# Patient Record
Sex: FEMALE | Race: WHITE | Employment: UNEMPLOYED | URBAN - METROPOLITAN AREA
[De-identification: names, ages, dates, MRNs, and addresses within clinical notes are randomized per-mention and may not be internally consistent; named-entity substitution may affect disease eponyms.]

---

## 2018-02-24 ENCOUNTER — APPOINTMENT (OUTPATIENT)
Dept: MRI IMAGING | Age: 38
End: 2018-02-24
Attending: EMERGENCY MEDICINE
Payer: COMMERCIAL

## 2018-02-24 ENCOUNTER — APPOINTMENT (OUTPATIENT)
Dept: CT IMAGING | Age: 38
End: 2018-02-24
Attending: EMERGENCY MEDICINE
Payer: COMMERCIAL

## 2018-02-24 ENCOUNTER — HOSPITAL ENCOUNTER (EMERGENCY)
Age: 38
Discharge: HOME | End: 2018-02-25
Attending: EMERGENCY MEDICINE
Payer: COMMERCIAL

## 2018-02-24 DIAGNOSIS — R22.1 NECK SWELLING: ICD-10-CM

## 2018-02-24 DIAGNOSIS — K86.89 PANCREATIC MASS: ICD-10-CM

## 2018-02-24 DIAGNOSIS — D49.0 INTRADUCTAL PAPILLARY MUCINOUS NEOPLASM OF PANCREAS: ICD-10-CM

## 2018-02-24 DIAGNOSIS — R07.9 CHEST PAIN, UNSPECIFIED TYPE: ICD-10-CM

## 2018-02-24 DIAGNOSIS — R22.0 FACIAL SWELLING: Primary | ICD-10-CM

## 2018-02-24 LAB
ALBUMIN SERPL-MCNC: 3.7 G/DL (ref 3.5–5)
ALBUMIN/GLOB SERPL: 1.1 {RATIO} (ref 1.1–2.2)
ALP SERPL-CCNC: 112 U/L (ref 45–117)
ALT SERPL-CCNC: 26 U/L (ref 12–78)
ANION GAP SERPL CALC-SCNC: 7 MMOL/L (ref 5–15)
AST SERPL-CCNC: 25 U/L (ref 15–37)
BASOPHILS # BLD: 0.1 K/UL (ref 0–0.1)
BASOPHILS NFR BLD: 1 % (ref 0–1)
BILIRUB SERPL-MCNC: 0.3 MG/DL (ref 0.2–1)
BUN SERPL-MCNC: 7 MG/DL (ref 6–20)
BUN/CREAT SERPL: 9 (ref 12–20)
CALCIUM SERPL-MCNC: 8.8 MG/DL (ref 8.5–10.1)
CHLORIDE SERPL-SCNC: 100 MMOL/L (ref 97–108)
CK MB CFR SERPL CALC: NORMAL % (ref 0–2.5)
CK MB SERPL-MCNC: <1 NG/ML (ref 5–25)
CK SERPL-CCNC: 46 U/L (ref 26–192)
CO2 SERPL-SCNC: 31 MMOL/L (ref 21–32)
CREAT SERPL-MCNC: 0.74 MG/DL (ref 0.55–1.02)
DIFFERENTIAL METHOD BLD: ABNORMAL
EOSINOPHIL # BLD: 0.5 K/UL (ref 0–0.4)
EOSINOPHIL NFR BLD: 4 % (ref 0–7)
ERYTHROCYTE [DISTWIDTH] IN BLOOD BY AUTOMATED COUNT: 13.9 % (ref 11.5–14.5)
GLOBULIN SER CALC-MCNC: 3.5 G/DL (ref 2–4)
GLUCOSE SERPL-MCNC: 111 MG/DL (ref 65–100)
HCG UR QL: NEGATIVE
HCT VFR BLD AUTO: 41.3 % (ref 35–47)
HGB BLD-MCNC: 13.9 G/DL (ref 11.5–16)
IMM GRANULOCYTES # BLD: 0.1 K/UL (ref 0–0.04)
IMM GRANULOCYTES NFR BLD AUTO: 1 % (ref 0–0.5)
INR PPP: 1 (ref 0.9–1.1)
LIPASE SERPL-CCNC: 44 U/L (ref 73–393)
LYMPHOCYTES # BLD: 3.8 K/UL (ref 0.8–3.5)
LYMPHOCYTES NFR BLD: 32 % (ref 12–49)
MCH RBC QN AUTO: 33.4 PG (ref 26–34)
MCHC RBC AUTO-ENTMCNC: 33.7 G/DL (ref 30–36.5)
MCV RBC AUTO: 99.3 FL (ref 80–99)
MONOCYTES # BLD: 0.7 K/UL (ref 0–1)
MONOCYTES NFR BLD: 6 % (ref 5–13)
NEUTS SEG # BLD: 6.6 K/UL (ref 1.8–8)
NEUTS SEG NFR BLD: 56 % (ref 32–75)
NRBC # BLD: 0 K/UL (ref 0–0.01)
NRBC BLD-RTO: 0 PER 100 WBC
PLATELET # BLD AUTO: 348 K/UL (ref 150–400)
PMV BLD AUTO: 9.1 FL (ref 8.9–12.9)
POTASSIUM SERPL-SCNC: 3.3 MMOL/L (ref 3.5–5.1)
PROT SERPL-MCNC: 7.2 G/DL (ref 6.4–8.2)
PROTHROMBIN TIME: 10 SEC (ref 9–11.1)
RBC # BLD AUTO: 4.16 M/UL (ref 3.8–5.2)
SODIUM SERPL-SCNC: 138 MMOL/L (ref 136–145)
T4 FREE SERPL-MCNC: 0.8 NG/DL (ref 0.8–1.5)
TROPONIN I SERPL-MCNC: <0.04 NG/ML
TSH SERPL DL<=0.05 MIU/L-ACNC: 2.16 UIU/ML (ref 0.36–3.74)
WBC # BLD AUTO: 11.7 K/UL (ref 3.6–11)

## 2018-02-24 PROCEDURE — 74011250636 HC RX REV CODE- 250/636: Performed by: EMERGENCY MEDICINE

## 2018-02-24 PROCEDURE — 85025 COMPLETE CBC W/AUTO DIFF WBC: CPT | Performed by: EMERGENCY MEDICINE

## 2018-02-24 PROCEDURE — 84443 ASSAY THYROID STIM HORMONE: CPT | Performed by: EMERGENCY MEDICINE

## 2018-02-24 PROCEDURE — 74011636320 HC RX REV CODE- 636/320: Performed by: RADIOLOGY

## 2018-02-24 PROCEDURE — 99285 EMERGENCY DEPT VISIT HI MDM: CPT

## 2018-02-24 PROCEDURE — 94762 N-INVAS EAR/PLS OXIMTRY CONT: CPT

## 2018-02-24 PROCEDURE — 93005 ELECTROCARDIOGRAM TRACING: CPT

## 2018-02-24 PROCEDURE — 85610 PROTHROMBIN TIME: CPT | Performed by: EMERGENCY MEDICINE

## 2018-02-24 PROCEDURE — 84484 ASSAY OF TROPONIN QUANT: CPT | Performed by: EMERGENCY MEDICINE

## 2018-02-24 PROCEDURE — A9577 INJ MULTIHANCE: HCPCS | Performed by: EMERGENCY MEDICINE

## 2018-02-24 PROCEDURE — 71275 CT ANGIOGRAPHY CHEST: CPT

## 2018-02-24 PROCEDURE — 74011250636 HC RX REV CODE- 250/636

## 2018-02-24 PROCEDURE — 83690 ASSAY OF LIPASE: CPT | Performed by: EMERGENCY MEDICINE

## 2018-02-24 PROCEDURE — 82553 CREATINE MB FRACTION: CPT | Performed by: EMERGENCY MEDICINE

## 2018-02-24 PROCEDURE — 84439 ASSAY OF FREE THYROXINE: CPT | Performed by: EMERGENCY MEDICINE

## 2018-02-24 PROCEDURE — 74182 MRI ABDOMEN W/CONTRAST: CPT

## 2018-02-24 PROCEDURE — 96375 TX/PRO/DX INJ NEW DRUG ADDON: CPT

## 2018-02-24 PROCEDURE — 80053 COMPREHEN METABOLIC PANEL: CPT | Performed by: EMERGENCY MEDICINE

## 2018-02-24 PROCEDURE — 96361 HYDRATE IV INFUSION ADD-ON: CPT

## 2018-02-24 PROCEDURE — 81025 URINE PREGNANCY TEST: CPT

## 2018-02-24 PROCEDURE — 70450 CT HEAD/BRAIN W/O DYE: CPT

## 2018-02-24 PROCEDURE — 96374 THER/PROPH/DIAG INJ IV PUSH: CPT

## 2018-02-24 PROCEDURE — 96376 TX/PRO/DX INJ SAME DRUG ADON: CPT

## 2018-02-24 PROCEDURE — 36415 COLL VENOUS BLD VENIPUNCTURE: CPT | Performed by: EMERGENCY MEDICINE

## 2018-02-24 PROCEDURE — 74183 MRI ABD W/O CNTR FLWD CNTR: CPT

## 2018-02-24 PROCEDURE — 70491 CT SOFT TISSUE NECK W/DYE: CPT

## 2018-02-24 RX ORDER — KETOROLAC TROMETHAMINE 30 MG/ML
15 INJECTION, SOLUTION INTRAMUSCULAR; INTRAVENOUS
Status: COMPLETED | OUTPATIENT
Start: 2018-02-24 | End: 2018-02-24

## 2018-02-24 RX ORDER — HYDROMORPHONE HYDROCHLORIDE 2 MG/ML
0.5 INJECTION, SOLUTION INTRAMUSCULAR; INTRAVENOUS; SUBCUTANEOUS
Status: COMPLETED | OUTPATIENT
Start: 2018-02-24 | End: 2018-02-24

## 2018-02-24 RX ORDER — HYDROMORPHONE HYDROCHLORIDE 2 MG/ML
INJECTION, SOLUTION INTRAMUSCULAR; INTRAVENOUS; SUBCUTANEOUS
Status: COMPLETED
Start: 2018-02-24 | End: 2018-02-24

## 2018-02-24 RX ORDER — SODIUM CHLORIDE 0.9 % (FLUSH) 0.9 %
5-10 SYRINGE (ML) INJECTION AS NEEDED
Status: DISCONTINUED | OUTPATIENT
Start: 2018-02-24 | End: 2018-02-25 | Stop reason: HOSPADM

## 2018-02-24 RX ORDER — ONDANSETRON 2 MG/ML
4 INJECTION INTRAMUSCULAR; INTRAVENOUS
Status: COMPLETED | OUTPATIENT
Start: 2018-02-24 | End: 2018-02-24

## 2018-02-24 RX ORDER — HYDROMORPHONE HYDROCHLORIDE 2 MG/ML
0.5 INJECTION, SOLUTION INTRAMUSCULAR; INTRAVENOUS; SUBCUTANEOUS ONCE
Status: COMPLETED | OUTPATIENT
Start: 2018-02-24 | End: 2018-02-24

## 2018-02-24 RX ORDER — HYDROMORPHONE HYDROCHLORIDE 2 MG/ML
0.5 INJECTION, SOLUTION INTRAMUSCULAR; INTRAVENOUS; SUBCUTANEOUS ONCE
Status: DISCONTINUED | OUTPATIENT
Start: 2018-02-24 | End: 2018-02-24

## 2018-02-24 RX ADMIN — GADOBENATE DIMEGLUMINE 14 ML: 529 INJECTION, SOLUTION INTRAVENOUS at 21:07

## 2018-02-24 RX ADMIN — SODIUM CHLORIDE 1000 ML: 900 INJECTION, SOLUTION INTRAVENOUS at 15:05

## 2018-02-24 RX ADMIN — IOPAMIDOL 100 ML: 755 INJECTION, SOLUTION INTRAVENOUS at 15:47

## 2018-02-24 RX ADMIN — KETOROLAC TROMETHAMINE 15 MG: 30 INJECTION, SOLUTION INTRAMUSCULAR at 18:36

## 2018-02-24 RX ADMIN — HYDROMORPHONE HYDROCHLORIDE 0.5 MG: 2 INJECTION INTRAMUSCULAR; INTRAVENOUS; SUBCUTANEOUS at 23:28

## 2018-02-24 RX ADMIN — IOPAMIDOL 100 ML: 755 INJECTION, SOLUTION INTRAVENOUS at 16:52

## 2018-02-24 RX ADMIN — ONDANSETRON 4 MG: 2 INJECTION INTRAMUSCULAR; INTRAVENOUS at 19:52

## 2018-02-24 RX ADMIN — HYDROMORPHONE HYDROCHLORIDE 2 MG: 2 INJECTION INTRAMUSCULAR; INTRAVENOUS; SUBCUTANEOUS at 19:57

## 2018-02-24 RX ADMIN — KETOROLAC TROMETHAMINE 15 MG: 30 INJECTION, SOLUTION INTRAMUSCULAR at 17:53

## 2018-02-24 RX ADMIN — HYDROMORPHONE HYDROCHLORIDE 2 MG: 2 INJECTION, SOLUTION INTRAMUSCULAR; INTRAVENOUS; SUBCUTANEOUS at 19:57

## 2018-02-24 NOTE — DISCHARGE INSTRUCTIONS
Chest Pain: Care Instructions  Your Care Instructions    There are many things that can cause chest pain. Some are not serious and will get better on their own in a few days. But some kinds of chest pain need more testing and treatment. Your doctor may have recommended a follow-up visit in the next 8 to 12 hours. If you are not getting better, you may need more tests or treatment. Even though your doctor has released you, you still need to watch for any problems. The doctor carefully checked you, but sometimes problems can develop later. If you have new symptoms or if your symptoms do not get better, get medical care right away. If you have worse or different chest pain or pressure that lasts more than 5 minutes or you passed out (lost consciousness), call 911 or seek other emergency help right away. A medical visit is only one step in your treatment. Even if you feel better, you still need to do what your doctor recommends, such as going to all suggested follow-up appointments and taking medicines exactly as directed. This will help you recover and help prevent future problems. How can you care for yourself at home? · Rest until you feel better. · Take your medicine exactly as prescribed. Call your doctor if you think you are having a problem with your medicine. · Do not drive after taking a prescription pain medicine. When should you call for help? Call 911 if:  ? · You passed out (lost consciousness). ? · You have severe difficulty breathing. ? · You have symptoms of a heart attack. These may include:  ¨ Chest pain or pressure, or a strange feeling in your chest.  ¨ Sweating. ¨ Shortness of breath. ¨ Nausea or vomiting. ¨ Pain, pressure, or a strange feeling in your back, neck, jaw, or upper belly or in one or both shoulders or arms. ¨ Lightheadedness or sudden weakness. ¨ A fast or irregular heartbeat.   After you call 911, the  may tell you to chew 1 adult-strength or 2 to 4 low-dose aspirin. Wait for an ambulance. Do not try to drive yourself. ?Call your doctor today if:  ? · You have any trouble breathing. ? · Your chest pain gets worse. ? · You are dizzy or lightheaded, or you feel like you may faint. ? · You are not getting better as expected. ? · You are having new or different chest pain. Where can you learn more? Go to http://maria c-checo.info/. Enter A120 in the search box to learn more about \"Chest Pain: Care Instructions. \"  Current as of: March 20, 2017  Content Version: 11.4  © 4291-1656 [employer]. Care instructions adapted under license by [employer] (which disclaims liability or warranty for this information). If you have questions about a medical condition or this instruction, always ask your healthcare professional. [place] [address] any warranty or liability for your use of this information. Leg and Ankle Edema: Care Instructions  Your Care Instructions  Swelling in the legs, ankles, and feet is called edema. It is common after you sit or stand for a while. Long plane flights or car rides often cause swelling in the legs and feet. You may also have swelling if you have to stand for long periods of time at your job. Problems with the veins in the legs (varicose veins) and changes in hormones can also cause swelling. Sometimes the swelling in the ankles and feet is caused by a more serious problem, such as heart failure, infection, blood clots, or liver or kidney disease. Follow-up care is a key part of your treatment and safety. Be sure to make and go to all appointments, and call your doctor if you are having problems. It's also a good idea to know your test results and keep a list of the medicines you take. How can you care for yourself at home? · If your doctor gave you medicine, take it as prescribed.  Call your doctor if you think you are having a problem with your medicine. · Whenever you are resting, raise your legs up. Try to keep the swollen area higher than the level of your heart. · Take breaks from standing or sitting in one position. ¨ Walk around to increase the blood flow in your lower legs. ¨ Move your feet and ankles often while you stand, or tighten and relax your leg muscles. · Wear support stockings. Put them on in the morning, before swelling gets worse. · Eat a balanced diet. Lose weight if you need to. · Limit the amount of salt (sodium) in your diet. Salt holds fluid in the body and may increase swelling. When should you call for help? Call 911 anytime you think you may need emergency care. For example, call if:  ? · You have symptoms of a blood clot in your lung (called a pulmonary embolism). These may include:  ¨ Sudden chest pain. ¨ Trouble breathing. ¨ Coughing up blood. ?Call your doctor now or seek immediate medical care if:  ? · You have signs of a blood clot, such as:  ¨ Pain in your calf, back of the knee, thigh, or groin. ¨ Redness and swelling in your leg or groin. ? · You have symptoms of infection, such as:  ¨ Increased pain, swelling, warmth, or redness. ¨ Red streaks or pus. ¨ A fever. ? Watch closely for changes in your health, and be sure to contact your doctor if:  ? · Your swelling is getting worse. ? · You have new or worsening pain in your legs. ? · You do not get better as expected. Where can you learn more? Go to http://maria c-checo.info/. Enter Z233 in the search box to learn more about \"Leg and Ankle Edema: Care Instructions. \"  Current as of: March 20, 2017  Content Version: 11.4  © 5623-4479 [employer]. Care instructions adapted under license by [employer] (which disclaims liability or warranty for this information).  If you have questions about a medical condition or this instruction, always ask your healthcare professional. [place] [address] any warranty or liability for your use of this information.

## 2018-02-24 NOTE — Clinical Note
Thank you for allowing us to provide you with medical care today. We realize that you have many choices for your emergency care needs. We thank you for choosing [facility]. Please choose us in the future for any continued health care needs. We hope we addressed all of your medical concerns. We strive to provide excellent quality care in the Emergency Department. Anything less than excellent does not meet our expectations. The exam and treatment you received in the Emergency Department  were for an emergent problem and are not intended as complete care. It is important that you follow up with a doctor, nurse practitioner, or physician's assistant for ongoing care. If your symptoms worsen or you do not improve as expected and you are un able to reach your usual health care provider, you should return to the Emergency Department. We are available 24 hours a day. Take this sheet with you when you go to your follow-up visit. If you have any problem arranging the follow-up visit, co ntact the Emergency Department immediately. Make an appointment your family doctor for follow up of this visit. Return to the ER if you are unable to be seen in a timely manner.

## 2018-02-24 NOTE — ED TRIAGE NOTES
Swollen lymph nodes in neck since August. Reports that she went to [facility] Thursday and was referred to ENT. Having numbness in face and upper chest. Feels a pressure in chest for the last week.

## 2018-02-24 NOTE — ED PROVIDER NOTES
HPI Comments: 42 y.o. female with no significant past medical history who presents to the ED with chief complaint of neck swelling. Pt reports swelling in the lymph nodes in her neck onset last summer that has gotten progressively worse over the past 1-2 months. Pt now reports that for the past few days her neck swelling and pain has gotten worse and she is now having difficulty eating and swallowing, says it \"feels like fullness. \" Pt states she has also had facial numbness and chest pain that \"feels like her chest is bruised,\" says she gets a \"sharp\" pain when she takes a deep breath. Pt states today she had SOB with exertion, chest pain, and fatigue. Pt states she has also lost about 25-30 lbs since the end of October but recently gained back 5 lbs. Pt states she was seen at [facility] 2 days ago and had a negative x-ray, normal LFT's, and an elevated white count of 12,000. Pt states she has an appointment with ENT in 6 days. Pt denies hx of seasonal allergies. Pt denies fever, chills, cough, choking, voice changes, wheezing, or abdominal pain. There are no other acute medical complaints voiced at this time. Social Hx: Smoker. Uses [facility]. PCP: Siobhan Jiang MD    Note written by Roldan Valenzuela, as dictated by Ramona Bonilla MD 2:32 PM     The history is provided by the patient. No past medical history on file. Past Surgical History:   Procedure Laterality Date    HX GYN      endometriosis surgery         Family History:   Problem Relation Age of Onset    Cancer Mother      colon       Social History     Social History    Marital status:      Spouse name: N/A    Number of children: N/A    Years of education: N/A     Occupational History    Not on file.      Social History Main Topics    Smoking status: Current Every Day Smoker    Smokeless tobacco: Not on file    Alcohol use 2.5 oz/week     5 Glasses of wine per week    Drug use: Not on file    Sexual activity: Not on file     Other Topics Concern    Not on file     Social History Narrative    No narrative on file         ALLERGIES: Amoxicillin    Review of Systems   Constitutional: Positive for fatigue and unexpected weight change. Negative for chills and fever. HENT: Positive for trouble swallowing. Negative for voice change. Respiratory: Positive for shortness of breath (exertional). Negative for cough, choking and wheezing. Cardiovascular: Positive for chest pain. Gastrointestinal: Negative for abdominal pain. Musculoskeletal: Positive for neck pain (and swelling). Neurological: Positive for numbness (face). All other systems reviewed and are negative. Vitals:    02/24/18 2100 02/24/18 2322 02/24/18 2330 02/25/18 0100   BP: (!) 130/100 120/84 120/84 128/76   Pulse: 79  72    Resp: 10      Temp:       SpO2: 94%  95% 96%   Weight:       Height:                Physical Exam   Constitutional: She is oriented to person, place, and time. She appears well-developed and well-nourished. NAD, AxOx4, speaking in complete sentences    Pt exhibits a 'puffiness' bilat ant chest/ about her neck/ post head/ no  Palpable masses/ I did palpate 1 lnode ant cervical chain; does not appear cellulitis/ no redness/ stridor/ subglottic ttp;    HENT:   Head: Normocephalic and atraumatic. Nose: Nose normal.   Mouth/Throat: Oropharynx is clear and moist.   Cn intact   Eyes: Conjunctivae and EOM are normal. Pupils are equal, round, and reactive to light. Right eye exhibits no discharge. Left eye exhibits no discharge. No scleral icterus. Neck: Normal range of motion. Neck supple. No JVD present. No tracheal deviation present. An above   Cardiovascular: Normal rate, regular rhythm and normal heart sounds. Exam reveals no gallop and no friction rub. No murmur heard. Pulmonary/Chest: Effort normal and breath sounds normal. No respiratory distress. She has no wheezes. She has no rales. She exhibits no tenderness. Abdominal: Soft. Bowel sounds are normal. There is no tenderness. There is no rebound and no guarding. nttp     Genitourinary: No vaginal discharge found. Genitourinary Comments: Pt denies urinary/ vaginal complaints   Musculoskeletal: Normal range of motion. She exhibits no edema, tenderness or deformity. Lymphadenopathy:     She has cervical adenopathy. Neurological: She is alert and oriented to person, place, and time. She has normal reflexes. No cranial nerve deficit. She exhibits normal muscle tone. Coordination normal.   pt has motor/ CV/ Sensation grossly intact to all extremities, R = L in strength;   Skin: Skin is warm and dry. No rash noted. No erythema. No pallor. Psychiatric: She has a normal mood and affect. Her behavior is normal. Thought content normal.   Nursing note and vitals reviewed. [facility]      ED Course       Procedures    Chief Complaint   Patient presents with    Gland Swelling    Chest Pain       2:18 PM  The patients presenting problems have been discussed, and they are in agreement with the care plan formulated and outlined with them. I have encouraged them to ask questions as they arise throughout their visit. MEDICATIONS GIVEN:  Medications   sodium chloride (NS) flush 5-10 mL (not administered)       LABS REVIEWED:  Labs Reviewed - No data to display    RADIOLOGY RESULTS:  The following have been ordered and reviewed:  _____________________________________________________________________  _____________________________________________________________________    EKG interpretation:   Rhythm:  Sinus tachycardia  rhythm; and regular . Rate (approx.): 110; Axis: normal; P wave: normal; QRS interval: normal ; ST/T wave: normal; Negative acute significant segmental elevations/ no old;     PROCEDURES:        CONSULTATIONS:       PROGRESS NOTES:      DIAGNOSIS:    1. Facial swelling    2. Neck swelling    3. Chest pain, unspecified type    4. Pancreatic mass    5. Intraductal papillary mucinous neoplasm of pancreas        PLAN:  1- pancreatic neoplasm of pancrease - GI/ Heme-onc follow-up;  2 generalized upper body swelling - neg ed evaluation      ED COURSE: The patients hospital course has been uncomplicated. 4:18 PM  'doing ok' awaiting results;     5:30 PM  Pt/ Father told of results/ need for MRI Pancrease; they agree w/ plans;     6:05 PM  Change of shift. Care of patient signed over to Dr Colleen Ponce. Handoff complete.

## 2018-02-25 VITALS
SYSTOLIC BLOOD PRESSURE: 128 MMHG | HEIGHT: 63 IN | BODY MASS INDEX: 28.55 KG/M2 | WEIGHT: 161.16 LBS | OXYGEN SATURATION: 96 % | DIASTOLIC BLOOD PRESSURE: 76 MMHG | HEART RATE: 72 BPM | TEMPERATURE: 98.5 F | RESPIRATION RATE: 10 BRPM

## 2018-02-25 NOTE — ED NOTES
Change of shift. Care of patient taken over from Dr. Meryle Concha; H&P reviewed, handoff complete. Awaiting labs/imaging/consultant: MRI pending d/t finding on CT. Note written by Roldan Jorgensen, as dictated by Brittney Martin. Marli Murray MD 6:00 PM    Pt signed out to Dr. Ilya Smallwood at 22:30 with MRI still pending. Brittney Martin.  Marli Murray MD

## 2018-02-25 NOTE — ED NOTES
Discharge instructions given by provider, patient and  verbalized an understanding, agree to discharge plan. No further questions at this time.

## 2018-02-25 NOTE — ED NOTES
10:48 PM  Change of shift. Care of patient taken over from Gisella Polk to ; H&P reviewed, handoff complete. Awaiting labs/imaging/consultant.

## 2018-02-26 LAB
ATRIAL RATE: 109 BPM
CALCULATED P AXIS, ECG09: 55 DEGREES
CALCULATED R AXIS, ECG10: 25 DEGREES
CALCULATED T AXIS, ECG11: 23 DEGREES
DIAGNOSIS, 93000: NORMAL
P-R INTERVAL, ECG05: 128 MS
Q-T INTERVAL, ECG07: 338 MS
QRS DURATION, ECG06: 84 MS
QTC CALCULATION (BEZET), ECG08: 455 MS
VENTRICULAR RATE, ECG03: 109 BPM

## 2018-02-27 NOTE — PROGRESS NOTES
7: 07 AM  Called by radiology/ Dr Ramos Amaya confirmed pt had a GI appt for follow-up;  Pt to see Dr Soto Aver Dr Melvina Dia

## 2018-03-09 ENCOUNTER — OFFICE VISIT (OUTPATIENT)
Dept: ONCOLOGY | Age: 38
End: 2018-03-09

## 2018-03-09 VITALS
RESPIRATION RATE: 16 BRPM | HEART RATE: 68 BPM | WEIGHT: 162 LBS | TEMPERATURE: 97.9 F | SYSTOLIC BLOOD PRESSURE: 154 MMHG | DIASTOLIC BLOOD PRESSURE: 103 MMHG | OXYGEN SATURATION: 97 % | HEIGHT: 63 IN | BODY MASS INDEX: 28.7 KG/M2

## 2018-03-09 DIAGNOSIS — R22.1 NECK SWELLING: ICD-10-CM

## 2018-03-09 DIAGNOSIS — D72.820 LYMPHOCYTOSIS: Primary | ICD-10-CM

## 2018-03-09 RX ORDER — TRAZODONE HYDROCHLORIDE 50 MG/1
50 TABLET ORAL
COMMUNITY
End: 2018-04-30

## 2018-03-09 NOTE — PROGRESS NOTES
[facility] at [address], [address] [place] [address]  Danya Hove: [phone]  F: [phone]      Reason for Visit:   Ross Ealm is a 42 y.o. female who is seen in consultation at the request of Dr. Katia Cloud (ED physician) for evaluation of lymphadenopathy. History of Present Illness:   Ross Elam is a pleasant 42 y.o. female who presents today for evaluation of lymphadenopathy. She presented to the ED recently complaining of progressive neck fullness/swelling, along with pain and dysphagia. The swelling is painful and progressive, worse over the past few weeks. Some facial numbness as well. She tells me \"I can't feel when my eyes are watering. \"  Occasional chest pain, sharp, and dyspnea with exertion. Fatigue. Progressive weight loss, about 25 pounds in 6 months, though this has stabilized. Some night sweats, no fevers or chills. Tingling and numbness in arms. Prominent veins in face and chest.  She reports being told by several physicians that she likely had cancer and needed to see an oncologist.  In the ED she had imaging that revealed no evidence of adenopathy. She was noted to have an abnormality in her pancreas, and has seen GI about this. She was referred to ENT, and they could find nothing wrong. She is here for further evaluation. She is accompanied by her  today.     Past Medical History:   Diagnosis Date    Anxiety     Blurred vision     Chest pain     Depression     Frequent headaches     Muscle weakness     Shortness of breath     Stomach pain     Swallowing difficulty       Past Surgical History:   Procedure Laterality Date    HX GYN      endometriosis surgery      Social History   Substance Use Topics    Smoking status: Current Every Day Smoker    Smokeless tobacco: Never Used    Alcohol use 2.5 oz/week     5 Glasses of wine per week      Family History   Problem Relation Age of Onset    Cancer Mother      colon     Current Outpatient Prescriptions   Medication Sig    traZODone (DESYREL) 50 mg tablet Take  by mouth nightly.  duloxetine (CYMBALTA) 60 mg capsule      No current facility-administered medications for this visit. Allergies   Allergen Reactions    Amoxicillin Shortness of Breath and Rash     Pt has had keflex in the past        Review of Systems: A complete review of systems was obtained, negative except as described above. Physical Exam:     Visit Vitals    BP (!) 154/103 (BP 1 Location: Left arm, BP Patient Position: Sitting)    Pulse 68    Temp 97.9 °F (36.6 °C) (Oral)    Resp 16    Ht 5' 3\" (1.6 m)    Wt 162 lb (73.5 kg)    LMP 02/14/2018 (Exact Date)    SpO2 97%    BMI 28.7 kg/m2     General: No distress  Eyes: PERRLA, anicteric sclerae  HENT: Atraumatic, OP clear  Neck: Supple. Some soft tissue edema. Lymphatic: No cervical, supraclavicular, or inguinal adenopathy  Respiratory: CTAB, normal respiratory effort  CV: Normal rate, regular rhythm, no murmurs, no peripheral edema  GI: Soft, nontender, nondistended, no masses, no hepatomegaly, no splenomegaly  MS: Normal gait and station. Digits without clubbing or cyanosis. Skin: No rashes, ecchymoses, or petechiae. Normal temperature, turgor, and texture. Psych: Alert, oriented, appropriate affect, normal judgment/insight    Results:     Lab Results   Component Value Date/Time    WBC 11.7 (H) 02/24/2018 03:01 PM    HGB 13.9 02/24/2018 03:01 PM    HCT 41.3 02/24/2018 03:01 PM    PLATELET 164 39/77/6621 03:01 PM    MCV 99.3 (H) 02/24/2018 03:01 PM    ABS.  NEUTROPHILS 6.6 02/24/2018 03:01 PM     Lab Results   Component Value Date/Time    Sodium 138 02/24/2018 03:01 PM    Potassium 3.3 (L) 02/24/2018 03:01 PM    Chloride 100 02/24/2018 03:01 PM    CO2 31 02/24/2018 03:01 PM    Glucose 111 (H) 02/24/2018 03:01 PM    BUN 7 02/24/2018 03:01 PM    Creatinine 0.74 02/24/2018 03:01 PM    GFR est AA >60 02/24/2018 03:01 PM GFR est non-AA >60 02/24/2018 03:01 PM    Calcium 8.8 02/24/2018 03:01 PM     Lab Results   Component Value Date/Time    Bilirubin, total 0.3 02/24/2018 03:01 PM    ALT (SGPT) 26 02/24/2018 03:01 PM    AST (SGOT) 25 02/24/2018 03:01 PM    Alk. phosphatase 112 02/24/2018 03:01 PM    Protein, total 7.2 02/24/2018 03:01 PM    Albumin 3.7 02/24/2018 03:01 PM    Globulin 3.5 02/24/2018 03:01 PM         CT A/P 6/25/2011:  1. There is a well-defined lucent lesion in the pancreas as described. While this may be a pseudocyst, other cystic lesion of pancreatic parenchyma cannot be excluded. There is no evidence of pancreatitis. .  2. No other evidence of inflammatory change or acute process noted in the abdomen or pelvis. CT Head wo contrast 2/24/2018: normal    CT Neck 2/24/2018: normal, no mass or adenopathy    CTA Chest 2/24/2018  1. No evidence for pulmonary embolism. 2. Cystic pancreatic mass. Further evaluation with MRI is recommended. MRI abdomen 2/24/2018:  1. Cystic mass in the pancreatic body tail junction (28 mm), increased from 2011. Single internal septation is predominantly fine, though there is a thickened portion. New duct dilation and parenchymal atrophy in the tail. This is consistent with a low-grade mucinous neoplasm. Mucinous cystadenoma and sidebranch IPMN are favored over main duct IPMN and mucinous cystadenocarcinoma. Nonemergent GI consultation is recommended for consideration of endoscopic ultrasound. 2. Moderate hepatic steatosis. 3. At least partial annular pancreas. Records reviewed and summarized above. Assessment:   1) Neck fullness/edema  Uncertain etiology. She was referred to see me for lymphadenopathy, but CT of neck was unremarkable, no lymphadenopathy noted, no mass noted. Likewise no adenopathy on CT Chest or MRI abdomen. ENT evaluation was apparently unremarkable as well. I reassured her that she has no evidence of malignancy.   From a hematology/oncology perspective, in the absence of malignancy or adenopathy, I unfortunately dont have much to offer to help with her symptoms. I'm not sure where to go from here. We did discuss the option of rheumatology evaluation if her inflammatory labs are elevated and the symptoms persist.  She should also follow up with her PCP. 2) Pancreatic cystic lesion  Likely an IPMN. The next step in evaluation is to see GI for possible EUS. The results of this study will guide further treatment recommendations, such as surgery vs surveillance. I will defer further evaluation to GI, and remain on standby should malignancy be discovered. 3) Lymphocytosis  Relative lymphocytosis noted on recent CBC, as well as CBC back in 2011. Though absent in 2014. This is likely reactive, to whatever inflammatory issue is causing her current symptoms. Given that the cause of her symptoms remains elusive, I will check some additional labs to further evaluate. Plan:     · Labs today: CBC, LD, ESR, CRP, DOMONIQUE, flow cytometry, smear ([facility])  · Follow up with GI - has procedure planned with Dr. Reginald Saha on 3/14  · Follow up TBD      I appreciate the opportunity to participate in Ms. Alayna Hanks's care.     Signed By: Bessie Figueroa MD

## 2018-03-11 ENCOUNTER — HOSPITAL ENCOUNTER (EMERGENCY)
Age: 38
Discharge: HOME | End: 2018-03-11
Attending: STUDENT IN AN ORGANIZED HEALTH CARE EDUCATION/TRAINING PROGRAM
Payer: COMMERCIAL

## 2018-03-11 ENCOUNTER — TELEPHONE (OUTPATIENT)
Dept: ONCOLOGY | Age: 38
End: 2018-03-11

## 2018-03-11 VITALS
OXYGEN SATURATION: 93 % | SYSTOLIC BLOOD PRESSURE: 114 MMHG | BODY MASS INDEX: 29.06 KG/M2 | DIASTOLIC BLOOD PRESSURE: 83 MMHG | TEMPERATURE: 98.4 F | HEIGHT: 63 IN | RESPIRATION RATE: 16 BRPM | WEIGHT: 164 LBS | HEART RATE: 95 BPM

## 2018-03-11 DIAGNOSIS — R10.12 ABDOMINAL PAIN, LUQ (LEFT UPPER QUADRANT): Primary | ICD-10-CM

## 2018-03-11 DIAGNOSIS — Z72.0 TOBACCO ABUSE: ICD-10-CM

## 2018-03-11 LAB
ALBUMIN SERPL-MCNC: 3.3 G/DL (ref 3.5–5)
ALBUMIN/GLOB SERPL: 0.8 {RATIO} (ref 1.1–2.2)
ALP SERPL-CCNC: 113 U/L (ref 45–117)
ALT SERPL-CCNC: 47 U/L (ref 12–78)
ANION GAP SERPL CALC-SCNC: 8 MMOL/L (ref 5–15)
APPEARANCE UR: ABNORMAL
AST SERPL-CCNC: 63 U/L (ref 15–37)
BACTERIA URNS QL MICRO: ABNORMAL /HPF
BASOPHILS # BLD: 0.1 K/UL (ref 0–0.1)
BASOPHILS NFR BLD: 1 % (ref 0–1)
BILIRUB SERPL-MCNC: 0.2 MG/DL (ref 0.2–1)
BILIRUB UR QL: NEGATIVE
BUN SERPL-MCNC: 6 MG/DL (ref 6–20)
BUN/CREAT SERPL: 10 (ref 12–20)
CALCIUM SERPL-MCNC: 8.1 MG/DL (ref 8.5–10.1)
CHLORIDE SERPL-SCNC: 107 MMOL/L (ref 97–108)
CO2 SERPL-SCNC: 24 MMOL/L (ref 21–32)
COLOR UR: ABNORMAL
CREAT SERPL-MCNC: 0.61 MG/DL (ref 0.55–1.02)
DIFFERENTIAL METHOD BLD: ABNORMAL
EOSINOPHIL # BLD: 0.4 K/UL (ref 0–0.4)
EOSINOPHIL NFR BLD: 4 % (ref 0–7)
EPITH CASTS URNS QL MICRO: ABNORMAL /LPF
ERYTHROCYTE [DISTWIDTH] IN BLOOD BY AUTOMATED COUNT: 13.7 % (ref 11.5–14.5)
GLOBULIN SER CALC-MCNC: 4 G/DL (ref 2–4)
GLUCOSE SERPL-MCNC: 151 MG/DL (ref 65–100)
GLUCOSE UR STRIP.AUTO-MCNC: NEGATIVE MG/DL
HCG UR QL: NEGATIVE
HCT VFR BLD AUTO: 41.6 % (ref 35–47)
HGB BLD-MCNC: 14.5 G/DL (ref 11.5–16)
HGB UR QL STRIP: NEGATIVE
IMM GRANULOCYTES # BLD: 0.1 K/UL (ref 0–0.04)
IMM GRANULOCYTES NFR BLD AUTO: 1 % (ref 0–0.5)
KETONES UR QL STRIP.AUTO: NEGATIVE MG/DL
LEUKOCYTE ESTERASE UR QL STRIP.AUTO: ABNORMAL
LIPASE SERPL-CCNC: 57 U/L (ref 73–393)
LYMPHOCYTES # BLD: 3.3 K/UL (ref 0.8–3.5)
LYMPHOCYTES NFR BLD: 36 % (ref 12–49)
MCH RBC QN AUTO: 34.3 PG (ref 26–34)
MCHC RBC AUTO-ENTMCNC: 34.9 G/DL (ref 30–36.5)
MCV RBC AUTO: 98.3 FL (ref 80–99)
MONOCYTES # BLD: 0.6 K/UL (ref 0–1)
MONOCYTES NFR BLD: 7 % (ref 5–13)
NEUTS SEG # BLD: 4.6 K/UL (ref 1.8–8)
NEUTS SEG NFR BLD: 51 % (ref 32–75)
NITRITE UR QL STRIP.AUTO: NEGATIVE
NRBC # BLD: 0 K/UL (ref 0–0.01)
NRBC BLD-RTO: 0 PER 100 WBC
PH UR STRIP: 6 [PH] (ref 5–8)
PLATELET # BLD AUTO: 300 K/UL (ref 150–400)
PMV BLD AUTO: 9.4 FL (ref 8.9–12.9)
POTASSIUM SERPL-SCNC: 4.2 MMOL/L (ref 3.5–5.1)
PROT SERPL-MCNC: 7.3 G/DL (ref 6.4–8.2)
PROT UR STRIP-MCNC: NEGATIVE MG/DL
RBC # BLD AUTO: 4.23 M/UL (ref 3.8–5.2)
RBC #/AREA URNS HPF: ABNORMAL /HPF (ref 0–5)
SODIUM SERPL-SCNC: 139 MMOL/L (ref 136–145)
SP GR UR REFRACTOMETRY: 1.01 (ref 1–1.03)
UR CULT HOLD, URHOLD: NORMAL
UROBILINOGEN UR QL STRIP.AUTO: 0.2 EU/DL (ref 0.2–1)
WBC # BLD AUTO: 9 K/UL (ref 3.6–11)
WBC URNS QL MICRO: ABNORMAL /HPF (ref 0–4)

## 2018-03-11 PROCEDURE — 80053 COMPREHEN METABOLIC PANEL: CPT | Performed by: STUDENT IN AN ORGANIZED HEALTH CARE EDUCATION/TRAINING PROGRAM

## 2018-03-11 PROCEDURE — 96361 HYDRATE IV INFUSION ADD-ON: CPT

## 2018-03-11 PROCEDURE — 81025 URINE PREGNANCY TEST: CPT

## 2018-03-11 PROCEDURE — 96375 TX/PRO/DX INJ NEW DRUG ADDON: CPT

## 2018-03-11 PROCEDURE — 74011000250 HC RX REV CODE- 250: Performed by: STUDENT IN AN ORGANIZED HEALTH CARE EDUCATION/TRAINING PROGRAM

## 2018-03-11 PROCEDURE — 74011250636 HC RX REV CODE- 250/636: Performed by: STUDENT IN AN ORGANIZED HEALTH CARE EDUCATION/TRAINING PROGRAM

## 2018-03-11 PROCEDURE — 96374 THER/PROPH/DIAG INJ IV PUSH: CPT

## 2018-03-11 PROCEDURE — 83690 ASSAY OF LIPASE: CPT | Performed by: STUDENT IN AN ORGANIZED HEALTH CARE EDUCATION/TRAINING PROGRAM

## 2018-03-11 PROCEDURE — 36415 COLL VENOUS BLD VENIPUNCTURE: CPT | Performed by: STUDENT IN AN ORGANIZED HEALTH CARE EDUCATION/TRAINING PROGRAM

## 2018-03-11 PROCEDURE — 99284 EMERGENCY DEPT VISIT MOD MDM: CPT

## 2018-03-11 PROCEDURE — 81001 URINALYSIS AUTO W/SCOPE: CPT | Performed by: STUDENT IN AN ORGANIZED HEALTH CARE EDUCATION/TRAINING PROGRAM

## 2018-03-11 PROCEDURE — 85025 COMPLETE CBC W/AUTO DIFF WBC: CPT | Performed by: STUDENT IN AN ORGANIZED HEALTH CARE EDUCATION/TRAINING PROGRAM

## 2018-03-11 RX ORDER — KETOROLAC TROMETHAMINE 30 MG/ML
30 INJECTION, SOLUTION INTRAMUSCULAR; INTRAVENOUS ONCE
Status: COMPLETED | OUTPATIENT
Start: 2018-03-11 | End: 2018-03-11

## 2018-03-11 RX ORDER — ONDANSETRON 2 MG/ML
4 INJECTION INTRAMUSCULAR; INTRAVENOUS
Status: COMPLETED | OUTPATIENT
Start: 2018-03-11 | End: 2018-03-11

## 2018-03-11 RX ORDER — FAMOTIDINE 10 MG/ML
20 INJECTION INTRAVENOUS
Status: DISCONTINUED | OUTPATIENT
Start: 2018-03-11 | End: 2018-03-11 | Stop reason: CLARIF

## 2018-03-11 RX ORDER — MORPHINE SULFATE 2 MG/ML
4 INJECTION, SOLUTION INTRAMUSCULAR; INTRAVENOUS ONCE
Status: COMPLETED | OUTPATIENT
Start: 2018-03-11 | End: 2018-03-11

## 2018-03-11 RX ORDER — MORPHINE SULFATE 15 MG/1
15 TABLET ORAL
Qty: 7 TAB | Refills: 0 | Status: SHIPPED | OUTPATIENT
Start: 2018-03-11 | End: 2018-04-03

## 2018-03-11 RX ADMIN — FAMOTIDINE 20 MG: 10 INJECTION, SOLUTION INTRAVENOUS at 08:41

## 2018-03-11 RX ADMIN — SODIUM CHLORIDE 1000 ML: 900 INJECTION, SOLUTION INTRAVENOUS at 07:30

## 2018-03-11 RX ADMIN — KETOROLAC TROMETHAMINE 30 MG: 30 INJECTION, SOLUTION INTRAMUSCULAR at 08:41

## 2018-03-11 RX ADMIN — ONDANSETRON 4 MG: 2 INJECTION INTRAMUSCULAR; INTRAVENOUS at 07:31

## 2018-03-11 RX ADMIN — MORPHINE SULFATE 2 MG: 2 INJECTION, SOLUTION INTRAMUSCULAR; INTRAVENOUS at 07:30

## 2018-03-11 NOTE — DISCHARGE INSTRUCTIONS
Abdominal Pain: Care Instructions  Your Care Instructions    Abdominal pain has many possible causes. Some aren't serious and get better on their own in a few days. Others need more testing and treatment. If your pain continues or gets worse, you need to be rechecked and may need more tests to find out what is wrong. You may need surgery to correct the problem. Don't ignore new symptoms, such as fever, nausea and vomiting, urination problems, pain that gets worse, and dizziness. These may be signs of a more serious problem. Your doctor may have recommended a follow-up visit in the next 8 to 12 hours. If you are not getting better, you may need more tests or treatment. The doctor has checked you carefully, but problems can develop later. If you notice any problems or new symptoms, get medical treatment right away. Follow-up care is a key part of your treatment and safety. Be sure to make and go to all appointments, and call your doctor if you are having problems. It's also a good idea to know your test results and keep a list of the medicines you take. How can you care for yourself at home? · Rest until you feel better. · To prevent dehydration, drink plenty of fluids, enough so that your urine is light yellow or clear like water. Choose water and other caffeine-free clear liquids until you feel better. If you have kidney, heart, or liver disease and have to limit fluids, talk with your doctor before you increase the amount of fluids you drink. · If your stomach is upset, eat mild foods, such as rice, dry toast or crackers, bananas, and applesauce. Try eating several small meals instead of two or three large ones. · Wait until 48 hours after all symptoms have gone away before you have spicy foods, alcohol, and drinks that contain caffeine. · Do not eat foods that are high in fat. · Avoid anti-inflammatory medicines such as aspirin, ibuprofen (Advil, Motrin), and naproxen (Aleve).  These can cause stomach upset. Talk to your doctor if you take daily aspirin for another health problem. When should you call for help? Call 911 anytime you think you may need emergency care. For example, call if:  ? · You passed out (lost consciousness). ? · You pass maroon or very bloody stools. ? · You vomit blood or what looks like coffee grounds. ? · You have new, severe belly pain. ?Call your doctor now or seek immediate medical care if:  ? · Your pain gets worse, especially if it becomes focused in one area of your belly. ? · You have a new or higher fever. ? · Your stools are black and look like tar, or they have streaks of blood. ? · You have unexpected vaginal bleeding. ? · You have symptoms of a urinary tract infection. These may include:  ¨ Pain when you urinate. ¨ Urinating more often than usual.  ¨ Blood in your urine. ? · You are dizzy or lightheaded, or you feel like you may faint. ? Watch closely for changes in your health, and be sure to contact your doctor if:  ? · You are not getting better after 1 day (24 hours). Where can you learn more? Go to http://maria c-checo.info/. Enter K039 in the search box to learn more about \"Abdominal Pain: Care Instructions. \"  Current as of: March 20, 2017  Content Version: 11.4  © 9674-2435 [employer]. Care instructions adapted under license by [employer] (which disclaims liability or warranty for this information). If you have questions about a medical condition or this instruction, always ask your healthcare professional. [place] [address] any warranty or liability for your use of this information.

## 2018-03-11 NOTE — TELEPHONE ENCOUNTER
Called at 4 am requesting narcotics for severe pain and that she did not want to go to the ED. Advised that they cannot be called in and that she would have to go to the ED for evaluation.

## 2018-03-11 NOTE — ED NOTES
0915: Patient verbalizes understanding of discharge instructions. Opportunity for questions provided. Patient in no apparent distress. Pt states that her pain has decreased after receiving medication. VSS. Patient ambulatory accompanied by spouse upon discharge.

## 2018-03-11 NOTE — ED TRIAGE NOTES
TRIAGE: Patient arrives from home, escorted by her , for LEFT sided abdominal pain that began \"a long time ago and it just came to a head recently. \" Patient states she spoke to someone on call from [facility] and an on call GI physician. She states she has a mass on her pancreas which is followed by Dr. Christina Martinez and has a surgery scheduled on Wednesday. She last attempted to eat potato salad last night around 1700. Last bowel movement was 0200 this morning.  Last menstrual period was 2.14.18.

## 2018-03-11 NOTE — ED PROVIDER NOTES
HPI Comments: Patient is a 64-year-old female who presents to the emergency department with a chief complaint of left upper abdominal pain. She says this is been going on for the past 2 weeks but acutely became worse last evening. The pain is described as sharp and stabbing. Currently 9/10 in severity. Movement makes it worse. Nothing makes it better. She tried ibuprofen at home without relief. She also spoke to her usual physicians who told her to come to the ER for pain control. Patient has been worked up extensively in the past by many specialists, and currently has a endoscopic biopsy with GI on March 14 for an abnormal MRCP that showed pancreatic abnormalities. She denies fever, vomiting, chest pain, shortness breath, urinary symptoms,vaginal discharge. She admits to consuming alcohol tonight to help with her pain. Patient is a 42 y.o. female presenting with abdominal pain. The history is provided by the patient. Abdominal Pain    Associated symptoms include nausea. Pertinent negatives include no fever, no vomiting, no dysuria, no headaches, no chest pain and no back pain. Past Medical History:   Diagnosis Date    Anxiety     Blurred vision     Chest pain     Depression     Frequent headaches     Muscle weakness     Shortness of breath     Stomach pain     Swallowing difficulty        Past Surgical History:   Procedure Laterality Date    HX GYN      endometriosis surgery         Family History:   Problem Relation Age of Onset    Cancer Mother      colon    Cancer Father      skin       Social History     Social History    Marital status:      Spouse name: N/A    Number of children: N/A    Years of education: N/A     Occupational History    Not on file.      Social History Main Topics    Smoking status: Current Every Day Smoker     Packs/day: 1.00     Years: 25.00    Smokeless tobacco: Never Used    Alcohol use 1.8 oz/week     3 Glasses of wine per week    Drug use: No    Sexual activity: Yes     Other Topics Concern    Not on file     Social History Narrative         ALLERGIES: Amoxicillin    Review of Systems   Constitutional: Negative for chills and fever. HENT: Negative for sore throat. Respiratory: Negative for cough and shortness of breath. Cardiovascular: Negative for chest pain. Gastrointestinal: Positive for abdominal pain and nausea. Negative for vomiting. Genitourinary: Negative for dysuria. Musculoskeletal: Negative for back pain. Skin: Negative for rash. Neurological: Negative for syncope and headaches. Psychiatric/Behavioral: Negative for confusion. All other systems reviewed and are negative. Vitals:    03/11/18 0655   BP: 129/86   Resp: 22   Temp: 98.4 °F (36.9 °C)   SpO2: 95%   Weight: 74.4 kg (164 lb)   Height: 5' 3\" (1.6 m)            Physical Exam   Constitutional: She is oriented to person, place, and time. She appears well-developed. No distress. Tearful, hysterical   HENT:   Head: Normocephalic and atraumatic. Eyes: Conjunctivae and EOM are normal. Pupils are equal, round, and reactive to light. Neck: Normal range of motion. Neck supple. Cardiovascular: Normal rate, regular rhythm and normal heart sounds. No murmur heard. Pulmonary/Chest: Effort normal and breath sounds normal. No respiratory distress. Abdominal: Soft. Bowel sounds are normal. She exhibits no distension. There is tenderness (mild in LUQ). There is no rebound and no guarding. Musculoskeletal: Normal range of motion. She exhibits no edema. Neurological: She is alert and oriented to person, place, and time. No cranial nerve deficit. She exhibits normal muscle tone. Coordination normal.   Skin: Skin is warm and dry. No rash noted. Psychiatric: Her speech is normal and behavior is normal. Thought content normal. Her mood appears anxious. Her affect is labile. Nursing note and vitals reviewed.        MDM  Number of Diagnoses or Management Options  Diagnosis management comments: DDx: pancreatitis, gastritis, gastroparesis, hepatitis. H&P not consistent with acute cholecytitis or appendictis or other acute surgical emergency. Plan is for labs and pain control. Anticipated dc home once comfortable with outpatient follow up with GI as scheduled. ED Course       Procedures      8:45 AM  AST slightly elevated, otherwise blood work reassuring. I reevaluated the patient at 8:43 AM. She is feeling better. Abdominal exam without rebound or guarding, only mild tenderness to palpation of the left upper quadrant. Emergency medical condition stabilized today. Followup with her physicians as an outpatient recommended and encouraged her to keep her appointment with Dr. Evita Obregon on March 14. Patient agreement and understands this plan; all questions answered.

## 2018-03-13 ENCOUNTER — TELEPHONE (OUTPATIENT)
Dept: ONCOLOGY | Age: 38
End: 2018-03-13

## 2018-03-13 NOTE — TELEPHONE ENCOUNTER
Pt called and left a voicemail requesting a return call with her lab results.  Call back number for pt is [phone]

## 2018-03-13 NOTE — TELEPHONE ENCOUNTER
[address] at [facility]  [phone]    03/13/18- Informed patient that her lab results are not back yet. We will call her once we have results. She verbalized understanding.

## 2018-03-14 ENCOUNTER — ANESTHESIA EVENT (OUTPATIENT)
Dept: ENDOSCOPY | Age: 38
End: 2018-03-14
Payer: COMMERCIAL

## 2018-03-14 ENCOUNTER — HOSPITAL ENCOUNTER (OUTPATIENT)
Age: 38
Setting detail: OUTPATIENT SURGERY
Discharge: HOME | End: 2018-03-14
Attending: INTERNAL MEDICINE | Admitting: INTERNAL MEDICINE
Payer: COMMERCIAL

## 2018-03-14 ENCOUNTER — ANESTHESIA (OUTPATIENT)
Dept: ENDOSCOPY | Age: 38
End: 2018-03-14
Payer: COMMERCIAL

## 2018-03-14 VITALS
RESPIRATION RATE: 20 BRPM | SYSTOLIC BLOOD PRESSURE: 145 MMHG | WEIGHT: 161.44 LBS | HEIGHT: 63 IN | BODY MASS INDEX: 28.61 KG/M2 | OXYGEN SATURATION: 94 % | DIASTOLIC BLOOD PRESSURE: 86 MMHG | TEMPERATURE: 98.8 F | HEART RATE: 92 BPM

## 2018-03-14 LAB — HCG UR QL: NEGATIVE

## 2018-03-14 PROCEDURE — 74011000250 HC RX REV CODE- 250

## 2018-03-14 PROCEDURE — 77030022853 HC NDL ASPIR ULTRSND BSC -C: Performed by: INTERNAL MEDICINE

## 2018-03-14 PROCEDURE — 81025 URINE PREGNANCY TEST: CPT

## 2018-03-14 PROCEDURE — 88112 CYTOPATH CELL ENHANCE TECH: CPT | Performed by: INTERNAL MEDICINE

## 2018-03-14 PROCEDURE — 76060000031 HC ANESTHESIA FIRST 0.5 HR: Performed by: INTERNAL MEDICINE

## 2018-03-14 PROCEDURE — 76040000019: Performed by: INTERNAL MEDICINE

## 2018-03-14 PROCEDURE — 74011250636 HC RX REV CODE- 250/636

## 2018-03-14 RX ORDER — SODIUM CHLORIDE 9 MG/ML
50 INJECTION, SOLUTION INTRAVENOUS CONTINUOUS
Status: DISCONTINUED | OUTPATIENT
Start: 2018-03-14 | End: 2018-03-14 | Stop reason: HOSPADM

## 2018-03-14 RX ORDER — PROPOFOL 10 MG/ML
INJECTION, EMULSION INTRAVENOUS AS NEEDED
Status: DISCONTINUED | OUTPATIENT
Start: 2018-03-14 | End: 2018-03-14 | Stop reason: HOSPADM

## 2018-03-14 RX ORDER — EPINEPHRINE 0.1 MG/ML
1 INJECTION INTRACARDIAC; INTRAVENOUS
Status: DISCONTINUED | OUTPATIENT
Start: 2018-03-14 | End: 2018-03-14 | Stop reason: HOSPADM

## 2018-03-14 RX ORDER — SODIUM CHLORIDE 9 MG/ML
INJECTION, SOLUTION INTRAVENOUS
Status: DISCONTINUED | OUTPATIENT
Start: 2018-03-14 | End: 2018-03-14 | Stop reason: HOSPADM

## 2018-03-14 RX ORDER — LIDOCAINE HYDROCHLORIDE 20 MG/ML
INJECTION, SOLUTION EPIDURAL; INFILTRATION; INTRACAUDAL; PERINEURAL AS NEEDED
Status: DISCONTINUED | OUTPATIENT
Start: 2018-03-14 | End: 2018-03-14 | Stop reason: HOSPADM

## 2018-03-14 RX ORDER — FENTANYL CITRATE 50 UG/ML
100 INJECTION, SOLUTION INTRAMUSCULAR; INTRAVENOUS
Status: DISCONTINUED | OUTPATIENT
Start: 2018-03-14 | End: 2018-03-14 | Stop reason: HOSPADM

## 2018-03-14 RX ORDER — MIDAZOLAM HYDROCHLORIDE 1 MG/ML
.25-1 INJECTION, SOLUTION INTRAMUSCULAR; INTRAVENOUS
Status: DISCONTINUED | OUTPATIENT
Start: 2018-03-14 | End: 2018-03-14 | Stop reason: HOSPADM

## 2018-03-14 RX ORDER — DEXTROMETHORPHAN/PSEUDOEPHED 2.5-7.5/.8
1.2 DROPS ORAL
Status: DISCONTINUED | OUTPATIENT
Start: 2018-03-14 | End: 2018-03-14 | Stop reason: HOSPADM

## 2018-03-14 RX ORDER — NALOXONE HYDROCHLORIDE 0.4 MG/ML
0.4 INJECTION, SOLUTION INTRAMUSCULAR; INTRAVENOUS; SUBCUTANEOUS
Status: DISCONTINUED | OUTPATIENT
Start: 2018-03-14 | End: 2018-03-14 | Stop reason: HOSPADM

## 2018-03-14 RX ORDER — CHOLECALCIFEROL (VITAMIN D3) 125 MCG
CAPSULE ORAL
COMMUNITY
End: 2018-04-03

## 2018-03-14 RX ORDER — SODIUM CHLORIDE 0.9 % (FLUSH) 0.9 %
5-10 SYRINGE (ML) INJECTION AS NEEDED
Status: DISCONTINUED | OUTPATIENT
Start: 2018-03-14 | End: 2018-03-14 | Stop reason: HOSPADM

## 2018-03-14 RX ORDER — FLUMAZENIL 0.1 MG/ML
0.2 INJECTION INTRAVENOUS
Status: DISCONTINUED | OUTPATIENT
Start: 2018-03-14 | End: 2018-03-14 | Stop reason: HOSPADM

## 2018-03-14 RX ORDER — ATROPINE SULFATE 0.1 MG/ML
0.5 INJECTION INTRAVENOUS
Status: DISCONTINUED | OUTPATIENT
Start: 2018-03-14 | End: 2018-03-14 | Stop reason: HOSPADM

## 2018-03-14 RX ORDER — IBUPROFEN 200 MG
800 TABLET ORAL
COMMUNITY

## 2018-03-14 RX ORDER — SODIUM CHLORIDE 0.9 % (FLUSH) 0.9 %
5-10 SYRINGE (ML) INJECTION EVERY 8 HOURS
Status: DISCONTINUED | OUTPATIENT
Start: 2018-03-14 | End: 2018-03-14 | Stop reason: HOSPADM

## 2018-03-14 RX ADMIN — PROPOFOL 50 MG: 10 INJECTION, EMULSION INTRAVENOUS at 14:28

## 2018-03-14 RX ADMIN — LIDOCAINE HYDROCHLORIDE 80 MG: 20 INJECTION, SOLUTION EPIDURAL; INFILTRATION; INTRACAUDAL; PERINEURAL at 14:20

## 2018-03-14 RX ADMIN — PROPOFOL 50 MG: 10 INJECTION, EMULSION INTRAVENOUS at 14:27

## 2018-03-14 RX ADMIN — PROPOFOL 70 MG: 10 INJECTION, EMULSION INTRAVENOUS at 14:21

## 2018-03-14 RX ADMIN — PROPOFOL 50 MG: 10 INJECTION, EMULSION INTRAVENOUS at 14:32

## 2018-03-14 RX ADMIN — SODIUM CHLORIDE: 9 INJECTION, SOLUTION INTRAVENOUS at 14:12

## 2018-03-14 RX ADMIN — PROPOFOL 50 MG: 10 INJECTION, EMULSION INTRAVENOUS at 14:30

## 2018-03-14 RX ADMIN — PROPOFOL 50 MG: 10 INJECTION, EMULSION INTRAVENOUS at 14:22

## 2018-03-14 RX ADMIN — PROPOFOL 30 MG: 10 INJECTION, EMULSION INTRAVENOUS at 14:24

## 2018-03-14 RX ADMIN — PROPOFOL 50 MG: 10 INJECTION, EMULSION INTRAVENOUS at 14:26

## 2018-03-14 NOTE — PROCEDURES
118 [facility].  217 [facility] [address], [address]   [phone]                                Endoscopic Ultrasound    NAME:  Octavia Jose   :   1980   MRN:   [id number]       Date/Time:  3/14/2018   Procedure Type: Linear Upper EUS with cyst aspiration    Indications: Pancreatic cyst    Pre-operative Diagnosis: see indication above    Post-operative Diagnosis:  See findings below    : Ifeoma Lynn MD    Referring Provider: Louis Montenegro MD -Glenys De Jesus MD    Anethesia/Sedation:  MAC anesthesia      Procedure Details   After infom consent was obtained for the procedure, with all risks and benefits of procedure explained the patient was taken to the endoscopy suite and placed in the left lateral decubitus position. Following sequential administration of sedation as per above, the linear echoendoscope was inserted into the mouth and advanced under direct vision to second portion of the duodenum. A careful inspection was made as the gastroscope was withdrawn, including a retroflexed view of the proximal stomach; findings and interventions are described below. Findings:     Endoscopic:   Esophagus:normal   Stomach: normal    Duodenum/jejunum: normal    Ultrasound:   Esophagus: not examined   Stomach: not examined   Pancreas:     Areas examined: the entire gland    Parenchyma: -A multi-septate cyst was seen in the proximal body of pancreas. This measured about 29 X 27 mm in size. No definite communication was noted. Pancreatic duct in the tail, upstream of cyst, was mildly dilated. Rest of the pancreas and pancreatic duct downstream of the cystic lesion were normal.      Liver:     Parenchyma: not examined    Gallbladder: normal    Bile Duct: the common bile duct normal               Lymph Node: no adenopathy        Specimen Removed:  Biopsy of  the body of the pancreas    Complications: None. EBL:  None.     Interventions: Fine needle aspirate 2 cc clear was performed of the pancreas  using a 22 gauge needle with 1 of passes with preliminary results suggesting indetermined.       Recommendations:   -Await fluid cytology and CEA levels  -Follow symptoms  -Refer to Surgery for possible distal pancreatectomy for symptomatic cystic neoplasm of pancreas  -Continue current medications    Leanne Montes MD  3/14/2018  2:44 PM

## 2018-03-14 NOTE — ANESTHESIA PREPROCEDURE EVALUATION
Anesthetic History   No history of anesthetic complications  PONV          Review of Systems / Medical History  Patient summary reviewed, nursing notes reviewed and pertinent labs reviewed    Pulmonary  Within defined limits        Smoker         Neuro/Psych   Within defined limits           Cardiovascular  Within defined limits                     GI/Hepatic/Renal  Within defined limits     Hepatitis         Endo/Other  Within defined limits           Other Findings   Comments:  Thrombocytopenia, unspecified           Physical Exam    Airway  Mallampati: II  TM Distance: > 6 cm  Neck ROM: normal range of motion   Mouth opening: Normal     Cardiovascular  Regular rate and rhythm,  S1 and S2 normal,  no murmur, click, rub, or gallop             Dental  No notable dental hx       Pulmonary  Breath sounds clear to auscultation               Abdominal  GI exam deferred       Other Findings            Anesthetic Plan    ASA: 1  Anesthesia type: MAC          Induction: Intravenous  Anesthetic plan and risks discussed with: Patient

## 2018-03-14 NOTE — PROGRESS NOTES
Discussed discharge instructions with  and patient. Both verbalized understanding of written instructions.

## 2018-03-14 NOTE — H&P
118 [facility].  217 [facility] 140 [facility], [address]  [phone]                                History and Physical     NAME: Octavia Jose   :  1980   MRN:  [id number]     HPI:  The patient was seen and examined. Past Surgical History:   Procedure Laterality Date    HX GYN      endometriosis surgery     Past Medical History:   Diagnosis Date    Anxiety     Blurred vision     Chest pain     Depression     Frequent headaches     Muscle weakness     Shortness of breath     Stomach pain     Swallowing difficulty      Social History   Substance Use Topics    Smoking status: Current Every Day Smoker     Packs/day: 1.00     Years: 25.00    Smokeless tobacco: Never Used    Alcohol use 1.8 oz/week     3 Glasses of wine per week     Allergies   Allergen Reactions    Amoxicillin Shortness of Breath and Rash     Pt has had keflex in the past     Family History   Problem Relation Age of Onset    Cancer Mother      colon    Cancer Father      skin     No current facility-administered medications for this encounter. Facility-Administered Medications Ordered in Other Encounters   Medication Dose Route Frequency    0.9% sodium chloride infusion   IntraVENous CONTINUOUS         PHYSICAL EXAM:  General: WD, WN. Alert, cooperative, no acute distress    HEENT: NC, Atraumatic. PERRLA, EOMI. Anicteric sclerae. Lungs:  CTA Bilaterally. No Wheezing/Rhonchi/Rales. Heart:  Regular  rhythm,  No murmur, No Rubs, No Gallops  Abdomen: Soft, Non distended, Non tender.  +Bowel sounds, no HSM  Extremities: No c/c/e  Neurologic:  CN 2-12 gi, Alert and oriented X 3. No acute neurological distress   Psych:   Good insight. Not anxious nor agitated. The heart, lungs and mental status were satisfactory for the administration of MAC sedation and for the procedure.       Mallampati score: 2       Assessment:   · Pancreas tail cyst    Plan:   · Endoscopic procedure  · MAC sedation ·

## 2018-03-14 NOTE — IP AVS SNAPSHOT
[address] 
[phone] Patient: Purnima Leo MRN: [id number] TXM:3/80/[id number] About your hospitalization You were admitted on:  March 14, 2018 You last received care in the:  [facility] ENDOSCOPY You were discharged on:  March 14, 2018 Why you were hospitalized Your primary diagnosis was:  Not on File Follow-up Information None Discharge Orders None A check odette indicates which time of day the medication should be taken. My Medications CONTINUE taking these medications Instructions Each Dose to Equal  
 Morning Noon Evening Bedtime ALEVE 220 mg Cap Generic drug:  naproxen sodium Your last dose was: Your next dose is: Take  by mouth. DULoxetine 60 mg capsule Commonly known as:  CYMBALTA Your last dose was: Your next dose is:    
   
   
      
   
   
   
  
 morphine IR 15 mg tablet Commonly known as:  MS IR Your last dose was: Your next dose is: Take 1 Tab by mouth every four (4) hours as needed for Pain. Max Daily Amount: 90 mg.  
 15 mg MOTRIN  mg tablet Generic drug:  ibuprofen Your last dose was: Your next dose is: Take 800 mg by mouth. 800 mg  
    
   
   
   
  
 traZODone 50 mg tablet Commonly known as:  Patti Colon Your last dose was: Your next dose is: Take  by mouth nightly. Discharge Instructions 118 [facility]. 
[address]  
[phone] DISCHARGE INSTRUCTIONS Purnima Leo [id number] 
1980 DISCOMFORT: 
Sore throat- throat lozenges or warm salt water gargle 
redness at IV site- apply warm compress to area; if redness or soreness persist- contact your physician Gaseous discomfort- walking, belching will help relieve any discomfort You may not operate a vehicle for 12 hours You may not engage in an occupation involving machinery or appliances for rest of today You may not drink alcoholic beverages for at least 12 hours Avoid making any critical decisions for at least 24 hour DIET You may eat and drink after you leave. You may resume your regular diet  however -  remember your colon is empty and a heavy meal will produce gas. Avoid these foods:  vegetables, fried / greasy foods, carbonated drinks ACTIVITY You may resume your normal daily activities Spend the remainder of the day resting -  avoid any strenuous activity. CALL M.D. ANY SIGN OF Increasing pain, nausea, vomiting Abdominal distension (swelling) New increased bleeding (oral or rectal) Fever (chills) Pain in chest area Bloody discharge from nose or mouth Shortness of breath Follow-up Instructions: 
 Call Dr. Darshan Winslow for any questions or problems. If we took a biopsy please call the office within 2 weeks to discuss your                             pathology results. Telephone # [phone] Continue same medications. Introducing [facility]! Dear Mateo Michael: 
Thank you for requesting a [employer] account. Our records indicate that you already have an active [employer] account. You can access your account anytime at [email or web address] Did you know that you can access your hospital and ER discharge instructions at any time in [employer]? You can also review all of your test results from your hospital stay or ER visit. Additional Information If you have questions, please visit the Frequently Asked Questions section of the [employer] website at [email or web address] Remember, [employer] is NOT to be used for urgent needs. For medical emergencies, dial 911. Now available from your iPhone and Android! Providers Seen During Your Hospitalization Provider Specialty Primary office phone Tootie Flannery MD Gastroenterology [phone] Your Primary Care Physician (PCP) Primary Care Physician Office Phone Office Fax Ana Wagner [phone] You are allergic to the following Allergen Reactions Amoxicillin Shortness of Breath Rash Pt has had keflex in the past  
  
Recent Documentation Height Weight Breastfeeding? BMI OB Status Smoking Status 1.6 m 73.2 kg No 28.6 kg/m2 Having regular periods Current Every Day Smoker Emergency Contacts Name Discharge Info Relation Home Work Mobile [facility] [employer] CAREGIVER [3] Spouse [3] [phone] Patient Belongings The following personal items are in your possession at time of discharge: 
  Dental Appliances: None  Visual Aid: None Please provide this summary of care documentation to your next provider. Signatures-by signing, you are acknowledging that this After Visit Summary has been reviewed with you and you have received a copy. Patient Signature:  ____________________________________________________________ Date:  ____________________________________________________________  
  
Jersey Conrad Provider Signature:  ____________________________________________________________ Date:  ____________________________________________________________

## 2018-03-14 NOTE — PROGRESS NOTES

## 2018-03-14 NOTE — ROUTINE PROCESS
Bart Gamboa  1980  [id number]    Situation:  Verbal report received from: Payton Angeles  Procedure: Procedure(s):  ENDOSCOPIC ULTRASOUND (EUS) LINEAR WTIH PATH   FINE NEEDLE ASPIRATION    Background:    Preoperative diagnosis: ANNULAR PANCREAS   NEOPLASM OF TAIL OF PANCREAS   Postoperative diagnosis: 1.- Pancreatic Body and Tail [place]unction Cyst    :  Dr. Paulina Killian  Assistant(s): Endoscopy Technician-1: Leticia Oliveros  Endoscopy RN-1: Aayush Carlson RN    Specimens: * No specimens in log *  H. Pylori  no    Assessment:  Intra-procedure medications   Anesthesia gave intra-procedure sedation and medications, see anesthesia flow sheet yes    Intravenous fluids: NS@ KVO     Vital signs stable     Abdominal assessment: round and soft     Recommendation:  Discharge patient per MD order  Family or Friend   Permission to share finding with family or friend yes

## 2018-03-16 ENCOUNTER — TELEPHONE (OUTPATIENT)
Dept: ONCOLOGY | Age: 38
End: 2018-03-16

## 2018-03-16 LAB
ANA SER QL: NEGATIVE
ANALYSIS AND GATING STRATEGY: NORMAL
ASSESSMENT OF LEUKOCYTES: NORMAL
BASOPHILS # BLD AUTO: 0.1 X10E3/UL (ref 0–0.2)
BASOPHILS NFR BLD AUTO: 1 %
CLINICAL INFO: NORMAL
COMMENT , 490046: NORMAL
CRP SERPL-MCNC: 11.1 MG/L (ref 0–4.9)
EOSINOPHIL # BLD AUTO: 0.3 X10E3/UL (ref 0–0.4)
EOSINOPHIL NFR BLD AUTO: 3 %
ERYTHROCYTE [DISTWIDTH] IN BLOOD BY AUTOMATED COUNT: 14.3 % (ref 12.3–15.4)
ERYTHROCYTE [SEDIMENTATION RATE] IN BLOOD BY WESTERGREN METHOD: 4 MM/HR (ref 0–32)
HCT VFR BLD AUTO: 42.2 % (ref 34–46.6)
HGB BLD-MCNC: 14.3 G/DL (ref 11.1–15.9)
IMM GRANULOCYTES # BLD: 0.1 X10E3/UL (ref 0–0.1)
IMM GRANULOCYTES NFR BLD: 1 %
IMMUNOPHENOTYPING STUDY: NORMAL
LDH SERPL-CCNC: 178 IU/L (ref 119–226)
LYMPHOCYTES # BLD AUTO: 2.1 X10E3/UL (ref 0.7–3.1)
LYMPHOCYTES NFR BLD AUTO: 24 %
MCH RBC QN AUTO: 32.7 PG (ref 26.6–33)
MCHC RBC AUTO-ENTMCNC: 33.9 G/DL (ref 31.5–35.7)
MCV RBC AUTO: 97 FL (ref 79–97)
MONOCYTES # BLD AUTO: 0.4 X10E3/UL (ref 0.1–0.9)
MONOCYTES NFR BLD AUTO: 4 %
NEUTROPHILS # BLD AUTO: 6.1 X10E3/UL (ref 1.4–7)
NEUTROPHILS NFR BLD AUTO: 67 %
PATH INTERP SPEC-IMP: NORMAL
PATHOLOGIST NAME: NORMAL
PLATELET # BLD AUTO: 327 X10E3/UL (ref 150–379)
RBC # BLD AUTO: 4.37 X10E6/UL (ref 3.77–5.28)
SPECIMEN SOURCE: NORMAL
VIABLE CELLS NFR SPEC: NORMAL %
WBC # BLD AUTO: 9 X10E3/UL (ref 3.4–10.8)

## 2018-03-16 NOTE — TELEPHONE ENCOUNTER
[facility]  [phone]      Lab Results   Component Value Date/Time    WBC 9.0 03/11/2018 07:24 AM    HGB 14.5 03/11/2018 07:24 AM    HCT 41.6 03/11/2018 07:24 AM    PLATELET 543 63/87/0240 07:24 AM    MCV 98.3 03/11/2018 07:24 AM    ABS. NEUTROPHILS 4.6 03/11/2018 07:24 AM     Lab Results   Component Value Date/Time    Sodium 139 03/11/2018 07:24 AM    Potassium 4.2 03/11/2018 07:24 AM    Chloride 107 03/11/2018 07:24 AM    CO2 24 03/11/2018 07:24 AM    Glucose 151 (H) 03/11/2018 07:24 AM    BUN 6 03/11/2018 07:24 AM    Creatinine 0.61 03/11/2018 07:24 AM    GFR est AA >60 03/11/2018 07:24 AM    GFR est non-AA >60 03/11/2018 07:24 AM    Calcium 8.1 (L) 03/11/2018 07:24 AM     Lab Results   Component Value Date/Time    Bilirubin, total 0.2 03/11/2018 07:24 AM    ALT (SGPT) 47 03/11/2018 07:24 AM    AST (SGOT) 63 (H) 03/11/2018 07:24 AM    Alk. phosphatase 113 03/11/2018 07:24 AM    Protein, total 7.3 03/11/2018 07:24 AM    Albumin 3.3 (L) 03/11/2018 07:24 AM    Globulin 4.0 03/11/2018 07:24 AM     Lab Results   Component Value Date/Time    Vitamin B12 1457 (H) 06/26/2011 04:27 AM    Folate 8.5 06/26/2011 04:27 AM     03/09/2018 09:45 AM    Sed rate (ESR) 4 03/09/2018 09:45 AM    C-Reactive Protein, Qt 11.1 (H) 03/09/2018 09:45 AM    TSH 2.16 02/24/2018 03:01 PM    Antinuclear Antibodies Direct Negative 03/09/2018 09:45 AM    Lipase 57 (L) 03/11/2018 07:24 AM     Lab Results   Component Value Date/Time    INR 1.0 02/24/2018 03:01 PM    aPTT 39.6 (H) 06/24/2011 11:40 PM       3/9/2018  Peripheral Blood Flow Cytometry: negative      ASSESSMENT:    Labwork reviewed. I attempted to call her to discuss. No answer. I left a message for her to call me back. Labs are essentially unremarkable, with the exception of an elevate CRP. This is nonspecific, but suggestive of inflammation. CBC normal, lymphocytosis resolved.  LD normal.  Flow cytometry normal.  DOMONIQUE normal.    Given the normal labs, and normal imaging, I see no evidence of a malignancy. The patient remains worried about lymphoma causing her neck pain, but in the absence of lymphadenopathy, and with normal flow cytometry, I believe we have ruled this out. I recommend she follow up with her PCP regarding her pain, and follow up with GI regarding the pancreatic cyst lesion. Return to see me if needed for any future hematologic or oncologic issues.       Signed By: Nuria Arnold MD

## 2018-03-19 LAB
BASOPHILS # BLD AUTO: 0.1 X10E3/UL (ref 0–0.2)
BASOPHILS NFR BLD AUTO: 1 %
EOSINOPHIL # BLD AUTO: 0.3 X10E3/UL (ref 0–0.4)
EOSINOPHIL NFR BLD AUTO: 3 %
ERYTHROCYTE [DISTWIDTH] IN BLOOD BY AUTOMATED COUNT: 14.1 % (ref 12.3–15.4)
HCT VFR BLD AUTO: 44.3 % (ref 34–46.6)
HGB BLD-MCNC: 14.6 G/DL (ref 11.1–15.9)
IMM GRANULOCYTES # BLD: 0.1 X10E3/UL (ref 0–0.1)
IMM GRANULOCYTES NFR BLD: 1 %
LYMPHOCYTES # BLD AUTO: 2.4 X10E3/UL (ref 0.7–3.1)
LYMPHOCYTES NFR BLD AUTO: 24 %
MCH RBC QN AUTO: 33.3 PG (ref 26.6–33)
MCHC RBC AUTO-ENTMCNC: 33 G/DL (ref 31.5–35.7)
MCV RBC AUTO: 101 FL (ref 79–97)
MONOCYTES # BLD AUTO: 0.4 X10E3/UL (ref 0.1–0.9)
MONOCYTES NFR BLD AUTO: 4 %
NEUTROPHILS # BLD AUTO: 6.7 X10E3/UL (ref 1.4–7)
NEUTROPHILS NFR BLD AUTO: 67 %
PATH REV BLD -IMP: ABNORMAL
PATH REV BLD -IMP: NORMAL
PATH REV BLD -IMP: NORMAL
PATHOLOGIST NAME: ABNORMAL
PLATELET # BLD AUTO: 336 X10E3/UL (ref 150–379)
RBC # BLD AUTO: 4.39 X10E6/UL (ref 3.77–5.28)
WBC # BLD AUTO: 9.9 X10E3/UL (ref 3.4–10.8)

## 2018-03-21 ENCOUNTER — OFFICE VISIT (OUTPATIENT)
Dept: SURGERY | Age: 38
End: 2018-03-21

## 2018-03-21 VITALS
BODY MASS INDEX: 29.06 KG/M2 | SYSTOLIC BLOOD PRESSURE: 148 MMHG | DIASTOLIC BLOOD PRESSURE: 90 MMHG | TEMPERATURE: 98.5 F | OXYGEN SATURATION: 99 % | WEIGHT: 164 LBS | RESPIRATION RATE: 16 BRPM | HEART RATE: 96 BPM | HEIGHT: 63 IN

## 2018-03-21 DIAGNOSIS — K86.89 PANCREATIC MASS: Primary | ICD-10-CM

## 2018-03-21 PROBLEM — K86.2 PANCREATIC CYST: Status: ACTIVE | Noted: 2018-03-21

## 2018-03-21 RX ORDER — OXYCODONE AND ACETAMINOPHEN 5; 325 MG/1; MG/1
1 TABLET ORAL
Qty: 30 TAB | Refills: 0 | Status: SHIPPED | OUTPATIENT
Start: 2018-03-21 | End: 2018-04-03

## 2018-03-21 NOTE — PROGRESS NOTES
Surgery Consult    Subjective:      Rachel Crystal is a 42 y.o.  female who was referred by Dr Richard Fung for evaluation of a pancreatic neoplasm. The pt states It hurts a lot all along her left flank, and it hurts between her back bone and shoulder blade; it feels almost like a charley horse/muscle cramp. When she goes to try and stretch it, she will start experiencing cramps in her upper back. In conjunction with this, she will experience pain in the same spots when she goes to lay down in bed. Her  states that she went to the ER at [facility] about a month ago and while there, a CT scan and MRI was done which led to the \"thing\" being found on her pancreas. Additionally, the pt can feel \"two little beebees\" in the back of her mouth with her tongue. Her mouth doesn't feel very dry and her jaw will only hurt if she rests her head on her hands. The pt believes that she is abnormally swollen all around her neck, chin, and jaw; it has swelled up in the last couple of days and is sort of numb. Rx NOTE: Pt has tried to take a number of different NSAIDs but none them are working now. Her ENT gave her Percocet (Qty 20) and no one else she has asked is willing to give her any. She was also given 5-7 morphine by another provider which she states did not last her very long. NOTE:  The pt has plans to go to the [facility] at the end of 08/2018 and is concerned that her present condition and/or treatment(s) for it will interfere with it. PMHx:  In 2011/2012, the pt reports that her liver \"became sick\" because of a tick bite. Miscellaneous cytology from 03/14/2018:      MRI ABD W WO CONT from 02/24/2018: I independently reviewed these images.      Chief Complaint   Patient presents with    Mass     pancreatic       Patient Active Problem List    Diagnosis Date Noted    Rash 06/24/2011    Headache(784.0) 06/24/2011    Nausea & vomiting 06/24/2011    Hepatitis 06/24/2011    Thrombocytopenia, unspecified 06/24/2011    Abdominal pain, unspecified site 06/24/2011    Fever, unspecified 06/24/2011     Past Medical History:   Diagnosis Date    Anxiety     Blurred vision     Chest pain     Depression     Frequent headaches     Muscle weakness     Shortness of breath     Stomach pain     Swallowing difficulty       Past Surgical History:   Procedure Laterality Date    HX GYN      endometriosis surgery      Social History   Substance Use Topics    Smoking status: Current Every Day Smoker     Packs/day: 1.00     Years: 25.00    Smokeless tobacco: Never Used    Alcohol use 1.8 oz/week     3 Glasses of wine per week      Family History   Problem Relation Age of Onset    Cancer Mother      colon    Cancer Father      skin      Current Outpatient Prescriptions   Medication Sig    oxyCODONE-acetaminophen (PERCOCET) 5-325 mg per tablet Take 1 Tab by mouth every eight (8) hours as needed for Pain. Max Daily Amount: 3 Tabs.  ibuprofen (MOTRIN IB) 200 mg tablet Take 800 mg by mouth.  naproxen sodium (ALEVE) 220 mg cap Take  by mouth.  traZODone (DESYREL) 50 mg tablet Take  by mouth nightly.  duloxetine (CYMBALTA) 60 mg capsule     morphine IR (MS IR) 15 mg tablet Take 1 Tab by mouth every four (4) hours as needed for Pain. Max Daily Amount: 90 mg. No current facility-administered medications for this visit. Allergies   Allergen Reactions    Amoxicillin Shortness of Breath and Rash     Pt has had keflex in the past        Review of Systems:    A comprehensive review of systems was negative except for that written in the History of Present Illness.     Objective:     Visit Vitals    /90 (BP 1 Location: Right arm, BP Patient Position: Sitting)    Pulse 96    Temp 98.5 °F (36.9 °C) (Oral)    Resp 16    Ht 5' 3\" (1.6 m)    Wt 164 lb (74.4 kg)    LMP 03/14/2018    SpO2 99%    BMI 29.05 kg/m2         Physical Exam:  General appearance: alert, cooperative, no distress, appears stated age  Head: Normocephalic, without obvious abnormality, atraumatic  Lungs: clear to auscultation bilaterally  Heart: regular rate and rhythm, S1, S2 normal, no murmur, click, rub or gallop  Neurologic: Grossly normal  Neck: No peripheral adenopathy. Has bilateral slightly enlarged parotid glands. Abdomen: Soft, no masses, no tenderness, no organomegaly. Assessment:       ICD-10-CM ICD-9-CM    1. Pancreatic mass K86.9 577.9 oxyCODONE-acetaminophen (PERCOCET) 5-325 mg per tablet         Plan:     Patient has elected for a laparoscopic distal pancreatectomy. Risks and benefits explained and the patient will schedule an appointment at their earliest convenience. Will prescribe her 5mg Percocet tablets to take as needed at night for the pain. Written by michael Schafer, as dictated by Bryon Hirsch MD.    Total face to face time with patient: 30 minutes. Greater than 50% of the time was spent in counseling. Signed By: Bryon Hirsch MD     March 21, 2018

## 2018-03-21 NOTE — PROGRESS NOTES
1. Have you been to the ER, urgent care clinic since your last visit? Hospitalized since your last visit? Yes - in [facility]    2. Have you seen or consulted any other health care providers outside of the [address] [place] [address] since your last visit? Include any pap smears or colon screening.  No

## 2018-03-21 NOTE — MR AVS SNAPSHOT
[address] [facility] [address] [address] 
[phone] Patient: Brayden Hennessy MRN: [id number] OBQ:0/00/9780 Visit Information Date & Time Provider Department Dept. Phone Encounter #  
 3/21/2018  2:00 PM Olena Sever, [address] [phone] [id number] Follow-up Instructions Routing History Upcoming Health Maintenance Date Due Pneumococcal 19-64 Highest Risk (1 of 3 - PCV13) 8/23/1997 DTaP/Tdap/Td series (1 - Tdap) 8/23/1999 PAP AKA CERVICAL CYTOLOGY 8/23/1999 Influenza Age 5 to Adult 8/1/2017 Allergies as of 3/21/2018  Review Complete On: 3/21/2018 By: Olena Sever, MD  
  
 Severity Noted Reaction Type Reactions Amoxicillin  06/24/2011    Shortness of Breath, Rash Pt has had keflex in the past  
  
Current Immunizations  Never Reviewed No immunizations on file. Not reviewed this visit You Were Diagnosed With   
  
 Codes Comments Pancreatic mass    -  Primary ICD-10-CM: K86.9 ICD-9-CM: 577.9 Vitals BP Pulse Temp Resp Height(growth percentile) Weight(growth percentile) 148/90 (BP 1 Location: Right arm, BP Patient Position: Sitting) 96 98.5 °F (36.9 °C) (Oral) 16 5' 3\" (1.6 m) 164 lb (74.4 kg) LMP SpO2 BMI OB Status Smoking Status 03/14/2018 99% 29.05 kg/m2 Having regular periods Current Every Day Smoker BMI and BSA Data Body Mass Index Body Surface Area 29.05 kg/m 2 1.82 m 2 Preferred Pharmacy Pharmacy Name Phone [facility] [address] [facility] [phone] Your Updated Medication List  
  
   
This list is accurate as of 3/21/18  3:10 PM.  Always use your most recent med list.  
  
  
  
  
 ALEVE 220 mg Cap Generic drug:  naproxen sodium Take  by mouth. DULoxetine 60 mg capsule Commonly known as:  CYMBALTA  
  
 morphine IR 15 mg tablet Commonly known as:  MS IR Take 1 Tab by mouth every four (4) hours as needed for Pain. Max Daily Amount: 90 mg. MOTRIN  mg tablet Generic drug:  ibuprofen Take 800 mg by mouth. oxyCODONE-acetaminophen 5-325 mg per tablet Commonly known as:  PERCOCET Take 1 Tab by mouth every eight (8) hours as needed for Pain. Max Daily Amount: 3 Tabs. traZODone 50 mg tablet Commonly known as:  Lunette Grate Take  by mouth nightly. Prescriptions Printed Refills  
 oxyCODONE-acetaminophen (PERCOCET) 5-325 mg per tablet 0 Sig: Take 1 Tab by mouth every eight (8) hours as needed for Pain. Max Daily Amount: 3 Tabs. Class: Print Route: Oral  
  
Introducing [facility] SERVICES! Dear Sharona Mcnamara: 
Thank you for requesting a Mevvy account. Our records indicate that you already have an active Mevvy account. You can access your account anytime at [email or web address] Did you know that you can access your hospital and ER discharge instructions at any time in Mevvy? You can also review all of your test results from your hospital stay or ER visit. Additional Information If you have questions, please visit the Frequently Asked Questions section of the Mevvy website at [email or web address] Remember, Mevvy is NOT to be used for urgent needs. For medical emergencies, dial 911. Now available from your iPhone and Android! Please provide this summary of care documentation to your next provider. Your primary care clinician is listed as Ellen Hull. If you have any questions after today's visit, please call [phone].

## 2018-03-23 ENCOUNTER — TELEPHONE (OUTPATIENT)
Dept: SURGERY | Age: 38
End: 2018-03-23

## 2018-03-23 NOTE — TELEPHONE ENCOUNTER
I returned patients call and verified patient with two patient identifiers. Patient has questions about pain control post op, stating her mother had a horrific experience after colon surgery at [facility]. I tried to reassure her her pain will be managed post op. She states she is itching a lot, has had this in the past and is taking benadryl for it and it is working. She also states alcohol spray in the bottle help the itching. She states her stools are light brown, and her urine is dark. I encouraged her to push her fluids. She states she is very fatigued, at times having a hard time even taking a shower as her arms and legs burn like she has been working out with weights. I encouraged her to push her protein and to watch her carbs as her sugar was 150 on 3/11/18. She states she read on the internet that her 'stomach goes to sleep' after this type of surgery and she may vomit when trying to eat. I told her I could not attest to the 'stomach going to sleep' but that she will be watched closely in the hospital after surgery. She states sometimes she wakes up with bruises. I discussed all of this with Dr. Franny Ha and his thoughts were to stay the course for her scheduled surgery date, PATs and pre op appt. She states her surgery is 4/20/18 and her PATs   are on 4/13/18. I made a pre op appt. on Wed 3/18/18 with Dr. Franny Ha and  encouraged her to write down all of her questions for that appt. And to certainly call us if she has any concerns prior to that time.

## 2018-04-03 ENCOUNTER — HOSPITAL ENCOUNTER (INPATIENT)
Age: 38
LOS: 1 days | Discharge: HOME | DRG: 897 | End: 2018-04-04
Attending: EMERGENCY MEDICINE | Admitting: INTERNAL MEDICINE
Payer: COMMERCIAL

## 2018-04-03 ENCOUNTER — APPOINTMENT (OUTPATIENT)
Dept: CT IMAGING | Age: 38
DRG: 897 | End: 2018-04-03
Attending: EMERGENCY MEDICINE
Payer: COMMERCIAL

## 2018-04-03 DIAGNOSIS — E87.29 ALCOHOLIC KETOACIDOSIS: Primary | ICD-10-CM

## 2018-04-03 DIAGNOSIS — K85.20 ALCOHOL-INDUCED ACUTE PANCREATITIS WITHOUT INFECTION OR NECROSIS: ICD-10-CM

## 2018-04-03 DIAGNOSIS — R45.851 SUICIDAL IDEATION: ICD-10-CM

## 2018-04-03 DIAGNOSIS — F10.921 ALCOHOL INTOXICATION WITH DELIRIUM (HCC): ICD-10-CM

## 2018-04-03 PROBLEM — R74.8 ELEVATED LIPASE: Status: ACTIVE | Noted: 2018-04-03

## 2018-04-03 PROBLEM — F10.929 ALCOHOL INTOXICATION (HCC): Status: ACTIVE | Noted: 2018-04-03

## 2018-04-03 PROBLEM — R00.0 SINUS TACHYCARDIA: Status: ACTIVE | Noted: 2018-04-03

## 2018-04-03 PROBLEM — F10.10 ALCOHOL ABUSE: Status: ACTIVE | Noted: 2018-04-03

## 2018-04-03 LAB
ALBUMIN SERPL-MCNC: 3.9 G/DL (ref 3.5–5)
ALBUMIN/GLOB SERPL: 0.9 {RATIO} (ref 1.1–2.2)
ALP SERPL-CCNC: 144 U/L (ref 45–117)
ALT SERPL-CCNC: 52 U/L (ref 12–78)
AMPHET UR QL SCN: NEGATIVE
ANION GAP SERPL CALC-SCNC: 17 MMOL/L (ref 5–15)
APAP SERPL-MCNC: <2 UG/ML (ref 10–30)
APPEARANCE UR: CLEAR
AST SERPL-CCNC: 42 U/L (ref 15–37)
BACTERIA URNS QL MICRO: NEGATIVE /HPF
BARBITURATES UR QL SCN: NEGATIVE
BASOPHILS # BLD: 0.1 K/UL (ref 0–0.1)
BASOPHILS NFR BLD: 1 % (ref 0–1)
BENZODIAZ UR QL: NEGATIVE
BILIRUB SERPL-MCNC: 0.2 MG/DL (ref 0.2–1)
BILIRUB UR QL: NEGATIVE
BUN SERPL-MCNC: 6 MG/DL (ref 6–20)
BUN/CREAT SERPL: 9 (ref 12–20)
CALCIUM SERPL-MCNC: 9 MG/DL (ref 8.5–10.1)
CANNABINOIDS UR QL SCN: NEGATIVE
CHLORIDE SERPL-SCNC: 102 MMOL/L (ref 97–108)
CO2 SERPL-SCNC: 19 MMOL/L (ref 21–32)
COCAINE UR QL SCN: NEGATIVE
COLOR UR: ABNORMAL
CREAT SERPL-MCNC: 0.65 MG/DL (ref 0.55–1.02)
DIFFERENTIAL METHOD BLD: ABNORMAL
DRUG SCRN COMMENT,DRGCM: NORMAL
EOSINOPHIL # BLD: 0.6 K/UL (ref 0–0.4)
EOSINOPHIL NFR BLD: 5 % (ref 0–7)
EPITH CASTS URNS QL MICRO: ABNORMAL /LPF
ERYTHROCYTE [DISTWIDTH] IN BLOOD BY AUTOMATED COUNT: 12.8 % (ref 11.5–14.5)
ETHANOL SERPL-MCNC: 347 MG/DL
GLOBULIN SER CALC-MCNC: 4.3 G/DL (ref 2–4)
GLUCOSE SERPL-MCNC: 187 MG/DL (ref 65–100)
GLUCOSE UR STRIP.AUTO-MCNC: NEGATIVE MG/DL
HCG SERPL QL: NEGATIVE
HCG UR QL: NEGATIVE
HCT VFR BLD AUTO: 47.2 % (ref 35–47)
HGB BLD-MCNC: 15.9 G/DL (ref 11.5–16)
HGB UR QL STRIP: ABNORMAL
HYALINE CASTS URNS QL MICRO: ABNORMAL /LPF (ref 0–5)
IMM GRANULOCYTES # BLD: 0.1 K/UL (ref 0–0.04)
IMM GRANULOCYTES NFR BLD AUTO: 1 % (ref 0–0.5)
KETONES UR QL STRIP.AUTO: NEGATIVE MG/DL
LEUKOCYTE ESTERASE UR QL STRIP.AUTO: NEGATIVE
LIPASE SERPL-CCNC: 911 U/L (ref 73–393)
LYMPHOCYTES # BLD: 3.6 K/UL (ref 0.8–3.5)
LYMPHOCYTES NFR BLD: 30 % (ref 12–49)
MCH RBC QN AUTO: 32.7 PG (ref 26–34)
MCHC RBC AUTO-ENTMCNC: 33.7 G/DL (ref 30–36.5)
MCV RBC AUTO: 97.1 FL (ref 80–99)
METHADONE UR QL: NEGATIVE
MONOCYTES # BLD: 0.6 K/UL (ref 0–1)
MONOCYTES NFR BLD: 5 % (ref 5–13)
NEUTS SEG # BLD: 7.3 K/UL (ref 1.8–8)
NEUTS SEG NFR BLD: 60 % (ref 32–75)
NITRITE UR QL STRIP.AUTO: NEGATIVE
NRBC # BLD: 0 K/UL (ref 0–0.01)
NRBC BLD-RTO: 0 PER 100 WBC
OPIATES UR QL: NEGATIVE
PCP UR QL: NEGATIVE
PH UR STRIP: 6 [PH] (ref 5–8)
PLATELET # BLD AUTO: 389 K/UL (ref 150–400)
PMV BLD AUTO: 9.2 FL (ref 8.9–12.9)
POTASSIUM SERPL-SCNC: 3.5 MMOL/L (ref 3.5–5.1)
PROT SERPL-MCNC: 8.2 G/DL (ref 6.4–8.2)
PROT UR STRIP-MCNC: NEGATIVE MG/DL
RBC # BLD AUTO: 4.86 M/UL (ref 3.8–5.2)
RBC #/AREA URNS HPF: ABNORMAL /HPF (ref 0–5)
SALICYLATES SERPL-MCNC: 4.3 MG/DL (ref 2.8–20)
SODIUM SERPL-SCNC: 138 MMOL/L (ref 136–145)
SP GR UR REFRACTOMETRY: <1.005 (ref 1–1.03)
UR CULT HOLD, URHOLD: NORMAL
UROBILINOGEN UR QL STRIP.AUTO: 0.2 EU/DL (ref 0.2–1)
WBC # BLD AUTO: 12.2 K/UL (ref 3.6–11)
WBC URNS QL MICRO: ABNORMAL /HPF (ref 0–4)

## 2018-04-03 PROCEDURE — 81001 URINALYSIS AUTO W/SCOPE: CPT | Performed by: EMERGENCY MEDICINE

## 2018-04-03 PROCEDURE — 99284 EMERGENCY DEPT VISIT MOD MDM: CPT

## 2018-04-03 PROCEDURE — 85025 COMPLETE CBC W/AUTO DIFF WBC: CPT | Performed by: EMERGENCY MEDICINE

## 2018-04-03 PROCEDURE — 74011000250 HC RX REV CODE- 250: Performed by: EMERGENCY MEDICINE

## 2018-04-03 PROCEDURE — 80307 DRUG TEST PRSMV CHEM ANLYZR: CPT | Performed by: EMERGENCY MEDICINE

## 2018-04-03 PROCEDURE — 96372 THER/PROPH/DIAG INJ SC/IM: CPT

## 2018-04-03 PROCEDURE — 65660000000 HC RM CCU STEPDOWN

## 2018-04-03 PROCEDURE — 70450 CT HEAD/BRAIN W/O DYE: CPT

## 2018-04-03 PROCEDURE — 74011250636 HC RX REV CODE- 250/636: Performed by: EMERGENCY MEDICINE

## 2018-04-03 PROCEDURE — 83690 ASSAY OF LIPASE: CPT | Performed by: EMERGENCY MEDICINE

## 2018-04-03 PROCEDURE — 74011250636 HC RX REV CODE- 250/636: Performed by: INTERNAL MEDICINE

## 2018-04-03 PROCEDURE — 93005 ELECTROCARDIOGRAM TRACING: CPT

## 2018-04-03 PROCEDURE — 96374 THER/PROPH/DIAG INJ IV PUSH: CPT

## 2018-04-03 PROCEDURE — 81025 URINE PREGNANCY TEST: CPT

## 2018-04-03 PROCEDURE — 80053 COMPREHEN METABOLIC PANEL: CPT | Performed by: EMERGENCY MEDICINE

## 2018-04-03 PROCEDURE — 36415 COLL VENOUS BLD VENIPUNCTURE: CPT | Performed by: EMERGENCY MEDICINE

## 2018-04-03 PROCEDURE — 84703 CHORIONIC GONADOTROPIN ASSAY: CPT | Performed by: EMERGENCY MEDICINE

## 2018-04-03 RX ORDER — SODIUM CHLORIDE 0.9 % (FLUSH) 0.9 %
5-10 SYRINGE (ML) INJECTION EVERY 8 HOURS
Status: DISCONTINUED | OUTPATIENT
Start: 2018-04-03 | End: 2018-04-04 | Stop reason: HOSPADM

## 2018-04-03 RX ORDER — HYDROMORPHONE HYDROCHLORIDE 2 MG/ML
1 INJECTION, SOLUTION INTRAMUSCULAR; INTRAVENOUS; SUBCUTANEOUS ONCE
Status: COMPLETED | OUTPATIENT
Start: 2018-04-03 | End: 2018-04-03

## 2018-04-03 RX ORDER — SODIUM CHLORIDE 9 MG/ML
125 INJECTION, SOLUTION INTRAVENOUS CONTINUOUS
Status: DISCONTINUED | OUTPATIENT
Start: 2018-04-03 | End: 2018-04-04 | Stop reason: HOSPADM

## 2018-04-03 RX ORDER — ACETAMINOPHEN 500 MG
500 TABLET ORAL
COMMUNITY

## 2018-04-03 RX ORDER — ENOXAPARIN SODIUM 100 MG/ML
40 INJECTION SUBCUTANEOUS EVERY 24 HOURS
Status: DISCONTINUED | OUTPATIENT
Start: 2018-04-03 | End: 2018-04-04 | Stop reason: HOSPADM

## 2018-04-03 RX ORDER — LORAZEPAM 2 MG/ML
4 INJECTION INTRAMUSCULAR
Status: DISCONTINUED | OUTPATIENT
Start: 2018-04-03 | End: 2018-04-04 | Stop reason: HOSPADM

## 2018-04-03 RX ORDER — ZIPRASIDONE MESYLATE 20 MG/ML
10 INJECTION, POWDER, LYOPHILIZED, FOR SOLUTION INTRAMUSCULAR
Status: COMPLETED | OUTPATIENT
Start: 2018-04-03 | End: 2018-04-03

## 2018-04-03 RX ORDER — ZIPRASIDONE MESYLATE 20 MG/ML
10 INJECTION, POWDER, LYOPHILIZED, FOR SOLUTION INTRAMUSCULAR
Status: DISCONTINUED | OUTPATIENT
Start: 2018-04-03 | End: 2018-04-03

## 2018-04-03 RX ORDER — CLONAZEPAM 0.5 MG/1
0.5 TABLET ORAL
COMMUNITY
End: 2018-04-30

## 2018-04-03 RX ORDER — LORAZEPAM 2 MG/ML
2 INJECTION INTRAMUSCULAR
Status: ACTIVE | OUTPATIENT
Start: 2018-04-03 | End: 2018-04-03

## 2018-04-03 RX ORDER — WATER FOR INJECTION,STERILE
VIAL (ML) INJECTION
Status: DISPENSED
Start: 2018-04-03 | End: 2018-04-04

## 2018-04-03 RX ORDER — SODIUM CHLORIDE 0.9 % (FLUSH) 0.9 %
5-10 SYRINGE (ML) INJECTION AS NEEDED
Status: DISCONTINUED | OUTPATIENT
Start: 2018-04-03 | End: 2018-04-04 | Stop reason: HOSPADM

## 2018-04-03 RX ORDER — CLONAZEPAM 0.5 MG/1
0.25 TABLET ORAL DAILY
COMMUNITY
End: 2018-04-30

## 2018-04-03 RX ORDER — ALPRAZOLAM 0.5 MG/1
0.5 TABLET ORAL
COMMUNITY
End: 2018-04-13

## 2018-04-03 RX ORDER — LORAZEPAM 2 MG/ML
2 INJECTION INTRAMUSCULAR
Status: DISCONTINUED | OUTPATIENT
Start: 2018-04-03 | End: 2018-04-04 | Stop reason: HOSPADM

## 2018-04-03 RX ORDER — HALOPERIDOL 5 MG/ML
5 INJECTION INTRAMUSCULAR ONCE
Status: COMPLETED | OUTPATIENT
Start: 2018-04-03 | End: 2018-04-03

## 2018-04-03 RX ADMIN — ZIPRASIDONE MESYLATE 10 MG: 20 INJECTION, POWDER, LYOPHILIZED, FOR SOLUTION INTRAMUSCULAR at 17:09

## 2018-04-03 RX ADMIN — HYDROMORPHONE HYDROCHLORIDE 1 MG: 2 INJECTION, SOLUTION INTRAMUSCULAR; INTRAVENOUS; SUBCUTANEOUS at 23:03

## 2018-04-03 RX ADMIN — FOLIC ACID: 5 INJECTION, SOLUTION INTRAMUSCULAR; INTRAVENOUS; SUBCUTANEOUS at 16:53

## 2018-04-03 RX ADMIN — Medication 10 ML: at 22:40

## 2018-04-03 RX ADMIN — SODIUM CHLORIDE 1000 ML: 900 INJECTION, SOLUTION INTRAVENOUS at 16:56

## 2018-04-03 RX ADMIN — HALOPERIDOL LACTATE 5 MG: 5 INJECTION, SOLUTION INTRAMUSCULAR at 16:20

## 2018-04-03 NOTE — ED TRIAGE NOTES
Patient presents accompanied by  who states she called him 1 hour ago and asked him to come home.  found patient in upstairs bedroom, and she told him she had been drinking. When she tried to get up then, she fell and hit head twice on nightstand.

## 2018-04-03 NOTE — Clinical Note
Status[de-identified] Inpatient [101] Type of Bed: Telemetry [19] Inpatient Hospitalization Certified Necessary for the Following Reasons: 3. Patient receiving treatment that can only be provided in an inpatient setting (further clarification in H&P documentation) Admitting Diagnosis: [facility] [799734] Admitting Physician: Lutricia Barthel Attending Physician: Lutricia Barthel Estimated Length of Stay: 3-4 Midnights Discharge Plan[de-identified] Home with Office Follow-up

## 2018-04-03 NOTE — IP AVS SNAPSHOT
[address] 
[phone] Patient: Cain Gonzalez MRN: [id number] VQK:0/70/7583 A check odette indicates which time of day the medication should be taken. My Medications CONTINUE taking these medications Instructions Each Dose to Equal  
 Morning Noon Evening Bedtime  
 acetaminophen 500 mg tablet Commonly known as:  TYLENOL Your last dose was:  Resume at discharge Take 500 mg by mouth every six (6) hours as needed for Pain. 500 mg  
    
   
   
   
  
 ALPRAZolam 0.5 mg tablet Commonly known as:  Stephanie Ream Your last dose was:  Resume at discharge Take 0.5 mg by mouth daily as needed for Anxiety. 0.5 mg  
    
   
   
   
  
 * clonazePAM 0.5 mg tablet Commonly known as:  Beverely Hack Your last dose was:  Resume at discharge (not given while in hospital) Take 0.25 mg by mouth daily. 0.25 mg  
    
  
   
   
   
  
 * clonazePAM 0.5 mg tablet Commonly known as:  Beverely Hack Your last dose was:  Resume at discharge (not given while in hospital) Take 0.5 mg by mouth daily (after lunch). 0.5 mg DULoxetine 60 mg capsule Commonly known as:  CYMBALTA Your last dose was:  Resume at discharge (not given while in hospital) Take 60 mg by mouth daily. 60 mg MOTRIN  mg tablet Generic drug:  ibuprofen Your last dose was:  Resume at discharge Take 800 mg by mouth every eight (8) hours as needed. 800 mg  
    
   
   
   
  
 traZODone 50 mg tablet Commonly known as:  Teryl [place] Your last dose was:  Resume at discharge (not given while in hospital) Take 50 mg by mouth nightly. 50 mg  
    
   
   
   
  
  
 * Notice: This list has 2 medication(s) that are the same as other medications prescribed for you.  Read the directions carefully, and ask your doctor or other care provider to review them with you.

## 2018-04-03 NOTE — PROGRESS NOTES
[facility]: MED RECONCILIATION    Comments/Recommendations:    Patient is sleeping (had Haldol and Geodon earlier), but  available to provide information to the best of his knowledge   Her  reports that she had been on Fentanyl patches and Oxycodone for ~6 years and finally de-toxed November 2017. She was put on Vititrol (Naltrexone) 380 mg IM monthly for January and February 2018, but they think she had a reaction to it (itching, hives, throat closing) and it was subsequently stopped.  Quetiapine - patient was prescribed quetiapine 100 mg PO QHS on 3/4/18 but  states that he thinks she stopped taking because of the way it was making her feel    Medications added:     · Alprazolam 0.5 mg PO daily PRN (recently prescribed 3/22)  · Clonazepam 0.5 mg - take 1/2 tab (0.25 mg) PO daily and 1 tab (0.5 mg) in the afternoon (recently prescribed 3/22)    Medications removed:    · Morphine PO   · Percocet  · Naproxen    Medications adjusted:    · none    Information obtained from: patient's  [facility], Rx query    Significant PMH/Disease States:   Past Medical History:   Diagnosis Date    Anxiety     Blurred vision     Chest pain     Depression     Frequent headaches     Muscle weakness     Pancreatic cyst 3/21/2018    Shortness of breath     Stomach pain     Swallowing difficulty      Chief Complaint for this Admission:   Chief Complaint   Patient presents with    Alcohol intoxication    Fall    Head Injury     Allergies: Amoxicillin    Prior to Admission Medications:     Medication Documentation Review Audit       Reviewed by Chiqui Sahu PHARMD (Pharmacist) on 04/03/18 at [address] [place] [address] Provider Last Dose Status Taking?      acetaminophen (TYLENOL) 500 mg tablet Take 500 mg by mouth every six (6) hours as needed for Pain. Historical Provider  Active Yes    ALPRAZolam (XANAX) 0.5 mg tablet Take 0.5 mg by mouth daily as needed for Anxiety.  Historical Provider 4/3/2018 am Active Yes    clonazePAM (KLONOPIN) 0.5 mg tablet Take 0.25 mg by mouth daily. Historical Provider 4/3/2018 am Active Yes    clonazePAM (KLONOPIN) 0.5 mg tablet Take 0.5 mg by mouth daily (after lunch). Historical Provider 4/2/2018 afternoon Active Yes    duloxetine (CYMBALTA) 60 mg capsule Take 60 mg by mouth daily. Daniel Archer MD 4/3/2018 am Active Yes    ibuprofen (MOTRIN IB) 200 mg tablet Take 800 mg by mouth every eight (8) hours as needed. Historical Provider  Active Yes    traZODone (DESYREL) 50 mg tablet Take 50 mg by mouth nightly. Historical Provider 4/2/2018 pm Active Yes                  Thank you for the consult,  Kenroy GUEVARA [facility]

## 2018-04-03 NOTE — ED PROVIDER NOTES
HPI Comments: 42 y.o. female with past medical history significant for depression, anxiety, and pancreatic cyst who presents from home with chief complaint of alcohol intoxication. As per the , he called the pt, who was at home, around noon and believed that she was intoxicated. The  came home, found the pt to be intoxicated, and witnessed the pt fall and hit her head on the nightstand. The pt also slid down some stairs today as well. The  reports that he was surprised to see the pt drinking at this time. The pt normally drinks 3-6 drinks a day. The  reports that the pt has been depressed recently and it was noticeable one day ago. The  reports that she has been depressed about an upcoming pancreatic cyst removal on 4/20. The pt saw GI in February to have a biopsy of the cyst and it was benign. The  reports that the pt was seen at [address] [place] [address] one week ago for taking too many pills. The pt had a psychiatric evaluation and was discharged home. The pt takes antidepressants and anxiolytics. The pt was recently started on Xanax and Clonopin. The pt has been on a pain regiment with Fentanyl patches and other narcotics for pain. The  reports that the pt has been out of her percocet. In the ED, the pt vocalized SI as her reason for drinking. The  was not aware of SI from the pt prior to today. The pt denies taking pills along with drinking. There are no other acute medical concerns at this time. Social hx: current smoker, EtOH use  PCP: Rosalina Melton MD    Full history, physical exam, and ROS unable to be obtained due to:  intoxication. Note written by Roldan Cisneros, as dictated by Cliff Dalal MD 4:20 PM      The history is provided by the patient. No  was used.         Past Medical History:   Diagnosis Date    Anxiety     Blurred vision     Chest pain     Depression     Frequent headaches     Muscle weakness     Pancreatic cyst 3/21/2018    Shortness of breath     Stomach pain     Swallowing difficulty        Past Surgical History:   Procedure Laterality Date    HX GYN      endometriosis surgery         Family History:   Problem Relation Age of Onset    Cancer Mother      colon    Cancer Father      skin       Social History     Social History    Marital status:      Spouse name: N/A    Number of children: N/A    Years of education: N/A     Occupational History    Not on file. Social History Main Topics    Smoking status: Current Every Day Smoker     Packs/day: 1.00     Years: 25.00    Smokeless tobacco: Never Used    Alcohol use 1.8 oz/week     3 Glasses of wine per week    Drug use: No    Sexual activity: Yes     Other Topics Concern    Not on file     Social History Narrative         ALLERGIES: Amoxicillin    Review of Systems   Unable to perform ROS: Other       Vitals:    04/03/18 1542   BP: 127/90   Pulse: (!) 120   Resp: 22   Temp: 96.8 °F (36 °C)   SpO2: 98%   Weight: 70.3 kg (155 lb)   Height: 5' 3\" (1.6 m)            Physical Exam   Constitutional: She appears well-developed and well-nourished. No distress. Crying; Intoxicated;     HENT:   Head: Normocephalic and atraumatic. Right Ear: External ear normal.   Left Ear: External ear normal.   Nose: Nose normal.   Mouth/Throat: Oropharynx is clear and moist.   Eyes: Conjunctivae and EOM are normal. Pupils are equal, round, and reactive to light. Neck: Normal range of motion. Neck supple. No JVD present. No thyromegaly present. Cardiovascular: Normal rate, regular rhythm, normal heart sounds and intact distal pulses. No murmur heard. Pulmonary/Chest: Effort normal and breath sounds normal. No respiratory distress. She has no wheezes. She has no rales. Abdominal: Soft. Bowel sounds are normal. She exhibits no distension. There is no tenderness. Musculoskeletal: Normal range of motion. She exhibits no edema.    No external evidence of injury Neurological: She is alert. No cranial nerve deficit. nonfocal neurological exam   Skin: Skin is warm and dry. No rash noted. Psychiatric:   Depressed affect;  Voices SI;     Nursing note and vitals reviewed. Note written by Roldan Simpson, as dictated by Jay Garcia MD 4:21 PM      MDM  Number of Diagnoses or Management Options  Alcohol intoxication with delirium [facility]):   Alcoholic ketoacidosis:   Alcohol-induced acute pancreatitis without infection or necrosis:   Suicidal ideation:   Diagnosis management comments: Acute alcohol ketoacidosis. Pancreatitis. Depression with SI. Will admit to the hospitalist for further evaluation with [facility]. ED Course       Procedures  4:33 PM  [facility] will come see the pt.     5:02 PM  UA is clean. Drug screen is pending. Acetaminophen is less than 2. Salicylate is 4.3. Chemistry is significant for a bicarb of 19 with an anion gap elevation. Lipase is 911. WBC is 12.2. Alcohol is 347. Normal head CT. CONSULT NOTE:  5:03 PM Jay Garcia MD spoke with Dr. Collin Allen, Consult for Hospitalist.  Discussed available diagnostic tests and clinical findings. He will see and admit the pt. ED EKG interpretation:  Rhythm: sinus tachycardia; and regular .  Rate (approx.): 112; Axis: normal; ST/T wave: normal;   No ectopy  Note written by Roldan Simpson, as dictated by Jay Garcia MD 5:53 PM

## 2018-04-03 NOTE — H&P
[address] 19  [phone]    Admission History and Physical      NAME:  Mina Acosta   :   1980   MRN:  [id number]     PCP:  Aleksey Iyer MD     Date/Time:  4/3/2018         Subjective:     CHIEF COMPLAINT: brought due to alcohol intoxication      HISTORY OF PRESENT ILLNESS:     Ms. Surjit Roberts is a 42 y.o.  female who is admitted with alcohol intoxication. Ms. Surjit Roberts with PMH of alcohol abuse, anxiety, depression, pancreatis cyst was brought to ER after she was intoxicated with alcohol. Pt is sleeping, sedated and unable to provide history. History was obtained from her . According to him, this afternoon, which he was taking on the phone with her, he noted that she was drunk. Later when he arrive at home, found her very intoxicated and fell twice hitting her head. She has a lot of stress recently due to her upcoming pancreatic surgery. When the ER physician asked her about hearting herself, she said yes. Past Medical History:   Diagnosis Date    Anxiety     Blurred vision     Chest pain     Depression     Frequent headaches     Muscle weakness     Pancreatic cyst 3/21/2018    Shortness of breath     Stomach pain     Swallowing difficulty         Past Surgical History:   Procedure Laterality Date    HX GYN      endometriosis surgery       Social History   Substance Use Topics    Smoking status: Current Every Day Smoker     Packs/day: 1.00     Years: 25.00    Smokeless tobacco: Never Used    Alcohol use 1.8 oz/week     3 Glasses of wine per week        Family History   Problem Relation Age of Onset    Cancer Mother      colon    Cancer Father      skin        Allergies   Allergen Reactions    Amoxicillin Shortness of Breath and Rash     Pt has had keflex in the past        Prior to Admission medications    Medication Sig Start Date End Date Taking?  Authorizing Provider oxyCODONE-acetaminophen (PERCOCET) 5-325 mg per tablet Take 1 Tab by mouth every eight (8) hours as needed for Pain. Max Daily Amount: 3 Tabs. 3/21/18   Norma Thomas MD   ibuprofen (MOTRIN IB) 200 mg tablet Take 800 mg by mouth. Historical Provider   naproxen sodium (ALEVE) 220 mg cap Take  by mouth. Historical Provider   morphine IR (MS IR) 15 mg tablet Take 1 Tab by mouth every four (4) hours as needed for Pain. Max Daily Amount: 90 mg. 3/11/18   Aldo Scanlon MD   traZODone (DESYREL) 50 mg tablet Take  by mouth nightly.     Historical Provider   duloxetine (CYMBALTA) 60 mg capsule  10/7/14   Daniel Archer MD         Review of Systems:   pt unable to provide Hx due to sedation        Objective:      VITALS:    Vital signs reviewed; most recent are:    Visit Vitals    /78    Pulse (!) 113    Temp 96.8 °F (36 °C)    Resp 18    Ht 5' 3\" (1.6 m)    Wt 70.3 kg (155 lb)    SpO2 98%    BMI 27.46 kg/m2     SpO2 Readings from Last 6 Encounters:   04/03/18 98%   03/21/18 99%   03/14/18 94%   03/11/18 93%   03/09/18 97%   02/25/18 96%        No intake or output data in the 24 hours ending 04/03/18 1746         Exam:     Physical Exam:    Gen:  Well-developed, well-nourished, in no acute distress  HEENT:  Pink conjunctivae, PERRL, hearing intact to voice, moist mucous membranes  Neck:  Supple, without masses, thyroid non-tender  Resp:  No accessory muscle use, clear breath sounds without wheezes rales or rhonchi  Card:  No murmurs, normal S1, S2 without thrills, bruits or peripheral edema  Abd:  Soft, non-tender, non-distended, normoactive bowel sounds are present, no palpable organomegaly and no detectable hernias  Lymph:  No cervical or inguinal adenopathy  Musc:  No cyanosis or clubbing  Skin:  No rashes or ulcers, skin turgor is good  Neuro:  sedated  Psych:  Sedated        Labs:    Recent Labs      04/03/18   1605   WBC  12.2*   HGB  15.9   HCT  47.2*   PLT  389     Recent Labs      04/03/18 1605   NA  138   K  3.5   CL  102   CO2  19*   GLU  187*   BUN  6   CREA  0.65   CA  9.0   ALB  3.9   TBILI  0.2   SGOT  42*   ALT  52     No results found for: GLUCPOC  No results for input(s): PH, PCO2, PO2, HCO3, FIO2 in the last 72 hours. No results for input(s): INR in the last 72 hours. No lab exists for component: INREXT    Telemetry reviewed:  Sinus tachy       Assessment/Plan:    1. Alcohol intoxication ([address] (4/3/2018)/ Alcohol abuse (4/3/2018). Admit to telemetry. IVF, goody bag, start CIWA protocol. Monitor closely     2. Suicidal ideation (4/3/2018). Suicide precaution. Consult psychiatry. 3.   High anion gap metabolic acidosis (3/6/8828). likely secondary to alcohol abuse. Continue IVF and monitor labs     4. Elevated lipase (4/3/2018)/ pancreatic cyst. Keep NPO. Has upcoming pancreatic surgery. Consult GI, Dr Keven Raza. 5.  Sinus tachycardia (4/3/2018). This is likely secondary to alcohol intoxication. Continue IVF and monitor clinically.           Previous medical records reviewed     Risk of deterioration: high      Total time spent with patient: 79 [address] discussed with: Family, Nursing Staff and >50% of time spent in counseling and coordination of care    Discussed:  Care Plan    Prophylaxis:  Lovenox    Probable Disposition:  Home w/Family           ___________________________________________________    Attending Physician: Tavares Blanc MD

## 2018-04-03 NOTE — ED NOTES
Patient crying and writhing in wheelchair during triage assessment. Stated, \"I just want to die. \" Patient significantly intoxicated.  denies patient verbalizing any suicidal thoughts recently. Charge Nurse, Jessica Dobson, aware.

## 2018-04-03 NOTE — BSMART NOTE
4:20pm: Attempted to contact ER provider regarding consult. Was asked to call back as ER provider is on the phone. 4:30pm: Discussed patient with Dr. Ildefonso Galarza. Patient reports suicidal ideation and an overdose on pills last week resulting in a visit to [facility] but was released. Patient is currently very intoxicated and has been hitting herself and required medication to help calm her down. Patient is getting a CT and fluids and may be appropriate for assessment after 6pm. Oncoming BSMART counselor will be informed of consult request and will attempt assessment at that time. Dr. Ildefonso Galarza is in agreement with this plan.    5:58pm: Per chart review patient is being medically admitted. Will inform incoming BSMART counselor of medical admission.     Narendra Ulloa [facility]

## 2018-04-03 NOTE — IP AVS SNAPSHOT
[address] 
[phone] Patient: Farooq Moss MRN: [id number] BBP:5/52/0970 About your hospitalization You were admitted on:  April 3, 2018 You last received care in the:  [facility] 3 PROG CARE TELE 2 You were discharged on:  April 4, 2018 Why you were hospitalized Your primary diagnosis was:  Not on File Your diagnoses also included:  Alcohol Intoxication (Hcc), Suicidal Ideation, High Anion Gap Metabolic Acidosis, Elevated Lipase, Alcohol Abuse, Sinus Tachycardia Follow-up Information Follow up With Details Comments Contact Info Yessy rBar MD   [address] [facility] [address] [facility] [address] 
[phone] Your Scheduled Appointments Friday April 13, 2018 11:30 AM EDT Preadmission Testing with [facility] PAT EXAM RM 4 [address] [facility] ([address]) [address] [facility]  
[phone] Friday April 20, 2018 PANCREATECTOMY LAPAROSCOPIC with Jasmin Campo MD  
[facility] SURGERY (RI OR PRE ASSESSMENT) [address] [place] [address] 13  
[phone] Discharge Orders None A check odette indicates which time of day the medication should be taken. My Medications CONTINUE taking these medications Instructions Each Dose to Equal  
 Morning Noon Evening Bedtime  
 acetaminophen 500 mg tablet Commonly known as:  TYLENOL Your last dose was:  Resume at discharge Take 500 mg by mouth every six (6) hours as needed for Pain. 500 mg  
    
   
   
   
  
 ALPRAZolam 0.5 mg tablet Commonly known as:  Helenmond Nati Your last dose was:  Resume at discharge Take 0.5 mg by mouth daily as needed for Anxiety. 0.5 mg  
    
   
   
   
  
 * clonazePAM 0.5 mg tablet Commonly known as:  Nohemyene Backbone  
 Your last dose was:  Resume at discharge (not given while in hospital) Take 0.25 mg by mouth daily. 0.25 mg  
    
  
   
   
   
  
 * clonazePAM 0.5 mg tablet Commonly known as:  Beena Zepeda Your last dose was:  Resume at discharge (not given while in hospital) Take 0.5 mg by mouth daily (after lunch). 0.5 mg DULoxetine 60 mg capsule Commonly known as:  CYMBALTA Your last dose was:  Resume at discharge (not given while in hospital) Take 60 mg by mouth daily. 60 mg MOTRIN  mg tablet Generic drug:  ibuprofen Your last dose was:  Resume at discharge Take 800 mg by mouth every eight (8) hours as needed. 800 mg  
    
   
   
   
  
 traZODone 50 mg tablet Commonly known as:  Be Kruegerrick Your last dose was:  Resume at discharge (not given while in hospital) Take 50 mg by mouth nightly. 50 mg  
    
   
   
   
  
  
 * Notice: This list has 2 medication(s) that are the same as other medications prescribed for you. Read the directions carefully, and ask your doctor or other care provider to review them with you. Discharge Instructions HOSPITALIST DISCHARGE INSTRUCTIONS 
NAME: Coby Kolb :  1980 MRN:  [id number] Date/Time:  2018 2:14 PM 
 
ADMIT DATE: 4/3/2018 DISCHARGE DATE: 2018 ADMITTING DIAGNOSIS: 
Anxiety DISCHARGE DIAGNOSIS: 
As above MEDICATIONS: 
 
· It is important that you take the medication exactly as they are prescribed. · Keep your medication in the bottles provided by the pharmacist and keep a list of the medication names, dosages, and times to be taken in your wallet. · Do not take other medications without consulting your doctor. Pain Management: per above medications What to do at Home: 1. Stop drinking alcohol. Recommended diet:  Resume previous diet Recommended activity: Activity as tolerated If you experience any of the following symptoms then please call your primary care physician or return to the emergency room if you cannot get hold of your doctor: 
Fever, chills, nausea, vomiting, diarrhea, change in mentation, falling, bleeding, shortness of breath Follow Up: 
Dr. Bryon May MD  you are to call and set up an appointment to see them in 1 week. Information obtained by : 
I understand that if any problems occur once I am at home I am to contact my physician. I understand and acknowledge receipt of the instructions indicated above. Physician's or R.N.'s Signature                                                                  Date/Time Patient or Representative Signature                                                          Date/Time \ [employer] Announcement We are excited to announce that we are making your provider's discharge notes available to you in [employer]. You will see these notes when they are completed and signed by the physician that discharged you from your recent hospital stay. If you have any questions or concerns about any information you see in Medifyt, please call the Health Information Department where you were seen or reach out to your Primary Care Provider for more information about your plan of care. Introducing [facility]! Dear Candy Kevin: 
Thank you for requesting a [employer] account. Our records indicate that you already have an active [employer] account. You can access your account anytime at [email or web address] Did you know that you can access your hospital and ER discharge instructions at any time in ClaimReturnt? You can also review all of your test results from your hospital stay or ER visit. Additional Information If you have questions, please visit the Frequently Asked Questions section of the Shoto website at [email or web address] Remember, ClaimReturnt is NOT to be used for urgent needs. For medical emergencies, dial 911. Now available from your iPhone and Android! Introducing Clifton Melgar As a Maddy Valentin patient, I wanted to make you aware of our electronic visit tool called Clifton Melgar. Quat-E 24/7 allows you to connect within minutes with a medical provider 24 hours a day, seven days a week via a mobile device or tablet or logging into a secure website from your computer. You can access Clifton Melgar from anywhere in the [place]. A virtual visit might be right for you when you have a simple condition and feel like you just dont want to get out of bed, or cant get away from work for an appointment, when your regular Maddy Valentin provider is not available (evenings, weekends or holidays), or when youre out of town and need minor care. Electronic visits cost only $49 and if the Doyne Sprout 24/7 provider determines a prescription is needed to treat your condition, one can be electronically transmitted to a nearby pharmacy*. Please take a moment to enroll today if you have not already done so. The enrollment process is free and takes just a few minutes. To enroll, please download the Doyne Sprout 24/7 maria to your tablet or phone, or visit [email or web address] to enroll on your computer. And, as an [address] [place] [address] patient with a Timetric account, the results of your visits will be scanned into your electronic medical record and your primary care provider will be able to view the scanned results.    
We urge you to continue to see your regular Maddy Valentin provider for your ongoing medical care. And while your primary care provider may not be the one available when you seek a Clifton Taylorfin virtual visit, the peace of mind you get from getting a real diagnosis real time can be priceless. For more information on Clifton Taylorfin, view our Frequently Asked Questions (FAQs) at [email or web address]. Sincerely, 
 
Travis Hunter MD 
Chief Medical Officer Himanshu Polk *:  certain medications cannot be prescribed via powervaultjaimieNirvanix Unresulted Labs-Please follow up with your PCP about these lab tests Order Current Status EKG, 12 LEAD, INITIAL Preliminary result Providers Seen During Your Hospitalization Provider Specialty Primary office phone Radha Martin MD Emergency Medicine [phone] Hilario Orta MD Hospitalist [phone] Clyde Barron MD Internal Medicine [phone] Your Primary Care Physician (PCP) Primary Care Physician Office Phone Office Fax Jennifer Catalans [phone] You are allergic to the following Allergen Reactions Amoxicillin Shortness of Breath Rash Pt has had keflex in the past  
  
Recent Documentation Height Weight BMI OB Status Smoking Status 1.6 m 70.3 kg 27.46 kg/m2 Having regular periods Current Every Day Smoker Emergency Contacts Name Discharge Info Relation Home Work Mobile [facility] Medic CAREGIVER [3] Spouse [3] [phone] Patient Belongings The following personal items are in your possession at time of discharge: 
  Dental Appliances: None         Home Medications: None   Jewelry: None  Clothing: At bedside, Shirt Please provide this summary of care documentation to your next provider. Signatures-by signing, you are acknowledging that this After Visit Summary has been reviewed with you and you have received a copy.   
  
 
  
    
    
 Patient Signature: ____________________________________________________________ Date:  ____________________________________________________________  
  
Anjum Shackle Provider Signature:  ____________________________________________________________ Date:  ____________________________________________________________

## 2018-04-04 VITALS
WEIGHT: 155 LBS | HEART RATE: 69 BPM | DIASTOLIC BLOOD PRESSURE: 85 MMHG | OXYGEN SATURATION: 95 % | RESPIRATION RATE: 17 BRPM | TEMPERATURE: 98.6 F | HEIGHT: 63 IN | BODY MASS INDEX: 27.46 KG/M2 | SYSTOLIC BLOOD PRESSURE: 127 MMHG

## 2018-04-04 LAB
ANION GAP SERPL CALC-SCNC: 8 MMOL/L (ref 5–15)
ATRIAL RATE: 112 BPM
BUN SERPL-MCNC: 8 MG/DL (ref 6–20)
BUN/CREAT SERPL: 15 (ref 12–20)
CALCIUM SERPL-MCNC: 7.5 MG/DL (ref 8.5–10.1)
CALCULATED P AXIS, ECG09: 75 DEGREES
CALCULATED R AXIS, ECG10: 80 DEGREES
CALCULATED T AXIS, ECG11: 28 DEGREES
CHLORIDE SERPL-SCNC: 109 MMOL/L (ref 97–108)
CO2 SERPL-SCNC: 26 MMOL/L (ref 21–32)
CREAT SERPL-MCNC: 0.54 MG/DL (ref 0.55–1.02)
DIAGNOSIS, 93000: NORMAL
ERYTHROCYTE [DISTWIDTH] IN BLOOD BY AUTOMATED COUNT: 12.9 % (ref 11.5–14.5)
GLUCOSE SERPL-MCNC: 86 MG/DL (ref 65–100)
HCT VFR BLD AUTO: 39.8 % (ref 35–47)
HGB BLD-MCNC: 13 G/DL (ref 11.5–16)
LIPASE SERPL-CCNC: 389 U/L (ref 73–393)
MCH RBC QN AUTO: 32.5 PG (ref 26–34)
MCHC RBC AUTO-ENTMCNC: 32.7 G/DL (ref 30–36.5)
MCV RBC AUTO: 99.5 FL (ref 80–99)
NRBC # BLD: 0 K/UL (ref 0–0.01)
NRBC BLD-RTO: 0 PER 100 WBC
P-R INTERVAL, ECG05: 128 MS
PLATELET # BLD AUTO: 282 K/UL (ref 150–400)
PMV BLD AUTO: 9.3 FL (ref 8.9–12.9)
POTASSIUM SERPL-SCNC: 3.7 MMOL/L (ref 3.5–5.1)
Q-T INTERVAL, ECG07: 328 MS
QRS DURATION, ECG06: 86 MS
QTC CALCULATION (BEZET), ECG08: 447 MS
RBC # BLD AUTO: 4 M/UL (ref 3.8–5.2)
SODIUM SERPL-SCNC: 143 MMOL/L (ref 136–145)
VENTRICULAR RATE, ECG03: 112 BPM
WBC # BLD AUTO: 8.7 K/UL (ref 3.6–11)

## 2018-04-04 PROCEDURE — 74011250636 HC RX REV CODE- 250/636: Performed by: INTERNAL MEDICINE

## 2018-04-04 PROCEDURE — 80048 BASIC METABOLIC PNL TOTAL CA: CPT | Performed by: INTERNAL MEDICINE

## 2018-04-04 PROCEDURE — 74011250637 HC RX REV CODE- 250/637: Performed by: INTERNAL MEDICINE

## 2018-04-04 PROCEDURE — 85027 COMPLETE CBC AUTOMATED: CPT | Performed by: INTERNAL MEDICINE

## 2018-04-04 PROCEDURE — 36415 COLL VENOUS BLD VENIPUNCTURE: CPT | Performed by: INTERNAL MEDICINE

## 2018-04-04 PROCEDURE — 83690 ASSAY OF LIPASE: CPT | Performed by: INTERNAL MEDICINE

## 2018-04-04 RX ORDER — IBUPROFEN 200 MG
1 TABLET ORAL DAILY
Status: DISCONTINUED | OUTPATIENT
Start: 2018-04-04 | End: 2018-04-04 | Stop reason: HOSPADM

## 2018-04-04 RX ADMIN — LORAZEPAM 2 MG: 2 INJECTION INTRAMUSCULAR; INTRAVENOUS at 05:32

## 2018-04-04 RX ADMIN — LORAZEPAM 2 MG: 2 INJECTION INTRAMUSCULAR; INTRAVENOUS at 09:18

## 2018-04-04 RX ADMIN — SODIUM CHLORIDE 125 ML/HR: 900 INJECTION, SOLUTION INTRAVENOUS at 09:17

## 2018-04-04 RX ADMIN — LORAZEPAM 2 MG: 2 INJECTION INTRAMUSCULAR; INTRAVENOUS at 02:59

## 2018-04-04 RX ADMIN — Medication 10 ML: at 05:33

## 2018-04-04 NOTE — DISCHARGE INSTRUCTIONS
HOSPITALIST DISCHARGE INSTRUCTIONS  NAME: Marco A Mena   :  1980   MRN:  [id number]     Date/Time:  2018 2:14 PM    ADMIT DATE: 4/3/2018     DISCHARGE DATE: 2018     ADMITTING DIAGNOSIS:  Anxiety    DISCHARGE DIAGNOSIS:  As above    MEDICATIONS:    · It is important that you take the medication exactly as they are prescribed. · Keep your medication in the bottles provided by the pharmacist and keep a list of the medication names, dosages, and times to be taken in your wallet. · Do not take other medications without consulting your doctor. Pain Management: per above medications    What to do at Home:  1. Stop drinking alcohol. Recommended diet:  Resume previous diet    Recommended activity: Activity as tolerated    If you experience any of the following symptoms then please call your primary care physician or return to the emergency room if you cannot get hold of your doctor:  Fever, chills, nausea, vomiting, diarrhea, change in mentation, falling, bleeding, shortness of breath    Follow Up:  Dr. Raymundo Emmanuel MD  you are to call and set up an appointment to see them in 1 week. Information obtained by :  I understand that if any problems occur once I am at home I am to contact my physician. I understand and acknowledge receipt of the instructions indicated above.                                                                                                                                            Physician's or R.N.'s Signature                                                                  Date/Time                                                                                                                                              Patient or Representative Signature                                                          Date/Time    \

## 2018-04-04 NOTE — PROGRESS NOTES
Bedside and Verbal shift change report given to [facility] (oncoming nurse) by Lindy Martinez RN (offgoing nurse). Report included the following information SBAR, Kardex, Intake/Output, MAR, Recent Results and Cardiac Rhythm SR.       2257  Patient reports mid abdominal pain, sharp, 6/10, states \"gnawing pain. \" MD Kemp [place] notified, order for x1 dose Dilaudid ordered by MD. No further PRN pain medication orders to be received per MD this evening until patient is seen by morning physician. 2038  Patient in room with staff, spouse, transitioned to bed. Patient at this time appears calm, impulsive, but follows commands.  at bedside present to answer admission database questions. SAD assessment unable to be completed as patient is sleeping at this time. Tech present at bedside for remainder of evening per suicide precaution orders. Seizure pads in place, yellow socks placed, bed alarm on, tech instructed to notify RN for assistance. CIWA at this time: 1. Vital signs stable. Will continue to monitor.    _______________________________________________________  Primary Nurse Essie Diehl RN and Saad Paredes RN performed a dual skin assessment on this patient Impairment noted- see wound doc flow sheet  Fredrick score is 17. Patient has abrasions on PROMISE LE and UE ecchymosis. Patient does not have any POA open wounds. _________________________________________  2003  TRANSFER - IN REPORT:    Verbal report received from Aris Cee RN (name) on Leroy Duran  being received from ED 11(unit) for routine progression of care      Report consisted of patients Situation, Background, Assessment and   Recommendations(SBAR). Information from the following report(s) SBAR, Kardex, Intake/Output, MAR, Recent Results and Cardiac Rhythm SR was reviewed with the receiving nurse. Opportunity for questions and clarification was provided.       Assessment completed upon patients arrival to unit and care assumed.

## 2018-04-04 NOTE — PROGRESS NOTES
Pharmacist Discharge Medication Reconciliation    Discharge Provider:  Bridge       Discharge Medications:      My Medications        CONTINUE taking these medications         Instructions Each Dose to Equal   Morning Noon Evening Bedtime      acetaminophen 500 mg tablet   Commonly known as:  TYLENOL       Your last dose was: Your next dose is: Take 500 mg by mouth every six (6) hours as needed for Pain. 500 mg                        ALPRAZolam 0.5 mg tablet   Commonly known as:  XANAX       Your last dose was: Your next dose is: Take 0.5 mg by mouth daily as needed for Anxiety. 0.5 mg                        * clonazePAM 0.5 mg tablet   Commonly known as:  KlonoPIN       Your last dose was: Your next dose is: Take 0.25 mg by mouth daily. 0.25 mg                        * clonazePAM 0.5 mg tablet   Commonly known as:  KlonoPIN       Your last dose was: Your next dose is: Take 0.5 mg by mouth daily (after lunch). 0.5 mg                        DULoxetine 60 mg capsule   Commonly known as:  CYMBALTA       Your last dose was: Your next dose is: Take 60 mg by mouth daily. 60 mg                        MOTRIN  mg tablet   Generic drug:  ibuprofen       Your last dose was: Your next dose is: Take 800 mg by mouth every eight (8) hours as needed. 800 mg                        traZODone 50 mg tablet   Commonly known as:  DESYREL       Your last dose was: Your next dose is: Take 50 mg by mouth nightly. 50 mg                        * Notice: This list has 2 medication(s) that are the same as other medications prescribed for you. Read the directions carefully, and ask your doctor or other care provider to review them with you.                    The patient's chart, MAR, and AVS were reviewed by   Kerri Franco, Lula Araya,   Contact: [phone]

## 2018-04-04 NOTE — DISCHARGE SUMMARY
Physician Discharge Summary     Patient ID:  Chaitanya Reyes  [id number]  89 y.o.  1980    Admit date: 4/3/2018    Discharge date and time: 4/4/2018    Admission Diagnoses: Alcohol intoxication ([address]  Alcohol intoxication ([facility]    Discharge Diagnoses: Active Problems:    Alcohol intoxication ([address] (4/3/2018)      Suicidal ideation (4/3/2018)      High anion gap metabolic acidosis (8/2/6087)      Elevated lipase (4/3/2018)      Alcohol abuse (4/3/2018)      Sinus tachycardia (4/3/2018)           Hospital Course:   Alcohol intoxication / Alcohol abuse: increased etoh consumption acutely in relation to anxiety regarding surgery scheduled. Continue CIWA protocol. Eating po will stop goody bag  --pt received 3 doses of ativan. Reviewed symptoms with pt and nurse. Her +scores on the CIWA protocol are more consistent with generalized anxiety than actual alcohol withdrawal.      Suicidal ideation: Suicide precaution. Consult psychiatry, cleared for discharge       High anion gap metabolic acidosis (3/5/0641). likely secondary to alcohol abuse. Continue IVF and monitor labs        Elevated lipase (4/3/2018)/ pancreatic cyst: no abdominal pain, wishes to eat. Advance diet -- tolerated diet prior to discharge       Sinus tachycardia: This is likely secondary to alcohol intoxication. Continue IVF and monitor clinically.       Anxiety: sounds to be situational as above. Order nicotine patch -- contributing to her overall anxiety       PCP: Nam Mariee MD     Consults: GI and Psychiatry    Condition of patient at discharge: improved    Discharge Exam:  Please refer to progress note from today. Disposition: home    Patient Instructions:   Current Discharge Medication List      CONTINUE these medications which have NOT CHANGED    Details   ALPRAZolam (XANAX) 0.5 mg tablet Take 0.5 mg by mouth daily as needed for Anxiety. !! clonazePAM (KLONOPIN) 0.5 mg tablet Take 0.25 mg by mouth daily.       !! clonazePAM (KLONOPIN) 0.5 mg tablet Take 0.5 mg by mouth daily (after lunch). acetaminophen (TYLENOL) 500 mg tablet Take 500 mg by mouth every six (6) hours as needed for Pain. ibuprofen (MOTRIN IB) 200 mg tablet Take 800 mg by mouth every eight (8) hours as needed. traZODone (DESYREL) 50 mg tablet Take 50 mg by mouth nightly. duloxetine (CYMBALTA) 60 mg capsule Take 60 mg by mouth daily. !! - Potential duplicate medications found. Please discuss with provider. Activity: Activity as tolerated  Diet: Regular Diet  Wound Care: None needed    Follow-up with Joseph Freeman MD in 1 week.   Follow-up tests/labs none    Approximate time spent in patient care on day of discharge: 33 minutes    Signed:  Morales Baldwin MD  4/4/2018  2:15 PM

## 2018-04-04 NOTE — CONSULTS
PSYCHIATRIC CONSULTATION                         IDENTIFICATION:    Patient Name  Charo Mcnamara   Date of Birth 1980   [facility] [id number]   Medical Record Number  [id number]      Age  42 y.o. PCP Anahi Borrero MD   Admit date:  4/3/2018    [facility]  329/01  [facility] [address] [facility]   Date of Service  4/4/2018            HISTORY         REASON FOR HOSPITALIZATION/CONSULTATION:  A psychiatric consultation was requested by Mary Gonzalez MD to evaluate or provide advice/opinion related to evaluating alcohol abuse and suicidal ideations. CC: \"I was upset and drank too much\"    HISTORY OF PRESENT ILLNESS:    The patient, Charo Mcnamara, is a 42 y.o. WHITE OR  female with a past psychiatric history significant for depression  , who was admitted to the medical floor for the treatment of <principal problem not specified>. Pt seen today for evaluation. Pt was seen in her room. Her  was by her bedside. Pt reported that she  has been stressed out lately due to her upcoming surgery on 20th. Has been drinking 3 drinks of whiskey 5 days a week for last 1 and 1/2 month. Was drinking alcohol to calm herself down. Yesterday she drank too much and ended up in ER with alcohol intoxication. Pt has been on Cymbalta 60mg daily for yrs. Denies any depressive Sx at this time. Denies any SI/HI. Pt did make suicidal statements yesterday while she was intoxicated. \"I don't even remember making those statements. I was drunk\". , Denies  any intent or plan of suicide. No h/o previous suicide attempt. Pt as well as her   who was sitting at her bedside commited to her safety. No psychotic Sx. No manic Sx. ALLERGIES:  Allergies   Allergen Reactions    Amoxicillin Shortness of Breath and Rash     Pt has had keflex in the past      MEDICATIONS PRIOR TO ADMISSION:  Prescriptions Prior to Admission   Medication Sig    ALPRAZolam (XANAX) 0.5 mg tablet Take 0.5 mg by mouth daily as needed for Anxiety.     clonazePAM (KLONOPIN) 0.5 mg tablet Take 0.25 mg by mouth daily.  clonazePAM (KLONOPIN) 0.5 mg tablet Take 0.5 mg by mouth daily (after lunch).  acetaminophen (TYLENOL) 500 mg tablet Take 500 mg by mouth every six (6) hours as needed for Pain.  ibuprofen (MOTRIN IB) 200 mg tablet Take 800 mg by mouth every eight (8) hours as needed.  traZODone (DESYREL) 50 mg tablet Take 50 mg by mouth nightly.  duloxetine (CYMBALTA) 60 mg capsule Take 60 mg by mouth daily. PAST MEDICAL HISTORY:  Past Medical History:   Diagnosis Date    Anxiety     Blurred vision     Chest pain     Depression     Frequent headaches     Muscle weakness     Pancreatic cyst 3/21/2018    Shortness of breath     Stomach pain     Swallowing difficulty      Past Surgical History:   Procedure Laterality Date    HX GYN      endometriosis surgery      SOCIAL HISTORY: Pt is . Have one kid. She is not working  Social History     Social History    Marital status:      Spouse name: N/A    Number of children: N/A    Years of education: N/A     Occupational History    Not on file. Social History Main Topics    Smoking status: Current Every Day Smoker     Packs/day: 1.00     Years: 25.00    Smokeless tobacco: Never Used    Alcohol use 1.8 oz/week     3 Glasses of wine per week    Drug use: No    Sexual activity: Yes     Other Topics Concern    Not on file     Social History Narrative      FAMILY HISTORY: History reviewed. No pertinent family history. Family History   Problem Relation Age of Onset    Cancer Mother      colon    Cancer Father      skin       REVIEW of SYSTEMS:   Psychological ROS: positive for - anxiety and depression  Pertinent items are noted in the History of Present Illness. All other Systems reviewed and are considered negative. MENTAL STATUS EXAM & VITALS       MENTAL STATUS EXAM (MSE):    MSE FINDINGS ARE WITHIN NORMAL LIMITS (WNL) UNLESS OTHERWISE STATED BELOW. Orientation oriented to time, place and person   Vital Signs (BP,Pulse, Temp) See below (reviewed 4/4/2018); vital signs are WNL if not listed below.    Gait and Station Stable, no ataxia   Abnormal Muscular Movements/Tone/Behavior No EPS, no Tardive Dyskinesia, no abnormal muscular movements; wnl tone   Relations cooperative   General Appearance:  age appropriate   Language No aphasia or dysarthria   Speech:  normal pitch, normal volume and non-pressured   Thought Processes logical, wnl rate of thoughts, good abstract reasoning and computation   Thought Associations goal directed and logical   Thought Content free of delusions and free of hallucinations   Suicidal Ideations no plan , no intention and none   Homicidal Ideations no plan , no intention and none   Mood:  anxious   Affect:  anxious   Memory recent  adequate   Memory remote:  adequate   Concentration/Attention:  adequate   Fund of Knowledge average   Insight:  fair   Reliability fair   Judgment:  fair            VITALS:     Patient Vitals for the past 24 hrs:   Temp Pulse Resp BP SpO2   04/04/18 1101 98.6 °F (37 °C) 69 17 127/85 95 %   04/04/18 0724 98.7 °F (37.1 °C) 88 18 135/74 94 %   04/04/18 0700 - 92 - - -   04/04/18 0301 97.8 °F (36.6 °C) 82 17 120/74 96 %   04/03/18 2324 - 77 - - -   04/03/18 2244 97.7 °F (36.5 °C) 74 20 105/69 97 %   04/03/18 2038 98 °F (36.7 °C) 92 20 91/55 92 %   04/03/18 2030 - 90 - - -   04/03/18 1945 - 95 23 105/66 96 %   04/03/18 1930 - 95 22 105/59 95 %   04/03/18 1915 - 97 21 107/71 92 %   04/03/18 1900 - 99 23 106/68 96 %   04/03/18 1845 - (!) 103 26 107/59 95 %   04/03/18 1830 - (!) 106 24 110/68 94 %   04/03/18 1815 - (!) 111 25 101/69 91 %   04/03/18 1800 - (!) 114 26 108/70 (!) 88 %   04/03/18 1745 - (!) 107 24 109/78 93 %   04/03/18 1730 - (!) 104 25 118/78 93 %   04/03/18 1720 - - - - 98 %   04/03/18 1715 - (!) 112 23 102/42 -   04/03/18 1630 - (!) 113 18 103/78 -   04/03/18 1615 - (!) 121 18 (!) 133/97 - 04/03/18 1542 96.8 °F (36 °C) (!) 120 22 127/90 98 %              DATA     LABORATORY DATA:  Labs Reviewed   ACETAMINOPHEN - Abnormal; Notable for the following:        Result Value    Acetaminophen level <2 (*)     All other components within normal limits   METABOLIC PANEL, COMPREHENSIVE - Abnormal; Notable for the following:     CO2 19 (*)     Anion gap 17 (*)     Glucose 187 (*)     BUN/Creatinine ratio 9 (*)     AST (SGOT) 42 (*)     Alk. phosphatase 144 (*)     Globulin 4.3 (*)     A-G Ratio 0.9 (*)     All other components within normal limits   LIPASE - Abnormal; Notable for the following:     Lipase 911 (*)     All other components within normal limits   CBC WITH AUTOMATED DIFF - Abnormal; Notable for the following:     WBC 12.2 (*)     HCT 47.2 (*)     IMMATURE GRANULOCYTES 1 (*)     ABS. LYMPHOCYTES 3.6 (*)     ABS. EOSINOPHILS 0.6 (*)     ABS. IMM.  GRANS. 0.1 (*)     All other components within normal limits   URINALYSIS W/MICROSCOPIC - Abnormal; Notable for the following:     Blood TRACE (*)     All other components within normal limits   ETHYL ALCOHOL - Abnormal; Notable for the following:     ALCOHOL(ETHYL),SERUM 347 (*)     All other components within normal limits   METABOLIC PANEL, BASIC - Abnormal; Notable for the following:     Chloride 109 (*)     Creatinine 0.54 (*)     Calcium 7.5 (*)     All other components within normal limits   CBC W/O DIFF - Abnormal; Notable for the following:     MCV 99.5 (*)     All other components within normal limits   URINE CULTURE HOLD SAMPLE   SAMPLES BEING HELD   SALICYLATE   DRUG SCREEN, URINE   HCG QL SERUM   LIPASE   HCG URINE, QL. - POC     Admission on 04/03/2018   Component Date Value Ref Range Status    Acetaminophen level 04/03/2018 <2* 10 - 30 ug/mL Final    Salicylate level 29/34/7596 4.3  2.8 - 20.0 MG/DL Final    Ventricular Rate 04/03/2018 112  BPM Preliminary    Atrial Rate 04/03/2018 112  BPM Preliminary    P-R Interval 04/03/2018 128  ms Preliminary    QRS Duration 04/03/2018 86  ms Preliminary    Q-T Interval 04/03/2018 328  ms Preliminary    QTC Calculation (Bezet) 04/03/2018 447  ms Preliminary    Calculated P Axis 04/03/2018 75  degrees Preliminary    Calculated R Axis 04/03/2018 80  degrees Preliminary    Calculated T Axis 04/03/2018 28  degrees Preliminary    Diagnosis 04/03/2018    Preliminary                    Value:Sinus tachycardia  Possible Left atrial enlargement  Borderline ECG  When compared with ECG of 24-FEB-2018 14:24,  No significant change was found      AMPHETAMINES 04/03/2018 NEGATIVE   NEG   Final    BARBITURATES 04/03/2018 NEGATIVE   NEG   Final    BENZODIAZEPINES 04/03/2018 NEGATIVE   NEG   Final    COCAINE 04/03/2018 NEGATIVE   NEG   Final    METHADONE 04/03/2018 NEGATIVE   NEG   Final    OPIATES 04/03/2018 NEGATIVE   NEG   Final    PCP(PHENCYCLIDINE) 04/03/2018 NEGATIVE   NEG   Final    THC (TH-CANNABINOL) 04/03/2018 NEGATIVE   NEG   Final    Drug screen comment 04/03/2018 (NOTE)   Final    Sodium 04/03/2018 138  136 - 145 mmol/L Final    Potassium 04/03/2018 3.5  3.5 - 5.1 mmol/L Final    Chloride 04/03/2018 102  97 - 108 mmol/L Final    CO2 04/03/2018 19* 21 - 32 mmol/L Final    Anion gap 04/03/2018 17* 5 - 15 mmol/L Final    Glucose 04/03/2018 187* 65 - 100 mg/dL Final    BUN 04/03/2018 6  6 - 20 MG/DL Final    Creatinine 04/03/2018 0.65  0.55 - 1.02 MG/DL Final    BUN/Creatinine ratio 04/03/2018 9* 12 - 20   Final    GFR est AA 04/03/2018 >60  >60 ml/min/1.73m2 Final    GFR est non-AA 04/03/2018 >60  >60 ml/min/1.73m2 Final    Calcium 04/03/2018 9.0  8.5 - 10.1 MG/DL Final    Bilirubin, total 04/03/2018 0.2  0.2 - 1.0 MG/DL Final    ALT (SGPT) 04/03/2018 52  12 - 78 U/L Final    AST (SGOT) 04/03/2018 42* 15 - 37 U/L Final    Alk.  phosphatase 04/03/2018 144* 45 - 117 U/L Final    Protein, total 04/03/2018 8.2  6.4 - 8.2 g/dL Final    Albumin 04/03/2018 3.9  3.5 - 5.0 g/dL Final    Globulin 04/03/2018 4.3* 2.0 - 4.0 g/dL Final    A-G Ratio 04/03/2018 0.9* 1.1 - 2.2   Final    Lipase 04/03/2018 911* 73 - 393 U/L Final    WBC 04/03/2018 12.2* 3.6 - 11.0 K/uL Final    RBC 04/03/2018 4.86  3.80 - 5.20 M/uL Final    HGB 04/03/2018 15.9  11.5 - 16.0 g/dL Final    HCT 04/03/2018 47.2* 35.0 - 47.0 % Final    MCV 04/03/2018 97.1  80.0 - 99.0 FL Final    MCH 04/03/2018 32.7  26.0 - 34.0 PG Final    MCHC 04/03/2018 33.7  30.0 - 36.5 g/dL Final    RDW 04/03/2018 12.8  11.5 - 14.5 % Final    PLATELET 93/49/7341 007  150 - 400 K/uL Final    MPV 04/03/2018 9.2  8.9 - 12.9 FL Final    NRBC 04/03/2018 0.0  0  WBC Final    ABSOLUTE NRBC 04/03/2018 0.00  0.00 - 0.01 K/uL Final    NEUTROPHILS 04/03/2018 60  32 - 75 % Final    LYMPHOCYTES 04/03/2018 30  12 - 49 % Final    MONOCYTES 04/03/2018 5  5 - 13 % Final    EOSINOPHILS 04/03/2018 5  0 - 7 % Final    BASOPHILS 04/03/2018 1  0 - 1 % Final    IMMATURE GRANULOCYTES 04/03/2018 1* 0.0 - 0.5 % Final    ABS. NEUTROPHILS 04/03/2018 7.3  1.8 - 8.0 K/UL Final    ABS. LYMPHOCYTES 04/03/2018 3.6* 0.8 - 3.5 K/UL Final    ABS. MONOCYTES 04/03/2018 0.6  0.0 - 1.0 K/UL Final    ABS. EOSINOPHILS 04/03/2018 0.6* 0.0 - 0.4 K/UL Final    ABS. BASOPHILS 04/03/2018 0.1  0.0 - 0.1 K/UL Final    ABS. IMM.  GRANS. 04/03/2018 0.1* 0.00 - 0.04 K/UL Final    DF 04/03/2018 AUTOMATED    Final    Color 04/03/2018 YELLOW/STRAW    Final    Appearance 04/03/2018 CLEAR  CLEAR   Final    Specific gravity 04/03/2018 <1.005  1.003 - 1.030 Final    pH (UA) 04/03/2018 6.0  5.0 - 8.0   Final    Protein 04/03/2018 NEGATIVE   NEG mg/dL Final    Glucose 04/03/2018 NEGATIVE   NEG mg/dL Final    Ketone 04/03/2018 NEGATIVE   NEG mg/dL Final    Bilirubin 04/03/2018 NEGATIVE   NEG   Final    Blood 04/03/2018 TRACE* NEG   Final    Urobilinogen 04/03/2018 0.2  0.2 - 1.0 EU/dL Final    Nitrites 04/03/2018 NEGATIVE   NEG   Final    Leukocyte Esterase 04/03/2018 NEGATIVE   NEG   Final    WBC 04/03/2018 0-4  0 - 4 /hpf Final    RBC 04/03/2018 0-5  0 - 5 /hpf Final    Epithelial cells 04/03/2018 FEW  FEW /lpf Final    Bacteria 04/03/2018 NEGATIVE   NEG /hpf Final    Hyaline cast 04/03/2018 0-2  0 - 5 /lpf Final    Urine culture hold 04/03/2018 URINE ON HOLD IN MICROBIOLOGY DEPT FOR 3 DAYS. IF UNPRESERVED URINE IS SUBMITTED, IT CANNOT BE USED FOR ADDITIONAL TESTING AFTER 24 HRS, RECOLLECTION WILL BE REQUIRED.     Final    ALCOHOL(ETHYL),SERUM 04/03/2018 347* <10 MG/DL Final    HCG, Ql. 04/03/2018 NEGATIVE   NEG   Final    Pregnancy test,urine (POC) 04/03/2018 NEGATIVE   NEG   Final    Sodium 04/04/2018 143  136 - 145 mmol/L Final    Potassium 04/04/2018 3.7  3.5 - 5.1 mmol/L Final    Chloride 04/04/2018 109* 97 - 108 mmol/L Final    CO2 04/04/2018 26  21 - 32 mmol/L Final    Anion gap 04/04/2018 8  5 - 15 mmol/L Final    Glucose 04/04/2018 86  65 - 100 mg/dL Final    BUN 04/04/2018 8  6 - 20 MG/DL Final    Creatinine 04/04/2018 0.54* 0.55 - 1.02 MG/DL Final    BUN/Creatinine ratio 04/04/2018 15  12 - 20   Final    GFR est AA 04/04/2018 >60  >60 ml/min/1.73m2 Final    GFR est non-AA 04/04/2018 >60  >60 ml/min/1.73m2 Final    Calcium 04/04/2018 7.5* 8.5 - 10.1 MG/DL Final    WBC 04/04/2018 8.7  3.6 - 11.0 K/uL Final    RBC 04/04/2018 4.00  3.80 - 5.20 M/uL Final    HGB 04/04/2018 13.0  11.5 - 16.0 g/dL Final    HCT 04/04/2018 39.8  35.0 - 47.0 % Final    MCV 04/04/2018 99.5* 80.0 - 99.0 FL Final    MCH 04/04/2018 32.5  26.0 - 34.0 PG Final    MCHC 04/04/2018 32.7  30.0 - 36.5 g/dL Final    RDW 04/04/2018 12.9  11.5 - 14.5 % Final    PLATELET 85/11/1335 711  150 - 400 K/uL Final    MPV 04/04/2018 9.3  8.9 - 12.9 FL Final    NRBC 04/04/2018 0.0  0  WBC Final    ABSOLUTE NRBC 04/04/2018 0.00  0.00 - 0.01 K/uL Final    Lipase 04/04/2018 389  73 - 393 U/L Final        RADIOLOGY REPORTS:    Results from Hospital Encounter encounter on 10/21/14 XR ABD ACUTE W 1 V CHEST   Narrative **Final Report**      ICD Codes / Adm. Diagnosis: 3  389635 / Alcohol Problem  Abdominal Pain  Examination:  CR ABDOMEN ACUTE W PA CHEST  - [id number] - Oct 21 2014  5:30PM  Accession No:  [id number]  Reason:  abdominal pain      REPORT:  INDICATION:  Abdominal pain. COMPARISON:  Chest radiograph 6/24/2011. FINDINGS:  2 supine and left lateral decubitus views of the abdomen were   obtained. The bowel gas pattern is nonobstructive. No free intraperitoneal air is appreciated. An upright view of the chest was obtained. The heart is normal in size. The lungs are clear. IMPRESSION:    Nonobstructive bowel gas pattern. No pneumonia or CHF. Signing/Reading Doctor: Jadine Pack ([id number])    ApprovedJadine Pack ([id number])  Oct 21 2014  5:38PM                            Mri Lilliana Evans Wo Cont    Result Date: 2/26/2018  *PRELIMINARY REPORT* Pancreatic cystic lesion with thin septations in the tail has increased in size since 2011. No solid pancreatic mass. Low-grade IPMN is most likely. No evidence of pancreatitis. Preliminary report was provided by Dr. Marj Ann, the on-call radiologist, at [phone] Final report to follow. *END PRELIMINARY REPORT* MRI ABDOMEN AND MRCP WITH AND WITHOUT CONTRAST. INDICATION: Pancreatic mass COMPARISON: CT abdomen and pelvis 6/25/2011. TECHNIQUE: Multisequence and multiplanar MRI of the abdomen was performed after the administration of 14 mL of MultiHance IV contrast. Images were obtained without contrast; and in late arterial, portal venous, and delayed phases after the administration of contrast. Subtraction images were reconstructed. Heavily T2-weighted thick slab and thin slice images were obtained in the oblique coronal plane through the biliary tree (MRCP). FINDINGS: LIVER: Loss of signal on out of phase images is consistent with moderate hepatic steatosis.  There is focal fatty sparing around the gallbladder fossa and the IVC. Incidental, tiny, segment 2 cyst.. MRCP: Mild intrahepatic and extra hepatic biliary ductal dilation is of unclear etiology, as there is no pancreatic head mass or obstructing calculus. The common hepatic duct measures 9 mm in the dillan hepatis, and the common bile duct gradually tapers 6-7 mm in the pancreatic head. No pancreatic duct dilation downstream from the mucinous neoplasm. GALLBLADDER: Normal. No gallstones. PANCREAS: A round, circumscribed, cystic mass at the junction of the body and tail has increased and now measures 25 x 28 x 28 mm (15 x 16 x 13 mm at a similar level on 6/25/2011). A fine internal septation is intrinsically T1 hyperintense and does not definitely enhance (8-27). Posterosuperiorly, this septation is thicker (18-25, 14-34, 2-12). On MRCP images, there is questionable thickening anteroinferiorly as well (18-34), though this is not re-created on other sequences. Duct dilation in the pancreatic tail is new, though associated tail parenchymal atrophy suggests that this is relatively chronic. There is at least partial annular pancreas. A tongue of pancreatic tissue extends around the duodenum anteriorly, though there is no clear extension around the duodenum posteriorly. Annular pancreas can occur in this arrangement, with a fibrotic band posteriorly. No upstream dilation of the duodenal bulb. No findings to suggest windsock diverticulum. SPLEEN: Normal. ADRENALS: Normal. KIDNEYS: Normal. Tiny subcentimeter right lower pole cysts. DISTAL ESOPHAGUS: Normal. STOMACH AND DUODENUM: Normal. VISUALIZED SMALL BOWEL AND COLON: Normal. PERITONEUM: No free fluid and no abdominal lymphadenopathy. VISUALIZED LUNG BASES: Grossly clear within the sensitivity of MRI. BONES: No suspicious lesions. IMPRESSION: 1. Cystic mass in the pancreatic body tail junction (28 mm), increased from 2011.  Single internal septation is predominantly fine, though there is a thickened portion. New duct dilation and parenchymal atrophy in the tail. This is consistent with a low-grade mucinous neoplasm. Mucinous cystadenoma and sidebranch IPMN are favored over main duct IPMN and mucinous cystadenocarcinoma. Nonemergent GI consultation is recommended for consideration of endoscopic ultrasound. 2. Moderate hepatic steatosis. 3. At least partial annular pancreas. The findings were called to Dr. Aleida Laguerre on 2/26/2018 9:11 AM by Dr. Cindy Fernandez. [facility]    Result Date: 4/3/2018  EXAM:  CT HEAD WO CONT INDICATION:   falls, intoxicated, head injury. Leena Plump and hit head twice today. COMPARISON: 2/24/2018. CONTRAST:  None. TECHNIQUE: Unenhanced CT of the head was performed using 5 mm images. Brain and bone windows were generated. CT dose reduction was achieved through use of a standardized protocol tailored for this examination and automatic exposure control for dose modulation. FINDINGS: The ventricles and sulci are normal in size, shape and configuration and midline. There is no significant white matter disease. There is no intracranial hemorrhage, extra-axial collection, mass, mass effect or midline shift. The basilar cisterns are open. No acute infarct is identified. The bone windows demonstrate no abnormalities. The visualized portions of the paranasal sinuses and mastoid air cells are clear. IMPRESSION: No acute intracranial abnormality. Ct Head Wo Cont    Result Date: 2/24/2018  EXAM:  CT HEAD WO CONT INDICATION:   L facial swelling/ numbness, swollen lymph nodes in neck COMPARISON: 2011. CONTRAST:  None. TECHNIQUE: Unenhanced CT of the head was performed using 5 mm images. Brain and bone windows were generated. CT dose reduction was achieved through use of a standardized protocol tailored for this examination and automatic exposure control for dose modulation. FINDINGS: The ventricles and sulci are normal in size, shape and configuration and midline. There is no significant white matter disease. There is no intracranial hemorrhage, extra-axial collection, mass, mass effect or midline shift. The basilar cisterns are open. No acute infarct is identified. The bone windows demonstrate no abnormalities. The visualized portions of the paranasal sinuses and mastoid air cells are clear. IMPRESSION: Unremarkable CT of the head. Ct Neck Soft Tissue W Cont    Result Date: 2/24/2018  History: Swollen lymph nodes in neck and face. EXAM:  CT NECK SOFT TISSUE W CONT INDICATION:  swelling/ palpable masses/ 'getting bigger;' COMPARISON: None. CONTRAST: 100 mL of Isovue-300. TECHNIQUE: Multislice helical CT was performed from the mid calvarium to the aortic arch during uneventful rapid bolus intravenous contrast administration. Contiguous 2.5 mm axial images were reconstructed and lung and soft tissue windows were generated. Coronal and sagittal reformations were generated. CT dose reduction was achieved through use of a standardized protocol tailored for this examination and automatic exposure control for dose modulation. FINDINGS: No mass or adenopathy is identified within the neck. The carotid and jugular vessels enhance normally. The thyroid gland, submandibular salivary glands and parotid glands are normal. No nasopharyngeal, pharyngeal or laryngeal mass is identified. No abnormalities are identified in the visualized portions of the brain or orbits. The visualized mastoid air cells and paranasal sinuses are clear. The visualized portions of the lung apices are clear. IMPRESSION: Normal CT of the neck. Cta Chest W Or W Wo Cont    Result Date: 2/24/2018  History: Dyspnea, shortness of breath. CTA of the chest was performed. 200 mL Optiray-350 were injected and scanning was performed during the arterial phase of contrast administration. Post processing was performed. 3D reconstructions were performed.   CT dose reduction was achieved through use of a standardized protocol tailored for this examination and automatic exposure control for dose modulation. A second injection was performed. There are no intraluminal filling defects to suggest pulmonary embolism. The heart, pericardium, and great vessels appear unremarkable. The chest wall and axilla appear unremarkable. The lungs are clear. The upper abdomen demonstrates a 2.4 cm cystic lesion within the pancreatic body. IMPRESSION: 1. No evidence for pulmonary embolism. 2. Cystic pancreatic mass. Further evaluation with MRI is recommended.               MEDICATIONS       ALL MEDICATIONS  Current Facility-Administered Medications   Medication Dose Route Frequency    nicotine (NICODERM CQ) 21 mg/24 hr patch 1 Patch  1 Patch TransDERmal DAILY    sodium chloride (NS) flush 5-10 mL  5-10 mL IntraVENous Q8H    sodium chloride (NS) flush 5-10 mL  5-10 mL IntraVENous PRN    sodium chloride (NS) flush 5-10 mL  5-10 mL IntraVENous Q8H    sodium chloride (NS) flush 5-10 mL  5-10 mL IntraVENous PRN    0.9% sodium chloride infusion  125 mL/hr IntraVENous CONTINUOUS    enoxaparin (LOVENOX) injection 40 mg  40 mg SubCUTAneous Q24H    LORazepam (ATIVAN) injection 2 mg  2 mg IntraVENous Q1H PRN    LORazepam (ATIVAN) injection 4 mg  4 mg IntraVENous Q1H PRN      SCHEDULED MEDICATIONS  Current Facility-Administered Medications   Medication Dose Route Frequency    nicotine (NICODERM CQ) 21 mg/24 hr patch 1 Patch  1 Patch TransDERmal DAILY    sodium chloride (NS) flush 5-10 mL  5-10 mL IntraVENous Q8H    sodium chloride (NS) flush 5-10 mL  5-10 mL IntraVENous Q8H    0.9% sodium chloride infusion  125 mL/hr IntraVENous CONTINUOUS    enoxaparin (LOVENOX) injection 40 mg  40 mg SubCUTAneous Q24H                ASSESSMENT & PLAN        The patient Aime Norris is a 42 y.o.  female who presents at this time for treatment of the following diagnoses:  Patient Active Hospital Problem List:   Alcohol intoxication ([facility] [address] (4/3/2018)   Suicidal ideation (4/3/2018)     High anion gap metabolic acidosis (7/4/7031)     Elevated lipase (4/3/2018)   Alcohol abuse (4/3/2018)     Sinus tachycardia (4/3/2018)          Assessment:  1) Alcohol abuse with alcohol intoxication  2) Unspecified anxiety d/o  Plan:  Continue her alcohol detox. Patient denies any SI/HI at this time. No intent or plan. Pt reported that she made those statements yesterday when she was intoxicated. She doesn't even remember making those statements. She was stressed due to her upcoming surgery on April 20th. Drank too much yesterday to calm herself. Pt commited to safety. Her  was by her bedside and he commited to her safety too. Disposition:  No further f/u needed from our side. Thank you very much for the opportunity to participate in the care of your patient. I will continue to follow up with patient as deemed appropriate. I have reviewed admission (and previous/old) labs and medical tests in the EHR and or transferring hospital documents. I will continue to order blood tests/labs and diagnostic tests as deemed appropriate and review results as they become available (see orders for details). I have reviewed old psychiatric and medical records available in the EHR. I Will order additional psychiatric records from other institutions to further elucidate the nature of patient's psychopathology and review once available. I will gather additional collateral information from friends, family and o/p treatment team to further elucidate the nature of patient's psychopathology and baselline level of psychiatric functioning.                      SIGNED:    Sp Love MD  4/4/2018

## 2018-04-04 NOTE — PROGRESS NOTES
[address]. Deion feels uncomfortable, mild tremor, looks anxious. Ativan administered. Martínez removed. Pt still NPO.    1106 Pt anxious wants to go home. Notified GI health care ZARA Hardy on the floor. She contacted NP to consult pt. 1117 Pt requested nicotine patch, called MD Huerta.

## 2018-04-04 NOTE — CONSULTS
Gastroenterology Consultation Note      Admit Date: 4/3/2018  Consult Date: 4/4/2018   I greatly appreciate your asking me to see Chaitanya Reyes, thank you very much for the opportunity to participate in her care. Narrative Assessment and Plan     · Alcohol intoxication  · Suicidal ideation  · Elevated lipase - likely triggered by recent alcohol use, no evidence of pancreatitis  · Pancreatic cyst - scheduled for distal pancreatectomy 4/20/18 with Dr. James Romero    No evidence of pancreatitis. Did discuss with patient importance of completing abstaining from alcohol as this can lead to complications with pancreas such as pancreatitis or progression of pancreatic cyst. Pt has been turning to alcohol due to her anxiety in regards to surgery and medical conditions. Plan  - agree with psych consultation as patient will need some assistance with managing her anxiety  - okay for diet  - pt has another f/u appointment with Dr. James Romero prior to upcoming surgery  - following    ---  Her lipase elevation is not diagnostic of pancreatitis and is now resolved. No plans for endoscopic study here. Once recovered she can be discharged. Danilo Drew MD     Subjective:     Chief Complaint: \"I'm so hungry\"    History of Present Illness: Chaitanya Reyes is a 42 y.o. female who was brought in to ER for alcohol intoxication and suicidal ideation. Patient with history of pancreatic cyst first diagnosed in 2011. MRI done in 2011 showed progression of pancreatic cyst. Evaluated in office by Dr. Rohan Keller and referred to Dr. Layne Lindsay for EUS done 3/14/18. Path: benign, degenerated acinar and ductal cells, histocytes, and scant mixed inflammatory cells. No evidence of malignancy. Patient was referred to surgeon for to evaluate due to patient persistent symptoms. Patient prior complaints of intermittent abdominal \"nagging\" discomfort in epigastric and LUQ. Intermittent.  Usually she is able to tolerate it and the pain is not worsening. Denies nausea or vomiting. Able to tolerate diet. Currently she is asymptomatic. After surgery evaluation plan for distal pancreatectomy on 4/20/18. Since plan for surgery patient anxiety has increased and per  she is drinking more.  also states she ran out of her Cymbalta and that made things worse also. PCP:  Harshal Boyer MD    Past Medical History:   Diagnosis Date    Anxiety     Blurred vision     Chest pain     Depression     Frequent headaches     Muscle weakness     Pancreatic cyst 3/21/2018    Shortness of breath     Stomach pain     Swallowing difficulty         Past Surgical History:   Procedure Laterality Date    HX GYN      endometriosis surgery       Social History   Substance Use Topics    Smoking status: Current Every Day Smoker     Packs/day: 1.00     Years: 25.00    Smokeless tobacco: Never Used    Alcohol use 1.8 oz/week     3 Glasses of wine per week        Family History   Problem Relation Age of Onset    Cancer Mother      colon    Cancer Father      skin        Allergies   Allergen Reactions    Amoxicillin Shortness of Breath and Rash     Pt has had keflex in the past            Home Medications:  Prior to Admission Medications   Prescriptions Last Dose Informant Patient Reported? Taking? ALPRAZolam (XANAX) 0.5 mg tablet 4/3/2018 at am Significant Other Yes Yes   Sig: Take 0.5 mg by mouth daily as needed for Anxiety. acetaminophen (TYLENOL) 500 mg tablet  Significant Other Yes Yes   Sig: Take 500 mg by mouth every six (6) hours as needed for Pain. clonazePAM (KLONOPIN) 0.5 mg tablet 4/3/2018 at am Significant Other Yes Yes   Sig: Take 0.25 mg by mouth daily. clonazePAM (KLONOPIN) 0.5 mg tablet 4/2/2018 at afternoon Significant Other Yes Yes   Sig: Take 0.5 mg by mouth daily (after lunch). duloxetine (CYMBALTA) 60 mg capsule 4/3/2018 at am Significant Other Yes Yes   Sig: Take 60 mg by mouth daily.    ibuprofen (MOTRIN IB) 200 mg tablet Significant Other Yes Yes   Sig: Take 800 mg by mouth every eight (8) hours as needed. traZODone (DESYREL) 50 mg tablet 4/2/2018 at pm Significant Other Yes Yes   Sig: Take 50 mg by mouth nightly. Facility-Administered Medications: None       Hospital Medications:  Current Facility-Administered Medications   Medication Dose Route Frequency    nicotine (NICODERM CQ) 21 mg/24 hr patch 1 Patch  1 Patch TransDERmal DAILY    sodium chloride (NS) flush 5-10 mL  5-10 mL IntraVENous Q8H    sodium chloride (NS) flush 5-10 mL  5-10 mL IntraVENous PRN    sodium chloride (NS) flush 5-10 mL  5-10 mL IntraVENous Q8H    sodium chloride (NS) flush 5-10 mL  5-10 mL IntraVENous PRN    0.9% sodium chloride infusion  125 mL/hr IntraVENous CONTINUOUS    enoxaparin (LOVENOX) injection 40 mg  40 mg SubCUTAneous Q24H    0.9% sodium chloride 1,000 mL with mvi, adult no. 4 with vit K 10 mL, thiamine 348 mg, folic acid 1 mg infusion   IntraVENous Q24H    LORazepam (ATIVAN) injection 2 mg  2 mg IntraVENous Q1H PRN    LORazepam (ATIVAN) injection 4 mg  4 mg IntraVENous Q1H PRN       Review of Systems: Admission ROS by Eliezer Enamorado MD from 4/3/2018 were reviewed with the patient and changes (other than per HPI) include: none      Objective:     Physical Exam:  Visit Vitals    /85 (BP 1 Location: Left arm, BP Patient Position: At rest)    Pulse 69    Temp 98.6 °F (37 °C)    Resp 17    Ht 5' 3\" (1.6 m)    Wt 70.3 kg (155 lb)    SpO2 95%    BMI 27.46 kg/m2     SpO2 Readings from Last 6 Encounters:   04/04/18 95%   03/21/18 99%   03/14/18 94%   03/11/18 93%   03/09/18 97%   02/25/18 96%          Intake/Output Summary (Last 24 hours) at 04/04/18 1207  Last data filed at 04/04/18 8982   Gross per 24 hour   Intake           1937.5 ml   Output             2200 ml   Net           -262.5 ml      General: no distress, comfortable  Skin:  No rash or palpable dermatologic mass lesions  HEENT: Pupils equal, sclera anicteric, oropharynx with no gross lesions  Cardiovascular: No abnormal audible heart sounds, well perfused, no edema  Respiratory:  No abnormal audible breath sounds, normal respiratory effort, no throacic deformity  GI:  Bowel sounds present, soft, nondistended, nontender  Musculoskeletal:  No skeletal deformity nor acute arthritis noted. Neurological:  CN II-XII grossly intact, no focal deficits  Psychiatric:  Anxious, memory intact, appears to have insight into current illness    Laboratory:    Recent Results (from the past 24 hour(s))   ACETAMINOPHEN    Collection Time: 04/03/18  4:05 PM   Result Value Ref Range    Acetaminophen level <2 (L) 10 - 30 ug/mL   SALICYLATE    Collection Time: 04/03/18  4:05 PM   Result Value Ref Range    Salicylate level 4.3 2.8 - 02.1 MG/DL   METABOLIC PANEL, COMPREHENSIVE    Collection Time: 04/03/18  4:05 PM   Result Value Ref Range    Sodium 138 136 - 145 mmol/L    Potassium 3.5 3.5 - 5.1 mmol/L    Chloride 102 97 - 108 mmol/L    CO2 19 (L) 21 - 32 mmol/L    Anion gap 17 (H) 5 - 15 mmol/L    Glucose 187 (H) 65 - 100 mg/dL    BUN 6 6 - 20 MG/DL    Creatinine 0.65 0.55 - 1.02 MG/DL    BUN/Creatinine ratio 9 (L) 12 - 20      GFR est AA >60 >60 ml/min/1.73m2    GFR est non-AA >60 >60 ml/min/1.73m2    Calcium 9.0 8.5 - 10.1 MG/DL    Bilirubin, total 0.2 0.2 - 1.0 MG/DL    ALT (SGPT) 52 12 - 78 U/L    AST (SGOT) 42 (H) 15 - 37 U/L    Alk.  phosphatase 144 (H) 45 - 117 U/L    Protein, total 8.2 6.4 - 8.2 g/dL    Albumin 3.9 3.5 - 5.0 g/dL    Globulin 4.3 (H) 2.0 - 4.0 g/dL    A-G Ratio 0.9 (L) 1.1 - 2.2     LIPASE    Collection Time: 04/03/18  4:05 PM   Result Value Ref Range    Lipase 911 (H) 73 - 393 U/L   CBC WITH AUTOMATED DIFF    Collection Time: 04/03/18  4:05 PM   Result Value Ref Range    WBC 12.2 (H) 3.6 - 11.0 K/uL    RBC 4.86 3.80 - 5.20 M/uL    HGB 15.9 11.5 - 16.0 g/dL    HCT 47.2 (H) 35.0 - 47.0 %    MCV 97.1 80.0 - 99.0 FL    MCH 32.7 26.0 - 34.0 PG    MCHC 33.7 30.0 - 36.5 g/dL RDW 12.8 11.5 - 14.5 %    PLATELET 540 927 - 673 K/uL    MPV 9.2 8.9 - 12.9 FL    NRBC 0.0 0  WBC    ABSOLUTE NRBC 0.00 0.00 - 0.01 K/uL    NEUTROPHILS 60 32 - 75 %    LYMPHOCYTES 30 12 - 49 %    MONOCYTES 5 5 - 13 %    EOSINOPHILS 5 0 - 7 %    BASOPHILS 1 0 - 1 %    IMMATURE GRANULOCYTES 1 (H) 0.0 - 0.5 %    ABS. NEUTROPHILS 7.3 1.8 - 8.0 K/UL    ABS. LYMPHOCYTES 3.6 (H) 0.8 - 3.5 K/UL    ABS. MONOCYTES 0.6 0.0 - 1.0 K/UL    ABS. EOSINOPHILS 0.6 (H) 0.0 - 0.4 K/UL    ABS. BASOPHILS 0.1 0.0 - 0.1 K/UL    ABS. IMM. GRANS. 0.1 (H) 0.00 - 0.04 K/UL    DF AUTOMATED     ETHYL ALCOHOL    Collection Time: 04/03/18  4:05 PM   Result Value Ref Range    ALCOHOL(ETHYL),SERUM 347 (H) <10 MG/DL   HCG QL SERUM    Collection Time: 04/03/18  4:05 PM   Result Value Ref Range    HCG, Ql. NEGATIVE  NEG     DRUG SCREEN, URINE    Collection Time: 04/03/18  4:30 PM   Result Value Ref Range    AMPHETAMINES NEGATIVE  NEG      BARBITURATES NEGATIVE  NEG      BENZODIAZEPINES NEGATIVE  NEG      COCAINE NEGATIVE  NEG      METHADONE NEGATIVE  NEG      OPIATES NEGATIVE  NEG      PCP(PHENCYCLIDINE) NEGATIVE  NEG      THC (TH-CANNABINOL) NEGATIVE  NEG      Drug screen comment (NOTE)    URINALYSIS W/MICROSCOPIC    Collection Time: 04/03/18  4:30 PM   Result Value Ref Range    Color YELLOW/STRAW      Appearance CLEAR CLEAR      Specific gravity <1.005 1.003 - 1.030    pH (UA) 6.0 5.0 - 8.0      Protein NEGATIVE  NEG mg/dL    Glucose NEGATIVE  NEG mg/dL    Ketone NEGATIVE  NEG mg/dL    Bilirubin NEGATIVE  NEG      Blood TRACE (A) NEG      Urobilinogen 0.2 0.2 - 1.0 EU/dL    Nitrites NEGATIVE  NEG      Leukocyte Esterase NEGATIVE  NEG      WBC 0-4 0 - 4 /hpf    RBC 0-5 0 - 5 /hpf    Epithelial cells FEW FEW /lpf    Bacteria NEGATIVE  NEG /hpf    Hyaline cast 0-2 0 - 5 /lpf   URINE CULTURE HOLD SAMPLE    Collection Time: 04/03/18  4:30 PM   Result Value Ref Range    Urine culture hold        URINE ON HOLD IN MICROBIOLOGY DEPT FOR 3 DAYS.  IF UNPRESERVED URINE IS SUBMITTED, IT CANNOT BE USED FOR ADDITIONAL TESTING AFTER 24 HRS, RECOLLECTION WILL BE REQUIRED.    HCG URINE, QL. - POC    Collection Time: 04/03/18  5:18 PM   Result Value Ref Range    Pregnancy test,urine (POC) NEGATIVE  NEG     EKG, 12 LEAD, INITIAL    Collection Time: 04/03/18  5:53 PM   Result Value Ref Range    Ventricular Rate 112 BPM    Atrial Rate 112 BPM    P-R Interval 128 ms    QRS Duration 86 ms    Q-T Interval 328 ms    QTC Calculation (Bezet) 447 ms    Calculated P Axis 75 degrees    Calculated R Axis 80 degrees    Calculated T Axis 28 degrees    Diagnosis       Sinus tachycardia  Possible Left atrial enlargement  Borderline ECG  When compared with ECG of 24-FEB-2018 14:24,  No significant change was found     METABOLIC PANEL, BASIC    Collection Time: 04/04/18  1:23 AM   Result Value Ref Range    Sodium 143 136 - 145 mmol/L    Potassium 3.7 3.5 - 5.1 mmol/L    Chloride 109 (H) 97 - 108 mmol/L    CO2 26 21 - 32 mmol/L    Anion gap 8 5 - 15 mmol/L    Glucose 86 65 - 100 mg/dL    BUN 8 6 - 20 MG/DL    Creatinine 0.54 (L) 0.55 - 1.02 MG/DL    BUN/Creatinine ratio 15 12 - 20      GFR est AA >60 >60 ml/min/1.73m2    GFR est non-AA >60 >60 ml/min/1.73m2    Calcium 7.5 (L) 8.5 - 10.1 MG/DL   CBC W/O DIFF    Collection Time: 04/04/18  1:23 AM   Result Value Ref Range    WBC 8.7 3.6 - 11.0 K/uL    RBC 4.00 3.80 - 5.20 M/uL    HGB 13.0 11.5 - 16.0 g/dL    HCT 39.8 35.0 - 47.0 %    MCV 99.5 (H) 80.0 - 99.0 FL    MCH 32.5 26.0 - 34.0 PG    MCHC 32.7 30.0 - 36.5 g/dL    RDW 12.9 11.5 - 14.5 %    PLATELET 710 511 - 704 K/uL    MPV 9.3 8.9 - 12.9 FL    NRBC 0.0 0  WBC    ABSOLUTE NRBC 0.00 0.00 - 0.01 K/uL   LIPASE    Collection Time: 04/04/18  1:23 AM   Result Value Ref Range    Lipase 389 73 - 393 U/L     MRI Results (most recent):    Results from Hospital Encounter encounter on 02/24/18   MRI ABD W WO CONT   Narrative PRELIMINARY REPORT    Pancreatic cystic lesion with thin septations in the tail has increased in size  since 2011. No solid pancreatic mass. Low-grade IPMN is most likely. No evidence  of pancreatitis. Preliminary report was provided by Dr. Mei Garcia, the on-call radiologist, at [phone]  hours    Final report to follow. END PRELIMINARY REPORT        MRI ABDOMEN AND MRCP WITH AND WITHOUT CONTRAST. INDICATION: Pancreatic mass    COMPARISON: CT abdomen and pelvis 6/25/2011. TECHNIQUE: Multisequence and multiplanar MRI of the abdomen was performed after  the administration of 14 mL of MultiHance IV contrast. Images were obtained  without contrast; and in late arterial, portal venous, and delayed phases after  the administration of contrast. Subtraction images were reconstructed. Heavily T2-weighted thick slab and thin slice images were obtained in the  oblique coronal plane through the biliary tree (MRCP). FINDINGS:     LIVER: Loss of signal on out of phase images is consistent with moderate hepatic  steatosis. There is focal fatty sparing around the gallbladder fossa and the  IVC. Incidental, tiny, segment 2 cyst.. MRCP: Mild intrahepatic and extra hepatic biliary ductal dilation is of unclear  etiology, as there is no pancreatic head mass or obstructing calculus. The  common hepatic duct measures 9 mm in the dillan hepatis, and the common bile duct  gradually tapers 6-7 mm in the pancreatic head. No pancreatic duct dilation  downstream from the mucinous neoplasm. GALLBLADDER: Normal. No gallstones. PANCREAS: A round, circumscribed, cystic mass at the junction of the body and  tail has increased and now measures 25 x 28 x 28 mm (15 x 16 x 13 mm at a  similar level on 6/25/2011). A fine internal septation is intrinsically T1  hyperintense and does not definitely enhance (8-27). Posterosuperiorly, this  septation is thicker (18-25, 14-34, 2-12).  On MRCP images, there is questionable  thickening anteroinferiorly as well (18-34), though this is not re-created on  other sequences. Duct dilation in the pancreatic tail is new, though associated  tail parenchymal atrophy suggests that this is relatively chronic. There is at least partial annular pancreas. A tongue of pancreatic tissue  extends around the duodenum anteriorly, though there is no clear extension  around the duodenum posteriorly. Annular pancreas can occur in this arrangement,  with a fibrotic band posteriorly. No upstream dilation of the duodenal bulb. No  findings to suggest windsock diverticulum. SPLEEN: Normal.  ADRENALS: Normal.  KIDNEYS: Normal. Tiny subcentimeter right lower pole cysts. DISTAL ESOPHAGUS: Normal.  STOMACH AND DUODENUM: Normal.  VISUALIZED SMALL BOWEL AND COLON: Normal.  PERITONEUM: No free fluid and no abdominal lymphadenopathy. VISUALIZED LUNG BASES: Grossly clear within the sensitivity of MRI. BONES: No suspicious lesions. Impression IMPRESSION:   1. Cystic mass in the pancreatic body tail junction (28 mm), increased from  2011. Single internal septation is predominantly fine, though there is a  thickened portion. New duct dilation and parenchymal atrophy in the tail. 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 Nonemergent GI consultation is recommended for consideration of endoscopic  ultrasound. 2. Moderate hepatic steatosis. 3. At least partial annular pancreas. The findings were called to Dr. Venora Kawasaki on 2/26/2018 9:11 AM by Dr. Roland Brown. [phone]                Assessment/Plan:     Active Problems:    Alcohol intoxication ([address] (4/3/2018)      Suicidal ideation (4/3/2018)      High anion gap metabolic acidosis (6/6/4270)      Elevated lipase (4/3/2018)      Alcohol abuse (4/3/2018)      Sinus tachycardia (4/3/2018)         See above narrative for full detail.     Jamil Schulz PA-C  04/04/18  12:07 PM

## 2018-04-04 NOTE — PROGRESS NOTES
I met with the patient and the pt's  [facility] cell is [phone]. Pt lives in a two story home with 5 steps to enter the home. Pt drives and is independent with all of her ADLs. Pt has prescription coverage under her insurance plan, she gets her prescriptions filled at [facility]. Pt's PCP is Dr. Koko Sanabria. Pt has never had home health before and does not have any DME. No other issues or concerns at this time. Thanks JIMY Luu   Care Management Interventions  PCP Verified by CM:  Yes  MyChart Signup: No  Discharge Durable Medical Equipment: No  Physical Therapy Consult: No  Occupational Therapy Consult: No  Speech Therapy Consult: No  Current Support Network: Lives with Spouse  Plan discussed with Pt/Family/Caregiver: Yes  Discharge Location  Discharge Placement: Home

## 2018-04-04 NOTE — PROGRESS NOTES
Deshawn Franklin [address]  [address] [facility], [id number] [place] [address]  [phone]      Medical Progress Note      NAME: Pacheco Garcia   :  1980  MRM:  [id number]    Date/Time: 2018  1:47 PM         Subjective:     Chief Complaint:  anxiety    Pt reports anxiety this morning. She is upset and wants to eat. She is also upset bc she would like to smoke a cigarette. She reports she has been drinking more etoh as a coping mechanism to ease her anxiety about her upcoming pancreatectomy. She denies abdominal pain, SI, HI          Objective:       Vitals:          Last 24hrs VS reviewed since prior progress note.  Most recent are:    Visit Vitals    /85 (BP 1 Location: Left arm, BP Patient Position: At rest)    Pulse 69    Temp 98.6 °F (37 °C)    Resp 17    Ht 5' 3\" (1.6 m)    Wt 70.3 kg (155 lb)    SpO2 95%    BMI 27.46 kg/m2     SpO2 Readings from Last 6 Encounters:   18 95%   18 99%   18 94%   18 93%   18 97%   18 96%          Intake/Output Summary (Last 24 hours) at 18 1347  Last data filed at 18 4665   Gross per 24 hour   Intake           1937.5 ml   Output             2200 ml   Net           -262.5 ml          Exam:     Physical Exam:    Gen:  Well-developed, well-nourished, in no acute distress  HEENT:  Pink conjunctivae, PERRL, hearing intact to voice, moist mucous membranes  Neck:  Supple, without masses, thyroid non-tender  Resp:  No accessory muscle use, clear breath sounds without wheezes rales or rhonchi  Card:  No murmurs, normal S1, S2 without thrills, bruits or peripheral edema  Abd:  Soft, non-tender, non-distended, normoactive bowel sounds are present  Musc:  No cyanosis or clubbing  Skin:  No rashes or ulcers, skin turgor is good  Neuro:  Cranial nerves 3-12 are grossly intact,  strength is 5/5 bilaterally and dorsi / plantarflexion is 5/5 bilaterally, follows commands appropriately  Psych:  Good insight, oriented to person, place and time, alert           Medications Reviewed: (see below)    Lab Data Reviewed: (see below)    ______________________________________________________________________    Medications:     Current Facility-Administered Medications   Medication Dose Route Frequency    nicotine (NICODERM CQ) 21 mg/24 hr patch 1 Patch  1 Patch TransDERmal DAILY    sodium chloride (NS) flush 5-10 mL  5-10 mL IntraVENous Q8H    sodium chloride (NS) flush 5-10 mL  5-10 mL IntraVENous PRN    sodium chloride (NS) flush 5-10 mL  5-10 mL IntraVENous Q8H    sodium chloride (NS) flush 5-10 mL  5-10 mL IntraVENous PRN    0.9% sodium chloride infusion  125 mL/hr IntraVENous CONTINUOUS    enoxaparin (LOVENOX) injection 40 mg  40 mg SubCUTAneous Q24H    0.9% sodium chloride 1,000 mL with mvi, adult no. 4 with vit K 10 mL, thiamine 335 mg, folic acid 1 mg infusion   IntraVENous Q24H    LORazepam (ATIVAN) injection 2 mg  2 mg IntraVENous Q1H PRN    LORazepam (ATIVAN) injection 4 mg  4 mg IntraVENous Q1H PRN            Lab Review:     Recent Labs      04/04/18   0123  04/03/18   1605   WBC  8.7  12.2*   HGB  13.0  15.9   HCT  39.8  47.2*   PLT  282  389     Recent Labs      04/04/18   0123  04/03/18   1605   NA  143  138   K  3.7  3.5   CL  109*  102   CO2  26  19*   GLU  86  187*   BUN  8  6   CREA  0.54*  0.65   CA  7.5*  9.0   ALB   --   3.9   SGOT   --   42*   ALT   --   52     No components found for: GLPOC         Assessment / Plan:     Alcohol intoxication / Alcohol abuse: increased etoh consumption acutely in relation to anxiety regarding surgery scheduled. Continue CIWA protocol. Eating po will stop goody bag  --pt received 3 doses of ativan. Reviewed symptoms with pt and nurse. Her +scores on the CIWA protocol are more consistent with generalized anxiety than actual alcohol withdrawal.     Suicidal ideation: Suicide precaution. Consult psychiatry - pending      High anion gap metabolic acidosis (5/5/7906). likely secondary to alcohol abuse. Continue IVF and monitor labs       Elevated lipase (4/3/2018)/ pancreatic cyst: no abdominal pain, wishes to eat. Advance diet -- tolerated diet prior to discharge      Sinus tachycardia: This is likely secondary to alcohol intoxication. Continue IVF and monitor clinically.      Anxiety: sounds to be situational as above.  Order nicotine patch -- contributing to her overall anxiety    Total time spent with patient: 39 [address] [facility] discussed with: Patient and Family    Discussed:  Care Plan    Prophylaxis:  Lovenox    Disposition:  Home w/Family           ___________________________________________________    Attending Physician: Bobby Berrios MD

## 2018-04-04 NOTE — PROGRESS NOTES
GI Note    Noted GI Consult in chart. Asked by RN to review. Patient has been evaluated by [facility] (Dr. Tatiana Menendez) in the past. Have notified their NP of consult.     Julieta Larsen PA-C  04/04/18  11:06 AM

## 2018-04-04 NOTE — PROGRESS NOTES
Discharge instructions were reviewed with patient and her family. All questions were answered. IV and heart monitor were removed. Patient received a copy of her discharge papers and will be discharged home with her family.

## 2018-04-04 NOTE — ED NOTES
TRANSFER - OUT REPORT:    Verbal report given to [facility] RN on [employer]  being transferred to [facility] for routine progression of care       Report consisted of patients Situation, Background, Assessment and   Recommendations(SBAR). Information from the following report(s) SBAR, Kardex, ED Summary, [facility] and Recent Results was reviewed with the receiving nurse. Lines:   Peripheral IV 04/03/18 Left Hand (Active)   Site Assessment Clean, dry, & intact 4/3/2018  6:05 PM   Phlebitis Assessment 0 4/3/2018  6:05 PM   Infiltration Assessment 0 4/3/2018  6:05 PM   Dressing Status Clean, dry, & intact 4/3/2018  6:05 PM   Dressing Type Transparent 4/3/2018  6:05 PM   Hub Color/Line Status Pink 4/3/2018  6:05 PM        Opportunity for questions and clarification was provided.       Patient transported with:   Noemi Frame, monitor

## 2018-04-13 ENCOUNTER — HOSPITAL ENCOUNTER (OUTPATIENT)
Dept: PREADMISSION TESTING | Age: 38
Discharge: HOME | End: 2018-04-13

## 2018-04-13 VITALS
HEART RATE: 88 BPM | WEIGHT: 165.25 LBS | HEIGHT: 63 IN | DIASTOLIC BLOOD PRESSURE: 107 MMHG | TEMPERATURE: 98.1 F | SYSTOLIC BLOOD PRESSURE: 147 MMHG | BODY MASS INDEX: 29.28 KG/M2

## 2018-04-13 NOTE — PERIOP NOTES
PREOPERATIVE INSTRUCTIONS REVIEWED WITH PATIENT. PATIENT GIVEN TWO-- SIX PACKS OF CHG WIPES. INSTRUCTIONS REVIEWED ON USE OF CHG WIPES. PATIENT GIVEN SSI INFECTIONS SHEET. PATIENT WAS GIVEN THE OPPORTUNITY TO ASK QUESTIONS ON THE INFORMATION PROVIDED. QUIT SMOKING INFORMATION PROVIDED

## 2018-04-17 ENCOUNTER — ANESTHESIA EVENT (OUTPATIENT)
Dept: SURGERY | Age: 38
DRG: 407 | End: 2018-04-17
Payer: COMMERCIAL

## 2018-04-18 LAB
ALBUMIN SERPL-MCNC: 3.6 G/DL (ref 3.5–5)
ALBUMIN/GLOB SERPL: 0.8 {RATIO} (ref 1.1–2.2)
ALP SERPL-CCNC: 123 U/L (ref 45–117)
ALT SERPL-CCNC: 42 U/L (ref 12–78)
ANION GAP SERPL CALC-SCNC: 8 MMOL/L (ref 5–15)
AST SERPL-CCNC: 27 U/L (ref 15–37)
BILIRUB SERPL-MCNC: 0.1 MG/DL (ref 0.2–1)
BUN SERPL-MCNC: 5 MG/DL (ref 6–20)
BUN/CREAT SERPL: 9 (ref 12–20)
CALCIUM SERPL-MCNC: 9.2 MG/DL (ref 8.5–10.1)
CHLORIDE SERPL-SCNC: 107 MMOL/L (ref 97–108)
CO2 SERPL-SCNC: 25 MMOL/L (ref 21–32)
CREAT SERPL-MCNC: 0.55 MG/DL (ref 0.55–1.02)
GLOBULIN SER CALC-MCNC: 4.3 G/DL (ref 2–4)
GLUCOSE SERPL-MCNC: 141 MG/DL (ref 65–100)
LIPASE SERPL-CCNC: 115 U/L (ref 73–393)
POTASSIUM SERPL-SCNC: 3.4 MMOL/L (ref 3.5–5.1)
PROT SERPL-MCNC: 7.9 G/DL (ref 6.4–8.2)
SODIUM SERPL-SCNC: 140 MMOL/L (ref 136–145)

## 2018-04-18 PROCEDURE — 96375 TX/PRO/DX INJ NEW DRUG ADDON: CPT

## 2018-04-18 PROCEDURE — 83690 ASSAY OF LIPASE: CPT | Performed by: EMERGENCY MEDICINE

## 2018-04-18 PROCEDURE — 96374 THER/PROPH/DIAG INJ IV PUSH: CPT

## 2018-04-18 PROCEDURE — 80053 COMPREHEN METABOLIC PANEL: CPT | Performed by: EMERGENCY MEDICINE

## 2018-04-18 PROCEDURE — 96376 TX/PRO/DX INJ SAME DRUG ADON: CPT

## 2018-04-18 PROCEDURE — 96361 HYDRATE IV INFUSION ADD-ON: CPT

## 2018-04-18 PROCEDURE — 36415 COLL VENOUS BLD VENIPUNCTURE: CPT | Performed by: EMERGENCY MEDICINE

## 2018-04-18 PROCEDURE — 99284 EMERGENCY DEPT VISIT MOD MDM: CPT

## 2018-04-18 PROCEDURE — 85025 COMPLETE CBC W/AUTO DIFF WBC: CPT | Performed by: EMERGENCY MEDICINE

## 2018-04-19 ENCOUNTER — HOSPITAL ENCOUNTER (EMERGENCY)
Age: 38
Discharge: HOME | End: 2018-04-19
Attending: EMERGENCY MEDICINE
Payer: COMMERCIAL

## 2018-04-19 VITALS
SYSTOLIC BLOOD PRESSURE: 135 MMHG | HEART RATE: 86 BPM | BODY MASS INDEX: 30.11 KG/M2 | RESPIRATION RATE: 16 BRPM | WEIGHT: 170 LBS | DIASTOLIC BLOOD PRESSURE: 98 MMHG | TEMPERATURE: 98.4 F | OXYGEN SATURATION: 95 %

## 2018-04-19 DIAGNOSIS — R10.12 ABDOMINAL PAIN, LUQ (LEFT UPPER QUADRANT): Primary | ICD-10-CM

## 2018-04-19 LAB
BASOPHILS # BLD: 0.1 K/UL (ref 0–0.1)
BASOPHILS NFR BLD: 1 % (ref 0–1)
DIFFERENTIAL METHOD BLD: ABNORMAL
EOSINOPHIL # BLD: 0.4 K/UL (ref 0–0.4)
EOSINOPHIL NFR BLD: 4 % (ref 0–7)
ERYTHROCYTE [DISTWIDTH] IN BLOOD BY AUTOMATED COUNT: 12.8 % (ref 11.5–14.5)
HCT VFR BLD AUTO: 43.6 % (ref 35–47)
HGB BLD-MCNC: 14.9 G/DL (ref 11.5–16)
IMM GRANULOCYTES # BLD: 0.1 K/UL
IMM GRANULOCYTES NFR BLD AUTO: 1 %
LYMPHOCYTES # BLD: 4.7 K/UL (ref 0.8–3.5)
LYMPHOCYTES NFR BLD: 45 % (ref 12–49)
MCH RBC QN AUTO: 33.5 PG (ref 26–34)
MCHC RBC AUTO-ENTMCNC: 34.2 G/DL (ref 30–36.5)
MCV RBC AUTO: 98 FL (ref 80–99)
MONOCYTES # BLD: 0.5 K/UL (ref 0–1)
MONOCYTES NFR BLD: 5 % (ref 5–13)
NEUTS SEG # BLD: 4.6 K/UL (ref 1.8–8)
NEUTS SEG NFR BLD: 44 % (ref 32–75)
NRBC # BLD: 0 K/UL (ref 0–0.01)
NRBC BLD-RTO: 0 PER 100 WBC
PLATELET # BLD AUTO: 385 K/UL (ref 150–400)
PMV BLD AUTO: 9.4 FL (ref 8.9–12.9)
RBC # BLD AUTO: 4.45 M/UL (ref 3.8–5.2)
WBC # BLD AUTO: 10.4 K/UL (ref 3.6–11)
WBC MORPH BLD: ABNORMAL

## 2018-04-19 PROCEDURE — 74011250636 HC RX REV CODE- 250/636: Performed by: EMERGENCY MEDICINE

## 2018-04-19 PROCEDURE — 74011000250 HC RX REV CODE- 250: Performed by: EMERGENCY MEDICINE

## 2018-04-19 RX ORDER — OXYCODONE AND ACETAMINOPHEN 5; 325 MG/1; MG/1
1-2 TABLET ORAL
Qty: 12 TAB | Refills: 0 | Status: SHIPPED | OUTPATIENT
Start: 2018-04-19 | End: 2018-04-30

## 2018-04-19 RX ORDER — HYDROMORPHONE HYDROCHLORIDE 2 MG/ML
1 INJECTION, SOLUTION INTRAMUSCULAR; INTRAVENOUS; SUBCUTANEOUS
Status: COMPLETED | OUTPATIENT
Start: 2018-04-19 | End: 2018-04-19

## 2018-04-19 RX ORDER — HYDROMORPHONE HYDROCHLORIDE 2 MG/ML
0.5 INJECTION, SOLUTION INTRAMUSCULAR; INTRAVENOUS; SUBCUTANEOUS
Status: COMPLETED | OUTPATIENT
Start: 2018-04-19 | End: 2018-04-19

## 2018-04-19 RX ORDER — LORAZEPAM 2 MG/ML
1 INJECTION INTRAMUSCULAR
Status: COMPLETED | OUTPATIENT
Start: 2018-04-19 | End: 2018-04-19

## 2018-04-19 RX ORDER — OXYCODONE AND ACETAMINOPHEN 5; 325 MG/1; MG/1
1-2 TABLET ORAL
Qty: 12 TAB | Refills: 0 | Status: SHIPPED | OUTPATIENT
Start: 2018-04-19 | End: 2018-04-19

## 2018-04-19 RX ORDER — KETOROLAC TROMETHAMINE 30 MG/ML
30 INJECTION, SOLUTION INTRAMUSCULAR; INTRAVENOUS
Status: COMPLETED | OUTPATIENT
Start: 2018-04-19 | End: 2018-04-19

## 2018-04-19 RX ADMIN — SODIUM CHLORIDE 1000 ML: 900 INJECTION, SOLUTION INTRAVENOUS at 04:51

## 2018-04-19 RX ADMIN — HYDROMORPHONE HYDROCHLORIDE 1 MG: 2 INJECTION INTRAMUSCULAR; INTRAVENOUS; SUBCUTANEOUS at 04:51

## 2018-04-19 RX ADMIN — LORAZEPAM 1 MG: 2 INJECTION INTRAMUSCULAR; INTRAVENOUS at 03:33

## 2018-04-19 RX ADMIN — KETOROLAC TROMETHAMINE 30 MG: 30 INJECTION, SOLUTION INTRAMUSCULAR at 03:32

## 2018-04-19 RX ADMIN — SODIUM CHLORIDE 20 MG: 9 INJECTION INTRAMUSCULAR; INTRAVENOUS; SUBCUTANEOUS at 03:33

## 2018-04-19 RX ADMIN — HYDROMORPHONE HYDROCHLORIDE 0.5 MG: 2 INJECTION INTRAMUSCULAR; INTRAVENOUS; SUBCUTANEOUS at 06:34

## 2018-04-19 RX ADMIN — SODIUM CHLORIDE 1000 ML: 900 INJECTION, SOLUTION INTRAVENOUS at 03:33

## 2018-04-19 NOTE — ED TRIAGE NOTES
TRIAGE: Pt arrives with c/o L sided abdominal pain and nausea. Pt scheduled for pancreatic surgery with Dr Venkat Rm on Friday and was told to come in and check in early if pain becomes unbearable.

## 2018-04-19 NOTE — DISCHARGE INSTRUCTIONS
Abdominal Pain: Care Instructions  Your Care Instructions    Abdominal pain has many possible causes. Some aren't serious and get better on their own in a few days. Others need more testing and treatment. If your pain continues or gets worse, you need to be rechecked and may need more tests to find out what is wrong. You may need surgery to correct the problem. Don't ignore new symptoms, such as fever, nausea and vomiting, urination problems, pain that gets worse, and dizziness. These may be signs of a more serious problem. Your doctor may have recommended a follow-up visit in the next 8 to 12 hours. If you are not getting better, you may need more tests or treatment. The doctor has checked you carefully, but problems can develop later. If you notice any problems or new symptoms, get medical treatment right away. Follow-up care is a key part of your treatment and safety. Be sure to make and go to all appointments, and call your doctor if you are having problems. It's also a good idea to know your test results and keep a list of the medicines you take. How can you care for yourself at home? · Rest until you feel better. · To prevent dehydration, drink plenty of fluids, enough so that your urine is light yellow or clear like water. Choose water and other caffeine-free clear liquids until you feel better. If you have kidney, heart, or liver disease and have to limit fluids, talk with your doctor before you increase the amount of fluids you drink. · If your stomach is upset, eat mild foods, such as rice, dry toast or crackers, bananas, and applesauce. Try eating several small meals instead of two or three large ones. · Wait until 48 hours after all symptoms have gone away before you have spicy foods, alcohol, and drinks that contain caffeine. · Do not eat foods that are high in fat. · Avoid anti-inflammatory medicines such as aspirin, ibuprofen (Advil, Motrin), and naproxen (Aleve).  These can cause stomach upset. Talk to your doctor if you take daily aspirin for another health problem. When should you call for help? Call 911 anytime you think you may need emergency care. For example, call if:  ? · You passed out (lost consciousness). ? · You pass maroon or very bloody stools. ? · You vomit blood or what looks like coffee grounds. ? · You have new, severe belly pain. ?Call your doctor now or seek immediate medical care if:  ? · Your pain gets worse, especially if it becomes focused in one area of your belly. ? · You have a new or higher fever. ? · Your stools are black and look like tar, or they have streaks of blood. ? · You have unexpected vaginal bleeding. ? · You have symptoms of a urinary tract infection. These may include:  ¨ Pain when you urinate. ¨ Urinating more often than usual.  ¨ Blood in your urine. ? · You are dizzy or lightheaded, or you feel like you may faint. ? Watch closely for changes in your health, and be sure to contact your doctor if:  ? · You are not getting better after 1 day (24 hours). Where can you learn more? Go to http://maria c-checo.info/. Enter I994 in the search box to learn more about \"Abdominal Pain: Care Instructions. \"  Current as of: March 20, 2017  Content Version: 11.4  © 9440-5620 [employer] Care instructions adapted under license by [employer] (which disclaims liability or warranty for this information). If you have questions about a medical condition or this instruction, always ask your healthcare professional. [place] [address] any warranty or liability for your use of this information.

## 2018-04-19 NOTE — ED NOTES
0350: Assumed care of pt, report received from [facility] BUTLER Pt medicated as ordered, on monitor x2  0500: Pt provided with drink with MD approval  0630: MD reviewed discharge instructions and options with patient and patient verbalized understanding. RN reviewed discharge instructions using teachback method. Pt ambulated to exit without difficulty and in no signs of acute distress escorted by  who will drive home. No complaints or needs expressed at this time. Patient was counseled on medications prescribed at discharge. VSS at time of discharge.  Pt to call PCP in the morning for follow up and have surgery Friday

## 2018-04-19 NOTE — ED PROVIDER NOTES
Patient is a 42 y.o. female presenting with referral/consult. Referral / Consult   The problem has been gradually worsening. Associated symptoms include abdominal pain. Pertinent negatives include no headaches and no shortness of breath. 42year old female with anxiety, alcohol abuse, chronic pain, pancreatitis and pancreatic cyst, presents with worsening left sided abdominal pain. She was recently seen/admitted for alcohol withdrawal- surgery scheduled for Friday with Dr. Enoc Montoya for pancreatic cyst removal.  She states the pain has gotten worse and she can't tolerate it any more. She does admit to drinking 3 beers today- she has been cutting back. She is taking tylenol and aleve without relief. Pain is 6/10. Nausea/vomiting today. No fevers. NO urinary or bowel difficulties. Eating doesn't make it worse. Normal periods. Patient states she called surgery today and they recommended she come in for treatment. Past Medical History:   Diagnosis Date    Anxiety     Blurred vision     Chest pain     Chronic pain     MUSCLE WEAKNESS,PANCREATIC CYST     Depression     Frequent headaches     Ill-defined condition     Muscle weakness     Pancreatic cyst 3/21/2018    Shortness of breath     Stomach pain     Swallowing difficulty        Past Surgical History:   Procedure Laterality Date    HX GI      COLONOSCOPY    HX GYN  2005    endometriosis surgery         Family History:   Problem Relation Age of Onset    Cancer Mother      colon    Cancer Father      skin    Anesth Problems Father      SUCCINYLCHOLINE  REACTION       Social History     Social History    Marital status:      Spouse name: N/A    Number of children: N/A    Years of education: N/A     Occupational History    Not on file.      Social History Main Topics    Smoking status: Current Every Day Smoker     Packs/day: 1.00     Years: 25.00    Smokeless tobacco: Never Used      Comment: STOP SMOKING BOOKLET PROVIDED    Alcohol use 1.8 oz/week     3 Shots of liquor per week    Drug use: No    Sexual activity: Yes     Other Topics Concern    Not on file     Social History Narrative         ALLERGIES: Amoxicillin    Review of Systems   Constitutional: Negative for appetite change and fever. HENT: Negative for congestion. Eyes: Negative for visual disturbance. Respiratory: Negative for cough, chest tightness and shortness of breath. Gastrointestinal: Positive for abdominal pain, nausea and vomiting. Endocrine: Negative for polyuria. Genitourinary: Negative for dysuria. Musculoskeletal: Negative for gait problem. Skin: Negative for rash. Neurological: Negative for headaches. Psychiatric/Behavioral: Negative for dysphoric mood. Vitals:    04/18/18 2243 04/19/18 0022   BP: (!) 149/107 (!) 139/93   Pulse: (!) 111 (!) 113   Resp: 16 18   Temp: 98.4 °F (36.9 °C) 98.7 °F (37.1 °C)   SpO2: 96% 99%   Weight: 77.1 kg (170 lb)             Physical Exam   Constitutional: She is oriented to person, place, and time. She appears well-developed and well-nourished. No distress. HENT:   Head: Normocephalic and atraumatic. Mouth/Throat: Oropharynx is clear and moist. No oropharyngeal exudate. Eyes: Conjunctivae and EOM are normal. Pupils are equal, round, and reactive to light. Right eye exhibits no discharge. Left eye exhibits no discharge. No scleral icterus. Neck: Normal range of motion. Neck supple. No JVD present. Cardiovascular: Normal rate, regular rhythm, normal heart sounds and intact distal pulses. Exam reveals no gallop and no friction rub. No murmur heard. Pulmonary/Chest: Effort normal and breath sounds normal. No stridor. No respiratory distress. She has no wheezes. She has no rales. She exhibits no tenderness. Abdominal: Soft. Bowel sounds are normal. She exhibits no distension and no mass. There is tenderness (LUQ). There is no rebound and no guarding. Musculoskeletal: Normal range of motion.  She exhibits no edema or tenderness. Neurological: She is alert and oriented to person, place, and time. She has normal reflexes. No cranial nerve deficit. She exhibits normal muscle tone. Coordination normal.   Skin: Skin is warm and dry. No rash noted. No erythema. Psychiatric: She has a normal mood and affect. Her behavior is normal. Judgment and thought content normal.        MDM      ED Course       Procedures      MIldly tachy and hypertensive- cutting back on alcohol- may be starting to withdraw- IV fluids/ativan, Toradol/pepcid for abdominal pain. Labs all ok, lipase normal.        FEeling better. Spoke with surgery- Dr. Kiarra Pinon. He spoke with her yesterday morning and advised her to come to the office. Can go home with Rx for pain meds and follow up in am for further. Dr. James Romero has given her Percocet in the past. Patient is comfortable going home with pain meds. Heart rate improving.

## 2018-04-20 ENCOUNTER — HOSPITAL ENCOUNTER (INPATIENT)
Age: 38
LOS: 10 days | Discharge: HOME | DRG: 407 | End: 2018-04-30
Attending: SURGERY | Admitting: SURGERY
Payer: COMMERCIAL

## 2018-04-20 ENCOUNTER — ANESTHESIA (OUTPATIENT)
Dept: SURGERY | Age: 38
DRG: 407 | End: 2018-04-20
Payer: COMMERCIAL

## 2018-04-20 DIAGNOSIS — K86.2 PANCREATIC CYST: Primary | ICD-10-CM

## 2018-04-20 LAB
BASOPHILS # BLD: 0 K/UL (ref 0–0.1)
BASOPHILS NFR BLD: 0 % (ref 0–1)
DIFFERENTIAL METHOD BLD: ABNORMAL
EOSINOPHIL # BLD: 0 K/UL (ref 0–0.4)
EOSINOPHIL NFR BLD: 0 % (ref 0–7)
ERYTHROCYTE [DISTWIDTH] IN BLOOD BY AUTOMATED COUNT: 12.7 % (ref 11.5–14.5)
GLUCOSE BLD STRIP.AUTO-MCNC: 190 MG/DL (ref 65–100)
GLUCOSE BLD STRIP.AUTO-MCNC: 208 MG/DL (ref 65–100)
GLUCOSE BLD STRIP.AUTO-MCNC: 251 MG/DL (ref 65–100)
HCG UR QL: NEGATIVE
HCT VFR BLD AUTO: 33.7 % (ref 35–47)
HGB BLD-MCNC: 11 G/DL (ref 11.5–16)
IMM GRANULOCYTES # BLD: 0 K/UL (ref 0–0.04)
IMM GRANULOCYTES NFR BLD AUTO: 0 % (ref 0–0.5)
LYMPHOCYTES # BLD: 0.7 K/UL (ref 0.8–3.5)
LYMPHOCYTES NFR BLD: 5 % (ref 12–49)
MCH RBC QN AUTO: 33 PG (ref 26–34)
MCHC RBC AUTO-ENTMCNC: 32.6 G/DL (ref 30–36.5)
MCV RBC AUTO: 101.2 FL (ref 80–99)
MONOCYTES # BLD: 0.1 K/UL (ref 0–1)
MONOCYTES NFR BLD: 1 % (ref 5–13)
NEUTS SEG # BLD: 12.7 K/UL (ref 1.8–8)
NEUTS SEG NFR BLD: 94 % (ref 32–75)
NRBC # BLD: 0 K/UL (ref 0–0.01)
NRBC BLD-RTO: 0 PER 100 WBC
PLATELET # BLD AUTO: 289 K/UL (ref 150–400)
PMV BLD AUTO: 9.7 FL (ref 8.9–12.9)
RBC # BLD AUTO: 3.33 M/UL (ref 3.8–5.2)
RBC MORPH BLD: ABNORMAL
SERVICE CMNT-IMP: ABNORMAL
WBC # BLD AUTO: 13.5 K/UL (ref 3.6–11)

## 2018-04-20 PROCEDURE — 74011000250 HC RX REV CODE- 250: Performed by: SURGERY

## 2018-04-20 PROCEDURE — 77030013567 HC DRN WND RESERV BARD -A: Performed by: SURGERY

## 2018-04-20 PROCEDURE — 77030012407 HC DRN WND BARD -B: Performed by: SURGERY

## 2018-04-20 PROCEDURE — 77030035051: Performed by: SURGERY

## 2018-04-20 PROCEDURE — 77030008771 HC TU NG SALEM SUMP -A: Performed by: ANESTHESIOLOGY

## 2018-04-20 PROCEDURE — 77030035045 HC TRCR ENDOSC VRSPRT BLDLSS COVD -B: Performed by: SURGERY

## 2018-04-20 PROCEDURE — 77030008684 HC TU ET CUF COVD -B: Performed by: ANESTHESIOLOGY

## 2018-04-20 PROCEDURE — 82962 GLUCOSE BLOOD TEST: CPT

## 2018-04-20 PROCEDURE — 85018 HEMOGLOBIN: CPT

## 2018-04-20 PROCEDURE — 77030022473 HC HNDL ENDO GIA UNIV USDA -C: Performed by: SURGERY

## 2018-04-20 PROCEDURE — 74011000250 HC RX REV CODE- 250

## 2018-04-20 PROCEDURE — 77030018702 HC CLP LIG TI TELE -A: Performed by: SURGERY

## 2018-04-20 PROCEDURE — 77030002933 HC SUT MCRYL J&J -A: Performed by: SURGERY

## 2018-04-20 PROCEDURE — 77030002916 HC SUT ETHLN J&J -A: Performed by: SURGERY

## 2018-04-20 PROCEDURE — 88309 TISSUE EXAM BY PATHOLOGIST: CPT | Performed by: SURGERY

## 2018-04-20 PROCEDURE — 77030019702 HC WRP THER MENM -C: Performed by: SURGERY

## 2018-04-20 PROCEDURE — 77030035048 HC TRCR ENDOSC OPTCL COVD -B: Performed by: SURGERY

## 2018-04-20 PROCEDURE — 77030037367 HC STPLR ENDO TRI-STPLR COVD -D: Performed by: SURGERY

## 2018-04-20 PROCEDURE — 74011250636 HC RX REV CODE- 250/636: Performed by: SURGERY

## 2018-04-20 PROCEDURE — P9045 ALBUMIN (HUMAN), 5%, 250 ML: HCPCS

## 2018-04-20 PROCEDURE — 77030016151 HC PROTCTR LNS DFOG COVD -B: Performed by: SURGERY

## 2018-04-20 PROCEDURE — 77030037032 HC INSRT SCIS CLICKLLINE DISP STOR -B: Performed by: SURGERY

## 2018-04-20 PROCEDURE — 77030020747 HC TU INSUF ENDOSC TELE -A: Performed by: SURGERY

## 2018-04-20 PROCEDURE — 74011250636 HC RX REV CODE- 250/636: Performed by: ANESTHESIOLOGY

## 2018-04-20 PROCEDURE — 77030010938 HC CLP LIG TELE -A: Performed by: SURGERY

## 2018-04-20 PROCEDURE — 77030039266 HC ADH SKN EXOFIN S2SG -A: Performed by: SURGERY

## 2018-04-20 PROCEDURE — 77030002895 HC DEV VASC CLOSR COVD -B: Performed by: SURGERY

## 2018-04-20 PROCEDURE — 74011250636 HC RX REV CODE- 250/636

## 2018-04-20 PROCEDURE — 77030034628 HC LIGASURE LAP SEAL DIV MD COVD -F: Performed by: SURGERY

## 2018-04-20 PROCEDURE — 74011250636 HC RX REV CODE- 250/636: Performed by: SPECIALIST

## 2018-04-20 PROCEDURE — 74011636637 HC RX REV CODE- 636/637: Performed by: SURGERY

## 2018-04-20 PROCEDURE — 76010000173 HC OR TIME 3 TO 3.5 HR INTENSV-TIER 1: Performed by: SURGERY

## 2018-04-20 PROCEDURE — 77030002996 HC SUT SLK J&J -A: Performed by: SURGERY

## 2018-04-20 PROCEDURE — 36415 COLL VENOUS BLD VENIPUNCTURE: CPT | Performed by: SURGERY

## 2018-04-20 PROCEDURE — 77030034850: Performed by: SURGERY

## 2018-04-20 PROCEDURE — 74011250637 HC RX REV CODE- 250/637: Performed by: SURGERY

## 2018-04-20 PROCEDURE — 0FBG0ZZ EXCISION OF PANCREAS, OPEN APPROACH: ICD-10-PCS | Performed by: SURGERY

## 2018-04-20 PROCEDURE — 76060000037 HC ANESTHESIA 3 TO 3.5 HR: Performed by: SURGERY

## 2018-04-20 PROCEDURE — 0WJG4ZZ INSPECTION OF PERITONEAL CAVITY, PERCUTANEOUS ENDOSCOPIC APPROACH: ICD-10-PCS | Performed by: SURGERY

## 2018-04-20 PROCEDURE — 81025 URINE PREGNANCY TEST: CPT

## 2018-04-20 PROCEDURE — 77030020263 HC SOL INJ SOD CL0.9% LFCR 1000ML: Performed by: SURGERY

## 2018-04-20 PROCEDURE — 77030031139 HC SUT VCRL2 J&J -A: Performed by: SURGERY

## 2018-04-20 PROCEDURE — 77030011640 HC PAD GRND REM COVD -A: Performed by: SURGERY

## 2018-04-20 PROCEDURE — 65660000000 HC RM CCU STEPDOWN

## 2018-04-20 PROCEDURE — 77030008756 HC TU IRR SUC STRY -B: Performed by: SURGERY

## 2018-04-20 PROCEDURE — 77030009965 HC RELD STPLR ENDOS COVD -D: Performed by: SURGERY

## 2018-04-20 PROCEDURE — 77030026438 HC STYL ET INTUB CARD -A: Performed by: ANESTHESIOLOGY

## 2018-04-20 PROCEDURE — 76210000006 HC OR PH I REC 0.5 TO 1 HR: Performed by: SURGERY

## 2018-04-20 PROCEDURE — 85025 COMPLETE CBC W/AUTO DIFF WBC: CPT | Performed by: SURGERY

## 2018-04-20 RX ORDER — KETAMINE HYDROCHLORIDE 10 MG/ML
INJECTION, SOLUTION INTRAMUSCULAR; INTRAVENOUS AS NEEDED
Status: DISCONTINUED | OUTPATIENT
Start: 2018-04-20 | End: 2018-04-20 | Stop reason: HOSPADM

## 2018-04-20 RX ORDER — DULOXETIN HYDROCHLORIDE 60 MG/1
60 CAPSULE, DELAYED RELEASE ORAL DAILY
Status: DISCONTINUED | OUTPATIENT
Start: 2018-04-21 | End: 2018-04-30 | Stop reason: HOSPADM

## 2018-04-20 RX ORDER — TRAZODONE HYDROCHLORIDE 50 MG/1
50 TABLET ORAL
Status: DISCONTINUED | OUTPATIENT
Start: 2018-04-20 | End: 2018-04-30 | Stop reason: HOSPADM

## 2018-04-20 RX ORDER — DEXTROSE, SODIUM CHLORIDE, SODIUM LACTATE, POTASSIUM CHLORIDE, AND CALCIUM CHLORIDE 5; .6; .31; .03; .02 G/100ML; G/100ML; G/100ML; G/100ML; G/100ML
125 INJECTION, SOLUTION INTRAVENOUS CONTINUOUS
Status: DISCONTINUED | OUTPATIENT
Start: 2018-04-20 | End: 2018-04-20 | Stop reason: HOSPADM

## 2018-04-20 RX ORDER — MAGNESIUM SULFATE 100 %
4 CRYSTALS MISCELLANEOUS AS NEEDED
Status: DISCONTINUED | OUTPATIENT
Start: 2018-04-20 | End: 2018-04-30 | Stop reason: HOSPADM

## 2018-04-20 RX ORDER — FENTANYL CITRATE 50 UG/ML
50 INJECTION, SOLUTION INTRAMUSCULAR; INTRAVENOUS AS NEEDED
Status: DISCONTINUED | OUTPATIENT
Start: 2018-04-20 | End: 2018-04-20 | Stop reason: HOSPADM

## 2018-04-20 RX ORDER — SODIUM CHLORIDE, SODIUM LACTATE, POTASSIUM CHLORIDE, CALCIUM CHLORIDE 600; 310; 30; 20 MG/100ML; MG/100ML; MG/100ML; MG/100ML
125 INJECTION, SOLUTION INTRAVENOUS CONTINUOUS
Status: DISCONTINUED | OUTPATIENT
Start: 2018-04-20 | End: 2018-04-20 | Stop reason: HOSPADM

## 2018-04-20 RX ORDER — ROCURONIUM BROMIDE 10 MG/ML
INJECTION, SOLUTION INTRAVENOUS AS NEEDED
Status: DISCONTINUED | OUTPATIENT
Start: 2018-04-20 | End: 2018-04-20 | Stop reason: HOSPADM

## 2018-04-20 RX ORDER — ONDANSETRON 2 MG/ML
4 INJECTION INTRAMUSCULAR; INTRAVENOUS
Status: DISCONTINUED | OUTPATIENT
Start: 2018-04-20 | End: 2018-04-30 | Stop reason: HOSPADM

## 2018-04-20 RX ORDER — GLYCOPYRROLATE 0.2 MG/ML
INJECTION INTRAMUSCULAR; INTRAVENOUS AS NEEDED
Status: DISCONTINUED | OUTPATIENT
Start: 2018-04-20 | End: 2018-04-20 | Stop reason: HOSPADM

## 2018-04-20 RX ORDER — MIDAZOLAM HYDROCHLORIDE 1 MG/ML
1 INJECTION, SOLUTION INTRAMUSCULAR; INTRAVENOUS
Status: DISCONTINUED | OUTPATIENT
Start: 2018-04-20 | End: 2018-04-20 | Stop reason: HOSPADM

## 2018-04-20 RX ORDER — SODIUM CHLORIDE 0.9 % (FLUSH) 0.9 %
5-10 SYRINGE (ML) INJECTION EVERY 8 HOURS
Status: DISCONTINUED | OUTPATIENT
Start: 2018-04-20 | End: 2018-04-30 | Stop reason: HOSPADM

## 2018-04-20 RX ORDER — DIPHENHYDRAMINE HYDROCHLORIDE 50 MG/ML
12.5 INJECTION, SOLUTION INTRAMUSCULAR; INTRAVENOUS AS NEEDED
Status: DISCONTINUED | OUTPATIENT
Start: 2018-04-20 | End: 2018-04-20 | Stop reason: HOSPADM

## 2018-04-20 RX ORDER — ONDANSETRON 2 MG/ML
4 INJECTION INTRAMUSCULAR; INTRAVENOUS AS NEEDED
Status: DISCONTINUED | OUTPATIENT
Start: 2018-04-20 | End: 2018-04-20 | Stop reason: HOSPADM

## 2018-04-20 RX ORDER — SODIUM CHLORIDE 0.9 % (FLUSH) 0.9 %
5-10 SYRINGE (ML) INJECTION AS NEEDED
Status: DISCONTINUED | OUTPATIENT
Start: 2018-04-20 | End: 2018-04-30 | Stop reason: HOSPADM

## 2018-04-20 RX ORDER — MORPHINE SULFATE 10 MG/ML
2 INJECTION, SOLUTION INTRAMUSCULAR; INTRAVENOUS
Status: DISCONTINUED | OUTPATIENT
Start: 2018-04-20 | End: 2018-04-20 | Stop reason: HOSPADM

## 2018-04-20 RX ORDER — HYDRALAZINE HYDROCHLORIDE 20 MG/ML
INJECTION INTRAMUSCULAR; INTRAVENOUS AS NEEDED
Status: DISCONTINUED | OUTPATIENT
Start: 2018-04-20 | End: 2018-04-20 | Stop reason: HOSPADM

## 2018-04-20 RX ORDER — NEOSTIGMINE METHYLSULFATE 1 MG/ML
INJECTION INTRAVENOUS AS NEEDED
Status: DISCONTINUED | OUTPATIENT
Start: 2018-04-20 | End: 2018-04-20 | Stop reason: HOSPADM

## 2018-04-20 RX ORDER — DEXTROSE 50 % IN WATER (D50W) INTRAVENOUS SYRINGE
12.5-25 AS NEEDED
Status: DISCONTINUED | OUTPATIENT
Start: 2018-04-20 | End: 2018-04-30 | Stop reason: HOSPADM

## 2018-04-20 RX ORDER — LIDOCAINE HYDROCHLORIDE 10 MG/ML
0.5 INJECTION, SOLUTION EPIDURAL; INFILTRATION; INTRACAUDAL; PERINEURAL AS NEEDED
Status: DISCONTINUED | OUTPATIENT
Start: 2018-04-20 | End: 2018-04-20 | Stop reason: HOSPADM

## 2018-04-20 RX ORDER — SUCCINYLCHOLINE CHLORIDE 20 MG/ML
INJECTION INTRAMUSCULAR; INTRAVENOUS AS NEEDED
Status: DISCONTINUED | OUTPATIENT
Start: 2018-04-20 | End: 2018-04-20

## 2018-04-20 RX ORDER — ALBUMIN HUMAN 50 G/1000ML
SOLUTION INTRAVENOUS AS NEEDED
Status: DISCONTINUED | OUTPATIENT
Start: 2018-04-20 | End: 2018-04-20 | Stop reason: HOSPADM

## 2018-04-20 RX ORDER — HYDROMORPHONE HYDROCHLORIDE 2 MG/ML
1 INJECTION, SOLUTION INTRAMUSCULAR; INTRAVENOUS; SUBCUTANEOUS
Status: DISCONTINUED | OUTPATIENT
Start: 2018-04-20 | End: 2018-04-21

## 2018-04-20 RX ORDER — PHENYLEPHRINE HCL IN 0.9% NACL 0.4MG/10ML
SYRINGE (ML) INTRAVENOUS AS NEEDED
Status: DISCONTINUED | OUTPATIENT
Start: 2018-04-20 | End: 2018-04-20 | Stop reason: HOSPADM

## 2018-04-20 RX ORDER — MIDAZOLAM HYDROCHLORIDE 1 MG/ML
1 INJECTION, SOLUTION INTRAMUSCULAR; INTRAVENOUS AS NEEDED
Status: DISCONTINUED | OUTPATIENT
Start: 2018-04-20 | End: 2018-04-20 | Stop reason: HOSPADM

## 2018-04-20 RX ORDER — ONDANSETRON 2 MG/ML
INJECTION INTRAMUSCULAR; INTRAVENOUS AS NEEDED
Status: DISCONTINUED | OUTPATIENT
Start: 2018-04-20 | End: 2018-04-20 | Stop reason: HOSPADM

## 2018-04-20 RX ORDER — LABETALOL HYDROCHLORIDE 5 MG/ML
INJECTION, SOLUTION INTRAVENOUS AS NEEDED
Status: DISCONTINUED | OUTPATIENT
Start: 2018-04-20 | End: 2018-04-20 | Stop reason: HOSPADM

## 2018-04-20 RX ORDER — HYDROMORPHONE HCL/0.9% NACL/PF 0.5 MG/ML
PLASTIC BAG, INJECTION (ML) INTRAVENOUS
Status: DISCONTINUED | OUTPATIENT
Start: 2018-04-20 | End: 2018-04-22

## 2018-04-20 RX ORDER — PROPOFOL 10 MG/ML
INJECTION, EMULSION INTRAVENOUS AS NEEDED
Status: DISCONTINUED | OUTPATIENT
Start: 2018-04-20 | End: 2018-04-20 | Stop reason: HOSPADM

## 2018-04-20 RX ORDER — FENTANYL CITRATE 50 UG/ML
INJECTION, SOLUTION INTRAMUSCULAR; INTRAVENOUS AS NEEDED
Status: DISCONTINUED | OUTPATIENT
Start: 2018-04-20 | End: 2018-04-20 | Stop reason: HOSPADM

## 2018-04-20 RX ORDER — SODIUM CHLORIDE 0.9 % (FLUSH) 0.9 %
5-10 SYRINGE (ML) INJECTION AS NEEDED
Status: DISCONTINUED | OUTPATIENT
Start: 2018-04-20 | End: 2018-04-20 | Stop reason: HOSPADM

## 2018-04-20 RX ORDER — SODIUM CHLORIDE 0.9 % (FLUSH) 0.9 %
5-10 SYRINGE (ML) INJECTION EVERY 8 HOURS
Status: DISCONTINUED | OUTPATIENT
Start: 2018-04-20 | End: 2018-04-20 | Stop reason: HOSPADM

## 2018-04-20 RX ORDER — LIDOCAINE HYDROCHLORIDE 20 MG/ML
INJECTION, SOLUTION EPIDURAL; INFILTRATION; INTRACAUDAL; PERINEURAL AS NEEDED
Status: DISCONTINUED | OUTPATIENT
Start: 2018-04-20 | End: 2018-04-20 | Stop reason: HOSPADM

## 2018-04-20 RX ORDER — FENTANYL CITRATE 50 UG/ML
25 INJECTION, SOLUTION INTRAMUSCULAR; INTRAVENOUS
Status: DISCONTINUED | OUTPATIENT
Start: 2018-04-20 | End: 2018-04-20 | Stop reason: HOSPADM

## 2018-04-20 RX ORDER — SODIUM CHLORIDE, SODIUM LACTATE, POTASSIUM CHLORIDE, CALCIUM CHLORIDE 600; 310; 30; 20 MG/100ML; MG/100ML; MG/100ML; MG/100ML
INJECTION, SOLUTION INTRAVENOUS
Status: DISCONTINUED | OUTPATIENT
Start: 2018-04-20 | End: 2018-04-20 | Stop reason: HOSPADM

## 2018-04-20 RX ORDER — OXYCODONE HYDROCHLORIDE 5 MG/1
5 TABLET ORAL AS NEEDED
Status: DISCONTINUED | OUTPATIENT
Start: 2018-04-20 | End: 2018-04-20 | Stop reason: HOSPADM

## 2018-04-20 RX ORDER — CEFAZOLIN SODIUM 2 G/50ML
2 SOLUTION INTRAVENOUS
Status: COMPLETED | OUTPATIENT
Start: 2018-04-20 | End: 2018-04-20

## 2018-04-20 RX ORDER — DEXAMETHASONE SODIUM PHOSPHATE 4 MG/ML
INJECTION, SOLUTION INTRA-ARTICULAR; INTRALESIONAL; INTRAMUSCULAR; INTRAVENOUS; SOFT TISSUE AS NEEDED
Status: DISCONTINUED | OUTPATIENT
Start: 2018-04-20 | End: 2018-04-20 | Stop reason: HOSPADM

## 2018-04-20 RX ORDER — DIPHENHYDRAMINE HYDROCHLORIDE 50 MG/ML
12.5 INJECTION, SOLUTION INTRAMUSCULAR; INTRAVENOUS
Status: ACTIVE | OUTPATIENT
Start: 2018-04-20 | End: 2018-04-21

## 2018-04-20 RX ORDER — DEXTROSE, SODIUM CHLORIDE, AND POTASSIUM CHLORIDE 5; .45; .15 G/100ML; G/100ML; G/100ML
100 INJECTION INTRAVENOUS CONTINUOUS
Status: DISCONTINUED | OUTPATIENT
Start: 2018-04-20 | End: 2018-04-21

## 2018-04-20 RX ORDER — HYDROMORPHONE HYDROCHLORIDE 2 MG/ML
INJECTION, SOLUTION INTRAMUSCULAR; INTRAVENOUS; SUBCUTANEOUS AS NEEDED
Status: DISCONTINUED | OUTPATIENT
Start: 2018-04-20 | End: 2018-04-20 | Stop reason: HOSPADM

## 2018-04-20 RX ORDER — BUPIVACAINE HYDROCHLORIDE AND EPINEPHRINE 5; 5 MG/ML; UG/ML
30 INJECTION, SOLUTION EPIDURAL; INTRACAUDAL; PERINEURAL ONCE
Status: COMPLETED | OUTPATIENT
Start: 2018-04-20 | End: 2018-04-20

## 2018-04-20 RX ADMIN — HYDRALAZINE HYDROCHLORIDE 6 MG: 20 INJECTION INTRAMUSCULAR; INTRAVENOUS at 11:23

## 2018-04-20 RX ADMIN — PROPOFOL 200 MG: 10 INJECTION, EMULSION INTRAVENOUS at 09:22

## 2018-04-20 RX ADMIN — FENTANYL CITRATE 50 MCG: 50 INJECTION, SOLUTION INTRAMUSCULAR; INTRAVENOUS at 09:22

## 2018-04-20 RX ADMIN — HYDROMORPHONE HYDROCHLORIDE 0.4 MG: 2 INJECTION, SOLUTION INTRAMUSCULAR; INTRAVENOUS; SUBCUTANEOUS at 10:05

## 2018-04-20 RX ADMIN — SODIUM CHLORIDE, SODIUM LACTATE, POTASSIUM CHLORIDE, CALCIUM CHLORIDE: 600; 310; 30; 20 INJECTION, SOLUTION INTRAVENOUS at 11:30

## 2018-04-20 RX ADMIN — HYDROMORPHONE HYDROCHLORIDE 0.4 MG: 2 INJECTION, SOLUTION INTRAMUSCULAR; INTRAVENOUS; SUBCUTANEOUS at 11:05

## 2018-04-20 RX ADMIN — LABETALOL HYDROCHLORIDE 5 MG: 5 INJECTION, SOLUTION INTRAVENOUS at 11:10

## 2018-04-20 RX ADMIN — ALBUMIN HUMAN 250 ML: 50 SOLUTION INTRAVENOUS at 11:45

## 2018-04-20 RX ADMIN — LABETALOL HYDROCHLORIDE 10 MG: 5 INJECTION, SOLUTION INTRAVENOUS at 11:17

## 2018-04-20 RX ADMIN — Medication 80 MCG: at 11:45

## 2018-04-20 RX ADMIN — Medication 10 ML: at 22:34

## 2018-04-20 RX ADMIN — DEXAMETHASONE SODIUM PHOSPHATE 4 MG: 4 INJECTION, SOLUTION INTRA-ARTICULAR; INTRALESIONAL; INTRAMUSCULAR; INTRAVENOUS; SOFT TISSUE at 09:52

## 2018-04-20 RX ADMIN — HYDROMORPHONE HYDROCHLORIDE 0.4 MG: 2 INJECTION, SOLUTION INTRAMUSCULAR; INTRAVENOUS; SUBCUTANEOUS at 10:27

## 2018-04-20 RX ADMIN — NEOSTIGMINE METHYLSULFATE 4 MG: 1 INJECTION INTRAVENOUS at 12:15

## 2018-04-20 RX ADMIN — ONDANSETRON 4 MG: 2 INJECTION INTRAMUSCULAR; INTRAVENOUS at 09:58

## 2018-04-20 RX ADMIN — TRAZODONE HYDROCHLORIDE 25 MG: 50 TABLET ORAL at 22:34

## 2018-04-20 RX ADMIN — HYDROMORPHONE HYDROCHLORIDE 0.4 MG: 2 INJECTION, SOLUTION INTRAMUSCULAR; INTRAVENOUS; SUBCUTANEOUS at 10:20

## 2018-04-20 RX ADMIN — Medication 80 MCG: at 11:42

## 2018-04-20 RX ADMIN — CEFAZOLIN SODIUM 2 G: 2 SOLUTION INTRAVENOUS at 09:31

## 2018-04-20 RX ADMIN — SODIUM CHLORIDE, SODIUM LACTATE, POTASSIUM CHLORIDE, CALCIUM CHLORIDE: 600; 310; 30; 20 INJECTION, SOLUTION INTRAVENOUS at 09:12

## 2018-04-20 RX ADMIN — HYDROMORPHONE HYDROCHLORIDE 1 MG: 2 INJECTION INTRAMUSCULAR; INTRAVENOUS; SUBCUTANEOUS at 22:33

## 2018-04-20 RX ADMIN — ROCURONIUM BROMIDE 30 MG: 10 INJECTION, SOLUTION INTRAVENOUS at 09:56

## 2018-04-20 RX ADMIN — HYDROMORPHONE HYDROCHLORIDE 1 MG: 2 INJECTION INTRAMUSCULAR; INTRAVENOUS; SUBCUTANEOUS at 20:23

## 2018-04-20 RX ADMIN — GLYCOPYRROLATE 0.8 MG: 0.2 INJECTION INTRAMUSCULAR; INTRAVENOUS at 12:21

## 2018-04-20 RX ADMIN — MIDAZOLAM HYDROCHLORIDE 2 MG: 1 INJECTION, SOLUTION INTRAMUSCULAR; INTRAVENOUS at 08:37

## 2018-04-20 RX ADMIN — LIDOCAINE HYDROCHLORIDE 80 MG: 20 INJECTION, SOLUTION EPIDURAL; INFILTRATION; INTRACAUDAL; PERINEURAL at 09:22

## 2018-04-20 RX ADMIN — DEXTROSE MONOHYDRATE, SODIUM CHLORIDE, AND POTASSIUM CHLORIDE 100 ML/HR: 50; 4.5; 1.49 INJECTION, SOLUTION INTRAVENOUS at 13:30

## 2018-04-20 RX ADMIN — Medication: at 13:05

## 2018-04-20 RX ADMIN — HUMAN INSULIN 3 UNITS: 100 INJECTION, SOLUTION SUBCUTANEOUS at 17:54

## 2018-04-20 RX ADMIN — FENTANYL CITRATE 50 MCG: 50 INJECTION, SOLUTION INTRAMUSCULAR; INTRAVENOUS at 09:59

## 2018-04-20 RX ADMIN — HYDROMORPHONE HYDROCHLORIDE 0.4 MG: 2 INJECTION, SOLUTION INTRAMUSCULAR; INTRAVENOUS; SUBCUTANEOUS at 10:57

## 2018-04-20 RX ADMIN — ROCURONIUM BROMIDE 40 MG: 10 INJECTION, SOLUTION INTRAVENOUS at 09:22

## 2018-04-20 RX ADMIN — KETAMINE HYDROCHLORIDE 30 MG: 10 INJECTION, SOLUTION INTRAMUSCULAR; INTRAVENOUS at 09:45

## 2018-04-20 RX ADMIN — ALBUMIN HUMAN 250 ML: 50 SOLUTION INTRAVENOUS at 12:06

## 2018-04-20 NOTE — PERIOP NOTES
TRANSFER - OUT REPORT:    Verbal report given to NURSE ISIDRO ZUÑIGA(name) on [employer]  being transferred to [facility](unit) for routine post - op       Report consisted of patients Situation, Background, Assessment and   Recommendations(SBAR). Time Pre op antibiotic given:ANCEF 2 Vika@[employer]  Anesthesia Stop time: 5254  Martínez Present on Transfer to floor:YES  Order for Martínez on Chart:YES  Discharge Prescriptions with Chart:NO    Information from the following report(s) SBAR, OR Summary, Intake/Output, MAR, Recent Results and Cardiac Rhythm NSR was reviewed with the receiving nurse. Opportunity for questions and clarification was provided. Is the patient on 02? YES       L/Min 2      Is the patient on a monitor? NO    Is the nurse transporting with the patient? NO    Surgical Waiting Area notified of patient's transfer from PACU?  NO      The following personal items collected during your admission accompanied patient upon transfer:   Dental Appliance: Dental Appliances: None  Vision:    Hearing Aid:    Jewelry:    Clothing:    Other Valuables:    Valuables sent to safe:

## 2018-04-20 NOTE — ROUTINE PROCESS
Patient: Lindsay Bradley MRN: [id number]  SSN: [id number]   YOB: 1980  Age: 42 y.o. Sex: female     Patient is status post Procedure(s):  LAPAROSCOPIC CONVERTED TO OPEN DISTAL PANCREATECTOMY . Surgeon(s) and Role:     * Liz Thakur MD - Primary    Local/Dose/Irrigation:  Marcaine 0.5% with Epi- 3 ml                        Varinder Cordial #1 04/20/18 Left; Anterior Abdomen (Active)   Site Assessment Clean, dry, & intact 4/20/2018 11:50 AM   Dressing Status Clean, dry, & intact 4/20/2018 11:50 AM   Drainage Description Serosanguinous 4/20/2018 11:50 AM   Jaylen Drain Airleak No 4/20/2018 11:50 AM   Status Patent; Charged 4/20/2018 11:50 AM   Y Connector Used No 4/20/2018 11:50 AM      Airway - Endotracheal Tube 04/20/18 Oral (Active)                   Dressing/Packing:  Wound Abdomen-DRESSING TYPE: Topical skin adhesive/glue (04/20/18 1154)  Splint/Cast:  ]    Other:  Trocar sites x 3, midline incision- upper abdomen              16 Fr Martínez catheter               19 Fr Jaylen drain

## 2018-04-20 NOTE — IP AVS SNAPSHOT
[address] [place] [phone] Patient: Arnav Woodson MRN: [id number] OEE:[birth year]01 A check odette indicates which time of day the medication should be taken. My Medications START taking these medications Instructions Each Dose to Equal  
 Morning Noon Evening Bedtime  
 gabapentin 300 mg capsule Commonly known as:  NEURONTIN Your last dose was: Your next dose is: Take 1 Cap by mouth three (3) times daily. Indications: Postoperative Acute Pain 300 mg  
    
   
   
   
  
 ondansetron 4 mg disintegrating tablet Commonly known as:  ZOFRAN ODT Your last dose was: Your next dose is: Take 1 Tab by mouth every eight (8) hours as needed for Nausea. Indications: PREVENTION OF POST-OPERATIVE NAUSEA AND VOMITING  
 4 mg  
    
   
   
   
  
 oxyCODONE IR 10 mg Tab immediate release tablet Commonly known as:  Benito Star Your last dose was: Your next dose is: Take 1 Tab by mouth every three (3) hours as needed. Max Daily Amount: 80 mg. May cut tablet into half and take half a tablet instead of a whole tablet. Indications: Pain 10 mg  
    
   
   
   
  
 senna-docusate 8.6-50 mg per tablet Commonly known as:  Mueller Sherry Your last dose was: Your next dose is: Take 1 Tab by mouth daily as needed for Constipation. 1 Tab CHANGE how you take these medications Instructions Each Dose to Equal  
 Morning Noon Evening Bedtime * naloxone 2 mg/actuation Spry What changed:  Another medication with the same name was added. Make sure you understand how and when to take each. Your last dose was: Your next dose is:    
   
   
 Use 1 spray intranasally into 1 nostril. Use a new Narcan nasal spray for subsequent doses and administer into alternating nostrils.  May repeat every 2 to 3 minutes as needed. * naloxone 4 mg/actuation nasal spray Commonly known as:  [employer] What changed: You were already taking a medication with the same name, and this prescription was added. Make sure you understand how and when to take each. Your last dose was: Your next dose is:    
   
   
 Use 1 spray intranasally into 1 nostril. Use a new Narcan nasal spray for subsequent doses and administer into alternating nostrils. May repeat every 2 to 3 minutes as needed. * naloxone 2 mg/0.4 mL auto-injector Commonly known as:  Jasmin Nagel What changed: You were already taking a medication with the same name, and this prescription was added. Make sure you understand how and when to take each. Your last dose was: Your next dose is: 0.4 mL by IntraMUSCular route once as needed for Overdose for up to 1 dose. 2 mg * Notice: This list has 3 medication(s) that are the same as other medications prescribed for you. Read the directions carefully, and ask your doctor or other care provider to review them with you. CONTINUE taking these medications Instructions Each Dose to Equal  
 Morning Noon Evening Bedtime  
 acetaminophen 500 mg tablet Commonly known as:  TYLENOL Your last dose was: Your next dose is: Take 500 mg by mouth every eight (8) hours as needed for Pain. 500 mg DULoxetine 60 mg capsule Commonly known as:  CYMBALTA Your last dose was: Your next dose is: Take 60 mg by mouth daily. 60 mg MOTRIN  mg tablet Generic drug:  ibuprofen Your last dose was: Your next dose is: Take 800 mg by mouth every eight (8) hours as needed. 800 mg  
    
   
   
   
  
  
STOP taking these medications   
 clonazePAM 0.5 mg tablet Commonly known as:  KlonoPIN  
   
  
 oxyCODONE-acetaminophen 5-325 mg per tablet Commonly known as:  PERCOCET  
   
  
 traZODone 50 mg tablet Commonly known as:  Jeanie Odor Where to Get Your Medications [id number] [address], [address],  [address] [place] [id number] Phone:  [phone]  
  gabapentin 300 mg capsule Information on where to get these meds will be given to you by the nurse or doctor. ! Ask your nurse or doctor about these medications  
  naloxone 2 mg/0.4 mL auto-injector  
 naloxone 4 mg/actuation nasal spray  
 ondansetron 4 mg disintegrating tablet  
 oxyCODONE IR 10 mg Tab immediate release tablet  
 senna-docusate 8.6-50 mg per tablet

## 2018-04-20 NOTE — INTERVAL H&P NOTE
H&P Update:  Josefina Sosa was seen and examined. History and physical has been reviewed. The patient has been examined.  There have been no significant clinical changes since the completion of the originally dated History and Physical.    Signed By: Smiley Crystal MD     April 20, 2018 6:35 AM

## 2018-04-20 NOTE — ANESTHESIA POSTPROCEDURE EVALUATION
Post-Anesthesia Evaluation and Assessment    Patient: Josefina Sosa MRN: [id number]  SSN: [id number]    YOB: 1980  Age: 42 y.o. Sex: female       Cardiovascular Function/Vital Signs  Visit Vitals    /58 (BP 1 Location: Right arm, BP Patient Position: Head of bed elevated (Comment degrees))    Pulse 92    Temp 36.9 °C (98.5 °F)    Resp 16    Ht 5' 3\" (1.6 m)    Wt 74.8 kg (165 lb)    SpO2 93%    BMI 29.23 kg/m2       Patient is status post general anesthesia for Procedure(s):  LAPAROSCOPIC CONVERTED TO OPEN DISTAL PANCREATECTOMY . Nausea/Vomiting: None    Postoperative hydration reviewed and adequate. Pain:  Pain Intensity 1: 4 (04/20/18 1263)   Managed    Neurological Status:   Neuro (WDL): Within Defined Limits (04/20/18 1330)   At baseline    Mental Status and Level of Consciousness: Arousable    Pulmonary Status:   O2 Device: Nasal cannula (04/20/18 1330)   Adequate oxygenation and airway patent    Complications related to anesthesia: None    Post-anesthesia assessment completed.  No concerns    Signed By: Jordan Montes MD     April 20, 2018

## 2018-04-20 NOTE — OP NOTES
[address] [place] [address] REPORT    Suni Gonzales  MR#: [id number]  : 1980  ACCOUNT #: [de-identified]   DATE OF SERVICE: 2018    PREOPERATIVE DIAGNOSIS:  Mucinous lesion of the body of the pancreas. POSTOPERATIVE DIAGNOSIS:  Mucinous lesion of the body of the pancreas. PROCEDURE:  Laparoscopic distal pancreatectomy converted to open procedure. SURGEON:  Hermann Lee. Mary Mullen MD.    ASSISTANT:  Merlin Hernández. ANESTHESIA:  General endotracheal supplemented with 0.5% Marcaine with epinephrine. ESTIMATED BLOOD LOSS:  500 ml     SPECIMEN:  Distal pancreas. FINDINGS:  3 cm mass. COMPLICATIONS:  None. DRAINS:  One #19 Jaylen drain    IMPLANTS:  None. DESCRIPTION OF PROCEDURE:  With the patient supine and suitably anesthetized, the abdomen was prepped with ChloraPrep and draped as a field. 0.5% Marcaine with epinephrine was infiltrated in appropriate places. Subumbilical incision was made. The abdominal cavity was entered using direct vision and the camera was inserted. Everything appeared suitable. Then two 5 mm trocars were placed in the right upper abdomen and another one was placed in the left upper abdomen for retraction purposes. The patient was placed in reverse Trendelenburg position, turned a little bit to the right and the lesser sac was entered using the LigaSure device to divide the omentum. I did expose the entire length of the pancreas from the neck all the way out towards the tail of the pancreas. In dissecting down at the neck, there were a lot of adhesions and scarring, if you will, or inflammation surrounding where the cyst was and I felt uncomfortable continuing this with laparoscopy because I could not really visualize well where the vessels were. I therefore converted to an open procedure through an upper midline incision that was only 15 cm in length. I divided the pancreas at its neck using a stapling device.   I then mobilized the remainder of the pancreas by blunt and careful use of the LigaSure device. I distally wound up dividing the splenic artery because it was so adherent to the distal pancreas. The vein was left intact throughout. The spleen looked fine after the division of the splenic artery, which had obviously been divided proximally as well with the stapling device. The liver bed was inspected carefully. There was one bleeder from the splenic vein, which was suture ligated. The staple of the proximal splenic artery was reinforced with a figure-of-eight suture of 2-0 silk as well. Everything else looked dry. A 19 Jaylen drain was placed in the area of the pancreatic closure. All lap pads and Vicryl had been previously removed. The wound was inspected carefully and there is no evidence of problems with hemostasis. The fascia was then approximated with running #1 Vicryl. Subcutaneous tissue was approximated with Vicryl. The skin of the main incision was closed with subcuticular Monocryl followed by Dermabond. The 4 stab wounds of the trocar sites were closed with subcuticular Monocryl followed by Dermabond. At the termination of procedure, all counts were correct. The patient tolerated this well and was brought to the PACU in satisfactory condition. MD Aida Bess / FAY  D: 04/20/2018 12:15     T: 04/20/2018 16:16  JOB #: [id number]  CC: Syed Davis MD  CC: Patito Kang MD  CC: Gayle Diaz MD

## 2018-04-20 NOTE — BRIEF OP NOTE
BRIEF OPERATIVE NOTE    Date of Procedure: 4/20/2018   Preoperative Diagnosis: PANCREATIC MASS  Postoperative Diagnosis: PANCREATIC MASS    Procedure(s):  LAPAROSCOPIC CONVERTED TO OPEN DISTAL PANCREATECTOMY   Surgeon(s) and Role:     * Isaura Still MD - Primary         Surgical Assistant: Dusty Smallwood    Surgical Staff:  Circ-1: Migdalia Martínez RN  Circ-Relief: Ortiz Boogie RN  Scrub Tech-1: Travis Akins RN-Relief: Yu Harden RN  Surg Asst-1: Miguel Lama  Surg Asst-Relief: Radha Kearns  Event Time In   Incision Start 0021   Incision Close      Anesthesia: General   Estimated Blood Loss: 500 ml  Specimens:   ID Type Source Tests Collected by Time Destination   1 : distal pancreas Fresh Abdomen  Isaura Still MD 4/20/2018 1133 Pathology      Findings: 3 cm mass   Complications: none  Drain; 19 Jaylen  Implants: * No implants in log *      [id number]

## 2018-04-20 NOTE — H&P (VIEW-ONLY)
Surgery Consult    Subjective:      Amalia Mera is a 42 y.o.  female who was referred by Dr Loyda Pedersen for evaluation of a pancreatic neoplasm. The pt states It hurts a lot all along her left flank, and it hurts between her back bone and shoulder blade; it feels almost like a charley horse/muscle cramp. When she goes to try and stretch it, she will start experiencing cramps in her upper back. In conjunction with this, she will experience pain in the same spots when she goes to lay down in bed. Her  states that she went to the ER at [facility] about a month ago and while there, a CT scan and MRI was done which led to the \"thing\" being found on her pancreas. Additionally, the pt can feel \"two little beebees\" in the back of her mouth with her tongue. Her mouth doesn't feel very dry and her jaw will only hurt if she rests her head on her hands. The pt believes that she is abnormally swollen all around her neck, chin, and jaw; it has swelled up in the last couple of days and is sort of numb. Rx NOTE: Pt has tried to take a number of different NSAIDs but none them are working now. Her ENT gave her Percocet (Qty 20) and no one else she has asked is willing to give her any. She was also given 5-7 morphine by another provider which she states did not last her very long. NOTE:  The pt has plans to go to the [employer] at the end of 08/2018 and is concerned that her present condition and/or treatment(s) for it will interfere with it. PMHx:  In 2011/2012, the pt reports that her liver \"became sick\" because of a tick bite. Miscellaneous cytology from 03/14/2018:      MRI ABD W WO CONT from 02/24/2018: I independently reviewed these images.      Chief Complaint   Patient presents with    Mass     pancreatic       Patient Active Problem List    Diagnosis Date Noted    Rash 06/24/2011    Headache(784.0) 06/24/2011    Nausea & vomiting 06/24/2011    Hepatitis 06/24/2011    Thrombocytopenia, unspecified 06/24/2011    Abdominal pain, unspecified site 06/24/2011    Fever, unspecified 06/24/2011     Past Medical History:   Diagnosis Date    Anxiety     Blurred vision     Chest pain     Depression     Frequent headaches     Muscle weakness     Shortness of breath     Stomach pain     Swallowing difficulty       Past Surgical History:   Procedure Laterality Date    HX GYN      endometriosis surgery      Social History   Substance Use Topics    Smoking status: Current Every Day Smoker     Packs/day: 1.00     Years: 25.00    Smokeless tobacco: Never Used    Alcohol use 1.8 oz/week     3 Glasses of wine per week      Family History   Problem Relation Age of Onset    Cancer Mother      colon    Cancer Father      skin      Current Outpatient Prescriptions   Medication Sig    oxyCODONE-acetaminophen (PERCOCET) 5-325 mg per tablet Take 1 Tab by mouth every eight (8) hours as needed for Pain. Max Daily Amount: 3 Tabs.  ibuprofen (MOTRIN IB) 200 mg tablet Take 800 mg by mouth.  naproxen sodium (ALEVE) 220 mg cap Take  by mouth.  traZODone (DESYREL) 50 mg tablet Take  by mouth nightly.  duloxetine (CYMBALTA) 60 mg capsule     morphine IR (MS IR) 15 mg tablet Take 1 Tab by mouth every four (4) hours as needed for Pain. Max Daily Amount: 90 mg. No current facility-administered medications for this visit. Allergies   Allergen Reactions    Amoxicillin Shortness of Breath and Rash     Pt has had keflex in the past        Review of Systems:    A comprehensive review of systems was negative except for that written in the History of Present Illness.     Objective:     Visit Vitals    /90 (BP 1 Location: Right arm, BP Patient Position: Sitting)    Pulse 96    Temp 98.5 °F (36.9 °C) (Oral)    Resp 16    Ht 5' 3\" (1.6 m)    Wt 164 lb (74.4 kg)    LMP 03/14/2018    SpO2 99%    BMI 29.05 kg/m2         Physical Exam:  General appearance: alert, cooperative, no distress, appears stated age  Head: Normocephalic, without obvious abnormality, atraumatic  Lungs: clear to auscultation bilaterally  Heart: regular rate and rhythm, S1, S2 normal, no murmur, click, rub or gallop  Neurologic: Grossly normal  Neck: No peripheral adenopathy. Has bilateral slightly enlarged parotid glands. Abdomen: Soft, no masses, no tenderness, no organomegaly. Assessment:       ICD-10-CM ICD-9-CM    1. Pancreatic mass K86.9 577.9 oxyCODONE-acetaminophen (PERCOCET) 5-325 mg per tablet         Plan:     Patient has elected for a laparoscopic distal pancreatectomy. Risks and benefits explained and the patient will schedule an appointment at their earliest convenience. Will prescribe her 5mg Percocet tablets to take as needed at night for the pain. Written by michael Sniha, as dictated by Leslee Bruch Charmayne Bulla, MD.    Total face to face time with patient: 30 minutes. Greater than 50% of the time was spent in counseling. Signed By: Leslee Bruch Charmayne Bulla, MD     March 21, 2018

## 2018-04-20 NOTE — IP AVS SNAPSHOT
2700 [facility] [address] [place] [address] 
[phone] Patient: Cain Gonzalez MRN: [id number] NW:[id number] About your hospitalization You were admitted on:  April 20, 2018 You last received care in the:  [facility] [id number] You were discharged on:  April 30, 2018 Why you were hospitalized Your primary diagnosis was:  Pancreatic Cyst  
 Your diagnoses also included:  Pancreas Cyst, Obesity (Bmi 30-39. 9) Follow-up Information Follow up With Details Comments Contact Info Adrián Valera NP Go on 5/4/2018 New PCP appointment on Friday May 4 @ 11:00 a.m. If patient is unable to attend, please call the office. [address] [address] [phone] None   None (395) Patient stated that they have no PCP Maylin Luu MD In 1 week  200 [facility] [place] 506 [address] [place] [address] 
[phone] Your Scheduled Appointments Friday May 04, 2018 11:00 AM EDT New Patient with Adrián Valera NP 5900 [facility] ([facility]) N [address] [place] [address] [facility] [id number] [phone] Discharge Orders None A check odette indicates which time of day the medication should be taken. My Medications START taking these medications Instructions Each Dose to Equal  
 Morning Noon Evening Bedtime  
 gabapentin 300 mg capsule Commonly known as:  NEURONTIN Your last dose was: Your next dose is: Take 1 Cap by mouth three (3) times daily. Indications: Postoperative Acute Pain 300 mg  
    
   
   
   
  
 ondansetron 4 mg disintegrating tablet Commonly known as:  ZOFRAN ODT Your last dose was: Your next dose is: Take 1 Tab by mouth every eight (8) hours as needed for Nausea. Indications: PREVENTION OF POST-OPERATIVE NAUSEA AND VOMITING  
 4 mg oxyCODONE IR 10 mg Tab immediate release tablet Commonly known as:  Anthony Harrison Your last dose was: Your next dose is: Take 1 Tab by mouth every three (3) hours as needed. Max Daily Amount: 80 mg. May cut tablet into half and take half a tablet instead of a whole tablet. Indications: Pain 10 mg  
    
   
   
   
  
 senna-docusate 8.6-50 mg per tablet Commonly known as:  Hardik Hamilton Your last dose was: Your next dose is: Take 1 Tab by mouth daily as needed for Constipation. 1 Tab CHANGE how you take these medications Instructions Each Dose to Equal  
 Morning Noon Evening Bedtime * naloxone 2 mg/actuation Spry What changed:  Another medication with the same name was added. Make sure you understand how and when to take each. Your last dose was: Your next dose is:    
   
   
 Use 1 spray intranasally into 1 nostril. Use a new Narcan nasal spray for subsequent doses and administer into alternating nostrils. May repeat every 2 to 3 minutes as needed. * naloxone 4 mg/actuation nasal spray Commonly known as:  [employer] What changed: You were already taking a medication with the same name, and this prescription was added. Make sure you understand how and when to take each. Your last dose was: Your next dose is:    
   
   
 Use 1 spray intranasally into 1 nostril. Use a new Narcan nasal spray for subsequent doses and administer into alternating nostrils. May repeat every 2 to 3 minutes as needed. * naloxone 2 mg/0.4 mL auto-injector Commonly known as:  Patria Gupta What changed: You were already taking a medication with the same name, and this prescription was added. Make sure you understand how and when to take each. Your last dose was: Your next dose is: 0.4 mL by IntraMUSCular route once as needed for Overdose for up to 1 dose. 2 mg * Notice: This list has 3 medication(s) that are the same as other medications prescribed for you. Read the directions carefully, and ask your doctor or other care provider to review them with you. CONTINUE taking these medications Instructions Each Dose to Equal  
 Morning Noon Evening Bedtime  
 acetaminophen 500 mg tablet Commonly known as:  TYLENOL Your last dose was: Your next dose is: Take 500 mg by mouth every eight (8) hours as needed for Pain. 500 mg DULoxetine 60 mg capsule Commonly known as:  CYMBALTA Your last dose was: Your next dose is: Take 60 mg by mouth daily. 60 mg MOTRIN  mg tablet Generic drug:  ibuprofen Your last dose was: Your next dose is: Take 800 mg by mouth every eight (8) hours as needed. 800 mg  
    
   
   
   
  
  
STOP taking these medications   
 clonazePAM 0.5 mg tablet Commonly known as:  KlonoPIN  
   
  
 oxyCODONE-acetaminophen 5-325 mg per tablet Commonly known as:  PERCOCET  
   
  
 traZODone 50 mg tablet Commonly known as:  Radha Olivas Where to Get Your Medications These [facility] [address], 150 [facility],  [address] [place] [address] Phone:  [phone]  
  gabapentin 300 mg capsule Information on where to get these meds will be given to you by the nurse or doctor. ! Ask your nurse or doctor about these medications  
  naloxone 2 mg/0.4 mL auto-injector  
 naloxone 4 mg/actuation nasal spray  
 ondansetron 4 mg disintegrating tablet  
 oxyCODONE IR 10 mg Tab immediate release tablet  
 senna-docusate 8.6-50 mg per tablet Opioid Education Prescription Opioids: What You Need to Know: 
 
Prescription opioids can be used to help relieve moderate-to-severe pain and are often prescribed following a surgery or injury, or for certain health conditions. These medications can be an important part of treatment but also come with serious risks. Opioids are strong pain medicines. Examples include hydrocodone, oxycodone, fentanyl, and morphine. Heroin is an example of an illegal opioid. It is important to work with your health care provider to make sure you are getting the safest, most effective care. WHAT ARE THE RISKS AND SIDE EFFECTS OF OPIOID USE? Prescription opioids carry serious risks of addiction and overdose, especially with prolonged use. An opioid overdose, often marked by slow breathing, can cause sudden death. The use of prescription opioids can have a number of side effects as well, even when taken as directed. · Tolerance-meaning you might need to take more of a medication for the same pain relief · Physical dependence-meaning you have symptoms of withdrawal when the medication is stopped. Withdrawal symptoms can include nausea, sweating, chills, diarrhea, stomach cramps, and muscle aches. Withdrawal can last up to several weeks, depending on which drug you took and how long you took it. · Increased sensitivity to pain · Constipation · Nausea, vomiting, and dry mouth · Sleepiness and dizziness · Confusion · Depression · Low levels of testosterone that can result in lower sex drive, energy, and strength · Itching and sweating RISKS ARE GREATER WITH:      
· History of drug misuse, substance use disorder, or overdose · Mental health conditions (such as depression or anxiety) · Sleep apnea · Older age (72 years or older) · Pregnancy Avoid alcohol while taking prescription opioids. Also, unless specifically advised by your health care provider, medications to avoid include: · Benzodiazepines (such as Xanax or Valium) · Muscle relaxants (such as Soma or Flexeril) · Hypnotics (such as Ambien or Lunesta) · Other prescription opioids KNOW YOUR OPTIONS Talk to your health care provider about ways to manage your pain that don't involve prescription opioids. Some of these options may actually work better and have fewer risks and side effects. Options may include: 
· Pain relievers such as acetaminophen, ibuprofen, and naproxen · Some medications that are also used for depression or seizures · Physical therapy and exercise · Counseling to help patients learn how to cope better with triggers of pain and stress. · Application of heat or cold compress · Massage therapy · Relaxation techniques Be Informed Make sure you know the name of your medication, how much and how often to take it, and its potential risks & side effects. IF YOU ARE PRESCRIBED OPIOIDS FOR PAIN: 
· Never take opioids in greater amounts or more often than prescribed. Remember the goal is not to be pain-free but to manage your pain at a tolerable level. · Follow up with your primary care provider to: · Work together to create a plan on how to manage your pain. · Talk about ways to help manage your pain that don't involve prescription opioids. · Talk about any and all concerns and side effects. · Help prevent misuse and abuse. · Never sell or share prescription opioids · Help prevent misuse and abuse. · Store prescription opioids in a secure place and out of reach of others (this may include visitors, children, friends, and family). · Safely dispose of unused/unwanted prescription opioids: Find your community drug take-back program or your pharmacy mail-back program, or flush them down the toilet, following guidance from the Food and Drug Administration (www.fda.gov/Drugs/ResourcesForYou). · Visit www.cdc.gov/drugoverdose to learn about the risks of opioid abuse and overdose. · If you believe you may be struggling with addiction, tell your health care provider and ask for guidance or call [address] at [phone]. Discharge Instructions FOLLOW-UP with [address] [place] [address] Surgery at [facility], Dr. Raleigh Coburn : We are located at [facility] in the [facility], [address] [address] . Call office at [phone] to make to this appointment. Call your physician immediately [phone] if you have any fevers greater than 100.5F, drainage from your wound that is not clear or looks infected, persistent bleeding, increasing abdominal pain, problems urinating, or persistent nausea/vomiting. You should be aware that you may have shoulder pain after surgery and that this will progressively go away. This is called 'referred pain' and is from the area of the diaphragm caused by gas that may be trapped under the diaphragm from laparoscopic surgery. This gas will progressively get reabsorbed by your body. WOUND CARE INSTRUCTIONS:   You may shower at home. If clothing rubs against the wound or causes irritation and the wound is not draining you may cover it with a dry dressing during the daytime. Try to keep the wound dry and avoid ointments on the wound unless directed to do so. If the wound becomes bright red and painful or starts to drain infected material that is not clear, please contact your physician immediately. You should also call if you begin to drain fluid that is thin and greenish-brown from the wound or if output is whitish, pus-like in appearance. If the wound though is mildly pink and has a thick firm ridge underneath it, this is normal, and is referred to as a healing ridge. This will resolve over the next 4-6 weeks. DIET:  You may eat any foods that you can tolerate.   It is a good idea to eat a high fiber diet and take in plenty of fluids to prevent constipation. If you do become constipated you may want to take a mild laxative or softener (such as colace, senna, dulcolax, or miralax) on a daily basis until your bowel habits are regular. Constipation can be very uncomfortable, along with straining, after recent abdominal surgery. ACTIVITY:  You are encouraged to cough and deep breath or use your incentive spirometer if you were given one, every 15-30 minutes when awake. This will help prevent respiratory complications and low grade fevers post-operatively. You may want to hug a pillow when coughing and sneezing to add additional support to the surgical area(s) which will decrease pain during these times. You are encouraged to walk and engage in light activity for the next two weeks. You should not lift more than 15 pounds during this time frame as it could put you at increased risk for a post-operative hernia. Twenty pounds is roughly equivalent to a plastic bag of groceries. · You may shower 24 hours after surgery. Pat the cut (incision) dry. Do not take a bath for the first week. · Your doctor will tell you when you can have sex again. MEDICATIONS:  Try to take narcotic medications and anti-inflammatory medications, such as tylenol, ibuprofen, naprosyn, etc., with food. This will minimize stomach upset from the medication. Should you develop nausea and vomiting from the pain medication, or develop a rash, please discontinue the medication and contact your physician. You should not drive, make important decisions, or operate machinery when taking narcotic pain medication. · Your clonazepam and trazodone was stopped per recommendation of the inpatient pharmacist due to the risk of overdose while taking high doses of oxycodone and new prescription of gabapentin. · You also have a prescription of the naloxone auto-injector in case of over-sedation or over-dose. Your pharmacist should review instructions with you. · Keep your medication in the bottles provided by the pharmacist and keep a list of the medication names, dosages, and times to be taken in your wallet. · Do not take other medications without consulting your doctor. · Take ibuprofen (Motrin) or tylenol then combine with oxycodone as needed for severe pain. QUESTIONS:  Please feel free to call Dr. Davi Blanton office ([phone]) if you have any questions, and they will be glad to assist you. Information obtained by : 
 
I understand that if any problems occur once I am at home I am to contact my physician. I understand and acknowledge receipt of the instructions indicated above. Physician's or R.N.'s Signature                                                                  Date/Time Patient or Representative Signature                                                          Date/Time Introducing [facility]! Dear Adriaan Ocsaio: 
Thank you for requesting a [employer] account. Our records indicate that you already have an active [employer] account. You can access your account anytime at [email or web address]/[email or web address] Did you know that you can access your hospital and ER discharge instructions at any time in [employer]? You can also review all of your test results from your hospital stay or ER visit. Additional Information If you have questions, please visit the Frequently Asked Questions section of the [employer] website at [email or web address]/. Remember, [employer] is NOT to be used for urgent needs. For medical emergencies, dial 911. Now available from your iPhone and Android! Introducing Clifton Melgar As a [facility] patient, I wanted to make you aware of our electronic visit tool called [employer]if[employer] [place] [employer]. Bundle It 24/7 allows you to connect within minutes with a medical provider 24 hours a day, seven days a week via a mobile device or tablet or logging into a secure website from your computer. You can access [employer] from anywhere in the [place]. A virtual visit might be right for you when you have a simple condition and feel like you just dont want to get out of bed, or cant get away from work for an appointment, when your regular [facility] provider is not available (evenings, weekends or holidays), or when youre out of town and need minor care. Electronic visits cost only $49 and if the Bundle It 24/7 provider determines a prescription is needed to treat your condition, one can be electronically transmitted to a nearby pharmacy*. Please take a moment to enroll today if you have not already done so. The enrollment process is free and takes just a few minutes. To enroll, please download the Bundle It 24/7 maria to your tablet or phone, or visit [email or web address] to enroll on your computer. And, as an [address] [place] [address] patient with a [employer] account, the results of your visits will be scanned into your electronic medical record and your primary care provider will be able to view the scanned results. We urge you to continue to see your regular [facility] provider for your ongoing medical care. And while your primary care provider may not be the one available when you seek a Clifton Taylorfin virtual visit, the peace of mind you get from getting a real diagnosis real time can be priceless. For more information on Clifton [place] [employer], view our Frequently Asked Questions (FAQs) at [email or web address]. Sincerely, 
 
Beatriz Lainez MD 
Chief Medical Officer Himanshu Polk *:  certain medications cannot be prescribed via Clifton Melgar Providers Seen During Your Hospitalization Provider Specialty Primary office phone Ezequiel Crane MD General Surgery [phone] Your Primary Care Physician (PCP) Primary Care Physician Office Phone Office Fax NONE ** None ** ** None ** You are allergic to the following Allergen Reactions Amoxicillin Shortness of Breath Rash Pt has had keflex in the past  
  
Recent Documentation Height Weight Breastfeeding? BMI OB Status Smoking Status 1.6 m 77.2 kg No 30.15 kg/m2 Having regular periods Current Every Day Smoker Emergency Contacts Name Discharge Info Relation Home Work Mobile [facility] [employer] CAREGIVER [3] Spouse [3] [phone] Patient Belongings The following personal items are in your possession at time of discharge: 
  Dental Appliances: None  Visual Aid: None   Hearing Aids/Status: Does not own  Home Medications: None   Jewelry: None  Clothing: At bedside    Other Valuables: Cell Phone (and )  Personal Items Sent to Safe: none Discharge Instructions Attachments/References PANCREATIC CANCER SURGERY: POST-OP (ENGLISH) SURGICAL DRAIN CARE (ENGLISH) Patient Handouts Pancreatic Cancer Surgery: What to Expect at [facility] Your Recovery By the time you go home, most of your pain will probably be gone. If you have pain, you will have medicine you can take. You will probably feel very tired and weak. Even simple tasks may tire you. Take naps when you wish, but try to get some exercise. You may have trouble concentrating or difficulty sleeping. This usually goes away in 2 to 4 weeks. You will probably be able to return to work or your normal routine in about 1 month. It will probably take about 3 months for your strength to come back fully. You may need more treatment for the cancer, such as chemotherapy or radiation. Food may not taste good to you and may have a metallic taste. Your stomach may not empty as it should after eating. This may cause nausea, vomiting, and loss of appetite. These usually go away 2 to 6 weeks after surgery. Most people regain their normal appetite in about 8 weeks. You will probably lose some weight. This is normal. 
You may have a feeding tube (J-tube) coming out of your belly. If you have one, your doctor will decide when to take it out. You may have it for several months or longer. When you find out that you have cancer, you may feel many emotions and may need some help coping. Seek out family, friends, and counselors for support. You also can do things at home to make yourself feel better while you go through treatment. Call the Immediately (2-244.717.3118) or visit its website at [employer] for more information. This care sheet gives you a general idea about how long it will take for you to recover. But each person recovers at a different pace. Follow the steps below to get better as quickly as possible. How can you care for yourself at home? Activity ? · Rest when you feel tired. Getting enough sleep will help you recover. You will probably want to nap often. ? · Try to walk each day. Start by walking a little more than you did the day before. Bit by bit, increase the amount you walk. Walking boosts blood flow and helps prevent pneumonia and constipation. ? · For about 4 to 6 weeks after surgery, avoid lifting anything that would make you strain. This may include a child, heavy grocery bags and milk containers, a heavy briefcase or backpack, cat litter or dog food bags, or a vacuum . ? · Avoid strenuous activities, such as biking, jogging, weight lifting, or aerobic exercise, until your doctor says it is okay. ? · You may shower, if your doctor okays it. Pat the cut (incision) dry.  Follow your doctor's instructions about showering with your drain and how to empty and care for it. Keep your feeding tube taped to your skin so it will not fall off. After showering, clean the tube site, dry it well, and replace the dressing if you have one. ? · Ask your doctor when you can drive again. ? · You will probably be able to return to work about 4 weeks after you leave the hospital.  
? · Your doctor will tell you when you can have sex again. Diet ? · Sometimes the stomach empties food into the small intestine too quickly. This is called dumping syndrome. It can cause diarrhea and make you feel faint, shaky, and nauseated. It also can make it hard for your body to get enough nutrition. ¨ High-sugar foods-such as desserts, soda pop, and fruit juices-are most likely to cause dumping syndrome. Avoid high-sugar foods, or use products that have artificial sweeteners if sugar gives you a problem. ¨ Do not drink liquids within a half hour before eating and up to an hour after eating. Liquids move food even more quickly into the small intestine. Quick emptying of the stomach increases the chance of diarrhea. ¨ Eat slowly. Try to chew each bite about 20 times. Allow 20 to 30 minutes for each meal. 
¨ Eat 5 or 6 small meals or snacks a day. This may keep you from feeling too full after eating and may reduce problems with diarrhea and dumping syndrome. ? · If the surgeon did not remove any part of your stomach, you can eat your normal diet. But the surgery affects everyone's digestion differently. You may need to eat more smaller meals instead of fewer larger meals. You may have to try several foods to see what tastes good to you. ? · Eat healthy food. If you do not feel like eating, try to eat food that has protein and extra calories to keep up your strength and prevent weight loss. Drink liquid meal replacements for extra calories and protein. If your stomach is upset, try bland, low-fat foods like plain rice, broiled chicken, toast, and yogurt.  ·  
 ? · Whenever you eat, you may have to take enzyme pills to replace those the pancreas makes. These help you digest your food, especially fat. ? · You may notice that your bowel movements are not regular right after your surgery. This is common. Try to avoid constipation and straining with bowel movements. You may want to take a fiber supplement every day. If you have not had a bowel movement after a couple of days, ask your doctor about taking a mild laxative. Medicines ? · Your doctor will tell you if and when you can restart your medicines. He or she will also give you instructions about taking any new medicines. ? · If you take blood thinners, such as warfarin (Coumadin), clopidogrel (Plavix), or aspirin, be sure to talk to your doctor. He or she will tell you if and when to start taking those medicines again. Make sure that you understand exactly what your doctor wants you to do.  
? · You may have to take anti-ulcer medicine for stomach ulcers. ? · You may have diabetes. If this is the case, you may have to check your blood sugar and give yourself insulin shots every day. ? · You may need to take enzyme supplements to replace enzymes the pancreas makes. ? · Take pain medicines exactly as directed. ¨ If the doctor gave you a prescription medicine for pain, take it as prescribed. ¨ If you are not taking a prescription pain medicine, ask your doctor if you can take an over-the-counter medicine. ? · If you think your pain medicine is making you sick to your stomach: 
¨ Take your medicine after meals (unless your doctor has told you not to). ¨ Ask your doctor for a different pain medicine. ? · If your doctor prescribed antibiotics, take them as directed. Do not stop taking them just because you feel better. You need to take the full course of antibiotics. Incision care ? · You may feel a ridge along the incision, or cut. This is normal, and it will go away in a few weeks. ? · Wash the area daily with warm, soapy water and pat it dry, unless your doctor tells you not to. ? · If you have strips of tape on the cut, leave the tape on for a week or until it falls off.  
? · You may see a small amount of clear or light red fluid staining your dressing. This is normal.  
Exercise ? · Regular exercise will help you regain strength. Start with walking every day. Your doctor will tell you when you can do more. Other instructions ? · You will have a drain near your incision. Your doctor will tell you how to take care of it. ? · You may have a feeding tube in your belly. Your doctor will show you how to use it and take care of it. ¨ It is normal to have some yellowish fluid around your feeding tube. This is not a sign of infection. ¨ Keep your feeding tube clamped unless you are using it. ¨ Keep it taped to your skin at all times. ¨ Clean around the tube with water before and after you use it. ¨ Flush the tube daily as your doctor tells you to. Follow-up care is a key part of your treatment and safety. Be sure to make and go to all appointments, and call your doctor if you are having problems. It's also a good idea to know your test results and keep a list of the medicines you take. When should you call for help? Call 911 anytime you think you may need emergency care. For example, call if: 
? · You passed out (lost consciousness). ? · You are short of breath. ?Call your doctor now or seek immediate medical care if: 
? · You have pain that does not get better after you take pain medicine. ? · You have loose stitches, or your incision comes open. ? · Bright red blood has soaked through the bandage over your incision. ? · You cannot pass stools or gas. ? · You are sick to your stomach or cannot drink fluids. ? · You have signs of a blood clot in your leg (called a deep vein thrombosis), such as: 
¨ Pain in your calf, back of the knee, thigh, or groin. ¨ Redness or swelling in your leg. ? · You have signs of infection, such as: 
¨ Increased pain, swelling, warmth, or redness. ¨ Red streaks leading from the incision. ¨ Pus draining from the incision. ¨ A fever. ? Watch closely for changes in your health, and be sure to contact your doctor if you have any problems. Where can you learn more? Go to http://maria c-checo.info/. Enter P848 in the search box to learn more about \"Pancreatic Cancer Surgery: What to Expect at Home. \" Current as of: May 12, 2017 Content Version: 11.4 © 4480-8243 [employer]. Care instructions adapted under license by [employer] (which disclaims liability or warranty for this information). If you have questions about a medical condition or this instruction, always ask your healthcare professional. [address] any warranty or liability for your use of this information. Surgical Drain Care: Care Instructions What is a surgical drain? After a surgery, fluid may collect inside your body in the surgical area. This makes an infection or other problems more likely. A surgical drain allows the fluid to flow out. The doctor will put a thin rubber tube into the area of your body where the fluid is likely to collect. The rubber tube will carry the fluid outside your body. The most common type of surgical drain carries the fluid into a collection bulb that you empty. This is called a Junito-Garcia drain. The drain uses suction created by the bulb to pull the fluid from your body into the bulb. The rubber tube will probably be held in place by one or two stitches in your skin. Most people attach the bulb with a safety pin to clothing or near the bandage so that it doesn't flip around or pull on the stitches. When you first get the drain, the fluid will be bloody.  It will change color from red to pink to a light yellow or clear as the wound heals and the fluid starts to go away. Your doctor may give you specific information on when you no longer need the drain and when it will be removed. In general, you will need the drain until you are collecting less than about 2 tablespoons of fluid in 24 hours. Follow-up care is a key part of your treatment and safety. Be sure to make and go to all appointments, and call your doctor if you are having problems. It's also a good idea to know your test results and keep a list of the medicines you take. How can you care for yourself at home? Fluid collection Follow any instructions your doctor gives you. How often you empty the bulb depends on how much fluid is draining. Empty the bulb when it is half full. To empty the bulb: 
· Wash your hands with soap and water. · Take the plug out of the bulb. · Empty the bulb. If your doctor asks you to measure the fluid, empty the fluid into a measuring cup, and write down the color and how much you collected. Your doctor will want to know this information. How often you empty the bulb depends on how much fluid there is. Doctors often suggest emptying it when it's about half full. · Clean the plug with alcohol. · Squeeze the bulb until it is flat. This removes all the air from the bulb. You may need to put the bulb on a table or a counter to flatten it. · Keep the bulb flat and put the plug in. · The bulb should stay flat after you put the plug back in. This creates the suction that pulls the fluid into the bulb. · Empty the fluid into the toilet. · Wash your hands. Bandage care You may have a bandage. Your doctor will tell you how often to change it. · Wash your hands with soap and water. · Take off the bandage from around the drain. · Clean the drain site and the skin around it with soap and water. Use gauze or a cotton swab. · When the site is dry, put on a new bandage. Drain care Squeezing or \"milking\" the tube can help prevent clogs so that it drains correctly. Your doctor will tell you when you need to do this. In general, you do this when: 
· You see a clot in the tube that is preventing fluid from draining. The clot may look like a dark, stringy lining. · You see fluid leaking around the tube where it goes into the skin. · You think there is no suction in the drain. To milk the tube: · Use one hand to hold and pinch the tube where it leaves the skin. · With the other hand, pinch the tube with your thumb and first finger just below where you're holding it. · Slowly and firmly push your thumb and first finger down the tubing toward the bulb. · Do this as many times as you need to. The clot should move down the tube and into the bulb. When should you call for help? Call your doctor now or seek immediate medical care if: 
? · You have signs of infection, such as: 
¨ Increased pain, swelling, warmth, or redness around the area. ¨ Red streaks leading from the area. ¨ Pus draining from the area. ¨ A fever. ? · You see a sudden change in the color or smell of the drainage. ? · The tube is coming loose where it leaves your skin. ? Watch closely for changes in your health, and be sure to contact your doctor if: 
? · You see a lot of fluid around the drain. ? · You cannot remove a clot from the tube by milking the tube. Where can you learn more? Go to http://maria c-checo.info/. Enter K117 in the search box to learn more about \"Surgical Drain Care: Care Instructions. \" Current as of: March 20, 2017 Content Version: 11.4 © 2135-2791 [employer]. Care instructions adapted under license by [employer] (which disclaims liability or warranty for this information). If you have questions about a medical condition or this instruction, always ask your healthcare professional. [address] any warranty or liability for your use of this information. Please provide this summary of care documentation to your next provider. Signatures-by signing, you are acknowledging that this After Visit Summary has been reviewed with you and you have received a copy. Patient Signature:  ____________________________________________________________ Date:  ____________________________________________________________  
  
[facility] Provider Signature:  ____________________________________________________________ Date:  ____________________________________________________________

## 2018-04-21 LAB
BASOPHILS # BLD: 0 K/UL (ref 0–0.1)
BASOPHILS NFR BLD: 0 % (ref 0–1)
DIFFERENTIAL METHOD BLD: ABNORMAL
EOSINOPHIL # BLD: 0.1 K/UL (ref 0–0.4)
EOSINOPHIL NFR BLD: 1 % (ref 0–7)
ERYTHROCYTE [DISTWIDTH] IN BLOOD BY AUTOMATED COUNT: 12.7 % (ref 11.5–14.5)
GLUCOSE BLD STRIP.AUTO-MCNC: 146 MG/DL (ref 65–100)
GLUCOSE BLD STRIP.AUTO-MCNC: 160 MG/DL (ref 65–100)
GLUCOSE BLD STRIP.AUTO-MCNC: 183 MG/DL (ref 65–100)
GLUCOSE BLD STRIP.AUTO-MCNC: 212 MG/DL (ref 65–100)
HCT VFR BLD AUTO: 35.8 % (ref 35–47)
HGB BLD-MCNC: 11.3 G/DL (ref 11.5–16)
HGB BLD-MCNC: 12.1 G/DL (ref 11.5–16)
IMM GRANULOCYTES # BLD: 0.1 K/UL (ref 0–0.04)
IMM GRANULOCYTES NFR BLD AUTO: 1 % (ref 0–0.5)
LYMPHOCYTES # BLD: 2.8 K/UL (ref 0.8–3.5)
LYMPHOCYTES NFR BLD: 17 % (ref 12–49)
MCH RBC QN AUTO: 33.5 PG (ref 26–34)
MCHC RBC AUTO-ENTMCNC: 31.6 G/DL (ref 30–36.5)
MCV RBC AUTO: 106.2 FL (ref 80–99)
MONOCYTES # BLD: 0.6 K/UL (ref 0–1)
MONOCYTES NFR BLD: 4 % (ref 5–13)
NEUTS SEG # BLD: 13.1 K/UL (ref 1.8–8)
NEUTS SEG NFR BLD: 78 % (ref 32–75)
NRBC # BLD: 0 K/UL (ref 0–0.01)
NRBC BLD-RTO: 0 PER 100 WBC
PLATELET # BLD AUTO: 302 K/UL (ref 150–400)
PMV BLD AUTO: 10 FL (ref 8.9–12.9)
RBC # BLD AUTO: 3.37 M/UL (ref 3.8–5.2)
SERVICE CMNT-IMP: ABNORMAL
WBC # BLD AUTO: 16.8 K/UL (ref 3.6–11)

## 2018-04-21 PROCEDURE — 65660000000 HC RM CCU STEPDOWN

## 2018-04-21 PROCEDURE — 85025 COMPLETE CBC W/AUTO DIFF WBC: CPT | Performed by: SURGERY

## 2018-04-21 PROCEDURE — 74011636637 HC RX REV CODE- 636/637: Performed by: SURGERY

## 2018-04-21 PROCEDURE — 36415 COLL VENOUS BLD VENIPUNCTURE: CPT | Performed by: SURGERY

## 2018-04-21 PROCEDURE — 51798 US URINE CAPACITY MEASURE: CPT

## 2018-04-21 PROCEDURE — 82962 GLUCOSE BLOOD TEST: CPT

## 2018-04-21 PROCEDURE — 74011250637 HC RX REV CODE- 250/637: Performed by: SURGERY

## 2018-04-21 PROCEDURE — 74011250636 HC RX REV CODE- 250/636: Performed by: SURGERY

## 2018-04-21 PROCEDURE — 77030011943

## 2018-04-21 PROCEDURE — 74011250636 HC RX REV CODE- 250/636: Performed by: NURSE PRACTITIONER

## 2018-04-21 PROCEDURE — 74011250636 HC RX REV CODE- 250/636: Performed by: SPECIALIST

## 2018-04-21 PROCEDURE — 74011250637 HC RX REV CODE- 250/637: Performed by: NURSE PRACTITIONER

## 2018-04-21 RX ORDER — ACETAMINOPHEN 325 MG/1
650 TABLET ORAL
Status: DISCONTINUED | OUTPATIENT
Start: 2018-04-21 | End: 2018-04-27

## 2018-04-21 RX ORDER — IBUPROFEN 200 MG
1 TABLET ORAL DAILY
Status: DISCONTINUED | OUTPATIENT
Start: 2018-04-21 | End: 2018-04-25

## 2018-04-21 RX ORDER — SODIUM CHLORIDE, SODIUM LACTATE, POTASSIUM CHLORIDE, CALCIUM CHLORIDE 600; 310; 30; 20 MG/100ML; MG/100ML; MG/100ML; MG/100ML
100 INJECTION, SOLUTION INTRAVENOUS CONTINUOUS
Status: DISCONTINUED | OUTPATIENT
Start: 2018-04-21 | End: 2018-04-23

## 2018-04-21 RX ADMIN — HYDROMORPHONE HYDROCHLORIDE 1 MG: 2 INJECTION INTRAMUSCULAR; INTRAVENOUS; SUBCUTANEOUS at 08:08

## 2018-04-21 RX ADMIN — HUMAN INSULIN 2 UNITS: 100 INJECTION, SOLUTION SUBCUTANEOUS at 16:44

## 2018-04-21 RX ADMIN — HUMAN INSULIN 2 UNITS: 100 INJECTION, SOLUTION SUBCUTANEOUS at 13:16

## 2018-04-21 RX ADMIN — HYDROMORPHONE HYDROCHLORIDE 1 MG: 2 INJECTION INTRAMUSCULAR; INTRAVENOUS; SUBCUTANEOUS at 05:17

## 2018-04-21 RX ADMIN — Medication 10 ML: at 13:18

## 2018-04-21 RX ADMIN — DULOXETINE HYDROCHLORIDE 60 MG: 60 CAPSULE, DELAYED RELEASE ORAL at 09:02

## 2018-04-21 RX ADMIN — TRAZODONE HYDROCHLORIDE 50 MG: 50 TABLET ORAL at 22:10

## 2018-04-21 RX ADMIN — SODIUM CHLORIDE 500 ML: 900 INJECTION, SOLUTION INTRAVENOUS at 14:52

## 2018-04-21 RX ADMIN — SODIUM CHLORIDE, SODIUM LACTATE, POTASSIUM CHLORIDE, AND CALCIUM CHLORIDE 100 ML/HR: 600; 310; 30; 20 INJECTION, SOLUTION INTRAVENOUS at 10:04

## 2018-04-21 RX ADMIN — HUMAN INSULIN 3 UNITS: 100 INJECTION, SOLUTION SUBCUTANEOUS at 07:30

## 2018-04-21 RX ADMIN — ACETAMINOPHEN 650 MG: 325 TABLET, FILM COATED ORAL at 16:54

## 2018-04-21 RX ADMIN — HYDROMORPHONE HYDROCHLORIDE 1 MG: 2 INJECTION INTRAMUSCULAR; INTRAVENOUS; SUBCUTANEOUS at 02:51

## 2018-04-21 RX ADMIN — Medication: at 18:01

## 2018-04-21 RX ADMIN — DEXTROSE MONOHYDRATE, SODIUM CHLORIDE, AND POTASSIUM CHLORIDE 100 ML/HR: 50; 4.5; 1.49 INJECTION, SOLUTION INTRAVENOUS at 00:11

## 2018-04-21 RX ADMIN — Medication 10 ML: at 05:17

## 2018-04-21 RX ADMIN — SODIUM CHLORIDE 500 ML: 900 INJECTION, SOLUTION INTRAVENOUS at 16:06

## 2018-04-21 RX ADMIN — ACETAMINOPHEN 650 MG: 325 TABLET, FILM COATED ORAL at 11:29

## 2018-04-21 RX ADMIN — ONDANSETRON 4 MG: 2 INJECTION INTRAMUSCULAR; INTRAVENOUS at 20:51

## 2018-04-21 RX ADMIN — ACETAMINOPHEN 650 MG: 325 TABLET, FILM COATED ORAL at 22:10

## 2018-04-21 NOTE — PROGRESS NOTES
Progress Note    Patient: Arnav Woodson MRN: [id number]  SSN: [id number]    YOB: 1980  Age: 42 y.o. Sex: female      Admit Date: 2018    1 Day Post-Op    Procedure:  Procedure(s):  LAPAROSCOPIC CONVERTED TO OPEN DISTAL PANCREATECTOMY     Subjective:     Patient has complaints of pain. Wants to go outside and smoke. On PCA and very drowsy; per spouse \"sometimes coherant and sometimes out of it\"; he has been hitting the PCA for her. Feels better sitting up in the bed.  + belching, - flatus, voiding spontaneously; BETSY is bloody and < 50 ml. Tolerating clears without nausea / vomiting; HR in the one-teens to one-twenties   BS > 200   Objective:     Visit Vitals    BP 99/52 (BP 1 Location: Right arm, BP Patient Position: At rest)    Pulse (!) 123    Temp 98.7 °F (37.1 °C)    Resp 18    Ht 5' 3\" (1.6 m)    Wt 171 lb 8.3 oz (77.8 kg)    SpO2 90%    BMI 30.38 kg/m2       Temp (24hrs), Av.5 °F (36.9 °C), Min:97.7 °F (36.5 °C), Max:99.5 °F (37.5 °C)      Physical Exam:    A + O x 3, appears sedated and answers appropriately, but will then start talking about something off topic   Seems bit restless and moving about in the bed easily   Chest  Clear with diminished bases   COR  Regular and tachycardic   ABD Soft, BETSY with scant bloody drainage; midline incision is dry and intact; tender as appropriate; few BS  EXT No edema; ambulating with assist due to sedation       Data Review: reviewed  Nursing documentation and I & O    Lab Review: All lab results for the last 24 hours reviewed.     Assessment:     Hospital Problems  Date Reviewed: 2018          Codes Class Noted POA    Pancreas cyst ICD-10-CM: K86.2  ICD-9-CM: 577.2  2018 Unknown        * (Principal)Pancreatic cyst ICD-10-CM: K86.2  ICD-9-CM: 577.2  3/21/2018 Yes              Plan/Recommendations/Medical Decision Making:     POD 1 lap converted to open distal pancreatectomy    Will adjust PCA as she is overmedicated   DC prn dose Dilaudid   Add Tylenol 650 mg q4 hrs prn pain   DC current IVF and change to LR due to hyperglycemia   Monitor for signs of active bleeding and CBC in the am   Pulmonary toilet   Advised may not go off unit to smoke and will add nicotine patch   Diet clears   GI and DVT prophylaxis   Monitor BS sliding scale insulin   Dr. Danielle Jaramillo covering and will follow this weekend       Signed By: Hoa King NP     April 21, 2018

## 2018-04-21 NOTE — ROUTINE PROCESS
0820:  Pt having pain. HR 120s-130s. Pain medication makes her drowsy and incoherent but does not take her pain away. Pt reports she does no mind that. I told her I do because she could stop breathing. I inform her and her spouse about having provider look into her pain regiment and help with keeping her comfortable but not so drowsy. I  Informed her Toradol may be a good addition but patient voiced she's been on pain medication before and Toradol did not help her in the past.  Pt voiced she wanted to go outside to smoke. I informed her and her  that is not possible at any time. About 0910: Update Kajal Calderon NP of above situation. She voiced she will see the patient and definitely patient cannot go outside. 1676:  Update patient and her  about no more  Prn narcotic pain medication. I told Acetaminophen was added. Holly Albert voiced \"isn't that Tylenol? It does not work. \"  I told her she's too sedated and we do not want her to stop breathing. Holly Albert voiced she likes being sedated. 1350:  Pt has not voiced since Martínez was removed. Lori Peer assisted pt to commode to facilitate urination but it was unsuccessful. Will bladder scan patient. 1411:  Bladder scan reveal 70 ml of urine. Provider call about no urination post Martínez removal and bladder scan reveal 70 ml of urine in bladder. 1532:  500 ml bolus NS alsomt completed. Bladder scan performed with 179 ml of urine in bladder. 1658:  Returned to bed after failed attempt to urinate. Will straight cath patient (Dr. Lorenzo Leos aware). About 1723, pt straight cath and obtain 230 ml of faby urine. Pt wanted to sit on commode after cath but was unsuccessful.

## 2018-04-21 NOTE — PROGRESS NOTES
04/20/18 2348   Vitals   Temp 97.9 °F (36.6 °C)   Temp Source Oral   Pulse (Heart Rate) (!) 115   Heart Rate Source Monitor   Resp Rate 18   O2 Sat (%) 97 %   Level of Consciousness Alert   /68   MAP (Calculated) 79   BP 1 Location Right arm   BP 1 Method Automatic   BP Patient Position At rest   MEWS Score 3   pt was up and moving around when pct recorded HR.

## 2018-04-21 NOTE — PROGRESS NOTES
Bedside shift change report given to Ambrosio (oncoming nurse) by Angel Santoyo [id number] (offgoing nurse). Report included the following information SBAR, Intake/Output, MAR, Recent Results and Cardiac Rhythm Sinus Tach.

## 2018-04-21 NOTE — PROGRESS NOTES
MEWS of 4: Tachycardia drop to low 120s from upper 120s to 130s. Pt with pain issue being addressed by provider. Will continue to watch patient.

## 2018-04-21 NOTE — PROGRESS NOTES
Problem: Pressure Injury - Risk of  Goal: *Prevention of pressure ulcer  Outcome: Progressing Towards Goal  Pt afebrile with no erythema to surgical sites. Problem: Falls - Risk of  Goal: *Absence of Falls  Document Kirstie Fall Risk and appropriate interventions in the flowsheet.    Outcome: Progressing Towards Goal  Fall Risk Interventions:            Medication Interventions: Patient to call before getting OOB

## 2018-04-22 ENCOUNTER — APPOINTMENT (OUTPATIENT)
Dept: GENERAL RADIOLOGY | Age: 38
DRG: 407 | End: 2018-04-22
Attending: SURGERY
Payer: COMMERCIAL

## 2018-04-22 LAB
ANION GAP SERPL CALC-SCNC: 7 MMOL/L (ref 5–15)
ARTERIAL PATENCY WRIST A: YES
BASE EXCESS BLD CALC-SCNC: 1 MMOL/L
BASOPHILS # BLD: 0 K/UL (ref 0–0.1)
BASOPHILS NFR BLD: 0 % (ref 0–1)
BDY SITE: ABNORMAL
BUN SERPL-MCNC: 8 MG/DL (ref 6–20)
BUN/CREAT SERPL: 12 (ref 12–20)
CALCIUM SERPL-MCNC: 7.6 MG/DL (ref 8.5–10.1)
CHLORIDE SERPL-SCNC: 103 MMOL/L (ref 97–108)
CO2 SERPL-SCNC: 27 MMOL/L (ref 21–32)
CREAT SERPL-MCNC: 0.66 MG/DL (ref 0.55–1.02)
DIFFERENTIAL METHOD BLD: ABNORMAL
EOSINOPHIL # BLD: 0.3 K/UL (ref 0–0.4)
EOSINOPHIL NFR BLD: 2 % (ref 0–7)
ERYTHROCYTE [DISTWIDTH] IN BLOOD BY AUTOMATED COUNT: 12.9 % (ref 11.5–14.5)
GAS FLOW.O2 O2 DELIVERY SYS: ABNORMAL L/MIN
GAS FLOW.O2 SETTING OXYMISER: 5 L/M
GLUCOSE BLD STRIP.AUTO-MCNC: 112 MG/DL (ref 65–100)
GLUCOSE BLD STRIP.AUTO-MCNC: 161 MG/DL (ref 65–100)
GLUCOSE BLD STRIP.AUTO-MCNC: 180 MG/DL (ref 65–100)
GLUCOSE BLD STRIP.AUTO-MCNC: 185 MG/DL (ref 65–100)
GLUCOSE SERPL-MCNC: 164 MG/DL (ref 65–100)
HCO3 BLD-SCNC: 26.5 MMOL/L (ref 22–26)
HCT VFR BLD AUTO: 28 % (ref 35–47)
HGB BLD-MCNC: 8.7 G/DL (ref 11.5–16)
IMM GRANULOCYTES # BLD: 0.1 K/UL (ref 0–0.04)
IMM GRANULOCYTES NFR BLD AUTO: 1 % (ref 0–0.5)
LYMPHOCYTES # BLD: 2.2 K/UL (ref 0.8–3.5)
LYMPHOCYTES NFR BLD: 13 % (ref 12–49)
MCH RBC QN AUTO: 33.3 PG (ref 26–34)
MCHC RBC AUTO-ENTMCNC: 31.1 G/DL (ref 30–36.5)
MCV RBC AUTO: 107.3 FL (ref 80–99)
MONOCYTES # BLD: 0.9 K/UL (ref 0–1)
MONOCYTES NFR BLD: 5 % (ref 5–13)
NEUTS SEG # BLD: 13.6 K/UL (ref 1.8–8)
NEUTS SEG NFR BLD: 79 % (ref 32–75)
NRBC # BLD: 0 K/UL (ref 0–0.01)
NRBC BLD-RTO: 0 PER 100 WBC
O2/TOTAL GAS SETTING VFR VENT: 40 %
PCO2 BLD: 45.2 MMHG (ref 35–45)
PH BLD: 7.38 [PH] (ref 7.35–7.45)
PLATELET # BLD AUTO: 245 K/UL (ref 150–400)
PMV BLD AUTO: 10.2 FL (ref 8.9–12.9)
PO2 BLD: 52 MMHG (ref 80–100)
POTASSIUM SERPL-SCNC: 3.8 MMOL/L (ref 3.5–5.1)
RBC # BLD AUTO: 2.61 M/UL (ref 3.8–5.2)
SAO2 % BLD: 85 % (ref 92–97)
SERVICE CMNT-IMP: ABNORMAL
SODIUM SERPL-SCNC: 137 MMOL/L (ref 136–145)
SPECIMEN TYPE: ABNORMAL
TOTAL RESP. RATE, ITRR: 16
WBC # BLD AUTO: 17.1 K/UL (ref 3.6–11)

## 2018-04-22 PROCEDURE — 74011250637 HC RX REV CODE- 250/637: Performed by: SURGERY

## 2018-04-22 PROCEDURE — 36600 WITHDRAWAL OF ARTERIAL BLOOD: CPT

## 2018-04-22 PROCEDURE — 74011636637 HC RX REV CODE- 636/637: Performed by: SURGERY

## 2018-04-22 PROCEDURE — 80048 BASIC METABOLIC PNL TOTAL CA: CPT | Performed by: NURSE PRACTITIONER

## 2018-04-22 PROCEDURE — 85025 COMPLETE CBC W/AUTO DIFF WBC: CPT | Performed by: NURSE PRACTITIONER

## 2018-04-22 PROCEDURE — 36415 COLL VENOUS BLD VENIPUNCTURE: CPT | Performed by: NURSE PRACTITIONER

## 2018-04-22 PROCEDURE — 74011250636 HC RX REV CODE- 250/636: Performed by: SURGERY

## 2018-04-22 PROCEDURE — 82962 GLUCOSE BLOOD TEST: CPT

## 2018-04-22 PROCEDURE — 74011250637 HC RX REV CODE- 250/637: Performed by: NURSE PRACTITIONER

## 2018-04-22 PROCEDURE — 82803 BLOOD GASES ANY COMBINATION: CPT

## 2018-04-22 PROCEDURE — 65660000000 HC RM CCU STEPDOWN

## 2018-04-22 PROCEDURE — 71045 X-RAY EXAM CHEST 1 VIEW: CPT

## 2018-04-22 RX ORDER — KETOROLAC TROMETHAMINE 30 MG/ML
15 INJECTION, SOLUTION INTRAMUSCULAR; INTRAVENOUS EVERY 6 HOURS
Status: COMPLETED | OUTPATIENT
Start: 2018-04-22 | End: 2018-04-23

## 2018-04-22 RX ORDER — HYDROMORPHONE HYDROCHLORIDE 4 MG/1
4 TABLET ORAL
Status: DISCONTINUED | OUTPATIENT
Start: 2018-04-22 | End: 2018-04-25

## 2018-04-22 RX ORDER — HYDROMORPHONE HYDROCHLORIDE 2 MG/1
2 TABLET ORAL
Status: DISCONTINUED | OUTPATIENT
Start: 2018-04-22 | End: 2018-04-22

## 2018-04-22 RX ORDER — HYDROMORPHONE HYDROCHLORIDE 2 MG/ML
1 INJECTION, SOLUTION INTRAMUSCULAR; INTRAVENOUS; SUBCUTANEOUS
Status: DISCONTINUED | OUTPATIENT
Start: 2018-04-22 | End: 2018-04-24

## 2018-04-22 RX ORDER — DOCUSATE SODIUM 100 MG/1
100 CAPSULE, LIQUID FILLED ORAL 2 TIMES DAILY
Status: DISCONTINUED | OUTPATIENT
Start: 2018-04-22 | End: 2018-04-30

## 2018-04-22 RX ADMIN — ACETAMINOPHEN 650 MG: 325 TABLET, FILM COATED ORAL at 12:19

## 2018-04-22 RX ADMIN — HUMAN INSULIN 2 UNITS: 100 INJECTION, SOLUTION SUBCUTANEOUS at 12:00

## 2018-04-22 RX ADMIN — ONDANSETRON 4 MG: 2 INJECTION INTRAMUSCULAR; INTRAVENOUS at 21:25

## 2018-04-22 RX ADMIN — DOCUSATE SODIUM 100 MG: 100 CAPSULE, LIQUID FILLED ORAL at 20:00

## 2018-04-22 RX ADMIN — DOCUSATE SODIUM 100 MG: 100 CAPSULE, LIQUID FILLED ORAL at 11:36

## 2018-04-22 RX ADMIN — KETOROLAC TROMETHAMINE 15 MG: 30 INJECTION, SOLUTION INTRAMUSCULAR at 11:35

## 2018-04-22 RX ADMIN — KETOROLAC TROMETHAMINE 15 MG: 30 INJECTION, SOLUTION INTRAMUSCULAR at 23:51

## 2018-04-22 RX ADMIN — KETOROLAC TROMETHAMINE 15 MG: 30 INJECTION, SOLUTION INTRAMUSCULAR at 20:00

## 2018-04-22 RX ADMIN — DULOXETINE HYDROCHLORIDE 60 MG: 60 CAPSULE, DELAYED RELEASE ORAL at 09:09

## 2018-04-22 RX ADMIN — Medication: at 08:17

## 2018-04-22 RX ADMIN — TRAZODONE HYDROCHLORIDE 50 MG: 50 TABLET ORAL at 21:25

## 2018-04-22 RX ADMIN — Medication 10 ML: at 21:25

## 2018-04-22 RX ADMIN — ACETAMINOPHEN 650 MG: 325 TABLET, FILM COATED ORAL at 21:25

## 2018-04-22 RX ADMIN — HYDROMORPHONE HYDROCHLORIDE 4 MG: 4 TABLET ORAL at 16:38

## 2018-04-22 RX ADMIN — Medication 5 ML: at 14:00

## 2018-04-22 RX ADMIN — HUMAN INSULIN 2 UNITS: 100 INJECTION, SOLUTION SUBCUTANEOUS at 07:04

## 2018-04-22 NOTE — PROGRESS NOTES
Bedside shift change report given to Ambrosio (oncoming nurse) by Astrid Abreu (offgoing nurse). Report included the following information SBAR, Intake/Output, MAR, Recent Results and Cardiac Rhythm Sinus Tach.

## 2018-04-22 NOTE — PROGRESS NOTES
Progress Note    Patient: Mina Acosta MRN: [id number]  SSN: [id number]    YOB: 1980  Age: 42 y.o. Sex: female      Admit Date: 2018    2 Days Post-Op    Procedure:  Procedure(s):  LAPAROSCOPIC CONVERTED TO OPEN DISTAL PANCREATECTOMY     Subjective:     No acute surgical issues. Pt reported PCA does not control pain well because she falls asleep and waits up in pain. Pt denied any nausea or vomiting. Passing a little flatus but no BM yet. WBC a bit elevated - will monitor for now    Objective:     Visit Vitals    BP 98/61 (BP 1 Location: Right arm, BP Patient Position: At rest)    Pulse (!) 107    Temp 98.3 °F (36.8 °C)    Resp 17    Ht 5' 3\" (1.6 m)    Wt 177 lb 14.6 oz (80.7 kg)    SpO2 90%    BMI 31.52 kg/m2       Temp (24hrs), Av.6 °F (37 °C), Min:98.3 °F (36.8 °C), Max:99.9 °F (37.7 °C)        Physical Exam:    Gen:  NAD  Pulm:  Unlabored  Abd:  S/moderately distended/appropriate TTP  Wound:  C/D/I    Recent Results (from the past 24 hour(s))   GLUCOSE, POC    Collection Time: 18 12:04 PM   Result Value Ref Range    Glucose (POC) 183 (H) 65 - 100 mg/dL    Performed by Harry Hart, POC    Collection Time: 18  4:30 PM   Result Value Ref Range    Glucose (POC) 160 (H) 65 - 100 mg/dL    Performed by MELO ABREU(CON)    GLUCOSE, POC    Collection Time: 18 10:09 PM   Result Value Ref Range    Glucose (POC) 146 (H) 65 - 100 mg/dL    Performed by Pradip Esteban    POC G3 - PUL    Collection Time: 18  1:41 AM   Result Value Ref Range    FIO2 (POC) 40 %    pH (POC) 7.376 7.35 - 7.45      pCO2 (POC) 45.2 (H) 35.0 - 45.0 MMHG    pO2 (POC) 52 (L) 80 - 100 MMHG    HCO3 (POC) 26.5 (H) 22 - 26 MMOL/L    sO2 (POC) 85 (L) 92 - 97 %    Base excess (POC) 1 mmol/L    Site LEFT RADIAL      Device: NASAL CANNULA      Flow rate (POC) 5.0 L/M    Allens test (POC) YES      Specimen type (POC) ARTERIAL      Total resp.  rate 16     CBC WITH AUTOMATED DIFF Collection Time: 04/22/18  3:55 AM   Result Value Ref Range    WBC 17.1 (H) 3.6 - 11.0 K/uL    RBC 2.61 (L) 3.80 - 5.20 M/uL    HGB 8.7 (L) 11.5 - 16.0 g/dL    HCT 28.0 (L) 35.0 - 47.0 %    .3 (H) 80.0 - 99.0 FL    MCH 33.3 26.0 - 34.0 PG    MCHC 31.1 30.0 - 36.5 g/dL    RDW 12.9 11.5 - 14.5 %    PLATELET 990 635 - 895 K/uL    MPV 10.2 8.9 - 12.9 FL    NRBC 0.0 0  WBC    ABSOLUTE NRBC 0.00 0.00 - 0.01 K/uL    NEUTROPHILS 79 (H) 32 - 75 %    LYMPHOCYTES 13 12 - 49 %    MONOCYTES 5 5 - 13 %    EOSINOPHILS 2 0 - 7 %    BASOPHILS 0 0 - 1 %    IMMATURE GRANULOCYTES 1 (H) 0.0 - 0.5 %    ABS. NEUTROPHILS 13.6 (H) 1.8 - 8.0 K/UL    ABS. LYMPHOCYTES 2.2 0.8 - 3.5 K/UL    ABS. MONOCYTES 0.9 0.0 - 1.0 K/UL    ABS. EOSINOPHILS 0.3 0.0 - 0.4 K/UL    ABS. BASOPHILS 0.0 0.0 - 0.1 K/UL    ABS. IMM. GRANS. 0.1 (H) 0.00 - 0.04 K/UL    DF AUTOMATED     METABOLIC PANEL, BASIC    Collection Time: 04/22/18  3:55 AM   Result Value Ref Range    Sodium 137 136 - 145 mmol/L    Potassium 3.8 3.5 - 5.1 mmol/L    Chloride 103 97 - 108 mmol/L    CO2 27 21 - 32 mmol/L    Anion gap 7 5 - 15 mmol/L    Glucose 164 (H) 65 - 100 mg/dL    BUN 8 6 - 20 MG/DL    Creatinine 0.66 0.55 - 1.02 MG/DL    BUN/Creatinine ratio 12 12 - 20      GFR est AA >60 >60 ml/min/1.73m2    GFR est non-AA >60 >60 ml/min/1.73m2    Calcium 7.6 (L) 8.5 - 10.1 MG/DL   GLUCOSE, POC    Collection Time: 04/22/18  6:25 AM   Result Value Ref Range    Glucose (POC) 161 (H) 65 - 100 mg/dL    Performed by Lili Preston          Assessment:     Hospital Problems  Date Reviewed: 4/20/2018          Codes Class Noted POA    Pancreas cyst ICD-10-CM: K86.2  ICD-9-CM: 577.2  4/20/2018 Unknown        * (Principal)Pancreatic cyst ICD-10-CM: K86.2  ICD-9-CM: 577.2  3/21/2018 Yes              Plan/Recommendations/Medical Decision Making:     - Continue clear liquids  - Bowel regiment  - DC PCA and switch to oral dilaudid with IV for breakthrough pain  - Out of bed.   Encourage ambulation  - Monitor leukocytosis - No indication for antibiotic at this time  - Labs in am    Signed By: Venecia Hewitt MD     April 22, 2018

## 2018-04-22 NOTE — PROGRESS NOTES
Problem: Pressure Injury - Risk of  Goal: *Prevention of pressure ulcer  Outcome: Progressing Towards Goal  Skin on pressure points cdi with no apparent breakdown at this time. Problem: Falls - Risk of  Goal: *Absence of Falls  Document Kirstie Fall Risk and appropriate interventions in the flowsheet.    Outcome: Progressing Towards Goal  Fall Risk Interventions:            Medication Interventions: Teach patient to arise slowly

## 2018-04-22 NOTE — ROUTINE PROCESS
1124:  FT at 35% with saturation about 88-89%; increase to 40% and saturation increases to 92-93%. Pt declined NC but pt needs frequent reminder to keep FT on face. She keeps taking it off even with family at bedside. Patient reports pain 6/10; face relax, pt spoke calmly, able to laugh at my joke. She wants strong narcotics. \"Toradol does not work. I have taken it before. \"    21 : One hour reassessment and provided patient with Tylenol. I informed pt and family that I need to go low and go slow with narcotic medication administration as I am concern about pt's respiratory status. Pt and family nodded understanding. 1552:  Pt  Had been sleeping since after lunch. I had to wake her up for assessment and as soon as patient she voiced \" I hurt\" groggily.

## 2018-04-22 NOTE — PROGRESS NOTES
04/22/18 1124   Vitals   Temp 99.9 °F (37.7 °C)   Temp Source Oral   Pulse (Heart Rate) (!) 117   Heart Rate Source Monitor   Resp Rate 19   O2 Sat (%) 92 %   Level of Consciousness Alert   /68   MAP (Calculated) 83   BP 1 Location Right arm   BP 1 Method Automatic   BP Patient Position Head of bed elevated (Comment degrees)   Cardiac Rhythm Sinus Tach   MEWS Score 3   Box Number 442   Electrodes Replaced No   Pain 1   Pain Scale 1 Numeric (0 - 10)   Pain Intensity 1 6   Patient Stated Pain Goal 3   POSS Scale   Opioid Sedation Scale 0   Oxygen Therapy   O2 Flow Rate (L/min) 10 l/min   FIO2 (%) 40 %   Pt tachycardic but slowly improving. Provider aware. Adjusting pain medication today. Will continue to monitor patient.

## 2018-04-22 NOTE — ROUTINE PROCESS
Bedside and Verbal shift change report given to [id number] (oncoming nurse) by Cole Ward (offgoing nurse). Report included the following information SBAR, Kardex, Intake/Output, MAR, Recent Results and Cardiac Rhythm -130s with activites.

## 2018-04-23 ENCOUNTER — APPOINTMENT (OUTPATIENT)
Dept: GENERAL RADIOLOGY | Age: 38
DRG: 407 | End: 2018-04-23
Attending: NURSE PRACTITIONER
Payer: COMMERCIAL

## 2018-04-23 LAB
ALBUMIN SERPL-MCNC: 2.5 G/DL (ref 3.5–5)
ALBUMIN/GLOB SERPL: 0.7 {RATIO} (ref 1.1–2.2)
ALP SERPL-CCNC: 136 U/L (ref 45–117)
ALT SERPL-CCNC: 26 U/L (ref 12–78)
ANION GAP SERPL CALC-SCNC: 9 MMOL/L (ref 5–15)
AST SERPL-CCNC: 45 U/L (ref 15–37)
BILIRUB SERPL-MCNC: 0.4 MG/DL (ref 0.2–1)
BUN SERPL-MCNC: 5 MG/DL (ref 6–20)
BUN/CREAT SERPL: 9 (ref 12–20)
CALCIUM SERPL-MCNC: 8 MG/DL (ref 8.5–10.1)
CHLORIDE SERPL-SCNC: 101 MMOL/L (ref 97–108)
CO2 SERPL-SCNC: 28 MMOL/L (ref 21–32)
CREAT SERPL-MCNC: 0.53 MG/DL (ref 0.55–1.02)
ERYTHROCYTE [DISTWIDTH] IN BLOOD BY AUTOMATED COUNT: 12.8 % (ref 11.5–14.5)
GLOBULIN SER CALC-MCNC: 3.6 G/DL (ref 2–4)
GLUCOSE BLD STRIP.AUTO-MCNC: 120 MG/DL (ref 65–100)
GLUCOSE BLD STRIP.AUTO-MCNC: 144 MG/DL (ref 65–100)
GLUCOSE BLD STRIP.AUTO-MCNC: 158 MG/DL (ref 65–100)
GLUCOSE BLD STRIP.AUTO-MCNC: 163 MG/DL (ref 65–100)
GLUCOSE SERPL-MCNC: 129 MG/DL (ref 65–100)
HCT VFR BLD AUTO: 28.3 % (ref 35–47)
HGB BLD-MCNC: 8.9 G/DL (ref 11.5–16)
LIPASE SERPL-CCNC: 159 U/L (ref 73–393)
MCH RBC QN AUTO: 32.6 PG (ref 26–34)
MCHC RBC AUTO-ENTMCNC: 31.4 G/DL (ref 30–36.5)
MCV RBC AUTO: 103.7 FL (ref 80–99)
NRBC # BLD: 0 K/UL (ref 0–0.01)
NRBC BLD-RTO: 0 PER 100 WBC
PLATELET # BLD AUTO: 277 K/UL (ref 150–400)
PMV BLD AUTO: 10.5 FL (ref 8.9–12.9)
POTASSIUM SERPL-SCNC: 3.2 MMOL/L (ref 3.5–5.1)
PROT SERPL-MCNC: 6.1 G/DL (ref 6.4–8.2)
RBC # BLD AUTO: 2.73 M/UL (ref 3.8–5.2)
SERVICE CMNT-IMP: ABNORMAL
SODIUM SERPL-SCNC: 138 MMOL/L (ref 136–145)
WBC # BLD AUTO: 13.7 K/UL (ref 3.6–11)

## 2018-04-23 PROCEDURE — 74011250637 HC RX REV CODE- 250/637: Performed by: SURGERY

## 2018-04-23 PROCEDURE — 74011250637 HC RX REV CODE- 250/637: Performed by: NURSE PRACTITIONER

## 2018-04-23 PROCEDURE — 36415 COLL VENOUS BLD VENIPUNCTURE: CPT | Performed by: SURGERY

## 2018-04-23 PROCEDURE — 83690 ASSAY OF LIPASE: CPT | Performed by: SURGERY

## 2018-04-23 PROCEDURE — 74011250636 HC RX REV CODE- 250/636: Performed by: NURSE PRACTITIONER

## 2018-04-23 PROCEDURE — 85027 COMPLETE CBC AUTOMATED: CPT | Performed by: SURGERY

## 2018-04-23 PROCEDURE — 65660000000 HC RM CCU STEPDOWN

## 2018-04-23 PROCEDURE — 74011636637 HC RX REV CODE- 636/637: Performed by: SURGERY

## 2018-04-23 PROCEDURE — 80053 COMPREHEN METABOLIC PANEL: CPT | Performed by: SURGERY

## 2018-04-23 PROCEDURE — 82962 GLUCOSE BLOOD TEST: CPT

## 2018-04-23 PROCEDURE — 74011250636 HC RX REV CODE- 250/636: Performed by: SURGERY

## 2018-04-23 PROCEDURE — 71046 X-RAY EXAM CHEST 2 VIEWS: CPT

## 2018-04-23 RX ORDER — PANTOPRAZOLE SODIUM 40 MG/1
40 TABLET, DELAYED RELEASE ORAL
Status: DISCONTINUED | OUTPATIENT
Start: 2018-04-24 | End: 2018-04-30 | Stop reason: HOSPADM

## 2018-04-23 RX ORDER — POTASSIUM CHLORIDE 750 MG/1
20 TABLET, FILM COATED, EXTENDED RELEASE ORAL
Status: COMPLETED | OUTPATIENT
Start: 2018-04-23 | End: 2018-04-23

## 2018-04-23 RX ORDER — SODIUM CHLORIDE AND POTASSIUM CHLORIDE .9; .15 G/100ML; G/100ML
SOLUTION INTRAVENOUS CONTINUOUS
Status: DISCONTINUED | OUTPATIENT
Start: 2018-04-23 | End: 2018-04-27

## 2018-04-23 RX ORDER — ENOXAPARIN SODIUM 100 MG/ML
40 INJECTION SUBCUTANEOUS EVERY 24 HOURS
Status: DISCONTINUED | OUTPATIENT
Start: 2018-04-23 | End: 2018-04-30 | Stop reason: HOSPADM

## 2018-04-23 RX ADMIN — DOCUSATE SODIUM 100 MG: 100 CAPSULE, LIQUID FILLED ORAL at 08:47

## 2018-04-23 RX ADMIN — POTASSIUM CHLORIDE AND SODIUM CHLORIDE: 900; 150 INJECTION, SOLUTION INTRAVENOUS at 10:10

## 2018-04-23 RX ADMIN — HUMAN INSULIN 2 UNITS: 100 INJECTION, SOLUTION SUBCUTANEOUS at 12:02

## 2018-04-23 RX ADMIN — HUMAN INSULIN 2 UNITS: 100 INJECTION, SOLUTION SUBCUTANEOUS at 16:30

## 2018-04-23 RX ADMIN — TRAZODONE HYDROCHLORIDE 50 MG: 50 TABLET ORAL at 22:21

## 2018-04-23 RX ADMIN — POTASSIUM CHLORIDE AND SODIUM CHLORIDE: 900; 150 INJECTION, SOLUTION INTRAVENOUS at 20:15

## 2018-04-23 RX ADMIN — KETOROLAC TROMETHAMINE 15 MG: 30 INJECTION, SOLUTION INTRAMUSCULAR at 06:31

## 2018-04-23 RX ADMIN — POTASSIUM CHLORIDE 20 MEQ: 750 TABLET, FILM COATED, EXTENDED RELEASE ORAL at 10:10

## 2018-04-23 RX ADMIN — Medication 10 ML: at 22:25

## 2018-04-23 RX ADMIN — DULOXETINE HYDROCHLORIDE 60 MG: 60 CAPSULE, DELAYED RELEASE ORAL at 08:47

## 2018-04-23 RX ADMIN — Medication 10 ML: at 06:32

## 2018-04-23 RX ADMIN — ACETAMINOPHEN 650 MG: 325 TABLET, FILM COATED ORAL at 08:47

## 2018-04-23 RX ADMIN — HYDROMORPHONE HYDROCHLORIDE 4 MG: 4 TABLET ORAL at 20:15

## 2018-04-23 RX ADMIN — HYDROMORPHONE HYDROCHLORIDE 4 MG: 4 TABLET ORAL at 16:27

## 2018-04-23 RX ADMIN — Medication 10 ML: at 15:20

## 2018-04-23 RX ADMIN — HYDROMORPHONE HYDROCHLORIDE 4 MG: 4 TABLET ORAL at 03:13

## 2018-04-23 RX ADMIN — HYDROMORPHONE HYDROCHLORIDE 4 MG: 4 TABLET ORAL at 11:33

## 2018-04-23 RX ADMIN — KETOROLAC TROMETHAMINE 15 MG: 30 INJECTION, SOLUTION INTRAMUSCULAR at 19:25

## 2018-04-23 RX ADMIN — KETOROLAC TROMETHAMINE 15 MG: 30 INJECTION, SOLUTION INTRAMUSCULAR at 13:55

## 2018-04-23 RX ADMIN — ACETAMINOPHEN 650 MG: 325 TABLET, FILM COATED ORAL at 15:20

## 2018-04-23 RX ADMIN — ENOXAPARIN SODIUM 40 MG: 40 INJECTION SUBCUTANEOUS at 10:10

## 2018-04-23 RX ADMIN — HUMAN INSULIN 2 UNITS: 100 INJECTION, SOLUTION SUBCUTANEOUS at 06:58

## 2018-04-23 RX ADMIN — DOCUSATE SODIUM 100 MG: 100 CAPSULE, LIQUID FILLED ORAL at 17:41

## 2018-04-23 NOTE — CDMP QUERY
#3    Please clarify if this patient is (was) being treated/managed for:     => Systemic Inflammatory Response Syndrome (SIRS) of non-infectious origin in the postop setting requiring lab monitoring  => Other explanation of clinical findings  => Clinically Undetermined (no explanation for clinical findings)    The medical record reflects the following clinical findings, treatment, and risk factors. Risk Factors:  postop  Clinical Indicators:  WBC: 17.1, pulse rate range 102-117, temp range 98.3-100.1  Treatment: lab monitoring    Please clarify and document your clinical opinion in the progress notes and discharge summary including the definitive and/or presumptive diagnosis, (suspected or probable), related to the above clinical findings. Please include clinical findings supporting your diagnosis.     Thank you,    Tanya Schultz RN  [facility]  [phone]

## 2018-04-23 NOTE — PROGRESS NOTES
Problem: Pressure Injury - Risk of  Goal: *Prevention of pressure ulcer  Outcome: Progressing Towards Goal  Skin on pressure points cdi with no apparent breakdown at this time. Pt independently mobile and changes positions frequently    Problem: Falls - Risk of  Goal: *Absence of Falls  Document Kirstie Fall Risk and appropriate interventions in the flowsheet. Outcome: Progressing Towards Goal  Fall Risk Interventions:            Medication Interventions: Teach patient to arise slowly                  Problem: Surgical Pathway Post-Op Day 2 through Discharge  Goal: Medications  Outcome: Progressing Towards Goal  PCA discontinued. Scheduled toradol.   Goal: *Tolerating diet  Outcome: Progressing Towards Goal  toleratring clear liquid diet

## 2018-04-23 NOTE — PROGRESS NOTES
General Surgery Daily Progress Note    Admit Date: 2018  Post-Operative Day: 3 Days Post-Op from Procedure(s):  LAPAROSCOPIC CONVERTED TO OPEN DISTAL PANCREATECTOMY      Subjective:     Last 24 hrs: Per nurses report, pt was very groggy and difficult to arouse with PCA and IV dilaudid for breakthrough pain. PCA stopped yesterday evening and started on scheduled toradol, dilaudid 4 mg PO prn and tylenol. Pt reports still in pain. Note hx of ETOH abuse and sinus tachy on problem list. low SpO2, on NC O2 at 2L. Can get IS up to 775ish although She is not using regularly. Denies CP, SOB and difficulty breathing. Clear diet, no n/v/d. No flatus, no bm. Started on stool softener yesterday. OOB and ambulating a bit. Right and left hand swelling with former IV sites, reports FROM in both. R>L      Objective:     Blood pressure 111/90, pulse (!) 102, temperature 99 °F (37.2 °C), resp. rate 18, height 5' 3\" (1.6 m), weight 177 lb 14.6 oz (80.7 kg), last menstrual period 2018, SpO2 97 %, not currently breastfeeding. Temp (24hrs), Av.2 °F (37.3 °C), Min:98.3 °F (36.8 °C), Max:100.1 °F (37.8 °C)      _____________________  Physical Exam:     Alert and Oriented, walking around bed room, in no acute distress. Cardiovascular: RRR although a bit tachy, NC O2 2L, no LE peripheral edema. R hand swelling > L hand.  No redness or warmth  Lungs:bilateral crackles in bases  Abdomen: +Distended, +BS, +ecchymosis, TTP  Midline incision CDI   BETSY with SS output    Right hand swelling > Left hand  Ecchymosis at former IV sites   FROM  Strength 4/4    Assessment:   Principal Problem:    Pancreatic cyst (3/21/2018)    Active Problems:    Pancreas cyst (2018)            Plan:     CXR   Monitor vitals  Cont NC O2 PRN  Replete K  Change IVF  Cont clears  Cont pain control, scheduled toradol, PO dilaudid and tylenol  OOB and ambulate  Consult for PT  D/w pt to use IS hourly  Start DVT and GI proph  Further plans as per  [place]    Data Review:    Recent Labs      04/23/18   0250  04/22/18   0355  04/21/18   0253   WBC  13.7*  17.1*  16.8*   HGB  8.9*  8.7*  11.3*   HCT  28.3*  28.0*  35.8   PLT  277  245  302     Recent Labs      04/23/18   0250  04/22/18   0355   NA  138  137   K  3.2*  3.8   CL  101  103   CO2  28  27   GLU  129*  164*   BUN  5*  8   CREA  0.53*  0.66   CA  8.0*  7.6*   ALB  2.5*   --    SGOT  45*   --    ALT  26   --      Recent Labs      04/23/18   0250   LPSE  159           ______________________  Medications:    Current Facility-Administered Medications   Medication Dose Route Frequency    potassium chloride SR (KLOR-CON 10) tablet 20 mEq  20 mEq Oral NOW    0.9% sodium chloride with KCl 20 mEq/L infusion   IntraVENous CONTINUOUS    HYDROmorphone (DILAUDID) injection 1 mg  1 mg IntraVENous Q2H PRN    docusate sodium (COLACE) capsule 100 mg  100 mg Oral BID    ketorolac (TORADOL) injection 15 mg  15 mg IntraVENous Q6H    HYDROmorphone (DILAUDID) tablet 4 mg  4 mg Oral Q4H PRN    acetaminophen (TYLENOL) tablet 650 mg  650 mg Oral Q4H PRN    nicotine (NICODERM CQ) 14 mg/24 hr patch 1 Patch  1 Patch TransDERmal DAILY    sodium chloride (NS) flush 5-10 mL  5-10 mL IntraVENous Q8H    sodium chloride (NS) flush 5-10 mL  5-10 mL IntraVENous PRN    ondansetron (ZOFRAN) injection 4 mg  4 mg IntraVENous Q4H PRN    DULoxetine (CYMBALTA) capsule 60 mg  60 mg Oral DAILY    traZODone (DESYREL) tablet 50 mg  50 mg Oral QHS    insulin regular (NOVOLIN R, HUMULIN R) injection   SubCUTAneous AC&HS    glucose chewable tablet 16 g  4 Tab Oral PRN    dextrose (D50W) injection syrg 12.5-25 g  12.5-25 g IntraVENous PRN    glucagon (GLUCAGEN) injection 1 mg  1 mg IntraMUSCular PRN       Alfred Nicholson NP  4/23/2018                    ATTENDING ADDENDUM  I supervised the APC and reviewed the note. We discussed the plan of care  Doing well now that she is off high dose narcotics. Still no bowel function yet.

## 2018-04-23 NOTE — CDMP QUERY
Patient is noted to have a BMI of 31.52. Please clarify if this patient is:       =>Obesity (BMI of 30-39.9)    => Morbid Obesity  (BMI 40 or greater)    =>Overweight (BMI 25-29.9)    => Other weight status (specify  status)    => Unable to determine      The [address] [place] [address] has issued a statement indicating that, \"Individuals who are overweight, obese, or morbidly obese are at an increased risk for certain medical conditions when compared to persons of normal weight. Therefore, these conditions are always clinically significant and reportable when documented by the provider. \"      Presentation:  BMI: 31.52     Ht: 5' 3\" (1.6 m)    Wt: 80.7 kg (177 lb 14.6 oz)      Please clarify and document your clinical opinion in the progress notes and discharge summary, including the definitive and or presumptive diagnosis, (suspected or probable), related to the above clinical findings. Please include clinical findings supporting your diagnosis.      Thank you,    Lizzeth Carrero RN  [facility]  [phone]

## 2018-04-23 NOTE — CDMP QUERY
#4    Please clarify if this patient is (was) being treated/managed for:     => Encephalopathy due to narcotics in the postop setting requiring decreased PCA  => Other explanation of clinical findings  => Clinically Undetermined (no explanation for clinical findings)    The medical record reflects the following clinical findings, treatment, and risk factors. Risk Factors: postop   Clinical Indicators: \"appears sedated and answers appropriately, but will then start talking about something off topic\" On PCA and very drowsy; per spouse \"sometimes coherant and sometimes out of it\"  Treatment: decreased PCA, increased safety measures    Please clarify and document your clinical opinion in the progress notes and discharge summary including the definitive and/or presumptive diagnosis, (suspected or probable), related to the above clinical findings. Please include clinical findings supporting your diagnosis.     Thank you,    Clement Ya RN  [facility]  [phone]

## 2018-04-23 NOTE — PROGRESS NOTES
Bedside shift change report given to Ambrosio (oncoming nurse) by Perry Mckinney (offgoing nurse). Report included the following information SBAR, Intake/Output, MAR, Recent Results and Cardiac Rhythm Sinus Tach.

## 2018-04-23 NOTE — CDMP QUERY
#2    Please clarify if this patient is (was) being treated/managed for:     => Mild Hypokalemia in the setting of post-op hydration requiring potassium supplement   => Other explanation of clinical findings  => Clinically Undetermined (no explanation for clinical findings)    The medical record reflects the following clinical findings, treatment, and risk factors. Risk Factors:  post-op IVFs  Clinical Indicators:  Potassium: 3.2   Treatment: supplemental potassium, lab monitoring     Please clarify and document your clinical opinion in the progress notes and discharge summary including the definitive and/or presumptive diagnosis, (suspected or probable), related to the above clinical findings. Please include clinical findings supporting your diagnosis.     Thank you,    Sd Russell RN  [facility]  [phone]

## 2018-04-23 NOTE — CDMP QUERY
#5    Please clarify if this patient is (was) being treated/managed for:     => Acute Pulmonary Insufficiency in the postop setting requiring supplemental O2  => Other explanation of clinical findings  => Clinically Undetermined (no explanation for clinical findings)    The medical record reflects the following clinical findings, treatment, and risk factors. Risk Factors:  postop  Clinical Indicators:  97 % -- 6 l/min; 4/22 nurse note- [id number]:  FT at 35% with saturation about 88-89%; increase to 40% and saturation increases to 92-93%. Pt declined NC but pt needs frequent reminder to keep FT on face  Treatment: supplemental O2 via face tent and NC    Please clarify and document your clinical opinion in the progress notes and discharge summary including the definitive and/or presumptive diagnosis, (suspected or probable), related to the above clinical findings. Please include clinical findings supporting your diagnosis.     Thank you,    Marcia Benton RN  [facility]  [phone]

## 2018-04-24 ENCOUNTER — APPOINTMENT (OUTPATIENT)
Dept: CT IMAGING | Age: 38
DRG: 407 | End: 2018-04-24
Attending: NURSE PRACTITIONER
Payer: COMMERCIAL

## 2018-04-24 LAB
ANION GAP SERPL CALC-SCNC: 9 MMOL/L (ref 5–15)
ATRIAL RATE: 121 BPM
BUN SERPL-MCNC: 4 MG/DL (ref 6–20)
BUN/CREAT SERPL: 7 (ref 12–20)
CALCIUM SERPL-MCNC: 8.2 MG/DL (ref 8.5–10.1)
CALCULATED P AXIS, ECG09: 41 DEGREES
CALCULATED R AXIS, ECG10: 47 DEGREES
CALCULATED T AXIS, ECG11: 17 DEGREES
CHLORIDE SERPL-SCNC: 98 MMOL/L (ref 97–108)
CO2 SERPL-SCNC: 27 MMOL/L (ref 21–32)
CREAT SERPL-MCNC: 0.6 MG/DL (ref 0.55–1.02)
DIAGNOSIS, 93000: NORMAL
ERYTHROCYTE [DISTWIDTH] IN BLOOD BY AUTOMATED COUNT: 12.5 % (ref 11.5–14.5)
GLUCOSE BLD STRIP.AUTO-MCNC: 119 MG/DL (ref 65–100)
GLUCOSE BLD STRIP.AUTO-MCNC: 138 MG/DL (ref 65–100)
GLUCOSE BLD STRIP.AUTO-MCNC: 140 MG/DL (ref 65–100)
GLUCOSE BLD STRIP.AUTO-MCNC: 149 MG/DL (ref 65–100)
GLUCOSE SERPL-MCNC: 146 MG/DL (ref 65–100)
HCT VFR BLD AUTO: 29.4 % (ref 35–47)
HGB BLD-MCNC: 9.4 G/DL (ref 11.5–16)
MCH RBC QN AUTO: 32.8 PG (ref 26–34)
MCHC RBC AUTO-ENTMCNC: 32 G/DL (ref 30–36.5)
MCV RBC AUTO: 102.4 FL (ref 80–99)
NRBC # BLD: 0 K/UL (ref 0–0.01)
NRBC BLD-RTO: 0 PER 100 WBC
P-R INTERVAL, ECG05: 138 MS
PLATELET # BLD AUTO: 290 K/UL (ref 150–400)
PMV BLD AUTO: 10.3 FL (ref 8.9–12.9)
POTASSIUM SERPL-SCNC: 3.3 MMOL/L (ref 3.5–5.1)
Q-T INTERVAL, ECG07: 314 MS
QRS DURATION, ECG06: 90 MS
QTC CALCULATION (BEZET), ECG08: 445 MS
RBC # BLD AUTO: 2.87 M/UL (ref 3.8–5.2)
SERVICE CMNT-IMP: ABNORMAL
SODIUM SERPL-SCNC: 134 MMOL/L (ref 136–145)
VENTRICULAR RATE, ECG03: 121 BPM
WBC # BLD AUTO: 14.1 K/UL (ref 3.6–11)

## 2018-04-24 PROCEDURE — 97116 GAIT TRAINING THERAPY: CPT | Performed by: PHYSICAL THERAPIST

## 2018-04-24 PROCEDURE — 74177 CT ABD & PELVIS W/CONTRAST: CPT

## 2018-04-24 PROCEDURE — 85027 COMPLETE CBC AUTOMATED: CPT | Performed by: NURSE PRACTITIONER

## 2018-04-24 PROCEDURE — 74011250637 HC RX REV CODE- 250/637: Performed by: SURGERY

## 2018-04-24 PROCEDURE — 74011636320 HC RX REV CODE- 636/320: Performed by: SURGERY

## 2018-04-24 PROCEDURE — 74011000250 HC RX REV CODE- 250: Performed by: SURGERY

## 2018-04-24 PROCEDURE — 74011250636 HC RX REV CODE- 250/636: Performed by: SURGERY

## 2018-04-24 PROCEDURE — 74011000258 HC RX REV CODE- 258: Performed by: SURGERY

## 2018-04-24 PROCEDURE — 93005 ELECTROCARDIOGRAM TRACING: CPT

## 2018-04-24 PROCEDURE — 80048 BASIC METABOLIC PNL TOTAL CA: CPT | Performed by: NURSE PRACTITIONER

## 2018-04-24 PROCEDURE — 74011250636 HC RX REV CODE- 250/636: Performed by: NURSE PRACTITIONER

## 2018-04-24 PROCEDURE — 74011250637 HC RX REV CODE- 250/637: Performed by: NURSE PRACTITIONER

## 2018-04-24 PROCEDURE — 77010033678 HC OXYGEN DAILY

## 2018-04-24 PROCEDURE — 97161 PT EVAL LOW COMPLEX 20 MIN: CPT | Performed by: PHYSICAL THERAPIST

## 2018-04-24 PROCEDURE — 74011636637 HC RX REV CODE- 636/637: Performed by: SURGERY

## 2018-04-24 PROCEDURE — 82962 GLUCOSE BLOOD TEST: CPT

## 2018-04-24 PROCEDURE — 36415 COLL VENOUS BLD VENIPUNCTURE: CPT | Performed by: NURSE PRACTITIONER

## 2018-04-24 PROCEDURE — 65660000000 HC RM CCU STEPDOWN

## 2018-04-24 RX ORDER — METOPROLOL TARTRATE 5 MG/5ML
10 INJECTION INTRAVENOUS
Status: DISCONTINUED | OUTPATIENT
Start: 2018-04-24 | End: 2018-04-30 | Stop reason: HOSPADM

## 2018-04-24 RX ORDER — SODIUM CHLORIDE 0.9 % (FLUSH) 0.9 %
10 SYRINGE (ML) INJECTION
Status: COMPLETED | OUTPATIENT
Start: 2018-04-24 | End: 2018-04-24

## 2018-04-24 RX ORDER — HYDROMORPHONE HYDROCHLORIDE 2 MG/ML
2 INJECTION, SOLUTION INTRAMUSCULAR; INTRAVENOUS; SUBCUTANEOUS
Status: DISCONTINUED | OUTPATIENT
Start: 2018-04-24 | End: 2018-04-25

## 2018-04-24 RX ORDER — POTASSIUM CHLORIDE 14.9 MG/ML
10 INJECTION INTRAVENOUS
Status: COMPLETED | OUTPATIENT
Start: 2018-04-24 | End: 2018-04-24

## 2018-04-24 RX ORDER — KETOROLAC TROMETHAMINE 30 MG/ML
15 INJECTION, SOLUTION INTRAMUSCULAR; INTRAVENOUS
Status: DISPENSED | OUTPATIENT
Start: 2018-04-24 | End: 2018-04-25

## 2018-04-24 RX ADMIN — POTASSIUM CHLORIDE AND SODIUM CHLORIDE: 900; 150 INJECTION, SOLUTION INTRAVENOUS at 21:13

## 2018-04-24 RX ADMIN — METOPROLOL TARTRATE 10 MG: 5 INJECTION, SOLUTION INTRAVENOUS at 06:05

## 2018-04-24 RX ADMIN — Medication 10 ML: at 12:18

## 2018-04-24 RX ADMIN — HYDROMORPHONE HYDROCHLORIDE 1 MG: 2 INJECTION INTRAMUSCULAR; INTRAVENOUS; SUBCUTANEOUS at 10:35

## 2018-04-24 RX ADMIN — Medication 10 ML: at 14:48

## 2018-04-24 RX ADMIN — TRAZODONE HYDROCHLORIDE 50 MG: 50 TABLET ORAL at 23:21

## 2018-04-24 RX ADMIN — Medication 10 ML: at 22:00

## 2018-04-24 RX ADMIN — POTASSIUM CHLORIDE 10 MEQ: 200 INJECTION, SOLUTION INTRAVENOUS at 14:47

## 2018-04-24 RX ADMIN — POTASSIUM CHLORIDE AND SODIUM CHLORIDE: 900; 150 INJECTION, SOLUTION INTRAVENOUS at 06:31

## 2018-04-24 RX ADMIN — SODIUM CHLORIDE 100 ML: 900 INJECTION, SOLUTION INTRAVENOUS at 12:18

## 2018-04-24 RX ADMIN — METOPROLOL TARTRATE 10 MG: 5 INJECTION, SOLUTION INTRAVENOUS at 11:22

## 2018-04-24 RX ADMIN — HUMAN INSULIN 2 UNITS: 100 INJECTION, SOLUTION SUBCUTANEOUS at 12:55

## 2018-04-24 RX ADMIN — IOHEXOL 50 ML: 240 INJECTION, SOLUTION INTRATHECAL; INTRAVASCULAR; INTRAVENOUS; ORAL at 12:18

## 2018-04-24 RX ADMIN — ONDANSETRON 4 MG: 2 INJECTION INTRAMUSCULAR; INTRAVENOUS at 04:07

## 2018-04-24 RX ADMIN — IOPAMIDOL 100 ML: 755 INJECTION, SOLUTION INTRAVENOUS at 12:18

## 2018-04-24 RX ADMIN — KETOROLAC TROMETHAMINE 15 MG: 30 INJECTION, SOLUTION INTRAMUSCULAR at 20:21

## 2018-04-24 RX ADMIN — Medication 10 ML: at 06:37

## 2018-04-24 RX ADMIN — POTASSIUM CHLORIDE 10 MEQ: 200 INJECTION, SOLUTION INTRAVENOUS at 12:50

## 2018-04-24 RX ADMIN — METOPROLOL TARTRATE 10 MG: 5 INJECTION, SOLUTION INTRAVENOUS at 20:21

## 2018-04-24 RX ADMIN — HYDROMORPHONE HYDROCHLORIDE 4 MG: 4 TABLET ORAL at 00:43

## 2018-04-24 RX ADMIN — POTASSIUM CHLORIDE 10 MEQ: 200 INJECTION, SOLUTION INTRAVENOUS at 11:12

## 2018-04-24 RX ADMIN — KETOROLAC TROMETHAMINE 15 MG: 30 INJECTION, SOLUTION INTRAMUSCULAR at 14:50

## 2018-04-24 RX ADMIN — ENOXAPARIN SODIUM 40 MG: 40 INJECTION SUBCUTANEOUS at 10:42

## 2018-04-24 RX ADMIN — HUMAN INSULIN 2 UNITS: 100 INJECTION, SOLUTION SUBCUTANEOUS at 07:30

## 2018-04-24 RX ADMIN — HYDROMORPHONE HYDROCHLORIDE 1 MG: 2 INJECTION INTRAMUSCULAR; INTRAVENOUS; SUBCUTANEOUS at 05:31

## 2018-04-24 NOTE — PROGRESS NOTES
Progress Note    Patient: Iman Cardona MRN: [id number]  SSN: [id number]    YOB: 1980  Age: 42 y.o. Sex: female      Admit Date: 2018    4 Days Post-Op    Procedure:  Procedure(s):  LAPAROSCOPIC CONVERTED TO OPEN DISTAL PANCREATECTOMY     Subjective:     Patient with complaint of increase left upper quadrant pain and abdominal bloating in the last 24 hours. No nausea, no vomiting, no chest pain, no shortness of breath. Ambulating, voiding. No flatus, no bowel movement. Low grade fevers overnight. Objective:     Visit Vitals    BP (!) 151/100 (BP 1 Location: Right arm, BP Patient Position: At rest)    Pulse 100    Temp 99.9 °F (37.7 °C)    Resp 17    Ht 5' 3\" (1.6 m)    Wt 179 lb 3.7 oz (81.3 kg)    SpO2 93%    Breastfeeding No    BMI 31.75 kg/m2       Temp (24hrs), Av.3 °F (37.4 °C), Min:98.3 °F (36.8 °C), Max:100.3 °F (37.9 °C)      Physical Exam:    LUNG: clear to auscultation bilaterally, HEART: regular rate and rhythm, S1, S2 normal,  ABDOMEN: soft, distended, generalized tenderness, work to left upper quadrant. BETSY drain with minimal dark old blood output. Bowel sounds hypoactive to upper abdomen. Surgical incision dry and intact.  EXTREMITIES:  extremities normal, atraumatic, no cyanosis or edema        Lab Review:   BMP:   Lab Results   Component Value Date/Time     (L) 2018 03:21 AM    K 3.3 (L) 2018 03:21 AM    CL 98 2018 03:21 AM    CO2 27 2018 03:21 AM    AGAP 9 2018 03:21 AM     (H) 2018 03:21 AM    BUN 4 (L) 2018 03:21 AM    CREA 0.60 2018 03:21 AM    GFRAA >60 2018 03:21 AM    GFRNA >60 2018 03:21 AM     CMP:   Lab Results   Component Value Date/Time     (L) 2018 03:21 AM    K 3.3 (L) 2018 03:21 AM    CL 98 2018 03:21 AM    CO2 27 2018 03:21 AM    AGAP 9 2018 03:21 AM     (H) 2018 03:21 AM    BUN 4 (L) 2018 03:21 AM    CREA 0.60 04/24/2018 03:21 AM    GFRAA >60 04/24/2018 03:21 AM    GFRNA >60 04/24/2018 03:21 AM    CA 8.2 (L) 04/24/2018 03:21 AM     CBC:   Lab Results   Component Value Date/Time    WBC 14.1 (H) 04/24/2018 03:21 AM    HGB 9.4 (L) 04/24/2018 03:21 AM    HCT 29.4 (L) 04/24/2018 03:21 AM     04/24/2018 03:21 AM       Assessment:     Hospital Problems  Date Reviewed: 4/20/2018          Codes Class Noted POA    Pancreas cyst ICD-10-CM: K86.2  ICD-9-CM: 577.2  4/20/2018 Unknown        * (Principal)Pancreatic cyst ICD-10-CM: K86.2  ICD-9-CM: 577.2  3/21/2018 Yes              Plan/Recommendations/Medical Decision Making:     Continue present treatment  STAT CT scan of abdomen/pelvis to evaluate. NPO for now. Continue pain management. DVT and PPI prophylaxis. Replete potassium  Monitor BETSY drain output. Labs in AM  Further plans per Dr. Rosina Sosa. Signed By: Elmer Reece NP     April 24, 2018       ATTENDING ADDENDUM  I supervised the APC and reviewed the note. We discussed the plan of care  CT did not show any evidence of infection or drainable fluid.   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

## 2018-04-24 NOTE — PROGRESS NOTES
Problem: Falls - Risk of  Goal: *Absence of Falls  Document Kirstie Fall Risk and appropriate interventions in the flowsheet. Outcome: Progressing Towards Goal  Fall Risk Interventions:            Medication Interventions: Teach patient to arise slowly, Patient to call before getting OOB                  Problem: Surgical Pathway Post-Op Day 2 through Discharge  Goal: *No signs and symptoms of infection or wound complications  Outcome: Progressing Towards Goal  Variance: Patient Condition    Patient WBC's are still elevated and patient HR is elevated as well   Goal: *Optimal pain control at patient's stated goal  Outcome: Progressing Towards Goal  Improving on patient pain control   Goal: *Tolerating diet  Outcome: Not Progressing Towards Goal  Variance: Patient Condition    Patient on a clear liquid diet but abdomen with limited bowel sounds, not passing flatus, distention and patient became nauseous through part of the night and was given zofran for relief.  Continuing to monitor patient closely and offering her ice chips at this time    Goal: *Demonstrates progressive activity  Outcome: Progressing Towards Goal  Patient needs encouragement to walk in the hallway

## 2018-04-24 NOTE — PROGRESS NOTES
Reason for Admission: Giovanna Hutson 171 on 4/20/18                     RRAT Score:  9                   Plan for utilizing home health:   Not indicated at this time                       Likelihood of Readmission:  Low                         Transition of Care Plan:  Patient will be discharged home in care of her  and daughter. CM met with patient to discuss discharge planning. Patient lives with her  and her 15year old daughter in their private home. Patient is independent without any assistive devices. She does not have a PCP, she was offered assistance with getting a new PCP, she declined. She wanted to get a referral from her friend. Patient uses [facility] for her prescriptions. Her  will provide transportation home and she did not voice any discharge planning. CM will continue to follow as needed. Lior Crawford MSA, RN, CRM. Care Management Interventions  PCP Verified by CM: Yes  Palliative Care Criteria Met (RRAT>21 & CHF Dx)?: No  Mode of Transport at Discharge:  Other (see comment) (Private car)  Transition of Care Consult (CM Consult): Discharge Planning  MyChart Signup: No  Discharge Durable Medical Equipment: No  Health Maintenance Reviewed: Yes  Physical Therapy Consult: Yes  Occupational Therapy Consult: No  Speech Therapy Consult: No  Current Support Network: Lives with Spouse  Confirm Follow Up Transport: Family  Plan discussed with Pt/Family/Caregiver: Yes  Discharge Location  Discharge Placement: Home with family assistance

## 2018-04-24 NOTE — ROUTINE PROCESS
Bedside and Verbal shift change report given to Yasmin Pantoja (oncoming nurse) by Kaia Sierra (offgoing nurse). Report included the following information SBAR, Kardex, Procedure Summary, Intake/Output, MAR and Cardiac Rhythm NST occ NSR.

## 2018-04-24 NOTE — PROGRESS NOTES
Spiritual Care Partner Volunteer visited patient in [facility] on 4/24/18. Documented by:   Chaplain Strauss MDiv, JACINTO  [phone] ([phone])

## 2018-04-24 NOTE — PROGRESS NOTES
Bedside and Verbal shift change report given to [address] [place] [address] (oncoming nurse) by Noe Caceres RN (offgoing nurse). Report included the following information SBAR, OR Summary, Procedure Summary, MAR and Recent Results.

## 2018-04-24 NOTE — PROGRESS NOTES
physical Therapy EVALUATION/DISCHARGE  Patient: Ninfa Lamb (46 y.o. female)  Date: 4/24/2018  Primary Diagnosis: PANCREATIC MASS  Pancreas cyst  Procedure(s) (LRB):  LAPAROSCOPIC CONVERTED TO OPEN DISTAL PANCREATECTOMY  (N/A) 4 Days Post-Op   Precautions:   Fall  ASSESSMENT :  Based on the objective data described below, the patient presents with near baseline functional mobility. Prior to admit patient was living with family in a 2 level home but can stay on the first floor. Patient currently up in chair and agreeable to PT. Patient without O2 in place in sitting and noted SpO2 83% on room air just sitting. Replaced O2 and instructed in pursed lip breathing and SpO2 improved to 93%. Sit to stand modified independent with good standing balance. Amb approx 110 feet without assistive device with stable gait. SpO2 at 83% during ambulation on 2 L O2. Increased O2 to 3 L and SpO2 improved to 88% during ambulation. Patient reports she is a long time smoker (25 years) and reports she may have had some breathing issues prior to admit. Discussed smoking cessation and patient appears open to this. Patient is currently functioning at a modified independent-SBA level for mobility and does not require skilled PT services. Recommend ambulation with RN or PCT and that she ambulate with O2 in place secondary to major desaturation with activity. Please reconsult if patient status changes. Skilled physical therapy is not indicated at this time. PLAN :  Discharge Recommendations: None  Further Equipment Recommendations for Discharge: none     SUBJECTIVE:   Patient stated Janiya ramos for walking with me and helping me realize what I need to do.     OBJECTIVE DATA SUMMARY:   HISTORY:    Past Medical History:   Diagnosis Date    Anxiety     Blurred vision     Chest pain     Chronic pain     MUSCLE WEAKNESS,PANCREATIC CYST     Depression     Frequent headaches     Ill-defined condition     Muscle weakness  Pancreatic cyst 3/21/2018    Shortness of breath     Stomach pain     Swallowing difficulty      Past Surgical History:   Procedure Laterality Date    HX GI      COLONOSCOPY    HX GYN  2005    endometriosis surgery     Prior Level of Function/Home Situation: Independent at baseline. Will be staying on 1st floor in hospital bed at [facility] factors and/or comorbidities impacting plan of care:     Home Situation  Home Environment: Private residence  # Steps to Enter: 3 (4 from the back)  One/Two Story Residence: Two story, live on 1st floor  # of Interior Steps: 12  Height of Each Step (in): 10 inches  Interior Rails: Left  Lift Chair Available: No  Living Alone: No  Support Systems: Child(sherman), Family member(s), Friends \ neighbors, Parent, Spouse/Significant Other/Partner  Patient Expects to be Discharged to[de-identified] Private residence  Current DME Used/Available at Home: None    EXAMINATION/PRESENTATION/DECISION MAKING:   Critical Behavior:  Neurologic State: Alert  Orientation Level: Oriented X4  Cognition: Follows commands, Memory loss     Hearing: Auditory  Auditory Impairment: None  Hearing Aids/Status: Does not own    Range Of Motion:  AROM: Generally decreased, functional                       Strength:    Strength: Generally decreased, functional        Functional Mobility:  Bed Mobility:   not tested, up in chair           Transfers:  Sit to Stand: modified independent  Stand to Sit: modified independent                       Balance:   Sitting: Intact  Standing: Intact  Ambulation/Gait Training:  Distance (ft): 125 Feet (ft)  Assistive Device: Gait belt  Ambulation - Level of Assistance: Stand-by assistance        Gait Abnormalities: Decreased step clearance        Base of Support: Widened     Speed/Gaby: Pace decreased (<100 feet/min); Slow  Step Length: Left shortened;Right shortened            Functional Measure:  Timed up and go:    Timed Get Up And Go Test: 11     Timed Up and Go and G-code impairment scale:  Percentage of Impairment CH    0%   CI    1-19% CJ    20-39% CK    40-59% CL    60-79% CM    80-99% CN     100%   Timed   Score 0-56 10 11-12 13-14 15-16 17-18 19 20       < than 10 seconds=Normal  Greater then 13.5 seconds (in elderly)=Increased fall risk   Letty Xie Woolacott M. Predicting the probability for falls in community dwelling older adults using the Timed Up and Go Test. Phys Ther. 2000;80:896-903. G codes: In compliance with CMSs Claims Based Outcome Reporting, the following G-code set was chosen for this patient based on their primary functional limitation being treated: The outcome measure chosen to determine the severity of the functional limitation was the TUG with a score of 11 sec which was correlated with the impairment scale. ? Mobility - Walking and Moving Around:     - CURRENT STATUS: CI - 1%-19% impaired, limited or restricted    - GOAL STATUS: CI - 1%-19% impaired, limited or restricted    - D/C STATUS:  CI - 1%-19% impaired, limited or restricted         Pain:  Pain Scale 1: Numeric (0 - 10)  Pain Intensity 1: 8  Pain Location 1: Abdomen  Activity Tolerance:   Patient desaturates with activity  Please refer to the flowsheet for vital signs taken during this treatment. After treatment:   [x]   Patient left in no apparent distress sitting up in chair  []   Patient left in no apparent distress in bed  [x]   Call bell left within reach  [x]   Nursing notified  []   Caregiver present  []   Bed alarm activated    COMMUNICATION/EDUCATION:   Communication/Collaboration:  [x]   Fall prevention education was provided and the patient/caregiver indicated understanding. [x]   Patient/family have participated as able and agree with findings and recommendations. []   Patient is unable to participate in plan of care at this time.   Findings and recommendations were discussed with: Physical Therapist, Occupational Therapist and Registered Nurse    Thank you for this referral.  Ashley Zuluaga, PT, DPT   Time Calculation: 24 mins

## 2018-04-24 NOTE — ROUTINE PROCESS
Patient heart rate increasing progressively through the night and now sustained at 118. Blood pressure is more elevated at 145/91 and when patient ambulates HR increases to the 150's. Paging on call doctor now.

## 2018-04-24 NOTE — ROUTINE PROCESS
Bedside shift change report given to ISIDRO Gunn (oncoming nurse) by Hardik Menjivar (offgoing nurse). Report included the following information SBAR, OR Summary, Procedure Summary, Intake/Output, Recent Results, Med Rec Status and Cardiac Rhythm sinus tachycardia .

## 2018-04-25 LAB
ALBUMIN SERPL-MCNC: 2.5 G/DL (ref 3.5–5)
ALBUMIN/GLOB SERPL: 0.6 {RATIO} (ref 1.1–2.2)
ALP SERPL-CCNC: 186 U/L (ref 45–117)
ALT SERPL-CCNC: 47 U/L (ref 12–78)
ANION GAP SERPL CALC-SCNC: 10 MMOL/L (ref 5–15)
AST SERPL-CCNC: 46 U/L (ref 15–37)
BILIRUB SERPL-MCNC: 0.7 MG/DL (ref 0.2–1)
BUN SERPL-MCNC: 5 MG/DL (ref 6–20)
BUN/CREAT SERPL: 11 (ref 12–20)
CALCIUM SERPL-MCNC: 8.7 MG/DL (ref 8.5–10.1)
CHLORIDE SERPL-SCNC: 102 MMOL/L (ref 97–108)
CO2 SERPL-SCNC: 24 MMOL/L (ref 21–32)
CREAT SERPL-MCNC: 0.47 MG/DL (ref 0.55–1.02)
ERYTHROCYTE [DISTWIDTH] IN BLOOD BY AUTOMATED COUNT: 12.9 % (ref 11.5–14.5)
GLOBULIN SER CALC-MCNC: 4.1 G/DL (ref 2–4)
GLUCOSE BLD STRIP.AUTO-MCNC: 123 MG/DL (ref 65–100)
GLUCOSE BLD STRIP.AUTO-MCNC: 161 MG/DL (ref 65–100)
GLUCOSE BLD STRIP.AUTO-MCNC: 213 MG/DL (ref 65–100)
GLUCOSE SERPL-MCNC: 125 MG/DL (ref 65–100)
HCT VFR BLD AUTO: 27.3 % (ref 35–47)
HGB BLD-MCNC: 8.9 G/DL (ref 11.5–16)
MCH RBC QN AUTO: 33 PG (ref 26–34)
MCHC RBC AUTO-ENTMCNC: 32.6 G/DL (ref 30–36.5)
MCV RBC AUTO: 101.1 FL (ref 80–99)
NRBC # BLD: 0.03 K/UL (ref 0–0.01)
NRBC BLD-RTO: 0.3 PER 100 WBC
PLATELET # BLD AUTO: 283 K/UL (ref 150–400)
PMV BLD AUTO: 10.2 FL (ref 8.9–12.9)
POTASSIUM SERPL-SCNC: 3.4 MMOL/L (ref 3.5–5.1)
PROT SERPL-MCNC: 6.6 G/DL (ref 6.4–8.2)
RBC # BLD AUTO: 2.7 M/UL (ref 3.8–5.2)
SERVICE CMNT-IMP: ABNORMAL
SODIUM SERPL-SCNC: 136 MMOL/L (ref 136–145)
WBC # BLD AUTO: 11.3 K/UL (ref 3.6–11)

## 2018-04-25 PROCEDURE — 74011250636 HC RX REV CODE- 250/636: Performed by: NURSE PRACTITIONER

## 2018-04-25 PROCEDURE — 74011250637 HC RX REV CODE- 250/637: Performed by: SURGERY

## 2018-04-25 PROCEDURE — 85027 COMPLETE CBC AUTOMATED: CPT | Performed by: NURSE PRACTITIONER

## 2018-04-25 PROCEDURE — 80053 COMPREHEN METABOLIC PANEL: CPT | Performed by: NURSE PRACTITIONER

## 2018-04-25 PROCEDURE — 74011000250 HC RX REV CODE- 250: Performed by: SURGERY

## 2018-04-25 PROCEDURE — 65660000000 HC RM CCU STEPDOWN

## 2018-04-25 PROCEDURE — 74011250637 HC RX REV CODE- 250/637: Performed by: NURSE PRACTITIONER

## 2018-04-25 PROCEDURE — 82962 GLUCOSE BLOOD TEST: CPT

## 2018-04-25 PROCEDURE — 74011636637 HC RX REV CODE- 636/637: Performed by: SURGERY

## 2018-04-25 PROCEDURE — 36415 COLL VENOUS BLD VENIPUNCTURE: CPT | Performed by: NURSE PRACTITIONER

## 2018-04-25 RX ORDER — POTASSIUM CHLORIDE 750 MG/1
20 TABLET, FILM COATED, EXTENDED RELEASE ORAL 2 TIMES DAILY
Status: COMPLETED | OUTPATIENT
Start: 2018-04-25 | End: 2018-04-25

## 2018-04-25 RX ORDER — NICOTINE 7MG/24HR
1 PATCH, TRANSDERMAL 24 HOURS TRANSDERMAL DAILY
Status: DISCONTINUED | OUTPATIENT
Start: 2018-04-25 | End: 2018-04-30 | Stop reason: HOSPADM

## 2018-04-25 RX ORDER — OXYCODONE AND ACETAMINOPHEN 5; 325 MG/1; MG/1
2 TABLET ORAL
Status: DISCONTINUED | OUTPATIENT
Start: 2018-04-25 | End: 2018-04-27

## 2018-04-25 RX ADMIN — KETOROLAC TROMETHAMINE 15 MG: 30 INJECTION, SOLUTION INTRAMUSCULAR at 04:23

## 2018-04-25 RX ADMIN — POTASSIUM CHLORIDE 20 MEQ: 750 TABLET, FILM COATED, EXTENDED RELEASE ORAL at 10:22

## 2018-04-25 RX ADMIN — TRAZODONE HYDROCHLORIDE 50 MG: 50 TABLET ORAL at 22:46

## 2018-04-25 RX ADMIN — HUMAN INSULIN 3 UNITS: 100 INJECTION, SOLUTION SUBCUTANEOUS at 18:08

## 2018-04-25 RX ADMIN — OXYCODONE HYDROCHLORIDE AND ACETAMINOPHEN 2 TABLET: 5; 325 TABLET ORAL at 19:02

## 2018-04-25 RX ADMIN — Medication 10 ML: at 06:00

## 2018-04-25 RX ADMIN — OXYCODONE HYDROCHLORIDE AND ACETAMINOPHEN 2 TABLET: 5; 325 TABLET ORAL at 22:46

## 2018-04-25 RX ADMIN — DULOXETINE HYDROCHLORIDE 60 MG: 60 CAPSULE, DELAYED RELEASE ORAL at 09:04

## 2018-04-25 RX ADMIN — METOPROLOL TARTRATE 10 MG: 5 INJECTION, SOLUTION INTRAVENOUS at 04:19

## 2018-04-25 RX ADMIN — HUMAN INSULIN 2 UNITS: 100 INJECTION, SOLUTION SUBCUTANEOUS at 12:39

## 2018-04-25 RX ADMIN — POTASSIUM CHLORIDE 20 MEQ: 750 TABLET, FILM COATED, EXTENDED RELEASE ORAL at 18:09

## 2018-04-25 RX ADMIN — Medication 10 ML: at 22:47

## 2018-04-25 RX ADMIN — POTASSIUM CHLORIDE AND SODIUM CHLORIDE: 900; 150 INJECTION, SOLUTION INTRAVENOUS at 19:52

## 2018-04-25 RX ADMIN — POTASSIUM CHLORIDE AND SODIUM CHLORIDE: 900; 150 INJECTION, SOLUTION INTRAVENOUS at 09:05

## 2018-04-25 RX ADMIN — DOCUSATE SODIUM 100 MG: 100 CAPSULE, LIQUID FILLED ORAL at 18:09

## 2018-04-25 RX ADMIN — DOCUSATE SODIUM 100 MG: 100 CAPSULE, LIQUID FILLED ORAL at 09:04

## 2018-04-25 RX ADMIN — OXYCODONE HYDROCHLORIDE AND ACETAMINOPHEN 2 TABLET: 5; 325 TABLET ORAL at 15:10

## 2018-04-25 RX ADMIN — OXYCODONE HYDROCHLORIDE AND ACETAMINOPHEN 2 TABLET: 5; 325 TABLET ORAL at 10:22

## 2018-04-25 RX ADMIN — ENOXAPARIN SODIUM 40 MG: 40 INJECTION SUBCUTANEOUS at 09:04

## 2018-04-25 RX ADMIN — KETOROLAC TROMETHAMINE 15 MG: 30 INJECTION, SOLUTION INTRAMUSCULAR at 15:10

## 2018-04-25 NOTE — ROUTINE PROCESS
Bedside and Verbal shift change report given to Fara(oncoming nurse) by Nehal Rivera (offgoing nurse). Report included the following information SBAR, Kardex, Intake/Output, Med Rec Status and Cardiac Rhythm NSR.

## 2018-04-25 NOTE — PROGRESS NOTES
General Surgery Daily Progress Note    Admit Date: 2018  Post-Operative Day: 5 Days Post-Op from Procedure(s):  LAPAROSCOPIC CONVERTED TO OPEN DISTAL PANCREATECTOMY      Subjective:     Last 24 hrs: Pt feels better today. Is passing flatus. Still has pain but afraid dilaudid is making her constipated. Likes percocet - had some prior to surgery. WBC trending down; Tmax 99.2. CT results noted    Objective:     Blood pressure 167/76, pulse 80, temperature 98.7 °F (37.1 °C), resp. rate 17, height 5' 3\" (1.6 m), weight 170 lb 3.1 oz (77.2 kg), last menstrual period 2018, SpO2 97 %, not currently breastfeeding. Temp (24hrs), Av.9 °F (37.2 °C), Min:98.7 °F (37.1 °C), Max:99.2 °F (37.3 °C)      _____________________  Physical Exam:     Alert and Oriented, x3, in no acute distress.   Cardiovascular: RRR, no peripheral edema  Lungs:CTAB   Abdomen: soft w/ some distention; +BS, incision is healing well; BETSY w/ brown drainage      Assessment:   Principal Problem:    Pancreatic cyst (3/21/2018)    Active Problems:    Pancreas cyst (2018)            Plan:     Clear liq diet  Change dilaudid to percocet - cont toradol per order  OOB/IS  Cont IVF  Gi/dvt proph  Decrease Nicoderm to 7mg - per pt request  Replete K  Am labs    Data Review:    Recent Labs      18   0321  18   0250   WBC  11.3*  14.1*  13.7*   HGB  8.9*  9.4*  8.9*   HCT  27.3*  29.4*  28.3*   PLT  283  290  277     Recent Labs      18   04118   0321  18   0250   NA  136  134*  138   K  3.4*  3.3*  3.2*   CL  102  98  101   CO2  24  27  28   GLU  125*  146*  129*   BUN  5*  4*  5*   CREA  0.47*  0.60  0.53*   CA  8.7  8.2*  8.0*   ALB  2.5*   --   2.5*   SGOT  46*   --   45*   ALT  47   --   26     Recent Labs      18   0250   [facility]  [facility]           ______________________  Medications:    Current Facility-Administered Medications   Medication Dose Route Frequency    nicotine (NICODERM CQ) 7 mg/24 hr patch 1 Patch  1 Patch TransDERmal DAILY    oxyCODONE-acetaminophen (PERCOCET) 5-325 mg per tablet 2 Tab  2 Tab Oral Q4H PRN    potassium chloride SR (KLOR-CON 10) tablet 20 mEq  20 mEq Oral BID    metoprolol (LOPRESSOR) injection 10 mg  10 mg IntraVENous Q6H PRN    ketorolac (TORADOL) injection 15 mg  15 mg IntraVENous Q6H PRN    0.9% sodium chloride with KCl 20 mEq/L infusion   IntraVENous CONTINUOUS    enoxaparin (LOVENOX) injection 40 mg  40 mg SubCUTAneous Q24H    pantoprazole (PROTONIX) tablet 40 mg  40 mg Oral ACB    docusate sodium (COLACE) capsule 100 mg  100 mg Oral BID    acetaminophen (TYLENOL) tablet 650 mg  650 mg Oral Q4H PRN    sodium chloride (NS) flush 5-10 mL  5-10 mL IntraVENous Q8H    sodium chloride (NS) flush 5-10 mL  5-10 mL IntraVENous PRN    ondansetron (ZOFRAN) injection 4 mg  4 mg IntraVENous Q4H PRN    DULoxetine (CYMBALTA) capsule 60 mg  60 mg Oral DAILY    traZODone (DESYREL) tablet 50 mg  50 mg Oral QHS    insulin regular (NOVOLIN R, HUMULIN R) injection   SubCUTAneous AC&HS    glucose chewable tablet 16 g  4 Tab Oral PRN    dextrose (D50W) injection syrg 12.5-25 g  12.5-25 g IntraVENous PRN    glucagon (GLUCAGEN) injection 1 mg  1 mg IntraMUSCular PRN       Earnstine Drop, NP  4/25/2018                    ATTENDING ADDENDUM  I supervised the APC and reviewed the note. We discussed the plan of care  Passing a lot of flatus. Hungry. Will start gi light diet. Path benign.

## 2018-04-25 NOTE — PROGRESS NOTES
NUTRITION COMPLETE ASSESSMENT    RECOMMENDATIONS:   1. Continue diet advancement per surgery  -- If unable to advance diet to solids within the next 48 hours, consider short term PPN (see recs below prn)/    2. Continue daily weights (standing preferred)     Interventions/Plan:   Food/Nutrient Delivery: Ensure Clear    Assessment:   Reason for Assessment:   [x] NPO/CL x 6 days    Diet: Clear liquids  Nutritionally Significant Medications: [x] Reviewed & Includes: 0.9% sodium chloride with KCl 20 mEq/L @ 100 mL/hr; colace twice/day; insulin correction scale 4x/day; zofran q 4 hours prn; protonix daily; potassium chloride 20 mEq twice/day     Meal Intake:   Patient Vitals for the past 100 hrs:   % Diet Eaten   04/23/18 0845 0 %   04/22/18 1219 0 %   04/21/18 1730 5 %   04/21/18 1208 5 %     Pre-Hospitalization:  Usual Appetite: Excellent  Diet at Home: Regular  Vitamins/Supplements: No    Current Hospitalization:   Appetite: Fair  PO Ability: Independent      Subjective:  Pt in good spirits. In some pain after giving herself a bath. States her endurance feels very low. Objective:  Chart reviewed, discussed with RN and team during interdisciplinary rounds. Pt admitted for distal pancreatectomy. No significant nutrition related history. She is POD5 from open distal pancreatectomy. If unable to advance diet within the next 48 hours, consider PPN for short term nutrition support until diet is advanced. 4.25%AA D10 @ 63 mL/hr + 20% lipids, 250 mL 3x/week. This would provide a daily average of 985 kcal, 64 g protein in 1512 mL (1762 mL on lipid days)-- meeting 58% and 83% of estimated kcal and protein needs, respectively. Pt states weight has been stable (admits she has gained some). She had a great appetite PTA and is agreeable to trying ONS until her diet is advanced as she is concerned with her slow diet advancement.   She does hope to lose weight on discharge, but explained that now is not the time to fall farther behind nutritionally. She agrees. Will send Ensure Clear TID (720 kcal, 24 g protein). Estimated Nutrition Needs:   Kcals/day: 9146 Kcals/day (1809-3025 kcal/day (MSJ x 1.2-1.4))  Protein: 77 g (77-92 g/day (1-1.2 g/kg))  Fluid: 1700 ml (~1 mL/kcal)  Based On: [place] Jeor  Weight Used: Actual wt (77.2 kg)    Pt expected to meet estimated nutrient needs:  []   Yes     [x]  No    Nutrition Diagnosis:   1. Inadequate protein-energy intake related to slow diet progression as evidenced by Day 6 NPO/CL diet    Goals:     Pt to consume at least 2 supplemetns per day and diet to advance to solids within the next 72 hours     Monitoring & Evaluation:    - Total energy intake, Liquid meal replacement   - Weight/weight change     Previous Nutrition Goals Met:   N/A  Previous Recommendations:    N/A    Education & Discharge Needs:   [x] None Identified   [] Identified and addressed    [x] Participated in care plan, discharge planning, and/or interdisciplinary rounds        Cultural, Latter day and ethnic food preferences identified: None    Skin Integrity: []Intact  [x]Other: surgical incision  Edema: [x]None []Other  Last BM: None (+gas)  Food Allergies: [x]None []Other  Diet Restrictions: Cultural/Restorationist Preference(s): None     Anthropometrics:    Weight Loss Metrics 4/25/2018 4/20/2018 4/19/2018 4/18/2018 4/13/2018 4/3/2018 3/21/2018   Today's Wt 170 lb 3.1 oz - - 170 lb 165 lb 4 oz 155 lb 164 lb   BMI - 30.15 kg/m2 30.11 kg/m2 - 29.27 kg/m2 27.46 kg/m2 29.05 kg/m2      Weight Source: Standing scale (comment)  Height: 5' 3\" (160 cm),    Body mass index is 30.15 kg/(m^2).   IBW : 52.2 kg (115 lb),    Usual Body Weight: 77.1 kg (170 lb),      Labs:    Lab Results   Component Value Date/Time    Sodium 136 04/25/2018 04:19 AM    Potassium 3.4 (L) 04/25/2018 04:19 AM    Chloride 102 04/25/2018 04:19 AM    CO2 24 04/25/2018 04:19 AM    Glucose 125 (H) 04/25/2018 04:19 AM    BUN 5 (L) 04/25/2018 04:19 AM Creatinine 0.47 (L) 04/25/2018 04:19 AM    Calcium 8.7 04/25/2018 04:19 AM    Magnesium 1.9 06/26/2011 04:27 AM    Phosphorus 2.5 06/26/2011 04:27 AM    Albumin 2.5 (L) 04/25/2018 04:19 AM     Helen Torres, [address] [facility]

## 2018-04-25 NOTE — PROGRESS NOTES
Patient is refusing Q6 Glucose draws. Glucose stable at 04:00 lab draws. Will continue to monitor for s/s hypoglycemia        Bedside shift change report given to Courtney (oncoming nurse) by Zac Moeller (offgoing nurse). Report included the following information SBAR, OR Summary, Procedure Summary, Intake/Output and Cardiac Rhythm NSR.

## 2018-04-26 PROBLEM — E66.9 OBESITY (BMI 30-39.9): Status: ACTIVE | Noted: 2018-04-26

## 2018-04-26 LAB
ANION GAP SERPL CALC-SCNC: 9 MMOL/L (ref 5–15)
BUN SERPL-MCNC: 8 MG/DL (ref 6–20)
BUN/CREAT SERPL: 18 (ref 12–20)
CALCIUM SERPL-MCNC: 7.8 MG/DL (ref 8.5–10.1)
CHLORIDE SERPL-SCNC: 104 MMOL/L (ref 97–108)
CO2 SERPL-SCNC: 25 MMOL/L (ref 21–32)
CREAT SERPL-MCNC: 0.45 MG/DL (ref 0.55–1.02)
ERYTHROCYTE [DISTWIDTH] IN BLOOD BY AUTOMATED COUNT: 12.9 % (ref 11.5–14.5)
GLUCOSE BLD STRIP.AUTO-MCNC: 111 MG/DL (ref 65–100)
GLUCOSE BLD STRIP.AUTO-MCNC: 122 MG/DL (ref 65–100)
GLUCOSE BLD STRIP.AUTO-MCNC: 150 MG/DL (ref 65–100)
GLUCOSE BLD STRIP.AUTO-MCNC: 159 MG/DL (ref 65–100)
GLUCOSE SERPL-MCNC: 138 MG/DL (ref 65–100)
HCT VFR BLD AUTO: 29.6 % (ref 35–47)
HGB BLD-MCNC: 9.6 G/DL (ref 11.5–16)
MCH RBC QN AUTO: 32.4 PG (ref 26–34)
MCHC RBC AUTO-ENTMCNC: 32.4 G/DL (ref 30–36.5)
MCV RBC AUTO: 100 FL (ref 80–99)
NRBC # BLD: 0.02 K/UL (ref 0–0.01)
NRBC BLD-RTO: 0.2 PER 100 WBC
PLATELET # BLD AUTO: 331 K/UL (ref 150–400)
PMV BLD AUTO: 10.5 FL (ref 8.9–12.9)
POTASSIUM SERPL-SCNC: 3.8 MMOL/L (ref 3.5–5.1)
RBC # BLD AUTO: 2.96 M/UL (ref 3.8–5.2)
SERVICE CMNT-IMP: ABNORMAL
SODIUM SERPL-SCNC: 138 MMOL/L (ref 136–145)
WBC # BLD AUTO: 10.1 K/UL (ref 3.6–11)

## 2018-04-26 PROCEDURE — 74011250637 HC RX REV CODE- 250/637: Performed by: NURSE PRACTITIONER

## 2018-04-26 PROCEDURE — 36415 COLL VENOUS BLD VENIPUNCTURE: CPT | Performed by: NURSE PRACTITIONER

## 2018-04-26 PROCEDURE — 65270000032 HC RM SEMIPRIVATE

## 2018-04-26 PROCEDURE — 74011250636 HC RX REV CODE- 250/636: Performed by: NURSE PRACTITIONER

## 2018-04-26 PROCEDURE — 74011250637 HC RX REV CODE- 250/637: Performed by: SURGERY

## 2018-04-26 PROCEDURE — 80048 BASIC METABOLIC PNL TOTAL CA: CPT | Performed by: NURSE PRACTITIONER

## 2018-04-26 PROCEDURE — 74011636637 HC RX REV CODE- 636/637: Performed by: SURGERY

## 2018-04-26 PROCEDURE — 85027 COMPLETE CBC AUTOMATED: CPT | Performed by: NURSE PRACTITIONER

## 2018-04-26 PROCEDURE — 82962 GLUCOSE BLOOD TEST: CPT

## 2018-04-26 RX ORDER — POLYETHYLENE GLYCOL 3350 17 G/17G
17 POWDER, FOR SOLUTION ORAL DAILY
Status: DISCONTINUED | OUTPATIENT
Start: 2018-04-26 | End: 2018-04-30

## 2018-04-26 RX ORDER — PREGABALIN 25 MG/1
50 CAPSULE ORAL 2 TIMES DAILY
Status: DISCONTINUED | OUTPATIENT
Start: 2018-04-26 | End: 2018-04-30 | Stop reason: HOSPADM

## 2018-04-26 RX ADMIN — DOCUSATE SODIUM 100 MG: 100 CAPSULE, LIQUID FILLED ORAL at 17:10

## 2018-04-26 RX ADMIN — OXYCODONE HYDROCHLORIDE AND ACETAMINOPHEN 2 TABLET: 5; 325 TABLET ORAL at 08:04

## 2018-04-26 RX ADMIN — POTASSIUM CHLORIDE AND SODIUM CHLORIDE: 900; 150 INJECTION, SOLUTION INTRAVENOUS at 04:59

## 2018-04-26 RX ADMIN — TRAZODONE HYDROCHLORIDE 50 MG: 50 TABLET ORAL at 21:42

## 2018-04-26 RX ADMIN — HUMAN INSULIN 2 UNITS: 100 INJECTION, SOLUTION SUBCUTANEOUS at 12:20

## 2018-04-26 RX ADMIN — OXYCODONE HYDROCHLORIDE AND ACETAMINOPHEN 2 TABLET: 5; 325 TABLET ORAL at 19:57

## 2018-04-26 RX ADMIN — ENOXAPARIN SODIUM 40 MG: 40 INJECTION SUBCUTANEOUS at 09:29

## 2018-04-26 RX ADMIN — OXYCODONE HYDROCHLORIDE AND ACETAMINOPHEN 2 TABLET: 5; 325 TABLET ORAL at 16:29

## 2018-04-26 RX ADMIN — DULOXETINE HYDROCHLORIDE 60 MG: 60 CAPSULE, DELAYED RELEASE ORAL at 09:29

## 2018-04-26 RX ADMIN — DOCUSATE SODIUM 100 MG: 100 CAPSULE, LIQUID FILLED ORAL at 09:29

## 2018-04-26 RX ADMIN — OXYCODONE HYDROCHLORIDE AND ACETAMINOPHEN 2 TABLET: 5; 325 TABLET ORAL at 12:00

## 2018-04-26 RX ADMIN — PREGABALIN 50 MG: 25 CAPSULE ORAL at 17:10

## 2018-04-26 RX ADMIN — PANTOPRAZOLE SODIUM 40 MG: 40 TABLET, DELAYED RELEASE ORAL at 06:41

## 2018-04-26 RX ADMIN — HUMAN INSULIN 2 UNITS: 100 INJECTION, SOLUTION SUBCUTANEOUS at 07:30

## 2018-04-26 RX ADMIN — OXYCODONE HYDROCHLORIDE AND ACETAMINOPHEN 2 TABLET: 5; 325 TABLET ORAL at 03:47

## 2018-04-26 RX ADMIN — POLYETHYLENE GLYCOL 3350 17 G: 17 POWDER, FOR SOLUTION ORAL at 11:21

## 2018-04-26 RX ADMIN — Medication 10 ML: at 06:00

## 2018-04-26 NOTE — PROGRESS NOTES
TRANSFER - OUT REPORT:    Verbal report given to Sarah Morocho RN (name) on [facility]  being transferred to  (unit) for routine progression of care       Report consisted of patients Situation, Background, Assessment and   Recommendations(SBAR). Information from the following report(s) SBAR, Kardex, Procedure Summary, Intake/Output, MAR and Cardiac Rhythm NSR was reviewed with the receiving nurse. Lines:   Peripheral IV 04/24/18 Left Forearm (Active)   Site Assessment Clean, dry, & intact 4/26/2018  4:59 AM   Phlebitis Assessment 0 4/26/2018  4:59 AM   Infiltration Assessment 0 4/26/2018  4:59 AM   Dressing Status Clean, dry, & intact 4/26/2018  4:59 AM   Dressing Type Transparent 4/26/2018  4:59 AM   Hub Color/Line Status Blue; Infusing 4/26/2018  4:59 AM   Action Taken Open ports on tubing capped 4/26/2018  4:59 AM   Alcohol Cap Used Yes 4/26/2018  4:59 AM        Opportunity for questions and clarification was provided.       Patient transported with:   Registered Nurse

## 2018-04-26 NOTE — PROGRESS NOTES
several days ago the patient was overly sedated with her Dilaudid PCA resulting in confusion, poor pulmonary function. All cleared with cessation of sedation. She also developed hypokalemia treated with potassium repletion. Her elevated WBC is due to a partial splenic infarction due to the nature of her surgical procedure.

## 2018-04-26 NOTE — PROGRESS NOTES
Spiritual Care Partner Volunteer visited patient in room 442 on 4.26.18. Documented by: : Rev. Debi Ramon.  Mckay Duff; [facility], to contact [phone] call: [phone]

## 2018-04-26 NOTE — PROGRESS NOTES
Daily Progress Note  [facility] General Surgery at [address] Date: 2018  Post-Operative Day: 6 from Giovanna Hutson [address]      Subjective:     Last 24 hrs: Having throbbing pain around drain site, described as \"a pot of pain. \"  Otherwise, she is doing very well. Tolerating PO and + flatus. Requesting miralax. T max 98.7, WBC 10.1. Objective:     Blood pressure (!) 148/102, pulse 83, temperature 98.7 °F (37.1 °C), resp. rate 16, height 5' 3\" (1.6 m), weight 77.2 kg (170 lb 3.1 oz), last menstrual period 2018, SpO2 99 %, not currently breastfeeding. Temp (24hrs), Av.4 °F (36.9 °C), Min:98.1 °F (36.7 °C), Max:98.7 °F (37.1 °C)      _____________________  Physical Exam:     Alert and Oriented, lying in bed, no acute distress. Cardiovascular: RRR, no peripheral edema  Lungs:CTAB   Abdomen: + BS, soft. Tenderness around drain insertion site with 1 cm of erythema. Small area of tenderness superior to this area. No induration or drainage. Midline incision healing nicely. BETSY with tea colored output, 10 mL yesterday.        Assessment:   Principal Problem:    Pancreatic cyst (3/21/2018)    Active Problems:    Pancreas cyst (2018)      Abdominal pain      Plan:     Continue to monitor drain site  Follow WBC/temp  PO as tolerated  Can transfer to floor  OOB  Lovenox for DVT prophylaxis      Jeanette Richards, [address] [facility],   [facility], [address] [place] [address]  [phone]    Data Review:    Recent Labs      18   0357  18   0419  18   0321   WBC  10.1  11.3*  14.1*   HGB  9.6*  8.9*  9.4*   HCT  29.6*  27.3*  29.4*   PLT  331  283  290     Recent Labs      18   0357  18   0419  18   0321   NA  138  136  134*   K  3.8  3.4*  3.3*   CL  104  102  98   CO2  25  24  27   GLU  138*  125*  146*   BUN  8  5*  4*   CREA  0.45*  0.47*  0.60   CA  7.8*  8.7  8.2*   ALB   -- 2.5*   --    TBILI   --   0.7   --    SGOT   --   46*   --    ALT   --   47   --      No results for input(s): AML, LPSE in the last 72 hours. ______________________  Medications:    Current Facility-Administered Medications   Medication Dose Route Frequency    polyethylene glycol (MIRALAX) packet 17 g  17 g Oral DAILY    nicotine (NICODERM CQ) 7 mg/24 hr patch 1 Patch  1 Patch TransDERmal DAILY    oxyCODONE-acetaminophen (PERCOCET) 5-325 mg per tablet 2 Tab  2 Tab Oral Q4H PRN    metoprolol (LOPRESSOR) injection 10 mg  10 mg IntraVENous Q6H PRN    0.9% sodium chloride with KCl 20 mEq/L infusion   IntraVENous CONTINUOUS    enoxaparin (LOVENOX) injection 40 mg  40 mg SubCUTAneous Q24H    pantoprazole (PROTONIX) tablet 40 mg  40 mg Oral ACB    docusate sodium (COLACE) capsule 100 mg  100 mg Oral BID    acetaminophen (TYLENOL) tablet 650 mg  650 mg Oral Q4H PRN    sodium chloride (NS) flush 5-10 mL  5-10 mL IntraVENous Q8H    sodium chloride (NS) flush 5-10 mL  5-10 mL IntraVENous PRN    ondansetron (ZOFRAN) injection 4 mg  4 mg IntraVENous Q4H PRN    DULoxetine (CYMBALTA) capsule 60 mg  60 mg Oral DAILY    traZODone (DESYREL) tablet 50 mg  50 mg Oral QHS    insulin regular (NOVOLIN R, HUMULIN R) injection   SubCUTAneous AC&HS    glucose chewable tablet 16 g  4 Tab Oral PRN    dextrose (D50W) injection syrg 12.5-25 g  12.5-25 g IntraVENous PRN    glucagon (GLUCAGEN) injection 1 mg  1 mg IntraMUSCular PRN                                                                                               ATTENDING ADDENDUM  I supervised the APC and reviewed the note. We discussed the plan of care  The incision site looks ok to me. Describes a radicular component to the pain from around the drain site. Will try Lyrica rather than increase narcotics. Otherwise doing very well.

## 2018-04-26 NOTE — PROGRESS NOTES
For completeness sake she is noted to be obese, which was taken into account during her surgery and post operative course

## 2018-04-27 LAB
ERYTHROCYTE [DISTWIDTH] IN BLOOD BY AUTOMATED COUNT: 12.6 % (ref 11.5–14.5)
GLUCOSE BLD STRIP.AUTO-MCNC: 109 MG/DL (ref 65–100)
GLUCOSE BLD STRIP.AUTO-MCNC: 128 MG/DL (ref 65–100)
GLUCOSE BLD STRIP.AUTO-MCNC: 187 MG/DL (ref 65–100)
GLUCOSE BLD STRIP.AUTO-MCNC: 212 MG/DL (ref 65–100)
HCT VFR BLD AUTO: 28.9 % (ref 35–47)
HGB BLD-MCNC: 9.7 G/DL (ref 11.5–16)
MCH RBC QN AUTO: 32.1 PG (ref 26–34)
MCHC RBC AUTO-ENTMCNC: 33.6 G/DL (ref 30–36.5)
MCV RBC AUTO: 95.7 FL (ref 80–99)
NRBC # BLD: 0 K/UL (ref 0–0.01)
NRBC BLD-RTO: 0 PER 100 WBC
PLATELET # BLD AUTO: 359 K/UL (ref 150–400)
PMV BLD AUTO: 10.7 FL (ref 8.9–12.9)
RBC # BLD AUTO: 3.02 M/UL (ref 3.8–5.2)
SERVICE CMNT-IMP: ABNORMAL
WBC # BLD AUTO: 11.5 K/UL (ref 3.6–11)

## 2018-04-27 PROCEDURE — 74011250637 HC RX REV CODE- 250/637: Performed by: SURGERY

## 2018-04-27 PROCEDURE — 74011250637 HC RX REV CODE- 250/637: Performed by: NURSE PRACTITIONER

## 2018-04-27 PROCEDURE — 74011250636 HC RX REV CODE- 250/636: Performed by: NURSE PRACTITIONER

## 2018-04-27 PROCEDURE — 74011636637 HC RX REV CODE- 636/637: Performed by: NURSE PRACTITIONER

## 2018-04-27 PROCEDURE — 85027 COMPLETE CBC AUTOMATED: CPT | Performed by: NURSE PRACTITIONER

## 2018-04-27 PROCEDURE — 36415 COLL VENOUS BLD VENIPUNCTURE: CPT | Performed by: NURSE PRACTITIONER

## 2018-04-27 PROCEDURE — 74011250636 HC RX REV CODE- 250/636: Performed by: SURGERY

## 2018-04-27 PROCEDURE — 82962 GLUCOSE BLOOD TEST: CPT

## 2018-04-27 PROCEDURE — 65270000032 HC RM SEMIPRIVATE

## 2018-04-27 PROCEDURE — 51798 US URINE CAPACITY MEASURE: CPT

## 2018-04-27 RX ORDER — DIPHENHYDRAMINE HYDROCHLORIDE 50 MG/ML
12.5 INJECTION, SOLUTION INTRAMUSCULAR; INTRAVENOUS
Status: DISCONTINUED | OUTPATIENT
Start: 2018-04-27 | End: 2018-04-30 | Stop reason: HOSPADM

## 2018-04-27 RX ORDER — OXYCODONE HYDROCHLORIDE 5 MG/1
10 TABLET ORAL
Status: DISCONTINUED | OUTPATIENT
Start: 2018-04-27 | End: 2018-04-30 | Stop reason: HOSPADM

## 2018-04-27 RX ORDER — ACETAMINOPHEN 325 MG/1
650 TABLET ORAL
Status: DISCONTINUED | OUTPATIENT
Start: 2018-04-27 | End: 2018-04-30 | Stop reason: HOSPADM

## 2018-04-27 RX ADMIN — ACETAMINOPHEN 650 MG: 325 TABLET, FILM COATED ORAL at 17:24

## 2018-04-27 RX ADMIN — ENOXAPARIN SODIUM 40 MG: 40 INJECTION SUBCUTANEOUS at 09:42

## 2018-04-27 RX ADMIN — OXYCODONE HYDROCHLORIDE 10 MG: 5 TABLET ORAL at 12:52

## 2018-04-27 RX ADMIN — DOCUSATE SODIUM 100 MG: 100 CAPSULE, LIQUID FILLED ORAL at 09:41

## 2018-04-27 RX ADMIN — Medication 10 ML: at 13:08

## 2018-04-27 RX ADMIN — POLYETHYLENE GLYCOL 3350 17 G: 17 POWDER, FOR SOLUTION ORAL at 09:43

## 2018-04-27 RX ADMIN — PREGABALIN 50 MG: 25 CAPSULE ORAL at 09:41

## 2018-04-27 RX ADMIN — Medication 10 ML: at 06:37

## 2018-04-27 RX ADMIN — ACETAMINOPHEN 650 MG: 325 TABLET, FILM COATED ORAL at 04:15

## 2018-04-27 RX ADMIN — PANTOPRAZOLE SODIUM 40 MG: 40 TABLET, DELAYED RELEASE ORAL at 06:37

## 2018-04-27 RX ADMIN — DOCUSATE SODIUM 100 MG: 100 CAPSULE, LIQUID FILLED ORAL at 17:24

## 2018-04-27 RX ADMIN — DIPHENHYDRAMINE HYDROCHLORIDE 12.5 MG: 50 INJECTION, SOLUTION INTRAMUSCULAR; INTRAVENOUS at 19:41

## 2018-04-27 RX ADMIN — Medication 10 ML: at 19:42

## 2018-04-27 RX ADMIN — DULOXETINE HYDROCHLORIDE 60 MG: 60 CAPSULE, DELAYED RELEASE ORAL at 09:41

## 2018-04-27 RX ADMIN — TRAZODONE HYDROCHLORIDE 50 MG: 50 TABLET ORAL at 22:00

## 2018-04-27 RX ADMIN — OXYCODONE HYDROCHLORIDE AND ACETAMINOPHEN 2 TABLET: 5; 325 TABLET ORAL at 06:01

## 2018-04-27 RX ADMIN — PREGABALIN 50 MG: 25 CAPSULE ORAL at 17:24

## 2018-04-27 RX ADMIN — OXYCODONE HYDROCHLORIDE 10 MG: 5 TABLET ORAL at 22:00

## 2018-04-27 RX ADMIN — OXYCODONE HYDROCHLORIDE 10 MG: 5 TABLET ORAL at 15:53

## 2018-04-27 RX ADMIN — OXYCODONE HYDROCHLORIDE AND ACETAMINOPHEN 2 TABLET: 5; 325 TABLET ORAL at 09:51

## 2018-04-27 RX ADMIN — Medication 10 ML: at 06:03

## 2018-04-27 RX ADMIN — HUMAN INSULIN 2 UNITS: 100 INJECTION, SOLUTION SUBCUTANEOUS at 13:12

## 2018-04-27 RX ADMIN — OXYCODONE HYDROCHLORIDE 10 MG: 5 TABLET ORAL at 18:46

## 2018-04-27 RX ADMIN — OXYCODONE HYDROCHLORIDE AND ACETAMINOPHEN 2 TABLET: 5; 325 TABLET ORAL at 01:27

## 2018-04-27 NOTE — PROGRESS NOTES
Daily Progress Note  [facility] General Surgery at [address] Date: 2018  Post-Operative Day: 6 from Procedure(s):  LAPAROSCOPIC CONVERTED TO OPEN DISTAL PANCREATECTOMY      Subjective:     Last 24 hrs: Continues to have throbbing pain in LUQ above drain site, pain medication helps, but wears off before she can get next dose. Otherwise doing well. Eating small amounts, + flatus. No BM yet. Objective:     Blood pressure (!) 147/92, pulse 76, temperature 98.1 °F (36.7 °C), resp. rate 16, height 5' 3\" (1.6 m), weight 77.2 kg (170 lb 3.1 oz), last menstrual period 2018, SpO2 97 %, not currently breastfeeding. Temp (24hrs), Av.3 °F (36.8 °C), Min:98.1 °F (36.7 °C), Max:98.6 °F (37 °C)      _____________________  Physical Exam:     Alert and Oriented, sitting up in bed, no acute distress. Cardiovascular: RRR, no peripheral edema  Lungs:CTAB   Abdomen: soft, incision healing well. Pain in LUQ is unchanged. Drain with tea colored drainage, which is unchanged. 40 mL out yesterday. Assessment:   Principal Problem:    Pancreatic cyst (3/21/2018)    Active Problems:    Pancreas cyst (2018)      Obesity (BMI 30-39.9) (2018)    splenic infarct - likely cause of her on-going pain. Will continue to treat symptomatically.          Plan:     Adjust pain medication   KVO IVF  Plan for discharge once pain is better controlled  OOB  Lovenox for DVT prophylaxis        Ming Lombardi, ACNP - [address] [facility] Surgery at [place] [address],  [facility], [address] [place] [address]  [phone]    Data Review:    Recent Labs      18   04218   WBC  11.5*  10.1  11.3*   HGB  9.7*  9.6*  8.9*   HCT  28.9*  29.6*  27.3*   PLT  359  331  283     Recent Labs      18   0357  18   NA  138  136   K  3.8  3.4*   CL  104  102   CO2  25  24   GLU  138*  125*   BUN  8  5*   CREA  0.45*  0.47*   CA  7.8*  8.7   ALB   --   2.5*   TBILI --   0.7   SGOT   --   46*   ALT   --   47     No results for input(s): AML, LPSE in the last 72 hours. ______________________  Medications:    Current Facility-Administered Medications   Medication Dose Route Frequency    polyethylene glycol (MIRALAX) packet 17 g  17 g Oral DAILY    pregabalin (LYRICA) capsule 50 mg  50 mg Oral BID    nicotine (NICODERM CQ) 7 mg/24 hr patch 1 Patch  1 Patch TransDERmal DAILY    oxyCODONE-acetaminophen (PERCOCET) 5-325 mg per tablet 2 Tab  2 Tab Oral Q4H PRN    metoprolol (LOPRESSOR) injection 10 mg  10 mg IntraVENous Q6H PRN    0.9% sodium chloride with KCl 20 mEq/L infusion   IntraVENous CONTINUOUS    enoxaparin (LOVENOX) injection 40 mg  40 mg SubCUTAneous Q24H    pantoprazole (PROTONIX) tablet 40 mg  40 mg Oral ACB    docusate sodium (COLACE) capsule 100 mg  100 mg Oral BID    acetaminophen (TYLENOL) tablet 650 mg  650 mg Oral Q4H PRN    sodium chloride (NS) flush 5-10 mL  5-10 mL IntraVENous Q8H    sodium chloride (NS) flush 5-10 mL  5-10 mL IntraVENous PRN    ondansetron (ZOFRAN) injection 4 mg  4 mg IntraVENous Q4H PRN    DULoxetine (CYMBALTA) capsule 60 mg  60 mg Oral DAILY    traZODone (DESYREL) tablet 50 mg  50 mg Oral QHS    insulin regular (NOVOLIN R, HUMULIN R) injection   SubCUTAneous AC&HS    glucose chewable tablet 16 g  4 Tab Oral PRN    dextrose (D50W) injection syrg 12.5-25 g  12.5-25 g IntraVENous PRN    glucagon (GLUCAGEN) injection 1 mg  1 mg IntraMUSCular PRN                                                                                               ATTENDING ADDENDUM  I supervised the APC and reviewed the note. We discussed the plan of care  Will adjust her pain meds and hopefully get her home in several days.

## 2018-04-27 NOTE — PROGRESS NOTES
Bedside shift change report given to Dwight Vera (oncoming nurse) by Miguel Kwan (offgoing nurse). Report included the following information SBAR.

## 2018-04-27 NOTE — PROGRESS NOTES
Bedside shift change report given to ISIDRO Prabhakar (oncoming nurse) by Geneva Gonsalez RN (offgoing nurse). Report included the following information SBAR, Intake/Output and MAR.

## 2018-04-27 NOTE — PROGRESS NOTES
Bedside and Verbal shift change report given to Jennifer Barroso (oncoming nurse) by Early Leventhal (offgoing nurse). Report included the following information SBAR, Kardex, Procedure Summary, Intake/Output, MAR, Accordion and Recent Results.

## 2018-04-27 NOTE — PROGRESS NOTES
CM following for discharge planning, patient transferred to [address] [place] [address] from [address] [facility]. Patient advancing diet, passing flatus, working to manage pain. Expect discharge home when medically clear, no CM needs identified at this time. Per 4/24/18 note Brunilda Cleveland), patient declined offer of PCP setup. Please consult CM should new discharge needs arise.     JIMY Craig

## 2018-04-27 NOTE — PROGRESS NOTES
1935:  Patient developing hives. RN noted two spots, one on the RLQ of abd and one on the back of her L shoulder. Patient states that this happens occasionally when she gets stressed. Dr. Torres Cavazos paged. Order received for 12.5mg IV benadryl Q6H PRN. Will monitor. Bedside shift change report given to Michael Camargo RN (oncoming nurse) by Karen Smith RN (offgoing nurse). Report included the following information SBAR, Intake/Output and MAR.

## 2018-04-28 LAB
GLUCOSE BLD STRIP.AUTO-MCNC: 151 MG/DL (ref 65–100)
GLUCOSE BLD STRIP.AUTO-MCNC: 177 MG/DL (ref 65–100)
GLUCOSE BLD STRIP.AUTO-MCNC: 207 MG/DL (ref 65–100)
SERVICE CMNT-IMP: ABNORMAL

## 2018-04-28 PROCEDURE — 74011250637 HC RX REV CODE- 250/637: Performed by: SURGERY

## 2018-04-28 PROCEDURE — 74011250637 HC RX REV CODE- 250/637: Performed by: NURSE PRACTITIONER

## 2018-04-28 PROCEDURE — 82962 GLUCOSE BLOOD TEST: CPT

## 2018-04-28 PROCEDURE — 74011636637 HC RX REV CODE- 636/637: Performed by: NURSE PRACTITIONER

## 2018-04-28 PROCEDURE — 65270000032 HC RM SEMIPRIVATE

## 2018-04-28 PROCEDURE — 74011250636 HC RX REV CODE- 250/636: Performed by: NURSE PRACTITIONER

## 2018-04-28 RX ADMIN — DOCUSATE SODIUM 100 MG: 100 CAPSULE, LIQUID FILLED ORAL at 18:04

## 2018-04-28 RX ADMIN — ENOXAPARIN SODIUM 40 MG: 40 INJECTION SUBCUTANEOUS at 11:08

## 2018-04-28 RX ADMIN — HUMAN INSULIN 2 UNITS: 100 INJECTION, SOLUTION SUBCUTANEOUS at 08:01

## 2018-04-28 RX ADMIN — HUMAN INSULIN 3 UNITS: 100 INJECTION, SOLUTION SUBCUTANEOUS at 16:30

## 2018-04-28 RX ADMIN — PANTOPRAZOLE SODIUM 40 MG: 40 TABLET, DELAYED RELEASE ORAL at 07:32

## 2018-04-28 RX ADMIN — OXYCODONE HYDROCHLORIDE 10 MG: 5 TABLET ORAL at 20:31

## 2018-04-28 RX ADMIN — Medication 10 ML: at 21:33

## 2018-04-28 RX ADMIN — OXYCODONE HYDROCHLORIDE 10 MG: 5 TABLET ORAL at 01:05

## 2018-04-28 RX ADMIN — PREGABALIN 50 MG: 25 CAPSULE ORAL at 08:40

## 2018-04-28 RX ADMIN — POLYETHYLENE GLYCOL 3350 17 G: 17 POWDER, FOR SOLUTION ORAL at 08:40

## 2018-04-28 RX ADMIN — OXYCODONE HYDROCHLORIDE 10 MG: 5 TABLET ORAL at 07:33

## 2018-04-28 RX ADMIN — OXYCODONE HYDROCHLORIDE 10 MG: 5 TABLET ORAL at 04:32

## 2018-04-28 RX ADMIN — OXYCODONE HYDROCHLORIDE 10 MG: 5 TABLET ORAL at 16:42

## 2018-04-28 RX ADMIN — OXYCODONE HYDROCHLORIDE 10 MG: 5 TABLET ORAL at 10:41

## 2018-04-28 RX ADMIN — Medication 10 ML: at 04:36

## 2018-04-28 RX ADMIN — ACETAMINOPHEN 650 MG: 325 TABLET, FILM COATED ORAL at 13:59

## 2018-04-28 RX ADMIN — DOCUSATE SODIUM 100 MG: 100 CAPSULE, LIQUID FILLED ORAL at 08:41

## 2018-04-28 RX ADMIN — OXYCODONE HYDROCHLORIDE 10 MG: 5 TABLET ORAL at 13:59

## 2018-04-28 RX ADMIN — DULOXETINE HYDROCHLORIDE 60 MG: 60 CAPSULE, DELAYED RELEASE ORAL at 08:40

## 2018-04-28 RX ADMIN — HUMAN INSULIN 2 UNITS: 100 INJECTION, SOLUTION SUBCUTANEOUS at 11:52

## 2018-04-28 RX ADMIN — PREGABALIN 50 MG: 25 CAPSULE ORAL at 18:04

## 2018-04-28 RX ADMIN — ACETAMINOPHEN 650 MG: 325 TABLET, FILM COATED ORAL at 07:31

## 2018-04-28 RX ADMIN — TRAZODONE HYDROCHLORIDE 50 MG: 50 TABLET ORAL at 21:32

## 2018-04-28 RX ADMIN — ACETAMINOPHEN 650 MG: 325 TABLET, FILM COATED ORAL at 18:04

## 2018-04-28 NOTE — PROGRESS NOTES
Daily Progress Note  [facility] General Surgery at [address] Date: 2018  Post-Operative Day: 7 from Gi[facility] [address]      Subjective:     Last 24 hrs:  Still having throbbing pain a few cm above drain site, but pain is overall better with this regimen. Tolerating PO and + flatus. No BM yet. Objective:     Blood pressure 126/80, pulse 77, temperature 98.1 °F (36.7 °C), resp. rate 16, height 5' 3\" (1.6 m), weight 77.2 kg (170 lb 3.1 oz), last menstrual period 2018, SpO2 92 %, not currently breastfeeding. Temp (24hrs), Av °F (37.2 °C), Min:98.1 °F (36.7 °C), Max:99.7 °F (37.6 °C)      _____________________  Physical Exam:     Alert and Oriented, sitting up on side of bed, pleasant and cooperative. Cardiovascular: RRR, no peripheral edema  Lungs:CTAB   Abdomen: soft, incision healing well. BETSY with tea colored fluid, 20 mL out yesterday. Slight erythema around insertion site, improved since Wednesday. Assessment:   Principal Problem:    Pancreatic cyst (3/21/2018)    Active Problems:    Pancreas cyst (2018)      Obesity (BMI 30-39.9) (2018)      Splenic infarct      Plan:     Continue bowel regimen, await return of function. Continue current pain medication  Plan for dc in the next 2-3 days. Esteban Pavon [facility] Surgery at [facility] 124,  [place], [address] [place] [address], [facility]  [phone]    Data Review:    Recent Labs      18   0421  18   0357   WBC  11.5*  10.1   HGB  9.7*  9.6*   HCT  28.9*  29.6*   PLT  359  331     Recent Labs      18   0357   NA  138   K  3.8   CL  104   CO2  25   GLU  138*   BUN  8   CREA  0.45*   CA  7.8*     No results for input(s): AML, LPSE in the last 72 hours.         ______________________  Medications:    Current Facility-Administered Medications   Medication Dose Route Frequency    acetaminophen (TYLENOL) tablet 650 mg  650 mg Oral Q6HWA    oxyCODONE IR (ROXICODONE) tablet 10 mg  10 mg Oral Q3H PRN    insulin regular (NOVOLIN R, HUMULIN R) injection   SubCUTAneous TIDAC    diphenhydrAMINE (BENADRYL) injection 12.5 mg  12.5 mg IntraVENous Q6H PRN    polyethylene glycol (MIRALAX) packet 17 g  17 g Oral DAILY    pregabalin (LYRICA) capsule 50 mg  50 mg Oral BID    nicotine (NICODERM CQ) 7 mg/24 hr patch 1 Patch  1 Patch TransDERmal DAILY    metoprolol (LOPRESSOR) injection 10 mg  10 mg IntraVENous Q6H PRN    enoxaparin (LOVENOX) injection 40 mg  40 mg SubCUTAneous Q24H    pantoprazole (PROTONIX) tablet 40 mg  40 mg Oral ACB    docusate sodium (COLACE) capsule 100 mg  100 mg Oral BID    sodium chloride (NS) flush 5-10 mL  5-10 mL IntraVENous Q8H    sodium chloride (NS) flush 5-10 mL  5-10 mL IntraVENous PRN    ondansetron (ZOFRAN) injection 4 mg  4 mg IntraVENous Q4H PRN    DULoxetine (CYMBALTA) capsule 60 mg  60 mg Oral DAILY    traZODone (DESYREL) tablet 50 mg  50 mg Oral QHS    glucose chewable tablet 16 g  4 Tab Oral PRN    dextrose (D50W) injection syrg 12.5-25 g  12.5-25 g IntraVENous PRN    glucagon (GLUCAGEN) injection 1 mg  1 mg IntraMUSCular PRN         Pt seen and examined  Agree with above  Still complaining of some pain mainly around the drain    abd- s/mild incisional tenderness.  -overall improving  Conitnue diet and pain control

## 2018-04-29 LAB
GLUCOSE BLD STRIP.AUTO-MCNC: 132 MG/DL (ref 65–100)
GLUCOSE BLD STRIP.AUTO-MCNC: 140 MG/DL (ref 65–100)
GLUCOSE BLD STRIP.AUTO-MCNC: 140 MG/DL (ref 65–100)
GLUCOSE BLD STRIP.AUTO-MCNC: 160 MG/DL (ref 65–100)
GLUCOSE BLD STRIP.AUTO-MCNC: 165 MG/DL (ref 65–100)
GLUCOSE BLD STRIP.AUTO-MCNC: 185 MG/DL (ref 65–100)
GLUCOSE BLD STRIP.AUTO-MCNC: 211 MG/DL (ref 65–100)
SERVICE CMNT-IMP: ABNORMAL

## 2018-04-29 PROCEDURE — 74011250637 HC RX REV CODE- 250/637: Performed by: NURSE PRACTITIONER

## 2018-04-29 PROCEDURE — 74011250637 HC RX REV CODE- 250/637: Performed by: SURGERY

## 2018-04-29 PROCEDURE — 74011250636 HC RX REV CODE- 250/636: Performed by: NURSE PRACTITIONER

## 2018-04-29 PROCEDURE — 65270000032 HC RM SEMIPRIVATE

## 2018-04-29 PROCEDURE — 74011636637 HC RX REV CODE- 636/637: Performed by: NURSE PRACTITIONER

## 2018-04-29 RX ADMIN — HUMAN INSULIN 2 UNITS: 100 INJECTION, SOLUTION SUBCUTANEOUS at 18:01

## 2018-04-29 RX ADMIN — ACETAMINOPHEN 650 MG: 325 TABLET, FILM COATED ORAL at 06:12

## 2018-04-29 RX ADMIN — OXYCODONE HYDROCHLORIDE 10 MG: 5 TABLET ORAL at 16:28

## 2018-04-29 RX ADMIN — HUMAN INSULIN 2 UNITS: 100 INJECTION, SOLUTION SUBCUTANEOUS at 07:53

## 2018-04-29 RX ADMIN — DOCUSATE SODIUM 100 MG: 100 CAPSULE, LIQUID FILLED ORAL at 09:06

## 2018-04-29 RX ADMIN — PANTOPRAZOLE SODIUM 40 MG: 40 TABLET, DELAYED RELEASE ORAL at 07:32

## 2018-04-29 RX ADMIN — ACETAMINOPHEN 650 MG: 325 TABLET, FILM COATED ORAL at 18:02

## 2018-04-29 RX ADMIN — OXYCODONE HYDROCHLORIDE 10 MG: 5 TABLET ORAL at 19:30

## 2018-04-29 RX ADMIN — POLYETHYLENE GLYCOL 3350 17 G: 17 POWDER, FOR SOLUTION ORAL at 09:07

## 2018-04-29 RX ADMIN — OXYCODONE HYDROCHLORIDE 10 MG: 5 TABLET ORAL at 04:14

## 2018-04-29 RX ADMIN — PREGABALIN 50 MG: 25 CAPSULE ORAL at 18:01

## 2018-04-29 RX ADMIN — Medication 10 ML: at 06:10

## 2018-04-29 RX ADMIN — TRAZODONE HYDROCHLORIDE 50 MG: 50 TABLET ORAL at 22:18

## 2018-04-29 RX ADMIN — Medication 10 ML: at 13:09

## 2018-04-29 RX ADMIN — OXYCODONE HYDROCHLORIDE 10 MG: 5 TABLET ORAL at 10:11

## 2018-04-29 RX ADMIN — DULOXETINE HYDROCHLORIDE 60 MG: 60 CAPSULE, DELAYED RELEASE ORAL at 09:06

## 2018-04-29 RX ADMIN — OXYCODONE HYDROCHLORIDE 10 MG: 5 TABLET ORAL at 07:32

## 2018-04-29 RX ADMIN — OXYCODONE HYDROCHLORIDE 10 MG: 5 TABLET ORAL at 22:39

## 2018-04-29 RX ADMIN — ENOXAPARIN SODIUM 40 MG: 40 INJECTION SUBCUTANEOUS at 09:09

## 2018-04-29 RX ADMIN — OXYCODONE HYDROCHLORIDE 10 MG: 5 TABLET ORAL at 13:08

## 2018-04-29 RX ADMIN — PREGABALIN 50 MG: 25 CAPSULE ORAL at 09:06

## 2018-04-29 RX ADMIN — DOCUSATE SODIUM 100 MG: 100 CAPSULE, LIQUID FILLED ORAL at 18:02

## 2018-04-29 RX ADMIN — HUMAN INSULIN 2 UNITS: 100 INJECTION, SOLUTION SUBCUTANEOUS at 12:08

## 2018-04-29 RX ADMIN — ACETAMINOPHEN 650 MG: 325 TABLET, FILM COATED ORAL at 12:07

## 2018-04-29 NOTE — PROGRESS NOTES
Progress Note    Patient: Alicia Malik MRN: [id number]  SSN: [id number]    YOB: 1980  Age: 42 y.o. Sex: female      Admit Date: 2018    9 Days Post-Op    Procedure:  Procedure(s):  LAPAROSCOPIC CONVERTED TO OPEN DISTAL PANCREATECTOMY     Subjective:     Patient starting to feel better. She states the pain medicine is more effective now. + bm   Tolerating diet. Objective:     Visit Vitals    /77 (BP 1 Location: Left arm, BP Patient Position: At rest)    Pulse 85    Temp 98.6 °F (37 °C)    Resp 16    Ht 5' 3\" (1.6 m)    Wt 170 lb 3.1 oz (77.2 kg)    SpO2 91%    Breastfeeding No    BMI 30.15 kg/m2       Temp (24hrs), Av.2 °F (36.8 °C), Min:97.8 °F (36.6 °C), Max:98.6 °F (37 °C)      Physical Exam:    Gen- alert in NAD  Lungs- CTA  H-RRR   Abd- S with mild LUQ pain near drain  5 cc of milky drainage today. Data Review: images and reports reviewed    Lab Review: All lab results for the last 24 hours reviewed.   Recent Results (from the past 24 hour(s))   GLUCOSE, POC    Collection Time: 18  9:14 PM   Result Value Ref Range    Glucose (POC) 132 (H) 65 - 100 mg/dL    Performed by GEORGE NICHOLAS(CON)    GLUCOSE, POC    Collection Time: 18 11:18 PM   Result Value Ref Range    Glucose (POC) 140 (H) 65 - 100 mg/dL    Performed by GEORGE NICHOLAS(CON)    GLUCOSE, POC    Collection Time: 18 11:18 PM   Result Value Ref Range    Glucose (POC) 140 (H) 65 - 100 mg/dL    Performed by GEORGE NICHOLAS(CON)    GLUCOSE, POC    Collection Time: 18  7:35 AM   Result Value Ref Range    Glucose (POC) 165 (H) 65 - 100 mg/dL    Performed by [facility]    GLUCOSE, POC    Collection Time: 18 11:37 AM   Result Value Ref Range    Glucose (POC) 160 (H) 65 - 100 mg/dL    Performed by TJ Marlow 119, POC    Collection Time: 18  4:01 PM   Result Value Ref Range    Glucose (POC) 185 (H) 65 - 100 mg/dL    Performed by [address] [place] [address] Problems  Date Reviewed: 4/20/2018          Codes Class Noted POA    Obesity (BMI 30-39. 9) ICD-10-CM: E66.9  ICD-9-CM: 278.00  4/26/2018 Unknown        Pancreas cyst ICD-10-CM: K86.2  ICD-9-CM: 577.2  4/20/2018 Unknown        * (Principal)Pancreatic cyst ICD-10-CM: K86.2  ICD-9-CM: 577.2  3/21/2018 Yes              Plan/Recommendations/Medical Decision Making:   Continues to improve - May be able to be discharged soon.      Signed By: Melanie Aragon MD     April 29, 2018

## 2018-04-30 VITALS
RESPIRATION RATE: 18 BRPM | DIASTOLIC BLOOD PRESSURE: 74 MMHG | OXYGEN SATURATION: 94 % | HEIGHT: 63 IN | TEMPERATURE: 98.2 F | SYSTOLIC BLOOD PRESSURE: 120 MMHG | WEIGHT: 170.19 LBS | HEART RATE: 79 BPM | BODY MASS INDEX: 30.16 KG/M2

## 2018-04-30 LAB
GLUCOSE BLD STRIP.AUTO-MCNC: 142 MG/DL (ref 65–100)
GLUCOSE BLD STRIP.AUTO-MCNC: 180 MG/DL (ref 65–100)
SERVICE CMNT-IMP: ABNORMAL
SERVICE CMNT-IMP: ABNORMAL

## 2018-04-30 PROCEDURE — 74011250637 HC RX REV CODE- 250/637: Performed by: NURSE PRACTITIONER

## 2018-04-30 PROCEDURE — 82962 GLUCOSE BLOOD TEST: CPT

## 2018-04-30 PROCEDURE — 74011250637 HC RX REV CODE- 250/637: Performed by: SURGERY

## 2018-04-30 PROCEDURE — 74011250636 HC RX REV CODE- 250/636: Performed by: NURSE PRACTITIONER

## 2018-04-30 PROCEDURE — 74011636637 HC RX REV CODE- 636/637: Performed by: NURSE PRACTITIONER

## 2018-04-30 RX ORDER — NALOXONE HYDROCHLORIDE 2 MG/.4ML
2 INJECTION, SOLUTION INTRAMUSCULAR; SUBCUTANEOUS
Qty: 1 DEVICE | Refills: 0 | Status: SHIPPED | OUTPATIENT
Start: 2018-04-30 | End: 2018-05-04

## 2018-04-30 RX ORDER — ONDANSETRON 4 MG/1
4 TABLET, ORALLY DISINTEGRATING ORAL
Qty: 30 TAB | Refills: 0 | Status: SHIPPED | OUTPATIENT
Start: 2018-04-30 | End: 2018-11-09

## 2018-04-30 RX ORDER — GABAPENTIN 300 MG/1
300 CAPSULE ORAL 3 TIMES DAILY
Qty: 90 CAP | Refills: 0 | Status: SHIPPED | OUTPATIENT
Start: 2018-04-30 | End: 2018-07-30

## 2018-04-30 RX ORDER — NALOXONE HYDROCHLORIDE 4 MG/.1ML
SPRAY NASAL
Qty: 3 EACH | Refills: 0 | Status: SHIPPED | OUTPATIENT
Start: 2018-04-30

## 2018-04-30 RX ORDER — OXYCODONE HYDROCHLORIDE 10 MG/1
10 TABLET ORAL
Qty: 64 TAB | Refills: 0 | Status: SHIPPED | OUTPATIENT
Start: 2018-04-30 | End: 2018-05-07 | Stop reason: SDUPTHER

## 2018-04-30 RX ORDER — AMOXICILLIN 250 MG
1 CAPSULE ORAL
Qty: 30 TAB | Refills: 0 | Status: ON HOLD | OUTPATIENT
Start: 2018-04-30 | End: 2018-08-17

## 2018-04-30 RX ORDER — AMOXICILLIN 250 MG
1 CAPSULE ORAL DAILY
Status: DISCONTINUED | OUTPATIENT
Start: 2018-04-30 | End: 2018-04-30 | Stop reason: HOSPADM

## 2018-04-30 RX ADMIN — OXYCODONE HYDROCHLORIDE 10 MG: 5 TABLET ORAL at 05:11

## 2018-04-30 RX ADMIN — ENOXAPARIN SODIUM 40 MG: 40 INJECTION SUBCUTANEOUS at 09:20

## 2018-04-30 RX ADMIN — DULOXETINE HYDROCHLORIDE 60 MG: 60 CAPSULE, DELAYED RELEASE ORAL at 08:30

## 2018-04-30 RX ADMIN — HUMAN INSULIN 2 UNITS: 100 INJECTION, SOLUTION SUBCUTANEOUS at 06:50

## 2018-04-30 RX ADMIN — PANTOPRAZOLE SODIUM 40 MG: 40 TABLET, DELAYED RELEASE ORAL at 06:50

## 2018-04-30 RX ADMIN — ACETAMINOPHEN 650 MG: 325 TABLET, FILM COATED ORAL at 05:11

## 2018-04-30 RX ADMIN — PREGABALIN 50 MG: 25 CAPSULE ORAL at 08:30

## 2018-04-30 RX ADMIN — DOCUSATE SODIUM 100 MG: 100 CAPSULE, LIQUID FILLED ORAL at 08:30

## 2018-04-30 RX ADMIN — ACETAMINOPHEN 650 MG: 325 TABLET, FILM COATED ORAL at 11:33

## 2018-04-30 RX ADMIN — Medication 10 ML: at 06:53

## 2018-04-30 RX ADMIN — OXYCODONE HYDROCHLORIDE 10 MG: 5 TABLET ORAL at 02:04

## 2018-04-30 RX ADMIN — DOCUSATE SODIUM AND SENNOSIDES 1 TABLET: 8.6; 5 TABLET, FILM COATED ORAL at 09:25

## 2018-04-30 RX ADMIN — OXYCODONE HYDROCHLORIDE 10 MG: 5 TABLET ORAL at 11:33

## 2018-04-30 RX ADMIN — OXYCODONE HYDROCHLORIDE 10 MG: 5 TABLET ORAL at 08:30

## 2018-04-30 NOTE — PROGRESS NOTES
Bedside and Verbal shift change report given to Jennifer Barroso (oncoming nurse) by Marquis Bowens (offgoing nurse). Report included the following information SBAR, Kardex, Procedure Summary, Intake/Output, MAR and Accordion.

## 2018-04-30 NOTE — PROGRESS NOTES
New PCP appointment on Friday May 4,2018 @ 11:00 a.m. With Christina Park NP.  Added to AVS.      Mary Cristobal, Care Management  Specialist

## 2018-04-30 NOTE — PROGRESS NOTES
CM following for discharge planning, patient to discharge home with family today or tomorrow. New consult for PCP setup, patient previously declined [facility] setup as she was going to make own arrangements with a different provider. CM s/w patient at bedside, patient now prefers for [facility] to arrange PCP. CM sent referral to [facility] [place] for setup. Anticipate return home when medically clear with no further CM needs.     JIMY Yost

## 2018-04-30 NOTE — PROGRESS NOTES
Discussed at length with inpatient pharmacist regarding pt's PMH of ETOH abuse, current post op pain, PAT meds and current inpatient pain regiment. Pt was started on Lyrica while in the hospital and it is not on her insurance formulary. Pharmacist recommends gabapentin 300mg TID. Since pain is controlled with oxycodone every 3 hours, pharmacist recommends to stop clonazepam. Since gabapentin is new drug and can be sedating, stop trazodone. Recommend to have patient take oxycodone every 4 hours as needed. Oxycodone rx already written for every 3 hours but will discuss with pt and  that needs to be closer to 4 hours. rx to be given of naloxone autoinjector.

## 2018-04-30 NOTE — DISCHARGE SUMMARY
Physician Discharge Summary     Patient ID:  Cayetano Hernandez  [id number]  76 y.o.  1980    Admit Date: 4/20/2018    Discharge Date: 4/30/2018    Admission Diagnoses: PANCREATIC MASS  Pancreas cyst    Discharge Diagnoses:  Principal Problem:    Pancreatic cyst (3/21/2018)    Active Problems:    Pancreas cyst (4/20/2018)      Obesity (BMI 30-39.9) (4/26/2018)         Admission Condition: Poor    Discharge Condition: Fair    Last Procedure: Procedure(s):  LAPAROSCOPIC CONVERTED TO [address] Course:   Due to small splenic infarct, pt had difficulty getting pain under control early on in the post operative period and was drowsy on PCA. PCA was stopped and pt transitioned to PO pain medicines. Bowel function returned and diet was advanced. Pt was started on low dose Lyrica and oxycodone 10 mg every 3 hours and pain was controlled. Lyrica was not on formulary for her insurance and per pharmacist's recommendations was switched to gabapentin 300mg TID at discharge. Home meds of Clonazepam and trazodone was stopped. rx for oxycodone 10mg and naloxone was given and explained to patient and . Discharged to home with drain in place. F/u with Dr. Kate Disla in one week. Consults: None    Disposition: home    Patient Instructions:   Cannot display discharge medications since this patient is not currently admitted. Activity: No lifting, pushing or pulling anything heavier than 20 lbs x 2 weeks. Walking as tolerated. No driving while you are taking narcotics. Diet: Regular Diet. If you have cramps or nausea, try eating smaller light meals more frequently. You may find it helpful to take an over-the-counter stool softener such as colace if you feel constipated. Wound Care: Keep clean and dry. You may shower, but no immersion in standing water (bath tubs, swimming pools) x 2 weeks. Pat area with soft towel to dry. Follow-up with Dr. Kate Disla in 10-14 days.   Call office at [phone] to schedule appointment. We are located at [facility] in the [facility].        Signed:  Elda Osborn NP  4/30/2018  4:41 PM

## 2018-04-30 NOTE — DISCHARGE INSTRUCTIONS
FOLLOW-UP with [address] [facility], Dr. Evita Huitron : We are located at [facility] in the [facility], [address] . Call office at [phone] to make to this appointment. Call your physician immediately [phone] if you have any fevers greater than 100.5F, drainage from your wound that is not clear or looks infected, persistent bleeding, increasing abdominal pain, problems urinating, or persistent nausea/vomiting. You should be aware that you may have shoulder pain after surgery and that this will progressively go away. This is called 'referred pain' and is from the area of the diaphragm caused by gas that may be trapped under the diaphragm from laparoscopic surgery. This gas will progressively get reabsorbed by your body. WOUND CARE INSTRUCTIONS:   You may shower at home. If clothing rubs against the wound or causes irritation and the wound is not draining you may cover it with a dry dressing during the daytime. Try to keep the wound dry and avoid ointments on the wound unless directed to do so. If the wound becomes bright red and painful or starts to drain infected material that is not clear, please contact your physician immediately. You should also call if you begin to drain fluid that is thin and greenish-brown from the wound or if output is whitish, pus-like in appearance. If the wound though is mildly pink and has a thick firm ridge underneath it, this is normal, and is referred to as a healing ridge. This will resolve over the next 4-6 weeks. DIET:  You may eat any foods that you can tolerate. It is a good idea to eat a high fiber diet and take in plenty of fluids to prevent constipation. If you do become constipated you may want to take a mild laxative or softener (such as colace, senna, dulcolax, or miralax) on a daily basis until your bowel habits are regular.   Constipation can be very uncomfortable, along with straining, after recent abdominal surgery. ACTIVITY:  You are encouraged to cough and deep breath or use your incentive spirometer if you were given one, every 15-30 minutes when awake. This will help prevent respiratory complications and low grade fevers post-operatively. You may want to hug a pillow when coughing and sneezing to add additional support to the surgical area(s) which will decrease pain during these times. You are encouraged to walk and engage in light activity for the next two weeks. You should not lift more than 15 pounds during this time frame as it could put you at increased risk for a post-operative hernia. Twenty pounds is roughly equivalent to a plastic bag of groceries. · You may shower 24 hours after surgery. Pat the cut (incision) dry. Do not take a bath for the first week. · Your doctor will tell you when you can have sex again. MEDICATIONS:  Try to take narcotic medications and anti-inflammatory medications, such as tylenol, ibuprofen, naprosyn, etc., with food. This will minimize stomach upset from the medication. Should you develop nausea and vomiting from the pain medication, or develop a rash, please discontinue the medication and contact your physician. You should not drive, make important decisions, or operate machinery when taking narcotic pain medication. · Your clonazepam and trazodone was stopped per recommendation of the inpatient pharmacist due to the risk of overdose while taking high doses of oxycodone and new prescription of gabapentin. · You also have a prescription of the naloxone auto-injector in case of over-sedation or over-dose. Your pharmacist should review instructions with you. · Keep your medication in the bottles provided by the pharmacist and keep a list of the medication names, dosages, and times to be taken in your wallet. · Do not take other medications without consulting your doctor.      · Take ibuprofen (Motrin) or tylenol then combine with oxycodone as needed for severe pain. QUESTIONS:  Please feel free to call Dr. Araseli Magallanes office ([phone]) if you have any questions, and they will be glad to assist you. Information obtained by :    I understand that if any problems occur once I am at home I am to contact my physician. I understand and acknowledge receipt of the instructions indicated above.                                                                                                                                            Physician's or R.N.'s Signature                                                                  Date/Time                                                                                                                                              Patient or Representative Signature                                                          Date/Time

## 2018-04-30 NOTE — PROGRESS NOTES
General Surgery Daily Progress Note    Admit Date: 2018  Post-Operative Day: 10 Days Post-Op from Procedure(s):  LAPAROSCOPIC CONVERTED TO OPEN DISTAL PANCREATECTOMY      Subjective:     Last 24 hrs: Pain controlled with roxicone 10 mg every 3 hrs. Eating well. Had small, hard BM yesterday. No n/v/d/fevers/cp/sob. OOB and ambulating. Voiding well. Drain with 5ish cc of gray output, pt notes the change in color. Afebrile, VSS. Ready to go home. BS cont to be elevated, pt does not have PCP but has used [facility] in the past.      Objective:     Blood pressure 120/74, pulse 79, temperature 98.2 °F (36.8 °C), resp. rate 18, height 5' 3\" (1.6 m), weight 170 lb 3.1 oz (77.2 kg), last menstrual period 2018, SpO2 94 %, not currently breastfeeding. Temp (24hrs), Av.2 °F (36.8 °C), Min:97.8 °F (36.6 °C), Max:98.6 °F (37 °C)      _____________________  Physical Exam:     Alert and Oriented, in good spirits, in no acute distress. Cardiovascular: RRR,  No LE peripheral edema  Lungs:CTAB   Abdomen: +distension, +BS, TTP in LUQ, incision closed with dermabond and CDI  BETSY with scant opaque tan output. Minimal redness around BETSY insertion site        Assessment:   Principal Problem:    Pancreatic cyst (3/21/2018)    Active Problems:    Pancreas cyst (2018)      Obesity (BMI 30-39.9) (2018)            Plan:     Drain - likely to go home with with it. Will need BETSY drain care teaching  Pericolace - in lieu of miralax  Pain meds - cont percocet, will be sent home with rx  Recommend warm heating pad to the abd  Discharge - later today or tomorrow, per Dr. Rosina Sosa  PCP appointment after discharge - will ask case management to facilitate  Further plans per Dr. Rosina Sosa    ADDENDUM:  Patient concerned about pain control after discharge to home. Will give patient prescription for roxicodone 10mg, gabapenting and nalaxone and instructions to stop trazadone and benzos.     Data Review:    No results for input(s): WBC, HGB, HCT, PLT, HGBEXT, HCTEXT, PLTEXT in the last 72 hours. No results for input(s): NA, K, CL, CO2, GLU, BUN, CREA, CA, MG, PHOS, ALB, TBIL, SGOT, ALT, INR in the last 72 hours. No lab exists for component: INREXT  No results for input(s): AML, LPSE in the last 72 hours.         ______________________  Medications:    Current Facility-Administered Medications   Medication Dose Route Frequency    acetaminophen (TYLENOL) tablet 650 mg  650 mg Oral Q6HWA    oxyCODONE IR (ROXICODONE) tablet 10 mg  10 mg Oral Q3H PRN    insulin regular (NOVOLIN R, HUMULIN R) injection   SubCUTAneous TIDAC    diphenhydrAMINE (BENADRYL) injection 12.5 mg  12.5 mg IntraVENous Q6H PRN    polyethylene glycol (MIRALAX) packet 17 g  17 g Oral DAILY    pregabalin (LYRICA) capsule 50 mg  50 mg Oral BID    nicotine (NICODERM CQ) 7 mg/24 hr patch 1 Patch  1 Patch TransDERmal DAILY    metoprolol (LOPRESSOR) injection 10 mg  10 mg IntraVENous Q6H PRN    enoxaparin (LOVENOX) injection 40 mg  40 mg SubCUTAneous Q24H    pantoprazole (PROTONIX) tablet 40 mg  40 mg Oral ACB    docusate sodium (COLACE) capsule 100 mg  100 mg Oral BID    sodium chloride (NS) flush 5-10 mL  5-10 mL IntraVENous Q8H    sodium chloride (NS) flush 5-10 mL  5-10 mL IntraVENous PRN    ondansetron (ZOFRAN) injection 4 mg  4 mg IntraVENous Q4H PRN    DULoxetine (CYMBALTA) capsule 60 mg  60 mg Oral DAILY    traZODone (DESYREL) tablet 50 mg  50 mg Oral QHS    glucose chewable tablet 16 g  4 Tab Oral PRN    dextrose (D50W) injection syrg 12.5-25 g  12.5-25 g IntraVENous PRN    glucagon (GLUCAGEN) injection 1 mg  1 mg IntraMUSCular PRN       Frankie Sotelo NP  4/30/2018

## 2018-05-04 ENCOUNTER — OFFICE VISIT (OUTPATIENT)
Dept: FAMILY MEDICINE CLINIC | Age: 38
End: 2018-05-04

## 2018-05-04 VITALS
RESPIRATION RATE: 16 BRPM | HEART RATE: 89 BPM | BODY MASS INDEX: 28.79 KG/M2 | OXYGEN SATURATION: 97 % | WEIGHT: 162.5 LBS | TEMPERATURE: 98.6 F | SYSTOLIC BLOOD PRESSURE: 112 MMHG | HEIGHT: 63 IN | DIASTOLIC BLOOD PRESSURE: 73 MMHG

## 2018-05-04 DIAGNOSIS — F41.9 ANXIETY AND DEPRESSION: Primary | ICD-10-CM

## 2018-05-04 DIAGNOSIS — J98.11 BILATERAL ATELECTASIS: ICD-10-CM

## 2018-05-04 DIAGNOSIS — F32.A ANXIETY AND DEPRESSION: Primary | ICD-10-CM

## 2018-05-04 RX ORDER — DULOXETIN HYDROCHLORIDE 60 MG/1
60 CAPSULE, DELAYED RELEASE ORAL DAILY
Qty: 90 CAP | Refills: 1 | Status: SHIPPED | OUTPATIENT
Start: 2018-05-04 | End: 2019-05-25

## 2018-05-04 NOTE — PROGRESS NOTES
1. Have you been to the ER, urgent care clinic since your last visit? Hospitalized since your last visit? Yes, [facility], 04/2018    2. Have you seen or consulted any other health care providers outside of the [address] [place] [address] [place] since your last visit? Include any pap smears or colon screening.  No     Chief Complaint   Patient presents with   [facility] Follow-up     [facility] April 2018

## 2018-05-04 NOTE — PROGRESS NOTES
Chief Complaint   Patient presents with   [facility] Follow-up     [facility] April 2018     she is a 42y.o. year old female who presents for evalution. Pt states has been taking Cymbalta x years, takes for depression and anxiety. Previously was getting through other PCP but hasn't seen them in past year. Requesting refills today. Also just wants to F/U after pancreatic cyst removal and establish care with PCP. Discharged from hospital 4 days ago. Pt denies any alcohol usage since discharge. States is taking oxycodone for her pain. Reviewed PmHx, RxHx, FmHx, SocHx, AllgHx and updated and dated in the chart. Review of Systems - negative except as listed above in the HPI    Objective:     Vitals:    05/04/18 1124   BP: 112/73   Pulse: 89   Resp: 16   Temp: 98.6 °F (37 °C)   TempSrc: Oral   SpO2: 97%   Weight: 162 lb 8 oz (73.7 kg)   Height: 5' 3\" (1.6 m)     Physical Examination: General appearance - alert, well appearing, and in no distress  Chest - clear to auscultation, no wheezes, rales or rhonchi, symmetric air entry  Heart - normal rate, regular rhythm, normal S1, S2, no murmurs, rubs, clicks or gallops  Abdomen - scars from previous incisions healing well - no signs of infection or dehiscence, drain on L side still in place and bandaged     Assessment/ Plan:   Diagnoses and all orders for this visit:    1. Anxiety and depression  -     DULoxetine (CYMBALTA) 60 mg capsule; Take 1 Cap by mouth daily. Stable, refills provided. F/U prn    2. Bilateral atelectasis   Work on deep breathing at home. Keep F/U with surgeon as scheduled. Pt voiced understanding regarding plan of care. Follow-up Disposition:  Return if symptoms worsen or fail to improve. I have discussed the diagnosis with the patient and the intended plan as seen in the above orders. The patient has received an after-visit summary and questions were answered concerning future plans.      Medication Side Effects and Warnings were discussed with patient    Mathew Nieto, FLY

## 2018-05-04 NOTE — MR AVS SNAPSHOT
[address] [address] 
[phone] Patient: Rosaura Bermudez MRN: [id number] LNX:[birth year] Visit Information Date & Time Provider Department Dept. Phone Encounter #  
 5/4/2018 11:00 AM FLY [facility] [phone] [id number] Follow-up Instructions Return if symptoms worsen or fail to improve. Your Appointments 5/7/2018  1:40 PM  
POST OP 10 MIN with MD Pj Guzman [phone] ([facility]) Appt Note: PO follow up, 04/20/2018 GP: Laparosocopic Distal Pancreatectomy [address] [facility] [address] [facility] [id number]  
[address] [place] [address] Upcoming Health Maintenance Date Due Pneumococcal 19-64 Highest Risk (1 of 3 - PCV13) 8/23/1997 DTaP/Tdap/Td series (1 - Tdap) 8/23/1999 PAP AKA CERVICAL CYTOLOGY 8/23/1999 Influenza Age 5 to Adult 8/1/2018 Allergies as of 5/4/2018  Review Complete On: 5/4/2018 By: Nicanor Osgood, LPN Severity Noted Reaction Type Reactions Amoxicillin  06/24/2011    Shortness of Breath, Rash Pt has had keflex in the past  
  
Current Immunizations  Reviewed on 4/25/2018 No immunizations on file. Not reviewed this visit You Were Diagnosed With   
  
 Codes Comments Anxiety and depression    -  Primary ICD-10-CM: F41.9, F32.9 ICD-9-CM: 300.00, 311 Bilateral atelectasis     ICD-10-CM: J98.11 ICD-9-CM: 518.0 Vitals BP Pulse Temp Resp Height(growth percentile) Weight(growth percentile) 112/73 89 98.6 °F (37 °C) (Oral) 16 5' 3\" (1.6 m) 162 lb 8 oz (73.7 kg) LMP SpO2 BMI OB Status Smoking Status 04/04/2018 97% 28.79 kg/m2 Having regular periods Current Every Day Smoker Vitals History BMI and BSA Data Body Mass Index Body Surface Area  28.79 kg/m 2 1.81 m 2  
  
  
 Preferred Pharmacy Pharmacy Name Phone [facility] [phone] [facility]  [address] [phone] Your Updated Medication List  
  
   
This list is accurate as of 5/4/18 11:38 AM.  Always use your most recent med list.  
  
  
  
  
 acetaminophen 500 mg tablet Commonly known as:  TYLENOL Take 500 mg by mouth every eight (8) hours as needed for Pain. DULoxetine 60 mg capsule Commonly known as:  CYMBALTA Take 1 Cap by mouth daily. gabapentin 300 mg capsule Commonly known as:  NEURONTIN Take 1 Cap by mouth three (3) times daily. Indications: Postoperative Acute Pain MOTRIN  mg tablet Generic drug:  ibuprofen Take 800 mg by mouth every eight (8) hours as needed. * naloxone 2 mg/actuation Spry Use 1 spray intranasally into 1 nostril. Use a new Narcan nasal spray for subsequent doses and administer into alternating nostrils. May repeat every 2 to 3 minutes as needed. * naloxone 4 mg/actuation nasal spray Commonly known as:  [employer] Use 1 spray intranasally into 1 nostril. Use a new Narcan nasal spray for subsequent doses and administer into alternating nostrils. May repeat every 2 to 3 minutes as needed. ondansetron 4 mg disintegrating tablet Commonly known as:  ZOFRAN ODT Take 1 Tab by mouth every eight (8) hours as needed for Nausea. Indications: PREVENTION OF POST-OPERATIVE NAUSEA AND VOMITING  
  
 oxyCODONE IR 10 mg Tab immediate release tablet Commonly known as:  Santo Bers Take 1 Tab by mouth every three (3) hours as needed. Max Daily Amount: 80 mg. May cut tablet into half and take half a tablet instead of a whole tablet. Indications: Pain  
  
 senna-docusate 8.6-50 mg per tablet Commonly known as:  Cruz Peng Take 1 Tab by mouth daily as needed for Constipation. * Notice:   This list has 2 medication(s) that are the same as other medications prescribed for you. Read the directions carefully, and ask your doctor or other care provider to review them with you. Prescriptions Sent to Pharmacy Refills DULoxetine (CYMBALTA) 60 mg capsule 1 Sig: Take 1 Cap by mouth daily. Class: Normal  
 Pharmacy: [facility] [place], [address] [facility] #: [phone] Route: Oral  
  
Follow-up Instructions Return if symptoms worsen or fail to improve. Introducing [facility]! Dear Jonatan Linares: 
Thank you for requesting a [employer] account. Our records indicate that you already have an active [employer] account. You can access your account anytime at [email or web address] Did you know that you can access your hospital and ER discharge instructions at any time in [employer]? You can also review all of your test results from your hospital stay or ER visit. Additional Information If you have questions, please visit the Frequently Asked Questions section of the [employer] website at [email or web address] Remember, [employer] is NOT to be used for urgent needs. For medical emergencies, dial 911. Now available from your iPhone and Android! Please provide this summary of care documentation to your next provider. Your primary care clinician is listed as NONE. If you have any questions after today's visit, please call [phone].

## 2018-05-07 ENCOUNTER — OFFICE VISIT (OUTPATIENT)
Dept: SURGERY | Age: 38
End: 2018-05-07

## 2018-05-07 ENCOUNTER — APPOINTMENT (OUTPATIENT)
Dept: CT IMAGING | Age: 38
DRG: 864 | End: 2018-05-07
Attending: NURSE PRACTITIONER
Payer: COMMERCIAL

## 2018-05-07 ENCOUNTER — HOSPITAL ENCOUNTER (INPATIENT)
Age: 38
LOS: 4 days | Discharge: HOME | DRG: 864 | End: 2018-05-11
Attending: SURGERY | Admitting: SURGERY
Payer: COMMERCIAL

## 2018-05-07 VITALS
HEART RATE: 91 BPM | SYSTOLIC BLOOD PRESSURE: 110 MMHG | WEIGHT: 159 LBS | BODY MASS INDEX: 28.17 KG/M2 | TEMPERATURE: 98.4 F | RESPIRATION RATE: 18 BRPM | OXYGEN SATURATION: 96 % | HEIGHT: 63 IN | DIASTOLIC BLOOD PRESSURE: 70 MMHG

## 2018-05-07 DIAGNOSIS — K86.89 PANCREATIC FLUID LEAK: Primary | ICD-10-CM

## 2018-05-07 DIAGNOSIS — K86.2 PANCREATIC CYST: ICD-10-CM

## 2018-05-07 DIAGNOSIS — Z09 POSTOPERATIVE EXAMINATION: Primary | ICD-10-CM

## 2018-05-07 PROBLEM — R50.82 POSTOPERATIVE FEVER: Status: ACTIVE | Noted: 2018-05-07

## 2018-05-07 LAB
ALBUMIN SERPL-MCNC: 2.9 G/DL (ref 3.5–5)
ALBUMIN/GLOB SERPL: 0.6 {RATIO} (ref 1.1–2.2)
ALP SERPL-CCNC: 136 U/L (ref 45–117)
ALT SERPL-CCNC: 20 U/L (ref 12–78)
AMYLASE FLD-CCNC: NORMAL U/L
ANION GAP SERPL CALC-SCNC: 8 MMOL/L (ref 5–15)
AST SERPL-CCNC: 20 U/L (ref 15–37)
BASOPHILS # BLD: 0.1 K/UL (ref 0–0.1)
BASOPHILS NFR BLD: 1 % (ref 0–1)
BILIRUB SERPL-MCNC: 0.1 MG/DL (ref 0.2–1)
BUN SERPL-MCNC: 6 MG/DL (ref 6–20)
BUN/CREAT SERPL: 10 (ref 12–20)
CALCIUM SERPL-MCNC: 9.3 MG/DL (ref 8.5–10.1)
CHLORIDE SERPL-SCNC: 99 MMOL/L (ref 97–108)
CO2 SERPL-SCNC: 31 MMOL/L (ref 21–32)
CREAT SERPL-MCNC: 0.63 MG/DL (ref 0.55–1.02)
DIFFERENTIAL METHOD BLD: ABNORMAL
EOSINOPHIL # BLD: 0.8 K/UL (ref 0–0.4)
EOSINOPHIL NFR BLD: 8 % (ref 0–7)
ERYTHROCYTE [DISTWIDTH] IN BLOOD BY AUTOMATED COUNT: 14.2 % (ref 11.5–14.5)
GLOBULIN SER CALC-MCNC: 4.9 G/DL (ref 2–4)
GLUCOSE SERPL-MCNC: 150 MG/DL (ref 65–100)
HCT VFR BLD AUTO: 33.3 % (ref 35–47)
HGB BLD-MCNC: 10.5 G/DL (ref 11.5–16)
IMM GRANULOCYTES # BLD: 0.1 K/UL (ref 0–0.04)
IMM GRANULOCYTES NFR BLD AUTO: 1 % (ref 0–0.5)
LIPASE SERPL-CCNC: 81 U/L (ref 73–393)
LYMPHOCYTES # BLD: 2.9 K/UL (ref 0.8–3.5)
LYMPHOCYTES NFR BLD: 27 % (ref 12–49)
MCH RBC QN AUTO: 31 PG (ref 26–34)
MCHC RBC AUTO-ENTMCNC: 31.5 G/DL (ref 30–36.5)
MCV RBC AUTO: 98.2 FL (ref 80–99)
MONOCYTES # BLD: 0.6 K/UL (ref 0–1)
MONOCYTES NFR BLD: 6 % (ref 5–13)
NEUTS SEG # BLD: 6.1 K/UL (ref 1.8–8)
NEUTS SEG NFR BLD: 57 % (ref 32–75)
NRBC # BLD: 0 K/UL (ref 0–0.01)
NRBC BLD-RTO: 0 PER 100 WBC
PLATELET # BLD AUTO: 909 K/UL (ref 150–400)
PLATELET COMMENTS,PCOM: ABNORMAL
PMV BLD AUTO: 10.2 FL (ref 8.9–12.9)
POTASSIUM SERPL-SCNC: 4 MMOL/L (ref 3.5–5.1)
PROT SERPL-MCNC: 7.8 G/DL (ref 6.4–8.2)
RBC # BLD AUTO: 3.39 M/UL (ref 3.8–5.2)
RBC MORPH BLD: ABNORMAL
SODIUM SERPL-SCNC: 138 MMOL/L (ref 136–145)
SPECIMEN SOURCE FLD: NORMAL
WBC # BLD AUTO: 10.6 K/UL (ref 3.6–11)

## 2018-05-07 PROCEDURE — 74011636320 HC RX REV CODE- 636/320: Performed by: SURGERY

## 2018-05-07 PROCEDURE — 83690 ASSAY OF LIPASE: CPT | Performed by: NURSE PRACTITIONER

## 2018-05-07 PROCEDURE — 80053 COMPREHEN METABOLIC PANEL: CPT | Performed by: NURSE PRACTITIONER

## 2018-05-07 PROCEDURE — 74177 CT ABD & PELVIS W/CONTRAST: CPT

## 2018-05-07 PROCEDURE — 85025 COMPLETE CBC W/AUTO DIFF WBC: CPT | Performed by: NURSE PRACTITIONER

## 2018-05-07 PROCEDURE — 74011250637 HC RX REV CODE- 250/637: Performed by: NURSE PRACTITIONER

## 2018-05-07 PROCEDURE — 36415 COLL VENOUS BLD VENIPUNCTURE: CPT | Performed by: NURSE PRACTITIONER

## 2018-05-07 PROCEDURE — 74011250636 HC RX REV CODE- 250/636: Performed by: NURSE PRACTITIONER

## 2018-05-07 PROCEDURE — 82150 ASSAY OF AMYLASE: CPT | Performed by: NURSE PRACTITIONER

## 2018-05-07 PROCEDURE — 74011250636 HC RX REV CODE- 250/636: Performed by: SURGERY

## 2018-05-07 PROCEDURE — 65270000032 HC RM SEMIPRIVATE

## 2018-05-07 RX ORDER — KETOROLAC TROMETHAMINE 30 MG/ML
30 INJECTION, SOLUTION INTRAMUSCULAR; INTRAVENOUS
Status: ACTIVE | OUTPATIENT
Start: 2018-05-07 | End: 2018-05-07

## 2018-05-07 RX ORDER — SODIUM CHLORIDE 9 MG/ML
50 INJECTION, SOLUTION INTRAVENOUS CONTINUOUS
Status: DISCONTINUED | OUTPATIENT
Start: 2018-05-07 | End: 2018-05-11 | Stop reason: HOSPADM

## 2018-05-07 RX ORDER — SODIUM CHLORIDE 0.9 % (FLUSH) 0.9 %
10 SYRINGE (ML) INJECTION
Status: COMPLETED | OUTPATIENT
Start: 2018-05-07 | End: 2018-05-07

## 2018-05-07 RX ORDER — LEVOFLOXACIN 5 MG/ML
500 INJECTION, SOLUTION INTRAVENOUS EVERY 24 HOURS
Status: DISCONTINUED | OUTPATIENT
Start: 2018-05-07 | End: 2018-05-11 | Stop reason: HOSPADM

## 2018-05-07 RX ORDER — SODIUM CHLORIDE 0.9 % (FLUSH) 0.9 %
5-10 SYRINGE (ML) INJECTION AS NEEDED
Status: DISCONTINUED | OUTPATIENT
Start: 2018-05-07 | End: 2018-05-11 | Stop reason: HOSPADM

## 2018-05-07 RX ORDER — ONDANSETRON 2 MG/ML
4 INJECTION INTRAMUSCULAR; INTRAVENOUS
Status: DISCONTINUED | OUTPATIENT
Start: 2018-05-07 | End: 2018-05-11 | Stop reason: HOSPADM

## 2018-05-07 RX ORDER — SODIUM CHLORIDE 0.9 % (FLUSH) 0.9 %
5-10 SYRINGE (ML) INJECTION EVERY 8 HOURS
Status: DISCONTINUED | OUTPATIENT
Start: 2018-05-07 | End: 2018-05-11 | Stop reason: HOSPADM

## 2018-05-07 RX ORDER — OXYCODONE HYDROCHLORIDE 10 MG/1
10 TABLET ORAL
Qty: 64 TAB | Refills: 0 | Status: SHIPPED | OUTPATIENT
Start: 2018-05-07 | End: 2018-05-11

## 2018-05-07 RX ORDER — OXYCODONE HYDROCHLORIDE 5 MG/1
10 TABLET ORAL
Status: DISCONTINUED | OUTPATIENT
Start: 2018-05-07 | End: 2018-05-10

## 2018-05-07 RX ADMIN — SODIUM CHLORIDE 125 ML/HR: 900 INJECTION, SOLUTION INTRAVENOUS at 19:19

## 2018-05-07 RX ADMIN — OXYCODONE HYDROCHLORIDE 10 MG: 5 TABLET ORAL at 16:03

## 2018-05-07 RX ADMIN — OXYCODONE HYDROCHLORIDE 10 MG: 5 TABLET ORAL at 19:18

## 2018-05-07 RX ADMIN — LEVOFLOXACIN 500 MG: 5 INJECTION, SOLUTION INTRAVENOUS at 22:42

## 2018-05-07 RX ADMIN — OXYCODONE HYDROCHLORIDE 10 MG: 5 TABLET ORAL at 22:43

## 2018-05-07 RX ADMIN — IOHEXOL 50 ML: 240 INJECTION, SOLUTION INTRATHECAL; INTRAVASCULAR; INTRAVENOUS; ORAL at 18:34

## 2018-05-07 RX ADMIN — IOPAMIDOL 100 ML: 755 INJECTION, SOLUTION INTRAVENOUS at 18:33

## 2018-05-07 RX ADMIN — ONDANSETRON 4 MG: 2 INJECTION INTRAMUSCULAR; INTRAVENOUS at 19:18

## 2018-05-07 RX ADMIN — Medication 10 ML: at 18:33

## 2018-05-07 RX ADMIN — Medication 10 ML: at 18:34

## 2018-05-07 NOTE — IP AVS SNAPSHOT
1111 [facility] [address] [place] [address] 
[phone] Patient: Sara Rodriguez MRN: [id number] About your hospitalization You were admitted on: May 7, 2018 You last received care in the:  [facility] [id number] You were discharged on:  May 11, 2018 Why you were hospitalized Your primary diagnosis was:  Not on File Your diagnoses also included:  Postoperative Fever Follow-up Information Follow up With Details Comments Contact Info Reynaldo Orta NP Go on 5/14/2018 Hospital PCP f/u appointment on Monday 5/14 @ 2:45 p.[address]. [address] [facility] [address] [id number] [phone] Your Scheduled Appointments Monday May 14, 2018  2:45 PM EDT TRANSITIONAL CARE MANAGEMENT with Reynaldo Orta NP 5900 [facility] ([address]) [address] [address] [phone] Wednesday May 16, 2018  9:40 AM EDT  
POST OP with Connor Cowart NP  
[facility] 22 578 ([address]) [address] [facility] [address]  
[phone] Discharge Orders None A check odette indicates which time of day the medication should be taken. My Medications START taking these medications Instructions Each Dose to Equal  
 Morning Noon Evening Bedtime  
 levoFLOXacin 250 mg tablet Commonly known as:  Benito Feldman Your last dose was: Your next dose is: Take 2 Tabs by mouth daily for 5 days. 500 mg  
    
   
   
   
  
 metroNIDAZOLE 500 mg tablet Commonly known as:  FLAGYL Your last dose was: Your next dose is: Take 1 Tab by mouth three (3) times daily for 5 days. 500 mg CHANGE how you take these medications Instructions Each Dose to Equal  
 Morning Noon Evening Bedtime oxyCODONE IR 5 mg immediate release tablet Commonly known as:  Mulu Dukes What changed:   
- medication strength 
- how much to take 
- reasons to take this 
- additional instructions Your last dose was: Your next dose is: Take 1-2 Tabs by mouth every four (4) hours as needed for Pain for up to 40 doses. Max Daily Amount: 60 mg.  
 5-10 mg CONTINUE taking these medications Instructions Each Dose to Equal  
 Morning Noon Evening Bedtime  
 acetaminophen 500 mg tablet Commonly known as:  TYLENOL Your last dose was: Your next dose is: Take 500 mg by mouth every eight (8) hours as needed for Pain. 500 mg DULoxetine 60 mg capsule Commonly known as:  CYMBALTA Your last dose was: Your next dose is: Take 1 Cap by mouth daily. 60 mg  
    
   
   
   
  
 gabapentin 300 mg capsule Commonly known as:  NEURONTIN Your last dose was: Your next dose is: Take 1 Cap by mouth three (3) times daily. Indications: Postoperative Acute Pain 300 mg MOTRIN  mg tablet Generic drug:  ibuprofen Your last dose was: Your next dose is: Take 800 mg by mouth every eight (8) hours as needed. 800 mg  
    
   
   
   
  
 * naloxone 2 mg/actuation Spry Your last dose was: Your next dose is:    
   
   
 Use 1 spray intranasally into 1 nostril. Use a new Narcan nasal spray for subsequent doses and administer into alternating nostrils. May repeat every 2 to 3 minutes as needed. * naloxone 4 mg/actuation nasal spray Commonly known as:  [employer] Your last dose was: Your next dose is:    
   
   
 Use 1 spray intranasally into 1 nostril. Use a new Narcan nasal spray for subsequent doses and administer into alternating nostrils.  May repeat every 2 to 3 minutes as needed. ondansetron 4 mg disintegrating tablet Commonly known as:  ZOFRAN ODT Your last dose was: Your next dose is: Take 1 Tab by mouth every eight (8) hours as needed for Nausea. Indications: PREVENTION OF POST-OPERATIVE NAUSEA AND VOMITING  
 4 mg  
    
   
   
   
  
 senna-docusate 8.6-50 mg per tablet Commonly known as:  Ward Palmer Your last dose was: Your next dose is: Take 1 Tab by mouth daily as needed for Constipation. 1 Tab * Notice: This list has 2 medication(s) that are the same as other medications prescribed for you. Read the directions carefully, and ask your doctor or other care provider to review them with you. Where to Get Your Medications Information on where to get these meds will be given to you by the nurse or doctor. ! Ask your nurse or doctor about these medications  
  levoFLOXacin 250 mg tablet  
 metroNIDAZOLE 500 mg tablet  
 oxyCODONE IR 5 mg immediate release tablet Opioid Education Prescription Opioids: What You Need to Know: 
 
 
 
 
FOLLOW-UP:  Follow-up with Juan Nielsen NP on Wednesday, May 16, 2018 at 9:40 am.  Please call office at [phone] if you need to re-schedule. Arrive by 20 minutes before scheduled appointment time to complete paperwork.   Make sure to bring your ID card and insurance information. We are located at [facility] in the [facility], [address] [facility]. Call your physician immediately if you have any fevers greater than 101, drainage from your wound that is not clear or looks infected, persistent bleeding, increasing abdominal pain, problems urinating, or persistent nausea/vomiting. ACTIVITY:   You are encouraged to walk and engage in light activity for the next two weeks. You should not lift more than 20 pounds during this time frame as it could put you at increased risk for a post-operative hernia. Twenty pounds is roughly equivalent to a plastic bag of groceries. · Most people are able to return to work within 1 to 2 weeks after surgery. · You may shower 24 hours after surgery. Pat the cut (incision) dry. Do not take a bath for the first week. · No driving while you are taking narcotics. DIET:  You may eat any foods that you can tolerate. You may find it helpful to eat smaller sized, light meals more frequently for the first few days following surgery. It is a good idea to eat a high fiber diet and take in plenty of fluids to prevent constipation. If you do become constipated you may want to take a mild laxative or take ducolax tablets on a daily basis until your bowel habits are regular. Constipation can be very uncomfortable, along with straining, after recent abdominal surgery. WOUND CARE INSTRUCTIONS:   You may shower at home, taking care to keep the drains as dry as possible. Do not soak in a bath tub or swimming pool while you have drains in place. If clothing rubs against the wound or causes irritation and the wound is not draining you may cover it with a dry dressing during the daytime. Try to keep the wound dry and avoid ointments on the wound unless directed to do so. If the wound becomes bright red and painful or starts to drain infected material that is not clear, please contact your physician immediately.    You should also call if you begin to drain fluid that is thin and greenish-brown from the wound and appears to look like bile. DRAIN CARE: Empty drains daily (more often if needed) and record output. Also note if the fluid in the drain has changed in appearance. Bring this information with you to your follow-up appointment. MEDICATIONS:  Try to take narcotic medications and anti-inflammatory medications, such as tylenol, ibuprofen, naprosyn, etc., with food. This will minimize stomach upset from the medication. Should you develop nausea and vomiting from the pain medication, or develop a rash, please discontinue the medication and contact your physician. You should not drive, make important decisions, or operate machinery when taking narcotic pain medication. · It is important that you take the medication exactly as they are prescribed. · Keep your medication in the bottles provided by the pharmacist and keep a list of the medication names, dosages, and times to be taken in your wallet. · Do not take other medications without consulting your doctor. QUESTIONS:  Please feel free to call the office ([phone]) if you have any questions, and they will be glad to assist you. Signed: 
Andria Mcgarry NP 
5/11/2018 
2:38 PM 
 
ST. [address] [place] [address] SURGICAL DRAIN CARE DISCHARGE INSRUCTIONS What is a surgical drain? After a surgery, fluid may collect inside your body in the surgical area. This makes an infection or other problem more likely. A surgical drain allows the fluid to flow out. The doctor will put a thin rubber tube into the area of your body where the fluid is likely to collect. The rubber tube will carry the fluid outside your body. The most common type of surgical drain carries the fluid into a collection bulb that you empty. This is called a Junito-Garcia drain. The drain uses suction created by the bulb to pull the fluid from your body into the bulb.  The rubber tube will probably be held in place by one or two stitches in your skin. Most people attach the bulb with a safety pin to clothing or near the bandage so that it doesnt flip around or pull on the stitches. When you first get the drain, the fluid will be bloody. It will change color from red to pink to a light yellow or clear as the wound heals and the fluid starts to go away. Your doctor may give you specific information on when you no longer need the drain and when it will be removed. In general, you will need the drain until you are collecting less than about 2 tablespoons of fluid in 24 hours. Follow-up care is a key part of your treatment and safety. Be sure to make and go to all appointments, and call your doctor if you are having problems. Its also a good idea to know your test results and keep a list of the medicines that you take. How can you care for yourself at home? Fluid Collection: 
 
Follow any instructions your doctor gives you. How often you empty the bulb depends on how much fluid is draining. Empty the bulb when it is half full. To empty the bulb: 
? Wash your hands with soap and water. ? Take the plug out of the bulb. ? Empty the bulb. If your doctor asks you to measure the fluid, empty the fluid into a measuring cup, and write down how much you collected. ? Clean the plug with alcohol. ? Squeeze the bulb until it is flat. This removes all the air from the bulb. You may need to put the bulb on a table or a counter to flatten it. ? Keep the bulb flat and put the plug in. ? The bulb should stay flat after you put the plug back in. This creates the suction that pulls the fluid into the bulb. ? Empty the fluid into the toilet. ? Wash your hands. Bandage care ? You may have a bandage. Your doctor will tell you how often to change it. ? Wash your hands with soap and water. ? Take off the bandage from around the drain. ? Clean the drain site and the skin around it with soap and water. Use gauze or a cotton swab. ? When the site is dry, put on a new bandage. Drain care Squeezing or milking the tube can help prevent clogs so that it drains correctly. Your doctor will tell you when you need to do this. In general, you do this when: 
? You see a clot in the tube that is preventing fluid from draining. The clot may look like a dark, stringy lining. ? You see fluid leaking around the tube where it goes into the skin. ? You think there is no suction in the drain. To milk the tube: ? Use one hand to hold and pinch the tube where it leaves the skin. ? With the other hand, pinch the tube with your thumb and first finger just below where youre holding it. ? Slowly and firmly push your thumb and first finger down the tubing toward the bulb. ? Do this as many times as you need to. The clot should move down the tube and into the bulb. When should you call for help? Call your doctor [phone] or seek immediate medical care If: 
? You have signs of infection, such as: 
o Increased pain, swelling, warmth or redness around the area. o Red streaks leading from the area. o Pus draining from the area. o A fever. ? You see a sudden change in the color or smell of the drainage. ? The tube is coming loose where it leaves your skin. Watch closely for changes in your health, and be sure to contact your doctor If: 
? You see a lot of fluid around the drain. ? You cannot remove a clot from the tube by milking the tube. Introducing [facility]! Dear Emma Signs: 
Thank you for requesting a [employer] account. Our records indicate that you already have an active [employer] account. You can access your account anytime at [email or web address] Did you know that you can access your hospital and ER discharge instructions at any time in [employer]?   You can also review all of your test results from your hospital stay or ER visit. Additional Information If you have questions, please visit the Frequently Asked Questions section of the MyChart website at [email or web address] Remember, [employer] is NOT to be used for urgent needs. For medical emergencies, dial 911. Now available from your iPhone and Android! Introducing Clifton Melgar As a [employer] patient, I wanted to make you aware of our electronic visit tool called Clifton Melgar. [employer] 24/7 allows you to connect within minutes with a medical provider 24 hours a day, seven days a week via a mobile device or tablet or logging into a secure website from your computer. You can access Clifton Melgar from anywhere in the [place]. A virtual visit might be right for you when you have a simple condition and feel like you just dont want to get out of bed, or cant get away from work for an appointment, when your regular [employer] provider is not available (evenings, weekends or holidays), or when youre out of town and need minor care. Electronic visits cost only $49 and if the [employer] 24/7 provider determines a prescription is needed to treat your condition, one can be electronically transmitted to a nearby pharmacy*. Please take a moment to enroll today if you have not already done so. The enrollment process is free and takes just a few minutes. To enroll, please download the [employer] 24/7 maria to your tablet or phone, or visit [email or web address] to enroll on your computer. And, as an [address] [place] [address] patient with a [employer] account, the results of your visits will be scanned into your electronic medical record and your primary care provider will be able to view the scanned results. We urge you to continue to see your regular [employer] provider for your ongoing medical care.   And while your primary care provider may not be the one available when you seek a Clifton Duartekayleigh virtual visit, the peace of mind you get from getting a real diagnosis real time can be priceless. For more information on Clifton Duartejaimiefin, view our Frequently Asked Questions (FAQs) at [email or web address]. Sincerely, 
 
Deb Canas MD 
Chief Medical Officer Himanshu Polk *:  certain medications cannot be prescribed via Clifton Duartejaimieblake Unresulted Labs-Please follow up with your PCP about these lab tests Order Current Status CULTURE, ANAEROBIC Preliminary result CULTURE, BODY FLUID W GRAM STAIN Preliminary result Providers Seen During Your Hospitalization Provider Specialty Primary office phone Salas Campoverde MD General Surgery [phone] Your Primary Care Physician (PCP) Primary Care Physician Office Phone Office Fax Gigi Murphy [phone] You are allergic to the following Allergen Reactions Amoxicillin Shortness of Breath Rash Pt has had keflex in the past  
  
Recent Documentation Height Weight BMI OB Status Smoking Status 1.6 m 72.1 kg 28.17 kg/m2 Having regular periods Current Every Day Smoker Emergency Contacts Name Discharge Info Relation Home Work Mobile [facility] [employer] CAREGIVER [3] Spouse [3] [phone] Patient Belongings The following personal items are in your possession at time of discharge: 
  Dental Appliances: None         Home Medications: None   Jewelry: None  Clothing: At bedside    Other Valuables: None Please provide this summary of care documentation to your next provider. Signatures-by signing, you are acknowledging that this After Visit Summary has been reviewed with you and you have received a copy. Patient Signature:  ____________________________________________________________ Date:  ____________________________________________________________  
  
Lizet Oka Provider Signature:  ____________________________________________________________ Date:  ____________________________________________________________

## 2018-05-07 NOTE — PROGRESS NOTES
1. Have you been to the ER, urgent care clinic since your last visit? Hospitalized since your last visit? No    2. Have you seen or consulted any other health care providers outside of the [facility] since your last visit? Include any pap smears or colon screening.  No     Patient state she is out of pain medication and needs a refill

## 2018-05-07 NOTE — IP AVS SNAPSHOT
[address] 
[phone] Patient: Leroy Duran MRN: [id number] A check odette indicates which time of day the medication should be taken. My Medications START taking these medications Instructions Each Dose to Equal  
 Morning Noon Evening Bedtime  
 levoFLOXacin 250 mg tablet Commonly known as:  Izabel Callaway Your last dose was: Your next dose is: Take 2 Tabs by mouth daily for 5 days. 500 mg  
    
   
   
   
  
 metroNIDAZOLE 500 mg tablet Commonly known as:  FLAGYL Your last dose was: Your next dose is: Take 1 Tab by mouth three (3) times daily for 5 days. 500 mg CHANGE how you take these medications Instructions Each Dose to Equal  
 Morning Noon Evening Bedtime  
 oxyCODONE IR 5 mg immediate release tablet Commonly known as:  Joel Bone What changed:   
- medication strength 
- how much to take 
- reasons to take this 
- additional instructions Your last dose was: Your next dose is: Take 1-2 Tabs by mouth every four (4) hours as needed for Pain for up to 40 doses. Max Daily Amount: 60 mg.  
 5-10 mg CONTINUE taking these medications Instructions Each Dose to Equal  
 Morning Noon Evening Bedtime  
 acetaminophen 500 mg tablet Commonly known as:  TYLENOL Your last dose was: Your next dose is: Take 500 mg by mouth every eight (8) hours as needed for Pain. 500 mg DULoxetine 60 mg capsule Commonly known as:  CYMBALTA Your last dose was: Your next dose is: Take 1 Cap by mouth daily. 60 mg  
    
   
   
   
  
 gabapentin 300 mg capsule Commonly known as:  NEURONTIN Your last dose was: Your next dose is: Take 1 Cap by mouth three (3) times daily. Indications: Postoperative Acute Pain 300 mg MOTRIN  mg tablet Generic drug:  ibuprofen Your last dose was: Your next dose is: Take 800 mg by mouth every eight (8) hours as needed. 800 mg  
    
   
   
   
  
 * naloxone 2 mg/actuation Spry Your last dose was: Your next dose is:    
   
   
 Use 1 spray intranasally into 1 nostril. Use a new Narcan nasal spray for subsequent doses and administer into alternating nostrils. May repeat every 2 to 3 minutes as needed. * naloxone 4 mg/actuation nasal spray Commonly known as:  [employer] Your last dose was: Your next dose is:    
   
   
 Use 1 spray intranasally into 1 nostril. Use a new Narcan nasal spray for subsequent doses and administer into alternating nostrils. May repeat every 2 to 3 minutes as needed. ondansetron 4 mg disintegrating tablet Commonly known as:  ZOFRAN ODT Your last dose was: Your next dose is: Take 1 Tab by mouth every eight (8) hours as needed for Nausea. Indications: PREVENTION OF POST-OPERATIVE NAUSEA AND VOMITING  
 4 mg  
    
   
   
   
  
 senna-docusate 8.6-50 mg per tablet Commonly known as:  Cruz Peng Your last dose was: Your next dose is: Take 1 Tab by mouth daily as needed for Constipation. 1 Tab * Notice: This list has 2 medication(s) that are the same as other medications prescribed for you. Read the directions carefully, and ask your doctor or other care provider to review them with you. Where to Get Your Medications Information on where to get these meds will be given to you by the nurse or doctor. ! Ask your nurse or doctor about these medications  
  levoFLOXacin 250 mg tablet  
 metroNIDAZOLE 500 mg tablet oxyCODONE IR 5 mg immediate release tablet

## 2018-05-07 NOTE — H&P
Subjective:   Jeremi Walls is a 42 y.o. female presents for postop care 17 days following laparoscopic distal pancreatectomy converted to open procedure by Dr. Pineda Casas. 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     Appetite is good. Eating a regular diet without difficulty. Bowel movements are regular. However, patient is not feeling feel well.  is concerned.  notes that she has been sleeping a lot more the past 2 days, less energy. 2 days ago started having sporadic fevers of 100.5F - 101F. She would take tylenol and it would go away and return later on. No fever today; last took tylenol at 9 am but no fever at that point in the morning. Stopped taking gabapentin 2 days ago due to HA. Pt decreased amt of roxicodone 10 mg from every 3 hours to every 4 hours. Can make it rhough the night without taking pain medicine. Still in a lot of pain and hurts to ride in the car, she \"can't imagine going to the store. \"  Has rx for narcan from discharge. Drain output 120-150cc per day for the past 2 days.     Advance directive not on file        Objective:           Visit Vitals    /70 (BP 1 Location: Left arm, BP Patient Position: Sitting)    Pulse 91    Temp 98.4 °F (36.9 °C) (Oral)    Resp 18    Ht 5' 3\" (1.6 m)    Wt 159 lb (72.1 kg)    SpO2 96%    BMI 28.17 kg/m2            General:  fatigued, no distress, accompanied by    Abdomen: bowel sounds active, very TTPin RUQ around side and to her back   Incision:   healing well, no drainage, no erythema, no seroma, no swelling, no dehiscence, incisions well approximated   Heart: regular rate and rhythm, S1, S2 normal, no murmur, click, rub or gallop   Lungs: clear to auscultation bilaterally      BETSY with opaque milky discharge          Assessment:      S/P laparoscopic distal pancreatectomy converted to open procedure. Fevers  R/o splenic abscess/intra-abdominal infection     Plan:   D/W and plan per Dr. Lorrie Rocha  1. Admit to hospital.  2. CT scan today  3. check drain fluid for amylase  4. Labs  5. Pain control  6. NPO for now  7. IVF  8. Notify Dr. Erik French and further plans per Dr. Lino Martin  Patient verbalized understanding and agreement.

## 2018-05-08 ENCOUNTER — APPOINTMENT (OUTPATIENT)
Dept: CT IMAGING | Age: 38
DRG: 864 | End: 2018-05-08
Attending: SURGERY
Payer: COMMERCIAL

## 2018-05-08 ENCOUNTER — PATIENT OUTREACH (OUTPATIENT)
Dept: FAMILY MEDICINE CLINIC | Age: 38
End: 2018-05-08

## 2018-05-08 LAB
ERYTHROCYTE [DISTWIDTH] IN BLOOD BY AUTOMATED COUNT: 14.3 % (ref 11.5–14.5)
HCT VFR BLD AUTO: 29 % (ref 35–47)
HGB BLD-MCNC: 9.3 G/DL (ref 11.5–16)
MCH RBC QN AUTO: 30.9 PG (ref 26–34)
MCHC RBC AUTO-ENTMCNC: 32.1 G/DL (ref 30–36.5)
MCV RBC AUTO: 96.3 FL (ref 80–99)
NRBC # BLD: 0 K/UL (ref 0–0.01)
NRBC BLD-RTO: 0 PER 100 WBC
PLATELET # BLD AUTO: 768 K/UL (ref 150–400)
PMV BLD AUTO: 9.7 FL (ref 8.9–12.9)
RBC # BLD AUTO: 3.01 M/UL (ref 3.8–5.2)
WBC # BLD AUTO: 9.8 K/UL (ref 3.6–11)

## 2018-05-08 PROCEDURE — 87205 SMEAR GRAM STAIN: CPT | Performed by: RADIOLOGY

## 2018-05-08 PROCEDURE — 74011000250 HC RX REV CODE- 250: Performed by: NURSE PRACTITIONER

## 2018-05-08 PROCEDURE — 10030 IMG GID FLU COLL DRG SFT TIS: CPT

## 2018-05-08 PROCEDURE — 77030010546 HC BG URIN DRNG URES -B

## 2018-05-08 PROCEDURE — 74011250636 HC RX REV CODE- 250/636: Performed by: RADIOLOGY

## 2018-05-08 PROCEDURE — 85027 COMPLETE CBC AUTOMATED: CPT | Performed by: NURSE PRACTITIONER

## 2018-05-08 PROCEDURE — 0W9G30Z DRAINAGE OF PERITONEAL CAVITY WITH DRAINAGE DEVICE, PERCUTANEOUS APPROACH: ICD-10-PCS | Performed by: RADIOLOGY

## 2018-05-08 PROCEDURE — 74011000250 HC RX REV CODE- 250: Performed by: SURGERY

## 2018-05-08 PROCEDURE — 74011250637 HC RX REV CODE- 250/637: Performed by: NURSE PRACTITIONER

## 2018-05-08 PROCEDURE — 36415 COLL VENOUS BLD VENIPUNCTURE: CPT | Performed by: NURSE PRACTITIONER

## 2018-05-08 PROCEDURE — 65270000032 HC RM SEMIPRIVATE

## 2018-05-08 PROCEDURE — 74011250636 HC RX REV CODE- 250/636: Performed by: NURSE PRACTITIONER

## 2018-05-08 PROCEDURE — C1769 GUIDE WIRE: HCPCS

## 2018-05-08 PROCEDURE — 87075 CULTR BACTERIA EXCEPT BLOOD: CPT | Performed by: RADIOLOGY

## 2018-05-08 PROCEDURE — 74011250636 HC RX REV CODE- 250/636: Performed by: SURGERY

## 2018-05-08 PROCEDURE — C1729 CATH, DRAINAGE: HCPCS

## 2018-05-08 PROCEDURE — 77030003666 HC NDL SPINAL BD -A

## 2018-05-08 PROCEDURE — 77030018781

## 2018-05-08 PROCEDURE — 77030005208 HC CATH HLDR GLWC -A

## 2018-05-08 RX ORDER — GABAPENTIN 300 MG/1
300 CAPSULE ORAL 3 TIMES DAILY
Status: DISCONTINUED | OUTPATIENT
Start: 2018-05-08 | End: 2018-05-09

## 2018-05-08 RX ORDER — HYDROMORPHONE HYDROCHLORIDE 1 MG/ML
3 INJECTION, SOLUTION INTRAMUSCULAR; INTRAVENOUS; SUBCUTANEOUS
Status: DISCONTINUED | OUTPATIENT
Start: 2018-05-08 | End: 2018-05-08 | Stop reason: HOSPADM

## 2018-05-08 RX ORDER — SODIUM CHLORIDE 9 MG/ML
25 INJECTION, SOLUTION INTRAVENOUS ONCE
Status: COMPLETED | OUTPATIENT
Start: 2018-05-08 | End: 2018-05-08

## 2018-05-08 RX ORDER — HYDROMORPHONE HYDROCHLORIDE 1 MG/ML
2 INJECTION, SOLUTION INTRAMUSCULAR; INTRAVENOUS; SUBCUTANEOUS
Status: DISCONTINUED | OUTPATIENT
Start: 2018-05-08 | End: 2018-05-10

## 2018-05-08 RX ORDER — MIDAZOLAM HYDROCHLORIDE 1 MG/ML
5 INJECTION, SOLUTION INTRAMUSCULAR; INTRAVENOUS
Status: DISCONTINUED | OUTPATIENT
Start: 2018-05-08 | End: 2018-05-08 | Stop reason: ALTCHOICE

## 2018-05-08 RX ORDER — LIDOCAINE HYDROCHLORIDE 10 MG/ML
10 INJECTION INFILTRATION; PERINEURAL
Status: COMPLETED | OUTPATIENT
Start: 2018-05-08 | End: 2018-05-08

## 2018-05-08 RX ORDER — METRONIDAZOLE 500 MG/100ML
500 INJECTION, SOLUTION INTRAVENOUS EVERY 12 HOURS
Status: DISCONTINUED | OUTPATIENT
Start: 2018-05-08 | End: 2018-05-11 | Stop reason: HOSPADM

## 2018-05-08 RX ORDER — SODIUM CHLORIDE 0.9 % (FLUSH) 0.9 %
10 SYRINGE (ML) INJECTION
Status: COMPLETED | OUTPATIENT
Start: 2018-05-08 | End: 2018-05-08

## 2018-05-08 RX ORDER — FAMOTIDINE 20 MG/1
20 TABLET, FILM COATED ORAL 2 TIMES DAILY
Status: DISCONTINUED | OUTPATIENT
Start: 2018-05-08 | End: 2018-05-11 | Stop reason: HOSPADM

## 2018-05-08 RX ORDER — HYDROMORPHONE HYDROCHLORIDE 1 MG/ML
0.5 INJECTION, SOLUTION INTRAMUSCULAR; INTRAVENOUS; SUBCUTANEOUS
Status: DISCONTINUED | OUTPATIENT
Start: 2018-05-08 | End: 2018-05-08

## 2018-05-08 RX ORDER — FENTANYL CITRATE 50 UG/ML
300 INJECTION, SOLUTION INTRAMUSCULAR; INTRAVENOUS
Status: DISCONTINUED | OUTPATIENT
Start: 2018-05-08 | End: 2018-05-08 | Stop reason: ALTCHOICE

## 2018-05-08 RX ORDER — DULOXETIN HYDROCHLORIDE 60 MG/1
60 CAPSULE, DELAYED RELEASE ORAL DAILY
Status: DISCONTINUED | OUTPATIENT
Start: 2018-05-08 | End: 2018-05-11 | Stop reason: HOSPADM

## 2018-05-08 RX ADMIN — LIDOCAINE HYDROCHLORIDE 10 ML: 10 INJECTION, SOLUTION INFILTRATION; PERINEURAL at 15:48

## 2018-05-08 RX ADMIN — MIDAZOLAM HYDROCHLORIDE 1 MG: 1 INJECTION, SOLUTION INTRAMUSCULAR; INTRAVENOUS at 14:30

## 2018-05-08 RX ADMIN — GABAPENTIN 300 MG: 300 CAPSULE ORAL at 16:46

## 2018-05-08 RX ADMIN — FENTANYL CITRATE 25 MCG: 50 INJECTION, SOLUTION INTRAMUSCULAR; INTRAVENOUS at 14:49

## 2018-05-08 RX ADMIN — METRONIDAZOLE 500 MG: 500 INJECTION, SOLUTION INTRAVENOUS at 08:51

## 2018-05-08 RX ADMIN — SODIUM CHLORIDE 25 ML/HR: 900 INJECTION, SOLUTION INTRAVENOUS at 13:54

## 2018-05-08 RX ADMIN — FENTANYL CITRATE 50 MCG: 50 INJECTION, SOLUTION INTRAMUSCULAR; INTRAVENOUS at 14:30

## 2018-05-08 RX ADMIN — HYDROMORPHONE HYDROCHLORIDE 1 MG: 1 INJECTION, SOLUTION INTRAMUSCULAR; INTRAVENOUS; SUBCUTANEOUS at 16:10

## 2018-05-08 RX ADMIN — Medication 10 ML: at 06:26

## 2018-05-08 RX ADMIN — Medication 10 ML: at 16:10

## 2018-05-08 RX ADMIN — OXYCODONE HYDROCHLORIDE 10 MG: 5 TABLET ORAL at 16:46

## 2018-05-08 RX ADMIN — GABAPENTIN 300 MG: 300 CAPSULE ORAL at 21:12

## 2018-05-08 RX ADMIN — HYDROMORPHONE HYDROCHLORIDE 1 MG: 1 INJECTION, SOLUTION INTRAMUSCULAR; INTRAVENOUS; SUBCUTANEOUS at 15:28

## 2018-05-08 RX ADMIN — FAMOTIDINE 20 MG: 20 TABLET ORAL at 18:22

## 2018-05-08 RX ADMIN — FENTANYL CITRATE 50 MCG: 50 INJECTION, SOLUTION INTRAMUSCULAR; INTRAVENOUS at 14:54

## 2018-05-08 RX ADMIN — MIDAZOLAM HYDROCHLORIDE 1 MG: 1 INJECTION, SOLUTION INTRAMUSCULAR; INTRAVENOUS at 14:55

## 2018-05-08 RX ADMIN — SODIUM CHLORIDE 125 ML/HR: 900 INJECTION, SOLUTION INTRAVENOUS at 12:03

## 2018-05-08 RX ADMIN — FENTANYL CITRATE 25 MCG: 50 INJECTION, SOLUTION INTRAMUSCULAR; INTRAVENOUS at 14:39

## 2018-05-08 RX ADMIN — FAMOTIDINE 20 MG: 20 TABLET ORAL at 12:04

## 2018-05-08 RX ADMIN — METRONIDAZOLE 500 MG: 500 INJECTION, SOLUTION INTRAVENOUS at 20:54

## 2018-05-08 RX ADMIN — FENTANYL CITRATE 50 MCG: 50 INJECTION, SOLUTION INTRAMUSCULAR; INTRAVENOUS at 15:09

## 2018-05-08 RX ADMIN — Medication 10 ML: at 13:09

## 2018-05-08 RX ADMIN — HYDROMORPHONE HYDROCHLORIDE 1 MG: 1 INJECTION, SOLUTION INTRAMUSCULAR; INTRAVENOUS; SUBCUTANEOUS at 15:37

## 2018-05-08 RX ADMIN — DULOXETINE HYDROCHLORIDE 60 MG: 60 CAPSULE, DELAYED RELEASE ORAL at 12:04

## 2018-05-08 RX ADMIN — OXYCODONE HYDROCHLORIDE 10 MG: 5 TABLET ORAL at 09:25

## 2018-05-08 RX ADMIN — OXYCODONE HYDROCHLORIDE 10 MG: 5 TABLET ORAL at 19:52

## 2018-05-08 RX ADMIN — OXYCODONE HYDROCHLORIDE 10 MG: 5 TABLET ORAL at 22:34

## 2018-05-08 RX ADMIN — OXYCODONE HYDROCHLORIDE 10 MG: 5 TABLET ORAL at 02:55

## 2018-05-08 RX ADMIN — MIDAZOLAM HYDROCHLORIDE 1 MG: 1 INJECTION, SOLUTION INTRAMUSCULAR; INTRAVENOUS at 14:34

## 2018-05-08 RX ADMIN — Medication 10 ML: at 21:12

## 2018-05-08 RX ADMIN — MIDAZOLAM HYDROCHLORIDE 1 MG: 1 INJECTION, SOLUTION INTRAMUSCULAR; INTRAVENOUS at 14:42

## 2018-05-08 RX ADMIN — OXYCODONE HYDROCHLORIDE 10 MG: 5 TABLET ORAL at 12:27

## 2018-05-08 RX ADMIN — SODIUM CHLORIDE 125 ML/HR: 900 INJECTION, SOLUTION INTRAVENOUS at 03:09

## 2018-05-08 RX ADMIN — FENTANYL CITRATE 50 MCG: 50 INJECTION, SOLUTION INTRAMUSCULAR; INTRAVENOUS at 14:58

## 2018-05-08 RX ADMIN — LEVOFLOXACIN 500 MG: 5 INJECTION, SOLUTION INTRAVENOUS at 22:34

## 2018-05-08 RX ADMIN — OXYCODONE HYDROCHLORIDE 10 MG: 5 TABLET ORAL at 06:22

## 2018-05-08 RX ADMIN — HYDROMORPHONE HYDROCHLORIDE 2 MG: 1 INJECTION, SOLUTION INTRAMUSCULAR; INTRAVENOUS; SUBCUTANEOUS at 18:17

## 2018-05-08 NOTE — PROGRESS NOTES
CT completed  IMPRESSION:   1. Status post distal pancreatectomy. 2. Fluid collection in the bed of resection increased in size with surrounding  inflammation. 3. Splenic infarct decreased in size. .    Will start ABX- give Levaquin 500 mg q 24 hours starting tonight  Plan to review CT images am and decide regarding aspiration / drainage of pancreatic bed fluid collection  BETSY drain amylase is very high consistent with pancreatic fluid

## 2018-05-08 NOTE — H&P
Radiology History and Physical    Patient: Tin Paz 42 y.o. female     Chief Complaint: No chief complaint on file. History of Present Illness: LUQ collection. History:    Past Medical History:   Diagnosis Date    Anxiety     Blurred vision     Chest pain     Chronic pain     MUSCLE WEAKNESS,PANCREATIC CYST     Depression     Frequent headaches     Ill-defined condition     Muscle weakness     Obesity (BMI 30-39.9) 4/26/2018    Pancreatic cyst 3/21/2018    Shortness of breath     Stomach pain     Swallowing difficulty      Family History   Problem Relation Age of Onset    Cancer Mother      colon    Cancer Father      skin    Anesth Problems Father      SUCCINYLCHOLINE  REACTION     Social History     Social History    Marital status:      Spouse name: N/A    Number of children: N/A    Years of education: N/A     Occupational History    Not on file. Social History Main Topics    Smoking status: Current Every Day Smoker     Packs/day: 1.00     Years: 25.00    Smokeless tobacco: Never Used      Comment: STOP SMOKING BOOKLET PROVIDED    Alcohol use 1.8 oz/week     3 Shots of liquor per week    Drug use: No    Sexual activity: Yes     Other Topics Concern    Not on file     Social History Narrative       Allergies:    Allergies   Allergen Reactions    Amoxicillin Shortness of Breath and Rash     Pt has had keflex in the past       Current Medications:  Current Facility-Administered Medications   Medication Dose Route Frequency    metroNIDAZOLE (FLAGYL) IVPB premix 500 mg  500 mg IntraVENous Q12H    DULoxetine (CYMBALTA) capsule 60 mg  60 mg Oral DAILY    gabapentin (NEURONTIN) capsule 300 mg  300 mg Oral TID    famotidine (PEPCID) tablet 20 mg  20 mg Oral BID    fentaNYL citrate (PF) injection 300 mcg  300 mcg IntraVENous Multiple    midazolam (VERSED) injection 5 mg  5 mg IntraVENous Multiple    sodium chloride (NS) flush 10 mL  10 mL IntraVENous RAD ONCE  lidocaine (XYLOCAINE) 10 mg/mL (1 %) injection 1 mL  10 mg IntraDERMal RAD ONCE    sodium chloride (NS) flush 5-10 mL  5-10 mL IntraVENous Q8H    sodium chloride (NS) flush 5-10 mL  5-10 mL IntraVENous PRN    oxyCODONE IR (ROXICODONE) tablet 10 mg  10 mg Oral Q3H PRN    ondansetron (ZOFRAN) injection 4 mg  4 mg IntraVENous Q4H PRN    0.9% sodium chloride infusion  125 mL/hr IntraVENous CONTINUOUS    levoFLOXacin (LEVAQUIN) 500 mg in D5W IVPB  500 mg IntraVENous Q24H        Physical Exam:  Blood pressure 120/43, pulse 94, temperature 97.9 °F (36.6 °C), resp. rate 21, SpO2 94 %. GENERAL: alert, cooperative, mild distress, appears stated age, LUNG: clear to auscultation bilaterally, HEART: regular rate and rhythm      Alerts:    Hospital Problems  Date Reviewed: 5/7/2018          Codes Class Noted POA    Postoperative fever ICD-10-CM: R50.82  ICD-9-CM: 780.62  5/7/2018 Unknown              Laboratory:      Recent Labs      05/08/18   0443  05/07/18   1630   HGB  9.3*  10.5*   HCT  29.0*  33.3*   WBC  9.8  10.6   PLT  768*  909*   BUN   --   6   CREA   --   0.63   K   --   4.0         Plan of Care/Planned Procedure:  Risks, benefits, and alternatives reviewed with patient and she agrees to proceed with the procedure.

## 2018-05-08 NOTE — PROGRESS NOTES
Reason for Readmission: surgical complication - fluid collection s/p distal pancreatectomy          RRAT Score and Risk Level: 4, low risk             Care Conference scheduled: N/A - patient has attended all follow-ups, complies with tx plan, no barriers to care. Resources/supports as identified by patient/family: None         Top Challenges facing patient (as identified by patient/family and CM):  No challenges r/t any of following    Finances/Medication cost?       Transportation        Support system or lack thereof? Living arrangements? Self-care/ADLs/Cognition? Current Advanced Directive/Advance Care Plan: None            Plan for utilizing home health: Not homebound, no needs currently assessed. Likelihood of additional readmission: moderate, depending on surgical status. Transition of Care Plan:    Based on readmission, the patient's previous Plan of Care   has been evaluated and/or modified. The current Transition of Care Plan is: 43 y/o   female, insured by [employer], resides with  and 15 y/o daughter, independent all ADL/IADL's, no home DME. Was recently at [facility] for pancretectomy and splenic infarct, readmitted for fulid collection in bed of resection with surrounding inflammation. Patient was setup with PCP during last [facility] admission, attended appointment as directed. Patient currently off unit for percutaneous drain placemeent, CM s/w surgery NP Carmen who confirms plan to drain and monitor over next few days. Expect discharge home after drain removed, no CM needs anticipated at this time. Please consult CM should new needs arise. Care Management Interventions  PCP Verified by CM:  Yes  Last Visit to PCP: 05/04/18  Cheryl Signup: No  Discharge Durable Medical Equipment: No  Physical Therapy Consult: No  Occupational Therapy Consult: No  Speech Therapy Consult: No  Current Support Network: Own Home  Discharge Location  Discharge Placement: [address]

## 2018-05-08 NOTE — PROCEDURES
PROCEDURE:CT-guided drainage of LUQ collection. INDICATION:post distal pancreatectomy. ANESTHESIA:sedation and local.  COMPLICATION:NONE. SPECIMENS REMOVED:10cc turbid grey fluid. BLOOD LOSS:NONE. /ASSISTANT:GALINA Castro RECOMMENDATIONS:suction bag, irrigation, ordered. CONSENT OBTAINED:YES.  NOTES:samples sent for C&S, gram stain, and amylase.

## 2018-05-08 NOTE — ED NOTES
Upon my arrival to the room, patient is crying and rocking around in the bed, and will not answer my questions, keeps saying over and over again that she is sorry. Patient is not cooperative. Unable to complete the SADD assessment at this time.

## 2018-05-08 NOTE — PROGRESS NOTES
Clinical Pharmacy Note: Metronidazole Dosing    Please note that the metronidazole dose for Sarah Stephens has been changed to 500 mg IVq12h per [facility]-approved protocol. Please contact the pharmacy with any questions.     Andrea Holden, [facility]

## 2018-05-08 NOTE — PROGRESS NOTES
Bedside shift change report given to Ana Mead (oncoming nurse) by Perry Olmedo (offgoing nurse). Report included the following information SBAR.

## 2018-05-08 NOTE — PROGRESS NOTES
General Surgery Daily Progress Note    Admit Date: 2018  Post-Operative Day: * No surgery found * from * No surgery found *     Subjective:     Last 24 hrs: Pt wants to go home. Still having some LLQ abd pain. No n/v      Objective:     Blood pressure 102/67, pulse 66, temperature 98 °F (36.7 °C), resp. rate 16, SpO2 91 %. Temp (24hrs), Av.4 °F (36.9 °C), Min:98 °F (36.7 °C), Max:98.8 °F (37.1 °C)      _____________________  Physical Exam:     Alert and Oriented, x3, in no acute distress. Cardiovascular: RRR, no peripheral edema  Lungs:CTAB   Abdomen: soft, BETSY w/ clouded serous drainage      Assessment:   Active Problems:    Postoperative fever (2018)            Plan: Add flagyl to levaquin  For perc drain today  May eat after procedure  Add PTA meds  PPI  Am labs    Data Review:    Recent Labs      18   0443  18   1630   WBC  9.8  10.6   HGB  9.3*  10.5*   HCT  29.0*  33.3*   PLT  768*  909*     Recent Labs      18   1630   NA  138   K  4.0   CL  99   CO2  31   GLU  150*   BUN  6   CREA  0.63   CA  9.3   ALB  2.9*   SGOT  20   ALT  20     Recent Labs      18   1630   LPSE  81           ______________________  Medications:    Current Facility-Administered Medications   Medication Dose Route Frequency    metroNIDAZOLE (FLAGYL) IVPB premix 500 mg  500 mg IntraVENous Q12H    sodium chloride (NS) flush 5-10 mL  5-10 mL IntraVENous Q8H    sodium chloride (NS) flush 5-10 mL  5-10 mL IntraVENous PRN    oxyCODONE IR (ROXICODONE) tablet 10 mg  10 mg Oral Q3H PRN    ondansetron (ZOFRAN) injection 4 mg  4 mg IntraVENous Q4H PRN    0.9% sodium chloride infusion  125 mL/hr IntraVENous CONTINUOUS    levoFLOXacin (LEVAQUIN) 500 mg in D5W IVPB  500 mg IntraVENous Q24H       Liz Lainez NP  2018                    ATTENDING ADDENDUM  I supervised the APC and reviewed the note. We discussed the plan of care  Dr. Douglas Andersen placed a drain in the collection of turbid fluid. Await cultures.

## 2018-05-08 NOTE — ROUTINE PROCESS
TRANSFER - OUT REPORT:    Verbal report given to Madeline(name) on [employer]  being transferred to [facility](unit) for routine progression of care       Report consisted of patients Situation, Background, Assessment and   Recommendations(SBAR). Information from the following report(s) SBAR, Procedure Summary and MAR was reviewed with the receiving nurse. Lines:   Peripheral IV 05/07/18 Right Arm (Active)   Site Assessment Clean, dry, & intact 5/8/2018 11:40 AM   Phlebitis Assessment 0 5/8/2018 11:40 AM   Infiltration Assessment 0 5/8/2018 11:40 AM   Dressing Status Clean, dry, & intact 5/8/2018 11:40 AM   Dressing Type Transparent 5/8/2018 11:40 AM   Hub Color/Line Status Infusing 5/8/2018 11:40 AM   Action Taken Open ports on tubing capped 5/8/2018 11:40 AM   Alcohol Cap Used Yes 5/8/2018 11:40 AM       Peripheral IV 05/08/18 Left Arm (Active)   Site Assessment Clean, dry, & intact 5/8/2018  9:12 AM   Dressing Status New 5/8/2018  9:12 AM   Dressing Type Transparent 5/8/2018  9:12 AM        Opportunity for questions and clarification was provided.       Patient transported with:   [employer]

## 2018-05-09 LAB
ALBUMIN SERPL-MCNC: 2.7 G/DL (ref 3.5–5)
ALBUMIN/GLOB SERPL: 0.6 {RATIO} (ref 1.1–2.2)
ALP SERPL-CCNC: 122 U/L (ref 45–117)
ALT SERPL-CCNC: 16 U/L (ref 12–78)
ANION GAP SERPL CALC-SCNC: 10 MMOL/L (ref 5–15)
AST SERPL-CCNC: 17 U/L (ref 15–37)
BILIRUB SERPL-MCNC: 0.2 MG/DL (ref 0.2–1)
BUN SERPL-MCNC: 5 MG/DL (ref 6–20)
BUN/CREAT SERPL: 8 (ref 12–20)
CALCIUM SERPL-MCNC: 9.1 MG/DL (ref 8.5–10.1)
CHLORIDE SERPL-SCNC: 98 MMOL/L (ref 97–108)
CO2 SERPL-SCNC: 27 MMOL/L (ref 21–32)
CREAT SERPL-MCNC: 0.63 MG/DL (ref 0.55–1.02)
ERYTHROCYTE [DISTWIDTH] IN BLOOD BY AUTOMATED COUNT: 14.1 % (ref 11.5–14.5)
GLOBULIN SER CALC-MCNC: 4.3 G/DL (ref 2–4)
GLUCOSE SERPL-MCNC: 156 MG/DL (ref 65–100)
HCT VFR BLD AUTO: 31 % (ref 35–47)
HGB BLD-MCNC: 9.8 G/DL (ref 11.5–16)
MCH RBC QN AUTO: 30.6 PG (ref 26–34)
MCHC RBC AUTO-ENTMCNC: 31.6 G/DL (ref 30–36.5)
MCV RBC AUTO: 96.9 FL (ref 80–99)
NRBC # BLD: 0 K/UL (ref 0–0.01)
NRBC BLD-RTO: 0 PER 100 WBC
PLATELET # BLD AUTO: 748 K/UL (ref 150–400)
PMV BLD AUTO: 9.9 FL (ref 8.9–12.9)
POTASSIUM SERPL-SCNC: 3.8 MMOL/L (ref 3.5–5.1)
PROT SERPL-MCNC: 7 G/DL (ref 6.4–8.2)
RBC # BLD AUTO: 3.2 M/UL (ref 3.8–5.2)
SODIUM SERPL-SCNC: 135 MMOL/L (ref 136–145)
WBC # BLD AUTO: 12.6 K/UL (ref 3.6–11)

## 2018-05-09 PROCEDURE — 74011250637 HC RX REV CODE- 250/637: Performed by: NURSE PRACTITIONER

## 2018-05-09 PROCEDURE — 85027 COMPLETE CBC AUTOMATED: CPT | Performed by: NURSE PRACTITIONER

## 2018-05-09 PROCEDURE — 65270000032 HC RM SEMIPRIVATE

## 2018-05-09 PROCEDURE — 74011000250 HC RX REV CODE- 250: Performed by: NURSE PRACTITIONER

## 2018-05-09 PROCEDURE — 74011250636 HC RX REV CODE- 250/636: Performed by: SURGERY

## 2018-05-09 PROCEDURE — 74011250636 HC RX REV CODE- 250/636: Performed by: NURSE PRACTITIONER

## 2018-05-09 PROCEDURE — 36415 COLL VENOUS BLD VENIPUNCTURE: CPT | Performed by: NURSE PRACTITIONER

## 2018-05-09 PROCEDURE — 80053 COMPREHEN METABOLIC PANEL: CPT | Performed by: NURSE PRACTITIONER

## 2018-05-09 RX ORDER — PREGABALIN 25 MG/1
50 CAPSULE ORAL 2 TIMES DAILY
Status: DISCONTINUED | OUTPATIENT
Start: 2018-05-09 | End: 2018-05-11 | Stop reason: HOSPADM

## 2018-05-09 RX ORDER — OCTREOTIDE ACETATE 50 UG/ML
50 INJECTION, SOLUTION INTRAVENOUS; SUBCUTANEOUS 3 TIMES DAILY
Status: DISCONTINUED | OUTPATIENT
Start: 2018-05-09 | End: 2018-05-11 | Stop reason: HOSPADM

## 2018-05-09 RX ORDER — LIDOCAINE 50 MG/G
1 PATCH TOPICAL EVERY 24 HOURS
Status: DISCONTINUED | OUTPATIENT
Start: 2018-05-09 | End: 2018-05-11 | Stop reason: HOSPADM

## 2018-05-09 RX ORDER — AMOXICILLIN 250 MG
1 CAPSULE ORAL DAILY PRN
Status: DISCONTINUED | OUTPATIENT
Start: 2018-05-09 | End: 2018-05-11

## 2018-05-09 RX ADMIN — HYDROMORPHONE HYDROCHLORIDE 2 MG: 1 INJECTION, SOLUTION INTRAMUSCULAR; INTRAVENOUS; SUBCUTANEOUS at 06:40

## 2018-05-09 RX ADMIN — HYDROMORPHONE HYDROCHLORIDE 2 MG: 1 INJECTION, SOLUTION INTRAMUSCULAR; INTRAVENOUS; SUBCUTANEOUS at 00:30

## 2018-05-09 RX ADMIN — GABAPENTIN 300 MG: 300 CAPSULE ORAL at 08:42

## 2018-05-09 RX ADMIN — FAMOTIDINE 20 MG: 20 TABLET ORAL at 08:42

## 2018-05-09 RX ADMIN — HYDROMORPHONE HYDROCHLORIDE 2 MG: 1 INJECTION, SOLUTION INTRAMUSCULAR; INTRAVENOUS; SUBCUTANEOUS at 18:47

## 2018-05-09 RX ADMIN — OCTREOTIDE ACETATE 50 MCG: 50 INJECTION, SOLUTION INTRAVENOUS; SUBCUTANEOUS at 11:17

## 2018-05-09 RX ADMIN — Medication 10 ML: at 18:47

## 2018-05-09 RX ADMIN — HYDROMORPHONE HYDROCHLORIDE 2 MG: 1 INJECTION, SOLUTION INTRAMUSCULAR; INTRAVENOUS; SUBCUTANEOUS at 12:47

## 2018-05-09 RX ADMIN — OXYCODONE HYDROCHLORIDE 10 MG: 5 TABLET ORAL at 11:48

## 2018-05-09 RX ADMIN — FAMOTIDINE 20 MG: 20 TABLET ORAL at 17:49

## 2018-05-09 RX ADMIN — Medication 10 ML: at 16:25

## 2018-05-09 RX ADMIN — PREGABALIN 50 MG: 25 CAPSULE ORAL at 17:50

## 2018-05-09 RX ADMIN — Medication 10 ML: at 06:47

## 2018-05-09 RX ADMIN — OCTREOTIDE ACETATE 50 MCG: 50 INJECTION, SOLUTION INTRAVENOUS; SUBCUTANEOUS at 16:24

## 2018-05-09 RX ADMIN — METRONIDAZOLE 500 MG: 500 INJECTION, SOLUTION INTRAVENOUS at 08:42

## 2018-05-09 RX ADMIN — Medication 10 ML: at 23:09

## 2018-05-09 RX ADMIN — OXYCODONE HYDROCHLORIDE 10 MG: 5 TABLET ORAL at 05:11

## 2018-05-09 RX ADMIN — OXYCODONE HYDROCHLORIDE 10 MG: 5 TABLET ORAL at 20:56

## 2018-05-09 RX ADMIN — DULOXETINE HYDROCHLORIDE 60 MG: 60 CAPSULE, DELAYED RELEASE ORAL at 08:42

## 2018-05-09 RX ADMIN — OXYCODONE HYDROCHLORIDE 10 MG: 5 TABLET ORAL at 08:42

## 2018-05-09 RX ADMIN — OCTREOTIDE ACETATE 50 MCG: 50 INJECTION, SOLUTION INTRAVENOUS; SUBCUTANEOUS at 23:06

## 2018-05-09 RX ADMIN — OXYCODONE HYDROCHLORIDE 10 MG: 5 TABLET ORAL at 02:00

## 2018-05-09 RX ADMIN — SODIUM CHLORIDE 125 ML/HR: 900 INJECTION, SOLUTION INTRAVENOUS at 18:46

## 2018-05-09 RX ADMIN — OXYCODONE HYDROCHLORIDE 10 MG: 5 TABLET ORAL at 17:49

## 2018-05-09 RX ADMIN — METRONIDAZOLE 500 MG: 500 INJECTION, SOLUTION INTRAVENOUS at 20:56

## 2018-05-09 RX ADMIN — OXYCODONE HYDROCHLORIDE 10 MG: 5 TABLET ORAL at 14:44

## 2018-05-09 RX ADMIN — Medication 10 ML: at 11:25

## 2018-05-09 RX ADMIN — ONDANSETRON 4 MG: 2 INJECTION INTRAMUSCULAR; INTRAVENOUS at 16:24

## 2018-05-09 RX ADMIN — ONDANSETRON 4 MG: 2 INJECTION INTRAMUSCULAR; INTRAVENOUS at 11:25

## 2018-05-09 RX ADMIN — SODIUM CHLORIDE 125 ML/HR: 900 INJECTION, SOLUTION INTRAVENOUS at 02:00

## 2018-05-09 RX ADMIN — LEVOFLOXACIN 500 MG: 5 INJECTION, SOLUTION INTRAVENOUS at 23:07

## 2018-05-09 NOTE — PROGRESS NOTES
NUTRITION  Pt seen for:     [x]             PO intake check           RECOMMENDATIONS:   1. Monitor BG with insulin as needed d/t recent resection    - add consistent CHO to diet order as needed    - switch to chocolate Glucerna if BG elevated  2. Weekly weights    Interventions:  - diet liberalized to low fat  - Ensure Enlive BID  SUBJECTIVE/OBJECTIVE:   Information obtained from:   patient        Pt admitted for post-op fever s/p distal pancreatectomy on 4/20 for pancreatic cyst. PMHx: pancreatic cyst, pancreatitis, ETOH abuse, depression, anxiety. Abcess noted with drain placed yesterday. Octreotide started for pancreatic fistula. Surgery noting appetite good despite the above. Pt does note early satiety during visit. Able to eat 100% eggs and some toast this morning. Enjoys Ensure (chocolate) so will order BID for now (700kcal, 40g protein). Of note: BG has been elevated over 120mg/dl since distal pancreatectomy. Recommend continued monitoring of BG with insulin as needed d/t recent resection. Diet:  GI lite  Intake: [] Good     [x]  Fair      []  Poor     Weight Changes:   [x]            Loss - 4% wt loss x 1 month. #. Wt Readings from Last 8 Encounters:   05/09/18 72.1 kg (159 lb)   05/07/18 72.1 kg (159 lb)   05/04/18 73.7 kg (162 lb 8 oz)   04/13/18 75 kg (165 lb 4 oz)   04/03/18 70.3 kg (155 lb)   03/21/18 74.4 kg (164 lb)     Nutrition Diagnosis:   1. Altered nutrition-related lab values related to recent pancreatectomy as evidenced by elevated BG    2.  Unintended weight loss related to inadequate protein/energy intake (improving) as evidenced by wt loss of 4% x 1 month; recent surgery; tolerating diet    PLAN:   []           Obtained/adjusted food preferences/tolerances and/or snacks options   []           Educated patient  [x]           Rescreen per screening protocol  [x]           Add Supplements    Rescreen:  [x]            At Nutrition Risk will follow per protocol          [] Not at Nutrition Risk, rescreen per screening protocol    Jerline Lesches, RD

## 2018-05-09 NOTE — PROGRESS NOTES
General Surgery Daily Progress Note    Admit Date: 2018  Post-Operative Day: * No surgery found * from * No surgery found *     Subjective:     Last 24 hrs: Pt in tremendous pain around new drain site. Taking 10 mg oxycodone every 3 hours and 2 mg IV dilaudid every 3 hours and still does not have relief from pain. Perc drain 225 CC output yesterday; BETSY drain with 25 cc output  On levaquin and flagyl. Tmax 99.1. WBC up at bit to 12.6. Drain cultures pending. +flatus, last BM 2 days ago. Tolerate GI lite diet, no N/V. Objective:     Blood pressure 148/82, pulse 85, temperature 99.1 °F (37.3 °C), resp. rate 18, SpO2 94 %. Temp (24hrs), Av.2 °F (36.8 °C), Min:97.6 °F (36.4 °C), Max:99.1 °F (37.3 °C)      _____________________  Physical Exam:     Alert and Oriented, crying and holding her left side. Cardiovascular: RRR,  peripheral edema, on 3L NC  Lungs: unlabored  Abdomen: +distension, +hypoactive BS, very TTP in left side  Perc drain with thick light brown output  BETSY drain white-yellow, clearer fluid (clearing up compared to Monday). Assessment:   Active Problems:    Postoperative fever (2018)    intra-abdominal fluid collection        Plan:     D/W Dr. Christian Obando with radiology - drain in good placement  D/w Dr. Aung Greene - will re-start Lyrica while inpatient and plan to resume gabapentin when at home since lyrica is not covered by insurance.   Lidocaine patch  Cont oxycodone and dilaudid IV prn  Warm heat  Start octreotide for pancreatic fistula  Add pericolace PRN  Cont GI lite diet  Cont GI and DVT proph  OOB and ambulate  Labs in the morning  Further plans per Dr. Aung Greene    Data Review:    Recent Labs      18   0626  18   0443  18   1630   WBC  12.6*  9.8  10.6   HGB  9.8*  9.3*  10.5*   HCT  31.0*  29.0*  33.3*   PLT  748*  768*  909*     Recent Labs      18   0626  18   1630   NA  135*  138   K  3.8  4.0   CL  98  99   CO2  27  31   GLU  156*  150*   BUN 5*  6   CREA  0.63  0.63   CA  9.1  9.3   ALB  2.7*  2.9*   SGOT  17  20   ALT  16  20     Recent Labs      05/07/18   1630   LPSE  81           ______________________  Medications:    Current Facility-Administered Medications   Medication Dose Route Frequency    senna-docusate (PERICOLACE) 8.6-50 mg per tablet 1 Tab  1 Tab Oral DAILY PRN    metroNIDAZOLE (FLAGYL) IVPB premix 500 mg  500 mg IntraVENous Q12H    DULoxetine (CYMBALTA) capsule 60 mg  60 mg Oral DAILY    gabapentin (NEURONTIN) capsule 300 mg  300 mg Oral TID    famotidine (PEPCID) tablet 20 mg  20 mg Oral BID    HYDROmorphone (PF) (DILAUDID) injection 2 mg  2 mg IntraVENous Q6H PRN    sodium chloride (NS) flush 5-10 mL  5-10 mL IntraVENous Q8H    sodium chloride (NS) flush 5-10 mL  5-10 mL IntraVENous PRN    oxyCODONE IR (ROXICODONE) tablet 10 mg  10 mg Oral Q3H PRN    ondansetron (ZOFRAN) injection 4 mg  4 mg IntraVENous Q4H PRN    0.9% sodium chloride infusion  125 mL/hr IntraVENous CONTINUOUS    levoFLOXacin (LEVAQUIN) 500 mg in D5W IVPB  500 mg IntraVENous Q24H       Missy Amador NP  5/9/2018

## 2018-05-09 NOTE — CDMP QUERY
Please clarify if this patient is (was) being treated/managed for:     =>Possible  Peritonitis (POA)  in the setting of positive body fluid cultures requiring IV Levaquin.  => Other explanation of clinical findings  => Clinically Undetermined (no explanation for clinical findings)    The medical record reflects the following clinical findings, treatment, and risk factors. Risk Factors:  s/p open distal pancreatectomy; positive body fluid cultures    Clinical Indicators:  - Body Fluid Cx Results:   GPC in pairs seen on gram stain of the thio broth   - Readmitted for fluid collection in bed of resection with surrounding inflammation  - H&P: BETSY drain amylase is very high consistent with pancreatic fluid  - CT drainage : 10cc turbid grey fluid.  - RUQ TTP on admission. continues to c/o LLQ abd pain. Transient fevers reports .5F - 101F. Treatment: Levaquin    Please clarify and document your clinical opinion in the progress notes and discharge summary including the definitive and/or presumptive diagnosis, (suspected or probable), related to the above clinical findings. Please include clinical findings supporting your diagnosis.     Thank you,  Humberto Mcghee, RN  [phone]

## 2018-05-09 NOTE — PROGRESS NOTES
26 Was notified by the lab that the body fluid culture grew gram positive cocci in pair seen on gram stain of the thio broth.  Will notify Dr. Darline Donovan

## 2018-05-10 LAB
ANION GAP SERPL CALC-SCNC: 7 MMOL/L (ref 5–15)
BUN SERPL-MCNC: 2 MG/DL (ref 6–20)
BUN/CREAT SERPL: 4 (ref 12–20)
CALCIUM SERPL-MCNC: 8.8 MG/DL (ref 8.5–10.1)
CHLORIDE SERPL-SCNC: 104 MMOL/L (ref 97–108)
CO2 SERPL-SCNC: 28 MMOL/L (ref 21–32)
CREAT SERPL-MCNC: 0.57 MG/DL (ref 0.55–1.02)
ERYTHROCYTE [DISTWIDTH] IN BLOOD BY AUTOMATED COUNT: 14.1 % (ref 11.5–14.5)
GLUCOSE SERPL-MCNC: 171 MG/DL (ref 65–100)
HCT VFR BLD AUTO: 28.9 % (ref 35–47)
HGB BLD-MCNC: 9.1 G/DL (ref 11.5–16)
MCH RBC QN AUTO: 30.8 PG (ref 26–34)
MCHC RBC AUTO-ENTMCNC: 31.5 G/DL (ref 30–36.5)
MCV RBC AUTO: 98 FL (ref 80–99)
NRBC # BLD: 0 K/UL (ref 0–0.01)
NRBC BLD-RTO: 0 PER 100 WBC
PLATELET # BLD AUTO: 641 K/UL (ref 150–400)
PMV BLD AUTO: 10.2 FL (ref 8.9–12.9)
POTASSIUM SERPL-SCNC: 4.1 MMOL/L (ref 3.5–5.1)
RBC # BLD AUTO: 2.95 M/UL (ref 3.8–5.2)
SODIUM SERPL-SCNC: 139 MMOL/L (ref 136–145)
WBC # BLD AUTO: 10.5 K/UL (ref 3.6–11)

## 2018-05-10 PROCEDURE — 85027 COMPLETE CBC AUTOMATED: CPT | Performed by: NURSE PRACTITIONER

## 2018-05-10 PROCEDURE — 74011250636 HC RX REV CODE- 250/636: Performed by: SURGERY

## 2018-05-10 PROCEDURE — 74011000250 HC RX REV CODE- 250: Performed by: NURSE PRACTITIONER

## 2018-05-10 PROCEDURE — 80048 BASIC METABOLIC PNL TOTAL CA: CPT | Performed by: NURSE PRACTITIONER

## 2018-05-10 PROCEDURE — 36415 COLL VENOUS BLD VENIPUNCTURE: CPT | Performed by: NURSE PRACTITIONER

## 2018-05-10 PROCEDURE — 74011250637 HC RX REV CODE- 250/637: Performed by: NURSE PRACTITIONER

## 2018-05-10 PROCEDURE — 65270000032 HC RM SEMIPRIVATE

## 2018-05-10 PROCEDURE — 74011250636 HC RX REV CODE- 250/636: Performed by: NURSE PRACTITIONER

## 2018-05-10 RX ORDER — SENNOSIDES 8.6 MG/1
2 TABLET ORAL DAILY
Status: DISCONTINUED | OUTPATIENT
Start: 2018-05-10 | End: 2018-05-11 | Stop reason: HOSPADM

## 2018-05-10 RX ORDER — POLYETHYLENE GLYCOL 3350 17 G/17G
17 POWDER, FOR SOLUTION ORAL DAILY
Status: DISCONTINUED | OUTPATIENT
Start: 2018-05-10 | End: 2018-05-11 | Stop reason: HOSPADM

## 2018-05-10 RX ORDER — OXYCODONE HYDROCHLORIDE 5 MG/1
15 TABLET ORAL
Status: DISCONTINUED | OUTPATIENT
Start: 2018-05-10 | End: 2018-05-11

## 2018-05-10 RX ORDER — HYDROMORPHONE HYDROCHLORIDE 1 MG/ML
2 INJECTION, SOLUTION INTRAMUSCULAR; INTRAVENOUS; SUBCUTANEOUS
Status: DISCONTINUED | OUTPATIENT
Start: 2018-05-10 | End: 2018-05-11 | Stop reason: SDUPTHER

## 2018-05-10 RX ADMIN — Medication 10 ML: at 08:14

## 2018-05-10 RX ADMIN — LEVOFLOXACIN 500 MG: 5 INJECTION, SOLUTION INTRAVENOUS at 23:35

## 2018-05-10 RX ADMIN — METRONIDAZOLE 500 MG: 500 INJECTION, SOLUTION INTRAVENOUS at 08:02

## 2018-05-10 RX ADMIN — POLYETHYLENE GLYCOL 3350 17 G: 17 POWDER, FOR SOLUTION ORAL at 12:16

## 2018-05-10 RX ADMIN — Medication 10 ML: at 22:00

## 2018-05-10 RX ADMIN — METRONIDAZOLE 500 MG: 500 INJECTION, SOLUTION INTRAVENOUS at 20:22

## 2018-05-10 RX ADMIN — Medication 10 ML: at 15:02

## 2018-05-10 RX ADMIN — HYDROMORPHONE HYDROCHLORIDE 2 MG: 1 INJECTION, SOLUTION INTRAMUSCULAR; INTRAVENOUS; SUBCUTANEOUS at 19:17

## 2018-05-10 RX ADMIN — OCTREOTIDE ACETATE 50 MCG: 50 INJECTION, SOLUTION INTRAVENOUS; SUBCUTANEOUS at 16:43

## 2018-05-10 RX ADMIN — ONDANSETRON 4 MG: 2 INJECTION INTRAMUSCULAR; INTRAVENOUS at 10:01

## 2018-05-10 RX ADMIN — OCTREOTIDE ACETATE 50 MCG: 50 INJECTION, SOLUTION INTRAVENOUS; SUBCUTANEOUS at 10:01

## 2018-05-10 RX ADMIN — SENNOSIDES 17.2 MG: 8.6 TABLET, FILM COATED ORAL at 12:16

## 2018-05-10 RX ADMIN — Medication 10 ML: at 10:01

## 2018-05-10 RX ADMIN — HYDROMORPHONE HYDROCHLORIDE 2 MG: 1 INJECTION, SOLUTION INTRAMUSCULAR; INTRAVENOUS; SUBCUTANEOUS at 15:00

## 2018-05-10 RX ADMIN — Medication 10 ML: at 09:00

## 2018-05-10 RX ADMIN — ONDANSETRON 4 MG: 2 INJECTION INTRAMUSCULAR; INTRAVENOUS at 23:56

## 2018-05-10 RX ADMIN — OXYCODONE HYDROCHLORIDE 10 MG: 5 TABLET ORAL at 04:25

## 2018-05-10 RX ADMIN — Medication 10 ML: at 09:48

## 2018-05-10 RX ADMIN — PREGABALIN 50 MG: 25 CAPSULE ORAL at 08:02

## 2018-05-10 RX ADMIN — PREGABALIN 50 MG: 25 CAPSULE ORAL at 17:30

## 2018-05-10 RX ADMIN — OXYCODONE HYDROCHLORIDE 15 MG: 5 TABLET ORAL at 16:41

## 2018-05-10 RX ADMIN — ONDANSETRON 4 MG: 2 INJECTION INTRAMUSCULAR; INTRAVENOUS at 16:43

## 2018-05-10 RX ADMIN — FAMOTIDINE 20 MG: 20 TABLET ORAL at 08:02

## 2018-05-10 RX ADMIN — DULOXETINE HYDROCHLORIDE 60 MG: 60 CAPSULE, DELAYED RELEASE ORAL at 08:02

## 2018-05-10 RX ADMIN — OXYCODONE HYDROCHLORIDE 15 MG: 5 TABLET ORAL at 11:56

## 2018-05-10 RX ADMIN — FAMOTIDINE 20 MG: 20 TABLET ORAL at 17:30

## 2018-05-10 RX ADMIN — OXYCODONE HYDROCHLORIDE 15 MG: 5 TABLET ORAL at 20:06

## 2018-05-10 RX ADMIN — HYDROMORPHONE HYDROCHLORIDE 2 MG: 1 INJECTION, SOLUTION INTRAMUSCULAR; INTRAVENOUS; SUBCUTANEOUS at 08:57

## 2018-05-10 RX ADMIN — OXYCODONE HYDROCHLORIDE 10 MG: 5 TABLET ORAL at 08:02

## 2018-05-10 RX ADMIN — OXYCODONE HYDROCHLORIDE 10 MG: 5 TABLET ORAL at 00:57

## 2018-05-10 RX ADMIN — HYDROMORPHONE HYDROCHLORIDE 2 MG: 1 INJECTION, SOLUTION INTRAMUSCULAR; INTRAVENOUS; SUBCUTANEOUS at 02:36

## 2018-05-10 NOTE — PROGRESS NOTES
Daily Progress Note  [facility] General Surgery at [address] Date: 2018  Post-Operative Day: 1 from CT guided drainage of fluid collection     Subjective:     Last 24 hrs: Still having a lot of pain around drain site, requesting increase in oxycodone. + flatus, no BM yet. WBC trending down, 10.5 today. Continues on levaquin/flagyl. Objective:     Blood pressure 103/70, pulse 60, temperature 98.4 °F (36.9 °C), resp. rate 18, height 5' 3\" (1.6 m), weight 72.1 kg (159 lb), SpO2 90 %. Temp (24hrs), Av.5 °F (36.9 °C), Min:98 °F (36.7 °C), Max:98.7 °F (37.1 °C)      _____________________  Physical Exam:     Alert and Oriented, lying in bed, no acute distress. Cardiovascular: RRR, no peripheral edema  Lungs:CTAB   Abdomen: + BS, soft, tender around drain site.         Assessment:   Active Problems:    Postoperative fever (2018)    s/p percutaneous drainage of abdominal fluid collection    Pancreatic leak    Plan:     Continue abx  Increase Roxicodone  Add bowel regimen  PO as tolerated  OOB  Lovenox for DVT prophylaxis        Magi Costello, ACNP - [address] [facility] Surgery at [address], [address] [place] [address]  [phone]    Data Review:    Recent Labs      05/10/18   0100  18   0626  18   0443   WBC  10.5  12.6*  9.8   HGB  9.1*  9.8*  9.3*   HCT  28.9*  31.0*  29.0*   PLT  641*  748*  768*     Recent Labs      05/10/18   0446  18   0626  18   1630   NA  139  135*  138   K  4.1  3.8  4.0   CL  104  98  99   CO2  28  27  31   GLU  171*  156*  150*   BUN  2*  5*  6   CREA  0.57  0.63  0.63   CA  8.8  9.1  9.3   ALB   --   2.7*  2.9*   TBILI   --   0.2  0.1*   SGOT   --   17  20   ALT   --   16  20     Recent Labs      18   1630   LPSE  81           ______________________  Medications:    Current Facility-Administered Medications   Medication Dose Route Frequency    oxyCODONE IR (ROXICODONE) tablet 15 mg  15 mg Oral Q3H PRN    senna (SENOKOT) tablet 17.2 mg  2 Tab Oral DAILY    senna-docusate (PERICOLACE) 8.6-50 mg per tablet 1 Tab  1 Tab Oral DAILY PRN    lidocaine (LIDODERM) 5 % patch 1 Patch  1 Patch TransDERmal Q24H    octreotide (SANDOSTATIN) injection 50 mcg  50 mcg IntraVENous TID    pregabalin (LYRICA) capsule 50 mg  50 mg Oral BID    metroNIDAZOLE (FLAGYL) IVPB premix 500 mg  500 mg IntraVENous Q12H    DULoxetine (CYMBALTA) capsule 60 mg  60 mg Oral DAILY    famotidine (PEPCID) tablet 20 mg  20 mg Oral BID    HYDROmorphone (PF) (DILAUDID) injection 2 mg  2 mg IntraVENous Q6H PRN    sodium chloride (NS) flush 5-10 mL  5-10 mL IntraVENous Q8H    sodium chloride (NS) flush 5-10 mL  5-10 mL IntraVENous PRN    ondansetron (ZOFRAN) injection 4 mg  4 mg IntraVENous Q4H PRN    0.9% sodium chloride infusion  50 mL/hr IntraVENous CONTINUOUS    levoFLOXacin (LEVAQUIN) 500 mg in D5W IVPB  500 mg IntraVENous Q24H                                                                                               ATTENDING ADDENDUM  I supervised the APC and reviewed the note. We discussed the plan of care  Cultures negative so far. Hopefully the pain will shashank soon.

## 2018-05-10 NOTE — PROGRESS NOTES
Bedside shift change report given to Gail Sim (oncoming nurse) by Keshia Sanz RN (offgoing nurse). Report included the following information SBAR, Kardex, Intake/Output, MAR and Recent Results.

## 2018-05-10 NOTE — PROGRESS NOTES
Bedside shift change report given to [facility] (oncoming nurse) by Sonia Light RN (offgoing nurse). Report included the following information SBAR, Kardex, ED Summary, Intake/Output, MAR, Accordion and Recent Results.

## 2018-05-11 VITALS
OXYGEN SATURATION: 90 % | BODY MASS INDEX: 28.17 KG/M2 | HEART RATE: 68 BPM | RESPIRATION RATE: 16 BRPM | HEIGHT: 63 IN | WEIGHT: 159 LBS | DIASTOLIC BLOOD PRESSURE: 68 MMHG | SYSTOLIC BLOOD PRESSURE: 108 MMHG | TEMPERATURE: 98 F

## 2018-05-11 LAB
ERYTHROCYTE [DISTWIDTH] IN BLOOD BY AUTOMATED COUNT: 14 % (ref 11.5–14.5)
HCT VFR BLD AUTO: 28 % (ref 35–47)
HGB BLD-MCNC: 8.8 G/DL (ref 11.5–16)
MCH RBC QN AUTO: 30.6 PG (ref 26–34)
MCHC RBC AUTO-ENTMCNC: 31.4 G/DL (ref 30–36.5)
MCV RBC AUTO: 97.2 FL (ref 80–99)
NRBC # BLD: 0 K/UL (ref 0–0.01)
NRBC BLD-RTO: 0 PER 100 WBC
PLATELET # BLD AUTO: 586 K/UL (ref 150–400)
PMV BLD AUTO: 10.2 FL (ref 8.9–12.9)
RBC # BLD AUTO: 2.88 M/UL (ref 3.8–5.2)
WBC # BLD AUTO: 9.6 K/UL (ref 3.6–11)

## 2018-05-11 PROCEDURE — 74011250636 HC RX REV CODE- 250/636: Performed by: NURSE PRACTITIONER

## 2018-05-11 PROCEDURE — 74011250637 HC RX REV CODE- 250/637: Performed by: NURSE PRACTITIONER

## 2018-05-11 PROCEDURE — 74011000250 HC RX REV CODE- 250: Performed by: NURSE PRACTITIONER

## 2018-05-11 PROCEDURE — 74011250636 HC RX REV CODE- 250/636: Performed by: SURGERY

## 2018-05-11 PROCEDURE — 85027 COMPLETE CBC AUTOMATED: CPT | Performed by: NURSE PRACTITIONER

## 2018-05-11 PROCEDURE — 77030010548 HC BG WND DRN ARMD -B

## 2018-05-11 PROCEDURE — 36415 COLL VENOUS BLD VENIPUNCTURE: CPT | Performed by: NURSE PRACTITIONER

## 2018-05-11 RX ORDER — LEVOFLOXACIN 250 MG/1
500 TABLET ORAL DAILY
Qty: 10 TAB | Refills: 0 | Status: SHIPPED | OUTPATIENT
Start: 2018-05-11 | End: 2018-05-16

## 2018-05-11 RX ORDER — DIPHENHYDRAMINE HYDROCHLORIDE 50 MG/ML
12.5 INJECTION, SOLUTION INTRAMUSCULAR; INTRAVENOUS
Status: DISCONTINUED | OUTPATIENT
Start: 2018-05-11 | End: 2018-05-11 | Stop reason: HOSPADM

## 2018-05-11 RX ORDER — HYDROMORPHONE HYDROCHLORIDE 2 MG/1
2-4 TABLET ORAL
Status: DISCONTINUED | OUTPATIENT
Start: 2018-05-11 | End: 2018-05-11 | Stop reason: HOSPADM

## 2018-05-11 RX ORDER — METRONIDAZOLE 500 MG/1
500 TABLET ORAL 3 TIMES DAILY
Qty: 15 TAB | Refills: 0 | Status: SHIPPED | OUTPATIENT
Start: 2018-05-11 | End: 2018-05-16

## 2018-05-11 RX ORDER — OXYCODONE HYDROCHLORIDE 5 MG/1
5-10 TABLET ORAL
Qty: 40 TAB | Refills: 0 | Status: SHIPPED | OUTPATIENT
Start: 2018-05-11 | End: 2018-05-16 | Stop reason: SDUPTHER

## 2018-05-11 RX ORDER — HYDROMORPHONE HYDROCHLORIDE 2 MG/ML
2 INJECTION, SOLUTION INTRAMUSCULAR; INTRAVENOUS; SUBCUTANEOUS
Status: DISCONTINUED | OUTPATIENT
Start: 2018-05-11 | End: 2018-05-11

## 2018-05-11 RX ADMIN — Medication 10 ML: at 06:00

## 2018-05-11 RX ADMIN — PREGABALIN 50 MG: 25 CAPSULE ORAL at 09:18

## 2018-05-11 RX ADMIN — SENNOSIDES 17.2 MG: 8.6 TABLET, FILM COATED ORAL at 09:18

## 2018-05-11 RX ADMIN — OCTREOTIDE ACETATE 50 MCG: 50 INJECTION, SOLUTION INTRAVENOUS; SUBCUTANEOUS at 00:32

## 2018-05-11 RX ADMIN — HYDROMORPHONE HYDROCHLORIDE 2 MG: 1 INJECTION, SOLUTION INTRAMUSCULAR; INTRAVENOUS; SUBCUTANEOUS at 00:00

## 2018-05-11 RX ADMIN — HYDROMORPHONE HYDROCHLORIDE 2 MG: 1 INJECTION, SOLUTION INTRAMUSCULAR; INTRAVENOUS; SUBCUTANEOUS at 05:11

## 2018-05-11 RX ADMIN — HYDROMORPHONE HYDROCHLORIDE 4 MG: 2 TABLET ORAL at 15:07

## 2018-05-11 RX ADMIN — OCTREOTIDE ACETATE 50 MCG: 50 INJECTION, SOLUTION INTRAVENOUS; SUBCUTANEOUS at 09:19

## 2018-05-11 RX ADMIN — DIPHENHYDRAMINE HYDROCHLORIDE 12.5 MG: 50 INJECTION, SOLUTION INTRAMUSCULAR; INTRAVENOUS at 05:12

## 2018-05-11 RX ADMIN — METRONIDAZOLE 500 MG: 500 INJECTION, SOLUTION INTRAVENOUS at 09:17

## 2018-05-11 RX ADMIN — ONDANSETRON 4 MG: 2 INJECTION INTRAMUSCULAR; INTRAVENOUS at 09:44

## 2018-05-11 RX ADMIN — OXYCODONE HYDROCHLORIDE 15 MG: 5 TABLET ORAL at 08:05

## 2018-05-11 RX ADMIN — DULOXETINE HYDROCHLORIDE 60 MG: 60 CAPSULE, DELAYED RELEASE ORAL at 09:18

## 2018-05-11 RX ADMIN — OXYCODONE HYDROCHLORIDE 15 MG: 5 TABLET ORAL at 02:11

## 2018-05-11 RX ADMIN — HYDROMORPHONE HYDROCHLORIDE 4 MG: 2 TABLET ORAL at 11:13

## 2018-05-11 RX ADMIN — POLYETHYLENE GLYCOL 3350 17 G: 17 POWDER, FOR SOLUTION ORAL at 09:17

## 2018-05-11 RX ADMIN — FAMOTIDINE 20 MG: 20 TABLET ORAL at 09:18

## 2018-05-11 NOTE — PROGRESS NOTES
Written and verbal discharge instructions reviewed with patient and caregiver. Also provided instruction on drain care for BETSY and accordian drain. Patient confirmed understanding with adequate teach back. IV removed per protocol. Belongings with patient at time of discharge.

## 2018-05-11 NOTE — PROGRESS NOTES
Daily Progress Note  [facility] General Surgery at [address] Date: 2018  Post-Operative Day: 2 from CT guided drainage of intraabdominal fluid collection     Subjective:     Last 24 hrs: still having a lot of pain around drain site. Eating well, afebrile, WBC 9.6, continues on levaquin/flagyl. + flatus, no BM yet. Continues to request both IV and PO pain medication. Hospital is out IV formulations of both morphine and dilaudid, so we will transition to PO dilaudid. She also notes that her accordion drain is leaking from a broken stopper on the collection bag. Will consult IR to replace. Objective:     Blood pressure 108/69, pulse 64, temperature 98.5 °F (36.9 °C), resp. rate 16, height 5' 3\" (1.6 m), weight 72.1 kg (159 lb), SpO2 93 %. Temp (24hrs), Av.4 °F (36.9 °C), Min:98.1 °F (36.7 °C), Max:98.5 °F (36.9 °C)      _____________________  Physical Exam:     Alert and Oriented, lying in bed, no acute distress. Cardiovascular: RRR, no peripheral edema  Lungs:CTAB   Abdomen: + BS, soft, tenderness around drain site. BETSY with grey clear fluid, 7.5 mL out yesterday. Accordion drain with 55 mL out yesterday, tan/grey fluid. Assessment:   Active Problems:    Postoperative fever (2018)    intraabdominal fluid collection    Pancreatic leak        Plan:     PO as tolerated  Adjust pain medications. She may switch back to Roxicodone 15 mg PO if the dilaudid is not working for her.    Bowel regimen  Continue abx  Monitor drain output  Further plan per Dr. Leonardo Greenwood, [address] [facility] Surgery at [facility] [address],   [facility], [address], [place]  [phone]    Data Review:    Recent Labs      18   0219  05/10/18   0100  18   0626   WBC  9.6  10.5  12.6*   HGB  8.8*  9.1*  9.8*   HCT  28.0*  28.9*  31.0*   PLT  586*  641*  748*     Recent Labs      05/10/18   0446  18   0626   NA  139  135*   K  4.1  3.8   CL  104  98   CO2 28  27   GLU  171*  156*   BUN  2*  5*   CREA  0.57  0.63   CA  8.8  9.1   ALB   --   2.7*   TBILI   --   0.2   SGOT   --   17   ALT   --   16     No results for input(s): AML, LPSE in the last 72 hours. ______________________  Medications:    Current Facility-Administered Medications   Medication Dose Route Frequency    diphenhydrAMINE (BENADRYL) injection 12.5 mg  12.5 mg IntraVENous Q6H PRN    HYDROmorphone (DILAUDID) tablet 2-4 mg  2-4 mg Oral Q4H PRN    senna (SENOKOT) tablet 17.2 mg  2 Tab Oral DAILY    polyethylene glycol (MIRALAX) packet 17 g  17 g Oral DAILY    lidocaine (LIDODERM) 5 % patch 1 Patch  1 Patch TransDERmal Q24H    octreotide (SANDOSTATIN) injection 50 mcg  50 mcg IntraVENous TID    pregabalin (LYRICA) capsule 50 mg  50 mg Oral BID    metroNIDAZOLE (FLAGYL) IVPB premix 500 mg  500 mg IntraVENous Q12H    DULoxetine (CYMBALTA) capsule 60 mg  60 mg Oral DAILY    famotidine (PEPCID) tablet 20 mg  20 mg Oral BID    sodium chloride (NS) flush 5-10 mL  5-10 mL IntraVENous Q8H    sodium chloride (NS) flush 5-10 mL  5-10 mL IntraVENous PRN    ondansetron (ZOFRAN) injection 4 mg  4 mg IntraVENous Q4H PRN    0.9% sodium chloride infusion  50 mL/hr IntraVENous CONTINUOUS    levoFLOXacin (LEVAQUIN) 500 mg in D5W IVPB  500 mg IntraVENous Q24H                                                                                               ATTENDING ADDENDUM  I supervised the APC and reviewed the note. We discussed the plan of care  Has decided she would like to go home on Roxicodone. Will see her back in a week.

## 2018-05-11 NOTE — PROGRESS NOTES
Hospital PCP [facility] follow-up appointment with Ramon Lozada on Monday May 14,2018 @ 2:45p.m. @ .  Added to AVS.   Gray Aparicio CM Specialist

## 2018-05-11 NOTE — PROGRESS NOTES
To clarify things she has developed a pancreatic leak with a fluid collection in the pancreatic resection bed. That fluid has not grown anything ans is presumed to be a sterile collection. She was placed empirically on antibiotics prior to the drainage, and, per protocol, will finish that course despite the negative cultures.

## 2018-05-11 NOTE — PROGRESS NOTES
Bedside shift change report given to Mel Doss 80 (oncoming nurse) by Fernando Betancourt RN (offgoing nurse). Report included the following information SBAR, Kardex, ED Summary, Intake/Output, MAR, Accordion and Recent Results.

## 2018-05-11 NOTE — PROGRESS NOTES
Bedside shift change report given to Darius Gil RN (oncoming nurse) by Pati Ramirez RN (offgoing nurse). Report included the following information SBAR, Kardex, Intake/Output, MAR and Recent Results.

## 2018-05-11 NOTE — PROGRESS NOTES
Patient c/o itching. Paged sent out to [facility].  Spoke with Dr. Corey Nageotte and received order for [facility]

## 2018-05-12 LAB
BACTERIA SPEC CULT: NORMAL
BACTERIA SPEC CULT: NORMAL
GRAM STN SPEC: NORMAL
SERVICE CMNT-IMP: NORMAL
SERVICE CMNT-IMP: NORMAL

## 2018-05-14 ENCOUNTER — OFFICE VISIT (OUTPATIENT)
Dept: FAMILY MEDICINE CLINIC | Age: 38
End: 2018-05-14

## 2018-05-14 ENCOUNTER — TELEPHONE (OUTPATIENT)
Dept: SURGERY | Age: 38
End: 2018-05-14

## 2018-05-14 VITALS
HEIGHT: 63 IN | WEIGHT: 155.5 LBS | OXYGEN SATURATION: 97 % | TEMPERATURE: 99.2 F | SYSTOLIC BLOOD PRESSURE: 119 MMHG | BODY MASS INDEX: 27.55 KG/M2 | RESPIRATION RATE: 18 BRPM | DIASTOLIC BLOOD PRESSURE: 91 MMHG | HEART RATE: 121 BPM

## 2018-05-14 DIAGNOSIS — L76.82 PAIN AT SURGICAL INCISION: ICD-10-CM

## 2018-05-14 DIAGNOSIS — R50.82 POSTOPERATIVE FEVER: Primary | ICD-10-CM

## 2018-05-14 DIAGNOSIS — R00.0 TACHYCARDIA: ICD-10-CM

## 2018-05-14 NOTE — MR AVS SNAPSHOT
[address] [address] 
[phone] Patient: Daina Overton MRN: [id number] VHI:[birth year] Visit Information Date & Time Provider Department Dept. Phone Encounter #  
 5/14/2018  2:45 PM Neyda Saunders NP [address] [facility] [phone] [id number] Follow-up Instructions Return if symptoms worsen or fail to improve. Your Appointments 5/16/2018  9:40 AM  
POST OP with Meryl Griffin NP  
[facility] GENERAL SURGERY [address] ([address]) Appt Note: PO    2nd visit following surgery by Dr. Jeannie Nails. vca  
 [address] [facility] [address] [facility] [address]  
[address] [place] [address] Upcoming Health Maintenance Date Due Pneumococcal 19-64 Highest Risk (1 of 3 - PCV13) 8/23/1997 DTaP/Tdap/Td series (1 - Tdap) 8/23/1999 PAP AKA CERVICAL CYTOLOGY 8/23/1999 Influenza Age 5 to Adult 8/1/2018 Allergies as of 5/14/2018  Review Complete On: 5/14/2018 By: Izaiah Omer LPN Severity Noted Reaction Type Reactions Amoxicillin  06/24/2011    Shortness of Breath, Rash Pt has had keflex in the past  
  
Current Immunizations  Reviewed on 4/25/2018 No immunizations on file. Not reviewed this visit You Were Diagnosed With   
  
 Codes Comments Postoperative fever    -  Primary ICD-10-CM: R50.82 ICD-9-CM: 780.62 Pain at surgical incision     ICD-10-CM: L76.82 
ICD-9-CM: 530. 0 Vitals BP Pulse Temp Resp Height(growth percentile) Weight(growth percentile) (!) 119/91 (BP 1 Location: Right arm, BP Patient Position: Sitting) (!) 121 99.2 °F (37.3 °C) (Oral) 18 5' 3\" (1.6 m) 155 lb 8 oz (70.5 kg) SpO2 BMI OB Status Smoking Status 97% 27.55 kg/m2 Having regular periods Current Every Day Smoker Vitals History BMI and BSA Data Body Mass Index Body Surface Area 27.55 kg/m 2 1.77 m 2 Preferred Pharmacy Pharmacy Name Phone [facility] [address] [facility]  [place] [phone] Your Updated Medication List  
  
   
This list is accurate as of 5/14/18  3:00 PM.  Always use your most recent med list.  
  
  
  
  
 acetaminophen 500 mg tablet Commonly known as:  TYLENOL Take 500 mg by mouth every eight (8) hours as needed for Pain. DULoxetine 60 mg capsule Commonly known as:  CYMBALTA Take 1 Cap by mouth daily. gabapentin 300 mg capsule Commonly known as:  NEURONTIN Take 1 Cap by mouth three (3) times daily. Indications: Postoperative Acute Pain  
  
 levoFLOXacin 250 mg tablet Commonly known as:  Zhang Bald Take 2 Tabs by mouth daily for 5 days. metroNIDAZOLE 500 mg tablet Commonly known as:  FLAGYL Take 1 Tab by mouth three (3) times daily for 5 days. MOTRIN  mg tablet Generic drug:  ibuprofen Take 800 mg by mouth every eight (8) hours as needed. * naloxone 2 mg/actuation Spry Use 1 spray intranasally into 1 nostril. Use a new Narcan nasal spray for subsequent doses and administer into alternating nostrils. May repeat every 2 to 3 minutes as needed. * naloxone 4 mg/actuation nasal spray Commonly known as:  [employer] Use 1 spray intranasally into 1 nostril. Use a new Narcan nasal spray for subsequent doses and administer into alternating nostrils. May repeat every 2 to 3 minutes as needed. ondansetron 4 mg disintegrating tablet Commonly known as:  ZOFRAN ODT Take 1 Tab by mouth every eight (8) hours as needed for Nausea. Indications: PREVENTION OF POST-OPERATIVE NAUSEA AND VOMITING  
  
 oxyCODONE IR 5 mg immediate release tablet Commonly known as:  [place] Take 1-2 Tabs by mouth every four (4) hours as needed for Pain for up to 40 doses. Max Daily Amount: 60 mg.  
  
 senna-docusate 8.6-50 mg per tablet Commonly known as:  Minesh Cortez Take 1 Tab by mouth daily as needed for Constipation. * Notice: This list has 2 medication(s) that are the same as other medications prescribed for you. Read the directions carefully, and ask your doctor or other care provider to review them with you. Follow-up Instructions Return if symptoms worsen or fail to improve. Introducing [facility] SERVICES! Dear Anisa Joel: 
Thank you for requesting a [employer] account. Our records indicate that you already have an active [employer] account. You can access your account anytime at [email or web address] Did you know that you can access your hospital and ER discharge instructions at any time in [employer]? You can also review all of your test results from your hospital stay or ER visit. Additional Information If you have questions, please visit the Frequently Asked Questions section of the [employer] website at [email or web address] Remember, [employer]o is NOT to be used for urgent needs. For medical emergencies, dial 911. Now available from your iPhone and Android! Please provide this summary of care documentation to your next provider. Your primary care clinician is listed as Wanda Matthews. If you have any questions after today's visit, please call [phone].

## 2018-05-14 NOTE — PROGRESS NOTES
Chief Complaint   Patient presents with   Emre from [facility] last week has 2 drainage tubes on left side     she is a 42y.o. year old female who presents for evalution. Pt here for hospital F/U. Was re-hospitalized with post-surgical fevers. Discharged 5/11/18 on Flagyl and Levaquin. Has been taking at home with no problems. No fevers or chills since discharge. Continues to complain of pain around drainage tube. Has F/U appt with surgeon in 2 days. Reviewed PmHx, RxHx, FmHx, SocHx, AllgHx and updated and dated in the chart. Review of Systems - negative except as listed above in the HPI    Objective:     Vitals:    05/14/18 1430   BP: (!) 119/91   Pulse: (!) 121   Resp: 18   Temp: 99.2 °F (37.3 °C)   TempSrc: Oral   SpO2: 97%   Weight: 155 lb 8 oz (70.5 kg)   Height: 5' 3\" (1.6 m)     Physical Examination: General appearance - alert, well appearing, and in no distress  Chest - clear to auscultation, no wheezes, rales or rhonchi, symmetric air entry  Heart - normal rate, regular rhythm, normal S1, S2, no murmurs, rubs, clicks or gallops    Assessment/ Plan:   Diagnoses and all orders for this visit:    1. Postoperative fever  Resolved, cont current antibiotics. 2. Pain at surgical incision  Pt asked me to prescribe pain medication for her, I declined - needs to come from surgeon. Unable to rebandage drain appropriately, did not examine today. F/U with surgeon in 2 days as planned. 3. Tachycardia  Likely secondary to pain and movement. Cont to monitor at home when comfortable and at rest.      Pt voiced understanding regarding plan of care. Follow-up Disposition:  Return if symptoms worsen or fail to improve. I have discussed the diagnosis with the patient and the intended plan as seen in the above orders. The patient has received an after-visit summary and questions were answered concerning future plans.      Medication Side Effects and Warnings were discussed with patient    Ashely Matthews NP

## 2018-05-14 NOTE — PROGRESS NOTES
1. Have you been to the ER, urgent care clinic since your last visit? Hospitalized since your last visit? No    2. Have you seen or consulted any other health care providers outside of the [facility] since your last visit? Include any pap smears or colon screening.  No   Chief Complaint   Patient presents with   Emre from [facility] last week has 2 drainage tubes on left side

## 2018-05-16 ENCOUNTER — OFFICE VISIT (OUTPATIENT)
Dept: SURGERY | Age: 38
End: 2018-05-16

## 2018-05-16 VITALS
DIASTOLIC BLOOD PRESSURE: 70 MMHG | HEIGHT: 63 IN | TEMPERATURE: 98.1 F | BODY MASS INDEX: 27.29 KG/M2 | WEIGHT: 154 LBS | OXYGEN SATURATION: 98 % | SYSTOLIC BLOOD PRESSURE: 110 MMHG | HEART RATE: 101 BPM | RESPIRATION RATE: 18 BRPM

## 2018-05-16 DIAGNOSIS — R10.11 RIGHT UPPER QUADRANT ABDOMINAL PAIN: ICD-10-CM

## 2018-05-16 DIAGNOSIS — K86.89 PANCREATIC FLUID LEAK: ICD-10-CM

## 2018-05-16 DIAGNOSIS — Z09 POSTOPERATIVE EXAMINATION: Primary | ICD-10-CM

## 2018-05-16 DIAGNOSIS — R50.82 POSTOPERATIVE FEVER: ICD-10-CM

## 2018-05-16 RX ORDER — OXYCODONE HYDROCHLORIDE 5 MG/1
7.5 TABLET ORAL
Qty: 60 TAB | Refills: 0 | Status: SHIPPED | OUTPATIENT
Start: 2018-05-16 | End: 2018-05-23

## 2018-05-16 RX ORDER — PREGABALIN 50 MG/1
50 CAPSULE ORAL 2 TIMES DAILY
Qty: 60 CAP | Refills: 0 | Status: SHIPPED | OUTPATIENT
Start: 2018-05-16 | End: 2018-05-16 | Stop reason: CLARIF

## 2018-05-16 RX ORDER — PREGABALIN 50 MG/1
50 CAPSULE ORAL 2 TIMES DAILY
Qty: 60 CAP | Refills: 0 | Status: SHIPPED | OUTPATIENT
Start: 2018-05-16 | End: 2018-07-30

## 2018-05-16 NOTE — MR AVS SNAPSHOT
[address] [id number] 
[phone] Patient: Liberty Oconnell MRN: [id number] WDC:[id number] Visit Information Date & Time Provider Department Dept. Phone Encounter #  
 5/16/2018  9:40 AM FLY Hurley [id number] [phone] [id number] Your Appointments 5/23/2018 10:40 AM  
POST OP 10 MIN with MD Pj Contreras [phone] ([address]) Appt Note: PO;  
 [address] [facility] [id number]  
[address] [place] [address] Upcoming Health Maintenance Date Due Pneumococcal 19-64 Highest Risk (1 of 3 - PCV13) 8/23/1997 DTaP/Tdap/Td series (1 - Tdap) 8/23/1999 PAP AKA CERVICAL CYTOLOGY 8/23/1999 Influenza Age 5 to Adult 8/1/2018 Allergies as of 5/16/2018  Review Complete On: 5/16/2018 By: Darius Cm NP Severity Noted Reaction Type Reactions Amoxicillin  06/24/2011    Shortness of Breath, Rash Pt has had keflex in the past  
  
Current Immunizations  Reviewed on 4/25/2018 No immunizations on file. Not reviewed this visit You Were Diagnosed With   
  
 Codes Comments Postoperative examination    -  Primary ICD-10-CM: D46 ICD-9-CM: V67.00 Right upper quadrant abdominal pain     ICD-10-CM: R10.11 ICD-9-CM: 789.01 Postoperative fever     ICD-10-CM: R50.82 ICD-9-CM: 780.62 Pancreatic fluid leak     ICD-10-CM: K86.89 
ICD-9-CM: 577.8 Vitals BP Pulse Temp Resp Height(growth percentile) Weight(growth percentile) 110/70 (BP 1 Location: Left arm, BP Patient Position: Sitting) (!) 101 98.1 °F (36.7 °C) (Oral) 18 5' 3\" (1.6 m) 154 lb (69.9 kg) SpO2 BMI OB Status Smoking Status 98% 27.28 kg/m2 Having regular periods Current Every Day Smoker BMI and BSA Data Body Mass Index Body Surface Area  
 27.28 kg/m 2 1.76 m 2 Preferred Pharmacy Pharmacy Name Phone [facility] [address] [facility]  [place] [address] [phone] Your Updated Medication List  
  
   
This list is accurate as of 5/16/18 11:42 AM.  Always use your most recent med list.  
  
  
  
  
 acetaminophen 500 mg tablet Commonly known as:  TYLENOL Take 500 mg by mouth every eight (8) hours as needed for Pain. DULoxetine 60 mg capsule Commonly known as:  CYMBALTA Take 1 Cap by mouth daily. gabapentin 300 mg capsule Commonly known as:  NEURONTIN Take 1 Cap by mouth three (3) times daily. Indications: Postoperative Acute Pain  
  
 levoFLOXacin 250 mg tablet Commonly known as:  Lorelle Gaston Take 2 Tabs by mouth daily for 5 days. metroNIDAZOLE 500 mg tablet Commonly known as:  FLAGYL Take 1 Tab by mouth three (3) times daily for 5 days. MOTRIN  mg tablet Generic drug:  ibuprofen Take 800 mg by mouth every eight (8) hours as needed. * naloxone 2 mg/actuation Spry Use 1 spray intranasally into 1 nostril. Use a new Narcan nasal spray for subsequent doses and administer into alternating nostrils. May repeat every 2 to 3 minutes as needed. * naloxone 4 mg/actuation nasal spray Commonly known as:  [employer] Use 1 spray intranasally into 1 nostril. Use a new Narcan nasal spray for subsequent doses and administer into alternating nostrils. May repeat every 2 to 3 minutes as needed. ondansetron 4 mg disintegrating tablet Commonly known as:  ZOFRAN ODT Take 1 Tab by mouth every eight (8) hours as needed for Nausea. Indications: PREVENTION OF POST-OPERATIVE NAUSEA AND VOMITING  
  
 oxyCODONE IR 5 mg immediate release tablet Commonly known as:  Frantz Allegra Take 1.5 Tabs by mouth every three (3) hours as needed for Pain for up to 7 days. Max Daily Amount: 60 mg. senna-docusate 8.6-50 mg per tablet Commonly known as:  Beckion Hope Take 1 Tab by mouth daily as needed for Constipation. * Notice: This list has 2 medication(s) that are the same as other medications prescribed for you. Read the directions carefully, and ask your doctor or other care provider to review them with you. Prescriptions Printed Refills  
 oxyCODONE IR (ROXICODONE) 5 mg immediate release tablet 0 Sig: Take 1.5 Tabs by mouth every three (3) hours as needed for Pain for up to 7 days. Max Daily Amount: 60 mg.  
 Class: Print Route: Oral  
  
Patient Instructions You make take 1.5 tabs of the oxycodone and take acetaminophen according to package directions. Call if you get a temperature >100.4F. Continue to take all of your Flagyl and Levaquin. Follow up next week with Dr. Laura Hernández. Pancreatic Cancer Surgery: What to Expect at [facility] Your Recovery By the time you go home, most of your pain will probably be gone. If you have pain, you will have medicine you can take. You will probably feel very tired and weak. Even simple tasks may tire you. Take naps when you wish, but try to get some exercise. You may have trouble concentrating or difficulty sleeping. This usually goes away in 2 to 4 weeks. You will probably be able to return to work or your normal routine in about 1 month. It will probably take about 3 months for your strength to come back fully. You may need more treatment for the cancer, such as chemotherapy or radiation. Food may not taste good to you and may have a metallic taste. Your stomach may not empty as it should after eating. This may cause nausea, vomiting, and loss of appetite. These usually go away 2 to 6 weeks after surgery. Most people regain their normal appetite in about 8 weeks. You will probably lose some weight. This is normal. 
You may have a feeding tube (J-tube) coming out of your belly.  If you have one, your doctor will decide when to take it out. You may have it for several months or longer. When you find out that you have cancer, you may feel many emotions and may need some help coping. Seek out family, friends, and counselors for support. You also can do things at home to make yourself feel better while you go through treatment. Call the [facility] (2-696.809.3220) or visit its website at [address] [facility] for more information. This care sheet gives you a general idea about how long it will take for you to recover. But each person recovers at a different pace. Follow the steps below to get better as quickly as possible. How can you care for yourself at home? Activity ? · Rest when you feel tired. Getting enough sleep will help you recover. You will probably want to nap often. ? · Try to walk each day. Start by walking a little more than you did the day before. Bit by bit, increase the amount you walk. Walking boosts blood flow and helps prevent pneumonia and constipation. ? · For about 4 to 6 weeks after surgery, avoid lifting anything that would make you strain. This may include a child, heavy grocery bags and milk containers, a heavy briefcase or backpack, cat litter or dog food bags, or a vacuum . ? · Avoid strenuous activities, such as biking, jogging, weight lifting, or aerobic exercise, until your doctor says it is okay. ? · You may shower, if your doctor okays it. Pat the cut (incision) dry. Follow your doctor's instructions about showering with your drain and how to empty and care for it. Keep your feeding tube taped to your skin so it will not fall off. After showering, clean the tube site, dry it well, and replace the dressing if you have one. ? · Ask your doctor when you can drive again. ? · You will probably be able to return to work about 4 weeks after you leave the hospital.  
? · Your doctor will tell you when you can have sex again. Diet ? · Sometimes the stomach empties food into the small intestine too quickly. This is called dumping syndrome. It can cause diarrhea and make you feel faint, shaky, and nauseated. It also can make it hard for your body to get enough nutrition. ¨ High-sugar foods-such as desserts, soda pop, and fruit juices-are most likely to cause dumping syndrome. Avoid high-sugar foods, or use products that have artificial sweeteners if sugar gives you a problem. ¨ Do not drink liquids within a half hour before eating and up to an hour after eating. Liquids move food even more quickly into the small intestine. Quick emptying of the stomach increases the chance of diarrhea. ¨ Eat slowly. Try to chew each bite about 20 times. Allow 20 to 30 minutes for each meal. 
¨ Eat 5 or 6 small meals or snacks a day. This may keep you from feeling too full after eating and may reduce problems with diarrhea and dumping syndrome. ? · If the surgeon did not remove any part of your stomach, you can eat your normal diet. But the surgery affects everyone's digestion differently. You may need to eat more smaller meals instead of fewer larger meals. You may have to try several foods to see what tastes good to you. ? · Eat healthy food. If you do not feel like eating, try to eat food that has protein and extra calories to keep up your strength and prevent weight loss. Drink liquid meal replacements for extra calories and protein. If your stomach is upset, try bland, low-fat foods like plain rice, broiled chicken, toast, and yogurt. ? · Whenever you eat, you may have to take enzyme pills to replace those the pancreas makes. These help you digest your food, especially fat. ? · You may notice that your bowel movements are not regular right after your surgery. This is common. Try to avoid constipation and straining with bowel movements. You may want to take a fiber supplement every day.  If you have not had a bowel movement after a couple of days, ask your doctor about taking a mild laxative. Medicines ? · Your doctor will tell you if and when you can restart your medicines. He or she will also give you instructions about taking any new medicines. ? · If you take blood thinners, such as warfarin (Coumadin), clopidogrel (Plavix), or aspirin, be sure to talk to your doctor. He or she will tell you if and when to start taking those medicines again. Make sure that you understand exactly what your doctor wants you to do.  
? · You may have to take anti-ulcer medicine for stomach ulcers. ? · You may have diabetes. If this is the case, you may have to check your blood sugar and give yourself insulin shots every day. ? · You may need to take enzyme supplements to replace enzymes the pancreas makes. ? · Take pain medicines exactly as directed. ¨ If the doctor gave you a prescription medicine for pain, take it as prescribed. ¨ If you are not taking a prescription pain medicine, ask your doctor if you can take an over-the-counter medicine. ? · If you think your pain medicine is making you sick to your stomach: 
¨ Take your medicine after meals (unless your doctor has told you not to). ¨ Ask your doctor for a different pain medicine. ? · If your doctor prescribed antibiotics, take them as directed. Do not stop taking them just because you feel better. You need to take the full course of antibiotics. Incision care ? · You may feel a ridge along the incision, or cut. This is normal, and it will go away in a few weeks. ? · Wash the area daily with warm, soapy water and pat it dry, unless your doctor tells you not to. ? · If you have strips of tape on the cut, leave the tape on for a week or until it falls off.  
? · You may see a small amount of clear or light red fluid staining your dressing. This is normal.  
Exercise ? · Regular exercise will help you regain strength.  Start with walking every day. Your doctor will tell you when you can do more. Other instructions ? · You will have a drain near your incision. Your doctor will tell you how to take care of it. ? · You may have a feeding tube in your belly. Your doctor will show you how to use it and take care of it. ¨ It is normal to have some yellowish fluid around your feeding tube. This is not a sign of infection. ¨ Keep your feeding tube clamped unless you are using it. ¨ Keep it taped to your skin at all times. ¨ Clean around the tube with water before and after you use it. ¨ Flush the tube daily as your doctor tells you to. Follow-up care is a key part of your treatment and safety. Be sure to make and go to all appointments, and call your doctor if you are having problems. It's also a good idea to know your test results and keep a list of the medicines you take. When should you call for help? Call 911 anytime you think you may need emergency care. For example, call if: 
? · You passed out (lost consciousness). ? · You are short of breath. ?Call your doctor now or seek immediate medical care if: 
? · You have pain that does not get better after you take pain medicine. ? · You have loose stitches, or your incision comes open. ? · Bright red blood has soaked through the bandage over your incision. ? · You cannot pass stools or gas. ? · You are sick to your stomach or cannot drink fluids. ? · You have signs of a blood clot in your leg (called a deep vein thrombosis), such as: 
¨ Pain in your calf, back of the knee, thigh, or groin. ¨ Redness or swelling in your leg. ? · You have signs of infection, such as: 
¨ Increased pain, swelling, warmth, or redness. ¨ Red streaks leading from the incision. ¨ Pus draining from the incision. ¨ A fever. ? Watch closely for changes in your health, and be sure to contact your doctor if you have any problems. Where can you learn more? Go to http://maria c-checo.info/. Enter P848 in the search box to learn more about \"Pancreatic Cancer Surgery: What to Expect at Home. \" Current as of: May 12, 2017 Content Version: 11.4 © 3090-3156 [employer]. Care instructions adapted under license by [employer] (which disclaims liability or warranty for this information). If you have questions about a medical condition or this instruction, always ask your healthcare professional. [address] any warranty or liability for your use of this information. Surgical Drain Care: Care Instructions What is a surgical drain? After a surgery, fluid may collect inside your body in the surgical area. This makes an infection or other problems more likely. A surgical drain allows the fluid to flow out. The doctor will put a thin rubber tube into the area of your body where the fluid is likely to collect. The rubber tube will carry the fluid outside your body. The most common type of surgical drain carries the fluid into a collection bulb that you empty. This is called a Junito-Garcia drain. The drain uses suction created by the bulb to pull the fluid from your body into the bulb. The rubber tube will probably be held in place by one or two stitches in your skin. Most people attach the bulb with a safety pin to clothing or near the bandage so that it doesn't flip around or pull on the stitches. When you first get the drain, the fluid will be bloody. It will change color from red to pink to a light yellow or clear as the wound heals and the fluid starts to go away. Your doctor may give you specific information on when you no longer need the drain and when it will be removed. In general, you will need the drain until you are collecting less than about 2 tablespoons of fluid in 24 hours. Follow-up care is a key part of your treatment and safety.  Be sure to make and go to all appointments, and call your doctor if you are having problems. It's also a good idea to know your test results and keep a list of the medicines you take. How can you care for yourself at home? Fluid collection Follow any instructions your doctor gives you. How often you empty the bulb depends on how much fluid is draining. Empty the bulb when it is half full. To empty the bulb: 
· Wash your hands with soap and water. · Take the plug out of the bulb. · Empty the bulb. If your doctor asks you to measure the fluid, empty the fluid into a measuring cup, and write down the color and how much you collected. Your doctor will want to know this information. How often you empty the bulb depends on how much fluid there is. Doctors often suggest emptying it when it's about half full. · Clean the plug with alcohol. · Squeeze the bulb until it is flat. This removes all the air from the bulb. You may need to put the bulb on a table or a counter to flatten it. · Keep the bulb flat and put the plug in. · The bulb should stay flat after you put the plug back in. This creates the suction that pulls the fluid into the bulb. · Empty the fluid into the toilet. · Wash your hands. Bandage care You may have a bandage. Your doctor will tell you how often to change it. · Wash your hands with soap and water. · Take off the bandage from around the drain. · Clean the drain site and the skin around it with soap and water. Use gauze or a cotton swab. · When the site is dry, put on a new bandage. Drain care Squeezing or \"milking\" the tube can help prevent clogs so that it drains correctly. Your doctor will tell you when you need to do this. In general, you do this when: 
· You see a clot in the tube that is preventing fluid from draining. The clot may look like a dark, stringy lining. · You see fluid leaking around the tube where it goes into the skin. · You think there is no suction in the drain. To milk the tube: · Use one hand to hold and pinch the tube where it leaves the skin. · With the other hand, pinch the tube with your thumb and first finger just below where you're holding it. · Slowly and firmly push your thumb and first finger down the tubing toward the bulb. · Do this as many times as you need to. The clot should move down the tube and into the bulb. When should you call for help? Call your doctor now or seek immediate medical care if: 
? · You have signs of infection, such as: 
¨ Increased pain, swelling, warmth, or redness around the area. ¨ Red streaks leading from the area. ¨ Pus draining from the area. ¨ A fever. ? · You see a sudden change in the color or smell of the drainage. ? · The tube is coming loose where it leaves your skin. ? Watch closely for changes in your health, and be sure to contact your doctor if: 
? · You see a lot of fluid around the drain. ? · You cannot remove a clot from the tube by milking the tube. Where can you learn more? Go to http://maria c-checo.info/. Enter K117 in the search box to learn more about \"Surgical Drain Care: Care Instructions. \" Current as of: March 20, 2017 Content Version: 11.4 © 0389-6037 [employer]. Care instructions adapted under license by [employer] (which disclaims liability or warranty for this information). If you have questions about a medical condition or this instruction, always ask your healthcare professional. [place] [address] any warranty or liability for your use of this information. Introducing [facility] & HEALTH SERVICES! Dear Deshaun Hilton: 
Thank you for requesting a [employer] account. Our records indicate that you already have an active [employer] account. You can access your account anytime at [email or web address] Did you know that you can access your hospital and ER discharge instructions at any time in [employer]? You can also review all of your test results from your hospital stay or ER visit. Additional Information If you have questions, please visit the Frequently Asked Questions section of the [employer] website at [email or web address] Remember, [employer] is NOT to be used for urgent needs. For medical emergencies, dial 911. Now available from your iPhone and Android! Please provide this summary of care documentation to your next provider. Your primary care clinician is listed as Adrián Valera. If you have any questions after today's visit, please call [phone].

## 2018-05-16 NOTE — PROGRESS NOTES
Subjective:   Zach Pride is a 42 y.o. female presents for postop care following following discharge from the hospital. She was admitted due to fevers and found to have pancreatic leak causing a fluid collection. Pt had drain placed by IR and was discharged home on Flagyl and Levaquin. Pt had distal pancreatectomy and partial splenic infarct. Appetite is fair. Eating a regular diet without difficulty. Pt recognizes that she should eat more. No n/v. Ate half a big mac yesterday. Bowel movements are slow but moving with taking pericolace. Pain is not well controlled. Pain in left side, localized around IR drain and going around to back. Medication(s) being used: none. Her pain is best controlled with 10 mg oxycodone every 3 hours; she ran out last night. She has also ran out of tylenol and has not been taking that as well. She stopped taking gabapentin for \"firey\" pain on skin due to HAs. She is open to try 7.5 mg of oxycodone as she has never tried that before. Had temp of 99.9F and 100F yesterday. Pt reports that she is not taking trazodone, clonazepam nor drinking ETOH. Denies nausea, redness at incision site, vomiting and diarrhea  BETSY drain putting out 10 cc day. IR drain put out 40 cc yesterday but leaked last night so unsure of drainage amt since then.  reports that she has not been as drowsy as last OV prior to being re-admitted for pancreatic leak.     Advance directive not on file      Objective:     Visit Vitals    /70 (BP 1 Location: Left arm, BP Patient Position: Sitting)    Pulse (!) 101    Temp 98.1 °F (36.7 °C) (Oral)    Resp 18    Ht 5' 3\" (1.6 m)    Wt 154 lb (69.9 kg)    SpO2 98%    BMI 27.28 kg/m2       General:  fatigued, no distress, accompanied by    Abdomen: soft, bowel sounds active, TTP in left side   Incision:   healing well, no drainage, no erythema, no seroma, no swelling, no dehiscence, incisions well approximated   Heart: regular rate and rhythm, S1, S2 normal, no murmur, click, rub or gallop   Lungs: clear to auscultation bilaterally     BETSY drain - scant, clear whitish output  IR accordian drain- clear-ambar, tan output in bulb only    Assessment:     S/P distal pancreatectomy. Post op pain    Plan:     1. Pt is to increase activities as tolerated. .  2. Follow-up in one week with DR. Lazo  3. Wound care discussed - removed outer bandages from drains and reapplied fresh bandages. Pt may shower if keep drain sites dry. 4. Will need CT scan prior to removing drains. KEep track of daily output  5. Pain control - refill oxycodone and pt agreeable to decrease (or try to) to 7.5 mg every 3 hrs. Pt to restart acetaminophen. Pt requesting Lyrica despite not covered by insurance and need prior auth. Pt has naloxone at home. Patient verbalized understanding and agreement. Ms. Latrice Resendiz has a reminder for a \"due or due soon\" health maintenance. I have asked that she contact her primary care provider for follow-up on this health maintenance.

## 2018-05-16 NOTE — DISCHARGE SUMMARY
Physician Discharge Summary     Patient ID:  Niru Dawn  [id number]  60 y.o.  1980    Allergies: Amoxicillin    Admit Date: 5/7/2018    Discharge Date: 5/11/2018    * Admission Diagnoses: FEVER;Postoperative fever    * Discharge Diagnoses:    Hospital Problems as of 5/11/2018  Date Reviewed: 5/7/2018          Codes Class Noted - Resolved POA    Postoperative fever ICD-10-CM: R50.82  ICD-9-CM: 780.62  5/7/2018 - Present Unknown               Admission Condition: Fair    * Discharge Condition: improved    * Procedures: * No surgery found [facility] Course:   Found to have a collection of pancreatic fluid which was drained percutaneously. Pain at her drain sites was the problem that kept her in the hospital.  Oxycodone on a regular basis was successful in keeping the pain under control. Drains were left in place. She received Flagyl and Levaquin presumptively on admission, and this was continued at home despite negative cultures from the fluid aspirated at the time of drainage. Consults: None    Significant Diagnostic Studies: none    * Disposition: Home    Discharge Medications:   Discharge Medication List as of 5/11/2018  2:50 PM      START taking these medications    Details   levoFLOXacin (LEVAQUIN) 250 mg tablet Take 2 Tabs by mouth daily for 5 days. , Print, Disp-10 Tab, R-0      metroNIDAZOLE (FLAGYL) 500 mg tablet Take 1 Tab by mouth three (3) times daily for 5 days. , Print, Disp-15 Tab, R-0         CONTINUE these medications which have CHANGED    Details   oxyCODONE IR (ROXICODONE) 5 mg immediate release tablet Take 1-2 Tabs by mouth every four (4) hours as needed for Pain for up to 40 doses. Max Daily Amount: 60 mg., Print, Disp-40 Tab, R-0         CONTINUE these medications which have NOT CHANGED    Details   DULoxetine (CYMBALTA) 60 mg capsule Take 1 Cap by mouth daily. , Normal, Disp-90 Cap, R-1      senna-docusate (PERICOLACE) 8.6-50 mg per tablet Take 1 Tab by mouth daily as needed for Constipation. , Print, Disp-30 Tab, R-0      gabapentin (NEURONTIN) 300 mg capsule Take 1 Cap by mouth three (3) times daily. Indications: Postoperative Acute Pain, Normal, Disp-90 Cap, R-0      ondansetron (ZOFRAN ODT) 4 mg disintegrating tablet Take 1 Tab by mouth every eight (8) hours as needed for Nausea. Indications: PREVENTION OF POST-OPERATIVE NAUSEA AND VOMITING, Print, Disp-30 Tab, R-0      naloxone (NARCAN) 4 mg/actuation nasal spray Use 1 spray intranasally into 1 nostril. Use a new Narcan nasal spray for subsequent doses and administer into alternating nostrils. May repeat every 2 to 3 minutes as needed. , Print, Disp-3 Each, R-0      naloxone 2 mg/actuation spry Use 1 spray intranasally into 1 nostril. Use a new Narcan nasal spray for subsequent doses and administer into alternating nostrils. May repeat every 2 to 3 minutes as needed. , Print, Disp-1 Packet, R-0      acetaminophen (TYLENOL) 500 mg tablet Take 500 mg by mouth every eight (8) hours as needed for Pain., Historical Med      ibuprofen (MOTRIN IB) 200 mg tablet Take 800 mg by mouth every eight (8) hours as needed., Historical Med             * Follow-up Care/Patient Instructions:   Activity: Activity as tolerated  Diet: Regular Diet  Instructed in drain care    Follow-up Information     Follow up With Details Comments [address] [place] [address], NP Go on 5/14/2018 [facility] PCP f/u appointment on Monday 5/14 @ 2:45 p.m. [address], [address] [place] [address]  [phone]              Signed:  Tung Reyes MD  5/16/2018  9:16 AM

## 2018-05-16 NOTE — PATIENT INSTRUCTIONS
You make take 1.5 tabs of the oxycodone and take acetaminophen according to package directions. Call if you get a temperature >100.4F. Continue to take all of your Flagyl and Levaquin. Follow up next week with Dr. Raffi Regalado. Pancreatic Cancer Surgery: What to Expect at [address] [place] [address]  By the time you go home, most of your pain will probably be gone. If you have pain, you will have medicine you can take. You will probably feel very tired and weak. Even simple tasks may tire you. Take naps when you wish, but try to get some exercise. You may have trouble concentrating or difficulty sleeping. This usually goes away in 2 to 4 weeks. You will probably be able to return to work or your normal routine in about 1 month. It will probably take about 3 months for your strength to come back fully. You may need more treatment for the cancer, such as chemotherapy or radiation. Food may not taste good to you and may have a metallic taste. Your stomach may not empty as it should after eating. This may cause nausea, vomiting, and loss of appetite. These usually go away 2 to 6 weeks after surgery. Most people regain their normal appetite in about 8 weeks. You will probably lose some weight. This is normal.  You may have a feeding tube (J-tube) coming out of your belly. If you have one, your doctor will decide when to take it out. You may have it for several months or longer. When you find out that you have cancer, you may feel many emotions and may need some help coping. Seek out family, friends, and counselors for support. You also can do things at home to make yourself feel better while you go through treatment. Call the [facility] ([phone]) or visit its website at [address] for more information. This care sheet gives you a general idea about how long it will take for you to recover. But each person recovers at a different pace.  Follow the steps below to get better as quickly as possible. How can you care for yourself at home? Activity  ? · Rest when you feel tired. Getting enough sleep will help you recover. You will probably want to nap often. ? · Try to walk each day. Start by walking a little more than you did the day before. Bit by bit, increase the amount you walk. Walking boosts blood flow and helps prevent pneumonia and constipation. ? · For about 4 to 6 weeks after surgery, avoid lifting anything that would make you strain. This may include a child, heavy grocery bags and milk containers, a heavy briefcase or backpack, cat litter or dog food bags, or a vacuum . ? · Avoid strenuous activities, such as biking, jogging, weight lifting, or aerobic exercise, until your doctor says it is okay. ? · You may shower, if your doctor okays it. Pat the cut (incision) dry. Follow your doctor's instructions about showering with your drain and how to empty and care for it. Keep your feeding tube taped to your skin so it will not fall off. After showering, clean the tube site, dry it well, and replace the dressing if you have one. ? · Ask your doctor when you can drive again. ? · You will probably be able to return to work about 4 weeks after you leave the hospital.   ? · Your doctor will tell you when you can have sex again. Diet  ? · Sometimes the stomach empties food into the small intestine too quickly. This is called dumping syndrome. It can cause diarrhea and make you feel faint, shaky, and nauseated. It also can make it hard for your body to get enough nutrition. ¨ High-sugar foods-such as desserts, soda pop, and fruit juices-are most likely to cause dumping syndrome. Avoid high-sugar foods, or use products that have artificial sweeteners if sugar gives you a problem. ¨ Do not drink liquids within a half hour before eating and up to an hour after eating. Liquids move food even more quickly into the small intestine.  Quick emptying of the stomach increases the chance of diarrhea. ¨ Eat slowly. Try to chew each bite about 20 times. Allow 20 to 30 minutes for each meal.  ¨ Eat 5 or 6 small meals or snacks a day. This may keep you from feeling too full after eating and may reduce problems with diarrhea and dumping syndrome. ? · If the surgeon did not remove any part of your stomach, you can eat your normal diet. But the surgery affects everyone's digestion differently. You may need to eat more smaller meals instead of fewer larger meals. You may have to try several foods to see what tastes good to you. ? · Eat healthy food. If you do not feel like eating, try to eat food that has protein and extra calories to keep up your strength and prevent weight loss. Drink liquid meal replacements for extra calories and protein. If your stomach is upset, try bland, low-fat foods like plain rice, broiled chicken, toast, and yogurt. ? · Whenever you eat, you may have to take enzyme pills to replace those the pancreas makes. These help you digest your food, especially fat. ? · You may notice that your bowel movements are not regular right after your surgery. This is common. Try to avoid constipation and straining with bowel movements. You may want to take a fiber supplement every day. If you have not had a bowel movement after a couple of days, ask your doctor about taking a mild laxative. Medicines  ? · Your doctor will tell you if and when you can restart your medicines. He or she will also give you instructions about taking any new medicines. ? · If you take blood thinners, such as warfarin (Coumadin), clopidogrel (Plavix), or aspirin, be sure to talk to your doctor. He or she will tell you if and when to start taking those medicines again. Make sure that you understand exactly what your doctor wants you to do.   ? · You may have to take anti-ulcer medicine for stomach ulcers. ? · You may have diabetes.  If this is the case, you may have to check your blood sugar and give yourself insulin shots every day. ? · You may need to take enzyme supplements to replace enzymes the pancreas makes. ? · Take pain medicines exactly as directed. ¨ If the doctor gave you a prescription medicine for pain, take it as prescribed. ¨ If you are not taking a prescription pain medicine, ask your doctor if you can take an over-the-counter medicine. ? · If you think your pain medicine is making you sick to your stomach:  ¨ Take your medicine after meals (unless your doctor has told you not to). ¨ Ask your doctor for a different pain medicine. ? · If your doctor prescribed antibiotics, take them as directed. Do not stop taking them just because you feel better. You need to take the full course of antibiotics. Incision care  ? · You may feel a ridge along the incision, or cut. This is normal, and it will go away in a few weeks. ? · Wash the area daily with warm, soapy water and pat it dry, unless your doctor tells you not to. ? · If you have strips of tape on the cut, leave the tape on for a week or until it falls off.   ? · You may see a small amount of clear or light red fluid staining your dressing. This is normal.   Exercise  ? · Regular exercise will help you regain strength. Start with walking every day. Your doctor will tell you when you can do more. Other instructions  ? · You will have a drain near your incision. Your doctor will tell you how to take care of it. ? · You may have a feeding tube in your belly. Your doctor will show you how to use it and take care of it. ¨ It is normal to have some yellowish fluid around your feeding tube. This is not a sign of infection. ¨ Keep your feeding tube clamped unless you are using it. ¨ Keep it taped to your skin at all times. ¨ Clean around the tube with water before and after you use it. ¨ Flush the tube daily as your doctor tells you to. Follow-up care is a key part of your treatment and safety.  Be sure to make and go to all appointments, and call your doctor if you are having problems. It's also a good idea to know your test results and keep a list of the medicines you take. When should you call for help? Call 911 anytime you think you may need emergency care. For example, call if:  ? · You passed out (lost consciousness). ? · You are short of breath. ?Call your doctor now or seek immediate medical care if:  ? · You have pain that does not get better after you take pain medicine. ? · You have loose stitches, or your incision comes open. ? · Bright red blood has soaked through the bandage over your incision. ? · You cannot pass stools or gas. ? · You are sick to your stomach or cannot drink fluids. ? · You have signs of a blood clot in your leg (called a deep vein thrombosis), such as:  ¨ Pain in your calf, back of the knee, thigh, or groin. ¨ Redness or swelling in your leg. ? · You have signs of infection, such as:  ¨ Increased pain, swelling, warmth, or redness. ¨ Red streaks leading from the incision. ¨ Pus draining from the incision. ¨ A fever. ? Watch closely for changes in your health, and be sure to contact your doctor if you have any problems. Where can you learn more? Go to http://maria c-checo.info/. Enter P848 in the search box to learn more about \"Pancreatic Cancer Surgery: What to Expect at Home. \"  Current as of: May 12, 2017  Content Version: 11.4  © 5996-2322 [employer]. Care instructions adapted under license by [employer] (which disclaims liability or warranty for this information). If you have questions about a medical condition or this instruction, always ask your healthcare professional. [address] any warranty or liability for your use of this information. Surgical Drain Care: Care Instructions  What is a surgical drain? After a surgery, fluid may collect inside your body in the surgical area.  This makes an infection or other problems more likely. A surgical drain allows the fluid to flow out. The doctor will put a thin rubber tube into the area of your body where the fluid is likely to collect. The rubber tube will carry the fluid outside your body. The most common type of surgical drain carries the fluid into a collection bulb that you empty. This is called a Junito-Garcia drain. The drain uses suction created by the bulb to pull the fluid from your body into the bulb. The rubber tube will probably be held in place by one or two stitches in your skin. Most people attach the bulb with a safety pin to clothing or near the bandage so that it doesn't flip around or pull on the stitches. When you first get the drain, the fluid will be bloody. It will change color from red to pink to a light yellow or clear as the wound heals and the fluid starts to go away. Your doctor may give you specific information on when you no longer need the drain and when it will be removed. In general, you will need the drain until you are collecting less than about 2 tablespoons of fluid in 24 hours. Follow-up care is a key part of your treatment and safety. Be sure to make and go to all appointments, and call your doctor if you are having problems. It's also a good idea to know your test results and keep a list of the medicines you take. How can you care for yourself at home? Fluid collection  Follow any instructions your doctor gives you. How often you empty the bulb depends on how much fluid is draining. Empty the bulb when it is half full. To empty the bulb:  · Wash your hands with soap and water. · Take the plug out of the bulb. · Empty the bulb. If your doctor asks you to measure the fluid, empty the fluid into a measuring cup, and write down the color and how much you collected. Your doctor will want to know this information. How often you empty the bulb depends on how much fluid there is.  Doctors often suggest emptying it when it's about half full. · Clean the plug with alcohol. · Squeeze the bulb until it is flat. This removes all the air from the bulb. You may need to put the bulb on a table or a counter to flatten it. · Keep the bulb flat and put the plug in. · The bulb should stay flat after you put the plug back in. This creates the suction that pulls the fluid into the bulb. · Empty the fluid into the toilet. · Wash your hands. Bandage care  You may have a bandage. Your doctor will tell you how often to change it. · Wash your hands with soap and water. · Take off the bandage from around the drain. · Clean the drain site and the skin around it with soap and water. Use gauze or a cotton swab. · When the site is dry, put on a new bandage. Drain care  Squeezing or \"milking\" the tube can help prevent clogs so that it drains correctly. Your doctor will tell you when you need to do this. In general, you do this when:  · You see a clot in the tube that is preventing fluid from draining. The clot may look like a dark, stringy lining. · You see fluid leaking around the tube where it goes into the skin. · You think there is no suction in the drain. To milk the tube:  · Use one hand to hold and pinch the tube where it leaves the skin. · With the other hand, pinch the tube with your thumb and first finger just below where you're holding it. · Slowly and firmly push your thumb and first finger down the tubing toward the bulb. · Do this as many times as you need to. The clot should move down the tube and into the bulb. When should you call for help? Call your doctor now or seek immediate medical care if:  ? · You have signs of infection, such as:  ¨ Increased pain, swelling, warmth, or redness around the area. ¨ Red streaks leading from the area. ¨ Pus draining from the area. ¨ A fever. ? · You see a sudden change in the color or smell of the drainage. ? · The tube is coming loose where it leaves your skin. ? Watch closely for changes in your health, and be sure to contact your doctor if:  ? · You see a lot of fluid around the drain. ? · You cannot remove a clot from the tube by milking the tube. Where can you learn more? Go to http://maria c-checo.info/. Enter K117 in the search box to learn more about \"Surgical Drain Care: Care Instructions. \"  Current as of: March 20, 2017  Content Version: 11.4  © 3373-0148 [employer]. Care instructions adapted under license by [employer] (which disclaims liability or warranty for this information). If you have questions about a medical condition or this instruction, always ask your healthcare professional. [address] any warranty or liability for your use of this information.

## 2018-05-16 NOTE — PROGRESS NOTES
1. Have you been to the ER, urgent care clinic since your last visit? Hospitalized since your last visit? No-not since last office visi    2. Have you seen or consulted any other health care providers outside of the [facility] since your last visit? Include any pap smears or colon screening.  No

## 2018-05-23 ENCOUNTER — OFFICE VISIT (OUTPATIENT)
Dept: SURGERY | Age: 38
End: 2018-05-23

## 2018-05-23 VITALS
OXYGEN SATURATION: 98 % | HEART RATE: 98 BPM | DIASTOLIC BLOOD PRESSURE: 84 MMHG | RESPIRATION RATE: 18 BRPM | HEIGHT: 63 IN | TEMPERATURE: 98.2 F | WEIGHT: 152 LBS | SYSTOLIC BLOOD PRESSURE: 112 MMHG | BODY MASS INDEX: 26.93 KG/M2

## 2018-05-23 DIAGNOSIS — K86.89 PANCREATIC FLUID LEAK: ICD-10-CM

## 2018-05-23 DIAGNOSIS — R10.11 RIGHT UPPER QUADRANT ABDOMINAL PAIN: ICD-10-CM

## 2018-05-23 DIAGNOSIS — Z09 POSTOPERATIVE EXAMINATION: ICD-10-CM

## 2018-05-23 DIAGNOSIS — R50.82 POSTOPERATIVE FEVER: ICD-10-CM

## 2018-05-23 RX ORDER — OXYCODONE HYDROCHLORIDE 10 MG/1
10 TABLET ORAL
Qty: 120 TAB | Refills: 0 | Status: SHIPPED | OUTPATIENT
Start: 2018-05-23 | End: 2018-06-06 | Stop reason: SDUPTHER

## 2018-05-23 NOTE — MR AVS SNAPSHOT
[address] [id number] 
[phone] Patient: Delma Gordon MRN: [id number] HPL:[birth year] Visit Information Date & Time Provider Department Dept. Phone Encounter #  
 5/23/2018 10:40 AM Monroe Heimlich, [address] [phone] [id number] Your Appointments 6/6/2018  1:40 PM  
POST OP 10 MIN with Monroe Heimlich, MD  
[address] [facility] Appt Note: PO 2 week f/u  
 [address] [facility] [address]  
[address] [place] [address] Upcoming Health Maintenance Date Due Pneumococcal 19-64 Highest Risk (1 of 3 - PCV13) 8/23/1997 DTaP/Tdap/Td series (1 - Tdap) 8/23/1999 PAP AKA CERVICAL CYTOLOGY 8/23/1999 Influenza Age 5 to Adult 8/1/2018 Allergies as of 5/23/2018  Review Complete On: 5/23/2018 By: Monroe Heimlich, MD  
  
 Severity Noted Reaction Type Reactions Amoxicillin  06/24/2011    Shortness of Breath, Rash Pt has had keflex in the past  
  
Current Immunizations  Reviewed on 4/25/2018 No immunizations on file. Not reviewed this visit You Were Diagnosed With   
  
 Codes Comments Pancreatic fluid leak     ICD-10-CM: K86.89 
ICD-9-CM: 577.8 Postoperative examination     ICD-10-CM: L99 ICD-9-CM: V67.00 Right upper quadrant abdominal pain     ICD-10-CM: R10.11 ICD-9-CM: 789.01 Postoperative fever     ICD-10-CM: R50.82 ICD-9-CM: 780.62 Vitals BP Pulse Temp Resp Height(growth percentile) Weight(growth percentile) 112/84 (BP 1 Location: Right arm, BP Patient Position: Sitting) 98 98.2 °F (36.8 °C) (Oral) 18 5' 3\" (1.6 m) 152 lb (68.9 kg) SpO2 BMI OB Status Smoking Status 98% 26.93 kg/m2 Having regular periods Current Every Day Smoker BMI and BSA Data Body Mass Index Body Surface Area 26.93 kg/m 2 1.75 m 2 Preferred Pharmacy Pharmacy Name Phone [facility] [address], [address] AT [facility]  [place] [phone] Your Updated Medication List  
  
   
This list is accurate as of 5/23/18  5:52 PM.  Always use your most recent med list.  
  
  
  
  
 acetaminophen 500 mg tablet Commonly known as:  TYLENOL Take 500 mg by mouth every eight (8) hours as needed for Pain. DULoxetine 60 mg capsule Commonly known as:  CYMBALTA Take 1 Cap by mouth daily. gabapentin 300 mg capsule Commonly known as:  NEURONTIN Take 1 Cap by mouth three (3) times daily. Indications: Postoperative Acute Pain MOTRIN  mg tablet Generic drug:  ibuprofen Take 800 mg by mouth every eight (8) hours as needed. * naloxone 2 mg/actuation Spry Use 1 spray intranasally into 1 nostril. Use a new Narcan nasal spray for subsequent doses and administer into alternating nostrils. May repeat every 2 to 3 minutes as needed. * naloxone 4 mg/actuation nasal spray Commonly known as:  [employer] Use 1 spray intranasally into 1 nostril. Use a new Narcan nasal spray for subsequent doses and administer into alternating nostrils. May repeat every 2 to 3 minutes as needed. ondansetron 4 mg disintegrating tablet Commonly known as:  ZOFRAN ODT Take 1 Tab by mouth every eight (8) hours as needed for Nausea. Indications: PREVENTION OF POST-OPERATIVE NAUSEA AND VOMITING  
  
 * oxyCODONE IR 5 mg immediate release tablet Commonly known as:  Gonzales Melena Take 1.5 Tabs by mouth every three (3) hours as needed for Pain for up to 7 days. Max Daily Amount: 60 mg.  
  
 * oxyCODONE IR 10 mg Tab immediate release tablet Commonly known as:  Gonzales Melena Take 1 Tab by mouth every three (3) hours as needed. Max Daily Amount: 80 mg.  
  
 pregabalin 50 mg capsule Commonly known as:  Miriam Bonilla Take 1 Cap by mouth two (2) times a day. Max Daily Amount: 100 mg. Indications: Postoperative Acute Pain  
  
 senna-docusate 8.6-50 mg per tablet Commonly known as:  Linda Blanc Take 1 Tab by mouth daily as needed for Constipation. * Notice: This list has 4 medication(s) that are the same as other medications prescribed for you. Read the directions carefully, and ask your doctor or other care provider to review them with you. Prescriptions Printed Refills  
 oxyCODONE IR (ROXICODONE) 10 mg tab immediate release tablet 0 Sig: Take 1 Tab by mouth every three (3) hours as needed. Max Daily Amount: 80 mg.  
 Class: Print Route: Oral  
  
Introducing [facility] SERVICES! Dear Taylor Charles: 
Thank you for requesting a obopay account. Our records indicate that you already have an active obopay account. You can access your account anytime at [email or web address] Did you know that you can access your hospital and ER discharge instructions at any time in obopay? You can also review all of your test results from your hospital stay or ER visit. Additional Information If you have questions, please visit the Frequently Asked Questions section of the obopay website at [email or web address] Echo [email or web address]/[email or web address]/. Remember, obopay is NOT to be used for urgent needs. For medical emergencies, dial 911. Now available from your iPhone and Android! Please provide this summary of care documentation to your next provider. Your primary care clinician is listed as Kenya Cornell. If you have any questions after today's visit, please call [phone].

## 2018-05-23 NOTE — PROGRESS NOTES
1. Have you been to the ER, urgent care clinic since your last visit? Hospitalized since your last visit? No    2. Have you seen or consulted any other health care providers outside of the [address] [place] [address] since your last visit? Include any pap smears or colon screening.  No

## 2018-05-23 NOTE — PROGRESS NOTES
Post-Op Visit    Subjective:     Oc Soto is a 42 y.o.  female s/p LAPAROSCOPIC CONVERTED TO OPEN DISTAL PANCREATECTOMY from 04/20/2018 who presents for post-op care. Drainage continues at about 100/day. Her pain is variable but persistent. Appetite is reasonable. CT ABD PELV W CONT from 05/07/2018:  FINDINGS:  LOWER CHEST: The visualized portions of the lung bases are clear. Is minimal  atelectasis in the posterior lower lobes. ABDOMEN:  Liver: The liver is normal in size and contour with no focal abnormality. Gallbladder and bile ducts: There is no calcified gallstone or biliary  dilatation. Spleen: There is a low-attenuation splenic infarct which has decreased in size. Pancreas: The pancreatic tail has been resected. There is a surgical drain in  the left upper quadrant. There is a 7.6 x 5.7 x 7.7 cm fluid collection in the  bed of resection which has increased in size with inflammatory stranding in the  adjacent fat. There is no gas within the collection and there is no well-formed  wall around the collection. The head and neck of the pancreas are normal.  Adrenal glands: No abnormality. Kidneys: No abnormality. PELVIS:  Reproductive organs: The uterus is normal.   Bladder: No abnormality. BOWEL AND MESENTERY: The small bowel is normal.  There is no mesenteric mass or  adenopathy. The appendix is normal.  PERITONEUM: There is no ascites or free intraperitoneal air. RETROPERITONEUM: The aorta  tapers without aneurysm. There is no retroperitoneal  adenopathy or mass. There is no pelvic mass or adenopathy. BONES AND SOFT TISSUES: The bones and soft tissues of the abdominal wall are  within normal limits.     IMPRESSION  IMPRESSION:   1. Status post distal pancreatectomy. 2. Fluid collection in the bed of resection increased in size with surrounding  inflammation. 3. Splenic infarct decreased in size. .    I independently reviewed these images.      Surgical pathology report from 04/20/2018:          Chief Complaint   Patient presents with    Other     drainage bag leaking       Patient Active Problem List    Diagnosis Date Noted    Postoperative fever 05/07/2018    Obesity (BMI 30-39.9) 04/26/2018    Pancreas cyst 04/20/2018    Alcohol intoxication ([facility] [address] 04/03/2018    Suicidal ideation 04/03/2018    High anion gap metabolic acidosis 26/01/6396    Elevated lipase 04/03/2018    Alcohol abuse 04/03/2018    Sinus tachycardia 04/03/2018    Pancreatic cyst 03/21/2018    Rash 06/24/2011    Headache(784.0) 06/24/2011    Nausea & vomiting 06/24/2011    Hepatitis 06/24/2011    Thrombocytopenia, unspecified ([facility] [address] 06/24/2011    Abdominal pain, unspecified site 06/24/2011    Fever, unspecified 06/24/2011     Past Medical History:   Diagnosis Date    Anxiety     Blurred vision     Chest pain     Chronic pain     MUSCLE WEAKNESS,PANCREATIC CYST     Depression     Frequent headaches     Ill-defined condition     Muscle weakness     Obesity (BMI 30-39.9) 4/26/2018    Pancreatic cyst 3/21/2018    Shortness of breath     Stomach pain     Swallowing difficulty       Past Surgical History:   Procedure Laterality Date    HX GI      COLONOSCOPY    HX GYN  2005    endometriosis surgery    HX OTHER SURGICAL  04/20/2018    lap distal pancreatectomy converted to open-[facility]-DR. Rosamaria Mack      Social History   Substance Use Topics    Smoking status: Current Every Day Smoker     Packs/day: 1.00     Years: 25.00    Smokeless tobacco: Never Used      Comment: STOP SMOKING BOOKLET PROVIDED    Alcohol use 1.8 oz/week     3 Shots of liquor per week      Family History   Problem Relation Age of Onset    Cancer Mother      colon    Cancer Father      skin    Anesth Problems Father      SUCCINYLCHOLINE  REACTION      Prior to Admission medications    Medication Sig Start Date End Date Taking?  Authorizing Provider   oxyCODONE IR (ROXICODONE) 5 mg immediate release tablet Take 1.5 Tabs by mouth every three (3) hours as needed for Pain for up to 7 days. Max Daily Amount: 60 mg. 5/16/18 5/23/18 Yes Boogie Maria NP   DULoxetine (CYMBALTA) 60 mg capsule Take 1 Cap by mouth daily. 5/4/18  Yes Chuyita Fernandez NP   senna-docusate (PERICOLACE) 8.6-50 mg per tablet Take 1 Tab by mouth daily as needed for Constipation. 4/30/18  Yes Boogie Maria NP   gabapentin (NEURONTIN) 300 mg capsule Take 1 Cap by mouth three (3) times daily. Indications: Postoperative Acute Pain 4/30/18  Yes Boogie Maria NP   ondansetron (ZOFRAN ODT) 4 mg disintegrating tablet Take 1 Tab by mouth every eight (8) hours as needed for Nausea. Indications: PREVENTION OF POST-OPERATIVE NAUSEA AND VOMITING 4/30/18  Yes Boogie Maria NP   acetaminophen (TYLENOL) 500 mg tablet Take 500 mg by mouth every eight (8) hours as needed for Pain. Yes Historical Provider   ibuprofen (MOTRIN IB) 200 mg tablet Take 800 mg by mouth every eight (8) hours as needed. Yes Historical Provider   pregabalin (LYRICA) 50 mg capsule Take 1 Cap by mouth two (2) times a day. Max Daily Amount: 100 mg. Indications: Postoperative Acute Pain 5/16/18   Boogie Maria NP   naloxone [facility]) 4 mg/actuation nasal spray Use 1 spray intranasally into 1 nostril. Use a new Narcan nasal spray for subsequent doses and administer into alternating nostrils. May repeat every 2 to 3 minutes as needed. 4/30/18   Boogie Maria NP   naloxone 2 mg/actuation spry Use 1 spray intranasally into 1 nostril. Use a new Narcan nasal spray for subsequent doses and administer into alternating nostrils. May repeat every 2 to 3 minutes as needed. 4/19/18   Minor MD Rambo     Allergies   Allergen Reactions    Amoxicillin Shortness of Breath and Rash     Pt has had keflex in the past         Review of Systems:    A comprehensive review of systems was negative except for that written in the History of Present Illness.     Objective:     Visit Vitals    /84 (BP 1 Location: Right arm, BP Patient Position: Sitting)    Pulse 98    Temp 98.2 °F (36.8 °C) (Oral)    Resp 18    Ht 5' 3\" (1.6 m)    Wt 152 lb (68.9 kg)    SpO2 98%    BMI 26.93 kg/m2       Physical Exam:  General appearance: alert, cooperative, no distress, appears stated age  Head: Normocephalic, without obvious abnormality, atraumatic  Neurologic: Grossly normal  Abdomen: She is still very tender in her left upper abdomen. At her two drain sites in particular. There is no evidence of infection around the drain sites. IMPRESSION:  Persistent upper abdominal pain that is characteristic of ongoing inflammation in the bed of her pancreatic resection. Assessment:       ICD-10-CM ICD-9-CM    1. Pancreatic fluid leak K86.89 577.8    2. Postoperative examination Z09 V67.00    3. Right upper quadrant abdominal pain R10.11 789.01    4. Postoperative fever R50.82 780.62        Plan:     Continue the drainage and treat her pain with appropriate narcotics. Written by michael Boothe, as dictated by Nona Regalado MD.    Total face to face time with patient: 19 minutes. Greater than 50% of the time was spent in counseling. Signed By: Nona Regalado MD    May 23, 2018

## 2018-06-06 ENCOUNTER — OFFICE VISIT (OUTPATIENT)
Dept: SURGERY | Age: 38
End: 2018-06-06

## 2018-06-06 ENCOUNTER — TELEPHONE (OUTPATIENT)
Dept: SURGERY | Age: 38
End: 2018-06-06

## 2018-06-06 VITALS
OXYGEN SATURATION: 96 % | BODY MASS INDEX: 27.11 KG/M2 | TEMPERATURE: 98.6 F | DIASTOLIC BLOOD PRESSURE: 80 MMHG | SYSTOLIC BLOOD PRESSURE: 140 MMHG | RESPIRATION RATE: 18 BRPM | HEIGHT: 63 IN | WEIGHT: 153 LBS | HEART RATE: 75 BPM

## 2018-06-06 DIAGNOSIS — K86.89 PANCREATIC FLUID LEAK: ICD-10-CM

## 2018-06-06 RX ORDER — OXYCODONE HYDROCHLORIDE 10 MG/1
10 TABLET ORAL
Qty: 120 TAB | Refills: 0 | Status: SHIPPED | OUTPATIENT
Start: 2018-06-06 | End: 2018-06-27 | Stop reason: SDUPTHER

## 2018-06-06 NOTE — PROGRESS NOTES
Post-Op Visit    Subjective:     Delma Gordon is a 42 y.o.  female s/p LAPAROSCOPIC CONVERTED TO OPEN DISTAL PANCREATECTOMY from 04/20/2018 who presents for post-op care. Her pain is better but is still present. Still draining about 75 cc/day from the accordion drain. Chief Complaint   Patient presents with    Surgical Follow-up    Dressing Change     at tube site       Patient Active Problem List    Diagnosis Date Noted    Postoperative fever 05/07/2018    Obesity (BMI 30-39.9) 04/26/2018    Pancreas cyst 04/20/2018    Alcohol intoxication ([address] 04/03/2018    Suicidal ideation 04/03/2018    High anion gap metabolic acidosis 45/86/7808    Elevated lipase 04/03/2018    Alcohol abuse 04/03/2018    Sinus tachycardia 04/03/2018    Pancreatic cyst 03/21/2018    Rash 06/24/2011    Headache(784.0) 06/24/2011    Nausea & vomiting 06/24/2011    Hepatitis 06/24/2011    Thrombocytopenia, unspecified ([address] 06/24/2011    Abdominal pain, unspecified site 06/24/2011    Fever, unspecified 06/24/2011     Past Medical History:   Diagnosis Date    Anxiety     Blurred vision     Chest pain     Chronic pain     MUSCLE WEAKNESS,PANCREATIC CYST     Depression     Frequent headaches     Ill-defined condition     Muscle weakness     Obesity (BMI 30-39.9) 4/26/2018    Pancreatic cyst 3/21/2018    Shortness of breath     Stomach pain     Swallowing difficulty       Past Surgical History:   Procedure Laterality Date    HX GI      COLONOSCOPY    HX GYN  2005    endometriosis surgery    HX OTHER SURGICAL  04/20/2018    lap distal pancreatectomy converted to open-[facility]-DR. Gagan Dave      Social History   Substance Use Topics    Smoking status: Current Every Day Smoker     Packs/day: 1.00     Years: 25.00    Smokeless tobacco: Never Used      Comment: STOP SMOKING BOOKLET PROVIDED    Alcohol use 1.8 oz/week     3 Shots of liquor per week      Family History   Problem Relation Age of Onset    Cancer Mother      colon    Cancer Father      skin    Anesth Problems Father      SUCCINYLCHOLINE  REACTION      Prior to Admission medications    Medication Sig Start Date End Date Taking? Authorizing Provider   oxyCODONE IR (ROXICODONE) 10 mg tab immediate release tablet Take 1 Tab by mouth every three (3) hours as needed. Max Daily Amount: 80 mg. 5/23/18  Yes Jasmin Campo MD   DULoxetine (CYMBALTA) 60 mg capsule Take 1 Cap by mouth daily. 5/4/18  Yes Katerina Abdi NP   acetaminophen (TYLENOL) 500 mg tablet Take 500 mg by mouth every eight (8) hours as needed for Pain. Yes Historical Provider   ibuprofen (MOTRIN IB) 200 mg tablet Take 800 mg by mouth every eight (8) hours as needed. Yes Historical Provider   pregabalin (LYRICA) 50 mg capsule Take 1 Cap by mouth two (2) times a day. Max Daily Amount: 100 mg. Indications: Postoperative Acute Pain 5/16/18   Sol Dutta NP   senna-docusate (PERICOLACE) 8.6-50 mg per tablet Take 1 Tab by mouth daily as needed for Constipation. 4/30/18   Sol Dutta NP   gabapentin (NEURONTIN) 300 mg capsule Take 1 Cap by mouth three (3) times daily. Indications: Postoperative Acute Pain 4/30/18   Sol Dutta NP   ondansetron (ZOFRAN ODT) 4 mg disintegrating tablet Take 1 Tab by mouth every eight (8) hours as needed for Nausea. Indications: PREVENTION OF POST-OPERATIVE NAUSEA AND VOMITING 4/30/18   Sol Dutta NP   naloxone [facility]) 4 mg/actuation nasal spray Use 1 spray intranasally into 1 nostril. Use a new Narcan nasal spray for subsequent doses and administer into alternating nostrils. May repeat every 2 to 3 minutes as needed. 4/30/18   Sol Dutta NP   naloxone 2 mg/actuation spry Use 1 spray intranasally into 1 nostril. Use a new Narcan nasal spray for subsequent doses and administer into alternating nostrils. May repeat every 2 to 3 minutes as needed.  4/19/18   Norma Quiroz MD     Allergies   Allergen Reactions    Amoxicillin Shortness of Breath and Rash     Pt has had keflex in the past         Review of Systems:        Objective:     Visit Vitals    /80 (BP 1 Location: Right arm, BP Patient Position: Sitting)    Pulse 75    Temp 98.6 °F (37 °C) (Oral)    Resp 18    Ht 5' 3\" (1.6 m)    Wt 153 lb (69.4 kg)    SpO2 96%    BMI 27.1 kg/m2       Physical Exam:  General appearance: alert, cooperative, no distress, appears stated age  Head: Normocephalic, without obvious abnormality, atraumatic  Neurologic: Grossly normal  Abdomen: Incision looks fine. Drainage sites are clean. Assessment:       ICD-10-CM ICD-9-CM    1. Pancreatic fluid leak K86.89 577.8        Plan:     The [facility] Gordon drainage was <10 cc/day so I removed it. Will continue to monitor the output from her drain and will see her again in three weeks. Written by michael Olvera, as dictated by Nanette Birchwood Lyla Oppenheim, MD.    Total face to face time with patient: 18 minutes. Greater than 50% of the time was spent in counseling. Signed By: Nanette Birchwood Lyla Oppenheim, MD    June 6, 2018

## 2018-06-06 NOTE — MR AVS SNAPSHOT
[address] [facility] [address] [id number] 
[phone] Patient: Siomara Rae MRN: [id number]:[id number] Visit Information Date & Time Provider Department Dept. Phone Encounter #  
 6/6/2018  1:40 PM [facility] [phone] [id number] Upcoming Health Maintenance Date Due Pneumococcal 19-64 Highest Risk (1 of 3 - PCV13) 8/23/1997 DTaP/Tdap/Td series (1 - Tdap) 8/23/1999 PAP AKA CERVICAL CYTOLOGY 8/23/1999 Influenza Age 5 to Adult 8/1/2018 Allergies as of 6/6/2018  Review Complete On: 6/6/2018 By: Jasmin Campo MD  
  
 Severity Noted Reaction Type Reactions Amoxicillin  06/24/2011    Shortness of Breath, Rash Pt has had keflex in the past  
  
Current Immunizations  Reviewed on 4/25/2018 No immunizations on file. Not reviewed this visit You Were Diagnosed With   
  
 Codes Comments Pancreatic fluid leak     ICD-10-CM: K86.89 
ICD-9-CM: 577.8 Vitals BP Pulse Temp Resp Height(growth percentile) Weight(growth percentile) 140/80 (BP 1 Location: Right arm, BP Patient Position: Sitting) 75 98.6 °F (37 °C) (Oral) 18 5' 3\" (1.6 m) 153 lb (69.4 kg) SpO2 BMI OB Status Smoking Status 96% 27.1 kg/m2 Having regular periods Current Every Day Smoker BMI and BSA Data Body Mass Index Body Surface Area  
 27.1 kg/m 2 1.76 m 2 Preferred Pharmacy Pharmacy Name Phone [facility] [address], [id number] [facility] [phone] Your Updated Medication List  
  
   
This list is accurate as of 6/6/18  2:48 PM.  Always use your most recent med list.  
  
  
  
  
 acetaminophen 500 mg tablet Commonly known as:  TYLENOL Take 500 mg by mouth every eight (8) hours as needed for Pain. DULoxetine 60 mg capsule Commonly known as:  CYMBALTA Take 1 Cap by mouth daily. gabapentin 300 mg capsule Commonly known as:  NEURONTIN Take 1 Cap by mouth three (3) times daily. Indications: Postoperative Acute Pain MOTRIN  mg tablet Generic drug:  ibuprofen Take 800 mg by mouth every eight (8) hours as needed. * naloxone 2 mg/actuation Spry Use 1 spray intranasally into 1 nostril. Use a new Narcan nasal spray for subsequent doses and administer into alternating nostrils. May repeat every 2 to 3 minutes as needed. * naloxone 4 mg/actuation nasal spray Commonly known as:  [employer] Use 1 spray intranasally into 1 nostril. Use a new Narcan nasal spray for subsequent doses and administer into alternating nostrils. May repeat every 2 to 3 minutes as needed. ondansetron 4 mg disintegrating tablet Commonly known as:  ZOFRAN ODT Take 1 Tab by mouth every eight (8) hours as needed for Nausea. Indications: PREVENTION OF POST-OPERATIVE NAUSEA AND VOMITING  
  
 oxyCODONE IR 10 mg Tab immediate release tablet Commonly known as:  Martín Maddi Take 1 Tab by mouth every four (4) hours as needed. Max Daily Amount: 60 mg. Indications: Pain  
  
 pregabalin 50 mg capsule Commonly known as:  Therese Mcpherson Take 1 Cap by mouth two (2) times a day. Max Daily Amount: 100 mg. Indications: Postoperative Acute Pain  
  
 senna-docusate 8.6-50 mg per tablet Commonly known as:  Steva Pelon Take 1 Tab by mouth daily as needed for Constipation. * Notice: This list has 2 medication(s) that are the same as other medications prescribed for you. Read the directions carefully, and ask your doctor or other care provider to review them with you. Prescriptions Printed Refills  
 oxyCODONE IR (ROXICODONE) 10 mg tab immediate release tablet 0 Sig: Take 1 Tab by mouth every four (4) hours as needed. Max Daily Amount: 60 mg. Indications: Pain Class: Print Route: Oral  
  
Introducing [facility]! Dear Jett Castillo: Thank you for requesting a [employer] account. Our records indicate that you already have an active [employer] account. You can access your account anytime at [email or web address] Did you know that you can access your hospital and ER discharge instructions at any time in [employer]? You can also review all of your test results from your hospital stay or ER visit. Additional Information If you have questions, please visit the Frequently Asked Questions section of the [employer] website at [email or web address] Remember, [employer] is NOT to be used for urgent needs. For medical emergencies, dial 911. Now available from your iPhone and Android! Please provide this summary of care documentation to your next provider. Your primary care clinician is listed as [facility] President. If you have any questions after today's visit, please call [phone].

## 2018-06-06 NOTE — PROGRESS NOTES
1. Have you been to the ER, urgent care clinic since your last visit? Hospitalized since your last visit? No    2. Have you seen or consulted any other health care providers outside of the [address] [place] [address] since your last visit? Include any pap smears or colon screening.  No

## 2018-06-27 ENCOUNTER — OFFICE VISIT (OUTPATIENT)
Dept: SURGERY | Age: 38
End: 2018-06-27

## 2018-06-27 VITALS
RESPIRATION RATE: 18 BRPM | BODY MASS INDEX: 26.4 KG/M2 | TEMPERATURE: 100.8 F | HEART RATE: 80 BPM | HEIGHT: 63 IN | SYSTOLIC BLOOD PRESSURE: 144 MMHG | DIASTOLIC BLOOD PRESSURE: 80 MMHG | OXYGEN SATURATION: 98 % | WEIGHT: 149 LBS

## 2018-06-27 DIAGNOSIS — Z09 POSTOPERATIVE EXAMINATION: Primary | ICD-10-CM

## 2018-06-27 DIAGNOSIS — K86.89 PANCREATIC FLUID LEAK: ICD-10-CM

## 2018-06-27 RX ORDER — OXYCODONE HYDROCHLORIDE 10 MG/1
10 TABLET ORAL
Qty: 80 TAB | Refills: 0 | Status: SHIPPED | OUTPATIENT
Start: 2018-06-27 | End: 2018-07-16 | Stop reason: SDUPTHER

## 2018-06-27 NOTE — PROGRESS NOTES
1. Have you been to the ER, urgent care clinic since your last visit? Hospitalized since your last visit? No    2. Have you seen or consulted any other health care providers outside of the [address] [place] [address] since your last visit? Include any pap smears or colon screening.  No     Patient still has drain in place

## 2018-06-27 NOTE — PROGRESS NOTES
Post-Op Visit    Subjective:     Steffanie Wilson is a 42 y.o.  female s/p LAPAROSCOPIC CONVERTED TO OPEN DISTAL PANCREATECTOMY from 04/20/2018 who presents for post-op care. She is doing better. Drainage is down to about 20 cc/day. Chief Complaint   Patient presents with    Surgical Follow-up       Patient Active Problem List    Diagnosis Date Noted    Postoperative fever 05/07/2018    Obesity (BMI 30-39.9) 04/26/2018    Pancreas cyst 04/20/2018    Alcohol intoxication ([address] 04/03/2018    Suicidal ideation 04/03/2018    High anion gap metabolic acidosis 45/41/0823    Elevated lipase 04/03/2018    Alcohol abuse 04/03/2018    Sinus tachycardia 04/03/2018    Pancreatic cyst 03/21/2018    Rash 06/24/2011    Headache(784.0) 06/24/2011    Nausea & vomiting 06/24/2011    Hepatitis 06/24/2011    Thrombocytopenia, unspecified ([facility] [address] 06/24/2011    Abdominal pain, unspecified site 06/24/2011    Fever, unspecified 06/24/2011     Past Medical History:   Diagnosis Date    Anxiety     Blurred vision     Chest pain     Chronic pain     MUSCLE WEAKNESS,PANCREATIC CYST     Depression     Frequent headaches     Ill-defined condition     Muscle weakness     Obesity (BMI 30-39.9) 4/26/2018    Pancreatic cyst 3/21/2018    Shortness of breath     Stomach pain     Swallowing difficulty       Past Surgical History:   Procedure Laterality Date    HX GI      COLONOSCOPY    HX GYN  2005    endometriosis surgery    HX OTHER SURGICAL  04/20/2018    lap distal pancreatectomy converted to open-[facility]-DR. César Henley      Social History   Substance Use Topics    Smoking status: Current Every Day Smoker     Packs/day: 1.00     Years: 25.00    Smokeless tobacco: Never Used      Comment: STOP SMOKING BOOKLET PROVIDED    Alcohol use 1.8 oz/week     3 Shots of liquor per week      Family History   Problem Relation Age of Onset    Cancer Mother      colon    Cancer Father      skin    Anesth Problems Father      SUCCINYLCHOLINE  REACTION      Prior to Admission medications    Medication Sig Start Date End Date Taking? Authorizing Provider   oxyCODONE IR (ROXICODONE) 10 mg tab immediate release tablet Take 1 Tab by mouth every four (4) hours as needed. Max Daily Amount: 60 mg. Indications: Pain 6/6/18   Nazia Stacy MD   pregabalin (LYRICA) 50 mg capsule Take 1 Cap by mouth two (2) times a day. Max Daily Amount: 100 mg. Indications: Postoperative Acute Pain 5/16/18   Missy Amador NP   DULoxetine (CYMBALTA) 60 mg capsule Take 1 Cap by mouth daily. 5/4/18   Karuna Jordan NP   senna-docusate (PERICOLACE) 8.6-50 mg per tablet Take 1 Tab by mouth daily as needed for Constipation. 4/30/18   Missy Amador NP   gabapentin (NEURONTIN) 300 mg capsule Take 1 Cap by mouth three (3) times daily. Indications: Postoperative Acute Pain 4/30/18   Missy Amador NP   ondansetron (ZOFRAN ODT) 4 mg disintegrating tablet Take 1 Tab by mouth every eight (8) hours as needed for Nausea. Indications: PREVENTION OF POST-OPERATIVE NAUSEA AND VOMITING 4/30/18   Missy Amador NP   naloxone [facility]) 4 mg/actuation nasal spray Use 1 spray intranasally into 1 nostril. Use a new Narcan nasal spray for subsequent doses and administer into alternating nostrils. May repeat every 2 to 3 minutes as needed. 4/30/18   Missy Amador NP   naloxone 2 mg/actuation spry Use 1 spray intranasally into 1 nostril. Use a new Narcan nasal spray for subsequent doses and administer into alternating nostrils. May repeat every 2 to 3 minutes as needed. 4/19/18   Danica Snow MD   acetaminophen (TYLENOL) 500 mg tablet Take 500 mg by mouth every eight (8) hours as needed for Pain. Historical Provider   ibuprofen (MOTRIN IB) 200 mg tablet Take 800 mg by mouth every eight (8) hours as needed.     Historical Provider     Allergies   Allergen Reactions    Amoxicillin Shortness of Breath and Rash     Pt has had keflex in the past Review of Systems:    A comprehensive review of systems was negative except for that written in the History of Present Illness. Objective:     Visit Vitals    /80 (BP 1 Location: Left arm, BP Patient Position: Sitting)    Pulse 80    Temp (!) 100.8 °F (38.2 °C) (Oral)    Resp 18    Ht 5' 3\" (1.6 m)    Wt 149 lb (67.6 kg)    SpO2 98%    BMI 26.39 kg/m2       Physical Exam:  General appearance: alert, cooperative, no distress, appears stated age  Head: Normocephalic, without obvious abnormality, atraumatic  Neurologic: Grossly normal  Abdomen: Incision is well-healed. Drain is in place       Assessment:       ICD-10-CM ICD-9-CM    1. Postoperative examination Z09 V67.00        Plan: Will do a CT scan of the abdomen in preparation for removal of her drain. Written by michael Tracy, as dictated by Erick Sosa MD.    Total face to face time with patient: 10 minutes. Greater than 50% of the time was spent in counseling. Signed By: Erick Sosa MD    June 27, 2018

## 2018-06-27 NOTE — MR AVS SNAPSHOT
[address] [facility] [address] [address] 
[phone] Patient: Daina Overton MRN: [id number] [id number] Visit Information Date & Time Provider Department Dept. Phone Encounter #  
 6/27/2018 11:40 AM Stacy Orozco, [address]64 [phone] [id number] Upcoming Health Maintenance Date Due Pneumococcal 19-64 Highest Risk (1 of 3 - PCV13) 8/23/1997 DTaP/Tdap/Td series (1 - Tdap) 8/23/1999 PAP AKA CERVICAL CYTOLOGY 8/23/1999 Influenza Age 5 to Adult 8/1/2018 Allergies as of 6/27/2018  Review Complete On: 6/27/2018 By: Stacy Orozco MD  
  
 Severity Noted Reaction Type Reactions Amoxicillin  06/24/2011    Shortness of Breath, Rash Pt has had keflex in the past  
  
Current Immunizations  Reviewed on 4/25/2018 No immunizations on file. Not reviewed this visit You Were Diagnosed With   
  
 Codes Comments Postoperative examination    -  Primary ICD-10-CM: G89 ICD-9-CM: V67.00 Pancreatic fluid leak     ICD-10-CM: K86.89 
ICD-9-CM: 577.8 Vitals BP Pulse Temp Resp Height(growth percentile) Weight(growth percentile) 144/80 (BP 1 Location: Left arm, BP Patient Position: Sitting) 80 (!) 100.8 °F (38.2 °C) (Oral) 18 5' 3\" (1.6 m) 149 lb (67.6 kg) SpO2 BMI OB Status Smoking Status 98% 26.39 kg/m2 Having regular periods Current Every Day Smoker BMI and BSA Data Body Mass Index Body Surface Area  
 26.39 kg/m 2 1.73 m 2 Preferred Pharmacy Pharmacy Name Phone Nirav [address], Evelina Berman [address] AT [facility] OF  [facility] [phone] Your Updated Medication List  
  
   
This list is accurate as of 6/27/18 12:27 PM.  Always use your most recent med list.  
  
  
  
  
 acetaminophen 500 mg tablet Commonly known as:  TYLENOL  
 Take 500 mg by mouth every eight (8) hours as needed for Pain. DULoxetine 60 mg capsule Commonly known as:  CYMBALTA Take 1 Cap by mouth daily. gabapentin 300 mg capsule Commonly known as:  NEURONTIN Take 1 Cap by mouth three (3) times daily. Indications: Postoperative Acute Pain MOTRIN  mg tablet Generic drug:  ibuprofen Take 800 mg by mouth every eight (8) hours as needed. * naloxone 2 mg/actuation Spry Use 1 spray intranasally into 1 nostril. Use a new Narcan nasal spray for subsequent doses and administer into alternating nostrils. May repeat every 2 to 3 minutes as needed. * naloxone 4 mg/actuation nasal spray Commonly known as:  [employer] Use 1 spray intranasally into 1 nostril. Use a new Narcan nasal spray for subsequent doses and administer into alternating nostrils. May repeat every 2 to 3 minutes as needed. ondansetron 4 mg disintegrating tablet Commonly known as:  ZOFRAN ODT Take 1 Tab by mouth every eight (8) hours as needed for Nausea. Indications: PREVENTION OF POST-OPERATIVE NAUSEA AND VOMITING  
  
 oxyCODONE IR 10 mg Tab immediate release tablet Commonly known as:  Gareld Maid Take 1 Tab by mouth every six (6) hours as needed. Max Daily Amount: 40 mg. Indications: Pain  
  
 pregabalin 50 mg capsule Commonly known as:  August Majestic Take 1 Cap by mouth two (2) times a day. Max Daily Amount: 100 mg. Indications: Postoperative Acute Pain  
  
 senna-docusate 8.6-50 mg per tablet Commonly known as:  Bettie Fresh Take 1 Tab by mouth daily as needed for Constipation. * Notice: This list has 2 medication(s) that are the same as other medications prescribed for you. Read the directions carefully, and ask your doctor or other care provider to review them with you. Prescriptions Printed  Refills  
 oxyCODONE IR (ROXICODONE) 10 mg tab immediate release tablet 0  
 Sig: Take 1 Tab by mouth every six (6) hours as needed. Max Daily Amount: 40 mg. Indications: Pain Class: Print Route: Oral  
  
To-Do List   
 06/29/2018 Imaging:  CT ABD W WO CONT Introducing [facility] & [facility] SERVICES! Dear Rony Steward: 
Thank you for requesting a [employer] account. Our records indicate that you already have an active [employer] account. You can access your account anytime at [email or web address] Did you know that you can access your hospital and ER discharge instructions at any time in [employer]? You can also review all of your test results from your hospital stay or ER visit. Additional Information If you have questions, please visit the Frequently Asked Questions section of the [employer] website at [email or web address] Remember, [employer] is NOT to be used for urgent needs. For medical emergencies, dial 911. Now available from your iPhone and Android! Please provide this summary of care documentation to your next provider. Your primary care clinician is listed as Nakia Posey. If you have any questions after today's visit, please call [phone].

## 2018-07-02 ENCOUNTER — HOSPITAL ENCOUNTER (OUTPATIENT)
Dept: CT IMAGING | Age: 38
Discharge: HOME | End: 2018-07-02
Attending: SURGERY
Payer: COMMERCIAL

## 2018-07-02 DIAGNOSIS — Z09 POSTOPERATIVE EXAMINATION: ICD-10-CM

## 2018-07-02 PROCEDURE — 74011000258 HC RX REV CODE- 258

## 2018-07-02 PROCEDURE — 74011636320 HC RX REV CODE- 636/320

## 2018-07-02 PROCEDURE — 74160 CT ABDOMEN W/CONTRAST: CPT

## 2018-07-02 PROCEDURE — 74011636320 HC RX REV CODE- 636/320: Performed by: SURGERY

## 2018-07-02 RX ORDER — SODIUM CHLORIDE 0.9 % (FLUSH) 0.9 %
SYRINGE (ML) INJECTION
Status: COMPLETED
Start: 2018-07-02 | End: 2018-07-02

## 2018-07-02 RX ORDER — SODIUM CHLORIDE 0.9 % (FLUSH) 0.9 %
10 SYRINGE (ML) INJECTION
Status: COMPLETED | OUTPATIENT
Start: 2018-07-02 | End: 2018-07-02

## 2018-07-02 RX ORDER — SODIUM CHLORIDE 9 MG/ML
INJECTION, SOLUTION INTRAVENOUS
Status: COMPLETED
Start: 2018-07-02 | End: 2018-07-02

## 2018-07-02 RX ADMIN — IOPAMIDOL 100 ML: 755 INJECTION, SOLUTION INTRAVENOUS at 14:28

## 2018-07-02 RX ADMIN — IOHEXOL 50 ML: 240 INJECTION, SOLUTION INTRATHECAL; INTRAVASCULAR; INTRAVENOUS; ORAL at 14:29

## 2018-07-02 RX ADMIN — Medication 10 ML: at 14:29

## 2018-07-02 RX ADMIN — SODIUM CHLORIDE 100 ML: 900 INJECTION, SOLUTION INTRAVENOUS at 14:29

## 2018-07-02 NOTE — PROGRESS NOTES
After reviewing with Dr. Anabell Vera, order was received to remove pt's drain. Pt's drain was removed and bandage applied. Pt was instructed to call Dr. Cabrera Adame if pain or fever reoccurs.

## 2018-07-16 ENCOUNTER — OFFICE VISIT (OUTPATIENT)
Dept: SURGERY | Age: 38
End: 2018-07-16

## 2018-07-16 VITALS
TEMPERATURE: 98.3 F | SYSTOLIC BLOOD PRESSURE: 110 MMHG | DIASTOLIC BLOOD PRESSURE: 70 MMHG | HEIGHT: 63 IN | OXYGEN SATURATION: 96 % | BODY MASS INDEX: 25.87 KG/M2 | RESPIRATION RATE: 18 BRPM | WEIGHT: 146 LBS | HEART RATE: 102 BPM

## 2018-07-16 DIAGNOSIS — K86.89 PANCREATIC FLUID LEAK: ICD-10-CM

## 2018-07-16 DIAGNOSIS — Z09 POSTOPERATIVE EXAMINATION: Primary | ICD-10-CM

## 2018-07-16 RX ORDER — OXYCODONE HYDROCHLORIDE 10 MG/1
10 TABLET ORAL
Qty: 90 TAB | Refills: 0 | Status: SHIPPED | OUTPATIENT
Start: 2018-07-16 | End: 2018-07-30 | Stop reason: SDUPTHER

## 2018-07-16 NOTE — PROGRESS NOTES
1. Have you been to the ER, urgent care clinic since your last visit? Hospitalized since your last visit? No    2. Have you seen or consulted any other health care providers outside of the [address] [place] [address] since your last visit? Include any pap smears or colon screening.  No

## 2018-07-16 NOTE — MR AVS SNAPSHOT
[address] [facility] [address] [id number] [phone] Patient: Kellen Crabtree MRN: [id number] Visit Information Date & Time Provider Department Dept. Phone Encounter #  
 7/16/2018  2:50 PM Lizet Palencia [address] [phone] [id number] Your Appointments 8/1/2018  2:20 PM  
ESTABLISHED PATIENT with Lizet Palencia MD  
[address] ([address]) Appt Note: EP 3 week f/u 04/20/2018 GP: LAPAROSCOPIC CONVERTED TO OPEN DISTAL PANCREATECTOMY  
 [address] [facility] [address] [place] [address] [address] [facility] [address]  
[address] [place] [address] Upcoming Health Maintenance Date Due Pneumococcal 19-64 Highest Risk (1 of 3 - PCV13) 8/23/1997 DTaP/Tdap/Td series (1 - Tdap) 8/23/1999 PAP AKA CERVICAL CYTOLOGY 8/23/1999 Influenza Age 5 to Adult 8/1/2018 Allergies as of 7/16/2018  Review Complete On: 7/16/2018 By: Lizet Palencia MD  
  
 Severity Noted Reaction Type Reactions Amoxicillin  06/24/2011    Shortness of Breath, Rash Pt has had keflex in the past  
  
Current Immunizations  Reviewed on 4/25/2018 No immunizations on file. Not reviewed this visit You Were Diagnosed With   
  
 Codes Comments Postoperative examination    -  Primary ICD-10-CM: I98 ICD-9-CM: V67.00 Pancreatic fluid leak     ICD-10-CM: K86.89 
ICD-9-CM: 577.8 Vitals BP Pulse Temp Resp Height(growth percentile) Weight(growth percentile) 110/70 (BP 1 Location: Right arm, BP Patient Position: Sitting) (!) 102 98.3 °F (36.8 °C) (Oral) 18 5' 3\" (1.6 m) 146 lb (66.2 kg) SpO2 BMI OB Status Smoking Status 96% 25.86 kg/m2 Having regular periods Current Every Day Smoker BMI and BSA Data Body Mass Index Body Surface Area  
 25.86 kg/m 2 1.72 m 2 Preferred Pharmacy Pharmacy Name Phone Evelina Moore [address] AT [facility] OF  Manuel [address]rennon [phone] Your Updated Medication List  
  
   
This list is accurate as of 7/16/18  4:59 PM.  Always use your most recent med list.  
  
  
  
  
 acetaminophen 500 mg tablet Commonly known as:  TYLENOL Take 500 mg by mouth every eight (8) hours as needed for Pain. DULoxetine 60 mg capsule Commonly known as:  CYMBALTA Take 1 Cap by mouth daily. gabapentin 300 mg capsule Commonly known as:  NEURONTIN Take 1 Cap by mouth three (3) times daily. Indications: Postoperative Acute Pain MOTRIN  mg tablet Generic drug:  ibuprofen Take 800 mg by mouth every eight (8) hours as needed. * naloxone 2 mg/actuation Spry Use 1 spray intranasally into 1 nostril. Use a new Narcan nasal spray for subsequent doses and administer into alternating nostrils. May repeat every 2 to 3 minutes as needed. * naloxone 4 mg/actuation nasal spray Commonly known as:  [employer] Use 1 spray intranasally into 1 nostril. Use a new Narcan nasal spray for subsequent doses and administer into alternating nostrils. May repeat every 2 to 3 minutes as needed. ondansetron 4 mg disintegrating tablet Commonly known as:  ZOFRAN ODT Take 1 Tab by mouth every eight (8) hours as needed for Nausea. Indications: PREVENTION OF POST-OPERATIVE NAUSEA AND VOMITING  
  
 oxyCODONE IR 10 mg Tab immediate release tablet Commonly known as:  Danne Copper Take 1 Tab by mouth every six (6) hours as needed. Max Daily Amount: 40 mg. Indications: Pain  
  
 pregabalin 50 mg capsule Commonly known as:  Seleta Baller Take 1 Cap by mouth two (2) times a day. Max Daily Amount: 100 mg. Indications: Postoperative Acute Pain  
  
 senna-docusate 8.6-50 mg per tablet Commonly known as:  Bessy Bernal Take 1 Tab by mouth daily as needed for Constipation. * Notice: This list has 2 medication(s) that are the same as other medications prescribed for you. Read the directions carefully, and ask your doctor or other care provider to review them with you. Prescriptions Printed Refills  
 oxyCODONE IR (ROXICODONE) 10 mg tab immediate release tablet 0 Sig: Take 1 Tab by mouth every six (6) hours as needed. Max Daily Amount: 40 mg. Indications: Pain Class: Print Route: Oral  
  
Introducing [facility] & [facility] SERVICES! Dear Khris Vincent: 
Thank you for requesting a [employer] account. Our records indicate that you already have an active [employer] account. You can access your account anytime at [email or web address] Did you know that you can access your hospital and ER discharge instructions at any time in [employer]? You can also review all of your test results from your hospital stay or ER visit. Additional Information If you have questions, please visit the Frequently Asked Questions section of the [employer] website at [email or web address] Remember, [employer] is NOT to be used for urgent needs. For medical emergencies, dial 911. Now available from your iPhone and Android! Please provide this summary of care documentation to your next provider. Your primary care clinician is listed as Hannah Pope. If you have any questions after today's visit, please call [phone].

## 2018-07-16 NOTE — PROGRESS NOTES
Post-Op Visit    Subjective:     Zeenat Spicer is a 42 y.o.  female s/p LAPAROSCOPIC CONVERTED TO OPEN DISTAL PANCREATECTOMY from 04/20/2018 who presents for post-op care. She returns today with some resolution of the pain associated with the drain, which has been removed. She has a new pain underneath her ribcage on the left side which is not quite as severe as the old pain used to be. She has had no fevers, no chills. Her appetite is slowly improving. Chief Complaint   Patient presents with    Surgical Follow-up       Patient Active Problem List    Diagnosis Date Noted    Postoperative examination 07/16/2018    Pancreatic fluid leak 07/16/2018    Postoperative fever 05/07/2018    Obesity (BMI 30-39.9) 04/26/2018    Pancreas cyst 04/20/2018    Alcohol intoxication ([address] 04/03/2018    Suicidal ideation 04/03/2018    High anion gap metabolic acidosis 48/64/2658    Elevated lipase 04/03/2018    Alcohol abuse 04/03/2018    Sinus tachycardia 04/03/2018    Pancreatic cyst 03/21/2018    Rash 06/24/2011    Headache(784.0) 06/24/2011    Nausea & vomiting 06/24/2011    Hepatitis 06/24/2011    Thrombocytopenia, unspecified ([address] 06/24/2011    Abdominal pain, unspecified site 06/24/2011    Fever, unspecified 06/24/2011     Past Medical History:   Diagnosis Date    Anxiety     Blurred vision     Chest pain     Chronic pain     MUSCLE WEAKNESS,PANCREATIC CYST     Depression     Frequent headaches     Ill-defined condition     Muscle weakness     Obesity (BMI 30-39.9) 4/26/2018    Pancreatic cyst 3/21/2018    Shortness of breath     Stomach pain     Swallowing difficulty       Past Surgical History:   Procedure Laterality Date    HX GI      COLONOSCOPY    HX GYN  2005    endometriosis surgery    HX OTHER SURGICAL  04/20/2018    lap distal pancreatectomy converted to open-[facility]-DR. Ciro James      Social History   Substance Use Topics    Smoking status: Current Every Day Smoker     Packs/day: 1.00     Years: 25.00    Smokeless tobacco: Never Used      Comment: STOP SMOKING BOOKLET PROVIDED    Alcohol use 1.8 oz/week     3 Shots of liquor per week      Family History   Problem Relation Age of Onset    Cancer Mother      colon    Cancer Father      skin    Anesth Problems Father      SUCCINYLCHOLINE  REACTION      Prior to Admission medications    Medication Sig Start Date End Date Taking? Authorizing Provider   oxyCODONE IR (ROXICODONE) 10 mg tab immediate release tablet Take 1 Tab by mouth every six (6) hours as needed. Max Daily Amount: 40 mg. Indications: Pain 7/16/18  Yes Silvia Del Castillo MD   DULoxetine (CYMBALTA) 60 mg capsule Take 1 Cap by mouth daily. 5/4/18  Yes Aime Reynolds NP   acetaminophen (TYLENOL) 500 mg tablet Take 500 mg by mouth every eight (8) hours as needed for Pain. Yes Historical Provider   ibuprofen (MOTRIN IB) 200 mg tablet Take 800 mg by mouth every eight (8) hours as needed. Yes Historical Provider   pregabalin (LYRICA) 50 mg capsule Take 1 Cap by mouth two (2) times a day. Max Daily Amount: 100 mg. Indications: Postoperative Acute Pain 5/16/18   Talib Zuniga NP   senna-docusate (PERICOLACE) 8.6-50 mg per tablet Take 1 Tab by mouth daily as needed for Constipation. 4/30/18   Talib Zuniga NP   gabapentin (NEURONTIN) 300 mg capsule Take 1 Cap by mouth three (3) times daily. Indications: Postoperative Acute Pain 4/30/18   Talib Zuniga NP   ondansetron (ZOFRAN ODT) 4 mg disintegrating tablet Take 1 Tab by mouth every eight (8) hours as needed for Nausea. Indications: PREVENTION OF POST-OPERATIVE NAUSEA AND VOMITING 4/30/18   Tabbya Efrain NP   naloxone [facility]) 4 mg/actuation nasal spray Use 1 spray intranasally into 1 nostril. Use a new Narcan nasal spray for subsequent doses and administer into alternating nostrils. May repeat every 2 to 3 minutes as needed.  4/30/18   Talib Zuniga NP   naloxone 2 mg/actuation spry Use 1 spray intranasally into 1 nostril. Use a new Narcan nasal spray for subsequent doses and administer into alternating nostrils. May repeat every 2 to 3 minutes as needed. 4/19/18   Nia France MD     Allergies   Allergen Reactions    Amoxicillin Shortness of Breath and Rash     Pt has had keflex in the past         Review of Systems:    A comprehensive review of systems was negative except for that written in the History of Present Illness. Objective:     Visit Vitals    /70 (BP 1 Location: Right arm, BP Patient Position: Sitting)    Pulse (!) 102    Temp 98.3 °F (36.8 °C) (Oral)    Resp 18    Ht 5' 3\" (1.6 m)    Wt 146 lb (66.2 kg)    SpO2 96%    BMI 25.86 kg/m2       Physical Exam:  General appearance: alert, cooperative, no distress, appears stated age  Head: Normocephalic, without obvious abnormality, atraumatic  Neurologic: Grossly normal  Abdomen: Drain sites are healing up with no evidence of infection. She is tender to palpation in the LUQ but there is no discernable mass present. Assessment:       ICD-10-CM ICD-9-CM    1. Postoperative examination Z09 V67.00    2. Pancreatic fluid leak K86.89 577.8 oxyCODONE IR (ROXICODONE) 10 mg tab immediate release tablet       Plan: Will slowly taper pain medication and continue to resume normal ADLs. She is will return in three weeks for another f/u appt. Written by michael Love, as dictated by Zina Colorado MD.    Total face to face time with patient: 10 minutes. Greater than 50% of the time was spent in counseling. Signed By: Zina Colorado MD    July 16, 2018

## 2018-07-27 ENCOUNTER — TELEPHONE (OUTPATIENT)
Dept: SURGERY | Age: 38
End: 2018-07-27

## 2018-07-27 NOTE — TELEPHONE ENCOUNTER
Patient calls today with C/O swelling to the left of  prior drain site-pain level 6. No redness, not draining. Intermittent fevers 99.5  101.5. States this started the day after she saw Dr. Estela Goodell 7/16/18. Appt.  Made for Monday for laverne.

## 2018-07-30 ENCOUNTER — OFFICE VISIT (OUTPATIENT)
Dept: SURGERY | Age: 38
End: 2018-07-30

## 2018-07-30 ENCOUNTER — HOSPITAL ENCOUNTER (INPATIENT)
Dept: CT IMAGING | Age: 38
LOS: 18 days | Discharge: HOME | DRG: 438 | End: 2018-08-17
Attending: SURGERY | Admitting: SURGERY
Payer: COMMERCIAL

## 2018-07-30 VITALS
HEIGHT: 63 IN | DIASTOLIC BLOOD PRESSURE: 70 MMHG | RESPIRATION RATE: 18 BRPM | SYSTOLIC BLOOD PRESSURE: 110 MMHG | OXYGEN SATURATION: 97 % | WEIGHT: 143 LBS | TEMPERATURE: 98.6 F | HEART RATE: 84 BPM | BODY MASS INDEX: 25.34 KG/M2

## 2018-07-30 DIAGNOSIS — K65.1 ABSCESS OF ABDOMINAL CAVITY (HCC): ICD-10-CM

## 2018-07-30 DIAGNOSIS — K59.03 DRUG-INDUCED CONSTIPATION: ICD-10-CM

## 2018-07-30 DIAGNOSIS — K65.1 ABSCESS OF ABDOMINAL CAVITY (HCC): Primary | ICD-10-CM

## 2018-07-30 DIAGNOSIS — R63.0 DECREASED APPETITE: ICD-10-CM

## 2018-07-30 DIAGNOSIS — R09.02 HYPOXIA: ICD-10-CM

## 2018-07-30 DIAGNOSIS — R10.9 ACUTE ABDOMINAL PAIN: ICD-10-CM

## 2018-07-30 DIAGNOSIS — K85.90 PANCREATIC ABSCESS: Primary | ICD-10-CM

## 2018-07-30 DIAGNOSIS — K86.89 PANCREATIC FLUID LEAK: ICD-10-CM

## 2018-07-30 PROCEDURE — 74011250637 HC RX REV CODE- 250/637: Performed by: SURGERY

## 2018-07-30 PROCEDURE — 74176 CT ABD & PELVIS W/O CONTRAST: CPT

## 2018-07-30 PROCEDURE — 74011250636 HC RX REV CODE- 250/636: Performed by: SURGERY

## 2018-07-30 PROCEDURE — 65270000032 HC RM SEMIPRIVATE

## 2018-07-30 PROCEDURE — 74011250636 HC RX REV CODE- 250/636: Performed by: RADIOLOGY

## 2018-07-30 PROCEDURE — 74011250636 HC RX REV CODE- 250/636

## 2018-07-30 RX ORDER — DIPHENHYDRAMINE HYDROCHLORIDE 50 MG/ML
50 INJECTION, SOLUTION INTRAMUSCULAR; INTRAVENOUS
Status: DISCONTINUED | OUTPATIENT
Start: 2018-07-30 | End: 2018-07-30

## 2018-07-30 RX ORDER — DEXTROSE, SODIUM CHLORIDE, AND POTASSIUM CHLORIDE 5; .45; .15 G/100ML; G/100ML; G/100ML
75 INJECTION INTRAVENOUS CONTINUOUS
Status: DISCONTINUED | OUTPATIENT
Start: 2018-07-30 | End: 2018-08-01

## 2018-07-30 RX ORDER — LIDOCAINE HYDROCHLORIDE 10 MG/ML
INJECTION, SOLUTION EPIDURAL; INFILTRATION; INTRACAUDAL; PERINEURAL
Status: DISCONTINUED
Start: 2018-07-30 | End: 2018-07-30

## 2018-07-30 RX ORDER — LEVOFLOXACIN 5 MG/ML
500 INJECTION, SOLUTION INTRAVENOUS EVERY 24 HOURS
Status: DISCONTINUED | OUTPATIENT
Start: 2018-07-30 | End: 2018-07-30

## 2018-07-30 RX ORDER — SODIUM CHLORIDE 0.9 % (FLUSH) 0.9 %
5-10 SYRINGE (ML) INJECTION AS NEEDED
Status: DISCONTINUED | OUTPATIENT
Start: 2018-07-30 | End: 2018-08-17 | Stop reason: HOSPADM

## 2018-07-30 RX ORDER — IBUPROFEN 200 MG
1 TABLET ORAL DAILY
Status: DISCONTINUED | OUTPATIENT
Start: 2018-07-30 | End: 2018-08-17 | Stop reason: HOSPADM

## 2018-07-30 RX ORDER — DULOXETIN HYDROCHLORIDE 60 MG/1
60 CAPSULE, DELAYED RELEASE ORAL DAILY
Status: DISCONTINUED | OUTPATIENT
Start: 2018-07-31 | End: 2018-08-17 | Stop reason: HOSPADM

## 2018-07-30 RX ORDER — FENTANYL CITRATE 50 UG/ML
50 INJECTION, SOLUTION INTRAMUSCULAR; INTRAVENOUS
Status: DISCONTINUED | OUTPATIENT
Start: 2018-07-30 | End: 2018-08-05

## 2018-07-30 RX ORDER — LIDOCAINE HYDROCHLORIDE 10 MG/ML
10 INJECTION INFILTRATION; PERINEURAL
Status: DISCONTINUED | OUTPATIENT
Start: 2018-07-30 | End: 2018-07-30

## 2018-07-30 RX ORDER — DIPHENHYDRAMINE HYDROCHLORIDE 50 MG/ML
12.5 INJECTION, SOLUTION INTRAMUSCULAR; INTRAVENOUS
Status: COMPLETED | OUTPATIENT
Start: 2018-07-30 | End: 2018-07-30

## 2018-07-30 RX ORDER — SODIUM CHLORIDE 0.9 % (FLUSH) 0.9 %
5-10 SYRINGE (ML) INJECTION EVERY 8 HOURS
Status: DISCONTINUED | OUTPATIENT
Start: 2018-07-30 | End: 2018-08-17 | Stop reason: HOSPADM

## 2018-07-30 RX ORDER — IBUPROFEN 200 MG
1 TABLET ORAL DAILY
Status: DISCONTINUED | OUTPATIENT
Start: 2018-07-31 | End: 2018-07-30

## 2018-07-30 RX ORDER — OXYCODONE HYDROCHLORIDE 10 MG/1
10 TABLET ORAL
Qty: 90 TAB | Refills: 0 | Status: SHIPPED | OUTPATIENT
Start: 2018-07-30 | End: 2018-08-17

## 2018-07-30 RX ORDER — ONDANSETRON 2 MG/ML
4 INJECTION INTRAMUSCULAR; INTRAVENOUS
Status: DISCONTINUED | OUTPATIENT
Start: 2018-07-30 | End: 2018-08-17 | Stop reason: HOSPADM

## 2018-07-30 RX ORDER — IBUPROFEN 200 MG
1 TABLET ORAL DAILY
Status: DISCONTINUED | OUTPATIENT
Start: 2018-07-31 | End: 2018-07-30 | Stop reason: SDUPTHER

## 2018-07-30 RX ORDER — FENTANYL CITRATE 50 UG/ML
50 INJECTION, SOLUTION INTRAMUSCULAR; INTRAVENOUS
Status: DISCONTINUED | OUTPATIENT
Start: 2018-07-30 | End: 2018-07-30

## 2018-07-30 RX ORDER — ACETAMINOPHEN 325 MG/1
650 TABLET ORAL
Status: DISCONTINUED | OUTPATIENT
Start: 2018-07-30 | End: 2018-07-31 | Stop reason: SDUPTHER

## 2018-07-30 RX ORDER — HYDROMORPHONE HYDROCHLORIDE 1 MG/ML
2 INJECTION, SOLUTION INTRAMUSCULAR; INTRAVENOUS; SUBCUTANEOUS
Status: DISCONTINUED | OUTPATIENT
Start: 2018-07-30 | End: 2018-07-30

## 2018-07-30 RX ORDER — DIPHENHYDRAMINE HYDROCHLORIDE 50 MG/ML
INJECTION, SOLUTION INTRAMUSCULAR; INTRAVENOUS
Status: COMPLETED
Start: 2018-07-30 | End: 2018-07-30

## 2018-07-30 RX ADMIN — ACETAMINOPHEN 650 MG: 325 TABLET, FILM COATED ORAL at 23:14

## 2018-07-30 RX ADMIN — DIPHENHYDRAMINE HYDROCHLORIDE 50 MG: 50 INJECTION, SOLUTION INTRAMUSCULAR; INTRAVENOUS at 15:35

## 2018-07-30 RX ADMIN — Medication 1 MG: at 16:55

## 2018-07-30 RX ADMIN — Medication 10 ML: at 18:18

## 2018-07-30 RX ADMIN — Medication 2 MG: at 15:42

## 2018-07-30 RX ADMIN — Medication 10 ML: at 21:00

## 2018-07-30 RX ADMIN — FENTANYL CITRATE 50 MCG: 50 INJECTION, SOLUTION INTRAMUSCULAR; INTRAVENOUS at 23:15

## 2018-07-30 RX ADMIN — POTASSIUM CHLORIDE, DEXTROSE MONOHYDRATE AND SODIUM CHLORIDE 75 ML/HR: 150; 5; 450 INJECTION, SOLUTION INTRAVENOUS at 18:25

## 2018-07-30 RX ADMIN — FENTANYL CITRATE 50 MCG: 50 INJECTION, SOLUTION INTRAMUSCULAR; INTRAVENOUS at 21:00

## 2018-07-30 RX ADMIN — LEVOFLOXACIN 500 MG: 5 INJECTION, SOLUTION INTRAVENOUS at 15:40

## 2018-07-30 RX ADMIN — Medication 1 MG: at 16:02

## 2018-07-30 RX ADMIN — FENTANYL CITRATE 50 MCG: 50 INJECTION, SOLUTION INTRAMUSCULAR; INTRAVENOUS at 18:18

## 2018-07-30 NOTE — H&P
Progress Notes  Encounter Date: 7/30/2018  Galileo Sandy MD   General Surgery      Hide copied text  Hover for attribution information        Subjective:          Cecilia Georges is a 42 y.o.  female who presents with \"knot under ribs\". The pt states that for the past week, she has noticed a low grade fever to 100.7 degF and has developed a firm mass in her left upper abdomen near an old drain site. Chief Complaint   Patient presents with    Abdominal Pain              Patient Active Problem List     Diagnosis Date Noted    Abscess of abdominal cavity ([address] 07/30/2018    Postoperative examination 07/16/2018    Pancreatic fluid leak 07/16/2018    Postoperative fever 05/07/2018    Obesity (BMI 30-39.9) 04/26/2018    Pancreas cyst 04/20/2018    Alcohol intoxication ([address] 04/03/2018    Suicidal ideation 04/03/2018    High anion gap metabolic acidosis 49/55/2492    Elevated lipase 04/03/2018    Alcohol abuse 04/03/2018    Sinus tachycardia 04/03/2018    Pancreatic cyst 03/21/2018    Rash 06/24/2011    Headache(784.0) 06/24/2011    Nausea & vomiting 06/24/2011    Hepatitis 06/24/2011    Thrombocytopenia, unspecified ([address] 06/24/2011    Abdominal pain, unspecified site 06/24/2011    Fever, unspecified 06/24/2011           Past Medical History:   Diagnosis Date    Abscess of abdominal cavity ([address] 7/30/2018    Anxiety      Blurred vision      Chest pain      Chronic pain       MUSCLE WEAKNESS,PANCREATIC CYST     Depression      Frequent headaches      Ill-defined condition      Muscle weakness      Obesity (BMI 30-39.9) 4/26/2018    Pancreatic cyst 3/21/2018    Shortness of breath      Stomach pain      Swallowing difficulty              Past Surgical History:   Procedure Laterality Date    HX GI         COLONOSCOPY    HX GYN   2005     endometriosis surgery    HX OTHER SURGICAL   04/20/2018     lap distal pancreatectomy converted to open-[facility]-DR. Shannon Trinh Social History   Substance Use Topics    Smoking status: Current Every Day Smoker       Packs/day: 1.00       Years: 25.00    Smokeless tobacco: Never Used         Comment: STOP SMOKING BOOKLET PROVIDED    Alcohol use 1.8 oz/week        3 Shots of liquor per week              Family History   Problem Relation Age of Onset    Cancer Mother         colon    Cancer Father         skin    Anesth Problems Father         SUCCINYLCHOLINE  REACTION      Prior to Admission medications    Medication Sig Start Date End Date Taking? Authorizing Provider   oxyCODONE IR (ROXICODONE) 10 mg tab immediate release tablet Take 1 Tab by mouth every six (6) hours as needed. Max Daily Amount: 40 mg. Indications: Pain, abdominal abscess 7/30/18   Yes Livingston Runner, MD   DULoxetine (CYMBALTA) 60 mg capsule Take 1 Cap by mouth daily. 5/4/18   Yes Clare Harmon NP   pregabalin (LYRICA) 50 mg capsule Take 1 Cap by mouth two (2) times a day. Max Daily Amount: 100 mg. Indications: Postoperative Acute Pain 5/16/18     Hossein Cole NP   senna-docusate (PERICOLACE) 8.6-50 mg per tablet Take 1 Tab by mouth daily as needed for Constipation. 4/30/18     Hossein Cole NP   gabapentin (NEURONTIN) 300 mg capsule Take 1 Cap by mouth three (3) times daily. Indications: Postoperative Acute Pain 4/30/18     Hossein Cole NP   ondansetron (ZOFRAN ODT) 4 mg disintegrating tablet Take 1 Tab by mouth every eight (8) hours as needed for Nausea. Indications: PREVENTION OF POST-OPERATIVE NAUSEA AND VOMITING 4/30/18     Hossein Cole NP   naloxone [facility]) 4 mg/actuation nasal spray Use 1 spray intranasally into 1 nostril. Use a new Narcan nasal spray for subsequent doses and administer into alternating nostrils. May repeat every 2 to 3 minutes as needed. 4/30/18     Hossein Cole NP   naloxone 2 mg/actuation spry Use 1 spray intranasally into 1 nostril.  Use a new Narcan nasal spray for subsequent doses and administer into alternating nostrils. May repeat every 2 to 3 minutes as needed. 4/19/18     Carolann Gonzalez MD   acetaminophen (TYLENOL) 500 mg tablet Take 500 mg by mouth every eight (8) hours as needed for Pain. Historical Provider   ibuprofen (MOTRIN IB) 200 mg tablet Take 800 mg by mouth every eight (8) hours as needed. Historical Provider            Allergies   Allergen Reactions    Amoxicillin Shortness of Breath and Rash       Pt has had keflex in the past          Review of Systems:    A comprehensive review of systems was negative except for that written in the History of Present Illness. Objective:           Visit Vitals    /70 (BP 1 Location: Right arm, BP Patient Position: Sitting)    Pulse 84    Temp 98.6 °F (37 °C) (Oral)    Resp 18    Ht 5' 3\" (1.6 m)    Wt 143 lb (64.9 kg)    SpO2 97%    BMI 25.33 kg/m2         Physical Exam:  General appearance: alert, cooperative, no distress, appears stated age  Head: Normocephalic, without obvious abnormality, atraumatic  Neurologic: Grossly normal  Abdomen: She has a 3x4 cm mass underneath her left upper abdominal drain site. This is quite tender. There is no erythema around it. Heart: regular rate and rhythm  Lungs: clear to auscultation     Assessment:          ICD-10-CM ICD-9-CM     1. Abscess of abdominal cavity (HCC) K65.1 567.22 CT GUIDE CATH/PERC DRAIN PERITON/RETROPERI FLUID W SI   2. Pancreatic fluid leak K86.89 577.8 oxyCODONE IR (ROXICODONE) 10 mg tab immediate release tablet         Plan: Will do a CT-guided insertion of drain. Written by michael Zendejas, as dictated by Nader Grijalva MD.     Total face to face time with patient: 10 minutes. Greater than 50% of the time was spent in counseling. Signed By: Ndaer Grijalva MD     July 30, 2018           Electronically signed by Nabor Bird MD at 07/30/18 0461   ·    Office Visit on 7/30/2018   ·    ·    Revision History   ·    ·    Detailed Report   · ·   Note shared with patient   Note Details   Author Eliezer Duran MD File Time 07/30/18 1654   Author Type Physician Status Signed   Last  Eliezer Duran MD Specialty General Surgery

## 2018-07-30 NOTE — PROGRESS NOTES
1. Have you been to the ER, urgent care clinic since your last visit? Hospitalized since your last visit? No    2. Have you seen or consulted any other health care providers outside of the [address] [place] [address] since your last visit? Include any pap smears or colon screening.  No

## 2018-07-30 NOTE — MR AVS SNAPSHOT
[address] [facility] [address] [address] 
[phone] Patient: Charisse Ruano MRN: [id number] Visit Information Date & Time Provider Department Dept. Phone Encounter #  
 7/30/2018 12:45 PM Erick Salmeron [address] [phone] [id number] Your Appointments 8/6/2018  3:20 PM  
ESTABLISHED PATIENT with Erick aSlmeron MD  
[address] ([address]) Appt Note: EP 3 week f/u 04/20/2018 GP: LAPAROSCOPIC CONVERTED TO OPEN DISTAL PANCREATECTOMY; $50CP $0PB/GSSM 7/16/18 st; re-champ from 8/1/18 [address] [facility] [address] [facility] [id number]  
[facility] Upcoming Health Maintenance Date Due Pneumococcal 19-64 Highest Risk (1 of 3 - PCV13) 8/23/1997 DTaP/Tdap/Td series (1 - Tdap) 8/23/1999 PAP AKA CERVICAL CYTOLOGY 8/23/1999 Influenza Age 5 to Adult 8/1/2018 Allergies as of 7/30/2018  Review Complete On: 7/30/2018 By: Erick Salmeron MD  
  
 Severity Noted Reaction Type Reactions Amoxicillin  06/24/2011    Shortness of Breath, Rash Pt has had keflex in the past  
  
Current Immunizations  Reviewed on 4/25/2018 No immunizations on file. Not reviewed this visit You Were Diagnosed With   
  
 Codes Comments [facility]    -  Primary ICD-10-CM: K65.1 ICD-9-CM: 567.22 Pancreatic fluid leak     ICD-10-CM: K86.89 
ICD-9-CM: 577.8 Vitals BP Pulse Temp Resp Height(growth percentile) Weight(growth percentile) 110/70 (BP 1 Location: Right arm, BP Patient Position: Sitting) 84 98.6 °F (37 °C) (Oral) 18 5' 3\" (1.6 m) 143 lb (64.9 kg) SpO2 BMI OB Status Smoking Status 97% 25.33 kg/m2 Having regular periods Current Every Day Smoker BMI and BSA Data Body Mass Index Body Surface Area 25.33 kg/m 2 1.7 m 2 Preferred Pharmacy Pharmacy Name Phone [facility]vel[facility] [address] [facility]  [place] [address] [phone] Your Updated Medication List  
  
   
This list is accurate as of 7/30/18  1:35 PM.  Always use your most recent med list.  
  
  
  
  
 acetaminophen 500 mg tablet Commonly known as:  TYLENOL Take 500 mg by mouth every eight (8) hours as needed for Pain. DULoxetine 60 mg capsule Commonly known as:  CYMBALTA Take 1 Cap by mouth daily. gabapentin 300 mg capsule Commonly known as:  NEURONTIN Take 1 Cap by mouth three (3) times daily. Indications: Postoperative Acute Pain MOTRIN  mg tablet Generic drug:  ibuprofen Take 800 mg by mouth every eight (8) hours as needed. * naloxone 2 mg/actuation Spry Use 1 spray intranasally into 1 nostril. Use a new Narcan nasal spray for subsequent doses and administer into alternating nostrils. May repeat every 2 to 3 minutes as needed. * naloxone 4 mg/actuation nasal spray Commonly known as:  [employer] Use 1 spray intranasally into 1 nostril. Use a new Narcan nasal spray for subsequent doses and administer into alternating nostrils. May repeat every 2 to 3 minutes as needed. ondansetron 4 mg disintegrating tablet Commonly known as:  ZOFRAN ODT Take 1 Tab by mouth every eight (8) hours as needed for Nausea. Indications: PREVENTION OF POST-OPERATIVE NAUSEA AND VOMITING  
  
 oxyCODONE IR 10 mg Tab immediate release tablet Commonly known as:  Urban [place]ute Take 1 Tab by mouth every six (6) hours as needed. Max Daily Amount: 40 mg. Indications: Pain, abdominal abscess  
  
 pregabalin 50 mg capsule Commonly known as:  Alba Hernandez Take 1 Cap by mouth two (2) times a day. Max Daily Amount: 100 mg. Indications: Postoperative Acute Pain  
  
 senna-docusate 8.6-50 mg per tablet Commonly known as:  David Pandya  
 Take 1 Tab by mouth daily as needed for Constipation. * Notice: This list has 2 medication(s) that are the same as other medications prescribed for you. Read the directions carefully, and ask your doctor or other care provider to review them with you. Prescriptions Printed Refills  
 oxyCODONE IR (ROXICODONE) 10 mg tab immediate release tablet 0 Sig: Take 1 Tab by mouth every six (6) hours as needed. Max Daily Amount: 40 mg. Indications: Pain, abdominal abscess Class: Print Route: Oral  
  
To-Do List   
 07/30/2018 Imaging:  CT GUIDE CATH/PERC DRAIN PERITON/RETROPERI FLUID W SI Introducing [facility]! Dear Lulu Prom: 
Thank you for requesting a [email or web address] account. Our records indicate that you already have an active [email or web address] account. You can access your account anytime at [email or web address] Did you know that you can access your hospital and ER discharge instructions at any time in [email or web address]? You can also review all of your test results from your hospital stay or ER visit. Additional Information If you have questions, please visit the Frequently Asked Questions section of the [email or web address] website at [email or web address] Remember, [email or web address] is NOT to be used for urgent needs. For medical emergencies, dial 911. Now available from your iPhone and Android! Please provide this summary of care documentation to your next provider. Your primary care clinician is listed as Delta Reyes. If you have any questions after today's visit, please call [phone].

## 2018-07-30 NOTE — IP AVS SNAPSHOT
[address] 
[phone] Patient: Adonis Shone MRN: [id number] CEP:[id number] About your hospitalization You were admitted on:  July 30, 2018 You last received care in the:  [facility] OBSERVATION You were discharged on:  August 17, 2018 Why you were hospitalized Your primary diagnosis was:  Not on File Your diagnoses also included:  Pancreatic Abscess Follow-up Information Follow up With Details Comments Contact Info Harris Reynolds NP In 5 days for hospital discharge follow up and blood glucose management [address] [facility] [address] [address] [phone] Your Scheduled Appointments Monday August 27, 2018  2:40 PM EDT  
POST OP with MD Pj Buckley [phone] ([address]) [address] [facility] [address] [phone]  
[phone] Discharge Orders None A check odette indicates which time of day the medication should be taken. My Medications START taking these medications Instructions Each Dose to Equal  
 Morning Noon Evening Bedtime * HYDROmorphone 8 mg tablet Commonly known as:  DILAUDID Your last dose was: Your next dose is: Take 1 Tab by mouth every six (6) hours as needed for up to 7 days. Max Daily Amount: 32 mg.  
 8 mg  
    
   
   
   
  
 * HYDROmorphone 8 mg tablet Commonly known as:  DILAUDID Start taking on:  8/24/2018 Your last dose was: Your next dose is: Take 1 Tab by mouth every eight (8) hours for 7 days. Max Daily Amount: 24 mg. Indications: Severe Pain  
 8 mg  
    
   
   
   
  
 levoFLOXacin 750 mg tablet Commonly known as:  Charlcie Nadiya Start taking on:  8/18/2018 Your last dose was: Your next dose is: Take 1 Tab by mouth every twenty-four (24) hours. 750 mg  
    
   
   
   
  
 polyethylene glycol 17 gram packet Commonly known as:  Jen Rekha Start taking on:  8/18/2018 Your last dose was: Your next dose is: Take 1 Packet by mouth daily. 17 g * Notice: This list has 2 medication(s) that are the same as other medications prescribed for you. Read the directions carefully, and ask your doctor or other care provider to review them with you. CHANGE how you take these medications Instructions Each Dose to Equal  
 Morning Noon Evening Bedtime  
 senna-docusate 8.6-50 mg per tablet Commonly known as:  Lanney Brunner What changed:   
- how much to take - when to take this 
- reasons to take this Your last dose was: Your next dose is: Take 2 Tabs by mouth nightly for 30 days. 2 Tab CONTINUE taking these medications Instructions Each Dose to Equal  
 Morning Noon Evening Bedtime  
 acetaminophen 500 mg tablet Commonly known as:  TYLENOL Your last dose was: Your next dose is: Take 500 mg by mouth every eight (8) hours as needed for Pain. 500 mg DULoxetine 60 mg capsule Commonly known as:  CYMBALTA Your last dose was: Your next dose is: Take 1 Cap by mouth daily. 60 mg MOTRIN  mg tablet Generic drug:  ibuprofen Your last dose was: Your next dose is: Take 800 mg by mouth every eight (8) hours as needed. 800 mg  
    
   
   
   
  
 * naloxone 2 mg/actuation Spry Your last dose was: Your next dose is:    
   
   
 Use 1 spray intranasally into 1 nostril. Use a new Narcan nasal spray for subsequent doses and administer into alternating nostrils. May repeat every 2 to 3 minutes as needed. * naloxone 4 mg/actuation nasal spray Commonly known as:  Fay  
   
 Your last dose was: Your next dose is:    
   
   
 Use 1 spray intranasally into 1 nostril. Use a new Narcan nasal spray for subsequent doses and administer into alternating nostrils. May repeat every 2 to 3 minutes as needed. ondansetron 4 mg disintegrating tablet Commonly known as:  ZOFRAN ODT Your last dose was: Your next dose is: Take 1 Tab by mouth every eight (8) hours as needed for Nausea. Indications: PREVENTION OF POST-OPERATIVE NAUSEA AND VOMITING  
 4 mg * Notice: This list has 2 medication(s) that are the same as other medications prescribed for you. Read the directions carefully, and ask your doctor or other care provider to review them with you. STOP taking these medications   
 oxyCODONE IR 10 mg Tab immediate release tablet Commonly known as:  Khadijah Low Where to Get Your Medications [facility] AT [address], 150 [facility],  [address] [place] [id number] Phone:  [phone]  
  senna-docusate 8.6-50 mg per tablet Information on where to get these meds will be given to you by the nurse or doctor. ! Ask your nurse or doctor about these medications HYDROmorphone 8 mg tablet HYDROmorphone 8 mg tablet  
 levoFLOXacin 750 mg tablet  
 polyethylene glycol 17 gram packet Opioid Education Prescription Opioids: What You Need to Know: 
 
Prescription opioids can be used to help relieve moderate-to-severe pain and are often prescribed following a surgery or injury, or for certain health conditions. These medications can be an important part of treatment but also come with serious risks. Opioids are strong pain medicines. Examples include hydrocodone, oxycodone, fentanyl, and morphine. Heroin is an example of an illegal opioid.   It is important to work with your health care provider to make sure you are getting the safest, most effective care. WHAT ARE THE RISKS AND SIDE EFFECTS OF OPIOID USE? Prescription opioids carry serious risks of addiction and overdose, especially with prolonged use. An opioid overdose, often marked by slow breathing, can cause sudden death. The use of prescription opioids can have a number of side effects as well, even when taken as directed. · Tolerance-meaning you might need to take more of a medication for the same pain relief · Physical dependence-meaning you have symptoms of withdrawal when the medication is stopped. Withdrawal symptoms can include nausea, sweating, chills, diarrhea, stomach cramps, and muscle aches. Withdrawal can last up to several weeks, depending on which drug you took and how long you took it. · Increased sensitivity to pain · Constipation · Nausea, vomiting, and dry mouth · Sleepiness and dizziness · Confusion · Depression · Low levels of testosterone that can result in lower sex drive, energy, and strength · Itching and sweating RISKS ARE GREATER WITH:      
· History of drug misuse, substance use disorder, or overdose · Mental health conditions (such as depression or anxiety) · Sleep apnea · Older age (72 years or older) · Pregnancy Avoid alcohol while taking prescription opioids. Also, unless specifically advised by your health care provider, medications to avoid include: · Benzodiazepines (such as Xanax or Valium) · Muscle relaxants (such as Soma or Flexeril) · Hypnotics (such as Ambien or Lunesta) · Other prescription opioids KNOW YOUR OPTIONS Talk to your health care provider about ways to manage your pain that don't involve prescription opioids. Some of these options may actually work better and have fewer risks and side effects. Options may include: 
· Pain relievers such as acetaminophen, ibuprofen, and naproxen · Some medications that are also used for depression or seizures · Physical therapy and exercise · Counseling to help patients learn how to cope better with triggers of pain and stress. · Application of heat or cold compress · Massage therapy · Relaxation techniques Be Informed Make sure you know the name of your medication, how much and how often to take it, and its potential risks & side effects. IF YOU ARE PRESCRIBED OPIOIDS FOR PAIN: 
· Never take opioids in greater amounts or more often than prescribed. Remember the goal is not to be pain-free but to manage your pain at a tolerable level. · Follow up with your primary care provider to: · Work together to create a plan on how to manage your pain. · Talk about ways to help manage your pain that don't involve prescription opioids. · Talk about any and all concerns and side effects. · Help prevent misuse and abuse. · Never sell or share prescription opioids · Help prevent misuse and abuse. · Store prescription opioids in a secure place and out of reach of others (this may include visitors, children, friends, and family). · Safely dispose of unused/unwanted prescription opioids: Find your community drug take-back program or your pharmacy mail-back program, or flush them down the toilet, following guidance from the Food and Drug Administration (www.fda.gov/Drugs/ResourcesForYou). · Visit www.cdc.gov/drugoverdose to learn about the risks of opioid abuse and overdose. · If you believe you may be struggling with addiction, tell your health care provider and ask for guidance or call [address] [place] [address] at 3-166-405-RSXT. Discharge Instructions [id number] [facility] Discharge Instructions Patient ID: Maggie Salomon [id number] 
42 y.o. 
1980 Admit Date: 7/30/2018 Discharge Date: 8/17/2018 Last Procedure: * No surgery found * Patient Instructions: Activity: No restrictions. No driving while you are taking narcotics. Diet: Regular Diet. If you have cramps or nausea, try eating smaller light meals more frequently. Bowel regimen as prescribed while you are taking narcotics. Medications: You have been provided with two prescriptions for pain control, one for each week after discharge which will cover until you have your follow-up appointment. Week 1: Dilaudid 8 mg every 6 hours Week 2: Dilaudid 8 mg every 8 hours Follow-up taper of pain medication will be prescribed at your follow-up appointment according to the recommendations given by Palliative Care. You are being prescribed the antibiotic Levaquin for your infection. Take this daily as prescribed. Wound Care: Avoid standing water (swimming pools, ocean, lakes, hot tubs, etc) while you have scabbed over areas or open wounds on your abdomen. You may take shower. Follow-up with Dr. Rancho Lange on Monday, August 27, 2018 at 2:40pm.  Call office at [phone] to schedule appointment. We are located at [facility] in the [facility], [address] [place] [address]. Call office and notify provider [phone] if you develop fever >101, or signs of infection at your surgical site including redness, swelling, or drainage that looks like pus or green/brown fluid. What to Expect at [facility] Your Recovery You are likely to have pain for the next few days. You may also feel like you have the flu, and you may have a low fever and feel tired and nauseated. This is common. You should feel better after a few days and will probably feel much better in 7 days. For several weeks you may feel twinges or pulling in the surgical area when you move. This is normal.  
This care sheet gives you a general idea about how long it will take for you to recover. But each person recovers at a different pace.  Follow the steps below to get better as quickly as possible. How can you care for yourself at home? Activity · Rest when you feel tired. Getting enough sleep will help you recover. Sleep with your head up by using three or four pillows. You can also try to sleep with your head up in a recliner chair. Do not sleep on your side or stomach. · Try to walk each day. Start by walking a little more than you did the day before. Bit by bit, increase the amount you walk. Walking boosts blood flow and helps prevent pneumonia and constipation. · Put ice or a cold pack on the area for 10 to 20 minutes at a time. Try to do this every 1 to 2 hours for the first 24 hours (when you are awake) or until the swelling goes down. Put a thin cloth between the ice and your skin. · Avoid strenuous activities, such as biking, jogging, weight lifting, or aerobic exercise, until your doctor says it is okay. · Avoid lifting anything that would make you strain. This may include heavy grocery bags and milk containers, a heavy briefcase or backpack, cat litter or dog food bags, a vacuum , or a child. · You may drive when you are no longer taking pain medicine and can quickly move your foot from the gas pedal to the brake. You must also be able to sit comfortably for a long period of time, even if you do not plan to go far. You might get caught in traffic. · You may shower 24 to 48 hours after surgery, if your doctor okays it. Pat the cut (incision) dry. Do not take a bath for the first 2 weeks, or until your doctor tells you it is okay. · Your doctor will tell you when you can have sex again. Diet · You can eat your normal diet. If your stomach is upset, try bland, low-fat foods like plain rice, broiled chicken, toast, and yogurt. · Drink plenty of fluids (unless your doctor tells you not to). · You may notice that your bowel movements are not regular right after your surgery. This is common.  Avoid constipation and straining with bowel movements. You may want to take a fiber supplement every day. If you have not had a bowel movement after a couple of days, ask your doctor about taking a mild laxative. Medicines · Take pain medicines exactly as directed. ¨ If the doctor gave you a prescription medicine for pain, take it as prescribed. ¨ If you are not taking a prescription pain medicine, take an over-the-counter medicine such as acetaminophen (Tylenol), ibuprofen (Advil, Motrin), or naproxen (Aleve). Read and follow all instructions on the label. ¨ Do not take two or more pain medicines at the same time unless the doctor told you to. Many pain medicines have acetaminophen, which is Tylenol. Too much acetaminophen (Tylenol) can be harmful. · If your doctor prescribed antibiotics, take them as directed. Do not stop taking them just because you feel better. You need to take the full course of antibiotics. · If you think your pain medicine is making you sick to your stomach: 
¨ Take your medicine after meals (unless your doctor has told you not to). ¨ Ask your doctor for a different pain medicine. Incision care · If you have strips of tape on the cut (incision) the doctor made, leave the tape on for a week or until it falls off. · If you have staples closing the cut, you will need to visit your doctor in 1 to 2 weeks to have them removed. · Wash the area daily with warm, soapy water and pat it dry. Follow-up care is a key part of your treatment and safety. Be sure to make and go to all appointments, and call your doctor if you are having problems. Its also a good idea to know your test results and keep a list of the medicines you take. When should you call for help? Call 911 anytime you think you may need emergency care. For example, call if: 
· You passed out (lost consciousness). · You have sudden chest pain and shortness of breath, or you cough up blood. · You have severe pain in your belly. Call your doctor now or seek immediate medical care if: 
· You are sick to your stomach and cannot keep fluids down. · You have signs of a blood clot, such as: 
¨ Pain in your calf, back of the knee, thigh, or groin. ¨ Redness and swelling in your leg or groin. · You have trouble passing urine or stool, especially if you have mild pain or swelling in your lower belly. · Bright red blood has soaked through the bandage over your incision. Watch closely for any changes in your health, and be sure to contact your doctor if: 
· Your swelling is getting worse. · Your swelling is not going down. Where can you learn more? Go to Vovici.be Enter B710 in the search box to learn more about \"Hernia Repair: What to Expect at Home. \"  
© 8903-4204 Healthwise, Incorporated. Care instructions adapted under license by [facility] (which disclaims liability or warranty for this information). This care instruction is for use with your licensed healthcare professional. If you have questions about a medical condition or this instruction, always ask your healthcare professional. [address] any warranty or liability for your use of this information. Content Version: 0.1.482429; Last Revised: January 21, 2011 BIOSAFE Announcement We are excited to announce that we are making your provider's discharge notes available to you in BIOSAFE. You will see these notes when they are completed and signed by the physician that discharged you from your recent hospital stay. If you have any questions or concerns about any information you see in BIOSAFE, please call the Health Information Department where you were seen or reach out to your Primary Care Provider for more information about your plan of care. Introducing [address]! Dear John Rose: 
Thank you for requesting a BIOSAFE account.   Our records indicate that you already have an active mywaves account. You can access your account anytime at [email or web address] Did you know that you can access your hospital and ER discharge instructions at any time in mywaves? You can also review all of your test results from your hospital stay or ER visit. Additional Information If you have questions, please visit the Frequently Asked Questions section of the mywaves website at [email or web address] Remember, mywaves is NOT to be used for urgent needs. For medical emergencies, dial 911. Now available from your iPhone and Android! Introducing Clifton Melgar As a Josué Carbo patient, I wanted to make you aware of our electronic visit tool called Clifton Melgar. Josué Carbo 24/7 allows you to connect within minutes with a medical provider 24 hours a day, seven days a week via a mobile device or tablet or logging into a secure website from your computer. You can access Clifton Melgar from anywhere in the [place]. A virtual visit might be right for you when you have a simple condition and feel like you just dont want to get out of bed, or cant get away from work for an appointment, when your regular Josué Carbo provider is not available (evenings, weekends or holidays), or when youre out of town and need minor care. Electronic visits cost only $49 and if the Josué Musistico 24/7 provider determines a prescription is needed to treat your condition, one can be electronically transmitted to a nearby pharmacy*. Please take a moment to enroll today if you have not already done so. The enrollment process is free and takes just a few minutes. To enroll, please download the Josué Carbo 24/7 maria to your tablet or phone, or visit [email or web address] to enroll on your computer.    
And, as an [address] [place] [address] patient with a [employer] account, the results of your visits will be scanned into your electronic medical record and your primary care provider will be able to view the scanned results. We urge you to continue to see your regular [employer] provider for your ongoing medical care. And while your primary care provider may not be the one available when you seek a Clifton Melgar virtual visit, the peace of mind you get from getting a real diagnosis real time can be priceless. For more information on Clifton Taylorfin, view our Frequently Asked Questions (FAQs) at [email or web address]. Sincerely, 
 
Candy Jett MD 
Chief Medical Officer [employer] *:  certain medications cannot be prescribed via Clifton Geraldjaimiefin Providers Seen During Your Hospitalization Provider Specialty Primary office phone Allison Gonzalez MD General Surgery [phone] Your Primary Care Physician (PCP) Primary Care Physician Office Phone Office Fax Alec Galeazzi [phone] You are allergic to the following Allergen Reactions Amoxicillin Shortness of Breath Rash Pt has had keflex in the past  
  
Recent Documentation Height Weight BMI OB Status Smoking Status 1.6 m 64 kg 24.98 kg/m2 Having regular periods Current Every Day Smoker Emergency Contacts Name Discharge Info Relation Home Work Mobile [facility] [employer] CAREGIVER [3] Spouse [3] [phone] Patient Belongings The following personal items are in your possession at time of discharge: 
  Dental Appliances: None         Home Medications: None   Jewelry: None  Clothing: Footwear, Pants, Shirt, Undergarments    Other Valuables: Cell Phone Please provide this summary of care documentation to your next provider. Signatures-by signing, you are acknowledging that this After Visit Summary has been reviewed with you and you have received a copy. Patient Signature:  ____________________________________________________________ Date:  ____________________________________________________________  
  
Leonor Rayray Provider Signature:  ____________________________________________________________ Date:  ____________________________________________________________

## 2018-07-30 NOTE — ROUTINE PROCESS
TRANSFER - OUT REPORT:    Verbal report given to Clive [place] unit RN on [employer]  being transferred to [address] for routine progression of care       Report consisted of patients Situation, Background, Assessment and   Recommendations(SBAR). Information from the following report(s) SBAR was reviewed with the receiving nurse. Lines:       Opportunity for questions and clarification was provided.       Patient transported with:   transport on stretcher to [address] with belongings and

## 2018-07-30 NOTE — PROGRESS NOTES
Subjective:        Leif Green is a 42 y.o.  female who presents with \"knot under ribs\". The pt states that for the past week, she has noticed a low grade fever to 100.7 degF and has developed a firm mass in her left upper abdomen near an old drain site. Chief Complaint   Patient presents with    Abdominal Pain       Patient Active Problem List    Diagnosis Date Noted    Abscess of abdominal cavity ([address] 07/30/2018    Postoperative examination 07/16/2018    Pancreatic fluid leak 07/16/2018    Postoperative fever 05/07/2018    Obesity (BMI 30-39.9) 04/26/2018    Pancreas cyst 04/20/2018    Alcohol intoxication ([address] 04/03/2018    Suicidal ideation 04/03/2018    High anion gap metabolic acidosis 85/33/8290    Elevated lipase 04/03/2018    Alcohol abuse 04/03/2018    Sinus tachycardia 04/03/2018    Pancreatic cyst 03/21/2018    Rash 06/24/2011    Headache(784.0) 06/24/2011    Nausea & vomiting 06/24/2011    Hepatitis 06/24/2011    Thrombocytopenia, unspecified ([address] 06/24/2011    Abdominal pain, unspecified site 06/24/2011    Fever, unspecified 06/24/2011     Past Medical History:   Diagnosis Date    Abscess of abdominal cavity ([address] 7/30/2018    Anxiety     Blurred vision     Chest pain     Chronic pain     MUSCLE WEAKNESS,PANCREATIC CYST     Depression     Frequent headaches     Ill-defined condition     Muscle weakness     Obesity (BMI 30-39.9) 4/26/2018    Pancreatic cyst 3/21/2018    Shortness of breath     Stomach pain     Swallowing difficulty       Past Surgical History:   Procedure Laterality Date    HX GI      COLONOSCOPY    HX GYN  2005    endometriosis surgery    HX OTHER SURGICAL  04/20/2018    lap distal pancreatectomy converted to open-[facility]-DR. Diana Zeina      Social History   Substance Use Topics    Smoking status: Current Every Day Smoker     Packs/day: 1.00     Years: 25.00    Smokeless tobacco: Never Used      Comment: STOP SMOKING BOOKLET PROVIDED    Alcohol use 1.8 oz/week     3 Shots of liquor per week      Family History   Problem Relation Age of Onset    Cancer Mother      colon    Cancer Father      skin    Anesth Problems Father      SUCCINYLCHOLINE  REACTION      Prior to Admission medications    Medication Sig Start Date End Date Taking? Authorizing Provider   oxyCODONE IR (ROXICODONE) 10 mg tab immediate release tablet Take 1 Tab by mouth every six (6) hours as needed. Max Daily Amount: 40 mg. Indications: Pain, abdominal abscess 7/30/18  Yes Eliezer Duran MD   DULoxetine (CYMBALTA) 60 mg capsule Take 1 Cap by mouth daily. 5/4/18  Yes Archie Ibarra NP   pregabalin (LYRICA) 50 mg capsule Take 1 Cap by mouth two (2) times a day. Max Daily Amount: 100 mg. Indications: Postoperative Acute Pain 5/16/18   Neli Suárez NP   senna-docusate (PERICOLACE) 8.6-50 mg per tablet Take 1 Tab by mouth daily as needed for Constipation. 4/30/18   Neli Suárez NP   gabapentin (NEURONTIN) 300 mg capsule Take 1 Cap by mouth three (3) times daily. Indications: Postoperative Acute Pain 4/30/18   Neli Suárez NP   ondansetron (ZOFRAN ODT) 4 mg disintegrating tablet Take 1 Tab by mouth every eight (8) hours as needed for Nausea. Indications: PREVENTION OF POST-OPERATIVE NAUSEA AND VOMITING 4/30/18   Neli Suárez NP   naloxone [facility]) 4 mg/actuation nasal spray Use 1 spray intranasally into 1 nostril. Use a new Narcan nasal spray for subsequent doses and administer into alternating nostrils. May repeat every 2 to 3 minutes as needed. 4/30/18   Neli Suárez NP   naloxone 2 mg/actuation spry Use 1 spray intranasally into 1 nostril. Use a new Narcan nasal spray for subsequent doses and administer into alternating nostrils. May repeat every 2 to 3 minutes as needed. 4/19/18   Sandralee Canavan, MD   acetaminophen (TYLENOL) 500 mg tablet Take 500 mg by mouth every eight (8) hours as needed for Pain.     Historical Provider   ibuprofen (MOTRIN IB) 200 mg tablet Take 800 mg by mouth every eight (8) hours as needed. Historical Provider     Allergies   Allergen Reactions    Amoxicillin Shortness of Breath and Rash     Pt has had keflex in the past         Review of Systems:    A comprehensive review of systems was negative except for that written in the History of Present Illness. Objective:     Visit Vitals    /70 (BP 1 Location: Right arm, BP Patient Position: Sitting)    Pulse 84    Temp 98.6 °F (37 °C) (Oral)    Resp 18    Ht 5' 3\" (1.6 m)    Wt 143 lb (64.9 kg)    SpO2 97%    BMI 25.33 kg/m2       Physical Exam:  General appearance: alert, cooperative, no distress, appears stated age  Head: Normocephalic, without obvious abnormality, atraumatic  Neurologic: Grossly normal  Abdomen: She has a 3x4 cm mass underneath her left upper abdominal drain site. This is quite tender. There is no erythema around it. Assessment:       ICD-10-CM ICD-9-CM    1. Abscess of abdominal cavity (HCC) K65.1 567.22 CT GUIDE CATH/PERC DRAIN PERITON/RETROPERI FLUID W SI   2. Pancreatic fluid leak K86.89 577.8 oxyCODONE IR (ROXICODONE) 10 mg tab immediate release tablet       Plan: Will do a CT-guided insertion of drain. Written by michael Aleman, as dictated by Mary Luis MD.    Total face to face time with patient: 10 minutes. Greater than 50% of the time was spent in counseling. Signed By: Mary Luis MD    July 30, 2018

## 2018-07-30 NOTE — IP AVS SNAPSHOT
[address] [facility] [address] 
[phone] Patient: Zeenat Spicer MRN: [id number] NGD:7/91/7706 A check odette indicates which time of day the medication should be taken. My Medications START taking these medications Instructions Each Dose to Equal  
 Morning Noon Evening Bedtime * HYDROmorphone 8 mg tablet Commonly known as:  DILAUDID Your last dose was: Your next dose is: Take 1 Tab by mouth every six (6) hours as needed for up to 7 days. Max Daily Amount: 32 mg.  
 8 mg  
    
   
   
   
  
 * HYDROmorphone 8 mg tablet Commonly known as:  DILAUDID Start taking on:  8/24/2018 Your last dose was: Your next dose is: Take 1 Tab by mouth every eight (8) hours for 7 days. Max Daily Amount: 24 mg. Indications: Severe Pain  
 8 mg  
    
   
   
   
  
 levoFLOXacin 750 mg tablet Commonly known as:  David Lyman Start taking on:  8/18/2018 Your last dose was: Your next dose is: Take 1 Tab by mouth every twenty-four (24) hours. 750 mg  
    
   
   
   
  
 polyethylene glycol 17 gram packet Commonly known as:  Kathie Kehr Start taking on:  8/18/2018 Your last dose was: Your next dose is: Take 1 Packet by mouth daily. 17 g * Notice: This list has 2 medication(s) that are the same as other medications prescribed for you. Read the directions carefully, and ask your doctor or other care provider to review them with you. CHANGE how you take these medications Instructions Each Dose to Equal  
 Morning Noon Evening Bedtime  
 senna-docusate 8.6-50 mg per tablet Commonly known as:  Georgi Cancel What changed:   
- how much to take - when to take this 
- reasons to take this Your last dose was: Your next dose is: Take 2 Tabs by mouth nightly for 30 days. 2 Tab CONTINUE taking these medications Instructions Each Dose to Equal  
 Morning Noon Evening Bedtime  
 acetaminophen 500 mg tablet Commonly known as:  TYLENOL Your last dose was: Your next dose is: Take 500 mg by mouth every eight (8) hours as needed for Pain. 500 mg DULoxetine 60 mg capsule Commonly known as:  CYMBALTA Your last dose was: Your next dose is: Take 1 Cap by mouth daily. 60 mg MOTRIN  mg tablet Generic drug:  ibuprofen Your last dose was: Your next dose is: Take 800 mg by mouth every eight (8) hours as needed. 800 mg  
    
   
   
   
  
 * naloxone 2 mg/actuation Spry Your last dose was: Your next dose is:    
   
   
 Use 1 spray intranasally into 1 nostril. Use a new Narcan nasal spray for subsequent doses and administer into alternating nostrils. May repeat every 2 to 3 minutes as needed. * naloxone 4 mg/actuation nasal spray Commonly known as:  [employer] Your last dose was: Your next dose is:    
   
   
 Use 1 spray intranasally into 1 nostril. Use a new Narcan nasal spray for subsequent doses and administer into alternating nostrils. May repeat every 2 to 3 minutes as needed. ondansetron 4 mg disintegrating tablet Commonly known as:  ZOFRAN ODT Your last dose was: Your next dose is: Take 1 Tab by mouth every eight (8) hours as needed for Nausea. Indications: PREVENTION OF POST-OPERATIVE NAUSEA AND VOMITING  
 4 mg * Notice: This list has 2 medication(s) that are the same as other medications prescribed for you. Read the directions carefully, and ask your doctor or other care provider to review them with you. STOP taking these medications oxyCODONE IR 10 mg Tab immediate release tablet Commonly known as:  Eddi Lockhart Where to Get Your Medications These medications were sent to [address], [address] [address] [address] [place] [address], [address],  [address] [place] [address] Phone:  [phone]  
  senna-docusate 8.6-50 mg per tablet Information on where to get these meds will be given to you by the nurse or doctor. ! Ask your nurse or doctor about these medications HYDROmorphone 8 mg tablet HYDROmorphone 8 mg tablet  
 levoFLOXacin 750 mg tablet  
 polyethylene glycol 17 gram packet

## 2018-07-31 ENCOUNTER — ANESTHESIA (OUTPATIENT)
Dept: CT IMAGING | Age: 38
DRG: 438 | End: 2018-07-31
Payer: COMMERCIAL

## 2018-07-31 ENCOUNTER — APPOINTMENT (OUTPATIENT)
Dept: CT IMAGING | Age: 38
DRG: 438 | End: 2018-07-31
Attending: SURGERY
Payer: COMMERCIAL

## 2018-07-31 ENCOUNTER — ANESTHESIA EVENT (OUTPATIENT)
Dept: CT IMAGING | Age: 38
DRG: 438 | End: 2018-07-31
Payer: COMMERCIAL

## 2018-07-31 LAB
ALBUMIN SERPL-MCNC: 2.8 G/DL (ref 3.5–5)
ALBUMIN/GLOB SERPL: 0.6 {RATIO} (ref 1.1–2.2)
ALP SERPL-CCNC: 126 U/L (ref 45–117)
ALT SERPL-CCNC: 9 U/L (ref 12–78)
ANION GAP SERPL CALC-SCNC: 8 MMOL/L (ref 5–15)
AST SERPL-CCNC: 11 U/L (ref 15–37)
BILIRUB SERPL-MCNC: 0.5 MG/DL (ref 0.2–1)
BUN SERPL-MCNC: 6 MG/DL (ref 6–20)
BUN/CREAT SERPL: 11 (ref 12–20)
CALCIUM SERPL-MCNC: 9.2 MG/DL (ref 8.5–10.1)
CHLORIDE SERPL-SCNC: 97 MMOL/L (ref 97–108)
CO2 SERPL-SCNC: 27 MMOL/L (ref 21–32)
CREAT BLD-MCNC: 0.4 MG/DL (ref 0.6–1.3)
CREAT SERPL-MCNC: 0.57 MG/DL (ref 0.55–1.02)
GLOBULIN SER CALC-MCNC: 5 G/DL (ref 2–4)
GLUCOSE SERPL-MCNC: 289 MG/DL (ref 65–100)
POTASSIUM SERPL-SCNC: 3.6 MMOL/L (ref 3.5–5.1)
PROT SERPL-MCNC: 7.8 G/DL (ref 6.4–8.2)
SODIUM SERPL-SCNC: 132 MMOL/L (ref 136–145)

## 2018-07-31 PROCEDURE — 49405 IMAGE CATH FLUID COLXN VISC: CPT

## 2018-07-31 PROCEDURE — 87075 CULTR BACTERIA EXCEPT BLOOD: CPT | Performed by: SURGERY

## 2018-07-31 PROCEDURE — 74011000258 HC RX REV CODE- 258: Performed by: SURGERY

## 2018-07-31 PROCEDURE — 74011250637 HC RX REV CODE- 250/637: Performed by: SURGERY

## 2018-07-31 PROCEDURE — 0F9G30Z DRAINAGE OF PANCREAS WITH DRAINAGE DEVICE, PERCUTANEOUS APPROACH: ICD-10-PCS | Performed by: INTERNAL MEDICINE

## 2018-07-31 PROCEDURE — 76060000034 HC ANESTHESIA 1.5 TO 2 HR

## 2018-07-31 PROCEDURE — 74011250636 HC RX REV CODE- 250/636

## 2018-07-31 PROCEDURE — 80053 COMPREHEN METABOLIC PANEL: CPT | Performed by: PHYSICIAN ASSISTANT

## 2018-07-31 PROCEDURE — 74011636320 HC RX REV CODE- 636/320: Performed by: SURGERY

## 2018-07-31 PROCEDURE — 74011000250 HC RX REV CODE- 250

## 2018-07-31 PROCEDURE — 87076 CULTURE ANAEROBE IDENT EACH: CPT | Performed by: SURGERY

## 2018-07-31 PROCEDURE — 87205 SMEAR GRAM STAIN: CPT | Performed by: SURGERY

## 2018-07-31 PROCEDURE — 74011250636 HC RX REV CODE- 250/636: Performed by: SURGERY

## 2018-07-31 PROCEDURE — 36415 COLL VENOUS BLD VENIPUNCTURE: CPT | Performed by: PHYSICIAN ASSISTANT

## 2018-07-31 PROCEDURE — C1769 GUIDE WIRE: HCPCS

## 2018-07-31 PROCEDURE — 77030010546 HC BG URIN DRNG URES -B

## 2018-07-31 PROCEDURE — 77030003666 HC NDL SPINAL BD -A

## 2018-07-31 PROCEDURE — 74160 CT ABDOMEN W/CONTRAST: CPT

## 2018-07-31 PROCEDURE — 82565 ASSAY OF CREATININE: CPT

## 2018-07-31 PROCEDURE — 65270000032 HC RM SEMIPRIVATE

## 2018-07-31 PROCEDURE — 74011250637 HC RX REV CODE- 250/637: Performed by: PHYSICIAN ASSISTANT

## 2018-07-31 PROCEDURE — 0W9F3ZZ DRAINAGE OF ABDOMINAL WALL, PERCUTANEOUS APPROACH: ICD-10-PCS | Performed by: INTERNAL MEDICINE

## 2018-07-31 PROCEDURE — 77030011229 HC DIL VESL COON COOK -A

## 2018-07-31 RX ORDER — DEXMEDETOMIDINE HYDROCHLORIDE 4 UG/ML
INJECTION, SOLUTION INTRAVENOUS AS NEEDED
Status: DISCONTINUED | OUTPATIENT
Start: 2018-07-31 | End: 2018-07-31 | Stop reason: HOSPADM

## 2018-07-31 RX ORDER — FENTANYL CITRATE 50 UG/ML
100 INJECTION, SOLUTION INTRAMUSCULAR; INTRAVENOUS
Status: COMPLETED | OUTPATIENT
Start: 2018-07-31 | End: 2018-07-31

## 2018-07-31 RX ORDER — HYDROMORPHONE HYDROCHLORIDE 2 MG/1
2 TABLET ORAL
Status: DISCONTINUED | OUTPATIENT
Start: 2018-07-31 | End: 2018-08-02

## 2018-07-31 RX ORDER — SODIUM CHLORIDE 0.9 % (FLUSH) 0.9 %
10 SYRINGE (ML) INJECTION
Status: COMPLETED | OUTPATIENT
Start: 2018-07-31 | End: 2018-07-31

## 2018-07-31 RX ORDER — ONDANSETRON 2 MG/ML
INJECTION INTRAMUSCULAR; INTRAVENOUS AS NEEDED
Status: DISCONTINUED | OUTPATIENT
Start: 2018-07-31 | End: 2018-07-31 | Stop reason: HOSPADM

## 2018-07-31 RX ORDER — MIDAZOLAM HYDROCHLORIDE 1 MG/ML
INJECTION, SOLUTION INTRAMUSCULAR; INTRAVENOUS
Status: COMPLETED
Start: 2018-07-31 | End: 2018-07-31

## 2018-07-31 RX ORDER — FENTANYL CITRATE-0.9 % NACL/PF 900MCG/30
PATIENT CONTROLLED ANALGESIA VIAL INJECTION
Status: DISCONTINUED | OUTPATIENT
Start: 2018-07-31 | End: 2018-08-02

## 2018-07-31 RX ORDER — PROPOFOL 10 MG/ML
INJECTION, EMULSION INTRAVENOUS AS NEEDED
Status: DISCONTINUED | OUTPATIENT
Start: 2018-07-31 | End: 2018-07-31 | Stop reason: HOSPADM

## 2018-07-31 RX ORDER — SODIUM CHLORIDE, SODIUM LACTATE, POTASSIUM CHLORIDE, CALCIUM CHLORIDE 600; 310; 30; 20 MG/100ML; MG/100ML; MG/100ML; MG/100ML
INJECTION, SOLUTION INTRAVENOUS
Status: DISCONTINUED | OUTPATIENT
Start: 2018-07-31 | End: 2018-07-31 | Stop reason: HOSPADM

## 2018-07-31 RX ORDER — MIDAZOLAM HYDROCHLORIDE 1 MG/ML
INJECTION, SOLUTION INTRAMUSCULAR; INTRAVENOUS AS NEEDED
Status: DISCONTINUED | OUTPATIENT
Start: 2018-07-31 | End: 2018-07-31 | Stop reason: HOSPADM

## 2018-07-31 RX ORDER — ACETAMINOPHEN 325 MG/1
650 TABLET ORAL
Status: DISCONTINUED | OUTPATIENT
Start: 2018-07-31 | End: 2018-08-17 | Stop reason: HOSPADM

## 2018-07-31 RX ORDER — DEXAMETHASONE SODIUM PHOSPHATE 4 MG/ML
INJECTION, SOLUTION INTRA-ARTICULAR; INTRALESIONAL; INTRAMUSCULAR; INTRAVENOUS; SOFT TISSUE AS NEEDED
Status: DISCONTINUED | OUTPATIENT
Start: 2018-07-31 | End: 2018-07-31 | Stop reason: HOSPADM

## 2018-07-31 RX ORDER — FENTANYL CITRATE 50 UG/ML
INJECTION, SOLUTION INTRAMUSCULAR; INTRAVENOUS
Status: COMPLETED
Start: 2018-07-31 | End: 2018-07-31

## 2018-07-31 RX ORDER — LIDOCAINE HYDROCHLORIDE 10 MG/ML
INJECTION, SOLUTION EPIDURAL; INFILTRATION; INTRACAUDAL; PERINEURAL
Status: COMPLETED
Start: 2018-07-31 | End: 2018-07-31

## 2018-07-31 RX ORDER — IBUPROFEN 400 MG/1
400 TABLET ORAL ONCE
Status: COMPLETED | OUTPATIENT
Start: 2018-07-31 | End: 2018-07-31

## 2018-07-31 RX ORDER — FENTANYL CITRATE 50 UG/ML
INJECTION, SOLUTION INTRAMUSCULAR; INTRAVENOUS AS NEEDED
Status: DISCONTINUED | OUTPATIENT
Start: 2018-07-31 | End: 2018-07-31 | Stop reason: HOSPADM

## 2018-07-31 RX ADMIN — Medication 10 ML: at 23:36

## 2018-07-31 RX ADMIN — PROPOFOL 50 MG: 10 INJECTION, EMULSION INTRAVENOUS at 11:46

## 2018-07-31 RX ADMIN — DULOXETINE HYDROCHLORIDE 60 MG: 60 CAPSULE, DELAYED RELEASE ORAL at 14:59

## 2018-07-31 RX ADMIN — DEXMEDETOMIDINE HYDROCHLORIDE 20 MCG: 4 INJECTION, SOLUTION INTRAVENOUS at 10:42

## 2018-07-31 RX ADMIN — FENTANYL CITRATE 100 MCG: 50 INJECTION, SOLUTION INTRAMUSCULAR; INTRAVENOUS at 17:01

## 2018-07-31 RX ADMIN — PROPOFOL 50 MG: 10 INJECTION, EMULSION INTRAVENOUS at 11:32

## 2018-07-31 RX ADMIN — Medication: at 17:38

## 2018-07-31 RX ADMIN — MIDAZOLAM HYDROCHLORIDE 1 MG: 1 INJECTION, SOLUTION INTRAMUSCULAR; INTRAVENOUS at 10:37

## 2018-07-31 RX ADMIN — PROPOFOL 50 MG: 10 INJECTION, EMULSION INTRAVENOUS at 11:36

## 2018-07-31 RX ADMIN — DEXAMETHASONE SODIUM PHOSPHATE 4 MG: 4 INJECTION, SOLUTION INTRA-ARTICULAR; INTRALESIONAL; INTRAMUSCULAR; INTRAVENOUS; SOFT TISSUE at 11:02

## 2018-07-31 RX ADMIN — PROPOFOL 50 MG: 10 INJECTION, EMULSION INTRAVENOUS at 12:04

## 2018-07-31 RX ADMIN — Medication 10 ML: at 06:47

## 2018-07-31 RX ADMIN — PROPOFOL 50 MG: 10 INJECTION, EMULSION INTRAVENOUS at 12:01

## 2018-07-31 RX ADMIN — PROPOFOL 50 MG: 10 INJECTION, EMULSION INTRAVENOUS at 11:18

## 2018-07-31 RX ADMIN — PROPOFOL 50 MG: 10 INJECTION, EMULSION INTRAVENOUS at 12:09

## 2018-07-31 RX ADMIN — PROPOFOL 50 MG: 10 INJECTION, EMULSION INTRAVENOUS at 11:22

## 2018-07-31 RX ADMIN — IOPAMIDOL 100 ML: 755 INJECTION, SOLUTION INTRAVENOUS at 09:45

## 2018-07-31 RX ADMIN — HYDROMORPHONE HYDROCHLORIDE 2 MG: 2 TABLET ORAL at 14:59

## 2018-07-31 RX ADMIN — PROPOFOL 50 MG: 10 INJECTION, EMULSION INTRAVENOUS at 11:15

## 2018-07-31 RX ADMIN — PROPOFOL 50 MG: 10 INJECTION, EMULSION INTRAVENOUS at 11:41

## 2018-07-31 RX ADMIN — Medication 10 ML: at 09:45

## 2018-07-31 RX ADMIN — PROPOFOL 50 MG: 10 INJECTION, EMULSION INTRAVENOUS at 11:12

## 2018-07-31 RX ADMIN — PROPOFOL 50 MG: 10 INJECTION, EMULSION INTRAVENOUS at 11:56

## 2018-07-31 RX ADMIN — PROPOFOL 50 MG: 10 INJECTION, EMULSION INTRAVENOUS at 11:02

## 2018-07-31 RX ADMIN — FENTANYL CITRATE 50 MCG: 50 INJECTION, SOLUTION INTRAMUSCULAR; INTRAVENOUS at 03:37

## 2018-07-31 RX ADMIN — ONDANSETRON 4 MG: 2 INJECTION INTRAMUSCULAR; INTRAVENOUS at 11:02

## 2018-07-31 RX ADMIN — SODIUM CHLORIDE, SODIUM LACTATE, POTASSIUM CHLORIDE, CALCIUM CHLORIDE: 600; 310; 30; 20 INJECTION, SOLUTION INTRAVENOUS at 10:33

## 2018-07-31 RX ADMIN — Medication: at 23:26

## 2018-07-31 RX ADMIN — PROPOFOL 100 MG: 10 INJECTION, EMULSION INTRAVENOUS at 10:42

## 2018-07-31 RX ADMIN — PROPOFOL 50 MG: 10 INJECTION, EMULSION INTRAVENOUS at 11:10

## 2018-07-31 RX ADMIN — LIDOCAINE HYDROCHLORIDE: 10 INJECTION, SOLUTION EPIDURAL; INFILTRATION; INTRACAUDAL; PERINEURAL at 11:00

## 2018-07-31 RX ADMIN — MIDAZOLAM HYDROCHLORIDE 2 MG: 1 INJECTION, SOLUTION INTRAMUSCULAR; INTRAVENOUS at 10:35

## 2018-07-31 RX ADMIN — PROPOFOL 50 MG: 10 INJECTION, EMULSION INTRAVENOUS at 11:44

## 2018-07-31 RX ADMIN — PROPOFOL 50 MG: 10 INJECTION, EMULSION INTRAVENOUS at 11:07

## 2018-07-31 RX ADMIN — FENTANYL CITRATE 50 MCG: 50 INJECTION, SOLUTION INTRAMUSCULAR; INTRAVENOUS at 10:33

## 2018-07-31 RX ADMIN — FENTANYL CITRATE 50 MCG: 50 INJECTION, SOLUTION INTRAMUSCULAR; INTRAVENOUS at 10:17

## 2018-07-31 RX ADMIN — PROPOFOL 50 MG: 10 INJECTION, EMULSION INTRAVENOUS at 11:26

## 2018-07-31 RX ADMIN — PROPOFOL 50 MG: 10 INJECTION, EMULSION INTRAVENOUS at 10:45

## 2018-07-31 RX ADMIN — FENTANYL CITRATE 50 MCG: 50 INJECTION, SOLUTION INTRAMUSCULAR; INTRAVENOUS at 07:28

## 2018-07-31 RX ADMIN — PROPOFOL 50 MG: 10 INJECTION, EMULSION INTRAVENOUS at 11:59

## 2018-07-31 RX ADMIN — PROPOFOL 50 MG: 10 INJECTION, EMULSION INTRAVENOUS at 10:58

## 2018-07-31 RX ADMIN — FENTANYL CITRATE 50 MCG: 50 INJECTION, SOLUTION INTRAMUSCULAR; INTRAVENOUS at 01:20

## 2018-07-31 RX ADMIN — IBUPROFEN 400 MG: 400 TABLET ORAL at 01:43

## 2018-07-31 RX ADMIN — SODIUM CHLORIDE, SODIUM LACTATE, POTASSIUM CHLORIDE, CALCIUM CHLORIDE: 600; 310; 30; 20 INJECTION, SOLUTION INTRAVENOUS at 11:10

## 2018-07-31 RX ADMIN — PROPOFOL 50 MG: 10 INJECTION, EMULSION INTRAVENOUS at 12:13

## 2018-07-31 RX ADMIN — PROPOFOL 50 MG: 10 INJECTION, EMULSION INTRAVENOUS at 11:49

## 2018-07-31 RX ADMIN — PROPOFOL 50 MG: 10 INJECTION, EMULSION INTRAVENOUS at 10:53

## 2018-07-31 RX ADMIN — MIDAZOLAM HYDROCHLORIDE 2 MG: 1 INJECTION, SOLUTION INTRAMUSCULAR; INTRAVENOUS at 10:33

## 2018-07-31 RX ADMIN — DEXMEDETOMIDINE HYDROCHLORIDE 20 MCG: 4 INJECTION, SOLUTION INTRAVENOUS at 10:37

## 2018-07-31 RX ADMIN — FENTANYL CITRATE 50 MCG: 50 INJECTION, SOLUTION INTRAMUSCULAR; INTRAVENOUS at 10:37

## 2018-07-31 RX ADMIN — SODIUM CHLORIDE 100 ML: 900 INJECTION, SOLUTION INTRAVENOUS at 09:45

## 2018-07-31 RX ADMIN — FENTANYL CITRATE 50 MCG: 50 INJECTION, SOLUTION INTRAMUSCULAR; INTRAVENOUS at 13:02

## 2018-07-31 RX ADMIN — PROPOFOL 50 MG: 10 INJECTION, EMULSION INTRAVENOUS at 11:52

## 2018-07-31 NOTE — ANESTHESIA PREPROCEDURE EVALUATION
Anesthetic History No history of anesthetic complications Review of Systems / Medical History Patient summary reviewed, nursing notes reviewed and pertinent labs reviewed Pulmonary Within defined limits Neuro/Psych Psychiatric history Cardiovascular Within defined limits GI/Hepatic/Renal 
  
 
 
 
Liver disease Endo/Other Within defined limits Other Findings Physical Exam 
 
Airway Mallampati: II 
TM Distance: > 6 cm Neck ROM: normal range of motion Mouth opening: Normal 
 
 Cardiovascular Regular rate and rhythm,  S1 and S2 normal,  no murmur, click, rub, or gallop Dental 
No notable dental hx Pulmonary Breath sounds clear to auscultation Abdominal 
GI exam deferred Other Findings Anesthetic Plan ASA: 2 Anesthesia type: MAC Induction: Intravenous Anesthetic plan and risks discussed with: Patient

## 2018-07-31 NOTE — PROGRESS NOTES
Returned to unit, wants Dr Lorene Leo called immediately per transferring nurses for something more for pain control. [phone] - page out to Dianna Barnett NP to request increase in pain medications.

## 2018-07-31 NOTE — ANESTHESIA POSTPROCEDURE EVALUATION
Post-Anesthesia Evaluation and Assessment Patient: Annie Stewart MRN: [id number]  SSN: [id number] YOB: 1980  Age: 42 y.o. Sex: female Cardiovascular Function/Vital Signs Visit Vitals  BP (!) 85/42 (BP 1 Location: Right arm, BP Patient Position: Head of bed elevated (Comment degrees))  Pulse 72  Temp 37.6 °C (99.6 °F)  Resp 18  SpO2 92% Patient is status post MAC anesthesia for * No procedures listed *. Nausea/Vomiting: None Postoperative hydration reviewed and adequate. Pain: 
Pain Scale 1: Visual (07/31/18 1220) Pain Intensity 1: 0 (07/31/18 1220) Managed Neurological Status: At baseline Mental Status and Level of Consciousness: Arousable Pulmonary Status:  
O2 Device: Nasal cannula (07/31/18 1220) Adequate oxygenation and airway patent Complications related to anesthesia: None Post-anesthesia assessment completed. No concerns Signed By: Vincent Mendoza MD   
 July 31, 2018

## 2018-07-31 NOTE — PROGRESS NOTES
Successful drainage accomplished. In a lot of pain which is to be expected because of the manipulation and also because of her long exposure to narcotics post op.

## 2018-07-31 NOTE — PROGRESS NOTES
Reason for Admission:   Patient is being seen for pancreatic abscess. RRAT Score:   4                  Plan for utilizing home health:  No home health needs identified at this time. Likelihood of Readmission:  Low                          Transition of Care Plan:  Return home with family support. Reviewed medical chart; met with the patient at the bedside. Patient is being seen for pancreatic abscess. Patient lives with her  and 13year old daughter. She has a PCP - Dr. Trevor Sandra and gets her prescriptions at [facility] on [address]. She is not employed or on disability income. Care Management will continue to follow her disposition. JIMY Camara    Care Management Interventions  PCP Verified by CM:  Yes  Palliative Care Criteria Met (RRAT>21 & CHF Dx)?: No  Mode of Transport at Discharge:  (Patient will travel home via car.  )  MyChart Signup: No  Discharge Durable Medical Equipment: No  Physical Therapy Consult: No  Occupational Therapy Consult: No  Speech Therapy Consult: No  Current Support Network: Lives with Spouse  Confirm Follow Up Transport:  (Patient will travel home via car.  )  Plan discussed with Pt/Family/Caregiver: Yes  Freedom of Choice Offered: Yes  Discharge Location  Discharge Placement: Home with family assistance

## 2018-08-01 ENCOUNTER — APPOINTMENT (OUTPATIENT)
Dept: GENERAL RADIOLOGY | Age: 38
DRG: 438 | End: 2018-08-01
Attending: SURGERY
Payer: COMMERCIAL

## 2018-08-01 LAB
ANION GAP SERPL CALC-SCNC: 7 MMOL/L (ref 5–15)
APPEARANCE UR: ABNORMAL
BACTERIA URNS QL MICRO: NEGATIVE /HPF
BASOPHILS # BLD: 0 K/UL (ref 0–0.1)
BASOPHILS # BLD: 0.1 K/UL (ref 0–0.1)
BASOPHILS NFR BLD: 0 % (ref 0–1)
BASOPHILS NFR BLD: 0 % (ref 0–1)
BILIRUB UR QL CFM: NEGATIVE
BUN SERPL-MCNC: 6 MG/DL (ref 6–20)
BUN/CREAT SERPL: 10 (ref 12–20)
CALCIUM SERPL-MCNC: 7.7 MG/DL (ref 8.5–10.1)
CHLORIDE SERPL-SCNC: 96 MMOL/L (ref 97–108)
CO2 SERPL-SCNC: 26 MMOL/L (ref 21–32)
COLOR UR: ABNORMAL
CREAT SERPL-MCNC: 0.59 MG/DL (ref 0.55–1.02)
DIFFERENTIAL METHOD BLD: ABNORMAL
DIFFERENTIAL METHOD BLD: ABNORMAL
EOSINOPHIL # BLD: 0.1 K/UL (ref 0–0.4)
EOSINOPHIL # BLD: 0.2 K/UL (ref 0–0.4)
EOSINOPHIL NFR BLD: 1 % (ref 0–7)
EOSINOPHIL NFR BLD: 1 % (ref 0–7)
EPITH CASTS URNS QL MICRO: ABNORMAL /LPF
ERYTHROCYTE [DISTWIDTH] IN BLOOD BY AUTOMATED COUNT: 16.4 % (ref 11.5–14.5)
ERYTHROCYTE [DISTWIDTH] IN BLOOD BY AUTOMATED COUNT: 16.5 % (ref 11.5–14.5)
EST. AVERAGE GLUCOSE BLD GHB EST-MCNC: 243 MG/DL
GLUCOSE BLD STRIP.AUTO-MCNC: 231 MG/DL (ref 65–100)
GLUCOSE BLD STRIP.AUTO-MCNC: 278 MG/DL (ref 65–100)
GLUCOSE BLD STRIP.AUTO-MCNC: 380 MG/DL (ref 65–100)
GLUCOSE BLD STRIP.AUTO-MCNC: 395 MG/DL (ref 65–100)
GLUCOSE SERPL-MCNC: 232 MG/DL (ref 65–100)
GLUCOSE UR STRIP.AUTO-MCNC: >1000 MG/DL
HBA1C MFR BLD: 10.1 % (ref 4.2–6.3)
HCT VFR BLD AUTO: 34.7 % (ref 35–47)
HCT VFR BLD AUTO: 36.9 % (ref 35–47)
HGB BLD-MCNC: 10.8 G/DL (ref 11.5–16)
HGB BLD-MCNC: 11.7 G/DL (ref 11.5–16)
HGB UR QL STRIP: ABNORMAL
IMM GRANULOCYTES # BLD: 0.1 K/UL (ref 0–0.04)
IMM GRANULOCYTES # BLD: 0.1 K/UL (ref 0–0.04)
IMM GRANULOCYTES NFR BLD AUTO: 0 % (ref 0–0.5)
IMM GRANULOCYTES NFR BLD AUTO: 1 % (ref 0–0.5)
KETONES UR QL STRIP.AUTO: ABNORMAL MG/DL
LACTATE SERPL-SCNC: 1.7 MMOL/L (ref 0.4–2)
LEUKOCYTE ESTERASE UR QL STRIP.AUTO: ABNORMAL
LYMPHOCYTES # BLD: 2.5 K/UL (ref 0.8–3.5)
LYMPHOCYTES # BLD: 2.7 K/UL (ref 0.8–3.5)
LYMPHOCYTES NFR BLD: 15 % (ref 12–49)
LYMPHOCYTES NFR BLD: 16 % (ref 12–49)
MCH RBC QN AUTO: 26.6 PG (ref 26–34)
MCH RBC QN AUTO: 27 PG (ref 26–34)
MCHC RBC AUTO-ENTMCNC: 31.1 G/DL (ref 30–36.5)
MCHC RBC AUTO-ENTMCNC: 31.7 G/DL (ref 30–36.5)
MCV RBC AUTO: 85 FL (ref 80–99)
MCV RBC AUTO: 85.5 FL (ref 80–99)
MONOCYTES # BLD: 0.9 K/UL (ref 0–1)
MONOCYTES # BLD: 0.9 K/UL (ref 0–1)
MONOCYTES NFR BLD: 6 % (ref 5–13)
MONOCYTES NFR BLD: 6 % (ref 5–13)
MUCOUS THREADS URNS QL MICRO: ABNORMAL /LPF
NEUTS SEG # BLD: 12.8 K/UL (ref 1.8–8)
NEUTS SEG # BLD: 13.2 K/UL (ref 1.8–8)
NEUTS SEG NFR BLD: 77 % (ref 32–75)
NEUTS SEG NFR BLD: 78 % (ref 32–75)
NITRITE UR QL STRIP.AUTO: NEGATIVE
NRBC # BLD: 0 K/UL (ref 0–0.01)
NRBC # BLD: 0 K/UL (ref 0–0.01)
NRBC BLD-RTO: 0 PER 100 WBC
NRBC BLD-RTO: 0 PER 100 WBC
PH UR STRIP: 5.5 [PH] (ref 5–8)
PLATELET # BLD AUTO: 429 K/UL (ref 150–400)
PLATELET # BLD AUTO: 475 K/UL (ref 150–400)
PMV BLD AUTO: 10.7 FL (ref 8.9–12.9)
PMV BLD AUTO: 11.1 FL (ref 8.9–12.9)
POTASSIUM SERPL-SCNC: 3.4 MMOL/L (ref 3.5–5.1)
PROT UR STRIP-MCNC: 30 MG/DL
RBC # BLD AUTO: 4.06 M/UL (ref 3.8–5.2)
RBC # BLD AUTO: 4.34 M/UL (ref 3.8–5.2)
RBC #/AREA URNS HPF: >100 /HPF (ref 0–5)
SERVICE CMNT-IMP: ABNORMAL
SODIUM SERPL-SCNC: 129 MMOL/L (ref 136–145)
SP GR UR REFRACTOMETRY: 1.03 (ref 1–1.03)
UROBILINOGEN UR QL STRIP.AUTO: 1 EU/DL (ref 0.2–1)
WBC # BLD AUTO: 16.7 K/UL (ref 3.6–11)
WBC # BLD AUTO: 16.9 K/UL (ref 3.6–11)
WBC URNS QL MICRO: ABNORMAL /HPF (ref 0–4)

## 2018-08-01 PROCEDURE — 36415 COLL VENOUS BLD VENIPUNCTURE: CPT | Performed by: NURSE PRACTITIONER

## 2018-08-01 PROCEDURE — 85025 COMPLETE CBC W/AUTO DIFF WBC: CPT | Performed by: NURSE PRACTITIONER

## 2018-08-01 PROCEDURE — 81001 URINALYSIS AUTO W/SCOPE: CPT | Performed by: SURGERY

## 2018-08-01 PROCEDURE — 83605 ASSAY OF LACTIC ACID: CPT | Performed by: SURGERY

## 2018-08-01 PROCEDURE — 80048 BASIC METABOLIC PNL TOTAL CA: CPT | Performed by: SURGERY

## 2018-08-01 PROCEDURE — 94760 N-INVAS EAR/PLS OXIMETRY 1: CPT

## 2018-08-01 PROCEDURE — 82962 GLUCOSE BLOOD TEST: CPT

## 2018-08-01 PROCEDURE — 74011250636 HC RX REV CODE- 250/636: Performed by: NURSE PRACTITIONER

## 2018-08-01 PROCEDURE — 74011636637 HC RX REV CODE- 636/637: Performed by: NURSE PRACTITIONER

## 2018-08-01 PROCEDURE — 74011250637 HC RX REV CODE- 250/637: Performed by: PHYSICIAN ASSISTANT

## 2018-08-01 PROCEDURE — 83036 HEMOGLOBIN GLYCOSYLATED A1C: CPT | Performed by: SURGERY

## 2018-08-01 PROCEDURE — 74011250637 HC RX REV CODE- 250/637: Performed by: NURSE PRACTITIONER

## 2018-08-01 PROCEDURE — 74011250636 HC RX REV CODE- 250/636: Performed by: SURGERY

## 2018-08-01 PROCEDURE — 93005 ELECTROCARDIOGRAM TRACING: CPT

## 2018-08-01 PROCEDURE — 74011250637 HC RX REV CODE- 250/637: Performed by: SURGERY

## 2018-08-01 PROCEDURE — 65660000000 HC RM CCU STEPDOWN

## 2018-08-01 PROCEDURE — 87040 BLOOD CULTURE FOR BACTERIA: CPT | Performed by: SURGERY

## 2018-08-01 PROCEDURE — 71045 X-RAY EXAM CHEST 1 VIEW: CPT

## 2018-08-01 RX ORDER — LEVOFLOXACIN 5 MG/ML
750 INJECTION, SOLUTION INTRAVENOUS EVERY 24 HOURS
Status: DISCONTINUED | OUTPATIENT
Start: 2018-08-01 | End: 2018-08-16 | Stop reason: SDUPTHER

## 2018-08-01 RX ORDER — MAGNESIUM SULFATE 100 %
4 CRYSTALS MISCELLANEOUS AS NEEDED
Status: DISCONTINUED | OUTPATIENT
Start: 2018-08-01 | End: 2018-08-17 | Stop reason: HOSPADM

## 2018-08-01 RX ORDER — INSULIN LISPRO 100 [IU]/ML
INJECTION, SOLUTION INTRAVENOUS; SUBCUTANEOUS
Status: DISCONTINUED | OUTPATIENT
Start: 2018-08-01 | End: 2018-08-17 | Stop reason: HOSPADM

## 2018-08-01 RX ORDER — DEXTROSE 50 % IN WATER (D50W) INTRAVENOUS SYRINGE
12.5-25 AS NEEDED
Status: DISCONTINUED | OUTPATIENT
Start: 2018-08-01 | End: 2018-08-17 | Stop reason: HOSPADM

## 2018-08-01 RX ORDER — INSULIN LISPRO 100 [IU]/ML
9 INJECTION, SOLUTION INTRAVENOUS; SUBCUTANEOUS ONCE
Status: COMPLETED | OUTPATIENT
Start: 2018-08-01 | End: 2018-08-01

## 2018-08-01 RX ORDER — AMOXICILLIN 250 MG
2 CAPSULE ORAL DAILY
Status: DISCONTINUED | OUTPATIENT
Start: 2018-08-01 | End: 2018-08-07

## 2018-08-01 RX ORDER — SODIUM CHLORIDE 9 MG/ML
25 INJECTION, SOLUTION INTRAVENOUS CONTINUOUS
Status: DISCONTINUED | OUTPATIENT
Start: 2018-08-01 | End: 2018-08-16

## 2018-08-01 RX ORDER — SODIUM CHLORIDE 0.9 % (FLUSH) 0.9 %
5-10 SYRINGE (ML) INJECTION AS NEEDED
Status: DISCONTINUED | OUTPATIENT
Start: 2018-08-01 | End: 2018-08-17 | Stop reason: HOSPADM

## 2018-08-01 RX ADMIN — LEVOFLOXACIN 750 MG: 5 INJECTION, SOLUTION INTRAVENOUS at 10:49

## 2018-08-01 RX ADMIN — INSULIN LISPRO 2 UNITS: 100 INJECTION, SOLUTION INTRAVENOUS; SUBCUTANEOUS at 22:08

## 2018-08-01 RX ADMIN — Medication: at 16:26

## 2018-08-01 RX ADMIN — POTASSIUM CHLORIDE, DEXTROSE MONOHYDRATE AND SODIUM CHLORIDE 75 ML/HR: 150; 5; 450 INJECTION, SOLUTION INTRAVENOUS at 02:52

## 2018-08-01 RX ADMIN — INSULIN LISPRO 5 UNITS: 100 INJECTION, SOLUTION INTRAVENOUS; SUBCUTANEOUS at 17:27

## 2018-08-01 RX ADMIN — HYDROMORPHONE HYDROCHLORIDE 2 MG: 2 TABLET ORAL at 13:15

## 2018-08-01 RX ADMIN — ACETAMINOPHEN 650 MG: 325 TABLET, FILM COATED ORAL at 12:48

## 2018-08-01 RX ADMIN — SODIUM CHLORIDE 75 ML/HR: 900 INJECTION, SOLUTION INTRAVENOUS at 17:27

## 2018-08-01 RX ADMIN — INSULIN LISPRO 9 UNITS: 100 INJECTION, SOLUTION INTRAVENOUS; SUBCUTANEOUS at 13:00

## 2018-08-01 RX ADMIN — SODIUM CHLORIDE 500 ML: 900 INJECTION, SOLUTION INTRAVENOUS at 09:22

## 2018-08-01 RX ADMIN — HYDROMORPHONE HYDROCHLORIDE 2 MG: 2 TABLET ORAL at 09:17

## 2018-08-01 RX ADMIN — SODIUM CHLORIDE 75 ML/HR: 900 INJECTION, SOLUTION INTRAVENOUS at 21:40

## 2018-08-01 RX ADMIN — INSULIN LISPRO 9 UNITS: 100 INJECTION, SOLUTION INTRAVENOUS; SUBCUTANEOUS at 13:11

## 2018-08-01 RX ADMIN — SODIUM CHLORIDE 1000 ML: 900 INJECTION, SOLUTION INTRAVENOUS at 19:37

## 2018-08-01 RX ADMIN — HYDROMORPHONE HYDROCHLORIDE 2 MG: 2 TABLET ORAL at 17:27

## 2018-08-01 RX ADMIN — Medication: at 04:13

## 2018-08-01 RX ADMIN — SENNOSIDES AND DOCUSATE SODIUM 2 TABLET: 8.6; 5 TABLET ORAL at 09:17

## 2018-08-01 RX ADMIN — Medication: at 10:21

## 2018-08-01 RX ADMIN — ACETAMINOPHEN 650 MG: 325 TABLET, FILM COATED ORAL at 19:02

## 2018-08-01 RX ADMIN — Medication 10 ML: at 21:40

## 2018-08-01 RX ADMIN — HYDROMORPHONE HYDROCHLORIDE 2 MG: 2 TABLET ORAL at 21:40

## 2018-08-01 RX ADMIN — SODIUM CHLORIDE 998 ML: 900 INJECTION, SOLUTION INTRAVENOUS at 20:34

## 2018-08-01 RX ADMIN — DULOXETINE HYDROCHLORIDE 60 MG: 60 CAPSULE, DELAYED RELEASE ORAL at 09:17

## 2018-08-01 RX ADMIN — Medication: at 20:19

## 2018-08-01 NOTE — PROGRESS NOTES
Pt started on blood sugar checks today. PCT checked bs with result of 380, rechecked for confirmation and bs now 395. Also pt's temp is elevated at 100.8.  Call placed to Dr Rick Bonilla office

## 2018-08-01 NOTE — PROGRESS NOTES
Bedside and Verbal shift change report given to Luis M RN  (oncoming nurse) by Rhett Lakhani (offgoing nurse). Report included the following information SBAR and Kardex.

## 2018-08-01 NOTE — PROGRESS NOTES
General Surgery Daily Progress Note    Admit Date: 2018  Post-Operative Day: * No surgery found * from * No surgery found *     Subjective:     Last 24 hrs:  Perc drain placed in one intraabdominal abscess and another abscess drained, but no drain placed. Pt cont report that fentanyl PCA with 25 mcg every 6 minutes. Requesting more pain medication. On full liquid diet, no n/v/d. Last BM \" few days ago. \" on pericolace at home. Tmax 99.6F, also taking tylenol. BP running low. Blood sugar 289      Objective:     Blood pressure 93/54, pulse 76, temperature 98.7 °F (37.1 °C), resp. rate 18, SpO2 92 %. Temp (24hrs), Av °F (36.7 °C), Min:96 °F (35.6 °C), Max:99.6 °F (37.6 °C)      _____________________  Physical Exam:     Alert and Oriented, sitting in bed, in no acute distress. Cardiovascular: RRR, no LE peripheral edema  Lungs:CTAB   Abdomen: +firmness in LUQ with TTP but otherwise soft, +BS  Perc Drain in place with scant old SS output      Assessment:   Active Problems:    Pancreatic abscess (2018)            Plan:     500 cc saline bolus  Start pericolace  Pain control - reminded pt that also has 2 mg dilaudid PO available. Cont Fentanyl PCA  Start SS insulin   GI lite  Check CBC now  Morning labs  Abx: pending culture results. No preliminary culture is back  OOB and ambulate  Further plan per Dr. Saleem Jenkins        Data Review:    No results for input(s): WBC, HGB, HCT, PLT, HGBEXT, HCTEXT, PLTEXT in the last 72 hours. Recent Labs      18   0945   NA  132*   K  3.6   CL  97   CO2  27   GLU  289*   BUN  6   CREA  0.57   CA  9.2   ALB  2.8*   SGOT  11*   ALT  9*     No results for input(s): AML, LPSE in the last 72 hours.         ______________________  Medications:    Current Facility-Administered Medications   Medication Dose Route Frequency    senna-docusate (PERICOLACE) 8.6-50 mg per tablet 2 Tab  2 Tab Oral DAILY    sodium chloride 0.9 % bolus infusion 500 mL  500 mL IntraVENous ONCE    insulin lispro (HUMALOG) injection   SubCUTAneous AC&HS    glucose chewable tablet 16 g  4 Tab Oral PRN    dextrose (D50W) injection syrg 12.5-25 g  12.5-25 g IntraVENous PRN    glucagon (GLUCAGEN) injection 1 mg  1 mg IntraMUSCular PRN    acetaminophen (TYLENOL) tablet 650 mg  650 mg Oral Q6H PRN    HYDROmorphone (DILAUDID) tablet 2 mg  2 mg Oral Q4H PRN    fentaNYL (PF)  mcg/30 ml (ADULT)   IntraVENous TITRATE    sodium chloride (NS) flush 5-10 mL  5-10 mL IntraVENous Q8H    sodium chloride (NS) flush 5-10 mL  5-10 mL IntraVENous PRN    dextrose 5% - 0.45% NaCl with KCl 20 mEq/L infusion  75 mL/hr IntraVENous CONTINUOUS    ondansetron (ZOFRAN) injection 4 mg  4 mg IntraVENous Q4H PRN    DULoxetine (CYMBALTA) capsule 60 mg  60 mg Oral DAILY    nicotine (NICODERM CQ) 21 mg/24 hr patch 1 Patch  1 Patch TransDERmal DAILY    fentaNYL citrate (PF) injection 50 mcg  50 mcg IntraVENous Q2H PRN       Nesha Arnold NP  8/1/2018

## 2018-08-01 NOTE — PROGRESS NOTES
Music Therapy Assessment    Buck Voss [id number]  [id number]    1980  42 y.o.  female    Patient Telephone Number: [phone] (home)   Hoahaoism Affiliation: No preference   Language: English   Extended Emergency Contact Information  Primary Emergency Contact: Neli Nielsen  Address: [address] [facility], [address] Phone: [phone]  Mobile Phone: [phone]  Relation: Spouse   Patient Active Problem List    Diagnosis Date Noted    Abscess of abdominal cavity ([facility] 07/30/2018    Pancreatic abscess 07/30/2018    Postoperative examination 07/16/2018    Pancreatic fluid leak 07/16/2018    Postoperative fever 05/07/2018    Obesity (BMI 30-39.9) 04/26/2018    Pancreas cyst 04/20/2018    Alcohol intoxication ([address] 04/03/2018    Suicidal ideation 04/03/2018    High anion gap metabolic acidosis 24/93/5959    Elevated lipase 04/03/2018    Alcohol abuse 04/03/2018    Sinus tachycardia 04/03/2018    Pancreatic cyst 03/21/2018    Rash 06/24/2011    Headache(784.0) 06/24/2011    Nausea & vomiting 06/24/2011    Hepatitis 06/24/2011    Thrombocytopenia, unspecified ([facility] 06/24/2011    Abdominal pain, unspecified site 06/24/2011    Fever, unspecified 06/24/2011        Date: 8/1/2018       Mental Status:   [x] Alert [  ] Johanna Mathis [  ]  Confused  [  ] Minimally responsive    Communication Status: [  ] Impaired Speech [  ] Nonverbal -N/A    Physical Status:   [  ] Oxygen in use  [  ] Hard of Hearing [  ] Vision Impaired  [  ] Ambulatory  [  ] Ambulatory with assistance [  ] Non-ambulatory -N/A    Music Preferences, Background: N/A: Please see Session Observations below. Clinical Problem addressed: Support healthy coping, self-expression. Goal(s) met in session:  Physical/Pain management (Scale of 1-10):    Pre-session rating: Pt didn't rate but reported her pain being currently under control.   Post-session rating: N/A: Please see Session Observations below. [  ] Increased relaxation   [  ] Regulated breathing patterns  [  ] Decreased muscle tension   [  ] Minimized physical distress     Emotional/Psychological:  [x] Increased self-expression   [  ] Decreased aggressive behavior   [  ] Decreased sadness   [  ] Discussed healthy coping skills     [  ] Improved mood    [  ] Decreased withdrawn behavior     Social:  [  ] Decreased feelings of isolation/loneliness [  ] Positive social interaction   [  ] Provided support and/or comfort for family/friends    Spiritual:  [  ] Spiritual support    [  ] Expressed peace  [  ] Expressed suzie    [  ] Discussed beliefs    Techniques Utilized (Check all that apply):   [  ] Procedural support MT [  ] Music for relaxation [  ] Patient preferred music  [  ] Dora analysis  [  ] Song choice  [  ] Music for validation  [  ] Entrainment  [  ] Progressive muscle relax. [  ] Guided visualization  [  ] Martin Paige  [  ] Patient instrument playing [  ] Jory Kaminski writing  [  ] Flora Rosario along   [  ] Ivonne [place]  [  ] Sensory stimulation  [x] Active Listening  [  ] Music for spiritual support [  ] Making of CDs as gifts    Session Observations: This music therapist (MT) was visiting pt's roommate. Pt's roommate was singing songs she'd written and the pt was applauding and complimenting the songs. Pt's roommate invited MT to speak with pt about the pain she'd had this morning. MT provided active listening as pt shared about the issues causing her pain. She said it's under control now, but that earlier today she had the most severe pain of her life. MT provided active listening as pt processed this, and words of validation. Pt's roommate asked to pray over pt and pt was receptive to this. She prayed with pt. She expressed gratitude for MT's visit. Will follow as able.     Jose Francisco Stark MT-BC (Music Therapist-Board Certified)  Spiritual Care Department  Referral-based service

## 2018-08-01 NOTE — PROGRESS NOTES
Spiritual Care Assessment/Progress Note  [address]      NAME: Kendy Kwon      MRN: [id number]  AGE: 42 y.o.  SEX: female  Alevism Affiliation: No preference   Language: English     8/1/2018     Total Time (in minutes): 16     Spiritual Assessment begun in [facility] [phone] through conversation with:         [x]Patient        [] Family    [] Friend(s)        Reason for Consult: Palliative Care, Initial/Spiritual Assessment     Spiritual beliefs: (Please include comment if needed)     [x] Identifies with a suzie tradition:         [] Supported by a suzie community:            [] Claims no spiritual orientation:           [] Seeking spiritual identity:                [] Adheres to an individual form of spirituality:           [] Not able to assess:                           Identified resources for coping:      [x] Prayer                               [] Music                  [] Guided Imagery     [] Family/friends                 [] Pet visits     [] Devotional reading                         [] Unknown     [] Other:                                             Interventions offered during this visit: (See comments for more details)    Patient Interventions: Affirmation of emotions/emotional suffering, Affirmation of suzie, Initial/Spiritual assessment, patient floor           Plan of Care:     [] Support spiritual and/or cultural needs    [] Support AMD and/or advance care planning process      [] Support grieving process   [] Coordinate Rites and/or Rituals    [] Coordination with community clergy   [] No spiritual needs identified at this time   [] Detailed Plan of Care below (See Comments)  [] Make referral to Music Therapy  [] Make referral to Pet Therapy     [] Make referral to Addiction services  [] Make referral to [facility]  [] Make referral to Spiritual Care Partner  [] No future visits requested        [x] Follow up visits as needed     Comments: Initial spiritual assessment/pallative consult  in 500-2. No family present. Patient states has strong suzie, request  return at a later time. No spiritual needs identified at this time.  Elzbieta RichardsonMcLeod Health Clarend[facility] Intern  [phone])

## 2018-08-01 NOTE — PROGRESS NOTES
Patient started having increased pain after flushing Pigtail drain. Sat 85%, placed on 2 liters and gradually increased to 3 liters with Sats still high 80's%. Dr. Elzbieta Grissom aware. Patient received 2 mg of Dilaudid PO at 9:15 am. Lungs clear. Per Dr. Elzbieta Grissom, Start [address] No change to Pain mediations and consult palliative for pain management. Willy Ambrose NP  ATTENDING ADDENDUM  I supervised the APC and reviewed the note. We discussed the plan of care  Looking and feeling a little better. WIll hold off on irrigating the drain for now.

## 2018-08-01 NOTE — PROGRESS NOTES
luq drain flushed with 10 ml sterile saline per orders at 0930 am. At this time pt is screaming in severe LUQ pain. Currently getting ordered 500 ml NS fluid bolus due to low bp this am. Current VS 98.0 - 118 - 28- - 151/101, sats 84 to 88% om r/a. Call placed to Dr Demond Solares office, [facility] NP to return call. 97 - 87%. - 122/83 - 20.

## 2018-08-01 NOTE — CDMP QUERY
Please clarify if this patient is (was) being treated/managed for:  
 
=> Acute Pulmonary Insufficiency following procedure requiring supplemental oxygen  
=> Other explanation of clinical findings 
=> Clinically Undetermined (no explanation for clinical findings) The medical record reflects the following clinical findings, treatment, and risk factors. Risk Factors:  post abscess drain placement Clinical Indicators:8/1-nurses note 
luq drain flushed with 10 ml sterile saline per orders at 0930 am. At this time pt is screaming in severe LUQ pain. Currently getting ordered 500 ml NS fluid bolus due to low bp this am. Current VS 98.0 - 118 - 28- - 151/101, sats 84 to 88% om r/a. Call placed to Dr Jackie Bustamante office, Lan Cardona NP to return call. 97 - 87%. - 122/83 - 20. 
  8/1-progress note--Patient started having increased pain after flushing Pigtail drain. Sat 85%, placed on 2 liters and gradually increased to 3 liters with Sats still high 80's%. Dr. Nader Nova aware. Patient received 2 mg of Dilaudid PO at 9:15 am. Lungs clear. Per Dr. Nader Nova, Start Levaquin Treatment: supplemental oxygen 3 lts/min Please clarify and document your clinical opinion in the progress notes and discharge summary including the definitive and/or presumptive diagnosis, (suspected or probable), related to the above clinical findings. Please include clinical findings supporting your diagnosis. Thank you, 
       Amanda Virgen [facility], [address] [facility]

## 2018-08-01 NOTE — CONSULTS
Palliative Medicine Consult  Luis: [phone] ([phone]    Patient Name: Kellen Crabtree  YOB: 1980    Date of Initial Consult: August 1, 2018  Reason for Consult: Pain Management  Requesting Provider: Tirso Yepez NP  Primary Care Physician: Liz Terry NP     SUMMARY:   Kellen Crabtree is a 42 y.o. with a past history of anxiety, blurred vision, chest pain, depression, frequent headaches, obesity, pancreatic cyst, shortness of breath, stomch aches and swallowing difficulties  who was admitted on 7/30/2018 from home with a diagnosis of abscess of the abdominal cavity, pancreatic fluid leak now s/p drain placement. Current medical issues leading to Palliative Medicine involvement include: pain management. PALLIATIVE DIAGNOSES:   1. Abdominal Pain  2. Abdominal cyst  3. Decreased appetite       PLAN:   1. Pain: add basal dosing of 10mcq of fentanyl to current pca of 25 mcq q 6 min  2. Continue oral dilaudid 2mg tablet q 4 prn for refractory s/s as this is working well  3. Noted order for iv fentanyl 50mcq iv available as well and the pt has been using. Typically will only allow one breakthrough agent to avoid polypharmacy. 4. The plan is to dc the basal tomorrow  5. Goal is to transition to oral regimen ASAP and to limit opioid use  6. Noted history of alcoholism which places the pt at high risk for opioid misuse. With that said, she does have an acute need for opioids at this time and we should treat accordingly. 7. Will keep everything else the same today and simplify the [facility] tomorrow after I evaluate her. The basal should be enough to break the pain cycle over night. 8. Initial consult note routed to primary continuity provider  9.  Communicated plan of care with: Palliative IDT       GOALS OF CARE / TREATMENT PREFERENCES:     GOALS OF CARE:  Patient/Health Care Proxy Stated Goals: Prolong life      TREATMENT PREFERENCES:   Code Status: Full Code    Advance Care Planning:  Advance Care Planning 8/1/2018   Patient's Healthcare Decision Maker is: Legal Next of Kin   Primary Decision Maker Name Mary Bess   Primary Decision Maker Phone Number [phone]   Primary Decision Maker Relationship to Patient Spouse   Confirm Advance Directive -   Patient Would Like to Complete Advance Directive -   Does the patient have other document types -       Medical Interventions: Full interventions   Other Instructions: Other:    As far as possible, the palliative care team has discussed with patient / health care proxy about goals of care / treatment preferences for patient. HISTORY:     History obtained from: chart, patient    CHIEF COMPLAINT: abdominal pain    HPI/SUBJECTIVE:    The patient is:   [x] Verbal and participatory  [] Non-participatory due to:   Pt says her abdomen hurt really bad earlier when the drain was flushed and afterwards for about an hour. Pain has lessened but still present.   Dilaudid tabs in addition to the pca have helped    Clinical Pain Assessment (nonverbal scale for severity on nonverbal patients):   Clinical Pain Assessment  Severity: 3  Location: abdominal pain  Character: severe and diffuse  Duration: days  Factors: pca helping  Frequency: constant          Duration: for how long has pt been experiencing pain (e.g., 2 days, 1 month, years)  Frequency: how often pain is an issue (e.g., several times per day, once every few days, constant)     FUNCTIONAL ASSESSMENT:     Palliative Performance Scale (PPS):  PPS: 70       PSYCHOSOCIAL/SPIRITUAL SCREENING:     Palliative IDT has assessed this patient for cultural preferences / practices and a referral made as appropriate to needs (Cultural Services, Patient Advocacy, Ethics, etc.)    Advance Care Planning:  Advance Care Planning 8/1/2018   Patient's Healthcare Decision Maker is: Legal Next of Minesh 67   Primary Decision Maker Name Mary Bess   Primary Decision Maker Phone Number [phone] Primary Decision Maker Relationship to Patient Spouse   Confirm Advance Directive -   Patient Would Like to Complete Advance Directive -   Does the patient have other document types -       Any spiritual / Restorationist concerns:  [] Yes /  [x] No    Caregiver Burnout:  [] Yes /  [] No /  [x] No Caregiver Present      Anticipatory grief assessment:   [] Normal  / [] Maladaptive       ESAS Anxiety: Anxiety: 0    ESAS Depression: Depression: 0        REVIEW OF SYSTEMS:     Positive and pertinent negative findings in ROS are noted above in HPI. The following systems were [x] reviewed / [] unable to be reviewed as noted in HPI  Other findings are noted below. Systems: constitutional, ears/nose/mouth/throat, respiratory, gastrointestinal, genitourinary, musculoskeletal, integumentary, neurologic, psychiatric, endocrine. Positive findings noted below. Modified ESAS Completed by: provider   Fatigue: 0 Drowsiness: 0   Depression: 0 Pain: 3   Anxiety: 0 Nausea: 0   Anorexia: 0 Dyspnea: 0                    PHYSICAL EXAM:     From RN flowsheet:  Wt Readings from Last 3 Encounters:   07/30/18 143 lb (64.9 kg)   07/16/18 146 lb (66.2 kg)   06/27/18 149 lb (67.6 kg)     Blood pressure 97/61, pulse 91, temperature 98.8 °F (37.1 °C), resp. rate 18, SpO2 96 %.     Pain Scale 1: Numeric (0 - 10)  Pain Intensity 1: 7  Pain Onset 1: this am  Pain Location 1: Abdomen  Pain Orientation 1: Left, Upper  Pain Description 1: Sharp  Pain Intervention(s) 1: Medication (see MAR)  Last bowel movement, if known:     Constitutional: alert, conversant, nad  Eyes: pupils equal, anicteric  ENMT: no nasal discharge, moist mucous membranes  Cardiovascular: regular rhythm, distal pulses intact  Respiratory: breathing not labored, symmetric  Gastrointestinal: soft, +tenderness left side in the area of the drain  Musculoskeletal: no deformity, no tenderness to palpation  Skin: warm, dry  Neurologic: following commands, moving all extremities  Psychiatric: full affect, no hallucinations  Other:       HISTORY:     Active Problems:    Pancreatic abscess (7/30/2018)      Past Medical History:   Diagnosis Date    Abscess of abdominal cavity ([address] 7/30/2018    Anxiety     Blurred vision     Chest pain     Chronic pain     MUSCLE WEAKNESS,PANCREATIC CYST     Depression     Frequent headaches     Ill-defined condition     Muscle weakness     Obesity (BMI 30-39.9) 4/26/2018    Pancreatic cyst 3/21/2018    Shortness of breath     Stomach pain     Swallowing difficulty       Past Surgical History:   Procedure Laterality Date    HX GI      COLONOSCOPY    HX GYN  2005    endometriosis surgery    HX OTHER SURGICAL  04/20/2018    lap distal pancreatectomy converted to open-[facility]-DR. Sally Mederos      Family History   Problem Relation Age of Onset    Cancer Mother      colon    Cancer Father      skin    Anesth Problems Father      SUCCINYLCHOLINE  REACTION      History reviewed, no pertinent family history.   Social History   Substance Use Topics    Smoking status: Current Every Day Smoker     Packs/day: 1.00     Years: 25.00    Smokeless tobacco: Current User      Comment: STOP SMOKING BOOKLET PROVIDED    Alcohol use No     Allergies   Allergen Reactions    Amoxicillin Shortness of Breath and Rash     Pt has had keflex in the past      Current Facility-Administered Medications   Medication Dose Route Frequency    senna-docusate (PERICOLACE) 8.6-50 mg per tablet 2 Tab  2 Tab Oral DAILY    insulin lispro (HUMALOG) injection   SubCUTAneous AC&HS    glucose chewable tablet 16 g  4 Tab Oral PRN    dextrose (D50W) injection syrg 12.5-25 g  12.5-25 g IntraVENous PRN    glucagon (GLUCAGEN) injection 1 mg  1 mg IntraMUSCular PRN    levoFLOXacin (LEVAQUIN) 750 mg in D5W IVPB  750 mg IntraVENous Q24H    0.9% sodium chloride infusion  75 mL/hr IntraVENous CONTINUOUS    acetaminophen (TYLENOL) tablet 650 mg  650 mg Oral Q6H PRN    HYDROmorphone (DILAUDID) tablet 2 mg  2 mg Oral Q4H PRN    fentaNYL (PF)  mcg/30 ml (ADULT)   IntraVENous TITRATE    sodium chloride (NS) flush 5-10 mL  5-10 mL IntraVENous Q8H    sodium chloride (NS) flush 5-10 mL  5-10 mL IntraVENous PRN    ondansetron (ZOFRAN) injection 4 mg  4 mg IntraVENous Q4H PRN    DULoxetine (CYMBALTA) capsule 60 mg  60 mg Oral DAILY    nicotine (NICODERM CQ) 21 mg/24 hr patch 1 Patch  1 Patch TransDERmal DAILY    fentaNYL citrate (PF) injection 50 mcg  50 mcg IntraVENous Q2H PRN          LAB AND IMAGING FINDINGS:     Lab Results   Component Value Date/Time    WBC 16.9 (H) 08/01/2018 10:16 AM    HGB 11.7 08/01/2018 10:16 AM    PLATELET 341 (H) 17/06/9281 10:16 AM     Lab Results   Component Value Date/Time    Sodium 132 (L) 07/31/2018 09:45 AM    Potassium 3.6 07/31/2018 09:45 AM    Chloride 97 07/31/2018 09:45 AM    CO2 27 07/31/2018 09:45 AM    BUN 6 07/31/2018 09:45 AM    Creatinine 0.57 07/31/2018 09:45 AM    Calcium 9.2 07/31/2018 09:45 AM    Magnesium 1.9 06/26/2011 04:27 AM    Phosphorus 2.5 06/26/2011 04:27 AM      Lab Results   Component Value Date/Time    AST (SGOT) 11 (L) 07/31/2018 09:45 AM    Alk.  phosphatase 126 (H) 07/31/2018 09:45 AM    Protein, total 7.8 07/31/2018 09:45 AM    Albumin 2.8 (L) 07/31/2018 09:45 AM    Globulin 5.0 (H) 07/31/2018 09:45 AM     Lab Results   Component Value Date/Time    INR 1.0 02/24/2018 03:01 PM    Prothrombin time 10.0 02/24/2018 03:01 PM    aPTT 39.6 (H) 06/24/2011 11:40 PM      No results found for: IRON, FE, TIBC, IBCT, PSAT, FERR   No results found for: PH, PCO2, PO2  No components found for: [place]   Lab Results   Component Value Date/Time    CK 46 02/24/2018 03:01 PM    CK - MB <1.0 02/24/2018 03:01 PM                Total time: 70 minutes  Counseling / coordination time, spent as noted above: 45 minutes  > 50% counseling / coordination?: y    Prolonged service was provided for  []30 min   []75 min in face to face time in the presence of the patient, spent as noted above. Time Start:   Time End:   Note: this can only be billed with 64731 (initial) or 91603 (follow up). If multiple start / stop times, list each separately.

## 2018-08-01 NOTE — PROGRESS NOTES
Pts significant other, Karen Dumont, made aware of pts change in status, informed him she will be moved to room 444 for closure monitoring, primary nurse calling report

## 2018-08-01 NOTE — PROGRESS NOTES
Pt continues to have low sats. Placed on oxygen nasal canula at 2 lpm. , spoke with Veronica BILL. She will be in to see pt.

## 2018-08-01 NOTE — PROGRESS NOTES
Spiritual Care Partner Volunteer visited patient in Rm 500 on 8/1/18.   Documented by:  Chaplain Pratt MDiv, MS, [facility]  [phone])

## 2018-08-02 LAB
ATRIAL RATE: 125 BPM
CALCULATED P AXIS, ECG09: 33 DEGREES
CALCULATED R AXIS, ECG10: 34 DEGREES
CALCULATED T AXIS, ECG11: -155 DEGREES
DIAGNOSIS, 93000: NORMAL
ERYTHROCYTE [DISTWIDTH] IN BLOOD BY AUTOMATED COUNT: 16.4 % (ref 11.5–14.5)
GLUCOSE BLD STRIP.AUTO-MCNC: 200 MG/DL (ref 65–100)
GLUCOSE BLD STRIP.AUTO-MCNC: 210 MG/DL (ref 65–100)
GLUCOSE BLD STRIP.AUTO-MCNC: 244 MG/DL (ref 65–100)
GLUCOSE BLD STRIP.AUTO-MCNC: 269 MG/DL (ref 65–100)
HCT VFR BLD AUTO: 32.6 % (ref 35–47)
HGB BLD-MCNC: 10.1 G/DL (ref 11.5–16)
LACTATE SERPL-SCNC: 0.8 MMOL/L (ref 0.4–2)
MCH RBC QN AUTO: 26.6 PG (ref 26–34)
MCHC RBC AUTO-ENTMCNC: 31 G/DL (ref 30–36.5)
MCV RBC AUTO: 86 FL (ref 80–99)
NRBC # BLD: 0 K/UL (ref 0–0.01)
NRBC BLD-RTO: 0 PER 100 WBC
P-R INTERVAL, ECG05: 126 MS
PLATELET # BLD AUTO: 383 K/UL (ref 150–400)
PMV BLD AUTO: 10.5 FL (ref 8.9–12.9)
Q-T INTERVAL, ECG07: 232 MS
QRS DURATION, ECG06: 86 MS
QTC CALCULATION (BEZET), ECG08: 334 MS
RBC # BLD AUTO: 3.79 M/UL (ref 3.8–5.2)
SERVICE CMNT-IMP: ABNORMAL
VENTRICULAR RATE, ECG03: 125 BPM
WBC # BLD AUTO: 14.3 K/UL (ref 3.6–11)

## 2018-08-02 PROCEDURE — 77010033678 HC OXYGEN DAILY

## 2018-08-02 PROCEDURE — 74011250637 HC RX REV CODE- 250/637: Performed by: NURSE PRACTITIONER

## 2018-08-02 PROCEDURE — 36415 COLL VENOUS BLD VENIPUNCTURE: CPT | Performed by: NURSE PRACTITIONER

## 2018-08-02 PROCEDURE — 74011636637 HC RX REV CODE- 636/637: Performed by: NURSE PRACTITIONER

## 2018-08-02 PROCEDURE — 74011250636 HC RX REV CODE- 250/636: Performed by: NURSE PRACTITIONER

## 2018-08-02 PROCEDURE — 74011250636 HC RX REV CODE- 250/636: Performed by: SURGERY

## 2018-08-02 PROCEDURE — 74011250637 HC RX REV CODE- 250/637: Performed by: SURGERY

## 2018-08-02 PROCEDURE — 74011636637 HC RX REV CODE- 636/637: Performed by: PHYSICIAN ASSISTANT

## 2018-08-02 PROCEDURE — 74011250636 HC RX REV CODE- 250/636: Performed by: PHYSICIAN ASSISTANT

## 2018-08-02 PROCEDURE — 94762 N-INVAS EAR/PLS OXIMTRY CONT: CPT

## 2018-08-02 PROCEDURE — 74011250637 HC RX REV CODE- 250/637: Performed by: PHYSICIAN ASSISTANT

## 2018-08-02 PROCEDURE — 83605 ASSAY OF LACTIC ACID: CPT | Performed by: SURGERY

## 2018-08-02 PROCEDURE — 82962 GLUCOSE BLOOD TEST: CPT

## 2018-08-02 PROCEDURE — 85027 COMPLETE CBC AUTOMATED: CPT | Performed by: NURSE PRACTITIONER

## 2018-08-02 PROCEDURE — 65660000000 HC RM CCU STEPDOWN

## 2018-08-02 RX ORDER — INSULIN GLARGINE 100 [IU]/ML
20 INJECTION, SOLUTION SUBCUTANEOUS DAILY
Status: DISCONTINUED | OUTPATIENT
Start: 2018-08-02 | End: 2018-08-17 | Stop reason: HOSPADM

## 2018-08-02 RX ORDER — DIPHENHYDRAMINE HYDROCHLORIDE 50 MG/ML
25 INJECTION, SOLUTION INTRAMUSCULAR; INTRAVENOUS
Status: DISCONTINUED | OUTPATIENT
Start: 2018-08-02 | End: 2018-08-17 | Stop reason: HOSPADM

## 2018-08-02 RX ORDER — HYDROMORPHONE HYDROCHLORIDE 4 MG/1
4 TABLET ORAL EVERY 4 HOURS
Status: DISCONTINUED | OUTPATIENT
Start: 2018-08-02 | End: 2018-08-03

## 2018-08-02 RX ADMIN — HYDROMORPHONE HYDROCHLORIDE 4 MG: 4 TABLET ORAL at 15:42

## 2018-08-02 RX ADMIN — HYDROMORPHONE HYDROCHLORIDE 2 MG: 2 TABLET ORAL at 06:38

## 2018-08-02 RX ADMIN — ONDANSETRON 4 MG: 2 INJECTION INTRAMUSCULAR; INTRAVENOUS at 19:41

## 2018-08-02 RX ADMIN — Medication 10 ML: at 21:46

## 2018-08-02 RX ADMIN — HYDROMORPHONE HYDROCHLORIDE 4 MG: 4 TABLET ORAL at 19:41

## 2018-08-02 RX ADMIN — HYDROMORPHONE HYDROCHLORIDE 4 MG: 4 TABLET ORAL at 23:22

## 2018-08-02 RX ADMIN — Medication: at 01:31

## 2018-08-02 RX ADMIN — FENTANYL CITRATE 50 MCG: 50 INJECTION, SOLUTION INTRAMUSCULAR; INTRAVENOUS at 21:45

## 2018-08-02 RX ADMIN — SODIUM CHLORIDE 75 ML/HR: 900 INJECTION, SOLUTION INTRAVENOUS at 10:02

## 2018-08-02 RX ADMIN — ACETAMINOPHEN 650 MG: 325 TABLET, FILM COATED ORAL at 18:00

## 2018-08-02 RX ADMIN — HYDROMORPHONE HYDROCHLORIDE 2 MG: 2 TABLET ORAL at 11:42

## 2018-08-02 RX ADMIN — INSULIN LISPRO 3 UNITS: 100 INJECTION, SOLUTION INTRAVENOUS; SUBCUTANEOUS at 11:37

## 2018-08-02 RX ADMIN — INSULIN LISPRO 3 UNITS: 100 INJECTION, SOLUTION INTRAVENOUS; SUBCUTANEOUS at 06:37

## 2018-08-02 RX ADMIN — Medication 10 ML: at 06:33

## 2018-08-02 RX ADMIN — SENNOSIDES AND DOCUSATE SODIUM 2 TABLET: 8.6; 5 TABLET ORAL at 10:02

## 2018-08-02 RX ADMIN — Medication: at 08:10

## 2018-08-02 RX ADMIN — LEVOFLOXACIN 750 MG: 5 INJECTION, SOLUTION INTRAVENOUS at 10:02

## 2018-08-02 RX ADMIN — HYDROMORPHONE HYDROCHLORIDE 2 MG: 2 TABLET ORAL at 01:11

## 2018-08-02 RX ADMIN — INSULIN GLARGINE 20 UNITS: 100 INJECTION, SOLUTION SUBCUTANEOUS at 12:23

## 2018-08-02 RX ADMIN — INSULIN LISPRO 5 UNITS: 100 INJECTION, SOLUTION INTRAVENOUS; SUBCUTANEOUS at 17:52

## 2018-08-02 RX ADMIN — ACETAMINOPHEN 650 MG: 325 TABLET, FILM COATED ORAL at 11:37

## 2018-08-02 RX ADMIN — ACETAMINOPHEN 650 MG: 325 TABLET, FILM COATED ORAL at 03:22

## 2018-08-02 RX ADMIN — DIPHENHYDRAMINE HYDROCHLORIDE 25 MG: 50 INJECTION, SOLUTION INTRAMUSCULAR; INTRAVENOUS at 12:23

## 2018-08-02 RX ADMIN — FENTANYL CITRATE 50 MCG: 50 INJECTION, SOLUTION INTRAMUSCULAR; INTRAVENOUS at 17:52

## 2018-08-02 RX ADMIN — DULOXETINE HYDROCHLORIDE 60 MG: 60 CAPSULE, DELAYED RELEASE ORAL at 10:02

## 2018-08-02 RX ADMIN — ONDANSETRON 4 MG: 2 INJECTION INTRAMUSCULAR; INTRAVENOUS at 15:42

## 2018-08-02 RX ADMIN — INSULIN LISPRO 2 UNITS: 100 INJECTION, SOLUTION INTRAVENOUS; SUBCUTANEOUS at 21:45

## 2018-08-02 RX ADMIN — ONDANSETRON 4 MG: 2 INJECTION INTRAMUSCULAR; INTRAVENOUS at 11:43

## 2018-08-02 NOTE — DIABETES MGMT
[facility]    Elevated A1C Visit Note    Recommendations/ Comments: Pt seen for elevated A1c, no previous hx of diabetes noted     Current hospital DM medications: Lantus 20 units daily (first dose given today), Humalog for correction, normal sensitivity scale     Patient is a 42 y.o. female with no hx of diabetes but A1c of 10.1% indicative of poor blood glucose control  Pt reports that she has never been diagnosed with diabetes, does not check blood sugars at home and was not taking any DM medications at home. Hx of pancreatic cyst, s/p distal pancreatectomy 4/20/18  Other PMHx includes alcohol abuse, tobacco abuse, depression, dysphagia. Discussed with patient overview of diabetes and importance of keeping blood sugars controled by checking blood sugars, taking medications (per physician), diet. I was able to discuss blood sugar targets, monitoring, A1c. She was not feeling well and felt overwhelmed and asked me to return when she is closer to being discharged. She is concerned about forgetting information provided today. Will see pt again prior to discharge and will teach use of insulin pen and self-injection of insulin. A1c:   Lab Results   Component Value Date/Time    Hemoglobin A1c 10.1 (H) 08/01/2018 07:19 PM       Recent Glucose Results:   Lab Results   Component Value Date/Time     (H) 08/01/2018 07:19 PM    GLUCPOC 200 (H) 08/02/2018 11:20 AM    GLUCPOC 210 (H) 08/02/2018 06:32 AM    GLUCPOC 231 (H) 08/01/2018 09:43 PM        Lab Results   Component Value Date/Time    Creatinine 0.59 08/01/2018 07:19 PM       Active Orders   Diet    DIET GI LITE (POST SURGICAL)        Provided patient with the following: [x]          Diabetes Self-Care Guide               [x]         Glucometer- One Touch Verio flex                                                    [x]          Outpatient DTC contact number          Patient to follow up with PCP after discharge.     Will continue to follow as needed. Thank you.   Farhad Hernandez RD, [phone]  Pager: [phone]

## 2018-08-02 NOTE — PROGRESS NOTES
Patient discussed during IDR this am. CM noted patient was transferred from [facility], she is s/p Perc drain placed in one intra-abdominal abscess and another abscess drained on 7/31/18. Patient c/o severe pain, palliative care consult for pain management. Started on [address] [facility] today. Patient lives with her  and their 13year old daughter. She will be discharged home in care of her  and daughter. CM will continue to follow for any discharge needs that may arise. Luciana Garner MSA, RN. CRM.

## 2018-08-02 NOTE — PROGRESS NOTES
For completeness sake the patient experienced an acute septic event secondary to the irrigation of her catheter draing a pancreatic abscess. She responded nicely to resuscitation. The catheter will not be irrigated further.

## 2018-08-02 NOTE — PROGRESS NOTES
Palliative Medicine Consult  Luis: [phone])    Patient Name: Carola Rosario  YOB: 1980    Date of Initial Consult: August 1, 2018  Reason for Consult: Pain Management  Requesting Provider: Pito Ellsworth NP  Primary Care Physician: Roge Max NP     SUMMARY:   Carola Rosario is a 42 y.o. with a past history of anxiety, blurred vision, chest pain, depression, frequent headaches, obesity, pancreatic cyst, shortness of breath, stomch aches and swallowing difficulties  who was admitted on 7/30/2018 from home with a diagnosis of abscess of the abdominal cavity, pancreatic fluid leak now s/p drain placement. Current medical issues leading to Palliative Medicine involvement include: pain management. PALLIATIVE DIAGNOSES:   1. Abdominal Pain  2. Abdominal abscess   3. Decreased appetite       PLAN:   1. Pain exacerbation overnight. Pt used an abundance of fentanyl 3357mcq (demands 243 with 127 delivered)  2. Seems fentanyl only providing suboptimal results. 3. Pt able to take oral and responds well to oral dilaudid  4. Will schedule dilaudid 4mg q 4 (hold for any indication of oversedation; pt may refuse). Will dc 2mg order  5. Dc fentanyl pca when syringe is empty  6. Allow fentanyl 50mcq iv q 2 prn refractory symptoms. We may need to change this to iv dilaudid if ineffective. 7. Noted history of alcoholism which places the pt at high risk for opioid misuse. With that said, she does have an acute need for opioids at this time and we should treat accordingly. 8. Initial consult note routed to primary continuity provider  9.  Communicated plan of care with: Palliative IDT       GOALS OF CARE / TREATMENT PREFERENCES:     GOALS OF CARE:  Patient/Health Care Proxy Stated Goals: Prolong life      TREATMENT PREFERENCES:   Code Status: Full Code    Advance Care Planning:  Advance Care Planning 8/1/2018   Patient's Healthcare Decision Maker is: Legal Next of Kin   Primary Decision Maker Name Bobby Carrero   Primary Decision Maker Phone Number [phone]   Primary Decision Maker Relationship to Patient Spouse   Confirm Advance Directive -   Patient Would Like to Complete Advance Directive -   Does the patient have other document types -       Medical Interventions: Full interventions   Other Instructions: Other:    As far as possible, the palliative care team has discussed with patient / health care proxy about goals of care / treatment preferences for patient. HISTORY:     HPI/SUBJECTIVE:    The patient is:   [x] Verbal and participatory  [] Non-participatory due to:   Had a lot of pain last night. Right now feeling a little better. Admits to using a lot of the pca overnight, less today. Neg nausea.     Clinical Pain Assessment (nonverbal scale for severity on nonverbal patients):   Clinical Pain Assessment  Severity: 2  Location: abdominal pain  Character: severe and diffuse  Duration: days  Factors: pca helping  Frequency: constant          Duration: for how long has pt been experiencing pain (e.g., 2 days, 1 month, years)  Frequency: how often pain is an issue (e.g., several times per day, once every few days, constant)     FUNCTIONAL ASSESSMENT:     Palliative Performance Scale (PPS):  PPS: 70       PSYCHOSOCIAL/SPIRITUAL SCREENING:     Palliative IDT has assessed this patient for cultural preferences / practices and a referral made as appropriate to needs (Cultural Services, Patient Advocacy, Ethics, etc.)    Advance Care Planning:  Advance Care Planning 8/1/2018   Patient's Healthcare Decision Maker is: Legal Next of Minesh 67   Primary Decision Maker Name Bobby Carrero   Primary Decision Maker Phone Number [phone]   Primary Decision Maker Relationship to Patient Spouse   Confirm Advance Directive -   Patient Would Like to Complete Advance Directive -   Does the patient have other document types -       Any spiritual / Yarsani concerns:  [] Yes /  [x] No    Caregiver Burnout:  [] Yes /  [] No /  [x] No Caregiver Present      Anticipatory grief assessment:   [] Normal  / [] Maladaptive       ESAS Anxiety: Anxiety: 0    ESAS Depression: Depression: 0        REVIEW OF SYSTEMS:     Positive and pertinent negative findings in ROS are noted above in HPI. The following systems were [x] reviewed / [] unable to be reviewed as noted in HPI  Other findings are noted below. Systems: constitutional, ears/nose/mouth/throat, respiratory, gastrointestinal, genitourinary, musculoskeletal, integumentary, neurologic, psychiatric, endocrine. Positive findings noted below. Modified ESAS Completed by: provider   Fatigue: 0 Drowsiness: 0   Depression: 0 Pain: 2   Anxiety: 0 Nausea: 0   Anorexia: 0 Dyspnea: 0                    PHYSICAL EXAM:     From RN flowsheet:  Wt Readings from Last 3 Encounters:   08/02/18 150 lb 5.7 oz (68.2 kg)   07/30/18 143 lb (64.9 kg)   07/16/18 146 lb (66.2 kg)     Blood pressure 117/89, pulse (!) 109, temperature (!) 101.3 °F (38.5 °C), resp. rate 19, weight 150 lb 5.7 oz (68.2 kg), SpO2 95 %.     Pain Scale 1: Visual  Pain Intensity 1:  (pt sleeping)  Pain Onset 1: post op  Pain Location 1: Abdomen  Pain Orientation 1: Left, Upper  Pain Description 1: Sharp  Pain Intervention(s) 1: Medication (see MAR), Encouraged PCA  Last bowel movement, if known:     Constitutional: alert, conversant, nad  Eyes: pupils equal, anicteric  ENMT: no nasal discharge, moist mucous membranes  Cardiovascular: regular rhythm, distal pulses intact  Respiratory: breathing not labored, symmetric  Gastrointestinal: soft, +tenderness left side in the area of the drain, palpable mass left abdomen  Musculoskeletal: no deformity, no tenderness to palpation  Skin: warm, dry  Neurologic: following commands, moving all extremities  Psychiatric: full affect, no hallucinations  Other:       HISTORY:     Active Problems:    Pancreatic abscess (7/30/2018)      Past Medical History: Diagnosis Date    [facility]cess [facility] abdominal [facility] ([facility]) 7/30/2018    Anxiety     Blurred vision     Chest pain     Chronic pain     MUSCLE WEAKNESS,PANCREATIC CYST     Depression     Frequent headaches     Ill-defined condition     Muscle weakness     Obesity (BMI 30-39.9) 4/26/2018    Pancreatic cyst 3/21/2018    Shortness of breath     Stomach pain     Swallowing difficulty       Past Surgical History:   Procedure Laterality Date    HX GI      COLONOSCOPY    HX GYN  2005    endometriosis surgery    HX OTHER SURGICAL  04/20/2018    lap distal pancreatectomy converted to open-[facility]-DR. Grisel Arana      Family History   Problem Relation Age of Onset    Cancer Mother      colon    Cancer Father      skin    Anesth Problems Father      SUCCINYLCHOLINE  REACTION      History reviewed, no pertinent family history.   Social History   Substance Use Topics    Smoking status: Current Every Day Smoker     Packs/day: 1.00     Years: 25.00    Smokeless tobacco: Current User      Comment: STOP SMOKING BOOKLET PROVIDED    Alcohol use No     Allergies   Allergen Reactions    Amoxicillin Shortness of Breath and Rash     Pt has had keflex in the past      Current Facility-Administered Medications   Medication Dose Route Frequency    insulin glargine (LANTUS) injection 20 Units  20 Units SubCUTAneous DAILY    diphenhydrAMINE (BENADRYL) injection 25 mg  25 mg IntraVENous Q6H PRN    senna-docusate (PERICOLACE) 8.6-50 mg per tablet 2 Tab  2 Tab Oral DAILY    insulin lispro (HUMALOG) injection   SubCUTAneous AC&HS    glucose chewable tablet 16 g  4 Tab Oral PRN    dextrose (D50W) injection syrg 12.5-25 g  12.5-25 g IntraVENous PRN    glucagon (GLUCAGEN) injection 1 mg  1 mg IntraMUSCular PRN    levoFLOXacin (LEVAQUIN) 750 mg in D5W IVPB  750 mg IntraVENous Q24H    0.9% sodium chloride infusion  75 mL/hr IntraVENous CONTINUOUS    sodium chloride (NS) flush 5-10 mL  5-10 mL IntraVENous PRN    acetaminophen (TYLENOL) tablet 650 mg  650 mg Oral Q6H PRN    HYDROmorphone (DILAUDID) tablet 2 mg  2 mg Oral Q4H PRN    fentaNYL (PF)  mcg/30 ml (ADULT)   IntraVENous TITRATE    sodium chloride (NS) flush 5-10 mL  5-10 mL IntraVENous Q8H    sodium chloride (NS) flush 5-10 mL  5-10 mL IntraVENous PRN    ondansetron (ZOFRAN) injection 4 mg  4 mg IntraVENous Q4H PRN    DULoxetine (CYMBALTA) capsule 60 mg  60 mg Oral DAILY    nicotine (NICODERM CQ) 21 mg/24 hr patch 1 Patch  1 Patch TransDERmal DAILY    fentaNYL citrate (PF) injection 50 mcg  50 mcg IntraVENous Q2H PRN          LAB AND IMAGING FINDINGS:     Lab Results   Component Value Date/Time    WBC 14.3 (H) 08/02/2018 02:45 AM    HGB 10.1 (L) 08/02/2018 02:45 AM    PLATELET 894 27/11/4440 02:45 AM     Lab Results   Component Value Date/Time    Sodium 129 (L) 08/01/2018 07:19 PM    Potassium 3.4 (L) 08/01/2018 07:19 PM    Chloride 96 (L) 08/01/2018 07:19 PM    CO2 26 08/01/2018 07:19 PM    BUN 6 08/01/2018 07:19 PM    Creatinine 0.59 08/01/2018 07:19 PM    Calcium 7.7 (L) 08/01/2018 07:19 PM    Magnesium 1.9 06/26/2011 04:27 AM    Phosphorus 2.5 06/26/2011 04:27 AM      Lab Results   Component Value Date/Time    AST (SGOT) 11 (L) 07/31/2018 09:45 AM    Alk.  phosphatase 126 (H) 07/31/2018 09:45 AM    Protein, total 7.8 07/31/2018 09:45 AM    Albumin 2.8 (L) 07/31/2018 09:45 AM    Globulin 5.0 (H) 07/31/2018 09:45 AM     Lab Results   Component Value Date/Time    INR 1.0 02/24/2018 03:01 PM    Prothrombin time 10.0 02/24/2018 03:01 PM    aPTT 39.6 (H) 06/24/2011 11:40 PM      No results found for: IRON, FE, TIBC, IBCT, PSAT, FERR   No results found for: PH, PCO2, PO2  No components found for: [place]   Lab Results   Component Value Date/Time    CK 46 02/24/2018 03:01 PM    CK - MB <1.0 02/24/2018 03:01 PM                Total time: 35 minutes  Counseling / coordination time, spent as noted above: 30 minutes  > 50% counseling / coordination?: y    Prolonged service was provided for  []30 min   []75 min in face to face time in the presence of the patient, spent as noted above. Time Start:   Time End:   Note: this can only be billed with 70564 (initial) or 26273 (follow up). If multiple start / stop times, list each separately.

## 2018-08-02 NOTE — PROGRESS NOTES
· Surgery Progress Note    8/2/2018    Admit Date: 7/30/2018    Subjective:       Pt has complaint of pain and is stressed and tearful today \"Im having an emotional day\", febrile to 102  3 times in past 24 hoursl . She reports area around her drain feels herrmann and more firm, now c/o pain pain radiating up towards left shoulder  Pts pain present - adequately treated. No SOB. No CP. Pt is ambulating. Patient 's current diet is Regular. . Pt reports  nausea and no vomiting. Pt reports fever and chills    Bowel Movements: None        Objective:     Blood pressure 117/89, pulse (!) 109, temperature (!) 101.3 °F (38.5 °C), resp. rate 19, weight 150 lb 5.7 oz (68.2 kg), SpO2 95 %.         07/31 1901 - 08/02 0700  In: 2473.8 [P.O.:1740;  I.V.:723.8]  Out: 200 [Urine:200]    General appearance: alert, cooperative, no distress, tearful  Lungs: clear to auscultation bilaterally  Heart: regular rate and rhythm  Abd:soft, protuberant, tenderness mild - left abdomen around drain site, , without guarding, without rebound, drain LUQ draining thin brown output, site is clean   Extremities: no edema  Neurologic: Grossly normal    Data Review    Recent Results (from the past 12 hour(s))   CBC W/O DIFF    Collection Time: 08/02/18  2:45 AM   Result Value Ref Range    WBC 14.3 (H) 3.6 - 11.0 K/uL    RBC 3.79 (L) 3.80 - 5.20 M/uL    HGB 10.1 (L) 11.5 - 16.0 g/dL    HCT 32.6 (L) 35.0 - 47.0 %    MCV 86.0 80.0 - 99.0 FL    MCH 26.6 26.0 - 34.0 PG    MCHC 31.0 30.0 - 36.5 g/dL    RDW 16.4 (H) 11.5 - 14.5 %    PLATELET 684 213 - 616 K/uL    MPV 10.5 8.9 - 12.9 FL    NRBC 0.0 0  WBC    ABSOLUTE NRBC 0.00 0.00 - 0.01 K/uL   LACTIC ACID    Collection Time: 08/02/18  2:45 AM   Result Value Ref Range    Lactic acid 0.8 0.4 - 2.0 MMOL/L   GLUCOSE, POC    Collection Time: 08/02/18  6:32 AM   Result Value Ref Range    Glucose (POC) 210 (H) 65 - 100 mg/dL    Performed by Danilo Diaz, POC    Collection Time: 08/02/18 11:20 AM Result Value Ref Range    Glucose (POC) 200 (H) 65 - 100 mg/dL    Performed by [facility]        Assessment:     Active Problems:    Pancreatic abscess (7/30/2018)    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 Catie Bocanegra PA

## 2018-08-02 NOTE — PROGRESS NOTES
TRANSFER - IN REPORT:    Verbal report received from Sravan Johnson RN(name) on Enedelia Bustos  being received from 5E(unit) for routine progression of care      Report consisted of patients Situation, Background, Assessment and   Recommendations(SBAR). Information from the following report(s) SBAR, Kardex, Procedure Summary, Intake/Output, MAR, Accordion, Recent Results, Med Rec Status and Alarm Parameters  was reviewed with the receiving nurse. Opportunity for questions and clarification was provided. Assessment completed upon patients arrival to unit and care assumed.

## 2018-08-02 NOTE — PROGRESS NOTES
Problem: Falls - Risk of  Goal: *Absence of Falls  Document Kirstie Fall Risk and appropriate interventions in the flowsheet.    Outcome: Progressing Towards Goal  Fall Risk Interventions:            Medication Interventions: Patient to call before getting OOB, Teach patient to arise slowly

## 2018-08-02 NOTE — PROGRESS NOTES
Spoke with Dr Jimbo Shankar to make him aware of pt change in status, rapid response called due to elevated temp, heart rate, decreased sats, continued pain. Orders received to transfer pt to [facility] and to initiate sepsis protocol. TRANSFER - OUT REPORT:    Verbal report given to Archana FELIX(name) on Kellen Crabtree  being transferred to [facility](unit) for urgent transfer       Report consisted of patients Situation, Background, Assessment and   Recommendations(SBAR). Information from the following report(s) SBAR, Kardex and Recent Results was reviewed with the receiving nurse. Lines:   Peripheral IV 07/31/18 Left Hand (Active)   Site Assessment Clean, dry, & intact 8/1/2018 10:26 AM   Phlebitis Assessment 0 8/1/2018 10:26 AM   Infiltration Assessment 0 8/1/2018 10:26 AM   Dressing Status Clean, dry, & intact 8/1/2018 10:26 AM   Dressing Type Transparent 8/1/2018 10:26 AM   Hub Color/Line Status Infusing 8/1/2018 10:26 AM   Alcohol Cap Used Yes 8/1/2018 10:26 AM       Peripheral IV 08/01/18 Left; Lower Arm (Active)   Site Assessment Clean, dry, & intact 8/1/2018  1:03 PM   Phlebitis Assessment 0 8/1/2018  1:03 PM   Infiltration Assessment 0 8/1/2018  1:03 PM   Dressing Status Clean, dry, & intact 8/1/2018  1:03 PM   Dressing Type Transparent 8/1/2018  1:03 PM        Opportunity for questions and clarification was provided.       Patient transported with:   Monitor  O2 @ 4 liters  Registered Nurse

## 2018-08-02 NOTE — ROUTINE PROCESS
Primary Nurse Fernando Esquivel and ISIDRO Zavala performed a dual skin assessment on this patient Impairment noted- see wound doc flow sheet  Fredrick score is 21

## 2018-08-03 ENCOUNTER — APPOINTMENT (OUTPATIENT)
Dept: CT IMAGING | Age: 38
DRG: 438 | End: 2018-08-03
Attending: PHYSICIAN ASSISTANT
Payer: COMMERCIAL

## 2018-08-03 LAB
BACTERIA SPEC CULT: ABNORMAL
BACTERIA SPEC CULT: NORMAL
GLUCOSE BLD STRIP.AUTO-MCNC: 104 MG/DL (ref 65–100)
GLUCOSE BLD STRIP.AUTO-MCNC: 124 MG/DL (ref 65–100)
GLUCOSE BLD STRIP.AUTO-MCNC: 138 MG/DL (ref 65–100)
GLUCOSE BLD STRIP.AUTO-MCNC: 153 MG/DL (ref 65–100)
GRAM STN SPEC: NORMAL
SERVICE CMNT-IMP: ABNORMAL
SERVICE CMNT-IMP: NORMAL

## 2018-08-03 PROCEDURE — 74011000258 HC RX REV CODE- 258: Performed by: SURGERY

## 2018-08-03 PROCEDURE — 74011636320 HC RX REV CODE- 636/320: Performed by: SURGERY

## 2018-08-03 PROCEDURE — 74011250636 HC RX REV CODE- 250/636: Performed by: NURSE PRACTITIONER

## 2018-08-03 PROCEDURE — 76450000000

## 2018-08-03 PROCEDURE — 82962 GLUCOSE BLOOD TEST: CPT

## 2018-08-03 PROCEDURE — 74011250637 HC RX REV CODE- 250/637: Performed by: NURSE PRACTITIONER

## 2018-08-03 PROCEDURE — 65660000000 HC RM CCU STEPDOWN

## 2018-08-03 PROCEDURE — 74011250637 HC RX REV CODE- 250/637: Performed by: SURGERY

## 2018-08-03 PROCEDURE — 74011250636 HC RX REV CODE- 250/636: Performed by: SURGERY

## 2018-08-03 PROCEDURE — 74011250637 HC RX REV CODE- 250/637: Performed by: PHYSICIAN ASSISTANT

## 2018-08-03 PROCEDURE — 74011636637 HC RX REV CODE- 636/637: Performed by: PHYSICIAN ASSISTANT

## 2018-08-03 PROCEDURE — 74011636637 HC RX REV CODE- 636/637: Performed by: NURSE PRACTITIONER

## 2018-08-03 PROCEDURE — 74177 CT ABD & PELVIS W/CONTRAST: CPT

## 2018-08-03 RX ORDER — SODIUM CHLORIDE 0.9 % (FLUSH) 0.9 %
10 SYRINGE (ML) INJECTION
Status: COMPLETED | OUTPATIENT
Start: 2018-08-03 | End: 2018-08-03

## 2018-08-03 RX ADMIN — Medication 10 ML: at 13:02

## 2018-08-03 RX ADMIN — Medication 10 ML: at 11:50

## 2018-08-03 RX ADMIN — DULOXETINE HYDROCHLORIDE 60 MG: 60 CAPSULE, DELAYED RELEASE ORAL at 08:11

## 2018-08-03 RX ADMIN — FENTANYL CITRATE 50 MCG: 50 INJECTION, SOLUTION INTRAMUSCULAR; INTRAVENOUS at 16:39

## 2018-08-03 RX ADMIN — SODIUM CHLORIDE 75 ML/HR: 900 INJECTION, SOLUTION INTRAVENOUS at 16:39

## 2018-08-03 RX ADMIN — HYDROMORPHONE HYDROCHLORIDE 6 MG: 4 TABLET ORAL at 15:57

## 2018-08-03 RX ADMIN — HYDROMORPHONE HYDROCHLORIDE 4 MG: 4 TABLET ORAL at 13:00

## 2018-08-03 RX ADMIN — Medication 10 ML: at 16:40

## 2018-08-03 RX ADMIN — FENTANYL CITRATE 50 MCG: 50 INJECTION, SOLUTION INTRAMUSCULAR; INTRAVENOUS at 22:08

## 2018-08-03 RX ADMIN — LEVOFLOXACIN 750 MG: 5 INJECTION, SOLUTION INTRAVENOUS at 11:50

## 2018-08-03 RX ADMIN — HYDROMORPHONE HYDROCHLORIDE 6 MG: 4 TABLET ORAL at 20:27

## 2018-08-03 RX ADMIN — HYDROMORPHONE HYDROCHLORIDE 6 MG: 4 TABLET ORAL at 23:53

## 2018-08-03 RX ADMIN — INSULIN GLARGINE 20 UNITS: 100 INJECTION, SOLUTION SUBCUTANEOUS at 09:21

## 2018-08-03 RX ADMIN — FENTANYL CITRATE 50 MCG: 50 INJECTION, SOLUTION INTRAMUSCULAR; INTRAVENOUS at 19:25

## 2018-08-03 RX ADMIN — ACETAMINOPHEN 650 MG: 325 TABLET, FILM COATED ORAL at 00:57

## 2018-08-03 RX ADMIN — FENTANYL CITRATE 50 MCG: 50 INJECTION, SOLUTION INTRAMUSCULAR; INTRAVENOUS at 00:53

## 2018-08-03 RX ADMIN — HYDROMORPHONE HYDROCHLORIDE 4 MG: 4 TABLET ORAL at 04:27

## 2018-08-03 RX ADMIN — SODIUM CHLORIDE 100 ML: 900 INJECTION, SOLUTION INTRAVENOUS at 11:17

## 2018-08-03 RX ADMIN — FENTANYL CITRATE 50 MCG: 50 INJECTION, SOLUTION INTRAMUSCULAR; INTRAVENOUS at 11:50

## 2018-08-03 RX ADMIN — SODIUM CHLORIDE 75 ML/HR: 900 INJECTION, SOLUTION INTRAVENOUS at 00:47

## 2018-08-03 RX ADMIN — ACETAMINOPHEN 650 MG: 325 TABLET, FILM COATED ORAL at 19:28

## 2018-08-03 RX ADMIN — IOPAMIDOL 100 ML: 755 INJECTION, SOLUTION INTRAVENOUS at 11:16

## 2018-08-03 RX ADMIN — Medication 10 ML: at 06:33

## 2018-08-03 RX ADMIN — Medication 10 ML: at 11:16

## 2018-08-03 RX ADMIN — SENNOSIDES AND DOCUSATE SODIUM 2 TABLET: 8.6; 5 TABLET ORAL at 08:11

## 2018-08-03 RX ADMIN — INSULIN LISPRO 2 UNITS: 100 INJECTION, SOLUTION INTRAVENOUS; SUBCUTANEOUS at 06:32

## 2018-08-03 RX ADMIN — FENTANYL CITRATE 50 MCG: 50 INJECTION, SOLUTION INTRAMUSCULAR; INTRAVENOUS at 06:26

## 2018-08-03 RX ADMIN — HYDROMORPHONE HYDROCHLORIDE 4 MG: 4 TABLET ORAL at 08:11

## 2018-08-03 NOTE — PROGRESS NOTES
CM received a call from [facility] regarding discharge planning. CM to call [phone] ext [phone] for assistance with discharge needs.  Rickie Peoples MSA, RN, CRM

## 2018-08-03 NOTE — DIABETES MGMT
DTC follow up Note    Recommendations/ Comments: Chart reviewed for follow up for hospital glucose management. Blood sugars improving with addition of Lantus 20 units. If post-prandial hyperglycemia occurs during the weekend, consider starting low dose of Humalog AC (2-3 units)    Current hospital DM medication: Humalog for correction, normal sensitivity scale and Lantus 20 units once daily    Chart reviewed on [address] [place] [address] [place]. Patient is a 42 y.o. female with no hx of diabetes noted, ? If new DM diagnosis s/p distal pancreatectomy 04/2018, PMHx includes tobacco abuse, alcohol abuse, dysphagia, hepatitis    A1c:   Lab Results   Component Value Date/Time    Hemoglobin A1c 10.1 (H) 08/01/2018 07:19 PM       Recent Glucose Results:   Lab Results   Component Value Date/Time    GLUCPOC 138 (H) 08/03/2018 04:16 PM    GLUCPOC 104 (H) 08/03/2018 12:00 PM    GLUCPOC 153 (H) 08/03/2018 06:29 AM        Lab Results   Component Value Date/Time    Creatinine 0.59 08/01/2018 07:19 PM     Estimated Creatinine Clearance: 118.8 mL/min (based on Cr of 0.59). Active Orders   Diet    DIET GI LITE (POST SURGICAL)        PO intake:   Patient Vitals for the past 72 hrs:   % Diet Eaten   08/03/18 1306 25 %   08/03/18 0734 0 %   08/01/18 1026 75 %   07/31/18 1918 75 %       Will continue to follow as needed.     Thank you  Albert Briceno RD, [address] [facility]  Pager: [phone]

## 2018-08-03 NOTE — PROGRESS NOTES
Palliative Medicine Consult  Luis: [phone] ([phone])    Patient Name: Noreen Vallejo  YOB: 1980    Date of Initial Consult: August 1, 2018  Reason for Consult: Pain Management  Requesting Provider: Alfreda Rodriguez NP  Primary Care Physician: Nathan Rollins NP     SUMMARY:   Noreen Vallejo is a 42 y.o. with a past history of anxiety, blurred vision, chest pain, depression, frequent headaches, obesity, pancreatic cyst, shortness of breath, stomch aches and swallowing difficulties  who was admitted on 7/30/2018 from home with a diagnosis of abscess of the abdominal cavity, pancreatic fluid leak now s/p drain placement. Current medical issues leading to Palliative Medicine involvement include: pain management. PALLIATIVE DIAGNOSES:   1. Abdominal Pain  2. Abdominal abscess   3. Decreased appetite       PLAN:   1. Pain better but not optimized on current regimen. Pt is requiring 5 breakthrough doses/24 hours for refractory s/s.    2. Will increase oral dilaudid to 6mg q 4 from 4mg  (hold for any indication of oversedation; pt may refuse)  3. Allow fentanyl 50mcq iv q 2 prn refractory symptoms. 5 doses/ 24 hours. 4. Discussed goal of using the fentanyl sparingly with the increase of the oral dilaudid  5. Overall pt is using less than she was on the pca with better results  6. Will follow up on Monday  7. Initial consult note routed to primary continuity provider  8.  Communicated plan of care with: Palliative IDT       GOALS OF CARE / TREATMENT PREFERENCES:     GOALS OF CARE:  Patient/Health Care Proxy Stated Goals: Prolong life      TREATMENT PREFERENCES:   Code Status: Full Code    Advance Care Planning:  Advance Care Planning 8/1/2018   Patient's Healthcare Decision Maker is: Legal Next of Minesh 67   Primary Decision Maker Name Eulalio Gibbs   Primary Decision Maker Phone Number [phone]   Primary Decision Maker Relationship to Patient Spouse   Confirm Advance Directive -   Patient Would Like to Complete Advance Directive -   Does the patient have other document types -       Medical Interventions: Full interventions   Other Instructions: Other:    As far as possible, the palliative care team has discussed with patient / health care proxy about goals of care / treatment preferences for patient. HISTORY:     HPI/SUBJECTIVE:    The patient is:   [x] Verbal and participatory  [] Non-participatory due to:   Pain control not optimized but has improved some.  Pt requesting an increase in the dose of the oral..  Able to ambulate the halls today    Clinical Pain Assessment (nonverbal scale for severity on nonverbal patients):   Clinical Pain Assessment  Severity: 2  Location: abdominal pain  Character: severe and diffuse  Duration: days  Factors: pca helping  Frequency: constant          Duration: for how long has pt been experiencing pain (e.g., 2 days, 1 month, years)  Frequency: how often pain is an issue (e.g., several times per day, once every few days, constant)     FUNCTIONAL ASSESSMENT:     Palliative Performance Scale (PPS):  PPS: 70       PSYCHOSOCIAL/SPIRITUAL SCREENING:     Palliative IDT has assessed this patient for cultural preferences / practices and a referral made as appropriate to needs (Cultural Services, Patient Advocacy, Ethics, etc.)    Advance Care Planning:  Advance Care Planning 8/1/2018   Patient's Healthcare Decision Maker is: Legal Next of Minesh 67   Primary Decision Maker Name Branden Ghosh   Primary Decision Maker Phone Number [phone]   Primary Decision Maker Relationship to Patient Spouse   Confirm Advance Directive -   Patient Would Like to Complete Advance Directive -   Does the patient have other document types -       Any spiritual / Spiritism concerns:  [] Yes /  [x] No    Caregiver Burnout:  [] Yes /  [] No /  [x] No Caregiver Present      Anticipatory grief assessment:   [] Normal  / [] Maladaptive       ESAS Anxiety: Anxiety: 0    ESAS Depression: Depression: 0        REVIEW OF SYSTEMS:     Positive and pertinent negative findings in ROS are noted above in HPI. The following systems were [x] reviewed / [] unable to be reviewed as noted in HPI  Other findings are noted below. Systems: constitutional, ears/nose/mouth/throat, respiratory, gastrointestinal, genitourinary, musculoskeletal, integumentary, neurologic, psychiatric, endocrine. Positive findings noted below. Modified ESAS Completed by: provider   Fatigue: 0 Drowsiness: 0   Depression: 0 Pain: 2   Anxiety: 0 Nausea: 0   Anorexia: 0 Dyspnea: 0                    PHYSICAL EXAM:     From RN flowsheet:  Wt Readings from Last 3 Encounters:   08/03/18 150 lb 12.7 oz (68.4 kg)   07/30/18 143 lb (64.9 kg)   07/16/18 146 lb (66.2 kg)     Blood pressure 123/72, pulse (!) 104, temperature 100 °F (37.8 °C), resp. rate 18, weight 150 lb 12.7 oz (68.4 kg), SpO2 97 %.     Pain Scale 1: Numeric (0 - 10)  Pain Intensity 1: 7  Pain Onset 1: post op  Pain Location 1: Abdomen  Pain Orientation 1: Left  Pain Description 1: Aching, Cramping  Pain Intervention(s) 1: Medication (see MAR)  Last bowel movement, if known:     Constitutional: alert, conversant, nad  Eyes: pupils equal, anicteric  ENMT: no nasal discharge, moist mucous membranes  Cardiovascular: regular rhythm, distal pulses intact  Respiratory: breathing not labored, symmetric  Gastrointestinal: soft, +tenderness left side in the area of the drain, palpable mass left abdomen  Musculoskeletal: no deformity, no tenderness to palpation  Skin: warm, dry  Neurologic: following commands, moving all extremities  Psychiatric: full affect, no hallucinations  Other: pigtail cath without drainage       HISTORY:     Active Problems:    Pancreatic abscess (7/30/2018)      Past Medical History:   Diagnosis Date    Abscess of abdominal cavity (HCC) 7/30/2018    Anxiety     Blurred vision     Chest pain     Chronic pain     MUSCLE WEAKNESS,PANCREATIC CYST     Depression  Frequent headaches     Ill-defined condition     Muscle weakness     Obesity (BMI 30-39.9) 4/26/2018    Pancreatic cyst 3/21/2018    Shortness of breath     Stomach pain     Swallowing difficulty       Past Surgical History:   Procedure Laterality Date    HX GI      COLONOSCOPY    HX GYN  2005    endometriosis surgery    HX OTHER SURGICAL  04/20/2018    lap distal pancreatectomy converted to [facility]-DR. Vertie Fabry      Family History   Problem Relation Age of Onset    Cancer Mother      colon    Cancer Father      skin    Anesth Problems Father      SUCCINYLCHOLINE  REACTION      History reviewed, no pertinent family history.   Social History   Substance Use Topics    Smoking status: Current Every Day Smoker     Packs/day: 1.00     Years: 25.00    Smokeless tobacco: Current User      Comment: STOP SMOKING BOOKLET PROVIDED    Alcohol use No     Allergies   Allergen Reactions    Amoxicillin Shortness of Breath and Rash     Pt has had keflex in the past      Current Facility-Administered Medications   Medication Dose Route Frequency    HYDROmorphone (DILAUDID) tablet 6 mg  6 mg Oral Q4H    insulin glargine (LANTUS) injection 20 Units  20 Units SubCUTAneous DAILY    diphenhydrAMINE (BENADRYL) injection 25 mg  25 mg IntraVENous Q6H PRN    senna-docusate (PERICOLACE) 8.6-50 mg per tablet 2 Tab  2 Tab Oral DAILY    insulin lispro (HUMALOG) injection   SubCUTAneous AC&HS    glucose chewable tablet 16 g  4 Tab Oral PRN    dextrose (D50W) injection syrg 12.5-25 g  12.5-25 g IntraVENous PRN    glucagon (GLUCAGEN) injection 1 mg  1 mg IntraMUSCular PRN    levoFLOXacin (LEVAQUIN) 750 mg in D5W IVPB  750 mg IntraVENous Q24H    0.9% sodium chloride infusion  75 mL/hr IntraVENous CONTINUOUS    sodium chloride (NS) flush 5-10 mL  5-10 mL IntraVENous PRN    acetaminophen (TYLENOL) tablet 650 mg  650 mg Oral Q6H PRN    sodium chloride (NS) flush 5-10 mL  5-10 mL IntraVENous Q8H    sodium chloride (NS) flush 5-10 mL  5-10 mL IntraVENous PRN    ondansetron (ZOFRAN) injection 4 mg  4 mg IntraVENous Q4H PRN    DULoxetine (CYMBALTA) capsule 60 mg  60 mg Oral DAILY    nicotine (NICODERM CQ) 21 mg/24 hr patch 1 Patch  1 Patch TransDERmal DAILY    fentaNYL citrate (PF) injection 50 mcg  50 mcg IntraVENous Q2H PRN          LAB AND IMAGING FINDINGS:     Lab Results   Component Value Date/Time    WBC 14.3 (H) 08/02/2018 02:45 AM    HGB 10.1 (L) 08/02/2018 02:45 AM    PLATELET 293 23/80/0808 02:45 AM     Lab Results   Component Value Date/Time    Sodium 129 (L) 08/01/2018 07:19 PM    Potassium 3.4 (L) 08/01/2018 07:19 PM    Chloride 96 (L) 08/01/2018 07:19 PM    CO2 26 08/01/2018 07:19 PM    BUN 6 08/01/2018 07:19 PM    Creatinine 0.59 08/01/2018 07:19 PM    Calcium 7.7 (L) 08/01/2018 07:19 PM    Magnesium 1.9 06/26/2011 04:27 AM    Phosphorus 2.5 06/26/2011 04:27 AM      Lab Results   Component Value Date/Time    AST (SGOT) 11 (L) 07/31/2018 09:45 AM    Alk. phosphatase 126 (H) 07/31/2018 09:45 AM    Protein, total 7.8 07/31/2018 09:45 AM    Albumin 2.8 (L) 07/31/2018 09:45 AM    Globulin 5.0 (H) 07/31/2018 09:45 AM     Lab Results   Component Value Date/Time    INR 1.0 02/24/2018 03:01 PM    Prothrombin time 10.0 02/24/2018 03:01 PM    aPTT 39.6 (H) 06/24/2011 11:40 PM      No results found for: IRON, FE, TIBC, IBCT, PSAT, FERR   No results found for: PH, PCO2, PO2  No components found for: [place]   Lab Results   Component Value Date/Time    CK 46 02/24/2018 03:01 PM    CK - MB <1.0 02/24/2018 03:01 PM                Total time: 35 minutes  Counseling / coordination time, spent as noted above: 30 minutes  > 50% counseling / coordination?: y    Prolonged service was provided for  []30 min   []75 min in face to face time in the presence of the patient, spent as noted above. Time Start:   Time End:   Note: this can only be billed with [id number] (initial) or 09219 (follow up).   If multiple start / stop times, list each separately.

## 2018-08-03 NOTE — PROGRESS NOTES
General Surgery Daily Progress Note    Admit Date: 2018  Post-Operative Day: * No surgery found * from * No surgery found *     Subjective:     Last 24 hrs: Continued c/o left-sided abdominal pain & swelling. CT ordered today (see results below) for further evaluation of pain & fevers. Denies NV. Pain transiently alleviated with PO & IV analgesics. PCA discontinued. CT Abdomen & Pelvis with Contrast (8/3/2018) shows:  IMPRESSION:  Multiple loculated collections are identified. Left lower quadrant collection is amenable to percutaneous drainage and/or I and  D if clinically warranted. Pigtail drainage catheter in left upper quadrant collection which is unchanged  in size compared to 2018 examination.     Collection posterior to the gastric body is not amenable to percutaneous  drainage. Interval left greater than right bilateral pleural effusions with bibasilar  atelectasis/consolidation. Objective:     Blood pressure 123/72, pulse (!) 104, temperature 100 °F (37.8 °C), resp. rate 18, weight 150 lb 12.7 oz (68.4 kg), SpO2 97 %. Temp (24hrs), Av.5 °F (37.5 °C), Min:98.9 °F (37.2 °C), Max:100.3 °F (37.9 °C)      _____________________  Physical Exam:     Alert and Oriented, cooperative, in no acute distress. Cardiovascular: RRR, no peripheral edema  Lungs:CTAB on 4L O2 via NC  Abdomen: round, firm. +BS  Outlined, raised area w mild tenderness. Drain without fluid noted in appliance. Assessment:   Active Problems:    Pancreatic abscess (2018)          Plan:     Appreciate Palliative input and help with pain mgmt.   Analgesics as needed  IV antibiotics  AM labs  Further surgical recs per Dr. Susan Ivey    Data Review:    Recent Labs      18   0245  18   1016   WBC  14.3*  16.7*  16.9*   HGB  10.1*  10.8*  11.7   HCT  32.6*  34.7*  36.9   PLT  383  429*  475*     Recent Labs      18   NA  129*   K  3.4*   CL  96*   CO2  26   GLU  232*   BUN  6 CREA  0.59   CA  7.7*     No results for input(s): AML, LPSE in the last 72 hours.         ______________________  Medications:    Current Facility-Administered Medications   Medication Dose Route Frequency    insulin glargine (LANTUS) injection 20 Units  20 Units SubCUTAneous DAILY    diphenhydrAMINE (BENADRYL) injection 25 mg  25 mg IntraVENous Q6H PRN    HYDROmorphone (DILAUDID) tablet 4 mg  4 mg Oral Q4H    senna-docusate (PERICOLACE) 8.6-50 mg per tablet 2 Tab  2 Tab Oral DAILY    insulin lispro (HUMALOG) injection   SubCUTAneous AC&HS    glucose chewable tablet 16 g  4 Tab Oral PRN    dextrose (D50W) injection syrg 12.5-25 g  12.5-25 g IntraVENous PRN    glucagon (GLUCAGEN) injection 1 mg  1 mg IntraMUSCular PRN    levoFLOXacin (LEVAQUIN) 750 mg in D5W IVPB  750 mg IntraVENous Q24H    0.9% sodium chloride infusion  75 mL/hr IntraVENous CONTINUOUS    sodium chloride (NS) flush 5-10 mL  5-10 mL IntraVENous PRN    acetaminophen (TYLENOL) tablet 650 mg  650 mg Oral Q6H PRN    sodium chloride (NS) flush 5-10 mL  5-10 mL IntraVENous Q8H    sodium chloride (NS) flush 5-10 mL  5-10 mL IntraVENous PRN    ondansetron (ZOFRAN) injection 4 mg  4 mg IntraVENous Q4H PRN    DULoxetine (CYMBALTA) capsule 60 mg  60 mg Oral DAILY    nicotine (NICODERM CQ) 21 mg/24 hr patch 1 Patch  1 Patch TransDERmal DAILY    fentaNYL citrate (PF) injection 50 mcg  50 mcg IntraVENous Q2H PRN       Anthony Serrano PA-C  8/3/2018

## 2018-08-04 LAB
BASOPHILS # BLD: 0 K/UL (ref 0–0.1)
BASOPHILS NFR BLD: 0 % (ref 0–1)
DIFFERENTIAL METHOD BLD: ABNORMAL
EOSINOPHIL # BLD: 0.5 K/UL (ref 0–0.4)
EOSINOPHIL NFR BLD: 3 % (ref 0–7)
ERYTHROCYTE [DISTWIDTH] IN BLOOD BY AUTOMATED COUNT: 17 % (ref 11.5–14.5)
GLUCOSE BLD STRIP.AUTO-MCNC: 116 MG/DL (ref 65–100)
GLUCOSE BLD STRIP.AUTO-MCNC: 137 MG/DL (ref 65–100)
GLUCOSE BLD STRIP.AUTO-MCNC: 152 MG/DL (ref 65–100)
GLUCOSE BLD STRIP.AUTO-MCNC: 171 MG/DL (ref 65–100)
HCT VFR BLD AUTO: 31.2 % (ref 35–47)
HGB BLD-MCNC: 9.7 G/DL (ref 11.5–16)
IMM GRANULOCYTES # BLD: 0.1 K/UL (ref 0–0.04)
IMM GRANULOCYTES NFR BLD AUTO: 1 % (ref 0–0.5)
LYMPHOCYTES # BLD: 1.4 K/UL (ref 0.8–3.5)
LYMPHOCYTES NFR BLD: 10 % (ref 12–49)
MCH RBC QN AUTO: 26.5 PG (ref 26–34)
MCHC RBC AUTO-ENTMCNC: 31.1 G/DL (ref 30–36.5)
MCV RBC AUTO: 85.2 FL (ref 80–99)
MONOCYTES # BLD: 1.1 K/UL (ref 0–1)
MONOCYTES NFR BLD: 8 % (ref 5–13)
NEUTS SEG # BLD: 10.6 K/UL (ref 1.8–8)
NEUTS SEG NFR BLD: 78 % (ref 32–75)
NRBC # BLD: 0 K/UL (ref 0–0.01)
NRBC BLD-RTO: 0 PER 100 WBC
PLATELET # BLD AUTO: 407 K/UL (ref 150–400)
PMV BLD AUTO: 10.7 FL (ref 8.9–12.9)
RBC # BLD AUTO: 3.66 M/UL (ref 3.8–5.2)
SERVICE CMNT-IMP: ABNORMAL
TROPONIN I SERPL-MCNC: <0.05 NG/ML
WBC # BLD AUTO: 13.7 K/UL (ref 3.6–11)

## 2018-08-04 PROCEDURE — 74011250636 HC RX REV CODE- 250/636: Performed by: NURSE PRACTITIONER

## 2018-08-04 PROCEDURE — 74011250637 HC RX REV CODE- 250/637: Performed by: PHYSICIAN ASSISTANT

## 2018-08-04 PROCEDURE — 74011250637 HC RX REV CODE- 250/637: Performed by: SURGERY

## 2018-08-04 PROCEDURE — 74011250637 HC RX REV CODE- 250/637: Performed by: NURSE PRACTITIONER

## 2018-08-04 PROCEDURE — 84484 ASSAY OF TROPONIN QUANT: CPT | Performed by: SURGERY

## 2018-08-04 PROCEDURE — 94760 N-INVAS EAR/PLS OXIMETRY 1: CPT

## 2018-08-04 PROCEDURE — 36415 COLL VENOUS BLD VENIPUNCTURE: CPT | Performed by: PHYSICIAN ASSISTANT

## 2018-08-04 PROCEDURE — 93005 ELECTROCARDIOGRAM TRACING: CPT

## 2018-08-04 PROCEDURE — 82962 GLUCOSE BLOOD TEST: CPT

## 2018-08-04 PROCEDURE — 65660000000 HC RM CCU STEPDOWN

## 2018-08-04 PROCEDURE — 85025 COMPLETE CBC W/AUTO DIFF WBC: CPT | Performed by: PHYSICIAN ASSISTANT

## 2018-08-04 PROCEDURE — 74011636637 HC RX REV CODE- 636/637: Performed by: PHYSICIAN ASSISTANT

## 2018-08-04 PROCEDURE — 74011250636 HC RX REV CODE- 250/636: Performed by: SURGERY

## 2018-08-04 PROCEDURE — 77010033678 HC OXYGEN DAILY

## 2018-08-04 PROCEDURE — 74011636637 HC RX REV CODE- 636/637: Performed by: NURSE PRACTITIONER

## 2018-08-04 RX ADMIN — ACETAMINOPHEN 650 MG: 325 TABLET, FILM COATED ORAL at 18:08

## 2018-08-04 RX ADMIN — HYDROMORPHONE HYDROCHLORIDE 6 MG: 4 TABLET ORAL at 03:28

## 2018-08-04 RX ADMIN — Medication 10 ML: at 21:02

## 2018-08-04 RX ADMIN — HYDROMORPHONE HYDROCHLORIDE 6 MG: 4 TABLET ORAL at 11:49

## 2018-08-04 RX ADMIN — FENTANYL CITRATE 50 MCG: 50 INJECTION, SOLUTION INTRAMUSCULAR; INTRAVENOUS at 06:54

## 2018-08-04 RX ADMIN — FENTANYL CITRATE 50 MCG: 50 INJECTION, SOLUTION INTRAMUSCULAR; INTRAVENOUS at 21:01

## 2018-08-04 RX ADMIN — FENTANYL CITRATE 50 MCG: 50 INJECTION, SOLUTION INTRAMUSCULAR; INTRAVENOUS at 09:56

## 2018-08-04 RX ADMIN — LEVOFLOXACIN 750 MG: 5 INJECTION, SOLUTION INTRAVENOUS at 11:00

## 2018-08-04 RX ADMIN — SENNOSIDES AND DOCUSATE SODIUM 2 TABLET: 8.6; 5 TABLET ORAL at 08:17

## 2018-08-04 RX ADMIN — Medication 10 ML: at 09:59

## 2018-08-04 RX ADMIN — Medication 10 ML: at 18:03

## 2018-08-04 RX ADMIN — HYDROMORPHONE HYDROCHLORIDE 6 MG: 4 TABLET ORAL at 23:55

## 2018-08-04 RX ADMIN — HYDROMORPHONE HYDROCHLORIDE 6 MG: 4 TABLET ORAL at 16:00

## 2018-08-04 RX ADMIN — FENTANYL CITRATE 50 MCG: 50 INJECTION, SOLUTION INTRAMUSCULAR; INTRAVENOUS at 23:50

## 2018-08-04 RX ADMIN — HYDROMORPHONE HYDROCHLORIDE 6 MG: 4 TABLET ORAL at 08:17

## 2018-08-04 RX ADMIN — FENTANYL CITRATE 50 MCG: 50 INJECTION, SOLUTION INTRAMUSCULAR; INTRAVENOUS at 01:48

## 2018-08-04 RX ADMIN — HYDROMORPHONE HYDROCHLORIDE 6 MG: 4 TABLET ORAL at 20:04

## 2018-08-04 RX ADMIN — Medication 10 ML: at 13:47

## 2018-08-04 RX ADMIN — Medication 5 ML: at 06:00

## 2018-08-04 RX ADMIN — FENTANYL CITRATE 50 MCG: 50 INJECTION, SOLUTION INTRAMUSCULAR; INTRAVENOUS at 18:03

## 2018-08-04 RX ADMIN — DULOXETINE HYDROCHLORIDE 60 MG: 60 CAPSULE, DELAYED RELEASE ORAL at 08:17

## 2018-08-04 RX ADMIN — SODIUM CHLORIDE 75 ML/HR: 900 INJECTION, SOLUTION INTRAVENOUS at 08:10

## 2018-08-04 RX ADMIN — FENTANYL CITRATE 50 MCG: 50 INJECTION, SOLUTION INTRAMUSCULAR; INTRAVENOUS at 13:44

## 2018-08-04 RX ADMIN — INSULIN GLARGINE 20 UNITS: 100 INJECTION, SOLUTION SUBCUTANEOUS at 08:17

## 2018-08-04 RX ADMIN — INSULIN LISPRO 2 UNITS: 100 INJECTION, SOLUTION INTRAVENOUS; SUBCUTANEOUS at 11:38

## 2018-08-04 RX ADMIN — FENTANYL CITRATE 50 MCG: 50 INJECTION, SOLUTION INTRAMUSCULAR; INTRAVENOUS at 04:41

## 2018-08-04 NOTE — PROGRESS NOTES
Bedside shift change report given to Zack Morris (oncoming nurse) by Raymon Akbar (offgoing nurse). Report included the following information SBAR, Kardex, Procedure Summary, Intake/Output, MAR, Accordion, Recent Results, Med Rec Status and Cardiac Rhythm NSR.

## 2018-08-04 NOTE — PROGRESS NOTES
PCT was getting blood sugar and vitals. Oxygen saturation was in the mid 60's and pt did not have her oxygen on. Put oxygen on pt and she came up to 95% on 4L. Patient denies feeling SOB. Patient does say she has pain. Meds given as ordered. Will continue to monitor closely.  Patient on continuous pulse oximetry and cardiac monitoring

## 2018-08-04 NOTE — PROGRESS NOTES
Progress Note    Patient: Charisse Ruano MRN: [id number]  SSN: [id number]    YOB: 1980  Age: 42 y.o. Sex: female      Admit Date: 2018    * No surgery found *    Procedure:      Subjective:     Patient still complaining of pain. Had some desaturations with  Oxygen off. Has not been taking deep breaths. Tolerating diet. Objective:     Visit Vitals    /76 (BP 1 Location: Left arm, BP Patient Position: Supine)    Pulse 89    Temp 98.7 °F (37.1 °C)    Resp 19    Wt 150 lb 5.7 oz (68.2 kg)    SpO2 93%    BMI 26.63 kg/m2       Temp (24hrs), Av.1 °F (37.3 °C), Min:98 °F (36.7 °C), Max:101.6 °F (38.7 °C)      Physical Exam:    Gen- Alert in NAD  Lungs- CTA  H-RRR  Abd soft with tenderness on the left side. There is a small area of fluctuance below the drain. Data Review: images and reports reviewed    Lab Review: All lab results for the last 24 hours reviewed. Recent Results (from the past 24 hour(s))   GLUCOSE, POC    Collection Time: 18  4:16 PM   Result Value Ref Range    Glucose (POC) 138 (H) 65 - 100 mg/dL    Performed by Harry Hart, POC    Collection Time: 18  9:32 PM   Result Value Ref Range    Glucose (POC) 124 (H) 65 - 100 mg/dL    Performed by Sammi Chavez    CBC WITH AUTOMATED DIFF    Collection Time: 18  4:41 AM   Result Value Ref Range    WBC 13.7 (H) 3.6 - 11.0 K/uL    RBC 3.66 (L) 3.80 - 5.20 M/uL    HGB 9.7 (L) 11.5 - 16.0 g/dL    HCT 31.2 (L) 35.0 - 47.0 %    MCV 85.2 80.0 - 99.0 FL    MCH 26.5 26.0 - 34.0 PG    MCHC 31.1 30.0 - 36.5 g/dL    RDW 17.0 (H) 11.5 - 14.5 %    PLATELET 911 (H) 513 - 400 K/uL    MPV 10.7 8.9 - 12.9 FL    NRBC 0.0 0  WBC    ABSOLUTE NRBC 0.00 0.00 - 0.01 K/uL    NEUTROPHILS 78 (H) 32 - 75 %    LYMPHOCYTES 10 (L) 12 - 49 %    MONOCYTES 8 5 - 13 %    EOSINOPHILS 3 0 - 7 %    BASOPHILS 0 0 - 1 %    IMMATURE GRANULOCYTES 1 (H) 0.0 - 0.5 %    ABS. NEUTROPHILS 10.6 (H) 1.8 - 8.0 K/UL    ABS. LYMPHOCYTES 1.4 0.8 - 3.5 K/UL    ABS. MONOCYTES 1.1 (H) 0.0 - 1.0 K/UL    ABS. EOSINOPHILS 0.5 (H) 0.0 - 0.4 K/UL    ABS. BASOPHILS 0.0 0.0 - 0.1 K/UL    ABS. IMM. GRANS. 0.1 (H) 0.00 - 0.04 K/UL    DF AUTOMATED     GLUCOSE, POC    Collection Time: 08/04/18  6:46 AM   Result Value Ref Range    Glucose (POC) 137 (H) 65 - 100 mg/dL    Performed by Harry Hart POC    Collection Time: 08/04/18 11:33 AM   Result Value Ref Range    Glucose (POC) 171 (H) 65 - 100 mg/dL    Performed by Maile Sol        Assessment:     Hospital Problems  Date Reviewed: 7/30/2018          Codes Class Noted POA    Pancreatic abscess ICD-10-CM: K85.90  ICD-9-CM: 636.6  7/30/2018 Unknown              Plan/Recommendations/Medical Decision Making:   GI lite Diet  Continue IV abx  OOB and Ambulate  ENCOURAGE INCENTIVE SPIROMETER  May need drain upsized at the beginning of the week.      Signed By: Austyn Falk MD     August 4, 2018

## 2018-08-04 NOTE — PROGRESS NOTES
Reported to Dr. Maybell Nyhan an increase in size, redness, and pain to patient's left abdomen site that has been marked as a problem. Received recommendation to apply warm compress and he will round this evening to assess site.

## 2018-08-05 LAB
ANION GAP SERPL CALC-SCNC: 12 MMOL/L (ref 5–15)
ATRIAL RATE: 83 BPM
BASOPHILS # BLD: 0 K/UL (ref 0–0.1)
BASOPHILS NFR BLD: 0 % (ref 0–1)
BUN SERPL-MCNC: 2 MG/DL (ref 6–20)
BUN/CREAT SERPL: 5 (ref 12–20)
CALCIUM SERPL-MCNC: 8.3 MG/DL (ref 8.5–10.1)
CALCULATED P AXIS, ECG09: 26 DEGREES
CALCULATED R AXIS, ECG10: 32 DEGREES
CALCULATED T AXIS, ECG11: 25 DEGREES
CHLORIDE SERPL-SCNC: 96 MMOL/L (ref 97–108)
CO2 SERPL-SCNC: 29 MMOL/L (ref 21–32)
CREAT SERPL-MCNC: 0.44 MG/DL (ref 0.55–1.02)
DIAGNOSIS, 93000: NORMAL
DIFFERENTIAL METHOD BLD: ABNORMAL
EOSINOPHIL # BLD: 0.5 K/UL (ref 0–0.4)
EOSINOPHIL NFR BLD: 4 % (ref 0–7)
ERYTHROCYTE [DISTWIDTH] IN BLOOD BY AUTOMATED COUNT: 17.5 % (ref 11.5–14.5)
GLUCOSE BLD STRIP.AUTO-MCNC: 105 MG/DL (ref 65–100)
GLUCOSE BLD STRIP.AUTO-MCNC: 77 MG/DL (ref 65–100)
GLUCOSE BLD STRIP.AUTO-MCNC: 92 MG/DL (ref 65–100)
GLUCOSE BLD STRIP.AUTO-MCNC: 96 MG/DL (ref 65–100)
GLUCOSE SERPL-MCNC: 129 MG/DL (ref 65–100)
HCT VFR BLD AUTO: 32.5 % (ref 35–47)
HGB BLD-MCNC: 10.1 G/DL (ref 11.5–16)
IMM GRANULOCYTES # BLD: 0.1 K/UL (ref 0–0.04)
IMM GRANULOCYTES NFR BLD AUTO: 1 % (ref 0–0.5)
LYMPHOCYTES # BLD: 2 K/UL (ref 0.8–3.5)
LYMPHOCYTES NFR BLD: 17 % (ref 12–49)
MAGNESIUM SERPL-MCNC: 1.7 MG/DL (ref 1.6–2.4)
MCH RBC QN AUTO: 26.8 PG (ref 26–34)
MCHC RBC AUTO-ENTMCNC: 31.1 G/DL (ref 30–36.5)
MCV RBC AUTO: 86.2 FL (ref 80–99)
MONOCYTES # BLD: 1.2 K/UL (ref 0–1)
MONOCYTES NFR BLD: 10 % (ref 5–13)
NEUTS SEG # BLD: 7.9 K/UL (ref 1.8–8)
NEUTS SEG NFR BLD: 68 % (ref 32–75)
NRBC # BLD: 0 K/UL (ref 0–0.01)
NRBC BLD-RTO: 0 PER 100 WBC
P-R INTERVAL, ECG05: 140 MS
PHOSPHATE SERPL-MCNC: 4.2 MG/DL (ref 2.6–4.7)
PLATELET # BLD AUTO: 501 K/UL (ref 150–400)
PMV BLD AUTO: 10.4 FL (ref 8.9–12.9)
POTASSIUM SERPL-SCNC: 3 MMOL/L (ref 3.5–5.1)
Q-T INTERVAL, ECG07: 348 MS
QRS DURATION, ECG06: 88 MS
QTC CALCULATION (BEZET), ECG08: 408 MS
RBC # BLD AUTO: 3.77 M/UL (ref 3.8–5.2)
SERVICE CMNT-IMP: ABNORMAL
SERVICE CMNT-IMP: NORMAL
SODIUM SERPL-SCNC: 137 MMOL/L (ref 136–145)
VENTRICULAR RATE, ECG03: 83 BPM
WBC # BLD AUTO: 11.7 K/UL (ref 3.6–11)

## 2018-08-05 PROCEDURE — 83735 ASSAY OF MAGNESIUM: CPT | Performed by: SURGERY

## 2018-08-05 PROCEDURE — 94760 N-INVAS EAR/PLS OXIMETRY 1: CPT

## 2018-08-05 PROCEDURE — 74011250636 HC RX REV CODE- 250/636: Performed by: NURSE PRACTITIONER

## 2018-08-05 PROCEDURE — 74011250637 HC RX REV CODE- 250/637: Performed by: NURSE PRACTITIONER

## 2018-08-05 PROCEDURE — 80048 BASIC METABOLIC PNL TOTAL CA: CPT | Performed by: SURGERY

## 2018-08-05 PROCEDURE — 74011250636 HC RX REV CODE- 250/636: Performed by: SURGERY

## 2018-08-05 PROCEDURE — 74011250637 HC RX REV CODE- 250/637: Performed by: PHYSICIAN ASSISTANT

## 2018-08-05 PROCEDURE — 84100 ASSAY OF PHOSPHORUS: CPT | Performed by: SURGERY

## 2018-08-05 PROCEDURE — 65660000000 HC RM CCU STEPDOWN

## 2018-08-05 PROCEDURE — 36415 COLL VENOUS BLD VENIPUNCTURE: CPT | Performed by: SURGERY

## 2018-08-05 PROCEDURE — 74011636637 HC RX REV CODE- 636/637: Performed by: PHYSICIAN ASSISTANT

## 2018-08-05 PROCEDURE — 77010033678 HC OXYGEN DAILY

## 2018-08-05 PROCEDURE — 74011250637 HC RX REV CODE- 250/637: Performed by: SURGERY

## 2018-08-05 PROCEDURE — 82962 GLUCOSE BLOOD TEST: CPT

## 2018-08-05 PROCEDURE — 87205 SMEAR GRAM STAIN: CPT | Performed by: SURGERY

## 2018-08-05 PROCEDURE — 85025 COMPLETE CBC W/AUTO DIFF WBC: CPT | Performed by: SURGERY

## 2018-08-05 RX ORDER — HYDROMORPHONE HCL/0.9% NACL/PF 0.5 MG/ML
PLASTIC BAG, INJECTION (ML) INTRAVENOUS CONTINUOUS
Status: DISCONTINUED | OUTPATIENT
Start: 2018-08-05 | End: 2018-08-14

## 2018-08-05 RX ORDER — HYDROMORPHONE HYDROCHLORIDE 1 MG/ML
2 INJECTION, SOLUTION INTRAMUSCULAR; INTRAVENOUS; SUBCUTANEOUS
Status: DISCONTINUED | OUTPATIENT
Start: 2018-08-05 | End: 2018-08-14

## 2018-08-05 RX ADMIN — HYDROMORPHONE HYDROCHLORIDE 6 MG: 4 TABLET ORAL at 03:43

## 2018-08-05 RX ADMIN — ACETAMINOPHEN 650 MG: 325 TABLET, FILM COATED ORAL at 20:45

## 2018-08-05 RX ADMIN — Medication 10 ML: at 14:49

## 2018-08-05 RX ADMIN — FENTANYL CITRATE 50 MCG: 50 INJECTION, SOLUTION INTRAMUSCULAR; INTRAVENOUS at 05:55

## 2018-08-05 RX ADMIN — ONDANSETRON 4 MG: 2 INJECTION INTRAMUSCULAR; INTRAVENOUS at 22:03

## 2018-08-05 RX ADMIN — Medication: at 10:42

## 2018-08-05 RX ADMIN — INSULIN GLARGINE 20 UNITS: 100 INJECTION, SOLUTION SUBCUTANEOUS at 08:52

## 2018-08-05 RX ADMIN — Medication 5 ML: at 22:00

## 2018-08-05 RX ADMIN — Medication 10 ML: at 08:55

## 2018-08-05 RX ADMIN — ONDANSETRON 4 MG: 2 INJECTION INTRAMUSCULAR; INTRAVENOUS at 03:17

## 2018-08-05 RX ADMIN — DULOXETINE HYDROCHLORIDE 60 MG: 60 CAPSULE, DELAYED RELEASE ORAL at 08:51

## 2018-08-05 RX ADMIN — FENTANYL CITRATE 50 MCG: 50 INJECTION, SOLUTION INTRAMUSCULAR; INTRAVENOUS at 08:52

## 2018-08-05 RX ADMIN — LEVOFLOXACIN 750 MG: 5 INJECTION, SOLUTION INTRAVENOUS at 10:42

## 2018-08-05 RX ADMIN — Medication 10 ML: at 05:56

## 2018-08-05 RX ADMIN — FENTANYL CITRATE 50 MCG: 50 INJECTION, SOLUTION INTRAMUSCULAR; INTRAVENOUS at 02:58

## 2018-08-05 RX ADMIN — Medication: at 20:03

## 2018-08-05 RX ADMIN — SODIUM CHLORIDE 75 ML/HR: 900 INJECTION, SOLUTION INTRAVENOUS at 10:42

## 2018-08-05 RX ADMIN — HYDROMORPHONE HYDROCHLORIDE 6 MG: 4 TABLET ORAL at 07:31

## 2018-08-05 RX ADMIN — SENNOSIDES AND DOCUSATE SODIUM 2 TABLET: 8.6; 5 TABLET ORAL at 08:51

## 2018-08-05 NOTE — ROUTINE PROCESS
Bedside and Verbal shift change report given to  Sydnee Low (oncoming nurse) by Carrillo Harper (offgoing nurse). Report included the following information SBAR, Kardex, Procedure Summary, Intake/Output, MAR, Accordion, Recent Results, Med Rec Status and Cardiac Rhythm NSR.

## 2018-08-05 NOTE — PROGRESS NOTES
08/04/18 2100   Output (mL)   Last Bowel Movement Date 07/29/18   1. Patient has had 6 days with no BMs  2. More abdomen, chest and diaphragm pain in last 2 days  3. Patient feeling anxious    MD paged about patient chest pain and anxiety. EKG and Troponin will be drawn, MD verbalized he is aware of these issues and rapid response is not necessary. No medication will be given for patient's anxiety. 08/05/2018- Patient has removed nasal cannula approximately 5 or 6 times tonight causing oxygen saturations to fall as low as 76% on room air. Patient has been educated on importance of maintaining oxygen sats above 92% at all times. Patient says right nare getting irritated. 0600- Patient left side accordion drain site with new drainage ( yellow pasty drainage) reinforced with transparent film, no new drainage noted while assessing.

## 2018-08-05 NOTE — PROGRESS NOTES
PALLIATIVE MEDICINE          Addendum:  Baclofen lowered to 2.5mg tid due to renal indices after speaking further with pharmacy. Darryl Jerome.  [address] MSN, FNP-BC, [facility]

## 2018-08-05 NOTE — PROGRESS NOTES
Palliative Medicine Consult  Luis: [phone]    Patient Name: Carmen Chaudhari  YOB: 1980    Date of Initial Consult: August 1, 2018  Reason for Consult: Pain Management  Requesting Provider: Thai Garcia NP  Primary Care Physician: Hannah Pope NP     SUMMARY:   Carmen Chaudhari is a 42 y.o. with a past history of anxiety, blurred vision, chest pain, depression, frequent headaches, obesity, pancreatic cyst, shortness of breath, stomch aches and swallowing difficulties  who was admitted on 7/30/2018 from home with a diagnosis of abscess of the abdominal cavity, pancreatic fluid leak now s/p drain placement. Current medical issues leading to Palliative Medicine involvement include: pain management. PALLIATIVE DIAGNOSES:   1. Abdominal Pain  2. Abdominal abscess   3. Hypoxia  4. Decreased appetite       PLAN:   1. Pain uncontrolled. Abdominal collections worsening. Surgery to re-eval  2. Will resume pca. Reviewed 24 hour opioid use~equivalent to 105mg iv morphine  3. Dilaudid pca basal 0.2mg with 0.3mg q 10 min. 50% cross tolerance calculated given pt is more responsive to dilaudid. Will advance dose based on clinical response  4. Will dc scheduled oral dilaudid  5. Will dc fentanyl 50mcq order  6. Dilaudid 2mg iv q 2prn refractory symptoms ordered  7. Will follow up  8.   9. Initial consult note routed to primary continuity provider  10.  Communicated plan of care with: Palliative IDT       GOALS OF CARE / TREATMENT PREFERENCES:     GOALS OF CARE:  Patient/Health Care Proxy Stated Goals: Prolong life      TREATMENT PREFERENCES:   Code Status: Full Code    Advance Care Planning:  Advance Care Planning 8/1/2018   Patient's Healthcare Decision Maker is: Legal Next of Kin   Primary Decision Maker Name Johnnie Mera   Primary Decision Maker Phone Number [phone]   Primary Decision Maker Relationship to Patient Spouse   Confirm Advance Directive -   Patient Would Like to Complete Advance Directive -   Does the patient have other document types -       Medical Interventions: Full interventions   Other Instructions: Other:    As far as possible, the palliative care team has discussed with patient / health care proxy about goals of care / treatment preferences for patient. HISTORY:     HPI/SUBJECTIVE:    The patient is:   [x] Verbal and participatory  [] Non-participatory due to:   Pain uncontrolled. Multiple areas of collection noted on CT.       Clinical Pain Assessment (nonverbal scale for severity on nonverbal patients):   Clinical Pain Assessment  Severity: 4  Location: abdominal pain  Character: severe and diffuse  Duration: days  Factors: pca helping  Frequency: constant          Duration: for how long has pt been experiencing pain (e.g., 2 days, 1 month, years)  Frequency: how often pain is an issue (e.g., several times per day, once every few days, constant)     FUNCTIONAL ASSESSMENT:     Palliative Performance Scale (PPS):  PPS: 70       PSYCHOSOCIAL/SPIRITUAL SCREENING:     Palliative IDT has assessed this patient for cultural preferences / practices and a referral made as appropriate to needs (Cultural Services, Patient Advocacy, Ethics, etc.)    Advance Care Planning:  Advance Care Planning 8/1/2018   Patient's Healthcare Decision Maker is: Legal Next remi [address] 67   Primary Decision Maker Name Branden Ghosh   Primary Decision Maker Phone Number [phone]   Primary Decision Maker Relationship to Patient Spouse   Confirm Advance Directive -   Patient Would Like to Complete Advance Directive -   Does the patient have other document types -       Any spiritual / Worship concerns:  [] Yes /  [x] No    Caregiver Burnout:  [] Yes /  [] No /  [x] No Caregiver Present      Anticipatory grief assessment:   [] Normal  / [] Maladaptive       ESAS Anxiety: Anxiety: 1    ESAS Depression: Depression: 0        REVIEW OF SYSTEMS:     Positive and pertinent negative findings in ROS are noted above in HPI. The following systems were [x] reviewed / [] unable to be reviewed as noted in HPI  Other findings are noted below. Systems: constitutional, ears/nose/mouth/throat, respiratory, gastrointestinal, genitourinary, musculoskeletal, integumentary, neurologic, psychiatric, endocrine. Positive findings noted below. Modified ESAS Completed by: provider   Fatigue: 0 Drowsiness: 0   Depression: 0 Pain: 4   Anxiety: 1 Nausea: 0   Anorexia: 0 Dyspnea: 0     Constipation: Yes              PHYSICAL EXAM:     From RN flowsheet:  Wt Readings from Last 3 Encounters:   08/05/18 151 lb 10.8 oz (68.8 kg)   07/30/18 143 lb (64.9 kg)   07/16/18 146 lb (66.2 kg)     Blood pressure 114/66, pulse 85, temperature 97.9 °F (36.6 °C), resp. rate 19, weight 151 lb 10.8 oz (68.8 kg), SpO2 97 %.     Pain Scale 1: Numeric (0 - 10)  Pain Intensity 1: 8  Pain Onset 1: chronic  Pain Location 1: Abdomen  Pain Orientation 1: Anterior  Pain Description 1: Aching  Pain Intervention(s) 1: Medication (see MAR), Repositioned, Rest  Last bowel movement, if known:     Constitutional: alert, conversant, nad  Eyes: pupils equal, anicteric  ENMT: no nasal discharge, moist mucous membranes  Cardiovascular: regular rhythm, distal pulses intact  Respiratory: breathing not labored, symmetric  Gastrointestinal: soft, +tenderness left side in the area of the drain, palpable mass left abdomen which has increased in size, erythema, allodynia   Musculoskeletal: no deformity, no tenderness to palpation  Skin: warm, dry  Neurologic: following commands, moving all extremities  Psychiatric: full affect, no hallucinations  Other: pigtail cath without drainage       HISTORY:     Active Problems:    Pancreatic abscess (7/30/2018)      Past Medical History:   Diagnosis Date    Abscess of abdominal cavity (HCC) 7/30/2018    Anxiety     Blurred vision     Chest pain     Chronic pain     MUSCLE WEAKNESS,PANCREATIC CYST     Depression     Frequent headaches     Ill-defined condition     Muscle weakness     Obesity (BMI 30-39.9) 4/26/2018    Pancreatic cyst 3/21/2018    Shortness of breath     Stomach pain     Swallowing difficulty       Past Surgical History:   Procedure Laterality Date    HX GI      COLONOSCOPY    HX GYN  2005    endometriosis surgery    HX OTHER SURGICAL  04/20/2018    lap distal pancreatectomy converted to open-[facility]-DR. Grisel Arana      Family History   Problem Relation Age of Onset    Cancer Mother      colon    Cancer Father      skin    Anesth Problems Father      SUCCINYLCHOLINE  REACTION      History reviewed, no pertinent family history.   Social History   Substance Use Topics    Smoking status: Current Every Day Smoker     Packs/day: 1.00     Years: 25.00    Smokeless tobacco: Current User      Comment: STOP SMOKING BOOKLET PROVIDED    Alcohol use No     Allergies   Allergen Reactions    Amoxicillin Shortness of Breath and Rash     Pt has had keflex in the past      Current Facility-Administered Medications   Medication Dose Route Frequency    HYDROmorphone (PF) 25 mg/50 mL (DILAUDID) PCA   IntraVENous CONTINUOUS    HYDROmorphone (DILAUDID) injection 2 mg  2 mg IntraVENous Q2H PRN    insulin glargine (LANTUS) injection 20 Units  20 Units SubCUTAneous DAILY    diphenhydrAMINE (BENADRYL) injection 25 mg  25 mg IntraVENous Q6H PRN    senna-docusate (PERICOLACE) 8.6-50 mg per tablet 2 Tab  2 Tab Oral DAILY    insulin lispro (HUMALOG) injection   SubCUTAneous AC&HS    glucose chewable tablet 16 g  4 Tab Oral PRN    dextrose (D50W) injection syrg 12.5-25 g  12.5-25 g IntraVENous PRN    glucagon (GLUCAGEN) injection 1 mg  1 mg IntraMUSCular PRN    levoFLOXacin (LEVAQUIN) 750 mg in D5W IVPB  750 mg IntraVENous Q24H    0.9% sodium chloride infusion  75 mL/hr IntraVENous CONTINUOUS    sodium chloride (NS) flush 5-10 mL  5-10 mL IntraVENous PRN    acetaminophen (TYLENOL) tablet 650 mg  650 mg Oral Q6H PRN    sodium chloride (NS) flush 5-10 mL  5-10 mL IntraVENous Q8H    sodium chloride (NS) flush 5-10 mL  5-10 mL IntraVENous PRN    ondansetron (ZOFRAN) injection 4 mg  4 mg IntraVENous Q4H PRN    DULoxetine (CYMBALTA) capsule 60 mg  60 mg Oral DAILY    nicotine (NICODERM CQ) 21 mg/24 hr patch 1 Patch  1 Patch TransDERmal DAILY          LAB AND IMAGING FINDINGS:     Lab Results   Component Value Date/Time    WBC 11.7 (H) 08/05/2018 03:24 AM    HGB 10.1 (L) 08/05/2018 03:24 AM    PLATELET 433 (H) 65/42/7524 03:24 AM     Lab Results   Component Value Date/Time    Sodium 137 08/05/2018 03:24 AM    Potassium 3.0 (L) 08/05/2018 03:24 AM    Chloride 96 (L) 08/05/2018 03:24 AM    CO2 29 08/05/2018 03:24 AM    BUN 2 (L) 08/05/2018 03:24 AM    Creatinine 0.44 (L) 08/05/2018 03:24 AM    Calcium 8.3 (L) 08/05/2018 03:24 AM    Magnesium 1.7 08/05/2018 03:24 AM    Phosphorus 4.2 08/05/2018 03:24 AM      Lab Results   Component Value Date/Time    AST (SGOT) 11 (L) 07/31/2018 09:45 AM    Alk. phosphatase 126 (H) 07/31/2018 09:45 AM    Protein, total 7.8 07/31/2018 09:45 AM    Albumin 2.8 (L) 07/31/2018 09:45 AM    Globulin 5.0 (H) 07/31/2018 09:45 AM     Lab Results   Component Value Date/Time    INR 1.0 02/24/2018 03:01 PM    Prothrombin time 10.0 02/24/2018 03:01 PM    aPTT 39.6 (H) 06/24/2011 11:40 PM      No results found for: IRON, FE, TIBC, IBCT, PSAT, FERR   No results found for: PH, PCO2, PO2  No components found for: [place]   Lab Results   Component Value Date/Time    CK 46 02/24/2018 03:01 PM    CK - MB <1.0 02/24/2018 03:01 PM                Total time: 35 minutes  Counseling / coordination time, spent as noted above: 30 minutes  > 50% counseling / coordination?: y    Prolonged service was provided for  []30 min   []75 min in face to face time in the presence of the patient, spent as noted above. Time Start:   Time End:   Note: this can only be billed with 56615 (initial) or 77002 (follow up).   If multiple start / stop times, list each separately.

## 2018-08-05 NOTE — PROGRESS NOTES
Progress Note    Patient: Sara Li MRN: [id number]  SSN: [id number]    YOB: 1980  Age: 42 y.o. Sex: female      Admit Date: 2018    * No surgery found *    Procedure:      Subjective:     Patient Without complaints this am. She was re-evaluated by palliative that adjusted her medication. Complains of pain around the red area in LUQ. Objective:     Visit Vitals    /65 (BP 1 Location: Left arm, BP Patient Position: Sitting)    Pulse 88    Temp 99.5 °F (37.5 °C)    Resp 19    Wt 151 lb 10.8 oz (68.8 kg)    SpO2 (!) 86%    BMI 26.87 kg/m2       Temp (24hrs), Av.6 °F (37.6 °C), Min:97.9 °F (36.6 °C), Max:100.6 °F (38.1 °C)      Physical Exam:    Gen- Alert in NAD  Lungs-CTA  H- RRR  Abd- Soft there is a fluctuant region in LUQ that appears to be ready to busrt    Data Review: images and reports reviewed    Lab Review: All lab results for the last 24 hours reviewed. Assessment:     Hospital Problems  Date Reviewed: 2018          Codes Class Noted POA    Pancreatic abscess ICD-10-CM: K85.90  ICD-9-CM: 326.9  2018 Unknown              Plan/Recommendations/Medical Decision Making: The area was cleaned and opened using a nick of an edge of a needle. Large amount of pus was expressed,. Cultures were taken. The wound was covered in guaze and tape. Continue ABX  Conitnue Diet.      Signed By: Rand Forrester MD     2018

## 2018-08-06 ENCOUNTER — ANESTHESIA (OUTPATIENT)
Dept: INTERVENTIONAL RADIOLOGY/VASCULAR | Age: 38
DRG: 438 | End: 2018-08-06
Payer: COMMERCIAL

## 2018-08-06 ENCOUNTER — ANESTHESIA EVENT (OUTPATIENT)
Dept: INTERVENTIONAL RADIOLOGY/VASCULAR | Age: 38
DRG: 438 | End: 2018-08-06
Payer: COMMERCIAL

## 2018-08-06 ENCOUNTER — APPOINTMENT (OUTPATIENT)
Dept: INTERVENTIONAL RADIOLOGY/VASCULAR | Age: 38
DRG: 438 | End: 2018-08-06
Attending: NURSE PRACTITIONER
Payer: COMMERCIAL

## 2018-08-06 LAB
ANION GAP SERPL CALC-SCNC: 5 MMOL/L (ref 5–15)
BACTERIA SPEC CULT: NORMAL
BASOPHILS # BLD: 0 K/UL (ref 0–0.1)
BASOPHILS NFR BLD: 0 % (ref 0–1)
BUN SERPL-MCNC: 1 MG/DL (ref 6–20)
BUN/CREAT SERPL: 3 (ref 12–20)
CALCIUM SERPL-MCNC: 8.2 MG/DL (ref 8.5–10.1)
CHLORIDE SERPL-SCNC: 100 MMOL/L (ref 97–108)
CO2 SERPL-SCNC: 32 MMOL/L (ref 21–32)
CREAT SERPL-MCNC: 0.37 MG/DL (ref 0.55–1.02)
DIFFERENTIAL METHOD BLD: ABNORMAL
EOSINOPHIL # BLD: 0.5 K/UL (ref 0–0.4)
EOSINOPHIL NFR BLD: 5 % (ref 0–7)
ERYTHROCYTE [DISTWIDTH] IN BLOOD BY AUTOMATED COUNT: 17.3 % (ref 11.5–14.5)
GLUCOSE BLD STRIP.AUTO-MCNC: 119 MG/DL (ref 65–100)
GLUCOSE BLD STRIP.AUTO-MCNC: 126 MG/DL (ref 65–100)
GLUCOSE BLD STRIP.AUTO-MCNC: 131 MG/DL (ref 65–100)
GLUCOSE BLD STRIP.AUTO-MCNC: 94 MG/DL (ref 65–100)
GLUCOSE SERPL-MCNC: 107 MG/DL (ref 65–100)
HCT VFR BLD AUTO: 31 % (ref 35–47)
HGB BLD-MCNC: 9.7 G/DL (ref 11.5–16)
IMM GRANULOCYTES # BLD: 0 K/UL (ref 0–0.04)
IMM GRANULOCYTES NFR BLD AUTO: 0 % (ref 0–0.5)
LYMPHOCYTES # BLD: 1.7 K/UL (ref 0.8–3.5)
LYMPHOCYTES NFR BLD: 18 % (ref 12–49)
MAGNESIUM SERPL-MCNC: 1.9 MG/DL (ref 1.6–2.4)
MCH RBC QN AUTO: 26.9 PG (ref 26–34)
MCHC RBC AUTO-ENTMCNC: 31.3 G/DL (ref 30–36.5)
MCV RBC AUTO: 85.9 FL (ref 80–99)
MONOCYTES # BLD: 1.1 K/UL (ref 0–1)
MONOCYTES NFR BLD: 11 % (ref 5–13)
NEUTS SEG # BLD: 6.1 K/UL (ref 1.8–8)
NEUTS SEG NFR BLD: 65 % (ref 32–75)
NRBC # BLD: 0 K/UL (ref 0–0.01)
NRBC BLD-RTO: 0 PER 100 WBC
PHOSPHATE SERPL-MCNC: 4 MG/DL (ref 2.6–4.7)
PLATELET # BLD AUTO: 460 K/UL (ref 150–400)
PMV BLD AUTO: 10.4 FL (ref 8.9–12.9)
POTASSIUM SERPL-SCNC: 3 MMOL/L (ref 3.5–5.1)
RBC # BLD AUTO: 3.61 M/UL (ref 3.8–5.2)
SERVICE CMNT-IMP: ABNORMAL
SERVICE CMNT-IMP: NORMAL
SERVICE CMNT-IMP: NORMAL
SODIUM SERPL-SCNC: 137 MMOL/L (ref 136–145)
WBC # BLD AUTO: 9.5 K/UL (ref 3.6–11)

## 2018-08-06 PROCEDURE — 65660000000 HC RM CCU STEPDOWN

## 2018-08-06 PROCEDURE — 74011250636 HC RX REV CODE- 250/636: Performed by: NURSE PRACTITIONER

## 2018-08-06 PROCEDURE — 74011250637 HC RX REV CODE- 250/637: Performed by: NURSE PRACTITIONER

## 2018-08-06 PROCEDURE — 82962 GLUCOSE BLOOD TEST: CPT

## 2018-08-06 PROCEDURE — 74011250636 HC RX REV CODE- 250/636

## 2018-08-06 PROCEDURE — 36415 COLL VENOUS BLD VENIPUNCTURE: CPT | Performed by: SURGERY

## 2018-08-06 PROCEDURE — 74011636637 HC RX REV CODE- 636/637: Performed by: PHYSICIAN ASSISTANT

## 2018-08-06 PROCEDURE — 94760 N-INVAS EAR/PLS OXIMETRY 1: CPT

## 2018-08-06 PROCEDURE — 84100 ASSAY OF PHOSPHORUS: CPT | Performed by: SURGERY

## 2018-08-06 PROCEDURE — 85025 COMPLETE CBC W/AUTO DIFF WBC: CPT | Performed by: SURGERY

## 2018-08-06 PROCEDURE — 83735 ASSAY OF MAGNESIUM: CPT | Performed by: SURGERY

## 2018-08-06 PROCEDURE — 77010033678 HC OXYGEN DAILY

## 2018-08-06 PROCEDURE — 74011250637 HC RX REV CODE- 250/637: Performed by: SURGERY

## 2018-08-06 PROCEDURE — 74011250636 HC RX REV CODE- 250/636: Performed by: SURGERY

## 2018-08-06 PROCEDURE — 80048 BASIC METABOLIC PNL TOTAL CA: CPT | Performed by: SURGERY

## 2018-08-06 RX ORDER — FACIAL-BODY WIPES
10 EACH TOPICAL
Status: COMPLETED | OUTPATIENT
Start: 2018-08-06 | End: 2018-08-06

## 2018-08-06 RX ORDER — POTASSIUM CHLORIDE 7.45 MG/ML
10 INJECTION INTRAVENOUS
Status: COMPLETED | OUTPATIENT
Start: 2018-08-06 | End: 2018-08-06

## 2018-08-06 RX ORDER — LIDOCAINE HYDROCHLORIDE 10 MG/ML
10 INJECTION, SOLUTION EPIDURAL; INFILTRATION; INTRACAUDAL; PERINEURAL
Status: ACTIVE | OUTPATIENT
Start: 2018-08-06 | End: 2018-08-07

## 2018-08-06 RX ADMIN — Medication 10 ML: at 13:44

## 2018-08-06 RX ADMIN — DULOXETINE HYDROCHLORIDE 60 MG: 60 CAPSULE, DELAYED RELEASE ORAL at 09:36

## 2018-08-06 RX ADMIN — Medication 5 ML: at 06:00

## 2018-08-06 RX ADMIN — SODIUM CHLORIDE 75 ML/HR: 900 INJECTION, SOLUTION INTRAVENOUS at 19:07

## 2018-08-06 RX ADMIN — SODIUM CHLORIDE 75 ML/HR: 900 INJECTION, SOLUTION INTRAVENOUS at 04:23

## 2018-08-06 RX ADMIN — POTASSIUM CHLORIDE 10 MEQ: 10 INJECTION, SOLUTION INTRAVENOUS at 12:31

## 2018-08-06 RX ADMIN — Medication: at 08:07

## 2018-08-06 RX ADMIN — POTASSIUM CHLORIDE 10 MEQ: 10 INJECTION, SOLUTION INTRAVENOUS at 10:11

## 2018-08-06 RX ADMIN — Medication: at 11:22

## 2018-08-06 RX ADMIN — BISACODYL 10 MG: 10 SUPPOSITORY RECTAL at 09:46

## 2018-08-06 RX ADMIN — Medication 10 ML: at 22:17

## 2018-08-06 RX ADMIN — SENNOSIDES AND DOCUSATE SODIUM 2 TABLET: 8.6; 5 TABLET ORAL at 09:36

## 2018-08-06 RX ADMIN — INSULIN GLARGINE 20 UNITS: 100 INJECTION, SOLUTION SUBCUTANEOUS at 09:46

## 2018-08-06 RX ADMIN — LEVOFLOXACIN 750 MG: 5 INJECTION, SOLUTION INTRAVENOUS at 10:11

## 2018-08-06 RX ADMIN — ONDANSETRON 4 MG: 2 INJECTION INTRAMUSCULAR; INTRAVENOUS at 20:33

## 2018-08-06 RX ADMIN — POTASSIUM CHLORIDE 10 MEQ: 10 INJECTION, SOLUTION INTRAVENOUS at 11:29

## 2018-08-06 NOTE — ANESTHESIA PREPROCEDURE EVALUATION
Anesthetic History   No history of anesthetic complications  PONV          Review of Systems / Medical History  Patient summary reviewed, nursing notes reviewed and pertinent labs reviewed    Pulmonary  Within defined limits                 Neuro/Psych   Within defined limits           Cardiovascular  Within defined limits                     GI/Hepatic/Renal  Within defined limits              Endo/Other  Within defined limits      Obesity     Other Findings              Physical Exam    Airway  Mallampati: II  TM Distance: > 6 cm  Neck ROM: normal range of motion   Mouth opening: Normal     Cardiovascular  Regular rate and rhythm,  S1 and S2 normal,  no murmur, click, rub, or gallop             Dental  No notable dental hx       Pulmonary  Breath sounds clear to auscultation               Abdominal  GI exam deferred       Other Findings            Anesthetic Plan    ASA: 2  Anesthesia type: MAC          Induction: Intravenous  Anesthetic plan and risks discussed with: Patient

## 2018-08-06 NOTE — PROGRESS NOTES
General Surgery Daily Progress Note    Admit Date: 2018  Post-Operative Day: * No surgery found * from * No surgery found *     Subjective:     Last 24 hrs: Pain is under better control, on a dilaudid PCA per palliative management. Has not had a BM in over a week, prior to admission. +flatus, Little appetite. No n/v. Tmax 99.7F, WBC WNL,. On Levaquin. Bedside I&D by Dr. Dawna Chandra for abd abscess. Cultures pending. IR drain still without drainage. Drain is leakign around insertion site per report. Potassium low  Pt reports that she did not use IS \"much\" yesterday. Objective:     Blood pressure 112/71, pulse 70, temperature 98.8 °F (37.1 °C), resp. rate 18, weight 154 lb 1.6 oz (69.9 kg), SpO2 96 %. Temp (24hrs), Av.2 °F (37.3 °C), Min:98.8 °F (37.1 °C), Max:99.7 °F (37.6 °C)      _____________________  Physical Exam:     Alert and Oriented, sleeping, in no acute distress. Cardiovascular: RRR, no LE peripheral edema; on 4L O2 NC  Lungs:CTAB diminished at bases  Abdomen: distended, +distant BS, TTP  RUQ IR placed drain with scant brown, thin output  No active drainage at insertion site noted  I&D on right mid abd with induraction and mild erthem, TTP. small purulent drainage on bandage. Assessment:   Active Problems:    Pancreatic abscess (2018)            Plan:     Upsize IR drain, as per Dr. Romana Pimple. Will discuss w/ him  Cont daily pericolace  Suppository today  Cont Levaquin  Replete potassium  Morning labs  Cont blood sugar control  Change diet to low sugar  Cont pain control as per Palliative care, appreciate the care.   Encourage IS  Cont DVT and GI proph  Further plan as per Dr. Romana Pimple    Data Review:    Recent Labs      18   0416  18   0324  18   0441   WBC  9.5  11.7*  13.7*   HGB  9.7*  10.1*  9.7*   HCT  31.0*  32.5*  31.2*   PLT  460*  501*  407*     Recent Labs      18   0416  18   0324   NA  137  137   K  3.0*  3.0*   CL  100  96*   CO2  32  29   GLU  107* 129*   BUN  1*  2*   CREA  0.37*  0.44*   CA  8.2*  8.3*   MG  1.9  1.7   PHOS  4.0  4.2     No results for input(s): AML, LPSE in the last 72 hours. ______________________  Medications:    Current Facility-Administered Medications   Medication Dose Route Frequency    HYDROmorphone (PF) 25 mg/50 mL (DILAUDID) PCA   IntraVENous CONTINUOUS    HYDROmorphone (DILAUDID) injection 2 mg  2 mg IntraVENous Q2H PRN    insulin glargine (LANTUS) injection 20 Units  20 Units SubCUTAneous DAILY    diphenhydrAMINE (BENADRYL) injection 25 mg  25 mg IntraVENous Q6H PRN    senna-docusate (PERICOLACE) 8.6-50 mg per tablet 2 Tab  2 Tab Oral DAILY    insulin lispro (HUMALOG) injection   SubCUTAneous AC&HS    glucose chewable tablet 16 g  4 Tab Oral PRN    dextrose (D50W) injection syrg 12.5-25 g  12.5-25 g IntraVENous PRN    glucagon (GLUCAGEN) injection 1 mg  1 mg IntraMUSCular PRN    levoFLOXacin (LEVAQUIN) 750 mg in D5W IVPB  750 mg IntraVENous Q24H    0.9% sodium chloride infusion  75 mL/hr IntraVENous CONTINUOUS    sodium chloride (NS) flush 5-10 mL  5-10 mL IntraVENous PRN    acetaminophen (TYLENOL) tablet 650 mg  650 mg Oral Q6H PRN    sodium chloride (NS) flush 5-10 mL  5-10 mL IntraVENous Q8H    sodium chloride (NS) flush 5-10 mL  5-10 mL IntraVENous PRN    ondansetron (ZOFRAN) injection 4 mg  4 mg IntraVENous Q4H PRN    DULoxetine (CYMBALTA) capsule 60 mg  60 mg Oral DAILY    nicotine (NICODERM CQ) 21 mg/24 hr patch 1 Patch  1 Patch TransDERmal DAILY       Emma Awad NP  8/6/2018                    ATTENDING ADDENDUM  I supervised the APC and reviewed the note. We discussed the plan of care  Scheduling conflicts precluded upsizing her drain today. Will get this done tomorrow.

## 2018-08-06 NOTE — PROGRESS NOTES
Bedside shift change report given to Gris Corbin (oncoming nurse) by Derian Velasco (offgoing nurse). Report included the following information SBAR, Kardex, Procedure Summary, Intake/Output, MAR, Accordion, Recent Results, Med Rec Status and Cardiac Rhythm NSR.

## 2018-08-06 NOTE — PROGRESS NOTES
TRANSFER - OUT REPORT:    Verbal report given to ISIDRO Zhang on [employer]  being transferred to [facility] for routine progression of care       Report consisted of patients Situation, Background, Assessment and   Recommendations(SBAR). Information from the following report(s) SBAR, Kardex, Recent Results, Med Rec Status, Cardiac Rhythm NSR and Alarm Parameters  was reviewed with the receiving nurse. Lines:   Peripheral IV 08/01/18 Right Wrist (Active)   Site Assessment Clean, dry, & intact 8/6/2018  8:00 AM   Phlebitis Assessment 0 8/6/2018  8:00 AM   Infiltration Assessment 0 8/6/2018  8:00 AM   Dressing Status Clean, dry, & intact 8/6/2018  8:00 AM   Dressing Type Transparent 8/6/2018  8:00 AM   Hub Color/Line Status Blue; Infusing 8/6/2018  8:00 AM   Action Taken Open ports on tubing capped 8/6/2018  8:00 AM   Alcohol Cap Used Yes 8/6/2018  8:00 AM       Peripheral IV 08/06/18 Right Antecubital (Active)   Site Assessment Clean, dry, & intact 8/6/2018 10:00 AM   Phlebitis Assessment 0 8/6/2018 10:00 AM   Infiltration Assessment 0 8/6/2018 10:00 AM   Dressing Status Clean, dry, & intact 8/6/2018 10:00 AM   Dressing Type Transparent 8/6/2018 10:00 AM   Hub Color/Line Status Blue; Infusing; End cap changed 8/6/2018 10:00 AM   Action Taken Open ports on tubing capped 8/6/2018 10:00 AM   Alcohol Cap Used Yes 8/6/2018 10:00 AM        Opportunity for questions and clarification was provided.       Patient transported with:   O2 @ 4 liters  Patient-specific medications from [facility]

## 2018-08-06 NOTE — PROGRESS NOTES
Bedside and Verbal shift change report given to Bert Nageotte, RN (oncoming nurse) by Ankur Serrano RN (offgoing nurse). Report included the following information SBAR, Kardex, Intake/Output, MAR, Accordion, Recent Results and Med Rec Status.

## 2018-08-06 NOTE — PROGRESS NOTES
Note patient's transfer back to [facility]. As noted previously, this patient lives with her  and 13year old daughter and will return home with them at discharge. Note that Palliative Care is following for pain management. Care Management will continue to follow her disposition.    JIMY Nash

## 2018-08-06 NOTE — PROGRESS NOTES
Problem: Falls - Risk of  Goal: *Absence of Falls  Document Kirstie Fall Risk and appropriate interventions in the flowsheet.    Outcome: Progressing Towards Goal  Fall Risk Interventions:     Medication Interventions: Patient to call before getting OOB

## 2018-08-06 NOTE — PROGRESS NOTES
Last blood sugar was 77. Did not receive this information in report. PCT is checking blood sugar now.

## 2018-08-07 ENCOUNTER — ANESTHESIA EVENT (OUTPATIENT)
Dept: INTERVENTIONAL RADIOLOGY/VASCULAR | Age: 38
DRG: 438 | End: 2018-08-07
Payer: COMMERCIAL

## 2018-08-07 ENCOUNTER — APPOINTMENT (OUTPATIENT)
Dept: CT IMAGING | Age: 38
DRG: 438 | End: 2018-08-07
Attending: NURSE PRACTITIONER
Payer: COMMERCIAL

## 2018-08-07 ENCOUNTER — ANESTHESIA (OUTPATIENT)
Dept: INTERVENTIONAL RADIOLOGY/VASCULAR | Age: 38
DRG: 438 | End: 2018-08-07
Payer: COMMERCIAL

## 2018-08-07 LAB
ANION GAP SERPL CALC-SCNC: 10 MMOL/L (ref 5–15)
BACTERIA SPEC CULT: NORMAL
BASOPHILS # BLD: 0 K/UL (ref 0–0.1)
BASOPHILS NFR BLD: 0 % (ref 0–1)
BUN SERPL-MCNC: 2 MG/DL (ref 6–20)
BUN/CREAT SERPL: 6 (ref 12–20)
CALCIUM SERPL-MCNC: 8.3 MG/DL (ref 8.5–10.1)
CHLORIDE SERPL-SCNC: 100 MMOL/L (ref 97–108)
CO2 SERPL-SCNC: 28 MMOL/L (ref 21–32)
CREAT SERPL-MCNC: 0.34 MG/DL (ref 0.55–1.02)
DIFFERENTIAL METHOD BLD: ABNORMAL
EOSINOPHIL # BLD: 0.5 K/UL (ref 0–0.4)
EOSINOPHIL NFR BLD: 5 % (ref 0–7)
ERYTHROCYTE [DISTWIDTH] IN BLOOD BY AUTOMATED COUNT: 17.5 % (ref 11.5–14.5)
GLUCOSE BLD STRIP.AUTO-MCNC: 101 MG/DL (ref 65–100)
GLUCOSE BLD STRIP.AUTO-MCNC: 96 MG/DL (ref 65–100)
GLUCOSE SERPL-MCNC: 94 MG/DL (ref 65–100)
GRAM STN SPEC: NORMAL
GRAM STN SPEC: NORMAL
HCT VFR BLD AUTO: 28.9 % (ref 35–47)
HGB BLD-MCNC: 9 G/DL (ref 11.5–16)
IMM GRANULOCYTES # BLD: 0.1 K/UL (ref 0–0.04)
IMM GRANULOCYTES NFR BLD AUTO: 1 % (ref 0–0.5)
LYMPHOCYTES # BLD: 2 K/UL (ref 0.8–3.5)
LYMPHOCYTES NFR BLD: 21 % (ref 12–49)
MAGNESIUM SERPL-MCNC: 1.8 MG/DL (ref 1.6–2.4)
MCH RBC QN AUTO: 26.5 PG (ref 26–34)
MCHC RBC AUTO-ENTMCNC: 31.1 G/DL (ref 30–36.5)
MCV RBC AUTO: 85.3 FL (ref 80–99)
MONOCYTES # BLD: 1.1 K/UL (ref 0–1)
MONOCYTES NFR BLD: 12 % (ref 5–13)
NEUTS SEG # BLD: 5.9 K/UL (ref 1.8–8)
NEUTS SEG NFR BLD: 62 % (ref 32–75)
NRBC # BLD: 0 K/UL (ref 0–0.01)
NRBC BLD-RTO: 0 PER 100 WBC
PHOSPHATE SERPL-MCNC: 3.8 MG/DL (ref 2.6–4.7)
PLATELET # BLD AUTO: 453 K/UL (ref 150–400)
PMV BLD AUTO: 10.5 FL (ref 8.9–12.9)
POTASSIUM SERPL-SCNC: 3.3 MMOL/L (ref 3.5–5.1)
RBC # BLD AUTO: 3.39 M/UL (ref 3.8–5.2)
SERVICE CMNT-IMP: ABNORMAL
SERVICE CMNT-IMP: NORMAL
SERVICE CMNT-IMP: NORMAL
SODIUM SERPL-SCNC: 138 MMOL/L (ref 136–145)
WBC # BLD AUTO: 9.5 K/UL (ref 3.6–11)

## 2018-08-07 PROCEDURE — 74011250637 HC RX REV CODE- 250/637: Performed by: SURGERY

## 2018-08-07 PROCEDURE — 74011250636 HC RX REV CODE- 250/636: Performed by: RADIOLOGY

## 2018-08-07 PROCEDURE — 74011250637 HC RX REV CODE- 250/637: Performed by: NURSE PRACTITIONER

## 2018-08-07 PROCEDURE — 65660000000 HC RM CCU STEPDOWN

## 2018-08-07 PROCEDURE — 82962 GLUCOSE BLOOD TEST: CPT

## 2018-08-07 PROCEDURE — 74011000250 HC RX REV CODE- 250

## 2018-08-07 PROCEDURE — 74011636637 HC RX REV CODE- 636/637: Performed by: PHYSICIAN ASSISTANT

## 2018-08-07 PROCEDURE — 36415 COLL VENOUS BLD VENIPUNCTURE: CPT | Performed by: SURGERY

## 2018-08-07 PROCEDURE — 85025 COMPLETE CBC W/AUTO DIFF WBC: CPT | Performed by: SURGERY

## 2018-08-07 PROCEDURE — 80048 BASIC METABOLIC PNL TOTAL CA: CPT | Performed by: SURGERY

## 2018-08-07 PROCEDURE — 83735 ASSAY OF MAGNESIUM: CPT | Performed by: SURGERY

## 2018-08-07 PROCEDURE — 84100 ASSAY OF PHOSPHORUS: CPT | Performed by: SURGERY

## 2018-08-07 PROCEDURE — 74011250636 HC RX REV CODE- 250/636: Performed by: NURSE PRACTITIONER

## 2018-08-07 PROCEDURE — 74011250636 HC RX REV CODE- 250/636

## 2018-08-07 PROCEDURE — 94760 N-INVAS EAR/PLS OXIMETRY 1: CPT

## 2018-08-07 PROCEDURE — 65270000029 HC RM PRIVATE

## 2018-08-07 PROCEDURE — 74150 CT ABDOMEN W/O CONTRAST: CPT

## 2018-08-07 RX ORDER — LIDOCAINE HYDROCHLORIDE 10 MG/ML
INJECTION, SOLUTION EPIDURAL; INFILTRATION; INTRACAUDAL; PERINEURAL
Status: DISPENSED
Start: 2018-08-07 | End: 2018-08-07

## 2018-08-07 RX ORDER — MIDAZOLAM HYDROCHLORIDE 1 MG/ML
INJECTION, SOLUTION INTRAMUSCULAR; INTRAVENOUS AS NEEDED
Status: DISCONTINUED | OUTPATIENT
Start: 2018-08-07 | End: 2018-08-07 | Stop reason: HOSPADM

## 2018-08-07 RX ORDER — AMOXICILLIN 250 MG
2 CAPSULE ORAL
Status: DISCONTINUED | OUTPATIENT
Start: 2018-08-08 | End: 2018-08-17 | Stop reason: HOSPADM

## 2018-08-07 RX ORDER — FENTANYL CITRATE 50 UG/ML
25 INJECTION, SOLUTION INTRAMUSCULAR; INTRAVENOUS
Status: DISCONTINUED | OUTPATIENT
Start: 2018-08-07 | End: 2018-08-07 | Stop reason: HOSPADM

## 2018-08-07 RX ORDER — POTASSIUM CHLORIDE 750 MG/1
20 TABLET, FILM COATED, EXTENDED RELEASE ORAL 3 TIMES DAILY
Status: COMPLETED | OUTPATIENT
Start: 2018-08-07 | End: 2018-08-08

## 2018-08-07 RX ORDER — DEXMEDETOMIDINE HYDROCHLORIDE 4 UG/ML
INJECTION, SOLUTION INTRAVENOUS AS NEEDED
Status: DISCONTINUED | OUTPATIENT
Start: 2018-08-07 | End: 2018-08-07 | Stop reason: HOSPADM

## 2018-08-07 RX ORDER — MIDAZOLAM HYDROCHLORIDE 1 MG/ML
1 INJECTION, SOLUTION INTRAMUSCULAR; INTRAVENOUS
Status: DISCONTINUED | OUTPATIENT
Start: 2018-08-07 | End: 2018-08-07 | Stop reason: HOSPADM

## 2018-08-07 RX ORDER — LIDOCAINE HYDROCHLORIDE 20 MG/ML
INJECTION, SOLUTION EPIDURAL; INFILTRATION; INTRACAUDAL; PERINEURAL AS NEEDED
Status: DISCONTINUED | OUTPATIENT
Start: 2018-08-07 | End: 2018-08-07 | Stop reason: HOSPADM

## 2018-08-07 RX ORDER — PROPOFOL 10 MG/ML
INJECTION, EMULSION INTRAVENOUS AS NEEDED
Status: DISCONTINUED | OUTPATIENT
Start: 2018-08-07 | End: 2018-08-07 | Stop reason: HOSPADM

## 2018-08-07 RX ORDER — FENTANYL CITRATE 50 UG/ML
INJECTION, SOLUTION INTRAMUSCULAR; INTRAVENOUS AS NEEDED
Status: DISCONTINUED | OUTPATIENT
Start: 2018-08-07 | End: 2018-08-07 | Stop reason: HOSPADM

## 2018-08-07 RX ORDER — POLYETHYLENE GLYCOL 3350 17 G/17G
17 POWDER, FOR SOLUTION ORAL DAILY
Status: DISCONTINUED | OUTPATIENT
Start: 2018-08-07 | End: 2018-08-17 | Stop reason: HOSPADM

## 2018-08-07 RX ADMIN — POTASSIUM CHLORIDE 20 MEQ: 750 TABLET, EXTENDED RELEASE ORAL at 21:48

## 2018-08-07 RX ADMIN — SODIUM CHLORIDE 75 ML/HR: 900 INJECTION, SOLUTION INTRAVENOUS at 23:59

## 2018-08-07 RX ADMIN — Medication 10 ML: at 06:10

## 2018-08-07 RX ADMIN — PROPOFOL 20 MG: 10 INJECTION, EMULSION INTRAVENOUS at 12:30

## 2018-08-07 RX ADMIN — LIDOCAINE HYDROCHLORIDE 40 MG: 20 INJECTION, SOLUTION EPIDURAL; INFILTRATION; INTRACAUDAL; PERINEURAL at 12:20

## 2018-08-07 RX ADMIN — LACTULOSE 20 G: 20 SOLUTION ORAL at 16:16

## 2018-08-07 RX ADMIN — LEVOFLOXACIN 750 MG: 5 INJECTION, SOLUTION INTRAVENOUS at 11:00

## 2018-08-07 RX ADMIN — DULOXETINE HYDROCHLORIDE 60 MG: 60 CAPSULE, DELAYED RELEASE ORAL at 08:08

## 2018-08-07 RX ADMIN — INSULIN GLARGINE 20 UNITS: 100 INJECTION, SOLUTION SUBCUTANEOUS at 09:26

## 2018-08-07 RX ADMIN — MIDAZOLAM HYDROCHLORIDE 2 MG: 1 INJECTION, SOLUTION INTRAMUSCULAR; INTRAVENOUS at 12:24

## 2018-08-07 RX ADMIN — POTASSIUM CHLORIDE 20 MEQ: 750 TABLET, EXTENDED RELEASE ORAL at 16:16

## 2018-08-07 RX ADMIN — PROPOFOL 100 MG: 10 INJECTION, EMULSION INTRAVENOUS at 12:24

## 2018-08-07 RX ADMIN — PROPOFOL 20 MG: 10 INJECTION, EMULSION INTRAVENOUS at 12:27

## 2018-08-07 RX ADMIN — DEXMEDETOMIDINE HYDROCHLORIDE 20 MCG: 4 INJECTION, SOLUTION INTRAVENOUS at 12:21

## 2018-08-07 RX ADMIN — FENTANYL CITRATE 50 MCG: 50 INJECTION, SOLUTION INTRAMUSCULAR; INTRAVENOUS at 12:20

## 2018-08-07 RX ADMIN — FENTANYL CITRATE 50 MCG: 50 INJECTION, SOLUTION INTRAMUSCULAR; INTRAVENOUS at 12:24

## 2018-08-07 RX ADMIN — Medication: at 09:22

## 2018-08-07 RX ADMIN — SODIUM CHLORIDE 75 ML/HR: 900 INJECTION, SOLUTION INTRAVENOUS at 09:26

## 2018-08-07 RX ADMIN — SENNOSIDES AND DOCUSATE SODIUM 2 TABLET: 8.6; 5 TABLET ORAL at 08:08

## 2018-08-07 RX ADMIN — POLYETHYLENE GLYCOL 3350 17 G: 17 POWDER, FOR SOLUTION ORAL at 16:16

## 2018-08-07 RX ADMIN — Medication 10 ML: at 21:52

## 2018-08-07 NOTE — PROGRESS NOTES
CT guided abdominal abscess drainage cancelled per Dr. Fadi Montano after patient scanned. Abscess is smaller at this time. Patient under anesthesia at this time.

## 2018-08-07 NOTE — PROGRESS NOTES
Bedside shift change report given to Tracy Eugene RN (oncoming nurse) by Sujey West RN (offgoing nurse). Report included the following information SBAR.

## 2018-08-07 NOTE — ANESTHESIA POSTPROCEDURE EVALUATION
Post-Anesthesia Evaluation and Assessment    Patient: Nish [place] MRN: [id number]  SSN: [id number]    YOB: 1980  Age: 42 y.o. Sex: female       Cardiovascular Function/Vital Signs  Visit Vitals    /82 (BP 1 Location: Left arm, BP Patient Position: Supine; Head of bed elevated (Comment degrees))    Pulse 77    Temp 37.1 °C (98.7 °F)    Resp 13    Ht 5' 3\" (1.6 m)    Wt 72.8 kg (160 lb 8 oz)    SpO2 94%    BMI 28.43 kg/m2       Patient is status post MAC anesthesia for * No procedures listed *. Nausea/Vomiting: None    Postoperative hydration reviewed and adequate. Pain:  Pain Scale 1: Numeric (0 - 10) (08/07/18 1345)  Pain Intensity 1: 3 (08/07/18 1345)   Managed    Neurological Status: At baseline    Mental Status and Level of Consciousness: Arousable    Pulmonary Status:   O2 Device: Nasal cannula (08/07/18 1330)   Adequate oxygenation and airway patent    Complications related to anesthesia: None    Post-anesthesia assessment completed.  No concerns    Signed By: Shon Perez MD     August 7, 2018

## 2018-08-07 NOTE — PROGRESS NOTES
Problem: Falls - Risk of  Goal: *Absence of Falls  Document Kirstie Fall Risk and appropriate interventions in the flowsheet. Outcome: Progressing Towards Goal  Fall Risk Interventions:            Medication Interventions: Teach patient to arise slowly         History of Falls Interventions: Evaluate medications/consider consulting pharmacy        Problem: Pain  Goal: *PALLIATIVE CARE:  Alleviation of Pain  Received patient from previous RN via bedside shift report. Patient is resting comfortably; A&Ox4. AVSS, NAD noted at this time. Drsg to abd CDI. IVF/PCA infusing per emar. Pt NPO for drain placement. Full assessment into epic. Patient updated on current POC- verbalized understanding. All safety precautions in place- safety maintained. Will continue to monitor.

## 2018-08-07 NOTE — PROGRESS NOTES
General Surgery Daily Progress Note    Admit Date: 2018  Post-Operative Day: * No surgery found * from * No surgery found *     Subjective:     Last 24 hrs: Pt feels better w/ dilaudid PCA. Relieving pain much better than fentanyl. No BM since adm. Had a supp yesterday w/ flatus and a sm amt of stool. Drain fell out last night. Has a new abscess forming beside L breast       Objective:     Blood pressure 105/68, pulse 65, temperature 98.2 °F (36.8 °C), resp. rate 16, weight 160 lb 8 oz (72.8 kg), SpO2 95 %. Temp (24hrs), Av.8 °F (37.1 °C), Min:98.2 °F (36.8 °C), Max:99.1 °F (37.3 °C)      _____________________  Physical Exam:     Alert and Oriented, x3, in no acute distress. Cardiovascular: RRR, no peripheral edema  Abdomen: soft, NT, drsg over abscess w/ some induration, no erythema or drainage, drsg over old drain site, sm abscess forming beside L breast, tender w/ induration      Assessment:   Active Problems:    Pancreatic abscess (2018)            Plan: To IR for new drain today  Palliative to order bowel regimen   Cont diet  Replete potassium  OOB when able  Cont abx  Daily labs    Data Review:    Recent Labs      18   0631  18   0416  18   0324   WBC  9.5  9.5  11.7*   HGB  9.0*  9.7*  10.1*   HCT  28.9*  31.0*  32.5*   PLT  453*  460*  501*     Recent Labs      18   0631  18   0416  18   0324   NA  138  137  137   K  3.3*  3.0*  3.0*   CL  100  100  96*   CO2  28  32  29   GLU  94  107*  129*   BUN  2*  1*  2*   CREA  0.34*  0.37*  0.44*   CA  8.3*  8.2*  8.3*   MG  1.8  1.9  1.7   PHOS  3.8  4.0  4.2     No results for input(s): AML, LPSE in the last 72 hours.         ______________________  Medications:    Current Facility-Administered Medications   Medication Dose Route Frequency    HYDROmorphone (PF) 25 mg/50 mL (DILAUDID) PCA   IntraVENous CONTINUOUS    HYDROmorphone (DILAUDID) injection 2 mg  2 mg IntraVENous Q2H PRN    insulin glargine (LANTUS) injection 20 Units  20 Units SubCUTAneous DAILY    diphenhydrAMINE (BENADRYL) injection 25 mg  25 mg IntraVENous Q6H PRN    senna-docusate (PERICOLACE) 8.6-50 mg per tablet 2 Tab  2 Tab Oral DAILY    insulin lispro (HUMALOG) injection   SubCUTAneous AC&HS    glucose chewable tablet 16 g  4 Tab Oral PRN    dextrose (D50W) injection syrg 12.5-25 g  12.5-25 g IntraVENous PRN    glucagon (GLUCAGEN) injection 1 mg  1 mg IntraMUSCular PRN    levoFLOXacin (LEVAQUIN) 750 mg in D5W IVPB  750 mg IntraVENous Q24H    0.9% sodium chloride infusion  75 mL/hr IntraVENous CONTINUOUS    sodium chloride (NS) flush 5-10 mL  5-10 mL IntraVENous PRN    acetaminophen (TYLENOL) tablet 650 mg  650 mg Oral Q6H PRN    sodium chloride (NS) flush 5-10 mL  5-10 mL IntraVENous Q8H    sodium chloride (NS) flush 5-10 mL  5-10 mL IntraVENous PRN    ondansetron (ZOFRAN) injection 4 mg  4 mg IntraVENous Q4H PRN    DULoxetine (CYMBALTA) capsule 60 mg  60 mg Oral DAILY    nicotine (NICODERM CQ) 21 mg/24 hr patch 1 Patch  1 Patch TransDERmal DAILY       Justin Ugalde NP  8/7/2018                    ATTENDING ADDENDUM  I supervised the APC and reviewed the note. We discussed the plan of care  Scans showed the abscess almost gone. Not amenable to drainage. Will keep on the antibiotics and rescan on Friday AM in case it reaccumulates. /

## 2018-08-07 NOTE — PROGRESS NOTES
NUTRITION COMPLETE ASSESSMENT    RECOMMENDATIONS:   1. Resume diet after drain adjusted with:   - 1800 consistent CHO, low fat  2. Follow BG with insulin adjustment PRN  3. Add daily MVI if not drinking oral nutrition supplements  4. Weekly weights on standing scale     Interventions/Plan:   Food/Nutrient Delivery:  Other (1800 consistent CHO, low fat diet when resumed) Commercial supplement (Glucerna BID)          Assessment:   Reason for Assessment: [x]LOS [x]NPO/Clear Liquid     Diet: NPO (for drain (GI Lite prior))  Supplements: none  Nutritionally Significant Medications: [x] Reviewed & Includes: lantus (20 units), SSI, lactulose, levaquin, miralax, KCl, pericolace, NS@ 75ml/hr, dilaudid PCA  Meal Intake:   Patient Vitals for the past 100 hrs:   % Diet Eaten   08/05/18 1754 0 %   08/05/18 1109 50 %   08/05/18 0736 0 %   08/04/18 1345 15 %   08/04/18 0957 30 %   08/03/18 1306 25 %   08/03/18 0734 0 %     Current Hospitalization:   Appetite: Fair  PO Ability: Independent Average po intake:25-50%  Average supplements intake:        Subjective: Pt off the floor at time of visit. Objective:  Pt admitted for pancreatic abscess. 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 Abd drain in place with plans for upsizing today. New abscess noted today. New dx of DM this admit with A1c of 10.1% with pancreatectomy earlier this year. DTC following with insulin recommendations. Low K+ noted and repleted. Tolerating GI lite diet previously but with fair/poor appetite. Will add Glucerna BID (440kcal, 20g protein) for now. Once diet resumed recommend:  1800 consistent CHO, low fat    Constipation with abd distention noted. Bowel regimen rx with BM yesterday. Standing scale wt on 8/2 was 68.2kg with significant wt gain of 10# x 5 days. Severe wt loss of 11% x 5 months noted.    Last 3 Recorded Weights in this Encounter    08/05/18 0343 08/06/18 5155 08/07/18 8048   Weight: 68.8 kg (151 lb 10.8 oz) 69.9 kg (154 lb 1.6 oz) 72.8 kg (160 lb 8 oz)     Wt Readings:   07/30/18 64.9 kg (143 lb)   06/06/18 69.4 kg (153 lb)   05/07/18 72.1 kg (159 lb)   04/03/18 70.3 kg (155 lb)   03/21/18 74.4 kg (164 lb)   02/24/18 72.6 kg (160 lb)   10/21/14 81.6 kg (180 lb)   06/24/11 54.4 kg (120 lb)     Patient meets criteria for Severe Acute Protein Calorie Malnutrition as evidenced by:   ASPEN Malnutrition Criteria  Acute Illness, Chronic Illness, or Social/Enviornmental: Acute illness  Energy Intake: Less than/equal to 50% est energy req for greater than/equal to 5 days  Weight Loss: Greater than 7.5% x 3 mos  ASPEN Malnutrition Score - Acute Illness: 12  Acute Illness - Malnutrition Diagnosis: Severe malnutrition. Will continue to follow for pt interview as able, PO intake, supplements acceptance, wt trends, BM, and BG. Estimated Nutrition Needs:   Kcals/day: 1590 Kcals/day (1590-1725kcal)  Protein: 82 g (82-88g (1.2-1.3g/kg))  Fluid: 2040 ml (30ml/kg)  Based On: [place] Jeor (x 1.2-1.3)  Weight Used: Other (comment) (admit wt 68kg)    Pt expected to meet estimated nutrient needs:  []   Yes     []  No [x] Unable to predict at this time  Nutrition Diagnosis:   1.  Inadequate protein-energy intake related to poor appetite 2/2 altered GI fx as evidenced by pancreatic abcess with abd pain; less than 50% meals consumed  Goals:     Consumption of at leats 50% of meals and 1-2 supplements/day in 5-7 days; wt maintenance     Monitoring & Evaluation:    - Total energy intake, Liquid meal replacement, Carbohydrate intake   - Weight/weight change, Glucose profile      Previous Nutrition Goals Met:   N/A  Previous Recommendations:    N/A    Education & Discharge Needs:   [] None Identified   [] Identified and addressed    [x] Participated in care plan, discharge planning, and/or interdisciplinary rounds        Cultural, Yarsani and ethnic food preferences identified: None    Skin Integrity: [x]Intact  []Other  Edema: [x]None []Other  Last BM: 7/29  Food Allergies: [x]None []Other  Diet Restrictions: Cultural/Yazidi Preference(s): None     Anthropometrics:    Weight Loss Metrics 8/7/2018 7/30/2018 7/30/2018 7/30/2018 7/16/2018 6/27/2018 6/6/2018   Today's Wt 160 lb 8 oz - 143 lb - 146 lb 149 lb 153 lb   BMI - 28.43 kg/m2 - 25.33 kg/m2 25.86 kg/m2 26.39 kg/m2 27.1 kg/m2      Weight Source: Standing scale (comment)  Height: 5' 3\" (160 cm),    Body mass index is 28.43 kg/(m^2).   IBW : 52.2 kg (115 lb), % IBW (Calculated): 139.57 %  Usual Body Weight: 70.3 kg (155 lb),      Labs:    Lab Results   Component Value Date/Time    Sodium 138 08/07/2018 06:31 AM    Potassium 3.3 (L) 08/07/2018 06:31 AM    Chloride 100 08/07/2018 06:31 AM    CO2 28 08/07/2018 06:31 AM    Glucose 94 08/07/2018 06:31 AM    BUN 2 (L) 08/07/2018 06:31 AM    Creatinine 0.34 (L) 08/07/2018 06:31 AM    Calcium 8.3 (L) 08/07/2018 06:31 AM    Magnesium 1.8 08/07/2018 06:31 AM    Phosphorus 3.8 08/07/2018 06:31 AM    Albumin 2.8 (L) 07/31/2018 09:45 AM     Lab Results   Component Value Date/Time    Hemoglobin A1c 10.1 (H) 08/01/2018 07:19 PM     Darren Martinez RD Pager #[phone]

## 2018-08-07 NOTE — PROGRESS NOTES
Palliative Medicine Consult  Smith: [phone]    Patient Name: Chela Reed  YOB: 1980    Date of Initial Consult: August 1, 2018  Reason for Consult: Pain Management  Requesting Provider: Felisha Castelan NP  Primary Care Physician: Evelyn Golden NP     SUMMARY:   Chela Reed is a 42 y.o. with a past history of anxiety, blurred vision, chest pain, depression, frequent headaches, obesity, pancreatic cyst, shortness of breath, stomch aches and swallowing difficulties  who was admitted on 7/30/2018 from home with a diagnosis of abscess of the abdominal cavity, pancreatic fluid leak now s/p drain placement. Current medical issues leading to Palliative Medicine involvement include: pain management. Interval History:  Bedside I & D 8/5/18  Dilaudid pca started 8/5/18   PALLIATIVE DIAGNOSES:   1. Abdominal Pain  2. Abdominal abscess   3. Drug induced constipation  4. Decreased appetite       PLAN:   1. Pain well controlled with current Dilaudid PCA setting~0.2mg basal with 0.3mg q 10 min. 23 mg/ 24 hours. Pt noted not having to press the button as much as she did with the fentanyl PCA  2. Surgery did I & D and lots of pus drained on the new abscess that formed adjacent to the area with the drain. Plans for drain noted by the surgical team  3. Discussed with Dr. Cabrera Adame who educated me that this process can be prolonged   4. Dilaudid 2mg iv q 2prn refractory symptoms ordered. Pt has not required any doses  5. Constipation: add miralax daily. One dose of lactulose today. Continue pericolace 2 caps/hs. 6. The pt reported no bm since admission  7. Will continue to follow up  8. Will inquire about interest in AMD completion next encounter  9. Initial consult note routed to primary continuity provider  10.  Communicated plan of care with: Palliative IDT       GOALS OF CARE / TREATMENT PREFERENCES:     GOALS OF CARE:  Patient/Health Care Proxy Stated Goals: Prolong life      TREATMENT PREFERENCES:   Code Status: Full Code    Advance Care Planning:  Advance Care Planning 8/1/2018   Patient's Healthcare Decision Maker is: Legal Next of Kin   Primary Decision Maker Name Deedee Thompson   Primary Decision Maker Phone Number [phone]   Primary Decision Maker Relationship to Patient Spouse   Confirm Advance Directive -   Patient Would Like to Complete Advance Directive -   Does the patient have other document types -       Medical Interventions: Full interventions   Other Instructions: Other:    As far as possible, the palliative care team has discussed with patient / health care proxy about goals of care / treatment preferences for patient. HISTORY:     HPI/SUBJECTIVE:    The patient is:   [x] Verbal and participatory  [] Non-participatory due to:   Pain controlled. Still no bowel movement.   Straining worsens abdominal pain      Clinical Pain Assessment (nonverbal scale for severity on nonverbal patients):   Clinical Pain Assessment  Severity: 2  Location: abdominal pain  Character: severe and diffuse  Duration: days  Factors: pca helping  Frequency: constant          Duration: for how long has pt been experiencing pain (e.g., 2 days, 1 month, years)  Frequency: how often pain is an issue (e.g., several times per day, once every few days, constant)     FUNCTIONAL ASSESSMENT:     Palliative Performance Scale (PPS):  PPS: 70       PSYCHOSOCIAL/SPIRITUAL SCREENING:     Palliative IDT has assessed this patient for cultural preferences / practices and a referral made as appropriate to needs (Cultural Services, Patient Advocacy, Ethics, etc.)    Advance Care Planning:  Advance Care Planning 8/1/2018   Patient's Healthcare Decision Maker is: Legal Next of Kin   Primary Decision Maker Name Deedee Thompson   Primary Decision Maker Phone Number [phone]   Primary Decision Maker Relationship to Patient Spouse   Confirm Advance Directive -   Patient Would Like to Complete Advance Directive -   Does the patient have other document types -       Any spiritual / Orthodoxy concerns:  [] Yes /  [x] No    Caregiver Burnout:  [] Yes /  [] No /  [x] No Caregiver Present      Anticipatory grief assessment:   [] Normal  / [] Maladaptive       ESAS Anxiety: Anxiety: 1    ESAS Depression: Depression: 0        REVIEW OF SYSTEMS:     Positive and pertinent negative findings in ROS are noted above in HPI. The following systems were [x] reviewed / [] unable to be reviewed as noted in HPI  Other findings are noted below. Systems: constitutional, ears/nose/mouth/throat, respiratory, gastrointestinal, genitourinary, musculoskeletal, integumentary, neurologic, psychiatric, endocrine. Positive findings noted below. Modified ESAS Completed by: provider   Fatigue: 0 Drowsiness: 0   Depression: 0 Pain: 2   Anxiety: 1 Nausea: 0   Anorexia: 0 Dyspnea: 0     Constipation: Yes              PHYSICAL EXAM:     From RN flowsheet:  Wt Readings from Last 3 Encounters:   08/07/18 160 lb 8 oz (72.8 kg)   07/30/18 143 lb (64.9 kg)   07/16/18 146 lb (66.2 kg)     Blood pressure 105/68, pulse 65, temperature 98.2 °F (36.8 °C), resp. rate 16, weight 160 lb 8 oz (72.8 kg), SpO2 95 %. Pain Scale 1: Numeric (0 - 10)  Pain Intensity 1: 6  Pain Onset 1: past month  Pain Location 1: Back  Pain Orientation 1: Anterior  Pain Description 1: Aching  Pain Intervention(s) 1: Medication (see MAR)  Last bowel movement, if known:     Constitutional: alert, conversant, nad  Eyes: pupils equal, anicteric  ENMT: no nasal discharge, moist mucous membranes  Cardiovascular: regular rhythm, distal pulses intact  Respiratory: breathing not labored, symmetric  Gastrointestinal: soft, +tenderness left side in the area of the drain, palpable mass left abdomen lot smaller now since drained with blister noted.     Musculoskeletal: no deformity, no tenderness to palpation  Skin: warm, dry  Neurologic: following commands, moving all extremities  Psychiatric: full affect, no hallucinations  Other: pigtail cath without drainage       HISTORY:     Active Problems:    Pancreatic abscess (7/30/2018)      Past Medical History:   Diagnosis Date    Abscess of abdominal cavity ([address] 7/30/2018    Anxiety     Blurred vision     Chest pain     Chronic pain     MUSCLE WEAKNESS,PANCREATIC CYST     Depression     Frequent headaches     Muscle weakness     Obesity (BMI 30-39.9) 4/26/2018    Pancreatic cyst 3/21/2018    Shortness of breath     Stomach pain     Swallowing difficulty       Past Surgical History:   Procedure Laterality Date    HX GI      COLONOSCOPY    HX GYN  2005    endometriosis surgery    HX OTHER SURGICAL  04/20/2018    lap distal pancreatectomy converted to open-[facility]-DR. David Daniel      Family History   Problem Relation Age of Onset    Cancer Mother      colon    Cancer Father      skin    Anesth Problems Father      SUCCINYLCHOLINE  REACTION      History reviewed, no pertinent family history.   Social History   Substance Use Topics    Smoking status: Current Every Day Smoker     Packs/day: 1.00     Years: 25.00    Smokeless tobacco: Current User      Comment: STOP SMOKING BOOKLET PROVIDED    Alcohol use No     Allergies   Allergen Reactions    Amoxicillin Shortness of Breath and Rash     Pt has had keflex in the past      Current Facility-Administered Medications   Medication Dose Route Frequency    HYDROmorphone (PF) 25 mg/50 mL (DILAUDID) PCA   IntraVENous CONTINUOUS    HYDROmorphone (DILAUDID) injection 2 mg  2 mg IntraVENous Q2H PRN    insulin glargine (LANTUS) injection 20 Units  20 Units SubCUTAneous DAILY    diphenhydrAMINE (BENADRYL) injection 25 mg  25 mg IntraVENous Q6H PRN    senna-docusate (PERICOLACE) 8.6-50 mg per tablet 2 Tab  2 Tab Oral DAILY    insulin lispro (HUMALOG) injection   SubCUTAneous AC&HS    glucose chewable tablet 16 g  4 Tab Oral PRN    dextrose (D50W) injection syrg 12.5-25 g  12.5-25 g IntraVENous PRN    glucagon (GLUCAGEN) injection 1 mg  1 mg IntraMUSCular PRN    levoFLOXacin (LEVAQUIN) 750 mg in D5W IVPB  750 mg IntraVENous Q24H    0.9% sodium chloride infusion  75 mL/hr IntraVENous CONTINUOUS    sodium chloride (NS) flush 5-10 mL  5-10 mL IntraVENous PRN    acetaminophen (TYLENOL) tablet 650 mg  650 mg Oral Q6H PRN    sodium chloride (NS) flush 5-10 mL  5-10 mL IntraVENous Q8H    sodium chloride (NS) flush 5-10 mL  5-10 mL IntraVENous PRN    ondansetron (ZOFRAN) injection 4 mg  4 mg IntraVENous Q4H PRN    DULoxetine (CYMBALTA) capsule 60 mg  60 mg Oral DAILY    nicotine (NICODERM CQ) 21 mg/24 hr patch 1 Patch  1 Patch TransDERmal DAILY          LAB AND IMAGING FINDINGS:     Lab Results   Component Value Date/Time    WBC 9.5 08/07/2018 06:31 AM    HGB 9.0 (L) 08/07/2018 06:31 AM    PLATELET 236 (H) 43/75/0292 06:31 AM     Lab Results   Component Value Date/Time    Sodium 138 08/07/2018 06:31 AM    Potassium 3.3 (L) 08/07/2018 06:31 AM    Chloride 100 08/07/2018 06:31 AM    CO2 28 08/07/2018 06:31 AM    BUN 2 (L) 08/07/2018 06:31 AM    Creatinine 0.34 (L) 08/07/2018 06:31 AM    Calcium 8.3 (L) 08/07/2018 06:31 AM    Magnesium 1.8 08/07/2018 06:31 AM    Phosphorus 3.8 08/07/2018 06:31 AM      Lab Results   Component Value Date/Time    AST (SGOT) 11 (L) 07/31/2018 09:45 AM    Alk.  phosphatase 126 (H) 07/31/2018 09:45 AM    Protein, total 7.8 07/31/2018 09:45 AM    Albumin 2.8 (L) 07/31/2018 09:45 AM    Globulin 5.0 (H) 07/31/2018 09:45 AM     Lab Results   Component Value Date/Time    INR 1.0 02/24/2018 03:01 PM    Prothrombin time 10.0 02/24/2018 03:01 PM    aPTT 39.6 (H) 06/24/2011 11:40 PM      No results found for: IRON, FE, TIBC, IBCT, PSAT, FERR   No results found for: PH, PCO2, PO2  No components found for: [place]   Lab Results   Component Value Date/Time    CK 46 02/24/2018 03:01 PM    CK - MB <1.0 02/24/2018 03:01 PM                Total time: 35 minutes  Counseling / coordination time, spent as noted above: 30 minutes  > 50% counseling / coordination?: y    Prolonged service was provided for  []30 min   []75 min in face to face time in the presence of the patient, spent as noted above. Time Start:   Time End:   Note: this can only be billed with 32041 (initial) or 02871 (follow up). If multiple start / stop times, list each separately.

## 2018-08-07 NOTE — PROGRESS NOTES
Report called to [facility] regarding drain placement cancellation and post anesthesia VS.  Patient awake and taking PO fluids. Mother at bedside. Awaiting transport.

## 2018-08-07 NOTE — ANESTHESIA PREPROCEDURE EVALUATION
Anesthetic History   No history of anesthetic complications            Review of Systems / Medical History  Patient summary reviewed, nursing notes reviewed and pertinent labs reviewed    Pulmonary          Smoker         Neuro/Psych         Headaches and psychiatric history     Cardiovascular  Within defined limits                     GI/Hepatic/Renal           Liver disease     Endo/Other  Within defined limits           Other Findings            Physical Exam    Airway  Mallampati: II  TM Distance: > 6 cm  Neck ROM: normal range of motion   Mouth opening: Normal     Cardiovascular  Regular rate and rhythm,  S1 and S2 normal,  no murmur, click, rub, or gallop             Dental  No notable dental hx       Pulmonary  Breath sounds clear to auscultation               Abdominal  GI exam deferred       Other Findings            Anesthetic Plan    ASA: 2  Anesthesia type: MAC          Induction: Intravenous  Anesthetic plan and risks discussed with: Patient

## 2018-08-07 NOTE — ROUTINE PROCESS
Bedside and Verbal shift change report given to Mat Reinoso RN (oncoming nurse) by Shane Moreno RN (offgoing nurse). Report included the following information SBAR, Kardex, Intake/Output, MAR, Accordion and Recent Results.

## 2018-08-08 LAB
ANION GAP SERPL CALC-SCNC: 5 MMOL/L (ref 5–15)
BASOPHILS # BLD: 0 K/UL (ref 0–0.1)
BASOPHILS NFR BLD: 0 % (ref 0–1)
BUN SERPL-MCNC: <1 MG/DL (ref 6–20)
BUN/CREAT SERPL: ABNORMAL (ref 12–20)
CALCIUM SERPL-MCNC: 8.5 MG/DL (ref 8.5–10.1)
CHLORIDE SERPL-SCNC: 99 MMOL/L (ref 97–108)
CO2 SERPL-SCNC: 33 MMOL/L (ref 21–32)
CREAT SERPL-MCNC: 0.4 MG/DL (ref 0.55–1.02)
DIFFERENTIAL METHOD BLD: ABNORMAL
EOSINOPHIL # BLD: 0.5 K/UL (ref 0–0.4)
EOSINOPHIL NFR BLD: 4 % (ref 0–7)
ERYTHROCYTE [DISTWIDTH] IN BLOOD BY AUTOMATED COUNT: 17.4 % (ref 11.5–14.5)
GLUCOSE BLD STRIP.AUTO-MCNC: 109 MG/DL (ref 65–100)
GLUCOSE BLD STRIP.AUTO-MCNC: 109 MG/DL (ref 65–100)
GLUCOSE BLD STRIP.AUTO-MCNC: 90 MG/DL (ref 65–100)
GLUCOSE SERPL-MCNC: 69 MG/DL (ref 65–100)
HCT VFR BLD AUTO: 33.4 % (ref 35–47)
HGB BLD-MCNC: 10.4 G/DL (ref 11.5–16)
IMM GRANULOCYTES # BLD: 0.1 K/UL (ref 0–0.04)
IMM GRANULOCYTES NFR BLD AUTO: 1 % (ref 0–0.5)
LYMPHOCYTES # BLD: 1.9 K/UL (ref 0.8–3.5)
LYMPHOCYTES NFR BLD: 18 % (ref 12–49)
MAGNESIUM SERPL-MCNC: 2 MG/DL (ref 1.6–2.4)
MCH RBC QN AUTO: 26.5 PG (ref 26–34)
MCHC RBC AUTO-ENTMCNC: 31.1 G/DL (ref 30–36.5)
MCV RBC AUTO: 85.2 FL (ref 80–99)
MONOCYTES # BLD: 1 K/UL (ref 0–1)
MONOCYTES NFR BLD: 9 % (ref 5–13)
NEUTS SEG # BLD: 7.4 K/UL (ref 1.8–8)
NEUTS SEG NFR BLD: 68 % (ref 32–75)
NRBC # BLD: 0 K/UL (ref 0–0.01)
NRBC BLD-RTO: 0 PER 100 WBC
PHOSPHATE SERPL-MCNC: 4.1 MG/DL (ref 2.6–4.7)
PLATELET # BLD AUTO: 507 K/UL (ref 150–400)
PMV BLD AUTO: 10.2 FL (ref 8.9–12.9)
POTASSIUM SERPL-SCNC: 3.7 MMOL/L (ref 3.5–5.1)
RBC # BLD AUTO: 3.92 M/UL (ref 3.8–5.2)
SERVICE CMNT-IMP: ABNORMAL
SERVICE CMNT-IMP: ABNORMAL
SERVICE CMNT-IMP: NORMAL
SODIUM SERPL-SCNC: 137 MMOL/L (ref 136–145)
WBC # BLD AUTO: 10.9 K/UL (ref 3.6–11)

## 2018-08-08 PROCEDURE — 94760 N-INVAS EAR/PLS OXIMETRY 1: CPT

## 2018-08-08 PROCEDURE — 85025 COMPLETE CBC W/AUTO DIFF WBC: CPT | Performed by: SURGERY

## 2018-08-08 PROCEDURE — 74011250637 HC RX REV CODE- 250/637: Performed by: NURSE PRACTITIONER

## 2018-08-08 PROCEDURE — 65270000029 HC RM PRIVATE

## 2018-08-08 PROCEDURE — 80048 BASIC METABOLIC PNL TOTAL CA: CPT | Performed by: SURGERY

## 2018-08-08 PROCEDURE — 74011250637 HC RX REV CODE- 250/637: Performed by: SURGERY

## 2018-08-08 PROCEDURE — 84100 ASSAY OF PHOSPHORUS: CPT | Performed by: SURGERY

## 2018-08-08 PROCEDURE — 74011250636 HC RX REV CODE- 250/636: Performed by: NURSE PRACTITIONER

## 2018-08-08 PROCEDURE — 74011636637 HC RX REV CODE- 636/637: Performed by: PHYSICIAN ASSISTANT

## 2018-08-08 PROCEDURE — 83735 ASSAY OF MAGNESIUM: CPT | Performed by: SURGERY

## 2018-08-08 PROCEDURE — 65660000000 HC RM CCU STEPDOWN

## 2018-08-08 PROCEDURE — 36415 COLL VENOUS BLD VENIPUNCTURE: CPT | Performed by: SURGERY

## 2018-08-08 PROCEDURE — 82962 GLUCOSE BLOOD TEST: CPT

## 2018-08-08 PROCEDURE — 74011250636 HC RX REV CODE- 250/636: Performed by: SURGERY

## 2018-08-08 RX ADMIN — Medication: at 06:52

## 2018-08-08 RX ADMIN — LACTULOSE 20 G: 20 SOLUTION ORAL at 12:52

## 2018-08-08 RX ADMIN — LEVOFLOXACIN 750 MG: 5 INJECTION, SOLUTION INTRAVENOUS at 10:33

## 2018-08-08 RX ADMIN — POTASSIUM CHLORIDE 20 MEQ: 750 TABLET, EXTENDED RELEASE ORAL at 08:48

## 2018-08-08 RX ADMIN — INSULIN GLARGINE 20 UNITS: 100 INJECTION, SOLUTION SUBCUTANEOUS at 10:32

## 2018-08-08 RX ADMIN — DULOXETINE HYDROCHLORIDE 60 MG: 60 CAPSULE, DELAYED RELEASE ORAL at 08:48

## 2018-08-08 RX ADMIN — Medication 10 ML: at 22:18

## 2018-08-08 RX ADMIN — POLYETHYLENE GLYCOL 3350 17 G: 17 POWDER, FOR SOLUTION ORAL at 08:48

## 2018-08-08 RX ADMIN — ONDANSETRON 4 MG: 2 INJECTION INTRAMUSCULAR; INTRAVENOUS at 13:49

## 2018-08-08 RX ADMIN — SENNOSIDES AND DOCUSATE SODIUM 2 TABLET: 8.6; 5 TABLET ORAL at 22:18

## 2018-08-08 RX ADMIN — Medication 10 ML: at 06:52

## 2018-08-08 RX ADMIN — Medication 10 ML: at 12:53

## 2018-08-08 NOTE — PROGRESS NOTES
General Surgery Daily Progress Note    Admit Date: 2018  Post-Operative Day: * No surgery found * from * No surgery found *     Subjective:     Last 24 hrs: Abd fluid collections not large enough and not amenable to further drainage. Will cont Levaquin and rescan on Friday, 8/10/18. Pt had a second superficial abscess surface and break open yesterday and drain pus - under left breast. Superficial abscess on left abd cont to drain. No BM except for a few small pieces 2 days ago after suppository. Joe Albarran Np with Palliative managing pain and ordered miralax daily and one time dose of lactuse yesterday without results. +BM. Pt feels bloated. No getting OOB and ambulating. On IV levaquin. Tmax 99.1F. Labs have not been drawn yet as IV team has been called to draw blood; pt a difficult stick. Objective:     Blood pressure 108/71, pulse 71, temperature 99.1 °F (37.3 °C), resp. rate 18, height 5' 3\" (1.6 m), weight 160 lb 0.9 oz (72.6 kg), SpO2 92 %. Temp (24hrs), Av.7 °F (37.1 °C), Min:98.3 °F (36.8 °C), Max:99.1 °F (37.3 °C)      _____________________  Physical Exam:     Alert and Oriented,  in no acute distress. Cardiovascular: RRR, no LE peripheral edema  Lungs:CTAB   Abdomen: distended, hypoactive BS, TTP around abscess sites  Left abd - Small wound opening with less induration around it. +small amt of pus with pressure. No erythema  Left side under breast - small wound without drainage, very minimal induration.  No erythema    Assessment:   Active Problems:    Pancreatic abscess (2018)            Plan:     Appreciate Palliative managing pain control and constipation  Awaiting labs  Cont levaquin  Re-scan on Friday  Gi lite diet  Decrease IVF  Cont pain control as per palliative  Pt needs to get OOB and ambulate  Labs in the morning  Further plans as per Dr. Wesley Rousseau    Data Review:    Recent Labs      18   0631  18   0416   WBC  9.5  9.5   HGB  9.0*  9.7*   HCT  28.9*  31.0*   PLT 453*  460*     Recent Labs      08/07/18   0631  08/06/18   0416   NA  138  137   K  3.3*  3.0*   CL  100  100   CO2  28  32   GLU  94  107*   BUN  2*  1*   CREA  0.34*  0.37*   CA  8.3*  8.2*   MG  1.8  1.9   PHOS  3.8  4.0     No results for input(s): AML, LPSE in the last 72 hours. ______________________  Medications:    Current Facility-Administered Medications   Medication Dose Route Frequency    potassium chloride SR (KLOR-CON 10) tablet 20 mEq  20 mEq Oral TID    polyethylene glycol (MIRALAX) packet 17 g  17 g Oral DAILY    senna-docusate (PERICOLACE) 8.6-50 mg per tablet 2 Tab  2 Tab Oral QHS    HYDROmorphone (PF) 25 mg/50 mL (DILAUDID) PCA   IntraVENous CONTINUOUS    HYDROmorphone (DILAUDID) injection 2 mg  2 mg IntraVENous Q2H PRN    insulin glargine (LANTUS) injection 20 Units  20 Units SubCUTAneous DAILY    diphenhydrAMINE (BENADRYL) injection 25 mg  25 mg IntraVENous Q6H PRN    insulin lispro (HUMALOG) injection   SubCUTAneous AC&HS    glucose chewable tablet 16 g  4 Tab Oral PRN    dextrose (D50W) injection syrg 12.5-25 g  12.5-25 g IntraVENous PRN    glucagon (GLUCAGEN) injection 1 mg  1 mg IntraMUSCular PRN    levoFLOXacin (LEVAQUIN) 750 mg in D5W IVPB  750 mg IntraVENous Q24H    0.9% sodium chloride infusion  25 mL/hr IntraVENous CONTINUOUS    sodium chloride (NS) flush 5-10 mL  5-10 mL IntraVENous PRN    acetaminophen (TYLENOL) tablet 650 mg  650 mg Oral Q6H PRN    sodium chloride (NS) flush 5-10 mL  5-10 mL IntraVENous Q8H    sodium chloride (NS) flush 5-10 mL  5-10 mL IntraVENous PRN    ondansetron (ZOFRAN) injection 4 mg  4 mg IntraVENous Q4H PRN    DULoxetine (CYMBALTA) capsule 60 mg  60 mg Oral DAILY    nicotine (NICODERM CQ) 21 mg/24 hr patch 1 Patch  1 Patch TransDERmal DAILY       Melisa Blakely NP  8/8/2018                    ATTENDING ADDENDUM  I supervised the APC and reviewed the note.   We discussed the plan of care  Pain is better controlled, thanks to colleagues in [address]

## 2018-08-08 NOTE — ROUTINE PROCESS
2x nurses unable to draw labs, IV team notified and will attempt this am.    Bedside shift change report given to [address] (oncoming nurse) by Sandra Zuñiga RN (offgoing nurse). Report included the following information SBAR, Kardex, Intake/Output, MAR, Accordion and Recent Results.

## 2018-08-08 NOTE — PROGRESS NOTES
Palliative Medicine Consult  Luis: [phone] ([phone]    Patient Name: Mina Herrera  YOB: 1980    Date of Initial Consult: August 1, 2018  Reason for Consult: Pain Management  Requesting Provider: Loida Hargrove NP  Primary Care Physician: Arian Gauthier NP     SUMMARY:   Mina Herrera is a 42 y.o. with a past history of anxiety, blurred vision, chest pain, depression, frequent headaches, obesity, pancreatic cyst, shortness of breath, stomch aches and swallowing difficulties  who was admitted on 7/30/2018 from home with a diagnosis of abscess of the abdominal cavity, pancreatic fluid leak now s/p drain placement. Current medical issues leading to Palliative Medicine involvement include: pain management. Interval History:  Bedside I & D 8/5/18  Dilaudid pca started 8/5/18  Drain dislodged 8/7/18     PALLIATIVE DIAGNOSES:   1. Abdominal Pain  2. Abdominal abscess   3. Drug induced constipation  4. Decreased appetite       PLAN:   1. Pain well controlled with current Dilaudid PCA setting~0.2mg basal with 0.3mg q 10 min. 23 mg/ 24 hours yesterday. Unable to interrogate the pump today. 2. Drain fell out during the night. No plans to replace at this time as it wasn't really draining much  3. Discussed with Dr. Zeynep Davis who educated me that this process can be prolonged   4. Dilaudid 2mg iv q 2prn refractory symptoms ordered. Pt has not required any doses  5. Constipation:   1. miralax daily  2. Another dose of lactulose today per pt request  3. pericolace 2 caps/hs. 6. The pt reported no bm since admission. Feels something happening in her belly now like she should have a bm soon. Encouraged pt to walk the halls  7. Will continue to follow up  8. Will inquire about interest in AMD completion  9. Initial consult note routed to primary continuity provider  10.  Communicated plan of care with: Palliative IDT       GOALS OF CARE / TREATMENT PREFERENCES:     GOALS OF CARE:  Patient/Health Care Proxy Stated Goals: Prolong life      TREATMENT PREFERENCES:   Code Status: Full Code    Advance Care Planning:  Advance Care Planning 8/1/2018   Patient's Healthcare Decision Maker is: Legal Next of Kin   Primary Decision Maker Name Dion Plummer   Primary Decision Maker Phone Number [phone]   Primary Decision Maker Relationship to Patient Spouse   Confirm Advance Directive -   Patient Would Like to Complete Advance Directive -   Does the patient have other document types -       Medical Interventions: Full interventions   Other Instructions: Other:    As far as possible, the palliative care team has discussed with patient / health care proxy about goals of care / treatment preferences for patient. HISTORY:     HPI/SUBJECTIVE:    The patient is:   [x] Verbal and participatory  [] Non-participatory due to:   Pain controlled. Still no bowel movement.   Lots of gas    Clinical Pain Assessment (nonverbal scale for severity on nonverbal patients):   Clinical Pain Assessment  Severity: 1  Location: abdominal pain  Character: severe and diffuse  Duration: days  Factors: pca helping  Frequency: constant          Duration: for how long has pt been experiencing pain (e.g., 2 days, 1 month, years)  Frequency: how often pain is an issue (e.g., several times per day, once every few days, constant)     FUNCTIONAL ASSESSMENT:     Palliative Performance Scale (PPS):  PPS: 70       PSYCHOSOCIAL/SPIRITUAL SCREENING:     Palliative IDT has assessed this patient for cultural preferences / practices and a referral made as appropriate to needs (Cultural Services, Patient Advocacy, Ethics, etc.)    Advance Care Planning:  Advance Care Planning 8/1/2018   Patient's Healthcare Decision Maker is: Legal Next of Kin   Primary Decision Maker Name Dion Plummer   Primary Decision Maker Phone Number [phone]   Primary Decision Maker Relationship to Patient Spouse   Confirm Advance Directive -   Patient Would Like to Complete Advance Directive -   Does the patient have other document types -       Any spiritual / Confucianist concerns:  [] Yes /  [x] No    Caregiver Burnout:  [] Yes /  [] No /  [x] No Caregiver Present      Anticipatory grief assessment:   [] Normal  / [] Maladaptive       ESAS Anxiety: Anxiety: 1    ESAS Depression: Depression: 0        REVIEW OF SYSTEMS:     Positive and pertinent negative findings in ROS are noted above in HPI. The following systems were [x] reviewed / [] unable to be reviewed as noted in HPI  Other findings are noted below. Systems: constitutional, ears/nose/mouth/throat, respiratory, gastrointestinal, genitourinary, musculoskeletal, integumentary, neurologic, psychiatric, endocrine. Positive findings noted below. Modified ESAS Completed by: provider   Fatigue: 1 Drowsiness: 0   Depression: 0 Pain: 1   Anxiety: 1 Nausea: 0   Anorexia: 0 Dyspnea: 0     Constipation: Yes              PHYSICAL EXAM:     From RN flowsheet:  Wt Readings from Last 3 Encounters:   08/08/18 160 lb 0.9 oz (72.6 kg)   07/30/18 143 lb (64.9 kg)   07/16/18 146 lb (66.2 kg)     Blood pressure 108/71, pulse 71, temperature 99.1 °F (37.3 °C), resp. rate 18, height 5' 3\" (1.6 m), weight 160 lb 0.9 oz (72.6 kg), SpO2 92 %. Pain Scale 1: Numeric (0 - 10)  Pain Intensity 1: 3  Pain Onset 1: past month  Pain Location 1: Abdomen  Pain Orientation 1: Left  Pain Description 1: Sharp  Pain Intervention(s) 1: Medication (see MAR)  Last bowel movement, if known:     Constitutional: alert, conversant, nad  Eyes: pupils equal, anicteric  ENMT: no nasal discharge, moist mucous membranes  Cardiovascular: regular rhythm, distal pulses intact  Respiratory: breathing not labored, symmetric  Gastrointestinal: soft, +tenderness left side in the area of the drain, palpable mass left abdomen lot smaller now since drained with blister noted.  + hyperactive sounds  Musculoskeletal: no deformity, no tenderness to palpation  Skin: warm, dry  Neurologic: following commands, moving all extremities  Psychiatric: full affect, no hallucinations  Other:        HISTORY:     Active Problems:    Pancreatic abscess (7/30/2018)      Past Medical History:   Diagnosis Date    Abscess of abdominal cavity ([address] 7/30/2018    Anxiety     Blurred vision     Chest pain     Chronic pain     MUSCLE WEAKNESS,PANCREATIC CYST     Depression     Frequent headaches     Muscle weakness     Obesity (BMI 30-39.9) 4/26/2018    Pancreatic cyst 3/21/2018    Shortness of breath     Stomach pain     Swallowing difficulty       Past Surgical History:   Procedure Laterality Date    HX GI      COLONOSCOPY    HX GYN  2005    endometriosis surgery    HX OTHER SURGICAL  04/20/2018    lap distal pancreatectomy converted to open-[facility]-DR. Tennille Best      Family History   Problem Relation Age of Onset    Cancer Mother      colon    Cancer Father      skin    Anesth Problems Father      SUCCINYLCHOLINE  REACTION      History reviewed, no pertinent family history.   Social History   Substance Use Topics    Smoking status: Current Every Day Smoker     Packs/day: 1.00     Years: 25.00    Smokeless tobacco: Current User      Comment: STOP SMOKING BOOKLET PROVIDED    Alcohol use No     Allergies   Allergen Reactions    Amoxicillin Shortness of Breath and Rash     Pt has had keflex in the past      Current Facility-Administered Medications   Medication Dose Route Frequency    polyethylene glycol (MIRALAX) packet 17 g  17 g Oral DAILY    senna-docusate (PERICOLACE) 8.6-50 mg per tablet 2 Tab  2 Tab Oral QHS    HYDROmorphone (PF) 25 mg/50 mL (DILAUDID) PCA   IntraVENous CONTINUOUS    HYDROmorphone (DILAUDID) injection 2 mg  2 mg IntraVENous Q2H PRN    insulin glargine (LANTUS) injection 20 Units  20 Units SubCUTAneous DAILY    diphenhydrAMINE (BENADRYL) injection 25 mg  25 mg IntraVENous Q6H PRN    insulin lispro (HUMALOG) injection   SubCUTAneous AC&HS    glucose chewable tablet 16 g  4 Tab Oral PRN    dextrose (D50W) injection syrg 12.5-25 g  12.5-25 g IntraVENous PRN    glucagon (GLUCAGEN) injection 1 mg  1 mg IntraMUSCular PRN    levoFLOXacin (LEVAQUIN) 750 mg in D5W IVPB  750 mg IntraVENous Q24H    0.9% sodium chloride infusion  25 mL/hr IntraVENous CONTINUOUS    sodium chloride (NS) flush 5-10 mL  5-10 mL IntraVENous PRN    acetaminophen (TYLENOL) tablet 650 mg  650 mg Oral Q6H PRN    sodium chloride (NS) flush 5-10 mL  5-10 mL IntraVENous Q8H    sodium chloride (NS) flush 5-10 mL  5-10 mL IntraVENous PRN    ondansetron (ZOFRAN) injection 4 mg  4 mg IntraVENous Q4H PRN    DULoxetine (CYMBALTA) capsule 60 mg  60 mg Oral DAILY    nicotine (NICODERM CQ) 21 mg/24 hr patch 1 Patch  1 Patch TransDERmal DAILY          LAB AND IMAGING FINDINGS:     Lab Results   Component Value Date/Time    WBC 10.9 08/08/2018 04:47 AM    HGB 10.4 (L) 08/08/2018 04:47 AM    PLATELET 389 (H) 66/56/7541 04:47 AM     Lab Results   Component Value Date/Time    Sodium 137 08/08/2018 04:47 AM    Potassium 3.7 08/08/2018 04:47 AM    Chloride 99 08/08/2018 04:47 AM    CO2 33 (H) 08/08/2018 04:47 AM    BUN <1 (L) 08/08/2018 04:47 AM    Creatinine 0.40 (L) 08/08/2018 04:47 AM    Calcium 8.5 08/08/2018 04:47 AM    Magnesium 2.0 08/08/2018 04:47 AM    Phosphorus 4.1 08/08/2018 04:47 AM      Lab Results   Component Value Date/Time    AST (SGOT) 11 (L) 07/31/2018 09:45 AM    Alk.  phosphatase 126 (H) 07/31/2018 09:45 AM    Protein, total 7.8 07/31/2018 09:45 AM    Albumin 2.8 (L) 07/31/2018 09:45 AM    Globulin 5.0 (H) 07/31/2018 09:45 AM     Lab Results   Component Value Date/Time    INR 1.0 02/24/2018 03:01 PM    Prothrombin time 10.0 02/24/2018 03:01 PM    aPTT 39.6 (H) 06/24/2011 11:40 PM      No results found for: IRON, FE, TIBC, IBCT, PSAT, FERR   No results found for: PH, PCO2, PO2  No components found for: [place]   Lab Results   Component Value Date/Time    CK 46 02/24/2018 03:01 PM    CK - MB <1.0 02/24/2018 03:01 PM                Total time: 35 minutes  Counseling / coordination time, spent as noted above: 30 minutes  > 50% counseling / coordination?: y    Prolonged service was provided for  []30 min   []75 min in face to face time in the presence of the patient, spent as noted above. Time Start:   Time End:   Note: this can only be billed with 25859 (initial) or 12143 (follow up). If multiple start / stop times, list each separately.

## 2018-08-08 NOTE — PROGRESS NOTES
Problem: Falls - Risk of  Goal: *Absence of Falls  Document Kirstie Fall Risk and appropriate interventions in the flowsheet. Outcome: Progressing Towards Goal  Fall Risk Interventions:     Encourage use of no slip-socks. Leave call bell at patient's side before exiting room.     Medication Interventions: Teach patient to arise slowly         History of Falls Interventions: Evaluate medications/consider consulting pharmacy

## 2018-08-09 LAB
ANION GAP SERPL CALC-SCNC: 11 MMOL/L (ref 5–15)
BASOPHILS # BLD: 0 K/UL (ref 0–0.1)
BASOPHILS NFR BLD: 0 % (ref 0–1)
BUN SERPL-MCNC: 1 MG/DL (ref 6–20)
BUN/CREAT SERPL: 2 (ref 12–20)
CALCIUM SERPL-MCNC: 9 MG/DL (ref 8.5–10.1)
CHLORIDE SERPL-SCNC: 97 MMOL/L (ref 97–108)
CO2 SERPL-SCNC: 30 MMOL/L (ref 21–32)
CREAT SERPL-MCNC: 0.42 MG/DL (ref 0.55–1.02)
DIFFERENTIAL METHOD BLD: ABNORMAL
EOSINOPHIL # BLD: 0.5 K/UL (ref 0–0.4)
EOSINOPHIL NFR BLD: 5 % (ref 0–7)
ERYTHROCYTE [DISTWIDTH] IN BLOOD BY AUTOMATED COUNT: 17.3 % (ref 11.5–14.5)
GLUCOSE BLD STRIP.AUTO-MCNC: 129 MG/DL (ref 65–100)
GLUCOSE BLD STRIP.AUTO-MCNC: 135 MG/DL (ref 65–100)
GLUCOSE BLD STRIP.AUTO-MCNC: 85 MG/DL (ref 65–100)
GLUCOSE BLD STRIP.AUTO-MCNC: 93 MG/DL (ref 65–100)
GLUCOSE SERPL-MCNC: 100 MG/DL (ref 65–100)
HCT VFR BLD AUTO: 33.2 % (ref 35–47)
HGB BLD-MCNC: 10.1 G/DL (ref 11.5–16)
IMM GRANULOCYTES # BLD: 0.1 K/UL (ref 0–0.04)
IMM GRANULOCYTES NFR BLD AUTO: 1 % (ref 0–0.5)
LYMPHOCYTES # BLD: 1.8 K/UL (ref 0.8–3.5)
LYMPHOCYTES NFR BLD: 19 % (ref 12–49)
MAGNESIUM SERPL-MCNC: 2.1 MG/DL (ref 1.6–2.4)
MCH RBC QN AUTO: 26.3 PG (ref 26–34)
MCHC RBC AUTO-ENTMCNC: 30.4 G/DL (ref 30–36.5)
MCV RBC AUTO: 86.5 FL (ref 80–99)
MONOCYTES # BLD: 1 K/UL (ref 0–1)
MONOCYTES NFR BLD: 11 % (ref 5–13)
NEUTS SEG # BLD: 6.1 K/UL (ref 1.8–8)
NEUTS SEG NFR BLD: 64 % (ref 32–75)
NRBC # BLD: 0 K/UL (ref 0–0.01)
NRBC BLD-RTO: 0 PER 100 WBC
PHOSPHATE SERPL-MCNC: 4.3 MG/DL (ref 2.6–4.7)
PLATELET # BLD AUTO: 505 K/UL (ref 150–400)
PMV BLD AUTO: 9.9 FL (ref 8.9–12.9)
POTASSIUM SERPL-SCNC: 3.9 MMOL/L (ref 3.5–5.1)
RBC # BLD AUTO: 3.84 M/UL (ref 3.8–5.2)
SERVICE CMNT-IMP: ABNORMAL
SERVICE CMNT-IMP: ABNORMAL
SERVICE CMNT-IMP: NORMAL
SERVICE CMNT-IMP: NORMAL
SODIUM SERPL-SCNC: 138 MMOL/L (ref 136–145)
WBC # BLD AUTO: 9.6 K/UL (ref 3.6–11)

## 2018-08-09 PROCEDURE — 85025 COMPLETE CBC W/AUTO DIFF WBC: CPT | Performed by: SURGERY

## 2018-08-09 PROCEDURE — 74011250636 HC RX REV CODE- 250/636: Performed by: NURSE PRACTITIONER

## 2018-08-09 PROCEDURE — 84100 ASSAY OF PHOSPHORUS: CPT | Performed by: SURGERY

## 2018-08-09 PROCEDURE — 65270000029 HC RM PRIVATE

## 2018-08-09 PROCEDURE — 94760 N-INVAS EAR/PLS OXIMETRY 1: CPT

## 2018-08-09 PROCEDURE — 83735 ASSAY OF MAGNESIUM: CPT | Performed by: SURGERY

## 2018-08-09 PROCEDURE — 36415 COLL VENOUS BLD VENIPUNCTURE: CPT | Performed by: SURGERY

## 2018-08-09 PROCEDURE — 74011636637 HC RX REV CODE- 636/637: Performed by: PHYSICIAN ASSISTANT

## 2018-08-09 PROCEDURE — 82962 GLUCOSE BLOOD TEST: CPT

## 2018-08-09 PROCEDURE — 77010033678 HC OXYGEN DAILY

## 2018-08-09 PROCEDURE — 80048 BASIC METABOLIC PNL TOTAL CA: CPT | Performed by: SURGERY

## 2018-08-09 PROCEDURE — 74011250637 HC RX REV CODE- 250/637: Performed by: NURSE PRACTITIONER

## 2018-08-09 PROCEDURE — 65660000000 HC RM CCU STEPDOWN

## 2018-08-09 PROCEDURE — 74011250637 HC RX REV CODE- 250/637: Performed by: SURGERY

## 2018-08-09 RX ADMIN — SENNOSIDES AND DOCUSATE SODIUM 2 TABLET: 8.6; 5 TABLET ORAL at 21:42

## 2018-08-09 RX ADMIN — Medication 10 ML: at 21:42

## 2018-08-09 RX ADMIN — Medication: at 01:03

## 2018-08-09 RX ADMIN — POLYETHYLENE GLYCOL 3350 17 G: 17 POWDER, FOR SOLUTION ORAL at 10:11

## 2018-08-09 RX ADMIN — DULOXETINE HYDROCHLORIDE 60 MG: 60 CAPSULE, DELAYED RELEASE ORAL at 10:11

## 2018-08-09 RX ADMIN — INSULIN GLARGINE 20 UNITS: 100 INJECTION, SOLUTION SUBCUTANEOUS at 10:11

## 2018-08-09 RX ADMIN — LEVOFLOXACIN 750 MG: 5 INJECTION, SOLUTION INTRAVENOUS at 10:12

## 2018-08-09 NOTE — PROGRESS NOTES
General Surgery Daily Progress Note    Admit Date: 2018  Post-Operative Day: * No surgery found * from * No surgery found *     Subjective:     Last 24 hrs: Resting in bed in dark room this AM.  Tired b/c she says she had a very restless night last night and \"crazy dreams. \"  Reports L-side abdominal pain better managed with PCA  Has not eaten breakfast.  Still no Bm but feels like she may need to soon. Denies fevers. No CP or SOB      Objective:     Blood pressure 103/68, pulse 72, temperature 98.5 °F (36.9 °C), resp. rate 16, height 5' 3\" (1.6 m), weight 149 lb 12.8 oz (67.9 kg), SpO2 96 %. Temp (24hrs), Av °F (36.7 °C), Min:97.7 °F (36.5 °C), Max:98.5 °F (36.9 °C)      _____________________  Physical Exam:     Alert and Oriented, cooperative, in no acute distress. Cardiovascular: RRR, no peripheral edema  Lungs:CTAB, no resp distress  Abdomen: flat, firm +TTP along left side with 2 small skin abscesses w scant drainage with minimally-soiled overlying dressing. +BS      Assessment:   Active Problems:    Pancreatic abscess (2018)            Plan:     Encourage OOB & ambulation as tolerated  Encourage PO intake  Bowel regimen  PCA for pain mgmt  IV antibiotics  Further plan per Dr. Jose Luis    Data Review:    Recent Labs      18   0433  08/08/18   0447  18   0631   WBC  9.6  10.9  9.5   HGB  10.1*  10.4*  9.0*   HCT  33.2*  33.4*  28.9*   PLT  505*  507*  453*     Recent Labs      18   0433  08/08/18   0447  18   0631   NA  138  137  138   K  3.9  3.7  3.3*   CL  97  99  100   CO2  30  33*  28   GLU  100  69  94   BUN  1*  <1*  2*   CREA  0.42*  0.40*  0.34*   CA  9.0  8.5  8.3*   MG  2.1  2.0  1.8   PHOS  4.3  4.1  3.8     No results for input(s): AML, LPSE in the last 72 hours.         ______________________  Medications:    Current Facility-Administered Medications   Medication Dose Route Frequency    polyethylene glycol (MIRALAX) packet 17 g  17 g Oral DAILY    senna-docusate (PERICOLACE) 8.6-50 mg per tablet 2 Tab  2 Tab Oral QHS    HYDROmorphone (PF) 25 mg/50 mL (DILAUDID) PCA   IntraVENous CONTINUOUS    HYDROmorphone (DILAUDID) injection 2 mg  2 mg IntraVENous Q2H PRN    insulin glargine (LANTUS) injection 20 Units  20 Units SubCUTAneous DAILY    diphenhydrAMINE (BENADRYL) injection 25 mg  25 mg IntraVENous Q6H PRN    insulin lispro (HUMALOG) injection   SubCUTAneous AC&HS    glucose chewable tablet 16 g  4 Tab Oral PRN    dextrose (D50W) injection syrg 12.5-25 g  12.5-25 g IntraVENous PRN    glucagon (GLUCAGEN) injection 1 mg  1 mg IntraMUSCular PRN    levoFLOXacin (LEVAQUIN) 750 mg in D5W IVPB  750 mg IntraVENous Q24H    0.9% sodium chloride infusion  25 mL/hr IntraVENous CONTINUOUS    sodium chloride (NS) flush 5-10 mL  5-10 mL IntraVENous PRN    acetaminophen (TYLENOL) tablet 650 mg  650 mg Oral Q6H PRN    sodium chloride (NS) flush 5-10 mL  5-10 mL IntraVENous Q8H    sodium chloride (NS) flush 5-10 mL  5-10 mL IntraVENous PRN    ondansetron (ZOFRAN) injection 4 mg  4 mg IntraVENous Q4H PRN    DULoxetine (CYMBALTA) capsule 60 mg  60 mg Oral DAILY    nicotine (NICODERM CQ) 21 mg/24 hr patch 1 Patch  1 Patch TransDERmal DAILY       Gamaliel Chavez PA-C  8/9/2018

## 2018-08-09 NOTE — PROGRESS NOTES
NUTRITION brief    Recommendations:   1. Encourage PO intake with meals    - should be drinking Glucerna if less than 75% meals consumed   - document % of supplements and meals in flowsheet    2. Follow BG, consider re-consult to DTC prior to discharge since new dx of DM     3. Monitor BM with adjustment to bowel regimen PRN    Interventions:  - Glucerna TID  - food preferences updated  - diet liberalized to low fat     Diet: GI lite  Supplements: none  Meal intake:   Patient Vitals for the past 100 hrs:   % Diet Eaten   08/08/18 1803 100 %   08/05/18 1754 0 %   08/05/18 1109 50 %   08/05/18 0736 0 %     Pt visited for PO check. She reports appetite fair/poor. Pain and constipation inhibiting PO intake. Has yet to eat breakfast. She notes little interest in most protein foods. Discussed supplements and high protein foods. Likes Ensure shakes so agreeable to Glucerna (with BG control issues now) TID for 660kcal, 30g protein. Likes yogurt, hardboiled eggs, pears, cereal, milk. Diet liberalized to just low fat for more food options since pt had not been able to order salads on GI lite diet. Issues with constipation noted one BM on 7/29 and one on 8/6. Bowel regimen started 8/7 with Palliative following. Will continue to follow for PO intake, protein and supplement consumption, BG and wt trends. Estimated Nutrition Needs:   Kcals/day: 1590 Kcals/day (1590-1725kcal)  Protein: 82 g (82-88g (1.2-1.3g/kg))  Fluid: 2040 ml (30ml/kg)  Based On: Syeda Hope (x 1.2-1.3)  Weight Used:  Other (comment) (admit wt 68kg)    Dc Mcfadden RD

## 2018-08-09 NOTE — PROGRESS NOTES
Bedside and Verbal shift change report given to Bre Dozier (oncoming nurse) by Pati Wolf (offgoing nurse). Report included the following information SBAR, Kardex, Procedure Summary, Intake/Output, MAR, Accordion and Recent Results.

## 2018-08-09 NOTE — PROGRESS NOTES
Palliative Medicine Consult  Smith: [phone]    Patient Name: eKllen Crabtree  YOB: 1980    Date of Initial Consult: August 1, 2018  Reason for Consult: Pain Management  Requesting Provider: Tirso Yepez NP  Primary Care Physician: Liz Terry NP     SUMMARY:   Kellen Crabtree is a 42 y.o. with a past history of anxiety, blurred vision, chest pain, depression, frequent headaches, obesity, pancreatic cyst, shortness of breath, stomch aches and swallowing difficulties  who was admitted on 7/30/2018 from home with a diagnosis of abscess of the abdominal cavity, pancreatic fluid leak now s/p drain placement. Current medical issues leading to Palliative Medicine involvement include: pain management. Interval History:  Bedside I & D 8/5/18  Dilaudid pca started 8/5/18  Drain dislodged 8/7/18     PALLIATIVE DIAGNOSES:   1. Abdominal Pain  2. Abdominal abscess   3. Drug induced constipation  4. Decreased appetite       PLAN:   1. Pain well controlled with current Dilaudid PCA setting~0.2mg basal with 0.3mg q 10 min. 25 mg/ 24 hours yesterday. 2. Dilaudid 2mg iv q 2prn refractory symptoms ordered. Pt has not required any doses  3. Constipation:   1. miralax daily  2. pericolace 2 caps/hs. 3. Pt agrees to enema tomorrow if no bm    4. Will continue to follow up  5. Will inquire about interest in AMD completion  6. Initial consult note routed to primary continuity provider  7.  Communicated plan of care with: Palliative IDT       GOALS OF CARE / TREATMENT PREFERENCES:     GOALS OF CARE:  Patient/Health Care Proxy Stated Goals: Prolong life      TREATMENT PREFERENCES:   Code Status: Full Code    Advance Care Planning:  Advance Care Planning 8/1/2018   Patient's Healthcare Decision Maker is: Legal Next of Minesh 67   Primary Decision Maker Name Dion Plummer   Primary Decision Maker Phone Number [phone]   Primary Decision Maker Relationship to Patient Spouse   Confirm Advance Directive -   Patient Would Like to Complete Advance Directive -   Does the patient have other document types -       Medical Interventions: Full interventions   Other Instructions: Other:    As far as possible, the palliative care team has discussed with patient / health care proxy about goals of care / treatment preferences for patient. HISTORY:     HPI/SUBJECTIVE:    The patient is:   [x] Verbal and participatory  [] Non-participatory due to:   Pain controlled. Still no bowel movement.   Lots of gas    Clinical Pain Assessment (nonverbal scale for severity on nonverbal patients):   Clinical Pain Assessment  Severity: 1  Location: abdominal pain  Character: severe and diffuse  Duration: days  Factors: pca helping  Frequency: constant          Duration: for how long has pt been experiencing pain (e.g., 2 days, 1 month, years)  Frequency: how often pain is an issue (e.g., several times per day, once every few days, constant)     FUNCTIONAL ASSESSMENT:     Palliative Performance Scale (PPS):  PPS: 70       PSYCHOSOCIAL/SPIRITUAL SCREENING:     Palliative IDT has assessed this patient for cultural preferences / practices and a referral made as appropriate to needs (Cultural Services, Patient Advocacy, Ethics, etc.)    Advance Care Planning:  Advance Care Planning 8/1/2018   Patient's Healthcare Decision Maker is: Legal Next of Minesh 67   Primary Decision Maker Name Bobby Carrero   Primary Decision Maker Phone Number [phone]   Primary Decision Maker Relationship to Patient Spouse   Confirm Advance Directive -   Patient Would Like to Complete Advance Directive -   Does the patient have other document types -       Any spiritual / Anglican concerns:  [] Yes /  [x] No    Caregiver Burnout:  [] Yes /  [] No /  [x] No Caregiver Present      Anticipatory grief assessment:   [] Normal  / [] Maladaptive       ESAS Anxiety: Anxiety: 0    ESAS Depression: Depression: 0        REVIEW OF SYSTEMS:     Positive and pertinent negative findings in ROS are noted above in HPI. The following systems were [x] reviewed / [] unable to be reviewed as noted in HPI  Other findings are noted below. Systems: constitutional, ears/nose/mouth/throat, respiratory, gastrointestinal, genitourinary, musculoskeletal, integumentary, neurologic, psychiatric, endocrine. Positive findings noted below. Modified ESAS Completed by: provider   Fatigue: 0 Drowsiness: 0   Depression: 0 Pain: 1   Anxiety: 0 Nausea: 0   Anorexia: 3 Dyspnea: 0     Constipation: Yes              PHYSICAL EXAM:     From RN flowsheet:  Wt Readings from Last 3 Encounters:   08/09/18 149 lb 12.8 oz (67.9 kg)   07/30/18 143 lb (64.9 kg)   07/16/18 146 lb (66.2 kg)     Blood pressure 92/59, pulse 74, temperature 98.7 °F (37.1 °C), resp. rate 16, height 5' 3\" (1.6 m), weight 149 lb 12.8 oz (67.9 kg), SpO2 93 %. Pain Scale 1: Numeric (0 - 10)  Pain Intensity 1: 3  Pain Onset 1: past month  Pain Location 1: Abdomen  Pain Orientation 1: Left  Pain Description 1: Aching, Sharp  Pain Intervention(s) 1: Encouraged PCA  Last bowel movement, if known:     Constitutional: alert, conversant, nad  Eyes: pupils equal, anicteric  ENMT: no nasal discharge, moist mucous membranes  Cardiovascular: regular rhythm, distal pulses intact  Respiratory: breathing not labored, symmetric  Gastrointestinal: soft, +tenderness left side in the area of the drain, palpable mass left abdomen lot smaller now since drained with blister noted.  + hyperactive sounds  Musculoskeletal: no deformity, no tenderness to palpation  Skin: warm, dry  Neurologic: following commands, moving all extremities  Psychiatric: full affect, no hallucinations  Other:        HISTORY:     Active Problems:    Pancreatic abscess (7/30/2018)      Past Medical History:   Diagnosis Date    Abscess of abdominal cavity (HCC) 7/30/2018    Anxiety     Blurred vision     Chest pain     Chronic pain     MUSCLE WEAKNESS,PANCREATIC CYST     Depression     Frequent headaches     Muscle weakness     Obesity (BMI 30-39.9) 4/26/2018    Pancreatic cyst 3/21/2018    Shortness of breath     Stomach pain     Swallowing difficulty       Past Surgical History:   Procedure Laterality Date    HX GI      COLONOSCOPY    HX GYN  2005    endometriosis surgery    HX OTHER SURGICAL  04/20/2018    lap distal pancreatectomy converted to open-[facility]-DR. Katherin Maynard      Family History   Problem Relation Age of Onset    Cancer Mother      colon    Cancer Father      skin    Anesth Problems Father      SUCCINYLCHOLINE  REACTION      History reviewed, no pertinent family history.   Social History   Substance Use Topics    Smoking status: Current Every Day Smoker     Packs/day: 1.00     Years: 25.00    Smokeless tobacco: Current User      Comment: STOP SMOKING BOOKLET PROVIDED    Alcohol use No     Allergies   Allergen Reactions    Amoxicillin Shortness of Breath and Rash     Pt has had keflex in the past      Current Facility-Administered Medications   Medication Dose Route Frequency    polyethylene glycol (MIRALAX) packet 17 g  17 g Oral DAILY    senna-docusate (PERICOLACE) 8.6-50 mg per tablet 2 Tab  2 Tab Oral QHS    HYDROmorphone (PF) 25 mg/50 mL (DILAUDID) PCA   IntraVENous CONTINUOUS    HYDROmorphone (DILAUDID) injection 2 mg  2 mg IntraVENous Q2H PRN    insulin glargine (LANTUS) injection 20 Units  20 Units SubCUTAneous DAILY    diphenhydrAMINE (BENADRYL) injection 25 mg  25 mg IntraVENous Q6H PRN    insulin lispro (HUMALOG) injection   SubCUTAneous AC&HS    glucose chewable tablet 16 g  4 Tab Oral PRN    dextrose (D50W) injection syrg 12.5-25 g  12.5-25 g IntraVENous PRN    glucagon (GLUCAGEN) injection 1 mg  1 mg IntraMUSCular PRN    levoFLOXacin (LEVAQUIN) 750 mg in D5W IVPB  750 mg IntraVENous Q24H    0.9% sodium chloride infusion  25 mL/hr IntraVENous CONTINUOUS    sodium chloride (NS) flush 5-10 mL  5-10 mL IntraVENous PRN    acetaminophen (TYLENOL) tablet 650 mg  650 mg Oral Q6H PRN    sodium chloride (NS) flush 5-10 mL  5-10 mL IntraVENous Q8H    sodium chloride (NS) flush 5-10 mL  5-10 mL IntraVENous PRN    ondansetron (ZOFRAN) injection 4 mg  4 mg IntraVENous Q4H PRN    DULoxetine (CYMBALTA) capsule 60 mg  60 mg Oral DAILY    nicotine (NICODERM CQ) 21 mg/24 hr patch 1 Patch  1 Patch TransDERmal DAILY          LAB AND IMAGING FINDINGS:     Lab Results   Component Value Date/Time    WBC 9.6 08/09/2018 04:33 AM    HGB 10.1 (L) 08/09/2018 04:33 AM    PLATELET 889 (H) 51/84/1159 04:33 AM     Lab Results   Component Value Date/Time    Sodium 138 08/09/2018 04:33 AM    Potassium 3.9 08/09/2018 04:33 AM    Chloride 97 08/09/2018 04:33 AM    CO2 30 08/09/2018 04:33 AM    BUN 1 (L) 08/09/2018 04:33 AM    Creatinine 0.42 (L) 08/09/2018 04:33 AM    Calcium 9.0 08/09/2018 04:33 AM    Magnesium 2.1 08/09/2018 04:33 AM    Phosphorus 4.3 08/09/2018 04:33 AM      Lab Results   Component Value Date/Time    AST (SGOT) 11 (L) 07/31/2018 09:45 AM    Alk. phosphatase 126 (H) 07/31/2018 09:45 AM    Protein, total 7.8 07/31/2018 09:45 AM    Albumin 2.8 (L) 07/31/2018 09:45 AM    Globulin 5.0 (H) 07/31/2018 09:45 AM     Lab Results   Component Value Date/Time    INR 1.0 02/24/2018 03:01 PM    Prothrombin time 10.0 02/24/2018 03:01 PM    aPTT 39.6 (H) 06/24/2011 11:40 PM      No results found for: IRON, FE, TIBC, IBCT, PSAT, FERR   No results found for: PH, PCO2, PO2  No components found for: [place]   Lab Results   Component Value Date/Time    CK 46 02/24/2018 03:01 PM    CK - MB <1.0 02/24/2018 03:01 PM                Total time: 35 minutes  Counseling / coordination time, spent as noted above: 30 minutes  > 50% counseling / coordination?: y    Prolonged service was provided for  []30 min   []75 min in face to face time in the presence of the patient, spent as noted above.   Time Start:   Time End:   Note: this can only be billed with 17983 (initial) or 91610 (follow up). If multiple start / stop times, list each separately.

## 2018-08-10 ENCOUNTER — APPOINTMENT (OUTPATIENT)
Dept: CT IMAGING | Age: 38
DRG: 438 | End: 2018-08-10
Attending: SURGERY
Payer: COMMERCIAL

## 2018-08-10 LAB
ANION GAP SERPL CALC-SCNC: 11 MMOL/L (ref 5–15)
BASOPHILS # BLD: 0 K/UL (ref 0–0.1)
BASOPHILS NFR BLD: 0 % (ref 0–1)
BUN SERPL-MCNC: 4 MG/DL (ref 6–20)
BUN/CREAT SERPL: 8 (ref 12–20)
CALCIUM SERPL-MCNC: 8.7 MG/DL (ref 8.5–10.1)
CHLORIDE SERPL-SCNC: 96 MMOL/L (ref 97–108)
CO2 SERPL-SCNC: 29 MMOL/L (ref 21–32)
CREAT SERPL-MCNC: 0.51 MG/DL (ref 0.55–1.02)
DIFFERENTIAL METHOD BLD: ABNORMAL
EOSINOPHIL # BLD: 0.4 K/UL (ref 0–0.4)
EOSINOPHIL NFR BLD: 4 % (ref 0–7)
ERYTHROCYTE [DISTWIDTH] IN BLOOD BY AUTOMATED COUNT: 17.8 % (ref 11.5–14.5)
GLUCOSE BLD STRIP.AUTO-MCNC: 107 MG/DL (ref 65–100)
GLUCOSE BLD STRIP.AUTO-MCNC: 113 MG/DL (ref 65–100)
GLUCOSE BLD STRIP.AUTO-MCNC: 121 MG/DL (ref 65–100)
GLUCOSE BLD STRIP.AUTO-MCNC: 144 MG/DL (ref 65–100)
GLUCOSE BLD STRIP.AUTO-MCNC: 155 MG/DL (ref 65–100)
GLUCOSE SERPL-MCNC: 109 MG/DL (ref 65–100)
HCT VFR BLD AUTO: 34.1 % (ref 35–47)
HGB BLD-MCNC: 10.5 G/DL (ref 11.5–16)
IMM GRANULOCYTES # BLD: 0.1 K/UL (ref 0–0.04)
IMM GRANULOCYTES NFR BLD AUTO: 1 % (ref 0–0.5)
LYMPHOCYTES # BLD: 2 K/UL (ref 0.8–3.5)
LYMPHOCYTES NFR BLD: 20 % (ref 12–49)
MAGNESIUM SERPL-MCNC: 2.2 MG/DL (ref 1.6–2.4)
MCH RBC QN AUTO: 26.1 PG (ref 26–34)
MCHC RBC AUTO-ENTMCNC: 30.8 G/DL (ref 30–36.5)
MCV RBC AUTO: 84.8 FL (ref 80–99)
MONOCYTES # BLD: 0.9 K/UL (ref 0–1)
MONOCYTES NFR BLD: 9 % (ref 5–13)
NEUTS SEG # BLD: 6.6 K/UL (ref 1.8–8)
NEUTS SEG NFR BLD: 66 % (ref 32–75)
NRBC # BLD: 0 K/UL (ref 0–0.01)
NRBC BLD-RTO: 0 PER 100 WBC
PHOSPHATE SERPL-MCNC: 4.4 MG/DL (ref 2.6–4.7)
PLATELET # BLD AUTO: 557 K/UL (ref 150–400)
PMV BLD AUTO: 10 FL (ref 8.9–12.9)
POTASSIUM SERPL-SCNC: 3.7 MMOL/L (ref 3.5–5.1)
RBC # BLD AUTO: 4.02 M/UL (ref 3.8–5.2)
SERVICE CMNT-IMP: ABNORMAL
SODIUM SERPL-SCNC: 136 MMOL/L (ref 136–145)
WBC # BLD AUTO: 10.1 K/UL (ref 3.6–11)

## 2018-08-10 PROCEDURE — 83735 ASSAY OF MAGNESIUM: CPT | Performed by: SURGERY

## 2018-08-10 PROCEDURE — 74011250636 HC RX REV CODE- 250/636: Performed by: SURGERY

## 2018-08-10 PROCEDURE — 85025 COMPLETE CBC W/AUTO DIFF WBC: CPT | Performed by: SURGERY

## 2018-08-10 PROCEDURE — 84100 ASSAY OF PHOSPHORUS: CPT | Performed by: SURGERY

## 2018-08-10 PROCEDURE — 74011636637 HC RX REV CODE- 636/637: Performed by: NURSE PRACTITIONER

## 2018-08-10 PROCEDURE — 36415 COLL VENOUS BLD VENIPUNCTURE: CPT | Performed by: SURGERY

## 2018-08-10 PROCEDURE — 74011250637 HC RX REV CODE- 250/637: Performed by: NURSE PRACTITIONER

## 2018-08-10 PROCEDURE — 74011000258 HC RX REV CODE- 258: Performed by: SURGERY

## 2018-08-10 PROCEDURE — 74011250636 HC RX REV CODE- 250/636: Performed by: NURSE PRACTITIONER

## 2018-08-10 PROCEDURE — 74160 CT ABDOMEN W/CONTRAST: CPT

## 2018-08-10 PROCEDURE — 82962 GLUCOSE BLOOD TEST: CPT

## 2018-08-10 PROCEDURE — 74011636637 HC RX REV CODE- 636/637: Performed by: PHYSICIAN ASSISTANT

## 2018-08-10 PROCEDURE — 74011636320 HC RX REV CODE- 636/320: Performed by: SURGERY

## 2018-08-10 PROCEDURE — 74011250637 HC RX REV CODE- 250/637: Performed by: SURGERY

## 2018-08-10 PROCEDURE — 65270000029 HC RM PRIVATE

## 2018-08-10 PROCEDURE — 80048 BASIC METABOLIC PNL TOTAL CA: CPT | Performed by: SURGERY

## 2018-08-10 PROCEDURE — 65660000000 HC RM CCU STEPDOWN

## 2018-08-10 RX ORDER — SODIUM CHLORIDE 0.9 % (FLUSH) 0.9 %
10 SYRINGE (ML) INJECTION
Status: COMPLETED | OUTPATIENT
Start: 2018-08-10 | End: 2018-08-10

## 2018-08-10 RX ADMIN — LEVOFLOXACIN 750 MG: 5 INJECTION, SOLUTION INTRAVENOUS at 10:45

## 2018-08-10 RX ADMIN — Medication: at 00:01

## 2018-08-10 RX ADMIN — Medication 5 ML: at 21:35

## 2018-08-10 RX ADMIN — Medication 10 ML: at 06:26

## 2018-08-10 RX ADMIN — POLYETHYLENE GLYCOL 3350 17 G: 17 POWDER, FOR SOLUTION ORAL at 09:46

## 2018-08-10 RX ADMIN — SENNOSIDES AND DOCUSATE SODIUM 2 TABLET: 8.6; 5 TABLET ORAL at 23:14

## 2018-08-10 RX ADMIN — SODIUM CHLORIDE 100 ML: 900 INJECTION, SOLUTION INTRAVENOUS at 06:26

## 2018-08-10 RX ADMIN — IOPAMIDOL 100 ML: 755 INJECTION, SOLUTION INTRAVENOUS at 06:26

## 2018-08-10 RX ADMIN — ONDANSETRON 4 MG: 2 INJECTION INTRAMUSCULAR; INTRAVENOUS at 15:17

## 2018-08-10 RX ADMIN — DULOXETINE HYDROCHLORIDE 60 MG: 60 CAPSULE, DELAYED RELEASE ORAL at 09:46

## 2018-08-10 RX ADMIN — INSULIN LISPRO 2 UNITS: 100 INJECTION, SOLUTION INTRAVENOUS; SUBCUTANEOUS at 11:30

## 2018-08-10 RX ADMIN — Medication 10 ML: at 06:00

## 2018-08-10 RX ADMIN — INSULIN GLARGINE 20 UNITS: 100 INJECTION, SOLUTION SUBCUTANEOUS at 09:47

## 2018-08-10 NOTE — PROGRESS NOTES
Bedside and Verbal shift change report given to Boogie Kingston (oncoming nurse) by General Tobin (offgoing nurse). Report included the following information SBAR, Kardex, Procedure Summary, Intake/Output, MAR, Accordion and Recent Results.

## 2018-08-10 NOTE — PROGRESS NOTES
Palliative Medicine Consult  Luis: [phone])    Patient Name: Zeenat Spicer  YOB: 1980    Date of Initial Consult: August 1, 2018  Reason for Consult: Pain Management  Requesting Provider: Geovany Juárez NP  Primary Care Physician: Leif Leo NP     SUMMARY:   Zeenat Spicer is a 42 y.o. with a past history of anxiety, blurred vision, chest pain, depression, frequent headaches, obesity, pancreatic cyst, shortness of breath, stomch aches and swallowing difficulties  who was admitted on 7/30/2018 from home with a diagnosis of abscess of the abdominal cavity, pancreatic fluid leak now s/p drain placement. Current medical issues leading to Palliative Medicine involvement include: pain management. Interval History:  Bedside I & D 8/5/18  Dilaudid pca started 8/5/18  Drain dislodged 8/7/18     PALLIATIVE DIAGNOSES:   1. Abdominal Pain  2. Abdominal abscess   3. Drug induced constipation  4. Decreased appetite     PLAN:   1. Pain well controlled with current Dilaudid PCA setting~0.2mg basal with 0.3mg q 10 min. 25 mg/ 24 hours yesterday. 2. Discussed maybe starting to wean next week by discontinuing the basal on the pca. No change today  3. Dilaudid 2mg iv q 2prn refractory symptoms ordered. Pt has not required any doses  4. Constipation:   1. miralax daily  2. pericolace 2 caps/hs. 3. Reports small bm today    5. Will continue to follow up  6. Declined AMD completion  7. Initial consult note routed to primary continuity provider  8.  Communicated plan of care with: Palliative IDT       GOALS OF CARE / TREATMENT PREFERENCES:     GOALS OF CARE:  Patient/Health Care Proxy Stated Goals: Prolong life      TREATMENT PREFERENCES:   Code Status: Full Code    Advance Care Planning:  Advance Care Planning 8/1/2018   Patient's Healthcare Decision Maker is: Legal Next of Minesh 67   Primary Decision Maker Name Jaziel Maria   Primary Decision Maker Phone Number [phone]   Primary Decision Maker Relationship to Patient Spouse   Confirm Advance Directive -   Patient Would Like to Complete Advance Directive -   Does the patient have other document types -       Medical Interventions: Full interventions   Other Instructions: Other:    As far as possible, the palliative care team has discussed with patient / health care proxy about goals of care / treatment preferences for patient. HISTORY:     HPI/SUBJECTIVE:    The patient is:   [x] Verbal and participatory  [] Non-participatory due to:   Pain controlled.   Small bowel movement today    Clinical Pain Assessment (nonverbal scale for severity on nonverbal patients):   Clinical Pain Assessment  Severity: 2  Location: abdominal pain  Character: severe and diffuse  Duration: days  Factors: pca helping  Frequency: constant          Duration: for how long has pt been experiencing pain (e.g., 2 days, 1 month, years)  Frequency: how often pain is an issue (e.g., several times per day, once every few days, constant)     FUNCTIONAL ASSESSMENT:     Palliative Performance Scale (PPS):  PPS: 70       PSYCHOSOCIAL/SPIRITUAL SCREENING:     Palliative IDT has assessed this patient for cultural preferences / practices and a referral made as appropriate to needs (Cultural Services, Patient Advocacy, Ethics, etc.)    Advance Care Planning:  Advance Care Planning 8/1/2018   Patient's Healthcare Decision Maker is: Legal Next of Minesh 67   Primary Decision Maker Name Krystle Quiroz   Primary Decision Maker Phone Number [phone]   Primary Decision Maker Relationship to Patient Spouse   Confirm Advance Directive -   Patient Would Like to Complete Advance Directive -   Does the patient have other document types -       Any spiritual / Methodist concerns:  [] Yes /  [x] No    Caregiver Burnout:  [] Yes /  [] No /  [x] No Caregiver Present      Anticipatory grief assessment:   [] Normal  / [] Maladaptive       ESAS Anxiety: Anxiety: 0    ESAS Depression: Depression: 0        REVIEW OF SYSTEMS:     Positive and pertinent negative findings in ROS are noted above in HPI. The following systems were [x] reviewed / [] unable to be reviewed as noted in HPI  Other findings are noted below. Systems: constitutional, ears/nose/mouth/throat, respiratory, gastrointestinal, genitourinary, musculoskeletal, integumentary, neurologic, psychiatric, endocrine. Positive findings noted below. Modified ESAS Completed by: provider   Fatigue: 1 Drowsiness: 0   Depression: 0 Pain: 2   Anxiety: 0 Nausea: 0   Anorexia: 4 Dyspnea: 0     Constipation: Yes              PHYSICAL EXAM:     From RN flowsheet:  Wt Readings from Last 3 Encounters:   08/10/18 146 lb 14.4 oz (66.6 kg)   07/30/18 143 lb (64.9 kg)   07/16/18 146 lb (66.2 kg)     Blood pressure 105/72, pulse 84, temperature 98.2 °F (36.8 °C), resp. rate 16, height 5' 3\" (1.6 m), weight 146 lb 14.4 oz (66.6 kg), SpO2 98 %.     Pain Scale 1: Numeric (0 - 10)  Pain Intensity 1: 2  Pain Onset 1: pta  Pain Location 1: Abdomen  Pain Orientation 1: Left, Upper  Pain Description 1: Constant  Pain Intervention(s) 1: Encouraged PCA  Last bowel movement, if known:     Constitutional: alert, conversant, nad  Eyes: pupils equal, anicteric  ENMT: no nasal discharge, moist mucous membranes  Cardiovascular: regular rhythm, distal pulses intact  Respiratory: breathing not labored, symmetric  Gastrointestinal: soft, +tenderness, areas of collection improving, no erythema, scant amount of drainage on dressing  Musculoskeletal: no deformity, no tenderness to palpation  Skin: warm, dry  Neurologic: following commands, moving all extremities  Psychiatric: full affect, no hallucinations  Other:        HISTORY:     Active Problems:    Pancreatic abscess (7/30/2018)      Past Medical History:   Diagnosis Date    Abscess of abdominal cavity (HCC) 7/30/2018    Anxiety     Blurred vision     Chest pain     Chronic pain     MUSCLE WEAKNESS,PANCREATIC CYST     Depression     Frequent headaches     Muscle weakness     Obesity (BMI 30-39.9) 4/26/2018    Pancreatic cyst 3/21/2018    Shortness of breath     Stomach pain     Swallowing difficulty       Past Surgical History:   Procedure Laterality Date    HX GI      COLONOSCOPY    HX GYN  2005    endometriosis surgery    HX OTHER SURGICAL  04/20/2018    lap distal pancreatectomy converted to open-[facility]-DR. Ortega Spine      Family History   Problem Relation Age of Onset    Cancer Mother      colon    Cancer Father      skin    Anesth Problems Father      SUCCINYLCHOLINE  REACTION      History reviewed, no pertinent family history.   Social History   Substance Use Topics    Smoking status: Current Every Day Smoker     Packs/day: 1.00     Years: 25.00    Smokeless tobacco: Current User      Comment: STOP SMOKING BOOKLET PROVIDED    Alcohol use No     Allergies   Allergen Reactions    Amoxicillin Shortness of Breath and Rash     Pt has had keflex in the past      Current Facility-Administered Medications   Medication Dose Route Frequency    polyethylene glycol (MIRALAX) packet 17 g  17 g Oral DAILY    senna-docusate (PERICOLACE) 8.6-50 mg per tablet 2 Tab  2 Tab Oral QHS    HYDROmorphone (PF) 25 mg/50 mL (DILAUDID) PCA   IntraVENous CONTINUOUS    HYDROmorphone (DILAUDID) injection 2 mg  2 mg IntraVENous Q2H PRN    insulin glargine (LANTUS) injection 20 Units  20 Units SubCUTAneous DAILY    diphenhydrAMINE (BENADRYL) injection 25 mg  25 mg IntraVENous Q6H PRN    insulin lispro (HUMALOG) injection   SubCUTAneous AC&HS    glucose chewable tablet 16 g  4 Tab Oral PRN    dextrose (D50W) injection syrg 12.5-25 g  12.5-25 g IntraVENous PRN    glucagon (GLUCAGEN) injection 1 mg  1 mg IntraMUSCular PRN    levoFLOXacin (LEVAQUIN) 750 mg in D5W IVPB  750 mg IntraVENous Q24H    0.9% sodium chloride infusion  25 mL/hr IntraVENous CONTINUOUS    sodium chloride (NS) flush 5-10 mL  5-10 mL IntraVENous PRN    acetaminophen (TYLENOL) tablet 650 mg  650 mg Oral Q6H PRN    sodium chloride (NS) flush 5-10 mL  5-10 mL IntraVENous Q8H    sodium chloride (NS) flush 5-10 mL  5-10 mL IntraVENous PRN    ondansetron (ZOFRAN) injection 4 mg  4 mg IntraVENous Q4H PRN    DULoxetine (CYMBALTA) capsule 60 mg  60 mg Oral DAILY    nicotine (NICODERM CQ) 21 mg/24 hr patch 1 Patch  1 Patch TransDERmal DAILY          LAB AND IMAGING FINDINGS:     Lab Results   Component Value Date/Time    WBC 10.1 08/10/2018 04:20 AM    HGB 10.5 (L) 08/10/2018 04:20 AM    PLATELET 852 (H) 53/59/2416 04:20 AM     Lab Results   Component Value Date/Time    Sodium 136 08/10/2018 04:20 AM    Potassium 3.7 08/10/2018 04:20 AM    Chloride 96 (L) 08/10/2018 04:20 AM    CO2 29 08/10/2018 04:20 AM    BUN 4 (L) 08/10/2018 04:20 AM    Creatinine 0.51 (L) 08/10/2018 04:20 AM    Calcium 8.7 08/10/2018 04:20 AM    Magnesium 2.2 08/10/2018 04:20 AM    Phosphorus 4.4 08/10/2018 04:20 AM      Lab Results   Component Value Date/Time    AST (SGOT) 11 (L) 07/31/2018 09:45 AM    Alk. phosphatase 126 (H) 07/31/2018 09:45 AM    Protein, total 7.8 07/31/2018 09:45 AM    Albumin 2.8 (L) 07/31/2018 09:45 AM    Globulin 5.0 (H) 07/31/2018 09:45 AM     Lab Results   Component Value Date/Time    INR 1.0 02/24/2018 03:01 PM    Prothrombin time 10.0 02/24/2018 03:01 PM    aPTT 39.6 (H) 06/24/2011 11:40 PM      No results found for: IRON, FE, TIBC, IBCT, PSAT, FERR   No results found for: PH, PCO2, PO2  No components found for: [place]   Lab Results   Component Value Date/Time    CK 46 02/24/2018 03:01 PM    CK - MB <1.0 02/24/2018 03:01 PM                Total time: 35 minutes  Counseling / coordination time, spent as noted above: 30 minutes  > 50% counseling / coordination?: y    Prolonged service was provided for  []30 min   []75 min in face to face time in the presence of the patient, spent as noted above.   Time Start:   Time End:   Note: this can only be billed with 46805 (initial) or 76447 (follow up). If multiple start / stop times, list each separately.

## 2018-08-10 NOTE — PROGRESS NOTES
General Surgery Daily Progress Note    Admit Date: 2018  Post-Operative Day: * No surgery found * from * No surgery found *     Subjective:     Last 24 hrs: Just back in bed after up to void & c/o \"stabbing pains\" on L-side. Somewhat forgetful this morning in regards to eating and recollection of seeing me yesterday. Reports small BM  No fevers  Reports deep breaths \"catch\" and increase L-sided pain. No SOB. Says she using IS. CT Abdomen with contrast (8/10/2018) shows:  IMPRESSION:  1. Interval improvement in fluid collections with 2 of the fluid collections  resolved since the prior exam of August 3 and the other 2 smaller. 2. Left pleural effusion with left basilar atelectasis. 3. New small pericardial effusion. 4. Slight increase in ascites. Objective:     Blood pressure 101/63, pulse 76, temperature 98.4 °F (36.9 °C), resp. rate 16, height 5' 3\" (1.6 m), weight 146 lb 14.4 oz (66.6 kg), SpO2 95 %. Temp (24hrs), Av.3 °F (36.8 °C), Min:97.8 °F (36.6 °C), Max:98.8 °F (37.1 °C)      _____________________  Physical Exam:     Alert and Oriented, talkative, in no acute distress. Cardiovascular: RRR  Lungs:CTAB no resp distress  Abdomen: soft, +BS  Small superficial area of draining abscesses x2 on L-side, dressed, +TTP      Assessment:   Active Problems:    Pancreatic abscess (2018)            Plan:     Encourage OOB & ambulation with assistance  IS hourly  IV antibiotics  PCA per Palliative--greatly apprec their input  Further plan per Dr. Bertha Hernadez.       Data Review:    Recent Labs      08/10/18   04218   0433  08/08/18   0447   WBC  10.1  9.6  10.9   HGB  10.5*  10.1*  10.4*   HCT  34.1*  33.2*  33.4*   PLT  557*  505*  507*     Recent Labs      08/10/18   04218   0433  08/08/18   0447   NA  136  138  137   K  3.7  3.9  3.7   CL  96*  97  99   CO2  29  30  33*   GLU  109*  100  69   BUN  4*  1*  <1*   CREA  0.51*  0.42*  0.40*   CA  8.7  9.0  8.5   MG  2.2  2.1  2.0 PHOS  4.4  4.3  4.1     No results for input(s): AML, LPSE in the last 72 hours.         ______________________  Medications:    Current Facility-Administered Medications   Medication Dose Route Frequency    polyethylene glycol (MIRALAX) packet 17 g  17 g Oral DAILY    senna-docusate (PERICOLACE) 8.6-50 mg per tablet 2 Tab  2 Tab Oral QHS    HYDROmorphone (PF) 25 mg/50 mL (DILAUDID) PCA   IntraVENous CONTINUOUS    HYDROmorphone (DILAUDID) injection 2 mg  2 mg IntraVENous Q2H PRN    insulin glargine (LANTUS) injection 20 Units  20 Units SubCUTAneous DAILY    diphenhydrAMINE (BENADRYL) injection 25 mg  25 mg IntraVENous Q6H PRN    insulin lispro (HUMALOG) injection   SubCUTAneous AC&HS    glucose chewable tablet 16 g  4 Tab Oral PRN    dextrose (D50W) injection syrg 12.5-25 g  12.5-25 g IntraVENous PRN    glucagon (GLUCAGEN) injection 1 mg  1 mg IntraMUSCular PRN    levoFLOXacin (LEVAQUIN) 750 mg in D5W IVPB  750 mg IntraVENous Q24H    0.9% sodium chloride infusion  25 mL/hr IntraVENous CONTINUOUS    sodium chloride (NS) flush 5-10 mL  5-10 mL IntraVENous PRN    acetaminophen (TYLENOL) tablet 650 mg  650 mg Oral Q6H PRN    sodium chloride (NS) flush 5-10 mL  5-10 mL IntraVENous Q8H    sodium chloride (NS) flush 5-10 mL  5-10 mL IntraVENous PRN    ondansetron (ZOFRAN) injection 4 mg  4 mg IntraVENous Q4H PRN    DULoxetine (CYMBALTA) capsule 60 mg  60 mg Oral DAILY    nicotine (NICODERM CQ) 21 mg/24 hr patch 1 Patch  1 Patch TransDERmal DAILY       Anthony Serrano PA-C  8/10/2018

## 2018-08-11 LAB
ANION GAP SERPL CALC-SCNC: 9 MMOL/L (ref 5–15)
BASOPHILS # BLD: 0 K/UL (ref 0–0.1)
BASOPHILS NFR BLD: 0 % (ref 0–1)
BUN SERPL-MCNC: 4 MG/DL (ref 6–20)
BUN/CREAT SERPL: 7 (ref 12–20)
CALCIUM SERPL-MCNC: 8.6 MG/DL (ref 8.5–10.1)
CHLORIDE SERPL-SCNC: 97 MMOL/L (ref 97–108)
CO2 SERPL-SCNC: 30 MMOL/L (ref 21–32)
CREAT SERPL-MCNC: 0.56 MG/DL (ref 0.55–1.02)
DIFFERENTIAL METHOD BLD: ABNORMAL
EOSINOPHIL # BLD: 0.5 K/UL (ref 0–0.4)
EOSINOPHIL NFR BLD: 4 % (ref 0–7)
ERYTHROCYTE [DISTWIDTH] IN BLOOD BY AUTOMATED COUNT: 17.7 % (ref 11.5–14.5)
GLUCOSE BLD STRIP.AUTO-MCNC: 104 MG/DL (ref 65–100)
GLUCOSE BLD STRIP.AUTO-MCNC: 109 MG/DL (ref 65–100)
GLUCOSE BLD STRIP.AUTO-MCNC: 114 MG/DL (ref 65–100)
GLUCOSE BLD STRIP.AUTO-MCNC: 152 MG/DL (ref 65–100)
GLUCOSE BLD STRIP.AUTO-MCNC: 59 MG/DL (ref 65–100)
GLUCOSE BLD STRIP.AUTO-MCNC: 79 MG/DL (ref 65–100)
GLUCOSE SERPL-MCNC: 102 MG/DL (ref 65–100)
HCT VFR BLD AUTO: 34.4 % (ref 35–47)
HGB BLD-MCNC: 10.5 G/DL (ref 11.5–16)
IMM GRANULOCYTES # BLD: 0.1 K/UL (ref 0–0.04)
IMM GRANULOCYTES NFR BLD AUTO: 1 % (ref 0–0.5)
LYMPHOCYTES # BLD: 2.5 K/UL (ref 0.8–3.5)
LYMPHOCYTES NFR BLD: 22 % (ref 12–49)
MAGNESIUM SERPL-MCNC: 2.2 MG/DL (ref 1.6–2.4)
MCH RBC QN AUTO: 26.3 PG (ref 26–34)
MCHC RBC AUTO-ENTMCNC: 30.5 G/DL (ref 30–36.5)
MCV RBC AUTO: 86 FL (ref 80–99)
MONOCYTES # BLD: 1 K/UL (ref 0–1)
MONOCYTES NFR BLD: 9 % (ref 5–13)
NEUTS SEG # BLD: 7.3 K/UL (ref 1.8–8)
NEUTS SEG NFR BLD: 64 % (ref 32–75)
NRBC # BLD: 0 K/UL (ref 0–0.01)
NRBC BLD-RTO: 0 PER 100 WBC
PHOSPHATE SERPL-MCNC: 4.4 MG/DL (ref 2.6–4.7)
PLATELET # BLD AUTO: 610 K/UL (ref 150–400)
PLATELET COMMENTS,PCOM: ABNORMAL
PMV BLD AUTO: 10.2 FL (ref 8.9–12.9)
POTASSIUM SERPL-SCNC: 3.8 MMOL/L (ref 3.5–5.1)
RBC # BLD AUTO: 4 M/UL (ref 3.8–5.2)
RBC MORPH BLD: ABNORMAL
SERVICE CMNT-IMP: ABNORMAL
SERVICE CMNT-IMP: NORMAL
SODIUM SERPL-SCNC: 136 MMOL/L (ref 136–145)
WBC # BLD AUTO: 11.4 K/UL (ref 3.6–11)

## 2018-08-11 PROCEDURE — 82962 GLUCOSE BLOOD TEST: CPT

## 2018-08-11 PROCEDURE — 74011636637 HC RX REV CODE- 636/637: Performed by: NURSE PRACTITIONER

## 2018-08-11 PROCEDURE — 74011250637 HC RX REV CODE- 250/637: Performed by: PHYSICIAN ASSISTANT

## 2018-08-11 PROCEDURE — 80048 BASIC METABOLIC PNL TOTAL CA: CPT | Performed by: SURGERY

## 2018-08-11 PROCEDURE — 65270000029 HC RM PRIVATE

## 2018-08-11 PROCEDURE — 36415 COLL VENOUS BLD VENIPUNCTURE: CPT | Performed by: SURGERY

## 2018-08-11 PROCEDURE — 77010033678 HC OXYGEN DAILY

## 2018-08-11 PROCEDURE — 83735 ASSAY OF MAGNESIUM: CPT | Performed by: SURGERY

## 2018-08-11 PROCEDURE — 74011250637 HC RX REV CODE- 250/637: Performed by: NURSE PRACTITIONER

## 2018-08-11 PROCEDURE — 74011250637 HC RX REV CODE- 250/637: Performed by: SURGERY

## 2018-08-11 PROCEDURE — 74011636637 HC RX REV CODE- 636/637: Performed by: PHYSICIAN ASSISTANT

## 2018-08-11 PROCEDURE — 74011250636 HC RX REV CODE- 250/636: Performed by: NURSE PRACTITIONER

## 2018-08-11 PROCEDURE — 85025 COMPLETE CBC W/AUTO DIFF WBC: CPT | Performed by: SURGERY

## 2018-08-11 PROCEDURE — 74011250636 HC RX REV CODE- 250/636: Performed by: PHYSICIAN ASSISTANT

## 2018-08-11 PROCEDURE — 65660000000 HC RM CCU STEPDOWN

## 2018-08-11 PROCEDURE — 84100 ASSAY OF PHOSPHORUS: CPT | Performed by: SURGERY

## 2018-08-11 PROCEDURE — 94760 N-INVAS EAR/PLS OXIMETRY 1: CPT

## 2018-08-11 RX ADMIN — DIPHENHYDRAMINE HYDROCHLORIDE 25 MG: 50 INJECTION, SOLUTION INTRAMUSCULAR; INTRAVENOUS at 12:48

## 2018-08-11 RX ADMIN — LEVOFLOXACIN 750 MG: 5 INJECTION, SOLUTION INTRAVENOUS at 10:00

## 2018-08-11 RX ADMIN — DULOXETINE HYDROCHLORIDE 60 MG: 60 CAPSULE, DELAYED RELEASE ORAL at 10:00

## 2018-08-11 RX ADMIN — SENNOSIDES AND DOCUSATE SODIUM 2 TABLET: 8.6; 5 TABLET ORAL at 21:39

## 2018-08-11 RX ADMIN — Medication: at 00:18

## 2018-08-11 RX ADMIN — INSULIN GLARGINE 20 UNITS: 100 INJECTION, SOLUTION SUBCUTANEOUS at 10:45

## 2018-08-11 RX ADMIN — INSULIN LISPRO 2 UNITS: 100 INJECTION, SOLUTION INTRAVENOUS; SUBCUTANEOUS at 12:45

## 2018-08-11 RX ADMIN — ACETAMINOPHEN 650 MG: 325 TABLET, FILM COATED ORAL at 21:00

## 2018-08-11 RX ADMIN — Medication: at 21:30

## 2018-08-11 RX ADMIN — POLYETHYLENE GLYCOL 3350 17 G: 17 POWDER, FOR SOLUTION ORAL at 10:00

## 2018-08-11 NOTE — PROGRESS NOTES
Bedside shift change report given to ISIDRO Braden (oncoming nurse) by Rajendra Webb (offgoing nurse). Report included the following information SBAR, Kardex, Intake/Output, MAR and Med Rec Status. never used

## 2018-08-11 NOTE — PROGRESS NOTES
· Surgery Progress Note    8/11/2018    Admit Date: 7/30/2018    Subjective:       Pt feeling better, in good spirits, no acute issues overnight, is supposed to go on vacation end of next week and is really hoping to be home if able  Pts pain present - adequately treated and she is on PCA . No SOB. No CP. Pt is ambulating. Patient 's current diet is Regular, good oral intake . [place] Pt reports  no nausea and no vomiting. Pt reports no fever or chills    Bowel Movements: Normal        Objective:     Blood pressure 98/63, pulse 77, temperature 98.2 °F (36.8 °C), resp. rate 18, height 5' 3\" (1.6 m), weight 146 lb 2.6 oz (66.3 kg), SpO2 93 %. 08/09 1901 - 08/11 0700  In: 890 [P.O.:890]  Out: -     General appearance: alert, cooperative, no distress  Lungs: clear to auscultation bilaterally  Heart: regular rate and rhythm  Abd:soft, protuberant, nondistended, without guarding, without rebound  Extremities: no edema  Neurologic: Grossly normal    Data Review    Recent Results (from the past 12 hour(s))   CBC WITH AUTOMATED DIFF    Collection Time: 08/11/18  2:57 AM   Result Value Ref Range    WBC 11.4 (H) 3.6 - 11.0 K/uL    RBC 4.00 3.80 - 5.20 M/uL    HGB 10.5 (L) 11.5 - 16.0 g/dL    HCT 34.4 (L) 35.0 - 47.0 %    MCV 86.0 80.0 - 99.0 FL    MCH 26.3 26.0 - 34.0 PG    MCHC 30.5 30.0 - 36.5 g/dL    RDW 17.7 (H) 11.5 - 14.5 %    PLATELET 014 (H) 103 - 400 K/uL    MPV 10.2 8.9 - 12.9 FL    NRBC 0.0 0  WBC    ABSOLUTE NRBC 0.00 0.00 - 0.01 K/uL    NEUTROPHILS 64 32 - 75 %    LYMPHOCYTES 22 12 - 49 %    MONOCYTES 9 5 - 13 %    EOSINOPHILS 4 0 - 7 %    BASOPHILS 0 0 - 1 %    IMMATURE GRANULOCYTES 1 (H) 0.0 - 0.5 %    ABS. NEUTROPHILS 7.3 1.8 - 8.0 K/UL    ABS. LYMPHOCYTES 2.5 0.8 - 3.5 K/UL    ABS. MONOCYTES 1.0 0.0 - 1.0 K/UL    ABS. EOSINOPHILS 0.5 (H) 0.0 - 0.4 K/UL    ABS. BASOPHILS 0.0 0.0 - 0.1 K/UL    ABS. IMM.  GRANS. 0.1 (H) 0.00 - 0.04 K/UL    DF SMEAR SCANNED      PLATELET COMMENTS Large Platelets      RBC COMMENTS ANISOCYTOSIS  1+       METABOLIC PANEL, BASIC    Collection Time: 08/11/18  2:57 AM   Result Value Ref Range    Sodium 136 136 - 145 mmol/L    Potassium 3.8 3.5 - 5.1 mmol/L    Chloride 97 97 - 108 mmol/L    CO2 30 21 - 32 mmol/L    Anion gap 9 5 - 15 mmol/L    Glucose 102 (H) 65 - 100 mg/dL    BUN 4 (L) 6 - 20 MG/DL    Creatinine 0.56 0.55 - 1.02 MG/DL    BUN/Creatinine ratio 7 (L) 12 - 20      GFR est AA >60 >60 ml/min/1.73m2    GFR est non-AA >60 >60 ml/min/1.73m2    Calcium 8.6 8.5 - 10.1 MG/DL   MAGNESIUM    Collection Time: 08/11/18  2:57 AM   Result Value Ref Range    Magnesium 2.2 1.6 - 2.4 mg/dL   PHOSPHORUS    Collection Time: 08/11/18  2:57 AM   Result Value Ref Range    Phosphorus 4.4 2.6 - 4.7 MG/DL   GLUCOSE, POC    Collection Time: 08/11/18  6:27 AM   Result Value Ref Range    Glucose (POC) 109 (H) 65 - 100 mg/dL    Performed by Laura Hauser        Assessment:     Active Problems:    Pancreatic abscess (7/30/2018)    s/p drainage with drain removed     Plan:   Diet regular   Pain management--on dilaudid pca with plan to begin weaning next week   GI Prophylaxis  OOB  Antibiotics:  Levaquin   Labs in AM   Stable and improving.    Further plan per dr. Carina Gloria PA-C

## 2018-08-12 LAB
ANION GAP SERPL CALC-SCNC: 10 MMOL/L (ref 5–15)
BASOPHILS # BLD: 0 K/UL (ref 0–0.1)
BASOPHILS NFR BLD: 0 % (ref 0–1)
BUN SERPL-MCNC: 5 MG/DL (ref 6–20)
BUN/CREAT SERPL: 10 (ref 12–20)
CALCIUM SERPL-MCNC: 8.6 MG/DL (ref 8.5–10.1)
CHLORIDE SERPL-SCNC: 97 MMOL/L (ref 97–108)
CO2 SERPL-SCNC: 28 MMOL/L (ref 21–32)
CREAT SERPL-MCNC: 0.52 MG/DL (ref 0.55–1.02)
DIFFERENTIAL METHOD BLD: ABNORMAL
EOSINOPHIL # BLD: 0.4 K/UL (ref 0–0.4)
EOSINOPHIL NFR BLD: 4 % (ref 0–7)
ERYTHROCYTE [DISTWIDTH] IN BLOOD BY AUTOMATED COUNT: 17.6 % (ref 11.5–14.5)
GLUCOSE BLD STRIP.AUTO-MCNC: 106 MG/DL (ref 65–100)
GLUCOSE BLD STRIP.AUTO-MCNC: 107 MG/DL (ref 65–100)
GLUCOSE BLD STRIP.AUTO-MCNC: 122 MG/DL (ref 65–100)
GLUCOSE BLD STRIP.AUTO-MCNC: 78 MG/DL (ref 65–100)
GLUCOSE BLD STRIP.AUTO-MCNC: 91 MG/DL (ref 65–100)
GLUCOSE SERPL-MCNC: 108 MG/DL (ref 65–100)
HCT VFR BLD AUTO: 32.6 % (ref 35–47)
HGB BLD-MCNC: 9.9 G/DL (ref 11.5–16)
IMM GRANULOCYTES # BLD: 0.1 K/UL (ref 0–0.04)
IMM GRANULOCYTES NFR BLD AUTO: 1 % (ref 0–0.5)
LYMPHOCYTES # BLD: 2.4 K/UL (ref 0.8–3.5)
LYMPHOCYTES NFR BLD: 21 % (ref 12–49)
MAGNESIUM SERPL-MCNC: 2.2 MG/DL (ref 1.6–2.4)
MCH RBC QN AUTO: 25.9 PG (ref 26–34)
MCHC RBC AUTO-ENTMCNC: 30.4 G/DL (ref 30–36.5)
MCV RBC AUTO: 85.3 FL (ref 80–99)
MONOCYTES # BLD: 1.3 K/UL (ref 0–1)
MONOCYTES NFR BLD: 11 % (ref 5–13)
NEUTS SEG # BLD: 7.5 K/UL (ref 1.8–8)
NEUTS SEG NFR BLD: 64 % (ref 32–75)
NRBC # BLD: 0 K/UL (ref 0–0.01)
NRBC BLD-RTO: 0 PER 100 WBC
PHOSPHATE SERPL-MCNC: 5.3 MG/DL (ref 2.6–4.7)
PLATELET # BLD AUTO: 526 K/UL (ref 150–400)
PMV BLD AUTO: 10.3 FL (ref 8.9–12.9)
POTASSIUM SERPL-SCNC: 3.9 MMOL/L (ref 3.5–5.1)
RBC # BLD AUTO: 3.82 M/UL (ref 3.8–5.2)
SERVICE CMNT-IMP: ABNORMAL
SERVICE CMNT-IMP: NORMAL
SERVICE CMNT-IMP: NORMAL
SODIUM SERPL-SCNC: 135 MMOL/L (ref 136–145)
WBC # BLD AUTO: 11.8 K/UL (ref 3.6–11)

## 2018-08-12 PROCEDURE — 36415 COLL VENOUS BLD VENIPUNCTURE: CPT | Performed by: SURGERY

## 2018-08-12 PROCEDURE — 74011250637 HC RX REV CODE- 250/637: Performed by: SURGERY

## 2018-08-12 PROCEDURE — 94760 N-INVAS EAR/PLS OXIMETRY 1: CPT

## 2018-08-12 PROCEDURE — 94762 N-INVAS EAR/PLS OXIMTRY CONT: CPT

## 2018-08-12 PROCEDURE — 74011250637 HC RX REV CODE- 250/637: Performed by: NURSE PRACTITIONER

## 2018-08-12 PROCEDURE — 65270000029 HC RM PRIVATE

## 2018-08-12 PROCEDURE — 83735 ASSAY OF MAGNESIUM: CPT | Performed by: SURGERY

## 2018-08-12 PROCEDURE — 80048 BASIC METABOLIC PNL TOTAL CA: CPT | Performed by: SURGERY

## 2018-08-12 PROCEDURE — 74011250636 HC RX REV CODE- 250/636: Performed by: NURSE PRACTITIONER

## 2018-08-12 PROCEDURE — 84100 ASSAY OF PHOSPHORUS: CPT | Performed by: SURGERY

## 2018-08-12 PROCEDURE — 77010033678 HC OXYGEN DAILY

## 2018-08-12 PROCEDURE — 85025 COMPLETE CBC W/AUTO DIFF WBC: CPT | Performed by: SURGERY

## 2018-08-12 PROCEDURE — 82962 GLUCOSE BLOOD TEST: CPT

## 2018-08-12 PROCEDURE — 65660000000 HC RM CCU STEPDOWN

## 2018-08-12 PROCEDURE — 74011636637 HC RX REV CODE- 636/637: Performed by: PHYSICIAN ASSISTANT

## 2018-08-12 RX ADMIN — INSULIN GLARGINE 20 UNITS: 100 INJECTION, SOLUTION SUBCUTANEOUS at 08:59

## 2018-08-12 RX ADMIN — DULOXETINE HYDROCHLORIDE 60 MG: 60 CAPSULE, DELAYED RELEASE ORAL at 08:59

## 2018-08-12 RX ADMIN — SENNOSIDES AND DOCUSATE SODIUM 2 TABLET: 8.6; 5 TABLET ORAL at 23:10

## 2018-08-12 RX ADMIN — POLYETHYLENE GLYCOL 3350 17 G: 17 POWDER, FOR SOLUTION ORAL at 08:58

## 2018-08-12 RX ADMIN — Medication: at 13:32

## 2018-08-12 RX ADMIN — Medication 10 ML: at 11:10

## 2018-08-12 RX ADMIN — Medication 10 ML: at 23:10

## 2018-08-12 RX ADMIN — LEVOFLOXACIN 750 MG: 5 INJECTION, SOLUTION INTRAVENOUS at 11:10

## 2018-08-12 NOTE — PROGRESS NOTES
Bedside shift change report given to [facility] (oncoming nurse) by Reese Martinez (offgoing nurse). Report included the following information SBAR, Kardex, Intake/Output and MAR.

## 2018-08-13 LAB
ANION GAP SERPL CALC-SCNC: 8 MMOL/L (ref 5–15)
BASOPHILS # BLD: 0.1 K/UL (ref 0–0.1)
BASOPHILS NFR BLD: 0 % (ref 0–1)
BUN SERPL-MCNC: 4 MG/DL (ref 6–20)
BUN/CREAT SERPL: 6 (ref 12–20)
CALCIUM SERPL-MCNC: 9 MG/DL (ref 8.5–10.1)
CHLORIDE SERPL-SCNC: 96 MMOL/L (ref 97–108)
CO2 SERPL-SCNC: 29 MMOL/L (ref 21–32)
CREAT SERPL-MCNC: 0.62 MG/DL (ref 0.55–1.02)
DIFFERENTIAL METHOD BLD: ABNORMAL
EOSINOPHIL # BLD: 0.5 K/UL (ref 0–0.4)
EOSINOPHIL NFR BLD: 4 % (ref 0–7)
ERYTHROCYTE [DISTWIDTH] IN BLOOD BY AUTOMATED COUNT: 17.5 % (ref 11.5–14.5)
GLUCOSE BLD STRIP.AUTO-MCNC: 109 MG/DL (ref 65–100)
GLUCOSE BLD STRIP.AUTO-MCNC: 118 MG/DL (ref 65–100)
GLUCOSE BLD STRIP.AUTO-MCNC: 149 MG/DL (ref 65–100)
GLUCOSE BLD STRIP.AUTO-MCNC: 83 MG/DL (ref 65–100)
GLUCOSE SERPL-MCNC: 180 MG/DL (ref 65–100)
HCT VFR BLD AUTO: 32.5 % (ref 35–47)
HGB BLD-MCNC: 10.1 G/DL (ref 11.5–16)
IMM GRANULOCYTES # BLD: 0.1 K/UL (ref 0–0.04)
IMM GRANULOCYTES NFR BLD AUTO: 1 % (ref 0–0.5)
LYMPHOCYTES # BLD: 2.7 K/UL (ref 0.8–3.5)
LYMPHOCYTES NFR BLD: 20 % (ref 12–49)
MAGNESIUM SERPL-MCNC: 2 MG/DL (ref 1.6–2.4)
MCH RBC QN AUTO: 26.2 PG (ref 26–34)
MCHC RBC AUTO-ENTMCNC: 31.1 G/DL (ref 30–36.5)
MCV RBC AUTO: 84.2 FL (ref 80–99)
MONOCYTES # BLD: 1.1 K/UL (ref 0–1)
MONOCYTES NFR BLD: 9 % (ref 5–13)
NEUTS SEG # BLD: 8.7 K/UL (ref 1.8–8)
NEUTS SEG NFR BLD: 67 % (ref 32–75)
NRBC # BLD: 0 K/UL (ref 0–0.01)
NRBC BLD-RTO: 0 PER 100 WBC
PHOSPHATE SERPL-MCNC: 4.9 MG/DL (ref 2.6–4.7)
PLATELET # BLD AUTO: 555 K/UL (ref 150–400)
PMV BLD AUTO: 10 FL (ref 8.9–12.9)
POTASSIUM SERPL-SCNC: 3.8 MMOL/L (ref 3.5–5.1)
RBC # BLD AUTO: 3.86 M/UL (ref 3.8–5.2)
SERVICE CMNT-IMP: ABNORMAL
SERVICE CMNT-IMP: NORMAL
SODIUM SERPL-SCNC: 133 MMOL/L (ref 136–145)
WBC # BLD AUTO: 13 K/UL (ref 3.6–11)

## 2018-08-13 PROCEDURE — 74011250637 HC RX REV CODE- 250/637: Performed by: SURGERY

## 2018-08-13 PROCEDURE — 74011250636 HC RX REV CODE- 250/636: Performed by: NURSE PRACTITIONER

## 2018-08-13 PROCEDURE — 65270000029 HC RM PRIVATE

## 2018-08-13 PROCEDURE — 74011250637 HC RX REV CODE- 250/637: Performed by: NURSE PRACTITIONER

## 2018-08-13 PROCEDURE — 36415 COLL VENOUS BLD VENIPUNCTURE: CPT | Performed by: SURGERY

## 2018-08-13 PROCEDURE — 74011636637 HC RX REV CODE- 636/637: Performed by: NURSE PRACTITIONER

## 2018-08-13 PROCEDURE — 84100 ASSAY OF PHOSPHORUS: CPT | Performed by: SURGERY

## 2018-08-13 PROCEDURE — 74011250637 HC RX REV CODE- 250/637: Performed by: PHYSICIAN ASSISTANT

## 2018-08-13 PROCEDURE — 74011636637 HC RX REV CODE- 636/637: Performed by: PHYSICIAN ASSISTANT

## 2018-08-13 PROCEDURE — 83735 ASSAY OF MAGNESIUM: CPT | Performed by: SURGERY

## 2018-08-13 PROCEDURE — 74011250636 HC RX REV CODE- 250/636: Performed by: SURGERY

## 2018-08-13 PROCEDURE — 85025 COMPLETE CBC W/AUTO DIFF WBC: CPT | Performed by: SURGERY

## 2018-08-13 PROCEDURE — 74011250636 HC RX REV CODE- 250/636: Performed by: PHYSICIAN ASSISTANT

## 2018-08-13 PROCEDURE — 65660000000 HC RM CCU STEPDOWN

## 2018-08-13 PROCEDURE — 82962 GLUCOSE BLOOD TEST: CPT

## 2018-08-13 PROCEDURE — 80048 BASIC METABOLIC PNL TOTAL CA: CPT | Performed by: SURGERY

## 2018-08-13 RX ADMIN — Medication 10 ML: at 21:20

## 2018-08-13 RX ADMIN — ACETAMINOPHEN 650 MG: 325 TABLET, FILM COATED ORAL at 08:42

## 2018-08-13 RX ADMIN — SENNOSIDES AND DOCUSATE SODIUM 2 TABLET: 8.6; 5 TABLET ORAL at 21:20

## 2018-08-13 RX ADMIN — ONDANSETRON 4 MG: 2 INJECTION INTRAMUSCULAR; INTRAVENOUS at 15:26

## 2018-08-13 RX ADMIN — Medication 10 ML: at 19:56

## 2018-08-13 RX ADMIN — INSULIN GLARGINE 20 UNITS: 100 INJECTION, SOLUTION SUBCUTANEOUS at 10:15

## 2018-08-13 RX ADMIN — Medication: at 08:09

## 2018-08-13 RX ADMIN — POLYETHYLENE GLYCOL 3350 17 G: 17 POWDER, FOR SOLUTION ORAL at 08:42

## 2018-08-13 RX ADMIN — LEVOFLOXACIN 750 MG: 5 INJECTION, SOLUTION INTRAVENOUS at 11:49

## 2018-08-13 RX ADMIN — INSULIN LISPRO 2 UNITS: 100 INJECTION, SOLUTION INTRAVENOUS; SUBCUTANEOUS at 17:38

## 2018-08-13 RX ADMIN — DIPHENHYDRAMINE HYDROCHLORIDE 25 MG: 50 INJECTION, SOLUTION INTRAMUSCULAR; INTRAVENOUS at 22:52

## 2018-08-13 RX ADMIN — DULOXETINE HYDROCHLORIDE 60 MG: 60 CAPSULE, DELAYED RELEASE ORAL at 08:43

## 2018-08-13 NOTE — PROGRESS NOTES
Palliative Medicine Consult  Luis: [phone]    Patient Name: Humble Armstrong  YOB: 1980    Date of Initial Consult: August 1, 2018  Reason for Consult: Pain Management  Requesting Provider: Cammy Hoffmann NP  Primary Care Physician: Sasha Quinones NP     SUMMARY:   Humble Armstrong is a 42 y.o. with a past history of anxiety, blurred vision, chest pain, depression, frequent headaches, obesity, pancreatic cyst, shortness of breath, stomch aches and swallowing difficulties  who was admitted on 7/30/2018 from home with a diagnosis of abscess of the abdominal cavity, pancreatic fluid leak now s/p drain placement. Current medical issues leading to Palliative Medicine involvement include: pain management. Interval History:  Bedside I & D 8/5/18  Dilaudid pca started 8/5/18  Drain dislodged 8/7/18  Basal on pca dc'd 8/13/18     PALLIATIVE DIAGNOSES:   1. Abdominal Pain  2. Abdominal abscess   3. Drug induced constipation  4. Decreased appetite     PLAN:   1. Pain well controlled with current Dilaudid PCA setting~0.2mg basal with 0.3mg q 10 min. 31 mg/ 24 hours yesterday. 2. D/c basal on pca  3. Will discuss with surgery tomorrow prior to full transition to oral.  Pt very anxious about discontinuing the pca  4. Dilaudid 2mg iv q 2prn refractory symptoms ordered. Pt has not required any doses  5. Constipation:   1. miralax daily  2. pericolace 2 caps/hs. 6. Will continue to follow up  7. Declined AMD completion  8. Initial consult note routed to primary continuity provider  9.  Communicated plan of care with: Palliative IDT       GOALS OF CARE / TREATMENT PREFERENCES:     GOALS OF CARE:  Patient/Health Care Proxy Stated Goals: Prolong life      TREATMENT PREFERENCES:   Code Status: Full Code    Advance Care Planning:  Advance Care Planning 8/1/2018   Patient's Healthcare Decision Maker is: Legal Next of Minesh 67   Primary Decision Maker Name Krystyna Valdovinos   Primary Decision Maker Phone Number [phone]   Primary Decision Maker Relationship to Patient Spouse   Confirm Advance Directive -   Patient Would Like to Complete Advance Directive -   Does the patient have other document types -       Medical Interventions: Full interventions   Other Instructions: Other:    As far as possible, the palliative care team has discussed with patient / health care proxy about goals of care / treatment preferences for patient. HISTORY:     HPI/SUBJECTIVE:    The patient is:   [x] Verbal and participatory  [] Non-participatory due to:   Pain controlled.   Small bowel movement friday    Clinical Pain Assessment (nonverbal scale for severity on nonverbal patients):   Clinical Pain Assessment  Severity: 2  Location: abdominal pain  Character: severe and diffuse  Duration: days  Factors: pca helping  Frequency: constant          Duration: for how long has pt been experiencing pain (e.g., 2 days, 1 month, years)  Frequency: how often pain is an issue (e.g., several times per day, once every few days, constant)     FUNCTIONAL ASSESSMENT:     Palliative Performance Scale (PPS):  PPS: 70       PSYCHOSOCIAL/SPIRITUAL SCREENING:     Palliative IDT has assessed this patient for cultural preferences / practices and a referral made as appropriate to needs (Cultural Services, Patient Advocacy, Ethics, etc.)    Advance Care Planning:  Advance Care Planning 8/1/2018   Patient's Healthcare Decision Maker is: Legal Next of Minesh 67   Primary Decision Maker Name Sayra Bermudez   Primary Decision Maker Phone Number [phone]   Primary Decision Maker Relationship to Patient Spouse   Confirm Advance Directive -   Patient Would Like to Complete Advance Directive -   Does the patient have other document types -       Any spiritual / Christian concerns:  [] Yes /  [x] No    Caregiver Burnout:  [] Yes /  [] No /  [x] No Caregiver Present      Anticipatory grief assessment:   [] Normal  / [] Maladaptive       ESAS Anxiety: Anxiety: 0    ESAS Depression: Depression: 0        REVIEW OF SYSTEMS:     Positive and pertinent negative findings in ROS are noted above in HPI. The following systems were [x] reviewed / [] unable to be reviewed as noted in HPI  Other findings are noted below. Systems: constitutional, ears/nose/mouth/throat, respiratory, gastrointestinal, genitourinary, musculoskeletal, integumentary, neurologic, psychiatric, endocrine. Positive findings noted below. Modified ESAS Completed by: provider   Fatigue: 1 Drowsiness: 0   Depression: 0 Pain: 2   Anxiety: 0 Nausea: 0   Anorexia: 4 Dyspnea: 0     Constipation: Yes     Stool Occurrence(s): 1        PHYSICAL EXAM:     From RN flowsheet:  Wt Readings from Last 3 Encounters:   08/13/18 140 lb 12.8 oz (63.9 kg)   07/30/18 143 lb (64.9 kg)   07/16/18 146 lb (66.2 kg)     Blood pressure 100/67, pulse 75, temperature 98.1 °F (36.7 °C), resp. rate 16, height 5' 3\" (1.6 m), weight 140 lb 12.8 oz (63.9 kg), SpO2 95 %.     Pain Scale 1: Numeric (0 - 10)  Pain Intensity 1: 4  Pain Onset 1: pta  Pain Location 1: Abdomen  Pain Orientation 1: Left  Pain Description 1: Sore  Pain Intervention(s) 1: Encouraged PCA  Last bowel movement, if known:     Constitutional: alert, conversant, nad  Eyes: pupils equal, anicteric  ENMT: no nasal discharge, moist mucous membranes  Cardiovascular: regular rhythm, distal pulses intact  Respiratory: breathing not labored, symmetric  Gastrointestinal: soft, +tenderness, areas of collection improving, no erythema, scant amount of drainage on dressing  Musculoskeletal: no deformity, no tenderness to palpation  Skin: warm, dry  Neurologic: following commands, moving all extremities  Psychiatric: full affect, no hallucinations  Other:        HISTORY:     Active Problems:    Pancreatic abscess (7/30/2018)      Past Medical History:   Diagnosis Date    Abscess of abdominal cavity (HCC) 7/30/2018    Anxiety     Blurred vision     Chest pain     Chronic pain MUSCLE WEAKNESS,PANCREATIC CYST     Depression     Frequent headaches     Muscle weakness     Obesity (BMI 30-39.9) 4/26/2018    Pancreatic cyst 3/21/2018    Shortness of breath     Stomach pain     Swallowing difficulty       Past Surgical History:   Procedure Laterality Date    HX GI      COLONOSCOPY    HX GYN  2005    endometriosis surgery    HX OTHER SURGICAL  04/20/2018    lap distal pancreatectomy converted to open-[facility]. Conception Jetty      Family History   Problem Relation Age of Onset    Cancer Mother      colon    Cancer Father      skin    Anesth Problems Father      SUCCINYLCHOLINE  REACTION      History reviewed, no pertinent family history.   Social History   Substance Use Topics    Smoking status: Current Every Day Smoker     Packs/day: 1.00     Years: 25.00    Smokeless tobacco: Current User      Comment: STOP SMOKING BOOKLET PROVIDED    Alcohol use No     Allergies   Allergen Reactions    Amoxicillin Shortness of Breath and Rash     Pt has had keflex in the past      Current Facility-Administered Medications   Medication Dose Route Frequency    polyethylene glycol (MIRALAX) packet 17 g  17 g Oral DAILY    senna-docusate (PERICOLACE) 8.6-50 mg per tablet 2 Tab  2 Tab Oral QHS    HYDROmorphone (PF) 25 mg/50 mL (DILAUDID) PCA   IntraVENous CONTINUOUS    HYDROmorphone (DILAUDID) injection 2 mg  2 mg IntraVENous Q2H PRN    insulin glargine (LANTUS) injection 20 Units  20 Units SubCUTAneous DAILY    diphenhydrAMINE (BENADRYL) injection 25 mg  25 mg IntraVENous Q6H PRN    insulin lispro (HUMALOG) injection   SubCUTAneous AC&HS    glucose chewable tablet 16 g  4 Tab Oral PRN    dextrose (D50W) injection syrg 12.5-25 g  12.5-25 g IntraVENous PRN    glucagon (GLUCAGEN) injection 1 mg  1 mg IntraMUSCular PRN    levoFLOXacin (LEVAQUIN) 750 mg in D5W IVPB  750 mg IntraVENous Q24H    0.9% sodium chloride infusion  25 mL/hr IntraVENous CONTINUOUS    sodium chloride (NS) flush 5-10 mL 5-10 mL IntraVENous PRN    acetaminophen (TYLENOL) tablet 650 mg  650 mg Oral Q6H PRN    sodium chloride (NS) flush 5-10 mL  5-10 mL IntraVENous Q8H    sodium chloride (NS) flush 5-10 mL  5-10 mL IntraVENous PRN    ondansetron (ZOFRAN) injection 4 mg  4 mg IntraVENous Q4H PRN    DULoxetine (CYMBALTA) capsule 60 mg  60 mg Oral DAILY    nicotine (NICODERM CQ) 21 mg/24 hr patch 1 Patch  1 Patch TransDERmal DAILY          LAB AND IMAGING FINDINGS:     Lab Results   Component Value Date/Time    WBC 13.0 (H) 08/13/2018 05:08 AM    HGB 10.1 (L) 08/13/2018 05:08 AM    PLATELET 157 (H) 35/21/9999 05:08 AM     Lab Results   Component Value Date/Time    Sodium 133 (L) 08/13/2018 05:08 AM    Potassium 3.8 08/13/2018 05:08 AM    Chloride 96 (L) 08/13/2018 05:08 AM    CO2 29 08/13/2018 05:08 AM    BUN 4 (L) 08/13/2018 05:08 AM    Creatinine 0.62 08/13/2018 05:08 AM    Calcium 9.0 08/13/2018 05:08 AM    Magnesium 2.0 08/13/2018 05:08 AM    Phosphorus 4.9 (H) 08/13/2018 05:08 AM      Lab Results   Component Value Date/Time    AST (SGOT) 11 (L) 07/31/2018 09:45 AM    Alk. phosphatase 126 (H) 07/31/2018 09:45 AM    Protein, total 7.8 07/31/2018 09:45 AM    Albumin 2.8 (L) 07/31/2018 09:45 AM    Globulin 5.0 (H) 07/31/2018 09:45 AM     Lab Results   Component Value Date/Time    INR 1.0 02/24/2018 03:01 PM    Prothrombin time 10.0 02/24/2018 03:01 PM    aPTT 39.6 (H) 06/24/2011 11:40 PM      No results found for: IRON, FE, TIBC, IBCT, PSAT, FERR   No results found for: PH, PCO2, PO2  No components found for: [place]   Lab Results   Component Value Date/Time    CK 46 02/24/2018 03:01 PM    CK - MB <1.0 02/24/2018 03:01 PM                Total time: 35 minutes  Counseling / coordination time, spent as noted above: 30 minutes  > 50% counseling / coordination?: y    Prolonged service was provided for  []30 min   []75 min in face to face time in the presence of the patient, spent as noted above.   Time Start:   Time End:   Note: this can only be billed with 70747 (initial) or 50374 (follow up). If multiple start / stop times, list each separately.

## 2018-08-13 NOTE — PROGRESS NOTES
Progress Note    Patient: Charisse Ruano MRN: [id number]  SSN: [id number]    YOB: 1980  Age: 42 y.o. Sex: female      Admit Date: 2018    Pancreatic abscess    Subjective:     No acute surgical issues. Pt still having pain but controlled with PCA. Tolerating diet. No nausea or vomiting. Objective:     Visit Vitals    /66 (BP 1 Location: Right arm, BP Patient Position: At rest)    Pulse 95    Temp 98.7 °F (37.1 °C)    Resp 16    Ht 5' 3\" (1.6 m)    Wt 143 lb 4.8 oz (65 kg)    SpO2 94%    BMI 25.38 kg/m2       Temp (24hrs), Av.7 °F (37.1 °C), Min:98.1 °F (36.7 °C), Max:100.1 °F (37.8 °C)        Physical Exam:    Gen:  NAD  Pulm:  Unlabored  Abd:  S/ND/appropriate TTP  Wound:  C/D/I    Recent Results (from the past 24 hour(s))   GLUCOSE, POC    Collection Time: 18  9:15 PM   Result Value Ref Range    Glucose (POC) 114 (H) 65 - 100 mg/dL    Performed by Best Mcdermott    CBC WITH AUTOMATED DIFF    Collection Time: 18  4:02 AM   Result Value Ref Range    WBC 11.8 (H) 3.6 - 11.0 K/uL    RBC 3.82 3.80 - 5.20 M/uL    HGB 9.9 (L) 11.5 - 16.0 g/dL    HCT 32.6 (L) 35.0 - 47.0 %    MCV 85.3 80.0 - 99.0 FL    MCH 25.9 (L) 26.0 - 34.0 PG    MCHC 30.4 30.0 - 36.5 g/dL    RDW 17.6 (H) 11.5 - 14.5 %    PLATELET 618 (H) 895 - 400 K/uL    MPV 10.3 8.9 - 12.9 FL    NRBC 0.0 0  WBC    ABSOLUTE NRBC 0.00 0.00 - 0.01 K/uL    NEUTROPHILS 64 32 - 75 %    LYMPHOCYTES 21 12 - 49 %    MONOCYTES 11 5 - 13 %    EOSINOPHILS 4 0 - 7 %    BASOPHILS 0 0 - 1 %    IMMATURE GRANULOCYTES 1 (H) 0.0 - 0.5 %    ABS. NEUTROPHILS 7.5 1.8 - 8.0 K/UL    ABS. LYMPHOCYTES 2.4 0.8 - 3.5 K/UL    ABS. MONOCYTES 1.3 (H) 0.0 - 1.0 K/UL    ABS. EOSINOPHILS 0.4 0.0 - 0.4 K/UL    ABS. BASOPHILS 0.0 0.0 - 0.1 K/UL    ABS. IMM.  GRANS. 0.1 (H) 0.00 - 0.04 K/UL    DF AUTOMATED     METABOLIC PANEL, BASIC    Collection Time: 18  4:02 AM   Result Value Ref Range    Sodium 135 (L) 136 - 145 mmol/L    Potassium 3.9 3.5 - 5.1 mmol/L    Chloride 97 97 - 108 mmol/L    CO2 28 21 - 32 mmol/L    Anion gap 10 5 - 15 mmol/L    Glucose 108 (H) 65 - 100 mg/dL    BUN 5 (L) 6 - 20 MG/DL    Creatinine 0.52 (L) 0.55 - 1.02 MG/DL    BUN/Creatinine ratio 10 (L) 12 - 20      GFR est AA >60 >60 ml/min/1.73m2    GFR est non-AA >60 >60 ml/min/1.73m2    Calcium 8.6 8.5 - 10.1 MG/DL   MAGNESIUM    Collection Time: 08/12/18  4:02 AM   Result Value Ref Range    Magnesium 2.2 1.6 - 2.4 mg/dL   PHOSPHORUS    Collection Time: 08/12/18  4:02 AM   Result Value Ref Range    Phosphorus 5.3 (H) 2.6 - 4.7 MG/DL   GLUCOSE, POC    Collection Time: 08/12/18  7:21 AM   Result Value Ref Range    Glucose (POC) 91 65 - 100 mg/dL    Performed by Luisa Daniels    GLUCOSE, POC    Collection Time: 08/12/18 12:15 PM   Result Value Ref Range    Glucose (POC) 122 (H) 65 - 100 mg/dL    Performed by Humberto Wagner    GLUCOSE, POC    Collection Time: 08/12/18  4:09 PM   Result Value Ref Range    Glucose (POC) 78 65 - 100 mg/dL    Performed by Jermaine Jauregui    GLUCOSE, POC    Collection Time: 08/12/18  5:00 PM   Result Value Ref Range    Glucose (POC) 106 (H) 65 - 100 mg/dL    Performed by Minerva Driscoll          Assessment:     Hospital Problems  Date Reviewed: 8/6/2018          Codes Class Noted POA    Pancreatic abscess ICD-10-CM: K85.90  ICD-9-CM: 577.0  7/30/2018 Unknown              Plan/Recommendations/Medical Decision Making:     - Continue PCA and wean tomorrow to transition to PO  - Continue antibiotic therpay  - Hopefully will be able to DC home once leukocytosis improves and pain is under control  - Out of bed.   Encourage ambulation    Signed By: Huy Dong MD     August 12, 2018

## 2018-08-13 NOTE — PROGRESS NOTES
General Surgery Daily Progress Note    Admit Date: 2018  Post-Operative Day: * No surgery found * from * No surgery found *     Subjective:     Last 24 hrs: Pt is doing well overall. 2 areas on L side are less painful. On dilaudid PCA    Slight bump in WBC this am    Objective:     Blood pressure 100/67, pulse 75, temperature 98.1 °F (36.7 °C), resp. rate 16, height 5' 3\" (1.6 m), weight 140 lb 12.8 oz (63.9 kg), SpO2 95 %. Temp (24hrs), Av.4 °F (36.9 °C), Min:98.1 °F (36.7 °C), Max:98.8 °F (37.1 °C)      _____________________  Physical Exam:     Alert and Oriented, x3, in no acute distress. Cardiovascular: RRR, no peripheral edema  Abdomen: soft, NT, L side w/ 2 small indurated abscesses - minimally painful      Assessment:   Active Problems:    Pancreatic abscess (2018)            Plan:     Cont abx  Pain mgmt per Palliative - switch to po meds soon  OOB as she is doing  Cont diet  Am labs - monitor WBC    Data Review:    Recent Labs      18   05018   0402  18   0257   WBC  13.0*  11.8*  11.4*   HGB  10.1*  9.9*  10.5*   HCT  32.5*  32.6*  34.4*   PLT  555*  526*  610*     Recent Labs      18   0508  18   0402  18   0257   NA  133*  135*  136   K  3.8  3.9  3.8   CL  96*  97  97   CO2  29  28  30   GLU  180*  108*  102*   BUN  4*  5*  4*   CREA  0.62  0.52*  0.56   CA  9.0  8.6  8.6   MG  2.0  2.2  2.2   PHOS  4.9*  5.3*  4.4     No results for input(s): AML, LPSE in the last 72 hours.         ______________________  Medications:    Current Facility-Administered Medications   Medication Dose Route Frequency    polyethylene glycol (MIRALAX) packet 17 g  17 g Oral DAILY    senna-docusate (PERICOLACE) 8.6-50 mg per tablet 2 Tab  2 Tab Oral QHS    HYDROmorphone (PF) 25 mg/50 mL (DILAUDID) PCA   IntraVENous CONTINUOUS    HYDROmorphone (DILAUDID) injection 2 mg  2 mg IntraVENous Q2H PRN    insulin glargine (LANTUS) injection 20 Units  20 Units SubCUTAneous DAILY    diphenhydrAMINE (BENADRYL) injection 25 mg  25 mg IntraVENous Q6H PRN    insulin lispro (HUMALOG) injection   SubCUTAneous AC&HS    glucose chewable tablet 16 g  4 Tab Oral PRN    dextrose (D50W) injection syrg 12.5-25 g  12.5-25 g IntraVENous PRN    glucagon (GLUCAGEN) injection 1 mg  1 mg IntraMUSCular PRN    levoFLOXacin (LEVAQUIN) 750 mg in D5W IVPB  750 mg IntraVENous Q24H    0.9% sodium chloride infusion  25 mL/hr IntraVENous CONTINUOUS    sodium chloride (NS) flush 5-10 mL  5-10 mL IntraVENous PRN    acetaminophen (TYLENOL) tablet 650 mg  650 mg Oral Q6H PRN    sodium chloride (NS) flush 5-10 mL  5-10 mL IntraVENous Q8H    sodium chloride (NS) flush 5-10 mL  5-10 mL IntraVENous PRN    ondansetron (ZOFRAN) injection 4 mg  4 mg IntraVENous Q4H PRN    DULoxetine (CYMBALTA) capsule 60 mg  60 mg Oral DAILY    nicotine (NICODERM CQ) 21 mg/24 hr patch 1 Patch  1 Patch TransDERmal DAILY       Guerrero Candelaria NP  8/13/2018                    ATTENDING ADDENDUM  I supervised the APC and reviewed the note. We discussed the plan of care  Hopefully she can wean down to po to be able to go tho the beach with her family. She deserves a break.

## 2018-08-14 LAB
ANION GAP SERPL CALC-SCNC: 9 MMOL/L (ref 5–15)
BASOPHILS # BLD: 0.1 K/UL (ref 0–0.1)
BASOPHILS NFR BLD: 1 % (ref 0–1)
BUN SERPL-MCNC: 5 MG/DL (ref 6–20)
BUN/CREAT SERPL: 9 (ref 12–20)
CALCIUM SERPL-MCNC: 8.8 MG/DL (ref 8.5–10.1)
CHLORIDE SERPL-SCNC: 97 MMOL/L (ref 97–108)
CO2 SERPL-SCNC: 28 MMOL/L (ref 21–32)
CREAT SERPL-MCNC: 0.57 MG/DL (ref 0.55–1.02)
DIFFERENTIAL METHOD BLD: ABNORMAL
EOSINOPHIL # BLD: 0.5 K/UL (ref 0–0.4)
EOSINOPHIL NFR BLD: 4 % (ref 0–7)
ERYTHROCYTE [DISTWIDTH] IN BLOOD BY AUTOMATED COUNT: 17.3 % (ref 11.5–14.5)
GLUCOSE BLD STRIP.AUTO-MCNC: 109 MG/DL (ref 65–100)
GLUCOSE BLD STRIP.AUTO-MCNC: 113 MG/DL (ref 65–100)
GLUCOSE BLD STRIP.AUTO-MCNC: 137 MG/DL (ref 65–100)
GLUCOSE BLD STRIP.AUTO-MCNC: 142 MG/DL (ref 65–100)
GLUCOSE SERPL-MCNC: 105 MG/DL (ref 65–100)
HCT VFR BLD AUTO: 33.4 % (ref 35–47)
HGB BLD-MCNC: 10.4 G/DL (ref 11.5–16)
IMM GRANULOCYTES # BLD: 0.1 K/UL (ref 0–0.04)
IMM GRANULOCYTES NFR BLD AUTO: 1 % (ref 0–0.5)
LYMPHOCYTES # BLD: 2.5 K/UL (ref 0.8–3.5)
LYMPHOCYTES NFR BLD: 22 % (ref 12–49)
MAGNESIUM SERPL-MCNC: 2.2 MG/DL (ref 1.6–2.4)
MCH RBC QN AUTO: 26.4 PG (ref 26–34)
MCHC RBC AUTO-ENTMCNC: 31.1 G/DL (ref 30–36.5)
MCV RBC AUTO: 84.8 FL (ref 80–99)
MONOCYTES # BLD: 1.1 K/UL (ref 0–1)
MONOCYTES NFR BLD: 10 % (ref 5–13)
NEUTS SEG # BLD: 7.1 K/UL (ref 1.8–8)
NEUTS SEG NFR BLD: 63 % (ref 32–75)
NRBC # BLD: 0 K/UL (ref 0–0.01)
NRBC BLD-RTO: 0 PER 100 WBC
PHOSPHATE SERPL-MCNC: 4.6 MG/DL (ref 2.6–4.7)
PLATELET # BLD AUTO: 595 K/UL (ref 150–400)
PMV BLD AUTO: 9.9 FL (ref 8.9–12.9)
POTASSIUM SERPL-SCNC: 3.9 MMOL/L (ref 3.5–5.1)
RBC # BLD AUTO: 3.94 M/UL (ref 3.8–5.2)
SERVICE CMNT-IMP: ABNORMAL
SODIUM SERPL-SCNC: 134 MMOL/L (ref 136–145)
WBC # BLD AUTO: 11.4 K/UL (ref 3.6–11)

## 2018-08-14 PROCEDURE — 74011250636 HC RX REV CODE- 250/636: Performed by: NURSE PRACTITIONER

## 2018-08-14 PROCEDURE — 85025 COMPLETE CBC W/AUTO DIFF WBC: CPT | Performed by: SURGERY

## 2018-08-14 PROCEDURE — 80048 BASIC METABOLIC PNL TOTAL CA: CPT | Performed by: SURGERY

## 2018-08-14 PROCEDURE — 82962 GLUCOSE BLOOD TEST: CPT

## 2018-08-14 PROCEDURE — 74011250636 HC RX REV CODE- 250/636: Performed by: SURGERY

## 2018-08-14 PROCEDURE — 65270000029 HC RM PRIVATE

## 2018-08-14 PROCEDURE — 84100 ASSAY OF PHOSPHORUS: CPT | Performed by: SURGERY

## 2018-08-14 PROCEDURE — 94760 N-INVAS EAR/PLS OXIMETRY 1: CPT

## 2018-08-14 PROCEDURE — 65660000000 HC RM CCU STEPDOWN

## 2018-08-14 PROCEDURE — 83735 ASSAY OF MAGNESIUM: CPT | Performed by: SURGERY

## 2018-08-14 PROCEDURE — 74011636637 HC RX REV CODE- 636/637: Performed by: NURSE PRACTITIONER

## 2018-08-14 PROCEDURE — 74011636637 HC RX REV CODE- 636/637: Performed by: PHYSICIAN ASSISTANT

## 2018-08-14 PROCEDURE — 36415 COLL VENOUS BLD VENIPUNCTURE: CPT | Performed by: SURGERY

## 2018-08-14 PROCEDURE — 74011250637 HC RX REV CODE- 250/637: Performed by: NURSE PRACTITIONER

## 2018-08-14 PROCEDURE — 74011250636 HC RX REV CODE- 250/636: Performed by: PHYSICIAN ASSISTANT

## 2018-08-14 PROCEDURE — 74011250637 HC RX REV CODE- 250/637: Performed by: SURGERY

## 2018-08-14 RX ORDER — HYDROMORPHONE HYDROCHLORIDE 1 MG/ML
2 INJECTION, SOLUTION INTRAMUSCULAR; INTRAVENOUS; SUBCUTANEOUS
Status: DISCONTINUED | OUTPATIENT
Start: 2018-08-14 | End: 2018-08-17 | Stop reason: HOSPADM

## 2018-08-14 RX ORDER — ALPRAZOLAM 0.25 MG/1
0.25 TABLET ORAL DAILY PRN
Status: DISCONTINUED | OUTPATIENT
Start: 2018-08-14 | End: 2018-08-17 | Stop reason: HOSPADM

## 2018-08-14 RX ORDER — HYDROMORPHONE HYDROCHLORIDE 2 MG/1
6 TABLET ORAL 4 TIMES DAILY
Status: DISCONTINUED | OUTPATIENT
Start: 2018-08-14 | End: 2018-08-17 | Stop reason: HOSPADM

## 2018-08-14 RX ORDER — HYDROMORPHONE HYDROCHLORIDE 4 MG/1
8 TABLET ORAL
Status: DISCONTINUED | OUTPATIENT
Start: 2018-08-14 | End: 2018-08-17 | Stop reason: HOSPADM

## 2018-08-14 RX ADMIN — POLYETHYLENE GLYCOL 3350 17 G: 17 POWDER, FOR SOLUTION ORAL at 09:18

## 2018-08-14 RX ADMIN — Medication: at 05:44

## 2018-08-14 RX ADMIN — HYDROMORPHONE HYDROCHLORIDE 8 MG: 4 TABLET ORAL at 20:45

## 2018-08-14 RX ADMIN — Medication 10 ML: at 13:09

## 2018-08-14 RX ADMIN — SENNOSIDES AND DOCUSATE SODIUM 2 TABLET: 8.6; 5 TABLET ORAL at 23:50

## 2018-08-14 RX ADMIN — LEVOFLOXACIN 750 MG: 5 INJECTION, SOLUTION INTRAVENOUS at 11:47

## 2018-08-14 RX ADMIN — SODIUM CHLORIDE 25 ML/HR: 900 INJECTION, SOLUTION INTRAVENOUS at 17:01

## 2018-08-14 RX ADMIN — HYDROMORPHONE HYDROCHLORIDE 6 MG: 2 TABLET ORAL at 23:50

## 2018-08-14 RX ADMIN — INSULIN LISPRO 2 UNITS: 100 INJECTION, SOLUTION INTRAVENOUS; SUBCUTANEOUS at 11:47

## 2018-08-14 RX ADMIN — Medication 2 MG: at 09:17

## 2018-08-14 RX ADMIN — Medication 2 MG: at 04:00

## 2018-08-14 RX ADMIN — ONDANSETRON 4 MG: 2 INJECTION INTRAMUSCULAR; INTRAVENOUS at 15:04

## 2018-08-14 RX ADMIN — INSULIN GLARGINE 20 UNITS: 100 INJECTION, SOLUTION SUBCUTANEOUS at 09:42

## 2018-08-14 RX ADMIN — HYDROMORPHONE HYDROCHLORIDE 6 MG: 2 TABLET ORAL at 19:17

## 2018-08-14 RX ADMIN — DULOXETINE HYDROCHLORIDE 60 MG: 60 CAPSULE, DELAYED RELEASE ORAL at 09:18

## 2018-08-14 RX ADMIN — DIPHENHYDRAMINE HYDROCHLORIDE 25 MG: 50 INJECTION, SOLUTION INTRAMUSCULAR; INTRAVENOUS at 13:09

## 2018-08-14 RX ADMIN — HYDROMORPHONE HYDROCHLORIDE 6 MG: 2 TABLET ORAL at 14:05

## 2018-08-14 RX ADMIN — Medication 10 ML: at 04:00

## 2018-08-14 NOTE — WOUND CARE
Wound Care Note:     New consult placed by nurse request for red marks on arm from tape. Chart shows:  Admitted for pancreatic abscess   Past Medical History:   Diagnosis Date    Abscess of abdominal cavity ([address] 7/30/2018    Anxiety     Blurred vision     Chest pain     Chronic pain     MUSCLE WEAKNESS,PANCREATIC CYST     Depression     Frequent headaches     Muscle weakness     Obesity (BMI 30-39.9) 4/26/2018    Pancreatic cyst 3/21/2018    Shortness of breath     Stomach pain     Swallowing difficulty      WBC = 11.4 on 8/14/18  Admitted from home    Assessment:   Patient is A&O x 4, communicative, continent with no assistance needed in repositioning. Bed: [facility]  Diet: Regular 50 gram fat with nutritional supplements    Bilateral buttocks and sacral skin intact and without erythema. Bilateral heels skin intact with blanchable erythema. 1. Left lower arm has some redness where the tape was removed to hold IV in place. Tape removed, adhesive remover used to remove any remaining adhesive from skin, ACE wrap applied loosely to cover skin and then IV included to hold in place. Patient stated it feels better after the adhesive remover was used. Patient sitting up in bed. Heels offloaded on pillow. Recommendations:    Left arm- monitor redness, use ACE wrap to hold IV tubing in place instead of tape. Skin Care & Pressure Prevention:  Minimize layers of linen/pads under patient to optimize support surface. Turn/reposition approximately every 2 hours and offload heels.   Promote continence     Discussed above plan with patient & Blanca Ma RN    Transition of Care: Plan to follow as needed while admitted to hospital.    Aniceto Duncan" Angelia Crigler, BSN, RN, [facility], [facility].  office [phone]  pager [phone] or call  to page

## 2018-08-14 NOTE — PROGRESS NOTES
CM following for discharge planning, discussed with RN in IDR rounds. Plan for discharge home with family as soon as able to manage pain on P.O., PCA is being discontinued today. No CM needs anticipated at this time.      JIMY Cruz

## 2018-08-14 NOTE — ROUTINE PROCESS
Patient required one extra dose of prn dilaudid overnight, complained of itching relieved by benadryl. Bedside shift change report given to [facility] and Mack Bone RN (oncoming nurse) by Kodak Carballo (offgoing nurse). Report included the following information SBAR, Kardex, Intake/Output, MAR, Accordion and Recent Results.

## 2018-08-14 NOTE — PROGRESS NOTES
Bedside and Verbal shift change report given to Lani (oncoming nurse) by Nesha Grove (offgoing nurse). Report included the following information SBAR.

## 2018-08-14 NOTE — PROGRESS NOTES
General Surgery Daily Progress Note    Admit Date: 2018  Post-Operative Day: * No surgery found * from * No surgery found *     Subjective:     Last 24 hrs: Pt is anxious about change to po pain meds. Cont to c/o some L side pain where small indurated areas are. WBC down today. On O2 at 2 liters - sats drop w/o it. She admits to taking shallow breaths. Objective:     Blood pressure 98/64, pulse 81, temperature 97.9 °F (36.6 °C), resp. rate 18, height 5' 3\" (1.6 m), weight 140 lb 12.8 oz (63.9 kg), SpO2 94 %. Temp (24hrs), Av.9 °F (36.6 °C), Min:97.5 °F (36.4 °C), Max:98.1 °F (36.7 °C)      _____________________  Physical Exam:     Alert and Oriented, x3, in no acute distress. Cardiovascular: RRR, no peripheral edema  Abdomen: soft, L side w/ 3-4 abscesses w/ induration, no erythema or drainage. Assessment:   Active Problems:    Pancreatic abscess (2018)            Plan:     Pain mgmt per palliative  Cont abx  I encouraged OOB in chair or ambulate - use IS  Wean O2  Cont diet    Data Review:    Recent Labs      18   0346  18   0508  18   0402   WBC  11.4*  13.0*  11.8*   HGB  10.4*  10.1*  9.9*   HCT  33.4*  32.5*  32.6*   PLT  595*  555*  526*     Recent Labs      18   0346  18   0508  18   0402   NA  134*  133*  135*   K  3.9  3.8  3.9   CL  97  96*  97   CO2  28  29  28   GLU  105*  180*  108*   BUN  5*  4*  5*   CREA  0.57  0.62  0.52*   CA  8.8  9.0  8.6   MG  2.2  2.0  2.2   PHOS  4.6  4.9*  5.3*     No results for input(s): AML, LPSE in the last 72 hours.         ______________________  Medications:    Current Facility-Administered Medications   Medication Dose Route Frequency    polyethylene glycol (MIRALAX) packet 17 g  17 g Oral DAILY    senna-docusate (PERICOLACE) 8.6-50 mg per tablet 2 Tab  2 Tab Oral QHS    HYDROmorphone (PF) 25 mg/50 mL (DILAUDID) PCA   IntraVENous CONTINUOUS    HYDROmorphone (DILAUDID) injection 2 mg  2 mg IntraVENous Q2H PRN    insulin glargine (LANTUS) injection 20 Units  20 Units SubCUTAneous DAILY    diphenhydrAMINE (BENADRYL) injection 25 mg  25 mg IntraVENous Q6H PRN    insulin lispro (HUMALOG) injection   SubCUTAneous AC&HS    glucose chewable tablet 16 g  4 Tab Oral PRN    dextrose (D50W) injection syrg 12.5-25 g  12.5-25 g IntraVENous PRN    glucagon (GLUCAGEN) injection 1 mg  1 mg IntraMUSCular PRN    levoFLOXacin (LEVAQUIN) 750 mg in D5W IVPB  750 mg IntraVENous Q24H    0.9% sodium chloride infusion  25 mL/hr IntraVENous CONTINUOUS    sodium chloride (NS) flush 5-10 mL  5-10 mL IntraVENous PRN    acetaminophen (TYLENOL) tablet 650 mg  650 mg Oral Q6H PRN    sodium chloride (NS) flush 5-10 mL  5-10 mL IntraVENous Q8H    sodium chloride (NS) flush 5-10 mL  5-10 mL IntraVENous PRN    ondansetron (ZOFRAN) injection 4 mg  4 mg IntraVENous Q4H PRN    DULoxetine (CYMBALTA) capsule 60 mg  60 mg Oral DAILY    nicotine (NICODERM CQ) 21 mg/24 hr patch 1 Patch  1 Patch TransDERmal DAILY       Trace Zuniga NP  8/14/2018

## 2018-08-14 NOTE — PROGRESS NOTES
Palliative Medicine Consult  [place]: [phone])    Patient Name: Chela Reed  YOB: 1980    Date of Initial Consult: August 1, 2018  Reason for Consult: Pain Management  Requesting Provider: Felisha Castelan NP  Primary Care Physician: Ness Sutton NP     SUMMARY:   Chela Reed is a 42 y.o. with a past history of anxiety, blurred vision, chest pain, depression, frequent headaches, obesity, pancreatic cyst, shortness of breath, stomch aches and swallowing difficulties  who was admitted on 7/30/2018 from home with a diagnosis of abscess of the abdominal cavity, pancreatic fluid leak now s/p drain placement. Current medical issues leading to Palliative Medicine involvement include: pain management. Interval History:  Bedside I & D 8/5/18  Dilaudid pca started 8/5/18  Drain dislodged 8/7/18  Basal on pca dc'd 8/13/18  PCA dc'd 8/14/18     PALLIATIVE DIAGNOSES:   1. Abdominal Pain  2. Abdominal abscess   3. Drug induced constipation  4. Decreased appetite     PLAN:   1. Pain well controlled with current Dilaudid PCA setting~0.2mg basal with 0.3mg q 10 min. 27 mg/ 24 hours yesterday. 2. D/c pca. Will allow pt to complete the current syringe prior to discontinuing  3. Pt anxious with anticipatory pain associated with transitioning to oral.  Counseled on why this is a good thing and how her condition is improving. Need to transition to oral so that we know that the dc regimen is effective  4. Dilaudid 2mg iv q 2prn refractory symptoms ordered. Pt used 2 doses in less than 24 hours. Will limit to 3 times daily prn refractory s/s only. Pt to try oral meds for breakthrough  5. Pt has used copious amounts of dilaudid via pca and direct conversion is not possible  6. Dilaudid 6mg po qid scheduled  7. Dilaudid 8mg po q 4 prn breakthrough  8. Constipation:   1. miralax daily  2. pericolace 2 caps/hs. 3. Had a good bowel movement 2 days ago.   Feels like another one could happen today.    9. Anxiety:  1. Will allow xanax 0.25mg once daily prn anxiety  10. Will continue to follow up  11. Declined AMD completion  12. Initial consult note routed to primary continuity provider  13. Communicated plan of care with: Palliative IDT       GOALS OF CARE / TREATMENT PREFERENCES:     GOALS OF CARE:  Patient/Health Care Proxy Stated Goals: Prolong life      TREATMENT PREFERENCES:   Code Status: Full Code    Advance Care Planning:  Advance Care Planning 8/1/2018   Patient's Healthcare Decision Maker is: Legal Next of Kin   Primary Decision Maker Name Zenia Arias   Primary Decision Maker Phone Number [phone]   Primary Decision Maker Relationship to Patient Spouse   Confirm Advance Directive -   Patient Would Like to Complete Advance Directive -   Does the patient have other document types -       Medical Interventions: Full interventions   Other Instructions: Other:    As far as possible, the palliative care team has discussed with patient / health care proxy about goals of care / treatment preferences for patient. HISTORY:     HPI/SUBJECTIVE:    The patient is:   [x] Verbal and participatory  [] Non-participatory due to:   Pain controlled.   Pt looking forward to going home    Clinical Pain Assessment (nonverbal scale for severity on nonverbal patients):   Clinical Pain Assessment  Severity: 2  Location: abdominal pain  Character: severe and diffuse  Duration: days  Factors: pca helping  Frequency: constant          Duration: for how long has pt been experiencing pain (e.g., 2 days, 1 month, years)  Frequency: how often pain is an issue (e.g., several times per day, once every few days, constant)     FUNCTIONAL ASSESSMENT:     Palliative Performance Scale (PPS):  PPS: 70       PSYCHOSOCIAL/SPIRITUAL SCREENING:     Palliative IDT has assessed this patient for cultural preferences / practices and a referral made as appropriate to needs (Cultural Services, Patient Advocacy, Ethics, etc.)    Advance Care Planning:  Advance Care Planning 8/1/2018   Patient's Healthcare Decision Maker is: Legal Next of Minesh Lang   Primary Decision Maker Name Camron Pinto   Primary Decision Maker Phone Number [phone]   Primary Decision Maker Relationship to Patient Spouse   Confirm Advance Directive -   Patient Would Like to Complete Advance Directive -   Does the patient have other document types -       Any spiritual / Hindu concerns:  [] Yes /  [x] No    Caregiver Burnout:  [] Yes /  [] No /  [x] No Caregiver Present      Anticipatory grief assessment:   [] Normal  / [] Maladaptive       ESAS Anxiety: Anxiety: 0    ESAS Depression: Depression: 0        REVIEW OF SYSTEMS:     Positive and pertinent negative findings in ROS are noted above in HPI. The following systems were [x] reviewed / [] unable to be reviewed as noted in HPI  Other findings are noted below. Systems: constitutional, ears/nose/mouth/throat, respiratory, gastrointestinal, genitourinary, musculoskeletal, integumentary, neurologic, psychiatric, endocrine. Positive findings noted below. Modified ESAS Completed by: provider   Fatigue: 1 Drowsiness: 0   Depression: 0 Pain: 2   Anxiety: 0 Nausea: 0   Anorexia: 4 Dyspnea: 0     Constipation: Yes     Stool Occurrence(s): 1        PHYSICAL EXAM:     From RN flowsheet:  Wt Readings from Last 3 Encounters:   08/13/18 140 lb 12.8 oz (63.9 kg)   07/30/18 143 lb (64.9 kg)   07/16/18 146 lb (66.2 kg)     Blood pressure 98/64, pulse 81, temperature 97.9 °F (36.6 °C), resp. rate 18, height 5' 3\" (1.6 m), weight 140 lb 12.8 oz (63.9 kg), SpO2 94 %.     Pain Scale 1: Numeric (0 - 10)  Pain Intensity 1: 5  Pain Onset 1: pta  Pain Location 1: Abdomen  Pain Orientation 1: Left  Pain Description 1: Constant  Pain Intervention(s) 1: Encouraged PCA  Last bowel movement, if known:     Constitutional: alert, conversant, nad  Eyes: pupils equal, anicteric  ENMT: no nasal discharge, moist mucous membranes  Cardiovascular: regular rhythm, distal pulses intact  Respiratory: breathing not labored, symmetric  Gastrointestinal: soft, dressings covering abscess area intact, less drainage every day   Musculoskeletal: no deformity, no tenderness to palpation  Skin: warm, dry  Neurologic: following commands, moving all extremities  Psychiatric: full affect, no hallucinations  Other:        HISTORY:     Active Problems:    Pancreatic abscess (7/30/2018)      Past Medical History:   Diagnosis Date    Abscess of abdominal cavity ([address] 7/30/2018    Anxiety     Blurred vision     Chest pain     Chronic pain     MUSCLE WEAKNESS,PANCREATIC CYST     Depression     Frequent headaches     Muscle weakness     Obesity (BMI 30-39.9) 4/26/2018    Pancreatic cyst 3/21/2018    Shortness of breath     Stomach pain     Swallowing difficulty       Past Surgical History:   Procedure Laterality Date    HX GI      COLONOSCOPY    HX GYN  2005    endometriosis surgery    HX OTHER SURGICAL  04/20/2018    lap distal pancreatectomy converted to open-[facility]-DR. Ciro James      Family History   Problem Relation Age of Onset    Cancer Mother      colon    Cancer Father      skin    Anesth Problems Father      SUCCINYLCHOLINE  REACTION      History reviewed, no pertinent family history.   Social History   Substance Use Topics    Smoking status: Current Every Day Smoker     Packs/day: 1.00     Years: 25.00    Smokeless tobacco: Current User      Comment: STOP SMOKING BOOKLET PROVIDED    Alcohol use No     Allergies   Allergen Reactions    Amoxicillin Shortness of Breath and Rash     Pt has had keflex in the past      Current Facility-Administered Medications   Medication Dose Route Frequency    polyethylene glycol (MIRALAX) packet 17 g  17 g Oral DAILY    senna-docusate (PERICOLACE) 8.6-50 mg per tablet 2 Tab  2 Tab Oral QHS    HYDROmorphone (PF) 25 mg/50 mL (DILAUDID) PCA   IntraVENous CONTINUOUS    HYDROmorphone (DILAUDID) injection 2 mg  2 mg IntraVENous Q2H PRN    insulin glargine (LANTUS) injection 20 Units  20 Units SubCUTAneous DAILY    diphenhydrAMINE (BENADRYL) injection 25 mg  25 mg IntraVENous Q6H PRN    insulin lispro (HUMALOG) injection   SubCUTAneous AC&HS    glucose chewable tablet 16 g  4 Tab Oral PRN    dextrose (D50W) injection syrg 12.5-25 g  12.5-25 g IntraVENous PRN    glucagon (GLUCAGEN) injection 1 mg  1 mg IntraMUSCular PRN    levoFLOXacin (LEVAQUIN) 750 mg in D5W IVPB  750 mg IntraVENous Q24H    0.9% sodium chloride infusion  25 mL/hr IntraVENous CONTINUOUS    sodium chloride (NS) flush 5-10 mL  5-10 mL IntraVENous PRN    acetaminophen (TYLENOL) tablet 650 mg  650 mg Oral Q6H PRN    sodium chloride (NS) flush 5-10 mL  5-10 mL IntraVENous Q8H    sodium chloride (NS) flush 5-10 mL  5-10 mL IntraVENous PRN    ondansetron (ZOFRAN) injection 4 mg  4 mg IntraVENous Q4H PRN    DULoxetine (CYMBALTA) capsule 60 mg  60 mg Oral DAILY    nicotine (NICODERM CQ) 21 mg/24 hr patch 1 Patch  1 Patch TransDERmal DAILY          LAB AND IMAGING FINDINGS:     Lab Results   Component Value Date/Time    WBC 11.4 (H) 08/14/2018 03:46 AM    HGB 10.4 (L) 08/14/2018 03:46 AM    PLATELET 541 (H) 07/13/6172 03:46 AM     Lab Results   Component Value Date/Time    Sodium 134 (L) 08/14/2018 03:46 AM    Potassium 3.9 08/14/2018 03:46 AM    Chloride 97 08/14/2018 03:46 AM    CO2 28 08/14/2018 03:46 AM    BUN 5 (L) 08/14/2018 03:46 AM    Creatinine 0.57 08/14/2018 03:46 AM    Calcium 8.8 08/14/2018 03:46 AM    Magnesium 2.2 08/14/2018 03:46 AM    Phosphorus 4.6 08/14/2018 03:46 AM      Lab Results   Component Value Date/Time    AST (SGOT) 11 (L) 07/31/2018 09:45 AM    Alk.  phosphatase 126 (H) 07/31/2018 09:45 AM    Protein, total 7.8 07/31/2018 09:45 AM    Albumin 2.8 (L) 07/31/2018 09:45 AM    Globulin 5.0 (H) 07/31/2018 09:45 AM     Lab Results   Component Value Date/Time    INR 1.0 02/24/2018 03:01 PM    Prothrombin time 10.0 02/24/2018 03:01 PM    aPTT 39.6 (H) 06/24/2011 11:40 PM      No results found for: IRON, FE, TIBC, IBCT, PSAT, FERR   No results found for: PH, PCO2, PO2  No components found for: [place]   Lab Results   Component Value Date/Time    CK 46 02/24/2018 03:01 PM    CK - MB <1.0 02/24/2018 03:01 PM                Total time: 35 minutes  Counseling / coordination time, spent as noted above: 30 minutes  > 50% counseling / coordination?: y    Prolonged service was provided for  []30 min   []75 min in face to face time in the presence of the patient, spent as noted above. Time Start:   Time End:   Note: this can only be billed with 41218 (initial) or 14543 (follow up). If multiple start / stop times, list each separately.

## 2018-08-15 ENCOUNTER — APPOINTMENT (OUTPATIENT)
Dept: CT IMAGING | Age: 38
DRG: 438 | End: 2018-08-15
Attending: NURSE PRACTITIONER
Payer: COMMERCIAL

## 2018-08-15 LAB
ANION GAP SERPL CALC-SCNC: 11 MMOL/L (ref 5–15)
BASOPHILS # BLD: 0.1 K/UL (ref 0–0.1)
BASOPHILS NFR BLD: 1 % (ref 0–1)
BUN SERPL-MCNC: 6 MG/DL (ref 6–20)
BUN/CREAT SERPL: 10 (ref 12–20)
CALCIUM SERPL-MCNC: 8.8 MG/DL (ref 8.5–10.1)
CHLORIDE SERPL-SCNC: 96 MMOL/L (ref 97–108)
CO2 SERPL-SCNC: 26 MMOL/L (ref 21–32)
CREAT SERPL-MCNC: 0.62 MG/DL (ref 0.55–1.02)
DIFFERENTIAL METHOD BLD: ABNORMAL
EOSINOPHIL # BLD: 0.6 K/UL (ref 0–0.4)
EOSINOPHIL NFR BLD: 5 % (ref 0–7)
ERYTHROCYTE [DISTWIDTH] IN BLOOD BY AUTOMATED COUNT: 17.3 % (ref 11.5–14.5)
GLUCOSE BLD STRIP.AUTO-MCNC: 125 MG/DL (ref 65–100)
GLUCOSE BLD STRIP.AUTO-MCNC: 150 MG/DL (ref 65–100)
GLUCOSE BLD STRIP.AUTO-MCNC: 80 MG/DL (ref 65–100)
GLUCOSE BLD STRIP.AUTO-MCNC: 88 MG/DL (ref 65–100)
GLUCOSE SERPL-MCNC: 98 MG/DL (ref 65–100)
HCT VFR BLD AUTO: 32.8 % (ref 35–47)
HGB BLD-MCNC: 10.1 G/DL (ref 11.5–16)
IMM GRANULOCYTES # BLD: 0.1 K/UL (ref 0–0.04)
IMM GRANULOCYTES NFR BLD AUTO: 1 % (ref 0–0.5)
LYMPHOCYTES # BLD: 3.1 K/UL (ref 0.8–3.5)
LYMPHOCYTES NFR BLD: 24 % (ref 12–49)
MAGNESIUM SERPL-MCNC: 2.1 MG/DL (ref 1.6–2.4)
MCH RBC QN AUTO: 26 PG (ref 26–34)
MCHC RBC AUTO-ENTMCNC: 30.8 G/DL (ref 30–36.5)
MCV RBC AUTO: 84.5 FL (ref 80–99)
MONOCYTES # BLD: 1.2 K/UL (ref 0–1)
MONOCYTES NFR BLD: 10 % (ref 5–13)
NEUTS SEG # BLD: 7.8 K/UL (ref 1.8–8)
NEUTS SEG NFR BLD: 61 % (ref 32–75)
NRBC # BLD: 0 K/UL (ref 0–0.01)
NRBC BLD-RTO: 0 PER 100 WBC
PHOSPHATE SERPL-MCNC: 4.4 MG/DL (ref 2.6–4.7)
PLATELET # BLD AUTO: 584 K/UL (ref 150–400)
PMV BLD AUTO: 9.7 FL (ref 8.9–12.9)
POTASSIUM SERPL-SCNC: 3.9 MMOL/L (ref 3.5–5.1)
RBC # BLD AUTO: 3.88 M/UL (ref 3.8–5.2)
SERVICE CMNT-IMP: ABNORMAL
SERVICE CMNT-IMP: ABNORMAL
SERVICE CMNT-IMP: NORMAL
SERVICE CMNT-IMP: NORMAL
SODIUM SERPL-SCNC: 133 MMOL/L (ref 136–145)
WBC # BLD AUTO: 12.9 K/UL (ref 3.6–11)

## 2018-08-15 PROCEDURE — 74011250636 HC RX REV CODE- 250/636: Performed by: SURGERY

## 2018-08-15 PROCEDURE — 74011636637 HC RX REV CODE- 636/637: Performed by: NURSE PRACTITIONER

## 2018-08-15 PROCEDURE — 83735 ASSAY OF MAGNESIUM: CPT | Performed by: SURGERY

## 2018-08-15 PROCEDURE — 80048 BASIC METABOLIC PNL TOTAL CA: CPT | Performed by: SURGERY

## 2018-08-15 PROCEDURE — 82962 GLUCOSE BLOOD TEST: CPT

## 2018-08-15 PROCEDURE — 74011250637 HC RX REV CODE- 250/637: Performed by: NURSE PRACTITIONER

## 2018-08-15 PROCEDURE — 84100 ASSAY OF PHOSPHORUS: CPT | Performed by: SURGERY

## 2018-08-15 PROCEDURE — 65660000000 HC RM CCU STEPDOWN

## 2018-08-15 PROCEDURE — 36415 COLL VENOUS BLD VENIPUNCTURE: CPT | Performed by: SURGERY

## 2018-08-15 PROCEDURE — 74011636320 HC RX REV CODE- 636/320: Performed by: SURGERY

## 2018-08-15 PROCEDURE — 85025 COMPLETE CBC W/AUTO DIFF WBC: CPT | Performed by: SURGERY

## 2018-08-15 PROCEDURE — 74177 CT ABD & PELVIS W/CONTRAST: CPT

## 2018-08-15 PROCEDURE — 74011250636 HC RX REV CODE- 250/636: Performed by: PHYSICIAN ASSISTANT

## 2018-08-15 PROCEDURE — 65270000029 HC RM PRIVATE

## 2018-08-15 PROCEDURE — 74011000258 HC RX REV CODE- 258: Performed by: SURGERY

## 2018-08-15 PROCEDURE — 74011250637 HC RX REV CODE- 250/637: Performed by: SURGERY

## 2018-08-15 PROCEDURE — 74011000250 HC RX REV CODE- 250: Performed by: NURSE PRACTITIONER

## 2018-08-15 PROCEDURE — 74011250636 HC RX REV CODE- 250/636: Performed by: NURSE PRACTITIONER

## 2018-08-15 PROCEDURE — 74011636637 HC RX REV CODE- 636/637: Performed by: PHYSICIAN ASSISTANT

## 2018-08-15 RX ORDER — SODIUM CHLORIDE 0.9 % (FLUSH) 0.9 %
10 SYRINGE (ML) INJECTION
Status: COMPLETED | OUTPATIENT
Start: 2018-08-15 | End: 2018-08-15

## 2018-08-15 RX ADMIN — DIPHENHYDRAMINE HYDROCHLORIDE 25 MG: 50 INJECTION, SOLUTION INTRAMUSCULAR; INTRAVENOUS at 07:08

## 2018-08-15 RX ADMIN — ONDANSETRON 4 MG: 2 INJECTION INTRAMUSCULAR; INTRAVENOUS at 12:08

## 2018-08-15 RX ADMIN — LEVOFLOXACIN 750 MG: 5 INJECTION, SOLUTION INTRAVENOUS at 12:03

## 2018-08-15 RX ADMIN — HYDROMORPHONE HYDROCHLORIDE 6 MG: 2 TABLET ORAL at 15:19

## 2018-08-15 RX ADMIN — HYDROMORPHONE HYDROCHLORIDE 6 MG: 2 TABLET ORAL at 09:30

## 2018-08-15 RX ADMIN — Medication 2 MG: at 13:51

## 2018-08-15 RX ADMIN — INSULIN LISPRO 2 UNITS: 100 INJECTION, SOLUTION INTRAVENOUS; SUBCUTANEOUS at 17:47

## 2018-08-15 RX ADMIN — ALPRAZOLAM 0.25 MG: 0.25 TABLET ORAL at 09:57

## 2018-08-15 RX ADMIN — INSULIN GLARGINE 20 UNITS: 100 INJECTION, SOLUTION SUBCUTANEOUS at 09:31

## 2018-08-15 RX ADMIN — HYDROMORPHONE HYDROCHLORIDE 8 MG: 4 TABLET ORAL at 12:03

## 2018-08-15 RX ADMIN — IOPAMIDOL 100 ML: 755 INJECTION, SOLUTION INTRAVENOUS at 11:10

## 2018-08-15 RX ADMIN — SENNOSIDES AND DOCUSATE SODIUM 2 TABLET: 8.6; 5 TABLET ORAL at 22:28

## 2018-08-15 RX ADMIN — POLYETHYLENE GLYCOL 3350 17 G: 17 POWDER, FOR SOLUTION ORAL at 09:31

## 2018-08-15 RX ADMIN — HYDROMORPHONE HYDROCHLORIDE 8 MG: 4 TABLET ORAL at 22:31

## 2018-08-15 RX ADMIN — HYDROMORPHONE HYDROCHLORIDE 8 MG: 4 TABLET ORAL at 07:15

## 2018-08-15 RX ADMIN — Medication 10 ML: at 07:08

## 2018-08-15 RX ADMIN — HYDROMORPHONE HYDROCHLORIDE 6 MG: 2 TABLET ORAL at 20:18

## 2018-08-15 RX ADMIN — SODIUM CHLORIDE 100 ML: 900 INJECTION, SOLUTION INTRAVENOUS at 11:10

## 2018-08-15 RX ADMIN — DULOXETINE HYDROCHLORIDE 60 MG: 60 CAPSULE, DELAYED RELEASE ORAL at 09:31

## 2018-08-15 RX ADMIN — Medication 10 ML: at 11:10

## 2018-08-15 RX ADMIN — HYDROMORPHONE HYDROCHLORIDE 8 MG: 4 TABLET ORAL at 17:38

## 2018-08-15 NOTE — PROGRESS NOTES
NUTRITION COMPLETE ASSESSMENT    RECOMMENDATIONS:   1. Encourage PO intake with meals    - should be drinking Glucerna if less than 75% meals consumed   - document % of supplements and meals in flowsheet     2. Follow BG, consider re-consult to DTC prior to discharge since new dx of DM      3. Monitor BM with adjustment to bowel regimen PRN  4. Weekly weights on standing scale     Interventions/Plan:   Food/Nutrient Delivery:   (preferences updated) Commercial supplement (Ensure Enlive TID)          Assessment:   Reason for Assessment: [x]LOS [x]NPO/Clear Liquid     Diet: Regular (50g fat)  Supplements: none  Nutritionally Significant Medications: [x] Reviewed & Includes: lantus (20 units), SSI, levaquin, miralax, KCl, pericolace, NS@ 25ml/hr; zofran PRN  Meal Intake:   Patient Vitals for the past 100 hrs:   % Diet Eaten   08/11/18 1704 50 %   08/11/18 1230 50 %   08/11/18 0759 75 %     Current Hospitalization:   Appetite: Fair  PO Ability: Independent Average po intake:50-75%  Average supplements intake: 100% Glucerna TID      Subjective: \"I'm drinking 3 of the Glucerna and at least 1-2 items off my trays. I had 2 small and 1 big BM yesterday so I think things are improving with that. \"     Objective:  Pt admitted for pancreatic abscess. 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 Abscesses continue with abx rx. Constipation improving with bowel regimen. Palliative Care following for pain meds. New dx of DM this admit with A1c of 10.1% with pancreatectomy earlier this year. DTC following with insulin recommendations. Low Na/Cl noted. Tolerating diet but appetite remains fair. Drinking 100% Glucerna TID (660kcal, 30g protein - meets 42% energy and 37% protein needs) and some of trays.  Will change supplements to Ensure Enlive TID for higher protein/energy content (1050kcal, 60g protein - 66% energy, 73% protein needs) since BG has been controlled with insulin and meal intake low. Standing scale wt on 8/2 was 68.2kg. Severe wt loss of 11% x 5 months noted. Last 3 Recorded Weights in this Encounter    08/12/18 0736 08/13/18 1215 08/14/18 1417   Weight: 65 kg (143 lb 4.8 oz) 63.9 kg (140 lb 12.8 oz) 64.9 kg (143 lb)     Will continue to follow for pt PO intake, supplements consumption, wt trends, BM, and BG. Estimated Nutrition Needs:   Kcals/day: 1590 Kcals/day (1590-1725kcal)  Protein: 82 g (82-88g (1.2-1.3g/kg))  Fluid: 2040 ml (30ml/kg)  Based On: [place] Jeor (x 1.2-1.3)  Weight Used: Other (comment) (admit wt 68kg)    Pt expected to meet estimated nutrient needs:  []   Yes     []  No [x] Unable to predict at this time  Nutrition Diagnosis:   1. Inadequate protein-energy intake (improving slowly) related to poor appetite 2/2 altered GI fx as evidenced by pancreatic abcess with abd pain; drinking 3 supplements/day ~ 50% meals  Goals:     Continued consumption of 3 supplements/day and 50% meals; wt maintenance     Monitoring & Evaluation:    - Total energy intake, Protein intake   - Weight/weight change      Previous Nutrition Goals Met:   Yes  Previous Recommendations:    Progressing    Education & Discharge Needs:   [] None Identified   [] Identified and addressed    [x] Participated in care plan, discharge planning, and/or interdisciplinary rounds        Cultural, Protestant and ethnic food preferences identified: None    Skin Integrity: [x]Intact  []Other  Edema: [x]None []Other  Last BM: 8/13  Food Allergies: [x]None []Other  Diet Restrictions: Cultural/Taoism Preference(s): None     Anthropometrics:    Weight Loss Metrics 8/14/2018 7/30/2018 7/30/2018 7/30/2018 7/16/2018 6/27/2018 6/6/2018   Today's Wt 143 lb - 143 lb - 146 lb 149 lb 153 lb   BMI - 25.33 kg/m2 - 25.33 kg/m2 25.86 kg/m2 26.39 kg/m2 27.1 kg/m2      Weight Source: Standing scale (comment)  Height: 5' 3\" (160 cm),    Body mass index is 25.33 kg/(m^2).   IBW : 52.2 kg (115 lb), % IBW (Calculated): 139.57 %  Usual Body Weight: 70.3 kg (155 lb),      Labs:    Lab Results   Component Value Date/Time    Sodium 133 (L) 08/15/2018 03:56 AM    Potassium 3.9 08/15/2018 03:56 AM    Chloride 96 (L) 08/15/2018 03:56 AM    CO2 26 08/15/2018 03:56 AM    Glucose 98 08/15/2018 03:56 AM    BUN 6 08/15/2018 03:56 AM    Creatinine 0.62 08/15/2018 03:56 AM    Calcium 8.8 08/15/2018 03:56 AM    Magnesium 2.1 08/15/2018 03:56 AM    Phosphorus 4.4 08/15/2018 03:56 AM    Albumin 2.8 (L) 07/31/2018 09:45 AM     Lab Results   Component Value Date/Time    Hemoglobin A1c 10.1 (H) 08/01/2018 07:19 PM     Rajwinder Norman RD Pager #[phone]

## 2018-08-15 NOTE — PROGRESS NOTES
Bedside shift change report given to Haresh Bolton (oncoming nurse) by Max Rao (offgoing nurse). Report included the following information SBAR, Kardex and MAR.

## 2018-08-15 NOTE — PROGRESS NOTES
General Surgery Daily Progress Note    Admit Date: 2018  Post-Operative Day: * No surgery found * from * No surgery found *     Subjective:     Last 24 hrs: basal and PCA stopped yesterday. Pain controlled with high dose scheduled dilaudid PO and PRN dilaudid PO for breakthrough pain - managed by palliative. Pt says she has hit a plateau, still feels poorly but hasn;t improved nor worsened in a while. No fevers. Denies sob/light headed dizzy. Off NC O2. Ambulating the hallways. Notes 2 knots on her stomach have returned and a 3rd one noted in her midline area. WBC 12.9, up a bit today. On Levaquin IV. Objective:     Blood pressure 96/62, pulse 75, temperature 98.3 °F (36.8 °C), resp. rate 18, height 5' 3\" (1.6 m), weight 143 lb (64.9 kg), SpO2 93 %. Temp (24hrs), Av.3 °F (36.8 °C), Min:98.1 °F (36.7 °C), Max:98.9 °F (37.2 °C)      _____________________  Physical Exam:     Alert and Oriented, sitting up in bed, in no acute distress. Cardiovascular: RRR, no LE peripheral edema  Lungs:Clear, fine crackles at bases  Abdomen: soft, +BS  2 hard knots - one under I&d in left abd and under spontaneously drained abscess on left side at bra line. One new, deeper mass/knot noted in left side of epigastric area. To the left of midline incision. All with mild TTP. No area of fluctance      Assessment:   Active Problems:    Pancreatic abscess (2018)            Plan:     Cont IV levaquin  Heating pad to abd  OOB and ambulate  Cont eating frequent small meals  Order nutrient supplements  Appreciate Palliative, Vilma Farnsworth NP's guidance on pain control  Keep eye on masses on stomach  May need a repeat CT scan in a few days   Cont IS  Cont DVT and GI proph  Will need detailed instructions/patient education for new orders for home insulin and BS checking by diabetic management.  Will also need reqs for home orders  Further plans as per Dr. Maggie Leal    Data Review:    Recent Labs      08/15/18   0356  18 1357  08/13/18   0508   WBC  12.9*  11.4*  13.0*   HGB  10.1*  10.4*  10.1*   HCT  32.8*  33.4*  32.5*   PLT  584*  595*  555*     Recent Labs      08/15/18   0356  08/14/18   0346  08/13/18   0508   NA  133*  134*  133*   K  3.9  3.9  3.8   CL  96*  97  96*   CO2  26  28  29   GLU  98  105*  180*   BUN  6  5*  4*   CREA  0.62  0.57  0.62   CA  8.8  8.8  9.0   MG  2.1  2.2  2.0   PHOS  4.4  4.6  4.9*     No results for input(s): AML, LPSE in the last 72 hours.         ______________________  Medications:    Current Facility-Administered Medications   Medication Dose Route Frequency    HYDROmorphone (DILAUDID) tablet 6 mg  6 mg Oral QID    HYDROmorphone (DILAUDID) tablet 8 mg  8 mg Oral Q4H PRN    HYDROmorphone (DILAUDID) injection 2 mg  2 mg IntraVENous TID PRN    ALPRAZolam (XANAX) tablet 0.25 mg  0.25 mg Oral DAILY PRN    polyethylene glycol (MIRALAX) packet 17 g  17 g Oral DAILY    senna-docusate (PERICOLACE) 8.6-50 mg per tablet 2 Tab  2 Tab Oral QHS    insulin glargine (LANTUS) injection 20 Units  20 Units SubCUTAneous DAILY    diphenhydrAMINE (BENADRYL) injection 25 mg  25 mg IntraVENous Q6H PRN    insulin lispro (HUMALOG) injection   SubCUTAneous AC&HS    glucose chewable tablet 16 g  4 Tab Oral PRN    dextrose (D50W) injection syrg 12.5-25 g  12.5-25 g IntraVENous PRN    glucagon (GLUCAGEN) injection 1 mg  1 mg IntraMUSCular PRN    levoFLOXacin (LEVAQUIN) 750 mg in D5W IVPB  750 mg IntraVENous Q24H    0.9% sodium chloride infusion  25 mL/hr IntraVENous CONTINUOUS    sodium chloride (NS) flush 5-10 mL  5-10 mL IntraVENous PRN    acetaminophen (TYLENOL) tablet 650 mg  650 mg Oral Q6H PRN    sodium chloride (NS) flush 5-10 mL  5-10 mL IntraVENous Q8H    sodium chloride (NS) flush 5-10 mL  5-10 mL IntraVENous PRN    ondansetron (ZOFRAN) injection 4 mg  4 mg IntraVENous Q4H PRN    DULoxetine (CYMBALTA) capsule 60 mg  60 mg Oral DAILY    nicotine (NICODERM CQ) 21 mg/24 hr patch 1 Patch  1 Patch TransDERmal DAILY       Ivanna Figueroa NP  8/15/2018                    ATTENDING ADDENDUM  I supervised the APC and reviewed the note. We discussed the plan of care  Peripancreatic collection is even smaller today. No drainable abdominal wall collections, but there are some areas of soft tissue thickening along the old drain tract.

## 2018-08-15 NOTE — PROGRESS NOTES
Bedside shift change report given to Sayda (oncoming nurse) by Gerson Andrade (offgoing nurse). Report included the following information SBAR.

## 2018-08-15 NOTE — DIABETES MGMT
Attempted to complete diabetes education but patient was not feeling well and asked me to return tomorrow.    Shannon Mcnamara RD, [address]  Pager: [phone]

## 2018-08-15 NOTE — PROGRESS NOTES
Palliative Medicine Consult  Luis: [phone] ([phone])    Patient Name: Carola Rosario  YOB: 1980    Date of Initial Consult: August 1, 2018  Reason for Consult: Pain Management  Requesting Provider: Pito Ellsworth NP  Primary Care Physician: Roge Max NP     SUMMARY:   Carola Rosario is a 42 y.o. with a past history of anxiety, blurred vision, chest pain, depression, frequent headaches, obesity, pancreatic cyst, shortness of breath, stomch aches and swallowing difficulties  who was admitted on 7/30/2018 from home with a diagnosis of abscess of the abdominal cavity, pancreatic fluid leak now s/p drain placement. Current medical issues leading to Palliative Medicine involvement include: pain management. Interval History:  Bedside I & D 8/5/18  Dilaudid pca started 8/5/18  Drain dislodged 8/7/18  Basal on pca dc'd 8/13/18  PCA dc'd 8/14/18     PALLIATIVE DIAGNOSES:   1. Abdominal Pain  2. Abdominal abscess   3. Drug induced constipation  4. Decreased appetite     PLAN:   1. Pain well controlled on oral regimen. Did not require any iv for refractory s/s overnight. Question of new collections this morning. Pt anxious that there are more abscesses and nurse administered Xanax  2. Dilaudid 2mg iv q 2prn refractory symptoms ordered. Pt used 1 doses in less than 24 hours. Will limit to 3 times daily prn refractory s/s only. Pt to try oral meds for breakthrough  3. Dilaudid 6mg po qid scheduled  4. Dilaudid 8mg po q 4 prn breakthrough. 3 doses/24 hours  5. Constipation:   1. miralax daily  2. pericolace 2 caps/hs. 3. Had a good bowel movement today. 6. Anxiety:  1. Will allow xanax 0.25mg once daily prn anxiety  7. Will continue to follow up  8. Declined AMD completion  9. Initial consult note routed to primary continuity provider  10.  Communicated plan of care with: Palliative IDT       GOALS OF CARE / TREATMENT PREFERENCES:     GOALS OF CARE:  Patient/Health Care Proxy Stated Goals: Prolong life      TREATMENT PREFERENCES:   Code Status: Full Code    Advance Care Planning:  Advance Care Planning 8/1/2018   Patient's Healthcare Decision Maker is: Legal Next of Kin   Primary Decision Maker Name Mary Bess   Primary Decision Maker Phone Number [phone]   Primary Decision Maker Relationship to Patient Spouse   Confirm Advance Directive -   Patient Would Like to Complete Advance Directive -   Does the patient have other document types -       Medical Interventions: Full interventions   Other Instructions: Other:    As far as possible, the palliative care team has discussed with patient / health care proxy about goals of care / treatment preferences for patient. HISTORY:     HPI/SUBJECTIVE:    The patient is:   [x] Verbal and participatory  [] Non-participatory due to:   Responding well to oral regimen. Layed on side and having some discomfort for which she is requesting one dose of iv medication.  Had a good bowel movement this morning    Clinical Pain Assessment (nonverbal scale for severity on nonverbal patients):   Clinical Pain Assessment  Severity: 3  Location: abdominal pain  Character: severe and diffuse  Duration: days  Factors: pca helping  Frequency: constant          Duration: for how long has pt been experiencing pain (e.g., 2 days, 1 month, years)  Frequency: how often pain is an issue (e.g., several times per day, once every few days, constant)     FUNCTIONAL ASSESSMENT:     Palliative Performance Scale (PPS):  PPS: 70       PSYCHOSOCIAL/SPIRITUAL SCREENING:     Palliative IDT has assessed this patient for cultural preferences / practices and a referral made as appropriate to needs (Cultural Services, Patient Advocacy, Ethics, etc.)    Advance Care Planning:  Advance Care Planning 8/1/2018   Patient's Healthcare Decision Maker is: Legal Next of Minesh 67   Primary Decision Maker Name Mary Bess   Primary Decision Maker Phone Number [phone]   Primary Decision Maker Relationship to Patient Spouse   Confirm Advance Directive -   Patient Would Like to Complete Advance Directive -   Does the patient have other document types -       Any spiritual / Mandaen concerns:  [] Yes /  [x] No    Caregiver Burnout:  [] Yes /  [] No /  [x] No Caregiver Present      Anticipatory grief assessment:   [] Normal  / [] Maladaptive       ESAS Anxiety: Anxiety: 1    ESAS Depression: Depression: 0        REVIEW OF SYSTEMS:     Positive and pertinent negative findings in ROS are noted above in HPI. The following systems were [x] reviewed / [] unable to be reviewed as noted in HPI  Other findings are noted below. Systems: constitutional, ears/nose/mouth/throat, respiratory, gastrointestinal, genitourinary, musculoskeletal, integumentary, neurologic, psychiatric, endocrine. Positive findings noted below. Modified ESAS Completed by: provider   Fatigue: 3 Drowsiness: 0   Depression: 0 Pain: 3   Anxiety: 1 Nausea: 0   Anorexia: 4 Dyspnea: 0     Constipation: No     Stool Occurrence(s): 1        PHYSICAL EXAM:     From RN flowsheet:  Wt Readings from Last 3 Encounters:   08/14/18 143 lb (64.9 kg)   07/30/18 143 lb (64.9 kg)   07/16/18 146 lb (66.2 kg)     Blood pressure 90/58, pulse 90, temperature 98.1 °F (36.7 °C), resp. rate 18, height 5' 3\" (1.6 m), weight 143 lb (64.9 kg), SpO2 95 %.     Pain Scale 1: Numeric (0 - 10)  Pain Intensity 1: 5  Pain Onset 1: pta  Pain Location 1: Abdomen  Pain Orientation 1: Left  Pain Description 1: Sharp  Pain Intervention(s) 1: Medication (see MAR)  Last bowel movement, if known:     Constitutional:easily awakened out of a deep sleep, conversant  Eyes: pupils equal, anicteric   ENMT: no nasal discharge, moist mucous membranes  Cardiovascular: regular rhythm, distal pulses intact  Respiratory: breathing not labored, symmetric  Gastrointestinal: soft, palpable mobile nodule left lower quad about the size of a nickel, left side near 4th ICS has palpable nodule as well  Musculoskeletal: no deformity, no tenderness to palpation  Skin: warm, dry  Neurologic: following commands, moving all extremities  Psychiatric: full affect, no hallucinations  Other:        HISTORY:     Active Problems:    Pancreatic abscess (7/30/2018)      Past Medical History:   Diagnosis Date    Abscess of abdominal cavity ([address] 7/30/2018    Anxiety     Blurred vision     Chest pain     Chronic pain     MUSCLE WEAKNESS,PANCREATIC CYST     Depression     Frequent headaches     Muscle weakness     Obesity (BMI 30-39.9) 4/26/2018    Pancreatic cyst 3/21/2018    Shortness of breath     Stomach pain     Swallowing difficulty       Past Surgical History:   Procedure Laterality Date    HX GI      COLONOSCOPY    HX GYN  2005    endometriosis surgery    HX OTHER SURGICAL  04/20/2018    lap distal pancreatectomy converted to open-[facility]-DR. Max Meade      Family History   Problem Relation Age of Onset    Cancer Mother      colon    Cancer Father      skin    Anesth Problems Father      SUCCINYLCHOLINE  REACTION      History reviewed, no pertinent family history.   Social History   Substance Use Topics    Smoking status: Current Every Day Smoker     Packs/day: 1.00     Years: 25.00    Smokeless tobacco: Current User      Comment: STOP SMOKING BOOKLET PROVIDED    Alcohol use No     Allergies   Allergen Reactions    Amoxicillin Shortness of Breath and Rash     Pt has had keflex in the past      Current Facility-Administered Medications   Medication Dose Route Frequency    HYDROmorphone (DILAUDID) tablet 6 mg  6 mg Oral QID    HYDROmorphone (DILAUDID) tablet 8 mg  8 mg Oral Q4H PRN    HYDROmorphone (DILAUDID) injection 2 mg  2 mg IntraVENous TID PRN    ALPRAZolam (XANAX) tablet 0.25 mg  0.25 mg Oral DAILY PRN    polyethylene glycol (MIRALAX) packet 17 g  17 g Oral DAILY    senna-docusate (PERICOLACE) 8.6-50 mg per tablet 2 Tab  2 Tab Oral QHS    insulin glargine (LANTUS) injection 20 Units  20 Units SubCUTAneous DAILY    diphenhydrAMINE (BENADRYL) injection 25 mg  25 mg IntraVENous Q6H PRN    insulin lispro (HUMALOG) injection   SubCUTAneous AC&HS    glucose chewable tablet 16 g  4 Tab Oral PRN    dextrose (D50W) injection syrg 12.5-25 g  12.5-25 g IntraVENous PRN    glucagon (GLUCAGEN) injection 1 mg  1 mg IntraMUSCular PRN    levoFLOXacin (LEVAQUIN) 750 mg in D5W IVPB  750 mg IntraVENous Q24H    0.9% sodium chloride infusion  25 mL/hr IntraVENous CONTINUOUS    sodium chloride (NS) flush 5-10 mL  5-10 mL IntraVENous PRN    acetaminophen (TYLENOL) tablet 650 mg  650 mg Oral Q6H PRN    sodium chloride (NS) flush 5-10 mL  5-10 mL IntraVENous Q8H    sodium chloride (NS) flush 5-10 mL  5-10 mL IntraVENous PRN    ondansetron (ZOFRAN) injection 4 mg  4 mg IntraVENous Q4H PRN    DULoxetine (CYMBALTA) capsule 60 mg  60 mg Oral DAILY    nicotine (NICODERM CQ) 21 mg/24 hr patch 1 Patch  1 Patch TransDERmal DAILY          LAB AND IMAGING FINDINGS:     Lab Results   Component Value Date/Time    WBC 12.9 (H) 08/15/2018 03:56 AM    HGB 10.1 (L) 08/15/2018 03:56 AM    PLATELET 103 (H) 93/92/8268 03:56 AM     Lab Results   Component Value Date/Time    Sodium 133 (L) 08/15/2018 03:56 AM    Potassium 3.9 08/15/2018 03:56 AM    Chloride 96 (L) 08/15/2018 03:56 AM    CO2 26 08/15/2018 03:56 AM    BUN 6 08/15/2018 03:56 AM    Creatinine 0.62 08/15/2018 03:56 AM    Calcium 8.8 08/15/2018 03:56 AM    Magnesium 2.1 08/15/2018 03:56 AM    Phosphorus 4.4 08/15/2018 03:56 AM      Lab Results   Component Value Date/Time    AST (SGOT) 11 (L) 07/31/2018 09:45 AM    Alk.  phosphatase 126 (H) 07/31/2018 09:45 AM    Protein, total 7.8 07/31/2018 09:45 AM    Albumin 2.8 (L) 07/31/2018 09:45 AM    Globulin 5.0 (H) 07/31/2018 09:45 AM     Lab Results   Component Value Date/Time    INR 1.0 02/24/2018 03:01 PM    Prothrombin time 10.0 02/24/2018 03:01 PM    aPTT 39.6 (H) 06/24/2011 11:40 PM      No results found for: IRON, FE, TIBC, IBCT, PSAT, FERR   No results found for: PH, PCO2, PO2  No components found for: [place]   Lab Results   Component Value Date/Time    CK 46 02/24/2018 03:01 PM    CK - MB <1.0 02/24/2018 03:01 PM                Total time: 35 minutes  Counseling / coordination time, spent as noted above: 30 minutes  > 50% counseling / coordination?: y    Prolonged service was provided for  []30 min   []75 min in face to face time in the presence of the patient, spent as noted above. Time Start:   Time End:   Note: this can only be billed with 19007 (initial) or 66747 (follow up). If multiple start / stop times, list each separately.

## 2018-08-15 NOTE — PROGRESS NOTES
Bedside shift change report given to Carla Schroeder RN (oncoming nurse) by Emi Rayo RN (offgoing nurse). Report included the following information SBAR, Kardex, Intake/Output and MAR.

## 2018-08-16 LAB
ANION GAP SERPL CALC-SCNC: 9 MMOL/L (ref 5–15)
BASOPHILS # BLD: 0.1 K/UL (ref 0–0.1)
BASOPHILS NFR BLD: 1 % (ref 0–1)
BUN SERPL-MCNC: 4 MG/DL (ref 6–20)
BUN/CREAT SERPL: 6 (ref 12–20)
CALCIUM SERPL-MCNC: 9.2 MG/DL (ref 8.5–10.1)
CHLORIDE SERPL-SCNC: 96 MMOL/L (ref 97–108)
CO2 SERPL-SCNC: 30 MMOL/L (ref 21–32)
CREAT SERPL-MCNC: 0.64 MG/DL (ref 0.55–1.02)
DIFFERENTIAL METHOD BLD: ABNORMAL
EOSINOPHIL # BLD: 0.8 K/UL (ref 0–0.4)
EOSINOPHIL NFR BLD: 7 % (ref 0–7)
ERYTHROCYTE [DISTWIDTH] IN BLOOD BY AUTOMATED COUNT: 17.3 % (ref 11.5–14.5)
GLUCOSE BLD STRIP.AUTO-MCNC: 101 MG/DL (ref 65–100)
GLUCOSE BLD STRIP.AUTO-MCNC: 108 MG/DL (ref 65–100)
GLUCOSE BLD STRIP.AUTO-MCNC: 142 MG/DL (ref 65–100)
GLUCOSE BLD STRIP.AUTO-MCNC: 205 MG/DL (ref 65–100)
GLUCOSE SERPL-MCNC: 78 MG/DL (ref 65–100)
HCT VFR BLD AUTO: 32.6 % (ref 35–47)
HGB BLD-MCNC: 10.2 G/DL (ref 11.5–16)
IMM GRANULOCYTES # BLD: 0.1 K/UL (ref 0–0.04)
IMM GRANULOCYTES NFR BLD AUTO: 1 % (ref 0–0.5)
LYMPHOCYTES # BLD: 3.2 K/UL (ref 0.8–3.5)
LYMPHOCYTES NFR BLD: 27 % (ref 12–49)
MAGNESIUM SERPL-MCNC: 2.2 MG/DL (ref 1.6–2.4)
MCH RBC QN AUTO: 26.1 PG (ref 26–34)
MCHC RBC AUTO-ENTMCNC: 31.3 G/DL (ref 30–36.5)
MCV RBC AUTO: 83.4 FL (ref 80–99)
MONOCYTES # BLD: 1.2 K/UL (ref 0–1)
MONOCYTES NFR BLD: 10 % (ref 5–13)
NEUTS SEG # BLD: 6.7 K/UL (ref 1.8–8)
NEUTS SEG NFR BLD: 56 % (ref 32–75)
NRBC # BLD: 0 K/UL (ref 0–0.01)
NRBC BLD-RTO: 0 PER 100 WBC
PHOSPHATE SERPL-MCNC: 4.4 MG/DL (ref 2.6–4.7)
PLATELET # BLD AUTO: 592 K/UL (ref 150–400)
PMV BLD AUTO: 9.7 FL (ref 8.9–12.9)
POTASSIUM SERPL-SCNC: 3.9 MMOL/L (ref 3.5–5.1)
RBC # BLD AUTO: 3.91 M/UL (ref 3.8–5.2)
SERVICE CMNT-IMP: ABNORMAL
SODIUM SERPL-SCNC: 135 MMOL/L (ref 136–145)
WBC # BLD AUTO: 12 K/UL (ref 3.6–11)

## 2018-08-16 PROCEDURE — 36415 COLL VENOUS BLD VENIPUNCTURE: CPT | Performed by: SURGERY

## 2018-08-16 PROCEDURE — 85025 COMPLETE CBC W/AUTO DIFF WBC: CPT | Performed by: SURGERY

## 2018-08-16 PROCEDURE — 74011250637 HC RX REV CODE- 250/637: Performed by: SURGERY

## 2018-08-16 PROCEDURE — 83735 ASSAY OF MAGNESIUM: CPT | Performed by: SURGERY

## 2018-08-16 PROCEDURE — 74011250636 HC RX REV CODE- 250/636: Performed by: NURSE PRACTITIONER

## 2018-08-16 PROCEDURE — 74011636637 HC RX REV CODE- 636/637: Performed by: PHYSICIAN ASSISTANT

## 2018-08-16 PROCEDURE — 84100 ASSAY OF PHOSPHORUS: CPT | Performed by: SURGERY

## 2018-08-16 PROCEDURE — 74011000250 HC RX REV CODE- 250: Performed by: NURSE PRACTITIONER

## 2018-08-16 PROCEDURE — 74011250637 HC RX REV CODE- 250/637: Performed by: NURSE PRACTITIONER

## 2018-08-16 PROCEDURE — 74011636637 HC RX REV CODE- 636/637: Performed by: NURSE PRACTITIONER

## 2018-08-16 PROCEDURE — 74011250636 HC RX REV CODE- 250/636: Performed by: PHYSICIAN ASSISTANT

## 2018-08-16 PROCEDURE — 65270000029 HC RM PRIVATE

## 2018-08-16 PROCEDURE — 82962 GLUCOSE BLOOD TEST: CPT

## 2018-08-16 PROCEDURE — 80048 BASIC METABOLIC PNL TOTAL CA: CPT | Performed by: SURGERY

## 2018-08-16 RX ADMIN — HYDROMORPHONE HYDROCHLORIDE 8 MG: 4 TABLET ORAL at 15:42

## 2018-08-16 RX ADMIN — Medication 10 ML: at 23:38

## 2018-08-16 RX ADMIN — Medication 10 ML: at 22:25

## 2018-08-16 RX ADMIN — HYDROMORPHONE HYDROCHLORIDE 6 MG: 2 TABLET ORAL at 19:52

## 2018-08-16 RX ADMIN — DIPHENHYDRAMINE HYDROCHLORIDE 25 MG: 50 INJECTION, SOLUTION INTRAMUSCULAR; INTRAVENOUS at 23:36

## 2018-08-16 RX ADMIN — DULOXETINE HYDROCHLORIDE 60 MG: 60 CAPSULE, DELAYED RELEASE ORAL at 10:15

## 2018-08-16 RX ADMIN — INSULIN LISPRO 2 UNITS: 100 INJECTION, SOLUTION INTRAVENOUS; SUBCUTANEOUS at 12:08

## 2018-08-16 RX ADMIN — ALPRAZOLAM 0.25 MG: 0.25 TABLET ORAL at 23:37

## 2018-08-16 RX ADMIN — HYDROMORPHONE HYDROCHLORIDE 6 MG: 2 TABLET ORAL at 02:29

## 2018-08-16 RX ADMIN — POLYETHYLENE GLYCOL 3350 17 G: 17 POWDER, FOR SOLUTION ORAL at 10:15

## 2018-08-16 RX ADMIN — LEVOFLOXACIN 750 MG: 5 INJECTION, SOLUTION INTRAVENOUS at 11:11

## 2018-08-16 RX ADMIN — SENNOSIDES AND DOCUSATE SODIUM 2 TABLET: 8.6; 5 TABLET ORAL at 22:19

## 2018-08-16 RX ADMIN — HYDROMORPHONE HYDROCHLORIDE 8 MG: 4 TABLET ORAL at 10:14

## 2018-08-16 RX ADMIN — HYDROMORPHONE HYDROCHLORIDE 8 MG: 4 TABLET ORAL at 22:15

## 2018-08-16 RX ADMIN — HYDROMORPHONE HYDROCHLORIDE 8 MG: 4 TABLET ORAL at 05:25

## 2018-08-16 RX ADMIN — SODIUM CHLORIDE 25 ML/HR: 900 INJECTION, SOLUTION INTRAVENOUS at 02:32

## 2018-08-16 RX ADMIN — Medication 2 MG: at 12:09

## 2018-08-16 RX ADMIN — Medication 10 ML: at 14:09

## 2018-08-16 RX ADMIN — HYDROMORPHONE HYDROCHLORIDE 6 MG: 2 TABLET ORAL at 14:07

## 2018-08-16 RX ADMIN — INSULIN GLARGINE 20 UNITS: 100 INJECTION, SOLUTION SUBCUTANEOUS at 10:14

## 2018-08-16 RX ADMIN — INSULIN LISPRO 2 UNITS: 100 INJECTION, SOLUTION INTRAVENOUS; SUBCUTANEOUS at 23:36

## 2018-08-16 RX ADMIN — HYDROMORPHONE HYDROCHLORIDE 6 MG: 2 TABLET ORAL at 08:16

## 2018-08-16 NOTE — PROGRESS NOTES
Bedside shift change report given to Cheng Kate RN (oncoming nurse) by Immanuel Garner RN (offgoing nurse). Report included the following information SBAR, Kardex, Intake/Output and MAR.

## 2018-08-16 NOTE — PROGRESS NOTES
Daily Progress Note  [facility] at [address] Date: 2018    Subjective:     Last 24 hrs: Continues to have discomfort in LUQ and a lot of anxiety about the possibility of new fluid collections developing. She is nervous about potentially going home. Eating well, having bowel function. Continue on levaquin. CT yesterday shows collections are smaller. WBC 12, afebrile. Objective:     Blood pressure 101/68, pulse 79, temperature 98.9 °F (37.2 °C), resp. rate 18, height 5' 3\" (1.6 m), weight 64 kg (141 lb), SpO2 94 %. Temp (24hrs), Av.3 °F (36.8 °C), Min:97.9 °F (36.6 °C), Max:98.9 °F (37.2 °C)      _____________________  Physical Exam:     Alert and Oriented, sitting up in bed, no acute distress. Cardiovascular: RRR, no peripheral edema  Lungs:CTAB   Abdomen: + BS, soft. LUQ with a few areas of thickness that are mildly uncomfortable to palpation. She states, \"Ouch, that tickles. \" No fluctuance or erythema noted. Small scabs x 3 from previous eruptions. Assessment:   Active Problems:    Pancreatic abscess (2018)            Plan:     Continue abx  Appreciate Palliative help with pain management  Possible discharge tomorrow        Bert Montero, ACNP - [address] [facility] at [facility] [address],  [place], [address] [place], [place]  [phone]    Data Review:    Recent Labs      18   0245  08/15/18   0356  18   0346   WBC  12.0*  12.9*  11.4*   HGB  10.2*  10.1*  10.4*   HCT  32.6*  32.8*  33.4*   PLT  592*  584*  595*     Recent Labs      18   0245  08/15/18   0356  18   0346   NA  135*  133*  134*   K  3.9  3.9  3.9   CL  96*  96*  97   CO2  30  26  28   GLU  78  98  105*   BUN  4*  6  5*   CREA  0.64  0.62  0.57   CA  9.2  8.8  8.8   MG  2.2  2.1  2.2   PHOS  4.4  4.4  4.6     No results for input(s): AML, LPSE in the last 72 hours.         ______________________  Medications:    Current Facility-Administered Medications   Medication Dose Route Frequency    HYDROmorphone (DILAUDID) tablet 6 mg  6 mg Oral QID    HYDROmorphone (DILAUDID) tablet 8 mg  8 mg Oral Q4H PRN    HYDROmorphone (DILAUDID) injection 2 mg  2 mg IntraVENous TID PRN    ALPRAZolam (XANAX) tablet 0.25 mg  0.25 mg Oral DAILY PRN    polyethylene glycol (MIRALAX) packet 17 g  17 g Oral DAILY    senna-docusate (PERICOLACE) 8.6-50 mg per tablet 2 Tab  2 Tab Oral QHS    insulin glargine (LANTUS) injection 20 Units  20 Units SubCUTAneous DAILY    diphenhydrAMINE (BENADRYL) injection 25 mg  25 mg IntraVENous Q6H PRN    insulin lispro (HUMALOG) injection   SubCUTAneous AC&HS    glucose chewable tablet 16 g  4 Tab Oral PRN    dextrose (D50W) injection syrg 12.5-25 g  12.5-25 g IntraVENous PRN    glucagon (GLUCAGEN) injection 1 mg  1 mg IntraMUSCular PRN    levoFLOXacin (LEVAQUIN) 750 mg in D5W IVPB  750 mg IntraVENous Q24H    sodium chloride (NS) flush 5-10 mL  5-10 mL IntraVENous PRN    acetaminophen (TYLENOL) tablet 650 mg  650 mg Oral Q6H PRN    sodium chloride (NS) flush 5-10 mL  5-10 mL IntraVENous Q8H    sodium chloride (NS) flush 5-10 mL  5-10 mL IntraVENous PRN    ondansetron (ZOFRAN) injection 4 mg  4 mg IntraVENous Q4H PRN    DULoxetine (CYMBALTA) capsule 60 mg  60 mg Oral DAILY    nicotine (NICODERM CQ) 21 mg/24 hr patch 1 Patch  1 Patch TransDERmal DAILY

## 2018-08-16 NOTE — PROGRESS NOTES
Music Therapy Assessment    Yarely Sims [id number]  [id number]    1980  42 y.o.  female    Patient Telephone Number: [phone] (home)   Mormonism Affiliation: No preference   Language: English   Extended Emergency Contact Information  Primary Emergency Contact: Ashley De Paz  Address: [address] [place] [address], [facility] [address] Phone: [phone]  Mobile Phone: [phone]  Relation: Spouse   Patient Active Problem List    Diagnosis Date Noted    Abscess of abdominal cavity ([address] 07/30/2018    Pancreatic abscess 07/30/2018    Postoperative examination 07/16/2018    Pancreatic fluid leak 07/16/2018    Postoperative fever 05/07/2018    Obesity (BMI 30-39.9) 04/26/2018    Pancreas cyst 04/20/2018    Alcohol intoxication ([address] 04/03/2018    Suicidal ideation 04/03/2018    High anion gap metabolic acidosis 37/29/1243    Elevated lipase 04/03/2018    Alcohol abuse 04/03/2018    Sinus tachycardia 04/03/2018    Pancreatic cyst 03/21/2018    Rash 06/24/2011    Headache(784.0) 06/24/2011    Nausea & vomiting 06/24/2011    Hepatitis 06/24/2011    Thrombocytopenia, unspecified ([address] 06/24/2011    Abdominal pain, unspecified site 06/24/2011    Fever, unspecified 06/24/2011        Date: 8/16/2018       Mental Status:   [x] Alert [  ] Odella Forward [  ]  Confused  [  ] Minimally responsive    Communication Status: [  ] Impaired Speech [  ] Nonverbal -N/A    Physical Status:   [  ] Oxygen in use  [  ] Hard of Hearing [  ] Vision Impaired  [  ] Ambulatory  [  ] Ambulatory with assistance [  ] Non-ambulatory -N/A    Music Preferences, Background: N/A: Please see Session Observations below. Clinical Problem addressed: N/A: Please see Session Observations below.     Goal(s) met in session: N/A: \"                                       \"  Physical/Pain management (Scale of 1-10):    Pre-session rating ___________    Post-session rating __________   [  ] Increased relaxation   [  ] Regulated breathing patterns  [  ] Decreased muscle tension   [  ] Minimized physical distress     Emotional/Psychological:  [  ] Increased self-expression   [  ] Decreased aggressive behavior   [  ] Decreased sadness   [  ] Discussed healthy coping skills     [  ] Improved mood    [  ] Decreased withdrawn behavior     Social:  [  ] Decreased feelings of isolation/loneliness [  ] Positive social interaction   [  ] Provided support and/or comfort for family/friends    Spiritual:  [  ] Spiritual support    [  ] Expressed peace  [  ] Expressed suzie    [  ] Discussed beliefs    Techniques Utilized (Check all that apply): N/A: Please see Session Observations below. [  ] Procedural support MT [  ] Music for relaxation [  ] Patient preferred music  [  ] Dora analysis  [  ] Song choice  [  ] Music for validation  [  ] Entrainment  [  ] Progressive muscle relax. [  ] Guided visualization  [  ] Antoinette Mendoza  [  ] Patient instrument playing [  ] Kwame Hollingsworth writing  [  ] Jyothi Oviedo along   [  ] Mary Alice Baxter  [  ] Sensory stimulation  [  ] Active Listening  [  ] Music for spiritual support [  ] Making of CDs as gifts    Session Observations:  F/u visit; Patient (pt) was lying in bed on her side facing away from the door. This music therapist (MT) reintroduced self. Pt said she's half awake and appeared to be disoriented as she was trying to wake up. MT encouraged pt to rest and MT will follow up at a later time as able.     THADDEUS LanBC (Music Therapist-Board Certified)  Spiritual Care Department  Referral-based service

## 2018-08-16 NOTE — PROGRESS NOTES
Clinical Pharmacy Note: Re: IV to PO Automatic Conversion - Antibiotic    Please note: Contreras Brasher medication Levaquin has been changed from IV to PO based on the following criteria:    The patient:  1. Has received IV therapy for at least 48 hours   2. Has a functioning GI tract  - Taking scheduled oral medications  - Tolerating tube feeds at goal rate or a full liquid, soft, or regular diet         3. Is clinically stable        - Temperature < 100.4F for at least 24 hours        - WBC is trending down    This IV to PO conversion is based on the [employer] approved automatic conversion policy for eligible patients. Please call with questions.

## 2018-08-16 NOTE — PROGRESS NOTES
Palliative Medicine Consult  Luis: [phone] ([phone])    Patient Name: Paige Marks  YOB: 1980    Date of Initial Consult: August 1, 2018  Reason for Consult: Pain Management  Requesting Provider: Rosa Smallwood NP  Primary Care Physician: Promise Paulson NP     SUMMARY:   Paige Marks is a 42 y.o. with a past history of anxiety, blurred vision, chest pain, depression, frequent headaches, obesity, pancreatic cyst, shortness of breath, stomch aches and swallowing difficulties  who was admitted on 7/30/2018 from home with a diagnosis of abscess of the abdominal cavity, pancreatic fluid leak now s/p drain placement. Current medical issues leading to Palliative Medicine involvement include: pain management. Interval History:  Bedside I & D 8/5/18  Dilaudid pca started 8/5/18  Drain dislodged 8/7/18  Basal on pca dc'd 8/13/18  PCA dc'd 8/14/18     PALLIATIVE DIAGNOSES:   1. Abdominal Pain  2. Abdominal abscess   3. Drug induced constipation  4. Decreased appetite     PLAN:   1. Pain overall controlled  2. Dilaudid 2mg iv q 2prn refractory symptoms ordered. Pt used 1 doses in less than 24 hours. Will limit to 3 times daily prn refractory s/s only. Pt to try oral meds for breakthrough and agrees to avoid iv  3. Dilaudid 6mg po qid scheduled  4. Dilaudid 8mg po q 4 prn breakthrough. 4 doses/24 hours  5. Continue current regimen when discharged. Wean based on clinical status. Would start by weaning 6mg tab tid then bid every 72 hours followed by once daily maybe for a week until she is off all together. Would follow the same for the 8mg depending on how much she is requiring. Address anxiety which will help with the wean. She has been doing well with xanax once daily. 6. Constipation:   1. miralax daily  2. pericolace 2 caps/hs. 3. Had a good bowel movement today again    7. Anxiety:  1. Will allow xanax 0.25mg once daily prn anxiety  8. Will continue to follow up.   Noted anticipated dc tomorrow  9. Declined AMD completion  10. Initial consult note routed to primary continuity provider  11. Communicated plan of care with: Palliative IDT       GOALS OF CARE / TREATMENT PREFERENCES:     GOALS OF CARE:  Patient/Health Care Proxy Stated Goals: Prolong life      TREATMENT PREFERENCES:   Code Status: Full Code    Advance Care Planning:  Advance Care Planning 8/1/2018   Patient's Healthcare Decision Maker is: Legal Next of Kin   Primary Decision Maker Name Sayra Bermudez   Primary Decision Maker Phone Number [phone]   Primary Decision Maker Relationship to Patient Spouse   Confirm Advance Directive -   Patient Would Like to Complete Advance Directive -   Does the patient have other document types -       Medical Interventions: Full interventions   Other Instructions: Other:    As far as possible, the palliative care team has discussed with patient / health care proxy about goals of care / treatment preferences for patient. HISTORY:     HPI/SUBJECTIVE:    The patient is:   [x] Verbal and participatory  [] Non-participatory due to:    Says pain meds are working but just doesn't feel well overall    Clinical Pain Assessment (nonverbal scale for severity on nonverbal patients):   Clinical Pain Assessment  Severity: 3  Location: abdominal pain  Character: severe and diffuse  Duration: days  Factors: pca helping  Frequency: constant          Duration: for how long has pt been experiencing pain (e.g., 2 days, 1 month, years)  Frequency: how often pain is an issue (e.g., several times per day, once every few days, constant)     FUNCTIONAL ASSESSMENT:     Palliative Performance Scale (PPS):  PPS: 70       PSYCHOSOCIAL/SPIRITUAL SCREENING:     Palliative IDT has assessed this patient for cultural preferences / practices and a referral made as appropriate to needs (Cultural Services, Patient Advocacy, Ethics, etc.)    Advance Care Planning:  Advance Care Planning 8/1/2018   Patient's Healthcare Decision Maker is: Legal Next of Kin   Primary Decision Maker Name Eleonora Kurtz   Primary Decision Maker Phone Number [phone]   Primary Decision Maker Relationship to Patient Spouse   Confirm Advance Directive -   Patient Would Like to Complete Advance Directive -   Does the patient have other document types -       Any spiritual / Advent concerns:  [] Yes /  [x] No    Caregiver Burnout:  [] Yes /  [] No /  [x] No Caregiver Present      Anticipatory grief assessment:   [] Normal  / [] Maladaptive       ESAS Anxiety: Anxiety: 2    ESAS Depression: Depression: 0        REVIEW OF SYSTEMS:     Positive and pertinent negative findings in ROS are noted above in HPI. The following systems were [x] reviewed / [] unable to be reviewed as noted in HPI  Other findings are noted below. Systems: constitutional, ears/nose/mouth/throat, respiratory, gastrointestinal, genitourinary, musculoskeletal, integumentary, neurologic, psychiatric, endocrine. Positive findings noted below. Modified ESAS Completed by: provider   Fatigue: 3 Drowsiness: 0   Depression: 0 Pain: 3   Anxiety: 2 Nausea: 0   Anorexia: 4 Dyspnea: 0     Constipation: No     Stool Occurrence(s): 1        PHYSICAL EXAM:     From RN flowsheet:  Wt Readings from Last 3 Encounters:   08/15/18 141 lb (64 kg)   07/30/18 143 lb (64.9 kg)   07/16/18 146 lb (66.2 kg)     Blood pressure 101/68, pulse 79, temperature 98.9 °F (37.2 °C), resp. rate 18, height 5' 3\" (1.6 m), weight 141 lb (64 kg), SpO2 94 %.     Pain Scale 1: Numeric (0 - 10)  Pain Intensity 1: 6  Pain Onset 1: pta  Pain Location 1: Abdomen  Pain Orientation 1: Anterior, Posterior  Pain Description 1: Aching, Constant  Pain Intervention(s) 1: Medication (see MAR)  Last bowel movement, if known:     Constitutional:nad, conversant  Eyes: pupils equal, anicteric   ENMT: no nasal discharge, moist mucous membranes  Cardiovascular: regular rhythm, distal pulses intact  Respiratory: breathing not labored, symmetric  Gastrointestinal: soft, palpable mobile nodule left lower quad, left side near 4th ICS has palpable nodule as well, neg distension, tender to palpation   Musculoskeletal: no deformity, no tenderness to palpation  Skin: warm, dry  Neurologic: following commands, moving all extremities  Psychiatric: full affect, no hallucinations  Other:        HISTORY:     Active Problems:    Pancreatic abscess (7/30/2018)      Past Medical History:   Diagnosis Date    Abscess of abdominal cavity ([address] 7/30/2018    Anxiety     Blurred vision     Chest pain     Chronic pain     MUSCLE WEAKNESS,PANCREATIC CYST     Depression     Frequent headaches     Muscle weakness     Obesity (BMI 30-39.9) 4/26/2018    Pancreatic cyst 3/21/2018    Shortness of breath     Stomach pain     Swallowing difficulty       Past Surgical History:   Procedure Laterality Date    HX GI      COLONOSCOPY    HX GYN  2005    endometriosis surgery    HX OTHER SURGICAL  04/20/2018    lap distal pancreatectomy converted to open-[facility]-DR. Farzad Richard      Family History   Problem Relation Age of Onset    Cancer Mother      colon    Cancer Father      skin    Anesth Problems Father      SUCCINYLCHOLINE  REACTION      History reviewed, no pertinent family history.   Social History   Substance Use Topics    Smoking status: Current Every Day Smoker     Packs/day: 1.00     Years: 25.00    Smokeless tobacco: Current User      Comment: STOP SMOKING BOOKLET PROVIDED    Alcohol use No     Allergies   Allergen Reactions    Amoxicillin Shortness of Breath and Rash     Pt has had keflex in the past      Current Facility-Administered Medications   Medication Dose Route Frequency    HYDROmorphone (DILAUDID) tablet 6 mg  6 mg Oral QID    HYDROmorphone (DILAUDID) tablet 8 mg  8 mg Oral Q4H PRN    HYDROmorphone (DILAUDID) injection 2 mg  2 mg IntraVENous TID PRN    ALPRAZolam (XANAX) tablet 0.25 mg  0.25 mg Oral DAILY PRN    polyethylene glycol (MIRALAX) packet 17 g  17 g Oral DAILY    senna-docusate (PERICOLACE) 8.6-50 mg per tablet 2 Tab  2 Tab Oral QHS    insulin glargine (LANTUS) injection 20 Units  20 Units SubCUTAneous DAILY    diphenhydrAMINE (BENADRYL) injection 25 mg  25 mg IntraVENous Q6H PRN    insulin lispro (HUMALOG) injection   SubCUTAneous AC&HS    glucose chewable tablet 16 g  4 Tab Oral PRN    dextrose (D50W) injection syrg 12.5-25 g  12.5-25 g IntraVENous PRN    glucagon (GLUCAGEN) injection 1 mg  1 mg IntraMUSCular PRN    levoFLOXacin (LEVAQUIN) 750 mg in D5W IVPB  750 mg IntraVENous Q24H    sodium chloride (NS) flush 5-10 mL  5-10 mL IntraVENous PRN    acetaminophen (TYLENOL) tablet 650 mg  650 mg Oral Q6H PRN    sodium chloride (NS) flush 5-10 mL  5-10 mL IntraVENous Q8H    sodium chloride (NS) flush 5-10 mL  5-10 mL IntraVENous PRN    ondansetron (ZOFRAN) injection 4 mg  4 mg IntraVENous Q4H PRN    DULoxetine (CYMBALTA) capsule 60 mg  60 mg Oral DAILY    nicotine (NICODERM CQ) 21 mg/24 hr patch 1 Patch  1 Patch TransDERmal DAILY          LAB AND IMAGING FINDINGS:     Lab Results   Component Value Date/Time    WBC 12.0 (H) 08/16/2018 02:45 AM    HGB 10.2 (L) 08/16/2018 02:45 AM    PLATELET 860 (H) 12/91/5776 02:45 AM     Lab Results   Component Value Date/Time    Sodium 135 (L) 08/16/2018 02:45 AM    Potassium 3.9 08/16/2018 02:45 AM    Chloride 96 (L) 08/16/2018 02:45 AM    CO2 30 08/16/2018 02:45 AM    BUN 4 (L) 08/16/2018 02:45 AM    Creatinine 0.64 08/16/2018 02:45 AM    Calcium 9.2 08/16/2018 02:45 AM    Magnesium 2.2 08/16/2018 02:45 AM    Phosphorus 4.4 08/16/2018 02:45 AM      Lab Results   Component Value Date/Time    AST (SGOT) 11 (L) 07/31/2018 09:45 AM    Alk.  phosphatase 126 (H) 07/31/2018 09:45 AM    Protein, total 7.8 07/31/2018 09:45 AM    Albumin 2.8 (L) 07/31/2018 09:45 AM    Globulin 5.0 (H) 07/31/2018 09:45 AM     Lab Results   Component Value Date/Time    INR 1.0 02/24/2018 03:01 PM    Prothrombin time 10.0 02/24/2018 03:01 PM    aPTT 39.6 (H) 06/24/2011 11:40 PM      No results found for: IRON, FE, TIBC, IBCT, PSAT, FERR   No results found for: PH, PCO2, PO2  No components found for: [place]   Lab Results   Component Value Date/Time    CK 46 02/24/2018 03:01 PM    CK - MB <1.0 02/24/2018 03:01 PM                Total time: 35 minutes  Counseling / coordination time, spent as noted above: 30 minutes  > 50% counseling / coordination?: y    Prolonged service was provided for  []30 min   []75 min in face to face time in the presence of the patient, spent as noted above. Time Start:   Time End:   Note: this can only be billed with 21734 (initial) or 08159 (follow up). If multiple start / stop times, list each separately.

## 2018-08-16 NOTE — PROGRESS NOTES
Pt is currently on continuous fluids of normal saline at 25ml/hr. Is this something we can d/c since pt is no long on PCA? Also, pt has been on tele since 7/30.   Is this something that can be d/c'd?

## 2018-08-17 VITALS
HEIGHT: 63 IN | SYSTOLIC BLOOD PRESSURE: 95 MMHG | HEART RATE: 69 BPM | WEIGHT: 141 LBS | OXYGEN SATURATION: 90 % | RESPIRATION RATE: 18 BRPM | BODY MASS INDEX: 24.98 KG/M2 | DIASTOLIC BLOOD PRESSURE: 62 MMHG | TEMPERATURE: 98.3 F

## 2018-08-17 LAB
ANION GAP SERPL CALC-SCNC: 11 MMOL/L (ref 5–15)
BASOPHILS # BLD: 0.1 K/UL (ref 0–0.1)
BASOPHILS NFR BLD: 1 % (ref 0–1)
BUN SERPL-MCNC: 4 MG/DL (ref 6–20)
BUN/CREAT SERPL: 7 (ref 12–20)
CALCIUM SERPL-MCNC: 8.6 MG/DL (ref 8.5–10.1)
CHLORIDE SERPL-SCNC: 98 MMOL/L (ref 97–108)
CO2 SERPL-SCNC: 27 MMOL/L (ref 21–32)
CREAT SERPL-MCNC: 0.6 MG/DL (ref 0.55–1.02)
DIFFERENTIAL METHOD BLD: ABNORMAL
EOSINOPHIL # BLD: 1 K/UL (ref 0–0.4)
EOSINOPHIL NFR BLD: 9 % (ref 0–7)
ERYTHROCYTE [DISTWIDTH] IN BLOOD BY AUTOMATED COUNT: 17.3 % (ref 11.5–14.5)
GLUCOSE BLD STRIP.AUTO-MCNC: 125 MG/DL (ref 65–100)
GLUCOSE BLD STRIP.AUTO-MCNC: 91 MG/DL (ref 65–100)
GLUCOSE SERPL-MCNC: 96 MG/DL (ref 65–100)
HCT VFR BLD AUTO: 32.7 % (ref 35–47)
HGB BLD-MCNC: 10 G/DL (ref 11.5–16)
IMM GRANULOCYTES # BLD: 0.1 K/UL (ref 0–0.04)
IMM GRANULOCYTES NFR BLD AUTO: 1 % (ref 0–0.5)
LYMPHOCYTES # BLD: 3.3 K/UL (ref 0.8–3.5)
LYMPHOCYTES NFR BLD: 31 % (ref 12–49)
MAGNESIUM SERPL-MCNC: 2.1 MG/DL (ref 1.6–2.4)
MCH RBC QN AUTO: 25.6 PG (ref 26–34)
MCHC RBC AUTO-ENTMCNC: 30.6 G/DL (ref 30–36.5)
MCV RBC AUTO: 83.8 FL (ref 80–99)
MONOCYTES # BLD: 1.1 K/UL (ref 0–1)
MONOCYTES NFR BLD: 10 % (ref 5–13)
NEUTS SEG # BLD: 5 K/UL (ref 1.8–8)
NEUTS SEG NFR BLD: 48 % (ref 32–75)
NRBC # BLD: 0 K/UL (ref 0–0.01)
NRBC BLD-RTO: 0 PER 100 WBC
PHOSPHATE SERPL-MCNC: 4.9 MG/DL (ref 2.6–4.7)
PLATELET # BLD AUTO: 628 K/UL (ref 150–400)
PMV BLD AUTO: 9.7 FL (ref 8.9–12.9)
POTASSIUM SERPL-SCNC: 3.9 MMOL/L (ref 3.5–5.1)
RBC # BLD AUTO: 3.9 M/UL (ref 3.8–5.2)
RBC MORPH BLD: ABNORMAL
SERVICE CMNT-IMP: ABNORMAL
SERVICE CMNT-IMP: NORMAL
SODIUM SERPL-SCNC: 136 MMOL/L (ref 136–145)
WBC # BLD AUTO: 10.6 K/UL (ref 3.6–11)
WBC MORPH BLD: ABNORMAL

## 2018-08-17 PROCEDURE — 94760 N-INVAS EAR/PLS OXIMETRY 1: CPT

## 2018-08-17 PROCEDURE — 74011000250 HC RX REV CODE- 250: Performed by: NURSE PRACTITIONER

## 2018-08-17 PROCEDURE — 84100 ASSAY OF PHOSPHORUS: CPT | Performed by: SURGERY

## 2018-08-17 PROCEDURE — 77010033678 HC OXYGEN DAILY

## 2018-08-17 PROCEDURE — 83735 ASSAY OF MAGNESIUM: CPT | Performed by: SURGERY

## 2018-08-17 PROCEDURE — 85025 COMPLETE CBC W/AUTO DIFF WBC: CPT | Performed by: SURGERY

## 2018-08-17 PROCEDURE — 82962 GLUCOSE BLOOD TEST: CPT

## 2018-08-17 PROCEDURE — 36415 COLL VENOUS BLD VENIPUNCTURE: CPT | Performed by: SURGERY

## 2018-08-17 PROCEDURE — 80048 BASIC METABOLIC PNL TOTAL CA: CPT | Performed by: SURGERY

## 2018-08-17 PROCEDURE — 74011636637 HC RX REV CODE- 636/637: Performed by: PHYSICIAN ASSISTANT

## 2018-08-17 PROCEDURE — 74011250637 HC RX REV CODE- 250/637: Performed by: NURSE PRACTITIONER

## 2018-08-17 PROCEDURE — 74011250637 HC RX REV CODE- 250/637: Performed by: SURGERY

## 2018-08-17 RX ORDER — HYDROMORPHONE HYDROCHLORIDE 8 MG/1
8 TABLET ORAL
Qty: 28 TAB | Refills: 0 | Status: SHIPPED | OUTPATIENT
Start: 2018-08-17 | End: 2018-08-24

## 2018-08-17 RX ORDER — HYDROMORPHONE HYDROCHLORIDE 8 MG/1
8 TABLET ORAL EVERY 8 HOURS
Qty: 21 TAB | Refills: 0 | Status: SHIPPED | OUTPATIENT
Start: 2018-08-24 | End: 2018-08-31

## 2018-08-17 RX ORDER — LEVOFLOXACIN 750 MG/1
750 TABLET ORAL EVERY 24 HOURS
Qty: 10 TAB | Refills: 0 | Status: SHIPPED | OUTPATIENT
Start: 2018-08-18 | End: 2018-10-10 | Stop reason: ALTCHOICE

## 2018-08-17 RX ORDER — POLYETHYLENE GLYCOL 3350 17 G/17G
17 POWDER, FOR SOLUTION ORAL DAILY
Qty: 30 EACH | Refills: 1 | Status: SHIPPED | OUTPATIENT
Start: 2018-08-18 | End: 2018-11-09

## 2018-08-17 RX ORDER — AMOXICILLIN 250 MG
2 CAPSULE ORAL
Qty: 60 TAB | Refills: 0 | Status: SHIPPED | OUTPATIENT
Start: 2018-08-17 | End: 2018-09-16

## 2018-08-17 RX ADMIN — HYDROMORPHONE HYDROCHLORIDE 8 MG: 4 TABLET ORAL at 04:54

## 2018-08-17 RX ADMIN — INSULIN GLARGINE 20 UNITS: 100 INJECTION, SOLUTION SUBCUTANEOUS at 08:50

## 2018-08-17 RX ADMIN — LEVOFLOXACIN 750 MG: 500 TABLET, FILM COATED ORAL at 10:59

## 2018-08-17 RX ADMIN — HYDROMORPHONE HYDROCHLORIDE 8 MG: 4 TABLET ORAL at 10:59

## 2018-08-17 RX ADMIN — POLYETHYLENE GLYCOL 3350 17 G: 17 POWDER, FOR SOLUTION ORAL at 08:50

## 2018-08-17 RX ADMIN — DULOXETINE HYDROCHLORIDE 60 MG: 60 CAPSULE, DELAYED RELEASE ORAL at 08:50

## 2018-08-17 RX ADMIN — HYDROMORPHONE HYDROCHLORIDE 6 MG: 2 TABLET ORAL at 02:03

## 2018-08-17 RX ADMIN — HYDROMORPHONE HYDROCHLORIDE 6 MG: 2 TABLET ORAL at 08:06

## 2018-08-17 NOTE — PROGRESS NOTES
Early this morning, went into pt's room and there was a strong smell of cigarette smoke. When asking the pt about the smell, pt denied smoking in the room. Informed pt that if if we continued to smell cigarette smoke, we would have to call security.

## 2018-08-17 NOTE — PROGRESS NOTES
Bedside shift change report given to Purnima Chaidez RN (oncoming nurse) by Alejandro Pacheco RN (offgoing nurse). Report included the following information SBAR, Kardex, Intake/Output and MAR.

## 2018-08-17 NOTE — DIABETES MGMT
DTC follow up Note    Recommendations/ Comments: Chart reviewed for follow up for hospital glucose management and preparation for discharge. Spoke with her nurse regarding her discharge orders - pt will not go home on insulin. Most BG's 80 - 150 with the exception of one result of 205 last night. Patient is a 42 y.o. female with no hx of diabetes noted, ? If new DM diagnosis s/p distal pancreatectomy 04/2018, PMHx includes tobacco abuse, alcohol abuse, dysphagia, hepatitis    She is ready for discharge. Her  is present. She is awake and attentive. Reviewed use of meter with both pt and   Instructed to test 2x/day  Goals for BG and when to contact MD reviewed. Reinforced the need for monitoring to evaluate her progress and need for medication  Also reviewed meal plan - she states her appetite has been \"not great'. Avoid drinks and foods with obvious sweets. F/U with MD in 5 days - take BG results   Informed her of availability of DTC outpatient 1:1 sessions      A1c:   Lab Results   Component Value Date/Time    Hemoglobin A1c 10.1 (H) 08/01/2018 07:19 PM       Recent Glucose Results:   Lab Results   Component Value Date/Time    GLU 96 08/17/2018 04:49 AM    GLUCPOC 91 08/17/2018 12:09 PM    GLUCPOC 125 (H) 08/17/2018 06:47 AM    GLUCPOC 205 (H) 08/16/2018 09:38 PM        Lab Results   Component Value Date/Time    Creatinine 0.60 08/17/2018 04:49 AM     Estimated Creatinine Clearance: 113.3 mL/min (based on Cr of 0.6). Active Orders   Diet    DIET REGULAR 50GM Fat        PO intake:   Patient Vitals for the past 72 hrs:   % Diet Eaten   08/15/18 2316 100 %       Will continue to follow as needed.     Thank you  Marty Mondragon RN, CDE

## 2018-08-17 NOTE — PROGRESS NOTES
Palliative Medicine Consult  Smith: [phone] ([phone])    Patient Name: Kendy Kwon  YOB: 1980    Date of Initial Consult: August 1, 2018  Reason for Consult: Pain Management  Requesting Provider: Bentley Huang NP  Primary Care Physician: Alex Diaz NP     SUMMARY:   Kendy Kwon is a 42 y.o. with a past history of anxiety, blurred vision, chest pain, depression, frequent headaches, obesity, pancreatic cyst, shortness of breath, stomch aches and swallowing difficulties  who was admitted on 7/30/2018 from home with a diagnosis of abscess of the abdominal cavity, pancreatic fluid leak now s/p drain placement. Current medical issues leading to Palliative Medicine involvement include: pain management. Interval History:  Bedside I & D 8/5/18  Dilaudid pca started 8/5/18  Drain dislodged 8/7/18  Basal on pca dc'd 8/13/18  PCA dc'd 8/14/18     PALLIATIVE DIAGNOSES:   1. Abdominal Pain  2. Abdominal abscess   3. Drug induced constipation  4. Decreased appetite     PLAN:   1. Pain overall controlled  2. Dilaudid 2mg iv q 2prn refractory symptoms ordered. Pt used 1 doses in less than 24 hours. 3. Dilaudid 6mg po qid scheduled  Discontinue this upon discharge and change to 8mg. 4. Dilaudid 8mg po qid for discharge instead of for breakthrough. 5. Discharge Recommendations: continue dilaudid 8mg po qid. Stop the 6mg dose. Plan to wean every week. . So after a week on qid then lower to tid x 7 days, then bid x 7 days, then once daily x 7 days then off. 6. Constipation:   1. miralax daily  2. pericolace 2 caps/hs. 3. Bowel movements regular on this regimen    7. Anxiety:  1. Will allow xanax 0.25mg once daily prn anxiety. Up to surgery if they want to continue this. 8. Declined AMD completion  9. Initial consult note routed to primary continuity provider  10.  Communicated plan of care with: Palliative IDT       GOALS OF CARE / TREATMENT PREFERENCES:     GOALS OF CARE:  Patient/Health Care Proxy Stated Goals: Prolong life      TREATMENT PREFERENCES:   Code Status: Full Code    Advance Care Planning:  Advance Care Planning 8/1/2018   Patient's Healthcare Decision Maker is: Legal Next of Kin   Primary Decision Maker Name Ree Rutledge   Primary Decision Maker Phone Number [phone]   Primary Decision Maker Relationship to Patient Spouse   Confirm Advance Directive -   Patient Would Like to Complete Advance Directive -   Does the patient have other document types -       Medical Interventions: Full interventions   Other Instructions: Other:    As far as possible, the palliative care team has discussed with patient / health care proxy about goals of care / treatment preferences for patient. HISTORY:     HPI/SUBJECTIVE:    The patient is:   [x] Verbal and participatory  [] Non-participatory due to: Says pain meds are working but just doesn't feel well overall. Looking forward to going to the beach.   Pain overall controlled    Clinical Pain Assessment (nonverbal scale for severity on nonverbal patients):   Clinical Pain Assessment  Severity: 3  Location: abdominal pain   Character: severe and diffuse  Duration: weeks  Effect: decreased appetite, malaise  Factors: anxiety worsens perception   Frequency: all the time          Duration: for how long has pt been experiencing pain (e.g., 2 days, 1 month, years)  Frequency: how often pain is an issue (e.g., several times per day, once every few days, constant)     FUNCTIONAL ASSESSMENT:     Palliative Performance Scale (PPS):  PPS: 70       PSYCHOSOCIAL/SPIRITUAL SCREENING:     Palliative IDT has assessed this patient for cultural preferences / practices and a referral made as appropriate to needs (Cultural Services, Patient Advocacy, Ethics, etc.)    Advance Care Planning:  Advance Care Planning 8/1/2018   Patient's Healthcare Decision Maker is: Legal Next of Mines[address] 67   Primary Decision Maker Name Ree Rutledge Primary Decision Maker Phone Number [phone]   Primary Decision Maker Relationship to Patient Spouse   Confirm Advance Directive -   Patient Would Like to Complete Advance Directive -   Does the patient have other document types -       Any spiritual / Taoist concerns:  [] Yes /  [x] No    Caregiver Burnout:  [] Yes /  [] No /  [x] No Caregiver Present      Anticipatory grief assessment:   [] Normal  / [] Maladaptive       ESAS Anxiety: Anxiety: 2    ESAS Depression: Depression: 0        REVIEW OF SYSTEMS:     Positive and pertinent negative findings in ROS are noted above in HPI. The following systems were [x] reviewed / [] unable to be reviewed as noted in HPI  Other findings are noted below. Systems: constitutional, ears/nose/mouth/throat, respiratory, gastrointestinal, genitourinary, musculoskeletal, integumentary, neurologic, psychiatric, endocrine. Positive findings noted below. Modified ESAS Completed by: provider   Fatigue: 3 Drowsiness: 0   Depression: 0 Pain: 3   Anxiety: 2 Nausea: 0   Anorexia: 4 Dyspnea: 0     Constipation: No     Stool Occurrence(s): 1        PHYSICAL EXAM:     From RN flowsheet:  Wt Readings from Last 3 Encounters:   08/15/18 141 lb (64 kg)   07/30/18 143 lb (64.9 kg)   07/16/18 146 lb (66.2 kg)     Blood pressure 95/62, pulse 69, temperature 98.3 °F (36.8 °C), resp. rate 18, height 5' 3\" (1.6 m), weight 141 lb (64 kg), SpO2 90 %.     Pain Scale 1: Numeric (0 - 10)  Pain Intensity 1: 6  Pain Onset 1: pta  Pain Location 1: Abdomen  Pain Orientation 1: Upper, Left  Pain Description 1: Aching, Sharp  Pain Intervention(s) 1: Medication (see MAR)  Last bowel movement, if known:     Constitutional:nad, conversant  Eyes: pupils equal, anicteric   ENMT: no nasal discharge, moist mucous membranes  Cardiovascular: regular rhythm, distal pulses intact  Respiratory: breathing not labored, symmetric  Gastrointestinal: soft, palpable mobile nodule left lower quad, left side near 4th ICS has palpable nodule as well, neg distension, tender to palpation   Musculoskeletal: no deformity, no tenderness to palpation  Skin: warm, dry  Neurologic: following commands, moving all extremities  Psychiatric: full affect, no hallucinations  Other:        HISTORY:     Active Problems:    Pancreatic abscess (7/30/2018)      Past Medical History:   Diagnosis Date    Abscess of abdominal cavity ([address] 7/30/2018    Anxiety     Blurred vision     Chest pain     Chronic pain     MUSCLE WEAKNESS,PANCREATIC CYST     Depression     Frequent headaches     Muscle weakness     Obesity (BMI 30-39.9) 4/26/2018    Pancreatic cyst 3/21/2018    Shortness of breath     Stomach pain     Swallowing difficulty       Past Surgical History:   Procedure Laterality Date    HX GI      COLONOSCOPY    HX GYN  2005    endometriosis surgery    HX OTHER SURGICAL  04/20/2018    lap distal pancreatectomy converted to open-[facility]-DR. Cornel Ortiz      Family History   Problem Relation Age of Onset    Cancer Mother      colon    Cancer Father      skin    Anesth Problems Father      SUCCINYLCHOLINE  REACTION      History reviewed, no pertinent family history.   Social History   Substance Use Topics    Smoking status: Current Every Day Smoker     Packs/day: 1.00     Years: 25.00    Smokeless tobacco: Current User      Comment: STOP SMOKING BOOKLET PROVIDED    Alcohol use No     Allergies   Allergen Reactions    Amoxicillin Shortness of Breath and Rash     Pt has had keflex in the past      Current Facility-Administered Medications   Medication Dose Route Frequency    levoFLOXacin (LEVAQUIN) tablet 750 mg  750 mg Oral Q24H    HYDROmorphone (DILAUDID) tablet 6 mg  6 mg Oral QID    HYDROmorphone (DILAUDID) tablet 8 mg  8 mg Oral Q4H PRN    HYDROmorphone (DILAUDID) injection 2 mg  2 mg IntraVENous TID PRN    ALPRAZolam (XANAX) tablet 0.25 mg  0.25 mg Oral DAILY PRN    polyethylene glycol (MIRALAX) packet 17 g  17 g Oral DAILY    senna-docusate (PERICOLACE) 8.6-50 mg per tablet 2 Tab  2 Tab Oral QHS    insulin glargine (LANTUS) injection 20 Units  20 Units SubCUTAneous DAILY    diphenhydrAMINE (BENADRYL) injection 25 mg  25 mg IntraVENous Q6H PRN    insulin lispro (HUMALOG) injection   SubCUTAneous AC&HS    glucose chewable tablet 16 g  4 Tab Oral PRN    dextrose (D50W) injection syrg 12.5-25 g  12.5-25 g IntraVENous PRN    glucagon (GLUCAGEN) injection 1 mg  1 mg IntraMUSCular PRN    sodium chloride (NS) flush 5-10 mL  5-10 mL IntraVENous PRN    acetaminophen (TYLENOL) tablet 650 mg  650 mg Oral Q6H PRN    sodium chloride (NS) flush 5-10 mL  5-10 mL IntraVENous Q8H    sodium chloride (NS) flush 5-10 mL  5-10 mL IntraVENous PRN    ondansetron (ZOFRAN) injection 4 mg  4 mg IntraVENous Q4H PRN    DULoxetine (CYMBALTA) capsule 60 mg  60 mg Oral DAILY    nicotine (NICODERM CQ) 21 mg/24 hr patch 1 Patch  1 Patch TransDERmal DAILY          LAB AND IMAGING FINDINGS:     Lab Results   Component Value Date/Time    WBC 10.6 08/17/2018 04:49 AM    HGB 10.0 (L) 08/17/2018 04:49 AM    PLATELET 521 (H) 53/52/5352 04:49 AM     Lab Results   Component Value Date/Time    Sodium 136 08/17/2018 04:49 AM    Potassium 3.9 08/17/2018 04:49 AM    Chloride 98 08/17/2018 04:49 AM    CO2 27 08/17/2018 04:49 AM    BUN 4 (L) 08/17/2018 04:49 AM    Creatinine 0.60 08/17/2018 04:49 AM    Calcium 8.6 08/17/2018 04:49 AM    Magnesium 2.1 08/17/2018 04:49 AM    Phosphorus 4.9 (H) 08/17/2018 04:49 AM      Lab Results   Component Value Date/Time    AST (SGOT) 11 (L) 07/31/2018 09:45 AM    Alk.  phosphatase 126 (H) 07/31/2018 09:45 AM    Protein, total 7.8 07/31/2018 09:45 AM    Albumin 2.8 (L) 07/31/2018 09:45 AM    Globulin 5.0 (H) 07/31/2018 09:45 AM     Lab Results   Component Value Date/Time    INR 1.0 02/24/2018 03:01 PM    Prothrombin time 10.0 02/24/2018 03:01 PM    aPTT 39.6 (H) 06/24/2011 11:40 PM      No results found for: IRON, FE, TIBC, IBCT, PSAT, FERR   No results found for: PH, PCO2, PO2  No components found for: [place]   Lab Results   Component Value Date/Time    CK 46 02/24/2018 03:01 PM    CK - MB <1.0 02/24/2018 03:01 PM                Total time: 35 minutes  Counseling / coordination time, spent as noted above: 30 minutes  > 50% counseling / coordination?: y    Prolonged service was provided for  []30 min   []75 min in face to face time in the presence of the patient, spent as noted above. Time Start:   Time End:   Note: this can only be billed with 95735 (initial) or 10565 (follow up). If multiple start / stop times, list each separately.

## 2018-08-17 NOTE — DISCHARGE INSTRUCTIONS
[id number] [facility] at [facility]  Discharge Instructions    Patient ID:  Buck Voss  [id number]  1 y.o.  1980    Admit Date: 7/30/2018    Discharge Date: 8/17/2018    Last Procedure: * No surgery found *      Patient Instructions: Activity: No restrictions. No driving while you are taking narcotics. Diet: Regular Diet. If you have cramps or nausea, try eating smaller light meals more frequently. Bowel regimen as prescribed while you are taking narcotics. Medications: You have been provided with two prescriptions for pain control, one for each week after discharge which will cover until you have your follow-up appointment. Week 1: Dilaudid 8 mg every 6 hours  Week 2: Dilaudid 8 mg every 8 hours    Follow-up taper of pain medication will be prescribed at your follow-up appointment according to the recommendations given by Palliative Care. You are being prescribed the antibiotic Levaquin for your infection. Take this daily as prescribed. Wound Care: Avoid standing water (swimming pools, ocean, lakes, hot tubs, etc) while you have scabbed over areas or open wounds on your abdomen. You may take shower. Follow-up with Dr. Gilberto Henderson on Monday, August 27, 2018 at 2:40pm.  Call office at [phone] to schedule appointment. We are located at [address] [place] [address] in the [facility], [address] [place] [address]. Call office and notify provider [phone] if you develop fever >101, or signs of infection at your surgical site including redness, swelling, or drainage that looks like pus or green/brown fluid. What to Expect at [address] are likely to have pain for the next few days. You may also feel like you have the flu, and you may have a low fever and feel tired and nauseated. This is common. You should feel better after a few days and will probably feel much better in 7 days.   For several weeks you may feel twinges or pulling in the surgical area when you move. This is normal.   This care sheet gives you a general idea about how long it will take for you to recover. But each person recovers at a different pace. Follow the steps below to get better as quickly as possible. How can you care for yourself at home? Activity  · Rest when you feel tired. Getting enough sleep will help you recover. Sleep with your head up by using three or four pillows. You can also try to sleep with your head up in a recliner chair. Do not sleep on your side or stomach. · Try to walk each day. Start by walking a little more than you did the day before. Bit by bit, increase the amount you walk. Walking boosts blood flow and helps prevent pneumonia and constipation. · Put ice or a cold pack on the area for 10 to 20 minutes at a time. Try to do this every 1 to 2 hours for the first 24 hours (when you are awake) or until the swelling goes down. Put a thin cloth between the ice and your skin. · Avoid strenuous activities, such as biking, jogging, weight lifting, or aerobic exercise, until your doctor says it is okay. · Avoid lifting anything that would make you strain. This may include heavy grocery bags and milk containers, a heavy briefcase or backpack, cat litter or dog food bags, a vacuum , or a child. · You may drive when you are no longer taking pain medicine and can quickly move your foot from the gas pedal to the brake. You must also be able to sit comfortably for a long period of time, even if you do not plan to go far. You might get caught in traffic. · You may shower 24 to 48 hours after surgery, if your doctor okays it. Pat the cut (incision) dry. Do not take a bath for the first 2 weeks, or until your doctor tells you it is okay. · Your doctor will tell you when you can have sex again. Diet  · You can eat your normal diet. If your stomach is upset, try bland, low-fat foods like plain rice, broiled chicken, toast, and yogurt.   · Drink plenty of fluids (unless your doctor tells you not to). · You may notice that your bowel movements are not regular right after your surgery. This is common. Avoid constipation and straining with bowel movements. You may want to take a fiber supplement every day. If you have not had a bowel movement after a couple of days, ask your doctor about taking a mild laxative. Medicines  · Take pain medicines exactly as directed. ¨ If the doctor gave you a prescription medicine for pain, take it as prescribed. ¨ If you are not taking a prescription pain medicine, take an over-the-counter medicine such as acetaminophen (Tylenol), ibuprofen (Advil, Motrin), or naproxen (Aleve). Read and follow all instructions on the label. ¨ Do not take two or more pain medicines at the same time unless the doctor told you to. Many pain medicines have acetaminophen, which is Tylenol. Too much acetaminophen (Tylenol) can be harmful. · If your doctor prescribed antibiotics, take them as directed. Do not stop taking them just because you feel better. You need to take the full course of antibiotics. · If you think your pain medicine is making you sick to your stomach:  ¨ Take your medicine after meals (unless your doctor has told you not to). ¨ Ask your doctor for a different pain medicine. Incision care  · If you have strips of tape on the cut (incision) the doctor made, leave the tape on for a week or until it falls off. · If you have staples closing the cut, you will need to visit your doctor in 1 to 2 weeks to have them removed. · Wash the area daily with warm, soapy water and pat it dry. Follow-up care is a key part of your treatment and safety. Be sure to make and go to all appointments, and call your doctor if you are having problems. Its also a good idea to know your test results and keep a list of the medicines you take. When should you call for help? Call 911 anytime you think you may need emergency care.  For example, call if:  · You passed out (lost consciousness). · You have sudden chest pain and shortness of breath, or you cough up blood. · You have severe pain in your belly. Call your doctor now or seek immediate medical care if:  · You are sick to your stomach and cannot keep fluids down. · You have signs of a blood clot, such as:  ¨ Pain in your calf, back of the knee, thigh, or groin. ¨ Redness and swelling in your leg or groin. · You have trouble passing urine or stool, especially if you have mild pain or swelling in your lower belly. · Bright red blood has soaked through the bandage over your incision. Watch closely for any changes in your health, and be sure to contact your doctor if:  · Your swelling is getting worse. · Your swelling is not going down. Where can you learn more? Go to [employer]  Enter G375 in the search box to learn more about \"Hernia [employer]: What to Expect at Home. \"   © 8045-1125 [employer]wise, Incorporated. Care instructions adapted under license by [facility] [employer] (which disclaims liability or warranty for this information). This care instruction is for use with your licensed healthcare professional. If you have questions about a medical condition or this instruction, always ask your healthcare professional. [place] [address] any warranty or liability for your use of this information.   Content Version: 6.9.485530; Last Revised: January 21, 2011

## 2018-08-17 NOTE — DISCHARGE SUMMARY
Discharge Summary  [address] [facility] Surgery at [facility]     Patient ID:  Rosenda Atkinson  [id number]  80 y.o.  1980    Admit Date: 7/30/2018    Discharge Date: 8/17/2018    Admission Diagnoses: Pancreatic abscess  Pancreatic abscess    Discharge Diagnoses: Active Problems:    Pancreatic abscess (7/30/2018)         Admission Condition: poor    Discharge Condition: Stable    Last Procedure: * No surgery found [facility] Course:    She is s/p laparoscopic converted to open distal pancreatectomy with Dr. Boston Pelayo on 4/20/18. She presented on 7/30/18 with a \"knot\" under her ribs, pain, and low grade temp. The mass was firm and located near an old drain site. CT demonstrated pancreatic fluid collections, two of which were drained under CT guidance. The one that was eroding through her skin was drained at the bedside. She is being treated with levaquin. Repeat CT on 8/10/18 shows interval improvement in fluid collections. Two of the fluid collectionsresolved since the prior exam of August 3 and two residual collections are smaller. She is tolerating PO and having bowel movements. Pain control has been an on-going challenge during her hospitalization and Palliative Care was consulted to help with this. They have provided recommendations for her outpatient regimen with taper. She will be provided with prescriptions for the first two weeks of her taper, and remaining prescriptions will be written at follow-up appointments. Consults: Palliative    Disposition: home    Patient Instructions:   Current Discharge Medication List      START taking these medications    Details   !! HYDROmorphone (DILAUDID) 8 mg tablet Take 1 Tab by mouth every six (6) hours as needed for up to 7 days. Max Daily Amount: 32 mg. Qty: 28 Tab, Refills: 0    Associated Diagnoses: Pancreatic abscess      levoFLOXacin (LEVAQUIN) 750 mg tablet Take 1 Tab by mouth every twenty-four (24) hours.   Qty: 10 Tab, Refills: 0 Associated Diagnoses: Pancreatic abscess      polyethylene glycol (MIRALAX) 17 gram packet Take 1 Packet by mouth daily. Qty: 30 Each, Refills: 1      !! HYDROmorphone (DILAUDID) 8 mg tablet Take 1 Tab by mouth every eight (8) hours for 7 days. Max Daily Amount: 24 mg. Indications: Severe Pain  Qty: 21 Tab, Refills: 0    Associated Diagnoses: Pancreatic abscess       !! - Potential duplicate medications found. Please discuss with provider. CONTINUE these medications which have CHANGED    Details   senna-docusate (PERICOLACE) 8.6-50 mg per tablet Take 2 Tabs by mouth nightly for 30 days. Qty: 60 Tab, Refills: 0         CONTINUE these medications which have NOT CHANGED    Details   DULoxetine (CYMBALTA) 60 mg capsule Take 1 Cap by mouth daily. Qty: 90 Cap, Refills: 1    Associated Diagnoses: Anxiety and depression      acetaminophen (TYLENOL) 500 mg tablet Take 500 mg by mouth every eight (8) hours as needed for Pain. ondansetron (ZOFRAN ODT) 4 mg disintegrating tablet Take 1 Tab by mouth every eight (8) hours as needed for Nausea. Indications: PREVENTION OF POST-OPERATIVE NAUSEA AND VOMITING  Qty: 30 Tab, Refills: 0      naloxone (NARCAN) 4 mg/actuation nasal spray Use 1 spray intranasally into 1 nostril. Use a new Narcan nasal spray for subsequent doses and administer into alternating nostrils. May repeat every 2 to 3 minutes as needed. Qty: 3 Each, Refills: 0      naloxone 2 mg/actuation spry Use 1 spray intranasally into 1 nostril. Use a new Narcan nasal spray for subsequent doses and administer into alternating nostrils. May repeat every 2 to 3 minutes as needed. Qty: 1 Packet, Refills: 0      ibuprofen (MOTRIN IB) 200 mg tablet Take 800 mg by mouth every eight (8) hours as needed. STOP taking these medications       oxyCODONE IR (ROXICODONE) 10 mg tab immediate release tablet Comments:   Reason for Stopping:                 Activity: No restrictions.   No driving while you are taking narcotics. Diet: Regular Diet. If you have cramps or nausea, try eating smaller light meals more frequently. Bowel regimen as prescribed while you are taking narcotics. Medications: You have been provided with two prescriptions for pain control, one for each week after discharge which will cover until you have your follow-up appointment. Week 1: Dilaudid 8 mg every 6 hours  Week 2: Dilaudid 8 mg every 8 hours    Follow-up taper of pain medication will be prescribed at your follow-up appointment according to the recommendations given by Palliative Care. You are being prescribed the antibiotic Levaquin for your infection. Take this daily as prescribed. Wound Care: Avoid standing water (swimming pools, ocean, lakes, hot tubs, etc) while you have scabbed over areas or open wounds on your abdomen. You may take shower. Follow-up with Dr. Kristi Lombardo on Monday, August 27, 2018 at 2:40pm.  Call office at [phone] to schedule appointment. We are located at [facility] in the [facility], [address] [place] [address]. Call office and notify provider [phone] if you develop fever >101, or signs of infection at your surgical site including redness, swelling, or drainage that looks like pus or green/brown fluid.        Signed:  Mariana Granger NP  8/17/2018  1:08 PM

## 2018-08-17 NOTE — PROGRESS NOTES
Bedside shift change report given to Melvina Riojas and Milton Jesus (oncoming nurse) by Smiley Villar (offgoing nurse). Report included the following information SBAR, Kardex and MAR.

## 2018-08-17 NOTE — PROGRESS NOTES
Daily Progress Note  [facility] at [address] Date: 2018    Subjective:     Last 24 hrs: Still with LUQ pain and feeling \"itchy\" around where the fluid collections have erupted. WBC normalized, T max 99.2. Continues on levaquin. Tolerating PO and + BM. She is tired and sore, but feels she can manage at home with pain medication and abx. Objective:     Blood pressure 95/62, pulse 69, temperature 98.3 °F (36.8 °C), resp. rate 18, height 5' 3\" (1.6 m), weight 64 kg (141 lb), SpO2 90 %. Temp (24hrs), Av.6 °F (37 °C), Min:97.9 °F (36.6 °C), Max:99.2 °F (37.3 °C)      _____________________  Physical Exam:     Alert and Oriented, sitting up in bed, no acute distress. Cardiovascular: RRR, no peripheral edema  Lungs:CTAB   Abdomen: soft, mild tenderness LUQ. Small (1 cm) palpable lump adjacent to LUQ scab. Assessment:   Active Problems:    Pancreatic abscess (2018)            Plan:     Plan for discharge today  Will ask Palliative to give recs for home pain regimen  Rx for levaquin  F/U with Dr. Sissy Downs on Monday, Aug 27 at 2:40pm        [facility], [address] [facility] Surgery at [address], [address] [place] [address]  [phone]    Data Review:    Recent Labs      18   0245  08/15/18   0356   WBC  10.6  12.0*  12.9*   HGB  10.0*  10.2*  10.1*   HCT  32.7*  32.6*  32.8*   PLT  628*  592*  584*     Recent Labs      18   0245  08/15/18   0356   NA  136  135*  133*   K  3.9  3.9  3.9   CL  98  96*  96*   CO2  27  30  26   GLU  96  78  98   BUN  4*  4*  6   CREA  0.60  0.64  0.62   CA  8.6  9.2  8.8   MG  2.1  2.2  2.1   PHOS  4.9*  4.4  4.4     No results for input(s): AML, LPSE in the last 72 hours.         ______________________  Medications:    Current Facility-Administered Medications   Medication Dose Route Frequency    levoFLOXacin (LEVAQUIN) tablet 750 mg  750 mg Oral Q24H    HYDROmorphone (DILAUDID) tablet 6 mg  6 mg Oral QID    HYDROmorphone (DILAUDID) tablet 8 mg  8 mg Oral Q4H PRN    HYDROmorphone (DILAUDID) injection 2 mg  2 mg IntraVENous TID PRN    ALPRAZolam (XANAX) tablet 0.25 mg  0.25 mg Oral DAILY PRN    polyethylene glycol (MIRALAX) packet 17 g  17 g Oral DAILY    senna-docusate (PERICOLACE) 8.6-50 mg per tablet 2 Tab  2 Tab Oral QHS    insulin glargine (LANTUS) injection 20 Units  20 Units SubCUTAneous DAILY    diphenhydrAMINE (BENADRYL) injection 25 mg  25 mg IntraVENous Q6H PRN    insulin lispro (HUMALOG) injection   SubCUTAneous AC&HS    glucose chewable tablet 16 g  4 Tab Oral PRN    dextrose (D50W) injection syrg 12.5-25 g  12.5-25 g IntraVENous PRN    glucagon (GLUCAGEN) injection 1 mg  1 mg IntraMUSCular PRN    sodium chloride (NS) flush 5-10 mL  5-10 mL IntraVENous PRN    acetaminophen (TYLENOL) tablet 650 mg  650 mg Oral Q6H PRN    sodium chloride (NS) flush 5-10 mL  5-10 mL IntraVENous Q8H    sodium chloride (NS) flush 5-10 mL  5-10 mL IntraVENous PRN    ondansetron (ZOFRAN) injection 4 mg  4 mg IntraVENous Q4H PRN    DULoxetine (CYMBALTA) capsule 60 mg  60 mg Oral DAILY    nicotine (NICODERM CQ) 21 mg/24 hr patch 1 Patch  1 Patch TransDERmal DAILY

## 2018-08-29 ENCOUNTER — OFFICE VISIT (OUTPATIENT)
Dept: SURGERY | Age: 38
End: 2018-08-29

## 2018-08-29 VITALS
RESPIRATION RATE: 16 BRPM | TEMPERATURE: 98.1 F | OXYGEN SATURATION: 97 % | HEIGHT: 63 IN | HEART RATE: 96 BPM | SYSTOLIC BLOOD PRESSURE: 110 MMHG | WEIGHT: 132 LBS | BODY MASS INDEX: 23.39 KG/M2 | DIASTOLIC BLOOD PRESSURE: 60 MMHG

## 2018-08-29 DIAGNOSIS — Z09 POSTOPERATIVE EXAMINATION: Primary | ICD-10-CM

## 2018-08-29 DIAGNOSIS — R10.12 LEFT UPPER QUADRANT PAIN: ICD-10-CM

## 2018-08-29 RX ORDER — OXYCODONE HYDROCHLORIDE 10 MG/1
10 TABLET ORAL
Qty: 60 TAB | Refills: 0 | Status: SHIPPED | OUTPATIENT
Start: 2018-08-29 | End: 2018-11-09

## 2018-08-29 RX ORDER — OXYCODONE HYDROCHLORIDE 10 MG/1
TABLET ORAL
COMMUNITY
End: 2018-08-29 | Stop reason: SDUPTHER

## 2018-08-29 NOTE — PROGRESS NOTES
1. Have you been to the ER, urgent care clinic since your last visit? Hospitalized since your last visit? No    2. Have you seen or consulted any other health care providers outside of the [address] [place] [address] since your last visit? Include any pap smears or colon screening. No

## 2018-08-29 NOTE — PROGRESS NOTES
Post-Op Visit    Subjective:       Kendy Kwon is a 34 y.o.  female  s/p LAPAROSCOPIC CONVERTED TO OPEN DISTAL PANCREATECTOMY from 04/20/2018 who presents for post-op care. She had issues with a pancreatic abscess for which she was admitted to [facility] for observation on 07/30/2018. She continues to have abdominal pain but now has substernal chest pain with exertion and deep breaths, especially when leaning forward. She has had low grade fevers at home and her appetite has diminished. She has lost ten pounds since her last visit. The dilaudid was ineffective for pain management so she took oxycodone which seems to be much better for pain control.      Chief Complaint   Patient presents with    Surgical Follow-up       Patient Active Problem List    Diagnosis Date Noted    Abscess of abdominal cavity ([address] 07/30/2018    Pancreatic abscess 07/30/2018    Postoperative examination 07/16/2018    Pancreatic fluid leak 07/16/2018    Postoperative fever 05/07/2018    Obesity (BMI 30-39.9) 04/26/2018    Pancreas cyst 04/20/2018    Alcohol intoxication ([address] 04/03/2018    Suicidal ideation 04/03/2018    High anion gap metabolic acidosis 44/81/1271    Elevated lipase 04/03/2018    Alcohol abuse 04/03/2018    Sinus tachycardia 04/03/2018    Pancreatic cyst 03/21/2018    Rash 06/24/2011    Headache(784.0) 06/24/2011    Nausea & vomiting 06/24/2011    Hepatitis 06/24/2011    Thrombocytopenia, unspecified ([address] 06/24/2011    Abdominal pain, unspecified site 06/24/2011    Fever, unspecified 06/24/2011     Past Medical History:   Diagnosis Date    Abscess of abdominal cavity (HCC) 7/30/2018    Anxiety     Blurred vision     Chest pain     Chronic pain     MUSCLE WEAKNESS,PANCREATIC CYST     Depression     Frequent headaches     Muscle weakness     Obesity (BMI 30-39.9) 4/26/2018    Pancreatic cyst 3/21/2018    Shortness of breath     Stomach pain     Swallowing difficulty       Past Surgical History:   Procedure Laterality Date    HX GI      COLONOSCOPY    HX GYN  2005    endometriosis surgery    HX OTHER SURGICAL  04/20/2018    lap distal pancreatectomy converted to open-[facility]-DR. Grisel Arana      Social History   Substance Use Topics    Smoking status: Current Every Day Smoker     Packs/day: 1.00     Years: 25.00    Smokeless tobacco: Current User      Comment: STOP SMOKING BOOKLET PROVIDED    Alcohol use No      Family History   Problem Relation Age of Onset    Cancer Mother      colon    Cancer Father      skin    Anesth Problems Father      SUCCINYLCHOLINE  REACTION      Prior to Admission medications    Medication Sig Start Date End Date Taking? Authorizing Provider   oxyCODONE IR (ROXICODONE) 10 mg tab immediate release tablet Take  by mouth. Yes Historical Provider   senna-docusate (PERICOLACE) 8.6-50 mg per tablet Take 2 Tabs by mouth nightly for 30 days. 8/17/18 9/16/18 Yes Zach Agudelo NP   HYDROmorphone (DILAUDID) 8 mg tablet Take 1 Tab by mouth every eight (8) hours for 7 days. Max Daily Amount: 24 mg. Indications: Severe Pain 8/24/18 8/31/18 Yes Zach Agudelo NP   DULoxetine (CYMBALTA) 60 mg capsule Take 1 Cap by mouth daily. 5/4/18  Yes Rachel Rangel NP   acetaminophen (TYLENOL) 500 mg tablet Take 500 mg by mouth every eight (8) hours as needed for Pain. Yes Historical Provider   ibuprofen (MOTRIN IB) 200 mg tablet Take 800 mg by mouth every eight (8) hours as needed. Yes Historical Provider   levoFLOXacin (LEVAQUIN) 750 mg tablet Take 1 Tab by mouth every twenty-four (24) hours. 8/18/18   Penluz marina Agudelo NP   polyethylene glycol [place]) 17 gram packet Take 1 Packet by mouth daily. 8/18/18   Zach Agudelo NP   ondansetron (ZOFRAN ODT) 4 mg disintegrating tablet Take 1 Tab by mouth every eight (8) hours as needed for Nausea.  Indications: PREVENTION OF POST-OPERATIVE NAUSEA AND VOMITING 4/30/18   Anival Fulton, FLY   naloxone [facility]) 4 mg/actuation nasal spray Use 1 spray intranasally into 1 nostril. Use a new Narcan nasal spray for subsequent doses and administer into alternating nostrils. May repeat every 2 to 3 minutes as needed. 4/30/18   Neli Suárez NP   naloxone 2 mg/actuation spry Use 1 spray intranasally into 1 nostril. Use a new Narcan nasal spray for subsequent doses and administer into alternating nostrils. May repeat every 2 to 3 minutes as needed. 4/19/18   Sandralee Canavan, MD     Allergies   Allergen Reactions    Amoxicillin Shortness of Breath and Rash     Pt has had keflex in the past         Review of Systems:    A comprehensive review of systems was negative except for that written in the History of Present Illness. Objective:     Visit Vitals    /60 (BP 1 Location: Left arm, BP Patient Position: Sitting)    Pulse 96    Temp 98.1 °F (36.7 °C) (Oral)    Resp 16    Ht 5' 3\" (1.6 m)    Wt 132 lb (59.9 kg)    SpO2 97%    BMI 23.38 kg/m2       Physical Exam:  General appearance: alert, cooperative, no distress, appears stated age  Head: Normocephalic, without obvious abnormality, atraumatic  Neurologic: Grossly normal  Lungs: Clear to ausculation. Abdomen: Unremarkable right now. IMPRESSION: New chest pain and continued abdominal pain. Assessment:       ICD-10-CM ICD-9-CM    1. Postoperative examination Z09 V67.00        Plan: Will do a CT of the chest and abdomen to reassess everything. Written by michael Rodriguez, as dictated by Chino Downs MD.    Total face to face time with patient: 20 minutes. Greater than 50% of the time was spent in counseling. Signed By: Chino Downs MD    August 29, 2018

## 2018-08-29 NOTE — MR AVS SNAPSHOT
[address] [facility] [address] [id number] [phone] Patient: Paige Marks MRN: [id number] CWL:[birth year]55 Visit Information Date & Time Provider Department Dept. Phone Encounter #  
 8/29/2018 10:40 AM Earnest Morales, [address] [phone] [id number] Upcoming Health Maintenance Date Due Pneumococcal 19-64 Highest Risk (1 of 3 - PCV13) 8/23/1997 DTaP/Tdap/Td series (1 - Tdap) 8/23/1999 PAP AKA CERVICAL CYTOLOGY 8/23/1999 Influenza Age 5 to Adult 8/1/2018 Allergies as of 8/29/2018  Review Complete On: 8/29/2018 By: Renzo Rater, JES Severity Noted Reaction Type Reactions Amoxicillin  06/24/2011    Shortness of Breath, Rash Pt has had keflex in the past  
  
Current Immunizations  Reviewed on 4/25/2018 No immunizations on file. Not reviewed this visit You Were Diagnosed With   
  
 Codes Comments Postoperative examination    -  Primary ICD-10-CM: U57 ICD-9-CM: V67.00 Left upper quadrant pain     ICD-10-CM: R10.12 ICD-9-CM: 789.02 Vitals BP Pulse Temp Resp Height(growth percentile) Weight(growth percentile) 110/60 (BP 1 Location: Left arm, BP Patient Position: Sitting) 96 98.1 °F (36.7 °C) (Oral) 16 5' 3\" (1.6 m) 132 lb (59.9 kg) SpO2 BMI OB Status Smoking Status 97% 23.38 kg/m2 Having regular periods Current Every Day Smoker BMI and BSA Data Body Mass Index Body Surface Area  
 23.38 kg/m 2 1.63 m 2 Preferred Pharmacy Pharmacy Name Phone Nirav Monte [address], [address] AT [facility] OF  [facility] [phone] Your Updated Medication List  
  
   
This list is accurate as of 8/29/18 11:34 AM.  Always use your most recent med list.  
  
  
  
  
 acetaminophen 500 mg tablet Commonly known as:  TYLENOL  
 Take 500 mg by mouth every eight (8) hours as needed for Pain. DULoxetine 60 mg capsule Commonly known as:  CYMBALTA Take 1 Cap by mouth daily. HYDROmorphone 8 mg tablet Commonly known as:  DILAUDID Take 1 Tab by mouth every eight (8) hours for 7 days. Max Daily Amount: 24 mg. Indications: Severe Pain  
  
 levoFLOXacin 750 mg tablet Commonly known as:  Celina Pih Take 1 Tab by mouth every twenty-four (24) hours. MOTRIN  mg tablet Generic drug:  ibuprofen Take 800 mg by mouth every eight (8) hours as needed. * naloxone 2 mg/actuation Spry Use 1 spray intranasally into 1 nostril. Use a new Narcan nasal spray for subsequent doses and administer into alternating nostrils. May repeat every 2 to 3 minutes as needed. * naloxone 4 mg/actuation nasal spray Commonly known as:  [employer] Use 1 spray intranasally into 1 nostril. Use a new Narcan nasal spray for subsequent doses and administer into alternating nostrils. May repeat every 2 to 3 minutes as needed. ondansetron 4 mg disintegrating tablet Commonly known as:  ZOFRAN ODT Take 1 Tab by mouth every eight (8) hours as needed for Nausea. Indications: PREVENTION OF POST-OPERATIVE NAUSEA AND VOMITING  
  
 oxyCODONE IR 10 mg Tab immediate release tablet Commonly known as:  Naldo Charlesack Take 1 Tab by mouth every six (6) hours as needed. Max Daily Amount: 40 mg.  
  
 polyethylene glycol 17 gram packet Commonly known as:  Kimmie Villa Take 1 Packet by mouth daily. senna-docusate 8.6-50 mg per tablet Commonly known as:  Harrison Punt Take 2 Tabs by mouth nightly for 30 days. * Notice: This list has 2 medication(s) that are the same as other medications prescribed for you. Read the directions carefully, and ask your doctor or other care provider to review them with you. Prescriptions Printed  Refills  
 oxyCODONE IR (ROXICODONE) 10 mg tab immediate release tablet 0  
 Sig: Take 1 Tab by mouth every six (6) hours as needed. Max Daily Amount: 40 mg.  
 Class: Print Route: Oral  
  
To-Do List   
 08/30/2018 Imaging:  CT CHEST W CONT AND ABD W WO CONT Introducing [facility] & [facility] SERVICES! Dear Vina Cockayne: 
Thank you for requesting a [employer] account. Our records indicate that you already have an active [employer] account. You can access your account anytime at [email or web address]. [email or web address] Did you know that you can access your hospital and ER discharge instructions at any time in [employer]? You can also review all of your test results from your hospital stay or ER visit. Additional Information If you have questions, please visit the Frequently Asked Questions section of the [employer] website at [email or web address] Remember, [employer] is NOT to be used for urgent needs. For medical emergencies, dial 911. Now available from your iPhone and Android! Please provide this summary of care documentation to your next provider. Your primary care clinician is listed as Shahid Pina. If you have any questions after today's visit, please call [phone].

## 2018-09-04 DIAGNOSIS — K85.90 PANCREATIC ABSCESS: Primary | ICD-10-CM

## 2018-09-05 ENCOUNTER — TELEPHONE (OUTPATIENT)
Dept: SURGERY | Age: 38
End: 2018-09-05

## 2018-09-05 DIAGNOSIS — R07.81 PLEURITIC CHEST PAIN: Primary | ICD-10-CM

## 2018-09-06 ENCOUNTER — HOSPITAL ENCOUNTER (OUTPATIENT)
Dept: CT IMAGING | Age: 38
Discharge: HOME | End: 2018-09-06
Payer: COMMERCIAL

## 2018-09-06 DIAGNOSIS — R07.81 PLEURITIC CHEST PAIN: ICD-10-CM

## 2018-09-06 PROCEDURE — 74011636320 HC RX REV CODE- 636/320: Performed by: SURGERY

## 2018-09-06 PROCEDURE — 71260 CT THORAX DX C+: CPT

## 2018-09-06 PROCEDURE — 74011000258 HC RX REV CODE- 258: Performed by: SURGERY

## 2018-09-06 PROCEDURE — 74177 CT ABD & PELVIS W/CONTRAST: CPT

## 2018-09-06 RX ORDER — SODIUM CHLORIDE 0.9 % (FLUSH) 0.9 %
10 SYRINGE (ML) INJECTION
Status: COMPLETED | OUTPATIENT
Start: 2018-09-06 | End: 2018-09-06

## 2018-09-06 RX ADMIN — IOPAMIDOL 100 ML: 755 INJECTION, SOLUTION INTRAVENOUS at 14:07

## 2018-09-06 RX ADMIN — SODIUM CHLORIDE 100 ML: 900 INJECTION, SOLUTION INTRAVENOUS at 14:07

## 2018-09-06 RX ADMIN — Medication 10 ML: at 14:07

## 2018-09-06 RX ADMIN — IOHEXOL 50 ML: 240 INJECTION, SOLUTION INTRATHECAL; INTRAVASCULAR; INTRAVENOUS; ORAL at 14:08

## 2018-09-10 ENCOUNTER — TELEPHONE (OUTPATIENT)
Dept: SURGERY | Age: 38
End: 2018-09-10

## 2018-09-10 NOTE — TELEPHONE ENCOUNTER
Told her the CT showed what looks like complete resolution of the abscesses and that there is no fluid in the pleural spaces either.   We will wok on tapering her off the narcotics

## 2018-09-12 ENCOUNTER — OFFICE VISIT (OUTPATIENT)
Dept: SURGERY | Age: 38
End: 2018-09-12

## 2018-09-12 VITALS
HEIGHT: 63 IN | BODY MASS INDEX: 22.5 KG/M2 | OXYGEN SATURATION: 98 % | HEART RATE: 104 BPM | DIASTOLIC BLOOD PRESSURE: 64 MMHG | RESPIRATION RATE: 18 BRPM | SYSTOLIC BLOOD PRESSURE: 110 MMHG | WEIGHT: 127 LBS | TEMPERATURE: 99.7 F

## 2018-09-12 DIAGNOSIS — G89.18 POST-OP PAIN: Primary | ICD-10-CM

## 2018-09-12 RX ORDER — HYDROMORPHONE HYDROCHLORIDE 8 MG/1
8 TABLET ORAL
Qty: 50 TAB | Refills: 0 | Status: SHIPPED | OUTPATIENT
Start: 2018-09-12 | End: 2018-11-09

## 2018-09-12 RX ORDER — HYDROMORPHONE HYDROCHLORIDE 8 MG/1
8 TABLET ORAL
COMMUNITY
End: 2018-09-12 | Stop reason: SDUPTHER

## 2018-09-12 NOTE — MR AVS SNAPSHOT
[address] [phone] 
[phone] Patient: Buck Voss MRN: [id number] AIX:[birth year] Visit Information Date & Time Provider Department Dept. Phone Encounter #  
 9/12/2018 11:40 AM Allison Gonzalez [address] [phone] [id number] Your Appointments 9/26/2018  1:40 PM  
POST OP 10 MIN with Allison Gonzalez MD  
57 [facility] Appt Note: PO 2 week f/u  
 [address] [facility] [address]  
[address] [place] [address] Upcoming Health Maintenance Date Due Pneumococcal 19-64 Highest Risk (1 of 3 - PCV13) 8/23/1997 DTaP/Tdap/Td series (1 - Tdap) 8/23/1999 PAP AKA CERVICAL CYTOLOGY 8/23/1999 Influenza Age 5 to Adult 8/1/2018 Allergies as of 9/12/2018  Review Complete On: 9/12/2018 By: Allison Gonzalez MD  
  
 Severity Noted Reaction Type Reactions Amoxicillin  06/24/2011    Shortness of Breath, Rash Pt has had keflex in the past  
  
Current Immunizations  Reviewed on 4/25/2018 No immunizations on file. Not reviewed this visit You Were Diagnosed With   
  
 Codes Comments Post-op pain    -  Primary ICD-10-CM: G89.18 
ICD-9-CM: 338.18 Vitals BP Pulse Temp Resp Height(growth percentile) Weight(growth percentile) 110/64 (BP 1 Location: Right arm, BP Patient Position: Sitting) (!) 104 99.7 °F (37.6 °C) (Oral) 18 5' 3\" (1.6 m) 127 lb (57.6 kg) SpO2 BMI OB Status Smoking Status 98% 22.5 kg/m2 Having regular periods Current Every Day Smoker BMI and BSA Data Body Mass Index Body Surface Area  
 22.5 kg/m 2 1.6 m 2 Preferred Pharmacy Pharmacy Name Phone Nirav Monte [address] AT [facility] OF  [facility] [phone] Your Updated Medication List  
  
   
This list is accurate as of 9/12/18  3:22 PM.  Always use your most recent med list.  
  
  
  
  
 acetaminophen 500 mg tablet Commonly known as:  TYLENOL Take 500 mg by mouth every eight (8) hours as needed for Pain. DULoxetine 60 mg capsule Commonly known as:  CYMBALTA Take 1 Cap by mouth daily. HYDROmorphone 8 mg tablet Commonly known as:  DILAUDID Take 1 Tab by mouth every eight (8) hours as needed for Pain. Max Daily Amount: 24 mg.  
  
 levoFLOXacin 750 mg tablet Commonly known as:  [place] Armor Take 1 Tab by mouth every twenty-four (24) hours. MOTRIN  mg tablet Generic drug:  ibuprofen Take 800 mg by mouth every eight (8) hours as needed. * naloxone 2 mg/actuation Spry Use 1 spray intranasally into 1 nostril. Use a new Narcan nasal spray for subsequent doses and administer into alternating nostrils. May repeat every 2 to 3 minutes as needed. * naloxone 4 mg/actuation nasal spray Commonly known as:  [employer] Use 1 spray intranasally into 1 nostril. Use a new Narcan nasal spray for subsequent doses and administer into alternating nostrils. May repeat every 2 to 3 minutes as needed. ondansetron 4 mg disintegrating tablet Commonly known as:  ZOFRAN ODT Take 1 Tab by mouth every eight (8) hours as needed for Nausea. Indications: PREVENTION OF POST-OPERATIVE NAUSEA AND VOMITING  
  
 oxyCODONE IR 10 mg Tab immediate release tablet Commonly known as:  Danne Copper Take 1 Tab by mouth every six (6) hours as needed. Max Daily Amount: 40 mg.  
  
 polyethylene glycol 17 gram packet Commonly known as:  Deliliah Hefty Take 1 Packet by mouth daily. senna-docusate 8.6-50 mg per tablet Commonly known as:  Bessy Bernal Take 2 Tabs by mouth nightly for 30 days. * Notice: This list has 2 medication(s) that are the same as other medications prescribed for you.  Read the directions carefully, and ask your doctor or other care provider to review them with you. Prescriptions Printed Refills HYDROmorphone (DILAUDID) 8 mg tablet 0 Sig: Take 1 Tab by mouth every eight (8) hours as needed for Pain. Max Daily Amount: 24 mg. Class: Print Route: Oral  
  
Introducing [address]! Dear Lulu Prom: 
Thank you for requesting a [employer] account. Our records indicate that you already have an active [employer] account. You can access your account anytime at [email or web address] Did you know that you can access your hospital and ER discharge instructions at any time in [employer]? You can also review all of your test results from your hospital stay or ER visit. Additional Information If you have questions, please visit the Frequently Asked Questions section of the [employer] website at [email or web address] Remember, [employer] is NOT to be used for urgent needs. For medical emergencies, dial 911. Now available from your iPhone and Android! Please provide this summary of care documentation to your next provider. Your primary care clinician is listed as Delta Reyes. If you have any questions after today's visit, please call [phone].

## 2018-09-12 NOTE — PROGRESS NOTES
1. Have you been to the ER, urgent care clinic since your last visit? Hospitalized since your last visit? No    2. Have you seen or consulted any other health care providers outside of the [address] [place] [address] since your last visit? Include any pap smears or colon screening. No     Patient states she lost her oxyCodone IR 10 mg tablets pills and the pharmacy told her it was too soon to refill them. Patient states she had some Dilaudid 8mg tablets that had stopped working and she started taking them and they work for her pain now.

## 2018-09-12 NOTE — PROGRESS NOTES
Post-Op Visit    Subjective:           Zoë Casper is a 34 y.o.  female  s/p LAPAROSCOPIC CONVERTED TO OPEN DISTAL PANCREATECTOMY from 04/20/2018 who presents for post-op care. Alex Hennessy was complicated by several abscesses and fluid collectios requiring accessory drains being placed. Latest CT shows resoution of all collections. She continues to hav epain, however, which will need to be dealt with by tapering off her narcotics. No chief complaint on file. Patient Active Problem List    Diagnosis Date Noted    Abscess of abdominal cavity ([address] 07/30/2018    Pancreatic abscess 07/30/2018    Postoperative examination 07/16/2018    Pancreatic fluid leak 07/16/2018    Postoperative fever 05/07/2018    Obesity (BMI 30-39.9) 04/26/2018    Pancreas cyst 04/20/2018    Alcohol intoxication ([address] 04/03/2018    Suicidal ideation 04/03/2018    High anion gap metabolic acidosis 46/98/6641    Elevated lipase 04/03/2018    Alcohol abuse 04/03/2018    Sinus tachycardia 04/03/2018    Pancreatic cyst 03/21/2018    Rash 06/24/2011    Headache(784.0) 06/24/2011    Nausea & vomiting 06/24/2011    Hepatitis 06/24/2011    Thrombocytopenia, unspecified ([address] 06/24/2011    Abdominal pain, unspecified site 06/24/2011    Fever, unspecified 06/24/2011     Past Medical History:   Diagnosis Date    Abscess of abdominal cavity ([address] 7/30/2018    Anxiety     Blurred vision     Chest pain     Chronic pain     MUSCLE WEAKNESS,PANCREATIC CYST     Depression     Frequent headaches     Muscle weakness     Obesity (BMI 30-39.9) 4/26/2018    Pancreatic cyst 3/21/2018    Shortness of breath     Stomach pain     Swallowing difficulty       Past Surgical History:   Procedure Laterality Date    HX GI      COLONOSCOPY    HX GYN  2005    endometriosis surgery    HX OTHER SURGICAL  04/20/2018    lap distal pancreatectomy converted to open-[facility]-DR. Trey Tobar      Social History   Substance Use Topics    Smoking status: Current Every Day Smoker     Packs/day: 1.00     Years: 25.00    Smokeless tobacco: Current User      Comment: STOP SMOKING BOOKLET PROVIDED    Alcohol use No      Family History   Problem Relation Age of Onset    Cancer Mother      colon    Cancer Father      skin    Anesth Problems Father      SUCCINYLCHOLINE  REACTION      Prior to Admission medications    Medication Sig Start Date End Date Taking? Authorizing Provider   oxyCODONE IR (ROXICODONE) 10 mg tab immediate release tablet Take 1 Tab by mouth every six (6) hours as needed. Max Daily Amount: 40 mg. 8/29/18   Erickson No MD   senna-docusate (PERICOLACE) 8.6-50 mg per tablet Take 2 Tabs by mouth nightly for 30 days. 8/17/18 9/16/18  Jayde Beverly NP   levoFLOXacin (LEVAQUIN) 750 mg tablet Take 1 Tab by mouth every twenty-four (24) hours. 8/18/18   Jayde Beverly NP   polyethylene glycol [place] [facility]) 17 gram packet Take 1 Packet by mouth daily. 8/18/18   Jayde Beverly NP   DULoxetine (CYMBALTA) 60 mg capsule Take 1 Cap by mouth daily. 5/4/18   Radha Hernández NP   ondansetron (ZOFRAN ODT) 4 mg disintegrating tablet Take 1 Tab by mouth every eight (8) hours as needed for Nausea. Indications: PREVENTION OF POST-OPERATIVE NAUSEA AND VOMITING 4/30/18   Mata Schwab NP   naloxone [facility]) 4 mg/actuation nasal spray Use 1 spray intranasally into 1 nostril. Use a new Narcan nasal spray for subsequent doses and administer into alternating nostrils. May repeat every 2 to 3 minutes as needed. 4/30/18   Mata Schwab NP   naloxone 2 mg/actuation spry Use 1 spray intranasally into 1 nostril. Use a new Narcan nasal spray for subsequent doses and administer into alternating nostrils. May repeat every 2 to 3 minutes as needed. 4/19/18   Onesimo Lord MD   acetaminophen (TYLENOL) 500 mg tablet Take 500 mg by mouth every eight (8) hours as needed for Pain.     Historical Provider   ibuprofen (MOTRIN IB) 200 mg tablet Take 800 mg by mouth every eight (8) hours as needed. Historical Provider     Allergies   Allergen Reactions    Amoxicillin Shortness of Breath and Rash     Pt has had keflex in the past         Review of Systems:    A comprehensive review of systems was negative except for that written in the History of Present Illness. Objective: There were no vitals taken for this visit. Physical Exam:  Visit Vitals    /64 (BP 1 Location: Right arm, BP Patient Position: Sitting)    Pulse (!) 104    Temp 99.7 °F (37.6 °C) (Oral)    Resp 18    Ht 5' 3\" (1.6 m)    Wt 127 lb (57.6 kg)    SpO2 98%    BMI 22.5 kg/m2     General appearance: alert, cooperative, no distress, appears stated age  Abdomen: soft, non-tender. Bowel sounds normal. No masses,  no organomegaly        Assessment:     Recovering nicely, finally, from pancreatic resection      Plan: Will start tapering her off narcotics, starting with Dilaudid 8 mg every 8 hours for 2 weeks, then going to 4 mg every 8 hours after that,    Written by michael Eduardo, as dictated by Franc Isaacs. Gilberto Henderson MD.        Signed By: Carlo Henderson MD    September 12, 2018

## 2018-09-13 ENCOUNTER — TELEPHONE (OUTPATIENT)
Dept: FAMILY MEDICINE CLINIC | Age: 38
End: 2018-09-13

## 2018-09-13 RX ORDER — VALACYCLOVIR HYDROCHLORIDE 500 MG/1
500 TABLET, FILM COATED ORAL 2 TIMES DAILY
Qty: 10 TAB | Refills: 0 | Status: SHIPPED | OUTPATIENT
Start: 2018-09-13 | End: 2018-09-18

## 2018-09-13 NOTE — TELEPHONE ENCOUNTER
Patient has a bad herpes outbreak & can hardly walk. Can you call in valtrex 1000 grams twice a day for 10 days. We do not have any openings & you have seen her for this before. She is miserable.  [facility] on file Let her know [phone]

## 2018-09-26 ENCOUNTER — OFFICE VISIT (OUTPATIENT)
Dept: SURGERY | Age: 38
End: 2018-09-26

## 2018-09-26 VITALS
OXYGEN SATURATION: 96 % | WEIGHT: 132 LBS | HEART RATE: 87 BPM | DIASTOLIC BLOOD PRESSURE: 68 MMHG | TEMPERATURE: 98 F | BODY MASS INDEX: 23.39 KG/M2 | HEIGHT: 63 IN | RESPIRATION RATE: 16 BRPM | SYSTOLIC BLOOD PRESSURE: 104 MMHG

## 2018-09-26 DIAGNOSIS — Z09 POSTOPERATIVE EXAMINATION: Primary | ICD-10-CM

## 2018-09-26 DIAGNOSIS — G89.18 POST-OP PAIN: ICD-10-CM

## 2018-09-26 RX ORDER — DEXTROAMPHETAMINE SACCHARATE, AMPHETAMINE ASPARTATE, DEXTROAMPHETAMINE SULFATE AND AMPHETAMINE SULFATE 7.5; 7.5; 7.5; 7.5 MG/1; MG/1; MG/1; MG/1
TABLET ORAL
COMMUNITY
Start: 2018-09-20

## 2018-09-26 RX ORDER — CLONAZEPAM 0.5 MG/1
TABLET ORAL AS NEEDED
COMMUNITY
Start: 2018-08-17 | End: 2019-05-25

## 2018-09-26 RX ORDER — ALPRAZOLAM 1 MG/1
TABLET ORAL
COMMUNITY
Start: 2018-09-20

## 2018-09-26 RX ORDER — HYDROMORPHONE HYDROCHLORIDE 2 MG/1
TABLET ORAL
Qty: 45 TAB | Refills: 0 | Status: SHIPPED | OUTPATIENT
Start: 2018-09-26 | End: 2018-11-09

## 2018-09-26 RX ORDER — HYDROMORPHONE HYDROCHLORIDE 4 MG/1
TABLET ORAL
Qty: 45 TAB | Refills: 0 | Status: SHIPPED | OUTPATIENT
Start: 2018-09-26 | End: 2018-10-10 | Stop reason: SDUPTHER

## 2018-09-26 NOTE — PROGRESS NOTES
Post-Op Visit    Subjective:       Maninder Jordan is a 34 y.o.  female  s/p LAPAROSCOPIC CONVERTED TO OPEN DISTAL PANCREATECTOMY from 04/20/2018 who presents for post-op care. The pt continues to do well and is down to 8 mg of dilaudid every 8 hours. She has some pain from time to time but has tolerated the reduction in pain rx. Chief Complaint   Patient presents with    Medication Evaluation     refilling dilaudid-tapering       Patient Active Problem List    Diagnosis Date Noted    Abscess of abdominal cavity ([address] 07/30/2018    Pancreatic abscess 07/30/2018    Postoperative examination 07/16/2018    Pancreatic fluid leak 07/16/2018    Postoperative fever 05/07/2018    Obesity (BMI 30-39.9) 04/26/2018    Pancreas cyst 04/20/2018    Alcohol intoxication ([address] 04/03/2018    Suicidal ideation 04/03/2018    High anion gap metabolic acidosis 54/15/2194    Elevated lipase 04/03/2018    Alcohol abuse 04/03/2018    Sinus tachycardia 04/03/2018    Pancreatic cyst 03/21/2018    Rash 06/24/2011    Headache(784.0) 06/24/2011    Nausea & vomiting 06/24/2011    Hepatitis 06/24/2011    Thrombocytopenia, unspecified ([address] 06/24/2011    Abdominal pain, unspecified site 06/24/2011    Fever, unspecified 06/24/2011     Past Medical History:   Diagnosis Date    Abscess of abdominal cavity ([address] 7/30/2018    Anxiety     Blurred vision     Chest pain     Chronic pain     MUSCLE WEAKNESS,PANCREATIC CYST     Depression     Frequent headaches     Muscle weakness     Obesity (BMI 30-39.9) 4/26/2018    Pancreatic cyst 3/21/2018    Shortness of breath     Stomach pain     Swallowing difficulty       Past Surgical History:   Procedure Laterality Date    HX GI      COLONOSCOPY    HX GYN  2005    endometriosis surgery    HX OTHER SURGICAL  04/20/2018    lap distal pancreatectomy converted to open-[facility]-DR. Chacha Lobo      Social History   Substance Use Topics    Smoking status: Current Every Day Smoker     Packs/day: 1.00     Years: 25.00    Smokeless tobacco: Current User      Comment: STOP SMOKING BOOKLET PROVIDED    Alcohol use No      Family History   Problem Relation Age of Onset    Cancer Mother      colon    Cancer Father      skin    Anesth Problems Father      SUCCINYLCHOLINE  REACTION      Prior to Admission medications    Medication Sig Start Date End Date Taking? Authorizing Provider   ALPRAZolam Melissa Allis) 1 mg tablet  9/20/18  Yes Historical Provider   dextroamphetamine-amphetamine (ADDERALL) 30 mg tablet  9/20/18  Yes Historical Provider   clonazePAM (KLONOPIN) 0.5 mg tablet  8/17/18  Yes Historical Provider   HYDROmorphone (DILAUDID) 8 mg tablet Take 1 Tab by mouth every eight (8) hours as needed for Pain. Max Daily Amount: 24 mg. 9/12/18  Yes Grisel Rivera MD   DULoxetine (CYMBALTA) 60 mg capsule Take 1 Cap by mouth daily. 5/4/18  Yes Jennifer Bruno NP   acetaminophen (TYLENOL) 500 mg tablet Take 500 mg by mouth every eight (8) hours as needed for Pain. Yes Historical Provider   ibuprofen (MOTRIN IB) 200 mg tablet Take 800 mg by mouth every eight (8) hours as needed. Yes Historical Provider   oxyCODONE IR (ROXICODONE) 10 mg tab immediate release tablet Take 1 Tab by mouth every six (6) hours as needed. Max Daily Amount: 40 mg. 8/29/18   Grisel Rivera MD   levoFLOXacin (LEVAQUIN) 750 mg tablet Take 1 Tab by mouth every twenty-four (24) hours. 8/18/18   Archibald Closs, NP   polyethylene glycol [facility]) 17 gram packet Take 1 Packet by mouth daily. 8/18/18   Archibald Closs, NP   ondansetron (ZOFRAN ODT) 4 mg disintegrating tablet Take 1 Tab by mouth every eight (8) hours as needed for Nausea. Indications: PREVENTION OF POST-OPERATIVE NAUSEA AND VOMITING 4/30/18   Ze Machuca NP   naloxone [facility]) 4 mg/actuation nasal spray Use 1 spray intranasally into 1 nostril. Use a new Narcan nasal spray for subsequent doses and administer into alternating nostrils.  May repeat every 2 to 3 minutes as needed. 4/30/18   Emma Awda NP   naloxone 2 mg/actuation spry Use 1 spray intranasally into 1 nostril. Use a new Narcan nasal spray for subsequent doses and administer into alternating nostrils. May repeat every 2 to 3 minutes as needed. 4/19/18   Rajinder Jorge MD     Allergies   Allergen Reactions    Amoxicillin Shortness of Breath and Rash     Pt has had keflex in the past         Review of Systems:    A comprehensive review of systems was negative except for that written in the History of Present Illness. Objective:     Visit Vitals    /68 (BP 1 Location: Right arm, BP Patient Position: Sitting)    Pulse 87    Temp 98 °F (36.7 °C) (Oral)    Resp 16    Ht 5' 3\" (1.6 m)    Wt 132 lb (59.9 kg)    SpO2 96%    BMI 23.38 kg/m2       Physical Exam:  General appearance: alert, cooperative, no distress, appears stated age  Head: Normocephalic, without obvious abnormality, atraumatic  Neurologic: Grossly normal  Abdomen: soft, no masses, no tenderness, no organomegaly. Assessment:       ICD-10-CM ICD-9-CM    1. Postoperative examination Z09 V67.00        Plan:     I told the pt that we need to continue to taper her narcotics. I also told her I had spoken with Dr Hyun Pompa who is an expert in addiction medicine and she will call his office for an appointment to help with the tapering of her medications. Today we will taper down to 6 mg every 8 hours. Written by michael Purcell, as dictated by Marybel Leo MD.    Total face to face time with patient: 16 minutes. Greater than 50% of the time was spent in counseling. Signed By: Marybel Leo MD    September 26, 2018

## 2018-09-26 NOTE — PROGRESS NOTES
1. Have you been to the ER, urgent care clinic since your last visit? Hospitalized since your last visit? No    2. Have you seen or consulted any other health care providers outside of the [address] [place] [address] since your last visit? Include any pap smears or colon screening.  No

## 2018-09-26 NOTE — MR AVS SNAPSHOT
[address] [facility] [address] [id number] 
[phone] Patient: Inés Morales MRN: [id number] ORH:1/24/0683 Visit Information Date & Time Provider Department Dept. Phone Encounter #  
 9/26/2018  1:40 PM Silvia Del CastilloMasonsil[facility] [address] [phone] [id number] Upcoming Health Maintenance Date Due Pneumococcal 19-64 Highest Risk (1 of 3 - PCV13) 8/23/1997 DTaP/Tdap/Td series (1 - Tdap) 8/23/1999 PAP AKA CERVICAL CYTOLOGY 8/23/1999 Influenza Age 5 to Adult 8/1/2018 Allergies as of 9/26/2018  Review Complete On: 9/26/2018 By: Silvia Del Castillo MD  
  
 Severity Noted Reaction Type Reactions Amoxicillin  06/24/2011    Shortness of Breath, Rash Pt has had keflex in the past  
  
Current Immunizations  Reviewed on 4/25/2018 No immunizations on file. Not reviewed this visit You Were Diagnosed With   
  
 Codes Comments Postoperative examination    -  Primary ICD-10-CM: T90 ICD-9-CM: V67.00 Post-op pain     ICD-10-CM: G89.18 
ICD-9-CM: 338.18 Vitals BP Pulse Temp Resp Height(growth percentile) Weight(growth percentile) 104/68 (BP 1 Location: Right arm, BP Patient Position: Sitting) 87 98 °F (36.7 °C) (Oral) 16 5' 3\" (1.6 m) 132 lb (59.9 kg) SpO2 BMI OB Status Smoking Status 96% 23.38 kg/m2 Having regular periods Current Every Day Smoker BMI and BSA Data Body Mass Index Body Surface Area  
 23.38 kg/m 2 1.63 m 2 Preferred Pharmacy Pharmacy Name Phone [facility] [address] AT [facility]  [facility] [phone] Your Updated Medication List  
  
   
This list is accurate as of 9/26/18  2:24 PM.  Always use your most recent med list.  
  
  
  
  
 acetaminophen 500 mg tablet Commonly known as:  TYLENOL  
 Take 500 mg by mouth every eight (8) hours as needed for Pain. ALPRAZolam 1 mg tablet Commonly known as:  XANAX  
  
 clonazePAM 0.5 mg tablet Commonly known as:  KlonoPIN  
  
 dextroamphetamine-amphetamine 30 mg tablet Commonly known as:  ADDERALL DULoxetine 60 mg capsule Commonly known as:  CYMBALTA Take 1 Cap by mouth daily. * HYDROmorphone 8 mg tablet Commonly known as:  DILAUDID Take 1 Tab by mouth every eight (8) hours as needed for Pain. Max Daily Amount: 24 mg.  
  
 * HYDROmorphone 2 mg tablet Commonly known as:  DILAUDID Take every 8 hours as needed with the 4 mg tablet as well  
  
 * HYDROmorphone 4 mg tablet Commonly known as:  DILAUDID Take one tablet every 8 hours with the 2 mg tablet as well  
  
 levoFLOXacin 750 mg tablet Commonly known as:  Charlcie Nadiya Take 1 Tab by mouth every twenty-four (24) hours. MOTRIN  mg tablet Generic drug:  ibuprofen Take 800 mg by mouth every eight (8) hours as needed. * naloxone 2 mg/actuation Spry Use 1 spray intranasally into 1 nostril. Use a new Narcan nasal spray for subsequent doses and administer into alternating nostrils. May repeat every 2 to 3 minutes as needed. * naloxone 4 mg/actuation nasal spray Commonly known as:  [employer] Use 1 spray intranasally into 1 nostril. Use a new Narcan nasal spray for subsequent doses and administer into alternating nostrils. May repeat every 2 to 3 minutes as needed. ondansetron 4 mg disintegrating tablet Commonly known as:  ZOFRAN ODT Take 1 Tab by mouth every eight (8) hours as needed for Nausea. Indications: PREVENTION OF POST-OPERATIVE NAUSEA AND VOMITING  
  
 oxyCODONE IR 10 mg Tab immediate release tablet Commonly known as:  Khadijah Low Take 1 Tab by mouth every six (6) hours as needed. Max Daily Amount: 40 mg.  
  
 polyethylene glycol 17 gram packet Commonly known as:  Tyron Huey Take 1 Packet by mouth daily. * Notice: This list has 5 medication(s) that are the same as other medications prescribed for you. Read the directions carefully, and ask your doctor or other care provider to review them with you. Prescriptions Printed Refills HYDROmorphone (DILAUDID) 2 mg tablet 0 Sig: Take every 8 hours as needed with the 4 mg tablet as well Class: Print HYDROmorphone (DILAUDID) 4 mg tablet 0 Sig: Take one tablet every 8 hours with the 2 mg tablet as well Class: Print Introducing [facility] SERVICES! Dear Aden Arguelles: 
Thank you for requesting a [employer] account. Our records indicate that you already have an active [employer] account. You can access your account anytime at [email or web address] Did you know that you can access your hospital and ER discharge instructions at any time in [employer] You can also review all of your test results from your hospital stay or ER visit. Additional Information If you have questions, please visit the Frequently Asked Questions section of the [employer] website at [email or web address] Remember, [employer] is NOT to be used for urgent needs. For medical emergencies, dial 911. Now available from your iPhone and Android! Please provide this summary of care documentation to your next provider. Your primary care clinician is listed as Aleksey Atkinson. If you have any questions after today's visit, please call [phone].

## 2018-10-10 ENCOUNTER — OFFICE VISIT (OUTPATIENT)
Dept: SURGERY | Age: 38
End: 2018-10-10

## 2018-10-10 VITALS
TEMPERATURE: 98.8 F | HEIGHT: 63 IN | HEART RATE: 70 BPM | RESPIRATION RATE: 16 BRPM | OXYGEN SATURATION: 97 % | BODY MASS INDEX: 23.74 KG/M2 | SYSTOLIC BLOOD PRESSURE: 110 MMHG | WEIGHT: 134 LBS | DIASTOLIC BLOOD PRESSURE: 70 MMHG

## 2018-10-10 DIAGNOSIS — K43.2 INCISIONAL HERNIA, WITHOUT OBSTRUCTION OR GANGRENE: Primary | ICD-10-CM

## 2018-10-10 RX ORDER — HYDROMORPHONE HYDROCHLORIDE 4 MG/1
TABLET ORAL
Qty: 70 TAB | Refills: 0 | Status: SHIPPED | OUTPATIENT
Start: 2018-10-10 | End: 2018-11-21

## 2018-10-10 NOTE — PROGRESS NOTES
Continues to improve. Did well on just 6 mg Dilaudid every 8 hours. Will go to 4 mg every 8 hours. She has appointment with Dr. Sekou Lopez on 10/31/2018. She has a small incisional henria at the top of her midline. Will keep an eye on it for now.

## 2018-10-24 ENCOUNTER — TELEPHONE (OUTPATIENT)
Dept: SURGERY | Age: 38
End: 2018-10-24

## 2018-10-24 NOTE — TELEPHONE ENCOUNTER
I returned patients call and she states the incisional hernia seems to be increasing in size and is uncomfortable, not painful. She states it is the size of an orange. she denies any vomiting and states her bowels are fine. She states she has 'weaned way down on the pain medication and is only taking it because Dr. Ernesto Jamison told her she would have withdrawal symptoms if she just stopped'. She said her family, daughter specifically, is very concerned. I suggested she make an appointment to discuss plan of care for hernia and bring her daughter with her. Appointment made for next week and she was instructed if any vomiting or bowels won't move to call back.

## 2018-10-31 ENCOUNTER — OFFICE VISIT (OUTPATIENT)
Dept: SURGERY | Age: 38
End: 2018-10-31

## 2018-10-31 VITALS
HEIGHT: 63 IN | HEART RATE: 84 BPM | TEMPERATURE: 99.1 F | RESPIRATION RATE: 16 BRPM | OXYGEN SATURATION: 98 % | WEIGHT: 131 LBS | SYSTOLIC BLOOD PRESSURE: 110 MMHG | BODY MASS INDEX: 23.21 KG/M2 | DIASTOLIC BLOOD PRESSURE: 80 MMHG

## 2018-10-31 DIAGNOSIS — K43.2 INCISIONAL HERNIA, WITHOUT OBSTRUCTION OR GANGRENE: Primary | ICD-10-CM

## 2018-10-31 RX ORDER — FLUOXETINE HYDROCHLORIDE 20 MG/1
80 CAPSULE ORAL DAILY
COMMUNITY

## 2018-10-31 NOTE — PROGRESS NOTES
1. Have you been to the ER, urgent care clinic since your last visit? Hospitalized since your last visit? No    2. Have you seen or consulted any other health care providers outside of the [address] [place] [address] since your last visit? Include any pap smears or colon screening.  No

## 2018-11-04 PROBLEM — K43.2 INCISIONAL HERNIA, WITHOUT OBSTRUCTION OR GANGRENE: Status: ACTIVE | Noted: 2018-11-04

## 2018-11-04 NOTE — H&P (VIEW-ONLY)
Surgery History and Physical 
 
Subjective:  
  
Srini Garcia is a 34 y.o.  female who presents with an enlarging ventral incisional hernia. She underwent a distal pancreatectomy which was complicated by pancreatic leak and multiple absceses. These have all healed bu she has evidenced an incisional hernia. It is tender but there has been no evidence of incarceration nor any obstructive symptoms. [place] Patient Active Problem List  
 Diagnosis Date Noted  Incisional hernia, without obstruction or gangrene 11/04/2018  Abscess of abdominal cavity ([address] 07/30/2018  Pancreatic abscess 07/30/2018  Postoperative examination 07/16/2018  Pancreatic fluid leak 07/16/2018  Postoperative fever 05/07/2018  Obesity (BMI 30-39.9) 04/26/2018  Pancreas cyst 04/20/2018  Alcohol intoxication ([address] 04/03/2018  Suicidal ideation 04/03/2018  High anion gap metabolic acidosis 24/57/2155  Elevated lipase 04/03/2018  Alcohol abuse 04/03/2018  Sinus tachycardia 04/03/2018  Pancreatic cyst 03/21/2018  Rash 06/24/2011  
 Headache(784.0) 06/24/2011  Nausea & vomiting 06/24/2011  Hepatitis 06/24/2011  Thrombocytopenia, unspecified ([address] 06/24/2011  Abdominal pain, unspecified site 06/24/2011  Fever, unspecified 06/24/2011 Past Medical History:  
Diagnosis Date  Abscess of abdominal cavity ([address] 7/30/2018  Anxiety  Blurred vision  Chest pain  Chronic pain MUSCLE WEAKNESS,PANCREATIC CYST  Depression  Frequent headaches  Incisional hernia, without obstruction or gangrene 11/4/2018  Muscle weakness  Obesity (BMI 30-39.9) 4/26/2018  Pancreatic cyst 3/21/2018  Shortness of breath  Stomach pain  Swallowing difficulty Past Surgical History:  
Procedure Laterality Date  HX GI    
 COLONOSCOPY  
 HX GYN  2005  
 endometriosis surgery  HX OTHER SURGICAL  04/20/2018 lap distal pancreatectomy converted to open-[facility]-DR. Adolph Price Social History Tobacco Use  Smoking status: Current Every Day Smoker Packs/day: 1.00 Years: 25.00 Pack years: 25.00  Smokeless tobacco: Current User  Tobacco comment: STOP SMOKING BOOKLET PROVIDED Substance Use Topics  Alcohol use: No  
  Alcohol/week: 1.8 oz Types: 3 Shots of liquor per week Family History Problem Relation Age of Onset  Cancer Mother   
     colon  Cancer Father   
     skin  Anesth Problems Father SUCCINYLCHOLINE  REACTION Prior to Admission medications Medication Sig Start Date End Date Taking? Authorizing Provider FLUoxetine (PROZAC) 20 mg capsule Take  by mouth daily. Yes Provider, Historical  
HYDROmorphone (DILAUDID) 4 mg tablet Take one tablet every 8 hours 10/10/18  Yes Stefanie Álvarez MD  
ALPRAZolam Orbie Blake) 1 mg tablet  9/20/18  Yes Provider, Historical  
dextroamphetamine-amphetamine (ADDERALL) 30 mg tablet  9/20/18  Yes Provider, Historical  
clonazePAM (KLONOPIN) 0.5 mg tablet  8/17/18  Yes Provider, Historical  
DULoxetine (CYMBALTA) 60 mg capsule Take 1 Cap by mouth daily. Patient taking differently: Take 60 mg by mouth daily. Patient states she takes 30mg daily 5/4/18  Yes Felecia Raygoza NP  
acetaminophen (TYLENOL) 500 mg tablet Take 500 mg by mouth every eight (8) hours as needed for Pain. Yes Provider, Historical  
ibuprofen (MOTRIN IB) 200 mg tablet Take 800 mg by mouth every eight (8) hours as needed. Yes Provider, Historical  
HYDROmorphone (DILAUDID) 2 mg tablet Take every 8 hours as needed with the 4 mg tablet as well 9/26/18   Stefanie Álvarez MD  
HYDROmorphone (DILAUDID) 8 mg tablet Take 1 Tab by mouth every eight (8) hours as needed for Pain.  Max Daily Amount: 24 mg. 9/12/18   Stefanie Álvarez MD  
oxyCODONE IR (ROXICODONE) 10 mg tab immediate release tablet Take 1 Tab by mouth every six (6) hours as needed. Max Daily Amount: 40 mg. 8/29/18   Huong Desouza MD  
polyethylene glycol [facility]) 17 gram packet Take 1 Packet by mouth daily. 8/18/18   Evi Vega, NP  
ondansetron (ZOFRAN ODT) 4 mg disintegrating tablet Take 1 Tab by mouth every eight (8) hours as needed for Nausea. Indications: PREVENTION OF POST-OPERATIVE NAUSEA AND VOMITING 4/30/18   Kira Jaime NP  
naloxone [facility]) 4 mg/actuation nasal spray Use 1 spray intranasally into 1 nostril. Use a new Narcan nasal spray for subsequent doses and administer into alternating nostrils. May repeat every 2 to 3 minutes as needed. 4/30/18   Kira Jaime NP  
naloxone 2 mg/actuation spry Use 1 spray intranasally into 1 nostril. Use a new Narcan nasal spray for subsequent doses and administer into alternating nostrils. May repeat every 2 to 3 minutes as needed. 4/19/18   Devon Sol MD  
 
Allergies Allergen Reactions  Amoxicillin Shortness of Breath and Rash Pt has had keflex in the past  
   
 
Review of Systems: A comprehensive review of systems was negative except for that written in the History of Present Illness. Objective:  
 
@IPVITALS[8:@ 
 
@IW[04@ Physical Exam: 
GENERAL: alert, cooperative, no distress, appears stated age, LYMPHATIC: Cervical, supraclavicular, and axillary nodes normal. , LUNG: clear to auscultation bilaterally, HEART: regular rate and rhythm, S1, S2 normal, no murmur, click, rub or gallop, ABDOMEN: soft, non-tender. Bowel sounds normal. No masses,  no organomegaly, 7 cm x 3 cm defect in the midline incision. Labs: No results found for this or any previous visit (from the past 24 hour(s)). Assessment:  
 
Ventral incisional hernia. Plan:  
 
Repair under anesthesia. Probable 1-3 day staty after. Signed By: Hemal Stoll MD   
 November 4, 2018

## 2018-11-04 NOTE — PROGRESS NOTES
Surgery History and Physical    Subjective:      Yulisa Elizabeth is a 34 y.o.  female who presents with an enlarging ventral incisional hernia. She underwent a distal pancreatectomy which was complicated by pancreatic leak and multiple absceses. These have all healed bu she has evidenced an incisional hernia. It is tender but there has been no evidence of incarceration nor any obstructive symptoms. .    Patient Active Problem List    Diagnosis Date Noted    Incisional hernia, without obstruction or gangrene 11/04/2018    Abscess of abdominal cavity ([address] 07/30/2018    Pancreatic abscess 07/30/2018    Postoperative examination 07/16/2018    Pancreatic fluid leak 07/16/2018    Postoperative fever 05/07/2018    Obesity (BMI 30-39.9) 04/26/2018    Pancreas cyst 04/20/2018    Alcohol intoxication ([address] 04/03/2018    Suicidal ideation 04/03/2018    High anion gap metabolic acidosis 62/10/1460    Elevated lipase 04/03/2018    Alcohol abuse 04/03/2018    Sinus tachycardia 04/03/2018    Pancreatic cyst 03/21/2018    Rash 06/24/2011    Headache(784.0) 06/24/2011    Nausea & vomiting 06/24/2011    Hepatitis 06/24/2011    Thrombocytopenia, unspecified ([address] 06/24/2011    Abdominal pain, unspecified site 06/24/2011    Fever, unspecified 06/24/2011     Past Medical History:   Diagnosis Date    Abscess of abdominal cavity ([address] 7/30/2018    Anxiety     Blurred vision     Chest pain     Chronic pain     MUSCLE WEAKNESS,PANCREATIC CYST     Depression     Frequent headaches     Incisional hernia, without obstruction or gangrene 11/4/2018    Muscle weakness     Obesity (BMI 30-39.9) 4/26/2018    Pancreatic cyst 3/21/2018    Shortness of breath     Stomach pain     Swallowing difficulty       Past Surgical History:   Procedure Laterality Date    HX GI      COLONOSCOPY    HX GYN  2005    endometriosis surgery    HX OTHER SURGICAL  04/20/2018    lap distal pancreatectomy converted to open-[facility]-DR. Greta Mathew      Social History     Tobacco Use    Smoking status: Current Every Day Smoker     Packs/day: 1.00     Years: 25.00     Pack years: 25.00    Smokeless tobacco: Current User    Tobacco comment: STOP SMOKING BOOKLET PROVIDED   Substance Use Topics    Alcohol use: No     Alcohol/week: 1.8 oz     Types: 3 Shots of liquor per week      Family History   Problem Relation Age of Onset    Cancer Mother         colon    Cancer Father         skin    Anesth Problems Father         SUCCINYLCHOLINE  REACTION      Prior to Admission medications    Medication Sig Start Date End Date Taking? Authorizing Provider   FLUoxetine (PROZAC) 20 mg capsule Take  by mouth daily. Yes Provider, Historical   HYDROmorphone (DILAUDID) 4 mg tablet Take one tablet every 8 hours 10/10/18  Yes Refugio Luis MD   ALPRAZolam Dorotha Riches) 1 mg tablet  9/20/18  Yes Provider, Historical   dextroamphetamine-amphetamine (ADDERALL) 30 mg tablet  9/20/18  Yes Provider, Historical   clonazePAM (KLONOPIN) 0.5 mg tablet  8/17/18  Yes Provider, Historical   DULoxetine (CYMBALTA) 60 mg capsule Take 1 Cap by mouth daily. Patient taking differently: Take 60 mg by mouth daily. Patient states she takes 30mg daily 5/4/18  Yes Kristen Matias NP   acetaminophen (TYLENOL) 500 mg tablet Take 500 mg by mouth every eight (8) hours as needed for Pain. Yes Provider, Historical   ibuprofen (MOTRIN IB) 200 mg tablet Take 800 mg by mouth every eight (8) hours as needed. Yes Provider, Historical   HYDROmorphone (DILAUDID) 2 mg tablet Take every 8 hours as needed with the 4 mg tablet as well 9/26/18   Refugio Luis MD   HYDROmorphone (DILAUDID) 8 mg tablet Take 1 Tab by mouth every eight (8) hours as needed for Pain. Max Daily Amount: 24 mg. 9/12/18   Refugio Luis MD   oxyCODONE IR (ROXICODONE) 10 mg tab immediate release tablet Take 1 Tab by mouth every six (6) hours as needed.  Max Daily Amount: 40 mg. 8/29/18   Refugio Luis MD polyethylene glycol (MIRALAX) 17 gram packet Take 1 Packet by mouth daily. 8/18/18   Winston Land NP   ondansetron (ZOFRAN ODT) 4 mg disintegrating tablet Take 1 Tab by mouth every eight (8) hours as needed for Nausea. Indications: PREVENTION OF POST-OPERATIVE NAUSEA AND VOMITING 4/30/18   Rodgers Bernheim, NP   naloxone [facility]) 4 mg/actuation nasal spray Use 1 spray intranasally into 1 nostril. Use a new Narcan nasal spray for subsequent doses and administer into alternating nostrils. May repeat every 2 to 3 minutes as needed. 4/30/18   Rodgers Bernheim, NP   naloxone 2 mg/actuation spry Use 1 spray intranasally into 1 nostril. Use a new Narcan nasal spray for subsequent doses and administer into alternating nostrils. May repeat every 2 to 3 minutes as needed. 4/19/18   Denise Tee MD     Allergies   Allergen Reactions    Amoxicillin Shortness of Breath and Rash     Pt has had keflex in the past         Review of Systems:    A comprehensive review of systems was negative except for that written in the History of Present Illness. Objective:     @[facility][8:@    [id number]    Physical Exam:  GENERAL: alert, cooperative, no distress, appears stated age, LYMPHATIC: Cervical, supraclavicular, and axillary nodes normal. , LUNG: clear to auscultation bilaterally, HEART: regular rate and rhythm, S1, S2 normal, no murmur, click, rub or gallop, ABDOMEN: soft, non-tender. Bowel sounds normal. No masses,  no organomegaly, 7 cm x 3 cm defect in the midline incision. Labs: No results found for this or any previous visit (from the past 24 hour(s)). Assessment:     Ventral incisional hernia. Plan:     Repair under anesthesia. Probable 1-3 day staty after.     Signed By: Edwin Ruiz MD     November 4, 2018

## 2018-11-09 ENCOUNTER — HOSPITAL ENCOUNTER (OUTPATIENT)
Dept: PREADMISSION TESTING | Age: 38
Discharge: HOME | End: 2018-11-09
Payer: COMMERCIAL

## 2018-11-09 VITALS
TEMPERATURE: 98 F | WEIGHT: 126.06 LBS | BODY MASS INDEX: 22.34 KG/M2 | HEART RATE: 87 BPM | DIASTOLIC BLOOD PRESSURE: 76 MMHG | HEIGHT: 63 IN | SYSTOLIC BLOOD PRESSURE: 111 MMHG

## 2018-11-09 LAB
ANION GAP SERPL CALC-SCNC: 9 MMOL/L (ref 5–15)
BASOPHILS # BLD: 0.1 K/UL (ref 0–0.1)
BASOPHILS NFR BLD: 1 % (ref 0–1)
BUN SERPL-MCNC: 7 MG/DL (ref 6–20)
BUN/CREAT SERPL: 13 (ref 12–20)
CALCIUM SERPL-MCNC: 9 MG/DL (ref 8.5–10.1)
CHLORIDE SERPL-SCNC: 103 MMOL/L (ref 97–108)
CO2 SERPL-SCNC: 28 MMOL/L (ref 21–32)
CREAT SERPL-MCNC: 0.53 MG/DL (ref 0.55–1.02)
DIFFERENTIAL METHOD BLD: ABNORMAL
EOSINOPHIL # BLD: 0.5 K/UL (ref 0–0.4)
EOSINOPHIL NFR BLD: 7 % (ref 0–7)
ERYTHROCYTE [DISTWIDTH] IN BLOOD BY AUTOMATED COUNT: 18.1 % (ref 11.5–14.5)
GLUCOSE SERPL-MCNC: 77 MG/DL (ref 65–100)
HCT VFR BLD AUTO: 39.1 % (ref 35–47)
HGB BLD-MCNC: 12.6 G/DL (ref 11.5–16)
IMM GRANULOCYTES # BLD: 0 K/UL (ref 0–0.04)
IMM GRANULOCYTES NFR BLD AUTO: 0 % (ref 0–0.5)
LYMPHOCYTES # BLD: 3.6 K/UL (ref 0.8–3.5)
LYMPHOCYTES NFR BLD: 45 % (ref 12–49)
MCH RBC QN AUTO: 28.4 PG (ref 26–34)
MCHC RBC AUTO-ENTMCNC: 32.2 G/DL (ref 30–36.5)
MCV RBC AUTO: 88.1 FL (ref 80–99)
MONOCYTES # BLD: 0.4 K/UL (ref 0–1)
MONOCYTES NFR BLD: 5 % (ref 5–13)
NEUTS SEG # BLD: 3.4 K/UL (ref 1.8–8)
NEUTS SEG NFR BLD: 42 % (ref 32–75)
NRBC # BLD: 0 K/UL (ref 0–0.01)
NRBC BLD-RTO: 0 PER 100 WBC
PLATELET # BLD AUTO: 229 K/UL (ref 150–400)
PMV BLD AUTO: 11.7 FL (ref 8.9–12.9)
POTASSIUM SERPL-SCNC: 4.5 MMOL/L (ref 3.5–5.1)
RBC # BLD AUTO: 4.44 M/UL (ref 3.8–5.2)
SODIUM SERPL-SCNC: 140 MMOL/L (ref 136–145)
WBC # BLD AUTO: 8 K/UL (ref 3.6–11)

## 2018-11-09 PROCEDURE — 85025 COMPLETE CBC W/AUTO DIFF WBC: CPT

## 2018-11-09 PROCEDURE — 80048 BASIC METABOLIC PNL TOTAL CA: CPT

## 2018-11-09 RX ORDER — SENNOSIDES 8.6 MG/1
1 TABLET ORAL AS NEEDED
COMMUNITY

## 2018-11-09 NOTE — PERIOP NOTES
Patient verbalizes understanding of preoperative instructions:  Given skin prep chlorhexidine wipes-given written and verbal instructions on use. Pre-Operative Instructions DO NOT EAT OR DRINK ANYTHING AFTER MIDNIGHT THE NIGHT BEFORE SURGERY.

## 2018-11-14 ENCOUNTER — ANESTHESIA EVENT (OUTPATIENT)
Dept: SURGERY | Age: 38
DRG: 354 | End: 2018-11-14
Payer: COMMERCIAL

## 2018-11-15 ENCOUNTER — ANESTHESIA (OUTPATIENT)
Dept: SURGERY | Age: 38
DRG: 354 | End: 2018-11-15
Payer: COMMERCIAL

## 2018-11-15 ENCOUNTER — HOSPITAL ENCOUNTER (INPATIENT)
Age: 38
LOS: 6 days | Discharge: HOME | DRG: 354 | End: 2018-11-21
Attending: SURGERY | Admitting: SURGERY
Payer: COMMERCIAL

## 2018-11-15 DIAGNOSIS — G89.18 POST-OPERATIVE PAIN: Primary | ICD-10-CM

## 2018-11-15 PROBLEM — K43.2 INCISIONAL HERNIA: Status: ACTIVE | Noted: 2018-11-15

## 2018-11-15 LAB — HCG UR QL: NEGATIVE

## 2018-11-15 PROCEDURE — 77030018846 HC SOL IRR STRL H20 ICUM -A: Performed by: SURGERY

## 2018-11-15 PROCEDURE — 77030018548 HC SUT ETHBND2 J&J -B: Performed by: SURGERY

## 2018-11-15 PROCEDURE — 77030026438 HC STYL ET INTUB CARD -A: Performed by: NURSE ANESTHETIST, CERTIFIED REGISTERED

## 2018-11-15 PROCEDURE — 77030008684 HC TU ET CUF COVD -B: Performed by: NURSE ANESTHETIST, CERTIFIED REGISTERED

## 2018-11-15 PROCEDURE — 74011250636 HC RX REV CODE- 250/636

## 2018-11-15 PROCEDURE — 76010000131 HC OR TIME 2 TO 2.5 HR: Performed by: SURGERY

## 2018-11-15 PROCEDURE — 77030018836 HC SOL IRR NACL ICUM -A: Performed by: SURGERY

## 2018-11-15 PROCEDURE — 77030031139 HC SUT VCRL2 J&J -A: Performed by: SURGERY

## 2018-11-15 PROCEDURE — 0DBU0ZZ EXCISION OF OMENTUM, OPEN APPROACH: ICD-10-PCS | Performed by: SURGERY

## 2018-11-15 PROCEDURE — 88302 TISSUE EXAM BY PATHOLOGIST: CPT

## 2018-11-15 PROCEDURE — 77030002933 HC SUT MCRYL J&J -A: Performed by: SURGERY

## 2018-11-15 PROCEDURE — 74011000250 HC RX REV CODE- 250

## 2018-11-15 PROCEDURE — 88307 TISSUE EXAM BY PATHOLOGIST: CPT

## 2018-11-15 PROCEDURE — 77030011640 HC PAD GRND REM COVD -A: Performed by: SURGERY

## 2018-11-15 PROCEDURE — 77030011278 HC ELECTRD LIG IMPT COVD -F: Performed by: SURGERY

## 2018-11-15 PROCEDURE — 74011250636 HC RX REV CODE- 250/636: Performed by: SURGERY

## 2018-11-15 PROCEDURE — 76060000035 HC ANESTHESIA 2 TO 2.5 HR: Performed by: SURGERY

## 2018-11-15 PROCEDURE — C1781 MESH (IMPLANTABLE): HCPCS | Performed by: SURGERY

## 2018-11-15 PROCEDURE — 65270000032 HC RM SEMIPRIVATE

## 2018-11-15 PROCEDURE — 74011250636 HC RX REV CODE- 250/636: Performed by: ANESTHESIOLOGY

## 2018-11-15 PROCEDURE — 81025 URINE PREGNANCY TEST: CPT

## 2018-11-15 PROCEDURE — 77030039266 HC ADH SKN EXOFIN S2SG -A: Performed by: SURGERY

## 2018-11-15 PROCEDURE — 77030020782 HC GWN BAIR PAWS FLX 3M -B

## 2018-11-15 PROCEDURE — 0WUF0JZ SUPPLEMENT ABDOMINAL WALL WITH SYNTHETIC SUBSTITUTE, OPEN APPROACH: ICD-10-PCS | Performed by: SURGERY

## 2018-11-15 PROCEDURE — 76210000001 HC OR PH I REC 2.5 TO 3 HR: Performed by: SURGERY

## 2018-11-15 PROCEDURE — 77030032490 HC SLV COMPR SCD KNE COVD -B: Performed by: SURGERY

## 2018-11-15 DEVICE — VENTRIO ST HERNIA PATCH, 15.5 CM X 25.7 CM (6.1" X 10.1"), OVAL
Type: IMPLANTABLE DEVICE | Site: ABDOMEN | Status: FUNCTIONAL
Brand: VENTRIO

## 2018-11-15 RX ORDER — DEXTROSE, SODIUM CHLORIDE, AND POTASSIUM CHLORIDE 5; .45; .15 G/100ML; G/100ML; G/100ML
100 INJECTION INTRAVENOUS CONTINUOUS
Status: DISCONTINUED | OUTPATIENT
Start: 2018-11-15 | End: 2018-11-21 | Stop reason: HOSPADM

## 2018-11-15 RX ORDER — MORPHINE SULFATE 10 MG/ML
2 INJECTION, SOLUTION INTRAMUSCULAR; INTRAVENOUS
Status: DISCONTINUED | OUTPATIENT
Start: 2018-11-15 | End: 2018-11-15 | Stop reason: HOSPADM

## 2018-11-15 RX ORDER — GLYCOPYRROLATE 0.2 MG/ML
INJECTION INTRAMUSCULAR; INTRAVENOUS AS NEEDED
Status: DISCONTINUED | OUTPATIENT
Start: 2018-11-15 | End: 2018-11-15 | Stop reason: HOSPADM

## 2018-11-15 RX ORDER — HYDROMORPHONE HCL/0.9% NACL/PF 0.5 MG/ML
PLASTIC BAG, INJECTION (ML) INTRAVENOUS
Status: DISCONTINUED | OUTPATIENT
Start: 2018-11-15 | End: 2018-11-17

## 2018-11-15 RX ORDER — DEXMEDETOMIDINE HYDROCHLORIDE 4 UG/ML
INJECTION, SOLUTION INTRAVENOUS AS NEEDED
Status: DISCONTINUED | OUTPATIENT
Start: 2018-11-15 | End: 2018-11-15 | Stop reason: HOSPADM

## 2018-11-15 RX ORDER — KETOROLAC TROMETHAMINE 30 MG/ML
INJECTION, SOLUTION INTRAMUSCULAR; INTRAVENOUS AS NEEDED
Status: DISCONTINUED | OUTPATIENT
Start: 2018-11-15 | End: 2018-11-15 | Stop reason: HOSPADM

## 2018-11-15 RX ORDER — ROCURONIUM BROMIDE 10 MG/ML
INJECTION, SOLUTION INTRAVENOUS AS NEEDED
Status: DISCONTINUED | OUTPATIENT
Start: 2018-11-15 | End: 2018-11-15 | Stop reason: HOSPADM

## 2018-11-15 RX ORDER — LIDOCAINE HYDROCHLORIDE 20 MG/ML
INJECTION, SOLUTION EPIDURAL; INFILTRATION; INTRACAUDAL; PERINEURAL AS NEEDED
Status: DISCONTINUED | OUTPATIENT
Start: 2018-11-15 | End: 2018-11-15 | Stop reason: HOSPADM

## 2018-11-15 RX ORDER — SODIUM CHLORIDE 0.9 % (FLUSH) 0.9 %
5-10 SYRINGE (ML) INJECTION EVERY 8 HOURS
Status: DISCONTINUED | OUTPATIENT
Start: 2018-11-15 | End: 2018-11-21 | Stop reason: HOSPADM

## 2018-11-15 RX ORDER — SODIUM CHLORIDE 0.9 % (FLUSH) 0.9 %
5-10 SYRINGE (ML) INJECTION AS NEEDED
Status: DISCONTINUED | OUTPATIENT
Start: 2018-11-15 | End: 2018-11-21 | Stop reason: HOSPADM

## 2018-11-15 RX ORDER — ACETAMINOPHEN 10 MG/ML
1000 INJECTION, SOLUTION INTRAVENOUS ONCE
Status: COMPLETED | OUTPATIENT
Start: 2018-11-15 | End: 2018-11-15

## 2018-11-15 RX ORDER — MIDAZOLAM HYDROCHLORIDE 1 MG/ML
1 INJECTION, SOLUTION INTRAMUSCULAR; INTRAVENOUS AS NEEDED
Status: DISCONTINUED | OUTPATIENT
Start: 2018-11-15 | End: 2018-11-15 | Stop reason: HOSPADM

## 2018-11-15 RX ORDER — SODIUM CHLORIDE, SODIUM LACTATE, POTASSIUM CHLORIDE, CALCIUM CHLORIDE 600; 310; 30; 20 MG/100ML; MG/100ML; MG/100ML; MG/100ML
INJECTION, SOLUTION INTRAVENOUS
Status: DISCONTINUED | OUTPATIENT
Start: 2018-11-15 | End: 2018-11-15 | Stop reason: HOSPADM

## 2018-11-15 RX ORDER — ONDANSETRON 2 MG/ML
INJECTION INTRAMUSCULAR; INTRAVENOUS AS NEEDED
Status: DISCONTINUED | OUTPATIENT
Start: 2018-11-15 | End: 2018-11-15 | Stop reason: HOSPADM

## 2018-11-15 RX ORDER — ONDANSETRON 2 MG/ML
4 INJECTION INTRAMUSCULAR; INTRAVENOUS
Status: DISCONTINUED | OUTPATIENT
Start: 2018-11-15 | End: 2018-11-21 | Stop reason: HOSPADM

## 2018-11-15 RX ORDER — FENTANYL CITRATE 50 UG/ML
25 INJECTION, SOLUTION INTRAMUSCULAR; INTRAVENOUS
Status: COMPLETED | OUTPATIENT
Start: 2018-11-15 | End: 2018-11-15

## 2018-11-15 RX ORDER — LIDOCAINE HYDROCHLORIDE 10 MG/ML
0.1 INJECTION, SOLUTION EPIDURAL; INFILTRATION; INTRACAUDAL; PERINEURAL AS NEEDED
Status: DISCONTINUED | OUTPATIENT
Start: 2018-11-15 | End: 2018-11-15 | Stop reason: HOSPADM

## 2018-11-15 RX ORDER — SODIUM CHLORIDE, SODIUM LACTATE, POTASSIUM CHLORIDE, CALCIUM CHLORIDE 600; 310; 30; 20 MG/100ML; MG/100ML; MG/100ML; MG/100ML
75 INJECTION, SOLUTION INTRAVENOUS CONTINUOUS
Status: DISCONTINUED | OUTPATIENT
Start: 2018-11-15 | End: 2018-11-15 | Stop reason: HOSPADM

## 2018-11-15 RX ORDER — SODIUM CHLORIDE 0.9 % (FLUSH) 0.9 %
5-10 SYRINGE (ML) INJECTION AS NEEDED
Status: DISCONTINUED | OUTPATIENT
Start: 2018-11-15 | End: 2018-11-15 | Stop reason: HOSPADM

## 2018-11-15 RX ORDER — SODIUM CHLORIDE, SODIUM LACTATE, POTASSIUM CHLORIDE, CALCIUM CHLORIDE 600; 310; 30; 20 MG/100ML; MG/100ML; MG/100ML; MG/100ML
125 INJECTION, SOLUTION INTRAVENOUS CONTINUOUS
Status: DISCONTINUED | OUTPATIENT
Start: 2018-11-15 | End: 2018-11-15 | Stop reason: HOSPADM

## 2018-11-15 RX ORDER — MIDAZOLAM HYDROCHLORIDE 1 MG/ML
INJECTION, SOLUTION INTRAMUSCULAR; INTRAVENOUS AS NEEDED
Status: DISCONTINUED | OUTPATIENT
Start: 2018-11-15 | End: 2018-11-15 | Stop reason: HOSPADM

## 2018-11-15 RX ORDER — HYDROMORPHONE HYDROCHLORIDE 2 MG/ML
INJECTION, SOLUTION INTRAMUSCULAR; INTRAVENOUS; SUBCUTANEOUS AS NEEDED
Status: DISCONTINUED | OUTPATIENT
Start: 2018-11-15 | End: 2018-11-15 | Stop reason: HOSPADM

## 2018-11-15 RX ORDER — FENTANYL CITRATE 50 UG/ML
50 INJECTION, SOLUTION INTRAMUSCULAR; INTRAVENOUS AS NEEDED
Status: DISCONTINUED | OUTPATIENT
Start: 2018-11-15 | End: 2018-11-15 | Stop reason: HOSPADM

## 2018-11-15 RX ORDER — PROPOFOL 10 MG/ML
INJECTION, EMULSION INTRAVENOUS AS NEEDED
Status: DISCONTINUED | OUTPATIENT
Start: 2018-11-15 | End: 2018-11-15 | Stop reason: HOSPADM

## 2018-11-15 RX ORDER — SODIUM CHLORIDE 9 MG/ML
50 INJECTION, SOLUTION INTRAVENOUS CONTINUOUS
Status: DISCONTINUED | OUTPATIENT
Start: 2018-11-15 | End: 2018-11-15 | Stop reason: HOSPADM

## 2018-11-15 RX ORDER — HYDROCODONE BITARTRATE AND ACETAMINOPHEN 5; 325 MG/1; MG/1
1 TABLET ORAL AS NEEDED
Status: DISCONTINUED | OUTPATIENT
Start: 2018-11-15 | End: 2018-11-15 | Stop reason: HOSPADM

## 2018-11-15 RX ORDER — SODIUM CHLORIDE 9 MG/ML
25 INJECTION, SOLUTION INTRAVENOUS CONTINUOUS
Status: DISCONTINUED | OUTPATIENT
Start: 2018-11-15 | End: 2018-11-15 | Stop reason: HOSPADM

## 2018-11-15 RX ORDER — HYDROMORPHONE HYDROCHLORIDE 2 MG/ML
INJECTION, SOLUTION INTRAMUSCULAR; INTRAVENOUS; SUBCUTANEOUS
Status: COMPLETED
Start: 2018-11-15 | End: 2018-11-15

## 2018-11-15 RX ORDER — DULOXETIN HYDROCHLORIDE 30 MG/1
30 CAPSULE, DELAYED RELEASE ORAL DAILY
Status: DISCONTINUED | OUTPATIENT
Start: 2018-11-16 | End: 2018-11-21 | Stop reason: HOSPADM

## 2018-11-15 RX ORDER — SODIUM CHLORIDE 0.9 % (FLUSH) 0.9 %
5-10 SYRINGE (ML) INJECTION EVERY 8 HOURS
Status: DISCONTINUED | OUTPATIENT
Start: 2018-11-15 | End: 2018-11-15 | Stop reason: HOSPADM

## 2018-11-15 RX ORDER — DEXTROAMPHETAMINE SACCHARATE, AMPHETAMINE ASPARTATE, DEXTROAMPHETAMINE SULFATE AND AMPHETAMINE SULFATE 2.5; 2.5; 2.5; 2.5 MG/1; MG/1; MG/1; MG/1
30 TABLET ORAL DAILY
Status: DISCONTINUED | OUTPATIENT
Start: 2018-11-16 | End: 2018-11-18

## 2018-11-15 RX ORDER — ONDANSETRON 2 MG/ML
4 INJECTION INTRAMUSCULAR; INTRAVENOUS AS NEEDED
Status: DISCONTINUED | OUTPATIENT
Start: 2018-11-15 | End: 2018-11-15 | Stop reason: HOSPADM

## 2018-11-15 RX ORDER — HYDROMORPHONE HCL/0.9% NACL/PF 0.5 MG/ML
PLASTIC BAG, INJECTION (ML) INTRAVENOUS
Status: COMPLETED
Start: 2018-11-15 | End: 2018-11-15

## 2018-11-15 RX ORDER — FENTANYL CITRATE 50 UG/ML
INJECTION, SOLUTION INTRAMUSCULAR; INTRAVENOUS AS NEEDED
Status: DISCONTINUED | OUTPATIENT
Start: 2018-11-15 | End: 2018-11-15 | Stop reason: HOSPADM

## 2018-11-15 RX ORDER — DEXAMETHASONE SODIUM PHOSPHATE 4 MG/ML
INJECTION, SOLUTION INTRA-ARTICULAR; INTRALESIONAL; INTRAMUSCULAR; INTRAVENOUS; SOFT TISSUE AS NEEDED
Status: DISCONTINUED | OUTPATIENT
Start: 2018-11-15 | End: 2018-11-15 | Stop reason: HOSPADM

## 2018-11-15 RX ORDER — HYDROMORPHONE HYDROCHLORIDE 2 MG/ML
0.2 INJECTION, SOLUTION INTRAMUSCULAR; INTRAVENOUS; SUBCUTANEOUS
Status: COMPLETED | OUTPATIENT
Start: 2018-11-15 | End: 2018-11-15

## 2018-11-15 RX ORDER — MIDAZOLAM HYDROCHLORIDE 1 MG/ML
0.5 INJECTION, SOLUTION INTRAMUSCULAR; INTRAVENOUS
Status: DISCONTINUED | OUTPATIENT
Start: 2018-11-15 | End: 2018-11-15 | Stop reason: HOSPADM

## 2018-11-15 RX ORDER — FLUOXETINE HYDROCHLORIDE 20 MG/1
20 CAPSULE ORAL DAILY
Status: DISCONTINUED | OUTPATIENT
Start: 2018-11-16 | End: 2018-11-21 | Stop reason: HOSPADM

## 2018-11-15 RX ORDER — ENOXAPARIN SODIUM 100 MG/ML
40 INJECTION SUBCUTANEOUS EVERY 24 HOURS
Status: DISCONTINUED | OUTPATIENT
Start: 2018-11-15 | End: 2018-11-21 | Stop reason: HOSPADM

## 2018-11-15 RX ORDER — CEFAZOLIN SODIUM/WATER 2 G/20 ML
2 SYRINGE (ML) INTRAVENOUS ONCE
Status: COMPLETED | OUTPATIENT
Start: 2018-11-15 | End: 2018-11-15

## 2018-11-15 RX ORDER — NEOSTIGMINE METHYLSULFATE 1 MG/ML
INJECTION INTRAVENOUS AS NEEDED
Status: DISCONTINUED | OUTPATIENT
Start: 2018-11-15 | End: 2018-11-15 | Stop reason: HOSPADM

## 2018-11-15 RX ADMIN — ENOXAPARIN SODIUM 40 MG: 40 INJECTION SUBCUTANEOUS at 22:55

## 2018-11-15 RX ADMIN — HYDROMORPHONE HYDROCHLORIDE 0.2 MG: 2 INJECTION, SOLUTION INTRAMUSCULAR; INTRAVENOUS; SUBCUTANEOUS at 13:13

## 2018-11-15 RX ADMIN — LIDOCAINE HYDROCHLORIDE 0.1 ML: 10 INJECTION, SOLUTION EPIDURAL; INFILTRATION; INTRACAUDAL; PERINEURAL at 08:35

## 2018-11-15 RX ADMIN — FENTANYL CITRATE 100 MCG: 50 INJECTION, SOLUTION INTRAMUSCULAR; INTRAVENOUS at 09:39

## 2018-11-15 RX ADMIN — FENTANYL CITRATE 25 MCG: 50 INJECTION INTRAMUSCULAR; INTRAVENOUS at 12:57

## 2018-11-15 RX ADMIN — KETOROLAC TROMETHAMINE 30 MG: 30 INJECTION, SOLUTION INTRAMUSCULAR; INTRAVENOUS at 11:10

## 2018-11-15 RX ADMIN — ROCURONIUM BROMIDE 5 MG: 10 INJECTION, SOLUTION INTRAVENOUS at 10:38

## 2018-11-15 RX ADMIN — HYDROMORPHONE HYDROCHLORIDE 0.2 MG: 2 INJECTION, SOLUTION INTRAMUSCULAR; INTRAVENOUS; SUBCUTANEOUS at 11:45

## 2018-11-15 RX ADMIN — HYDROMORPHONE HYDROCHLORIDE 0.5 MG: 2 INJECTION, SOLUTION INTRAMUSCULAR; INTRAVENOUS; SUBCUTANEOUS at 11:36

## 2018-11-15 RX ADMIN — Medication: at 12:16

## 2018-11-15 RX ADMIN — DEXAMETHASONE SODIUM PHOSPHATE 8 MG: 4 INJECTION, SOLUTION INTRA-ARTICULAR; INTRALESIONAL; INTRAMUSCULAR; INTRAVENOUS; SOFT TISSUE at 09:46

## 2018-11-15 RX ADMIN — ROCURONIUM BROMIDE 10 MG: 10 INJECTION, SOLUTION INTRAVENOUS at 10:21

## 2018-11-15 RX ADMIN — HYDROMORPHONE HYDROCHLORIDE 0.5 MG: 2 INJECTION, SOLUTION INTRAMUSCULAR; INTRAVENOUS; SUBCUTANEOUS at 11:10

## 2018-11-15 RX ADMIN — DEXMEDETOMIDINE HYDROCHLORIDE 10 MCG: 4 INJECTION, SOLUTION INTRAVENOUS at 09:57

## 2018-11-15 RX ADMIN — HYDROMORPHONE HYDROCHLORIDE 0.2 MG: 2 INJECTION, SOLUTION INTRAMUSCULAR; INTRAVENOUS; SUBCUTANEOUS at 12:35

## 2018-11-15 RX ADMIN — FENTANYL CITRATE 25 MCG: 50 INJECTION INTRAMUSCULAR; INTRAVENOUS at 12:05

## 2018-11-15 RX ADMIN — HYDROMORPHONE HYDROCHLORIDE 0.2 MG: 2 INJECTION, SOLUTION INTRAMUSCULAR; INTRAVENOUS; SUBCUTANEOUS at 12:50

## 2018-11-15 RX ADMIN — DEXMEDETOMIDINE HYDROCHLORIDE 10 MCG: 4 INJECTION, SOLUTION INTRAVENOUS at 09:40

## 2018-11-15 RX ADMIN — SODIUM CHLORIDE, SODIUM LACTATE, POTASSIUM CHLORIDE, CALCIUM CHLORIDE: 600; 310; 30; 20 INJECTION, SOLUTION INTRAVENOUS at 10:46

## 2018-11-15 RX ADMIN — HYDROMORPHONE HYDROCHLORIDE 2 MG: 2 INJECTION, SOLUTION INTRAMUSCULAR; INTRAVENOUS; SUBCUTANEOUS at 12:00

## 2018-11-15 RX ADMIN — MIDAZOLAM 0.5 MG: 1 INJECTION INTRAMUSCULAR; INTRAVENOUS at 12:37

## 2018-11-15 RX ADMIN — Medication 2 G: at 09:46

## 2018-11-15 RX ADMIN — GLYCOPYRROLATE 0.4 MG: 0.2 INJECTION INTRAMUSCULAR; INTRAVENOUS at 11:17

## 2018-11-15 RX ADMIN — FENTANYL CITRATE 25 MCG: 50 INJECTION INTRAMUSCULAR; INTRAVENOUS at 11:55

## 2018-11-15 RX ADMIN — PROPOFOL 200 MG: 10 INJECTION, EMULSION INTRAVENOUS at 09:39

## 2018-11-15 RX ADMIN — DEXTROSE MONOHYDRATE, SODIUM CHLORIDE, AND POTASSIUM CHLORIDE 100 ML/HR: 50; 4.5; 1.49 INJECTION, SOLUTION INTRAVENOUS at 14:37

## 2018-11-15 RX ADMIN — MIDAZOLAM HYDROCHLORIDE 2 MG: 1 INJECTION, SOLUTION INTRAMUSCULAR; INTRAVENOUS at 09:32

## 2018-11-15 RX ADMIN — DEXTROSE MONOHYDRATE, SODIUM CHLORIDE, AND POTASSIUM CHLORIDE 100 ML/HR: 50; 4.5; 1.49 INJECTION, SOLUTION INTRAVENOUS at 22:57

## 2018-11-15 RX ADMIN — ROCURONIUM BROMIDE 10 MG: 10 INJECTION, SOLUTION INTRAVENOUS at 09:57

## 2018-11-15 RX ADMIN — ACETAMINOPHEN 1000 MG: 10 INJECTION, SOLUTION INTRAVENOUS at 12:03

## 2018-11-15 RX ADMIN — HYDROMORPHONE HYDROCHLORIDE 2 MG: 2 INJECTION INTRAMUSCULAR; INTRAVENOUS; SUBCUTANEOUS at 12:00

## 2018-11-15 RX ADMIN — FENTANYL CITRATE 25 MCG: 50 INJECTION INTRAMUSCULAR; INTRAVENOUS at 12:30

## 2018-11-15 RX ADMIN — HYDROMORPHONE HYDROCHLORIDE 1 MG: 2 INJECTION, SOLUTION INTRAMUSCULAR; INTRAVENOUS; SUBCUTANEOUS at 10:31

## 2018-11-15 RX ADMIN — ROCURONIUM BROMIDE 25 MG: 10 INJECTION, SOLUTION INTRAVENOUS at 09:39

## 2018-11-15 RX ADMIN — NEOSTIGMINE METHYLSULFATE 3 MG: 1 INJECTION INTRAVENOUS at 11:17

## 2018-11-15 RX ADMIN — LIDOCAINE HYDROCHLORIDE 100 MG: 20 INJECTION, SOLUTION EPIDURAL; INFILTRATION; INTRACAUDAL; PERINEURAL at 09:39

## 2018-11-15 RX ADMIN — PROPOFOL 100 MG: 10 INJECTION, EMULSION INTRAVENOUS at 11:19

## 2018-11-15 RX ADMIN — MIDAZOLAM 0.5 MG: 1 INJECTION INTRAMUSCULAR; INTRAVENOUS at 13:39

## 2018-11-15 RX ADMIN — ONDANSETRON 4 MG: 2 INJECTION INTRAMUSCULAR; INTRAVENOUS at 09:46

## 2018-11-15 RX ADMIN — SODIUM CHLORIDE, SODIUM LACTATE, POTASSIUM CHLORIDE, AND CALCIUM CHLORIDE 125 ML/HR: 600; 310; 30; 20 INJECTION, SOLUTION INTRAVENOUS at 08:35

## 2018-11-15 NOTE — INTERVAL H&P NOTE
H&P Update: 
Dario Sandra was seen and examined. History and physical has been reviewed. The patient has been examined.  There have been no significant clinical changes since the completion of the originally dated History and Physical. 
 
Signed By: Real Roa MD   
 November 15, 2018 9:49 AM

## 2018-11-15 NOTE — PROGRESS NOTES
Problem: Falls - Risk of 
Goal: *Absence of Falls Document Courtney Gil Fall Risk and appropriate interventions in the flowsheet. Outcome: Progressing Towards Goal 
Fall Risk Interventions: 
  
 
  
 
Medication Interventions: Patient to call before getting OOB, Teach patient to arise slowly Elimination Interventions: Call light in reach Problem: Pressure Injury - Risk of 
Goal: *Prevention of pressure injury Document Fredrick Scale and appropriate interventions in the flowsheet. Outcome: Progressing Towards Goal 
Pressure Injury Interventions: 
  
 
  
 
  
 
Mobility Interventions: Pressure redistribution bed/mattress (bed type) Nutrition Interventions: Document food/fluid/supplement intake, Offer support with meals,snacks and hydration

## 2018-11-15 NOTE — ROUTINE PROCESS
Patient: Daniel Pavon MRN: [id number]  SSN: [id number] YOB: 1980  Age: 34 y.o. Sex: female Patient is status post Procedure(s): 
REPAIR OF VENTRAL INCISIONAL HERNIA. Surgeon(s) and Role: Marquita Ruiz MD - Primary Peripheral IV 11/15/18 Right Arm (Active) Dressing Status Clean, dry, & intact 11/15/2018  8:27 AM  
Dressing Type Transparent 11/15/2018  8:27 AM  
Hub Color/Line Status Infusing 11/15/2018  8:27 AM  
                 
 
 
 
Dressing/Packing:    
Splint/Cast:  ]

## 2018-11-15 NOTE — PROGRESS NOTES
TRANSFER - IN REPORT: 
 
Verbal report received from Magno Larson RN(name) on Milissa Brittle  being received from PACU for routine post - op Report consisted of patients Situation, Background, Assessment and  
Recommendations(SBAR). Information from the following report(s) SBAR, Kardex, OR Summary, Procedure Summary, Intake/Output, MAR and Recent Results was reviewed with the receiving nurse. Opportunity for questions and clarification was provided.

## 2018-11-15 NOTE — OP NOTES
[facility]  OPERATIVE REPORT    Name:Nel PAREDES  MR#: [id number]  : 1980  ACCOUNT #: [de-identified]   DATE OF SERVICE: 11/15/2018    PREOPERATIVE DIAGNOSIS:  Ventral incisional hernia. POSTOPERATIVE DIAGNOSIS:  Ventral incisional hernia. PROCEDURE PERFORMED:  Repair of ventral incisional hernia with underlying mesh. Partial omentectomy. SURGEON:  Maranda Tovar. Belgica Coronado MD    ASSISTANT:  Meghan Mendoza    ANESTHESIA:  General.    ESTIMATED BLOOD LOSS:  Minimal.    SPECIMENS REMOVED:  A mass in the omentum. FINDINGS:  About a 10 cm defect with several centimeters of normal fascia with a small umbilical hernia caudad to the original hernia itself. COMPLICATIONS:  None. IMPLANT:  A Bard Davol Ventrio ST mesh 6 x 10 inches. DRAINS:  None. CONDITION:  Good to the PACU. DESCRIPTION OF PROCEDURE:  With the patient supine and suitably anesthetized, the abdomen was prepped with ChloraPrep and draped as a field. The old incision was reopened. The thick scar was removed. The fascial defect was cleared circumferentially. Peritoneal cavity was entered and the hernia sac was removed. Inferiorly, I felt the small umbilical hernia which was then just reduced. While mobilizing the adhesions to the abdominal wall and the like, I came across this balled up piece of omentum that was very firm and it may have been related to her previous abscesses, but I elected to remove it in its entirety without opening it up. This was done by using the LigaSure device. A 6 x 10 inch Ventrio ST mesh was then placed underneath everything and then secured up circumferentially with tacks. The fascia was then closed with interrupted 0 Ethibond sutures. The umbilical site was closed with a figure-of-eight interrupted 0 Ethibond suture as well. The subcutaneous tissues were closed in layers, the lower most attaching the subcutaneous tissues to the fascia and are helping to close dead space. Skin was closed with subcuticular Monocryl followed by surgical glue. At termination of the procedure, all counts were correct. The patient tolerated this well and was brought to the PACU in satisfactory condition. MD Hortensia Montiel / [place]  D: 11/15/2018 11:36     T: 11/15/2018 13:31  JOB #: [id number]  CC: Ana Sahu MD  CC: Amanda Jordan MD  CC: Louisa Cheney NP

## 2018-11-15 NOTE — PERIOP NOTES
TRANSFER - OUT REPORT: 
 
Verbal report given to [facility] RN(name) on Megan Gilliam  being transferred to [facility](unit) for routine post - op Report consisted of patients Situation, Background, Assessment and  
Recommendations(SBAR). Time Pre op antibiotic AZPOK:[id number] Anesthesia Stop time: 1144 Martínez Present on Transfer to floor:no Order for Martínez on Chart:no Discharge Prescriptions with Chart:no Information from the following report(s) SBAR, OR Summary, Procedure Summary, Intake/Output, MAR, Recent Results, Med Rec Status and Cardiac Rhythm NSR was reviewed with the receiving nurse. Opportunity for questions and clarification was provided. Is the patient on 02? YES 
     L/Min 2 Other nc Is the patient on a monitor? NO Is the nurse transporting with the patient? NO Surgical Waiting Area notified of patient's transfer from PACU? YES The following personal items collected during your admission accompanied patient upon transfer:  
Dental Appliance: Dental Appliances: None Vision:   
Hearing Aid:   
Jewelry:   
Clothing: Clothing: (clothing bag to [employer]) Other Valuables:   
Valuables sent to safe:

## 2018-11-15 NOTE — BRIEF OP NOTE
BRIEF OPERATIVE NOTE Date of Procedure: 11/15/2018 Preoperative Diagnosis: VENTRAL INCISIONAL HERNIA Postoperative Diagnosis: VENTRAL INCISIONAL HERNIA Procedure(s): 
REPAIR OF VENTRAL INCISIONAL HERNIA, partial omentectomy Surgeon(s) and Role: Melvin Dover MD - Primary Surgical Assistant: Maryann Muhammad Surgical Staff: 
Circ-1: Celsa Coe RN 
Circ-Relief: Cande Harmon RN Scrub Tech-1: Abhinav Dia Scrub RN-Relief: Cindy Delacruz RN Surg Asst-1: Tess Bailey Event Time In Time Out Incision Start 0982 Incision Close 1129 Anesthesia: General  
Estimated Blood Loss: minimal 
Specimens:  
ID Type Source Tests Collected by Time Destination 1 : omental mass Fresh Omentum  Hank Roth MD 11/15/2018 1026 Pathology Findings: 10 cm defect Complications: none Implants:  
Implant Name Type Inv. Item Serial No.  Lot No. [id number] No. Used Action MESH VIC MID LINE [id number]. 1IN -- [facility] [facility] - SNA Mesh MESH VIC MID LINE [id number]. 1IN -- [facility] [facility] NA BARD DAVOL [id number] N/A 1 Implanted [id number]

## 2018-11-15 NOTE — ANESTHESIA PREPROCEDURE EVALUATION
Anesthetic History No history of anesthetic complications PONV Review of Systems / Medical History Patient summary reviewed, nursing notes reviewed and pertinent labs reviewed Pulmonary Within defined limits Neuro/Psych Within defined limits Cardiovascular Within defined limits GI/Hepatic/Renal 
Within defined limits Endo/Other Within defined limits Other Findings Physical Exam 
 
Airway Mallampati: II 
TM Distance: > 6 cm Neck ROM: normal range of motion Mouth opening: Normal 
 
 Cardiovascular Regular rate and rhythm,  S1 and S2 normal,  no murmur, click, rub, or gallop Dental 
No notable dental hx Pulmonary Breath sounds clear to auscultation Abdominal 
GI exam deferred Other Findings Anesthetic Plan ASA: 2 Anesthesia type: general 
 
 
 
 
Induction: Intravenous Anesthetic plan and risks discussed with: Patient

## 2018-11-15 NOTE — PERIOP NOTES
Spoke to Dr Cristiane Martinez. MD states can start PACU pain treatment with Dilaudid IV instead of starting with Fentanyl. NEw order received for IV TYlenol.

## 2018-11-15 NOTE — ANESTHESIA POSTPROCEDURE EVALUATION
Procedure(s): 
REPAIR OF VENTRAL INCISIONAL HERNIA. 
 
<BSHSIANPOST> Visit Vitals /62 Pulse 66 Temp 37.2 °C (98.9 °F) Resp 13 Ht 5' 3\" (1.6 m) Wt 57.2 kg (126 lb 1.7 oz) SpO2 95% BMI 22.34 kg/m²

## 2018-11-15 NOTE — PROGRESS NOTES
Bedside verbal shift change report given to oncoming nurse Dalila Estrella R.N. Opportunity for questions and clarifications provided.

## 2018-11-16 PROCEDURE — 94760 N-INVAS EAR/PLS OXIMETRY 1: CPT

## 2018-11-16 PROCEDURE — 77010033678 HC OXYGEN DAILY

## 2018-11-16 PROCEDURE — 74011250637 HC RX REV CODE- 250/637: Performed by: NURSE PRACTITIONER

## 2018-11-16 PROCEDURE — 77030027138 HC INCENT SPIROMETER -A

## 2018-11-16 PROCEDURE — 65270000032 HC RM SEMIPRIVATE

## 2018-11-16 PROCEDURE — 74011250637 HC RX REV CODE- 250/637: Performed by: SURGERY

## 2018-11-16 PROCEDURE — 74011250636 HC RX REV CODE- 250/636: Performed by: SURGERY

## 2018-11-16 RX ORDER — IBUPROFEN 200 MG
1 TABLET ORAL DAILY
Status: DISCONTINUED | OUTPATIENT
Start: 2018-11-16 | End: 2018-11-21 | Stop reason: HOSPADM

## 2018-11-16 RX ADMIN — DEXTROSE MONOHYDRATE, SODIUM CHLORIDE, AND POTASSIUM CHLORIDE 100 ML/HR: 50; 4.5; 1.49 INJECTION, SOLUTION INTRAVENOUS at 08:27

## 2018-11-16 RX ADMIN — DEXTROSE MONOHYDRATE, SODIUM CHLORIDE, AND POTASSIUM CHLORIDE 100 ML/HR: 50; 4.5; 1.49 INJECTION, SOLUTION INTRAVENOUS at 19:23

## 2018-11-16 RX ADMIN — DEXTROAMPHETAMINE SACCHARATE, AMPHETAMINE ASPARTATE, DEXTROAMPHETAMINE SULFATE AND AMPHETAMINE SULFATE 30 MG: 2.5; 2.5; 2.5; 2.5 TABLET ORAL at 08:27

## 2018-11-16 RX ADMIN — Medication: at 12:21

## 2018-11-16 RX ADMIN — ENOXAPARIN SODIUM 40 MG: 40 INJECTION SUBCUTANEOUS at 20:47

## 2018-11-16 RX ADMIN — DULOXETINE HYDROCHLORIDE 30 MG: 30 CAPSULE, DELAYED RELEASE ORAL at 08:27

## 2018-11-16 RX ADMIN — FLUOXETINE HYDROCHLORIDE 20 MG: 20 CAPSULE ORAL at 08:27

## 2018-11-16 RX ADMIN — ONDANSETRON HYDROCHLORIDE 4 MG: 2 INJECTION, SOLUTION INTRAMUSCULAR; INTRAVENOUS at 20:45

## 2018-11-16 RX ADMIN — Medication 10 ML: at 20:47

## 2018-11-16 RX ADMIN — Medication 10 ML: at 05:44

## 2018-11-16 NOTE — PROGRESS NOTES
Reason for Admission:   Incisional hernia, without obstruction or gangrene RRAT Score: 19 Resources/supports as identified by patient/family:  No home health needs identified at this time. Top Challenges facing patient (as identified by patient/family and CM):  Pain challenges Finances/Medication cost?  No financial concerns identified at this time. Transportation? Patient will travel home via car at discharge. Support system or lack thereof? Patient has support from her  and daughter. Living arrangements? Patient lives with her  and 13year old daughter. Self-care/ADLs/Cognition? Patient does not require assistance with self-care. Current Advanced Directive/Advance Care Plan:  Patient does not have an advanced medical directive. Plan for utilizing home health:  No home health needs identified at this time. Likelihood of readmission:  Patient will travel home via car. Transition of Care Plan:  Return home with her  and daughter. Reviewed medical chart; met with the patient at the bedside. Patient is still experiencing a great deal of pain. Patient lives with her  and 13year old daughter. She does not use any DME. She has a PCP - Dr. Josesito Schumacher and gets her prescriptions at [facility] on [address]. Care Management will continue to follow her disposition. JIMY Dhillon Care Management Interventions PCP Verified by CM: Yes 
Palliative Care Criteria Met (RRAT>21 & CHF Dx)?: No 
Mode of Transport at Discharge: (Patient will travel via car at discharge.  ) MyChart Signup: No 
Discharge Durable Medical Equipment: No 
Physical Therapy Consult: No 
Occupational Therapy Consult: No 
Speech Therapy Consult: No 
Current Support Network: Lives with Spouse Confirm Follow Up Transport: (Patient will travel via car at discharge.  ) Plan discussed with Pt/Family/Caregiver: Yes Freedom of Choice Offered: Yes Discharge Location Discharge Placement: Home with family assistance

## 2018-11-16 NOTE — PROGRESS NOTES
Spiritual Care Partner Volunteer visited patient in Rm 502 on 11/16/2018. Documented by: 
Chaplain Kwong MDiv, MS, [facility] 
[phone])

## 2018-11-16 NOTE — PROGRESS NOTES
Bedside shift change report given to Flor (oncoming nurse) by Golden Szymanski (offgoing nurse). Report included the following information SBAR.

## 2018-11-16 NOTE — PROGRESS NOTES
ATTENDING ADDENDUM I supervised the APC and reviewed the note. We discussed the plan of care Pain is the major concern, as expected from caroline need for long term narcotics following her complicated recovery from pancreatic resection Surgery Progress Note Admit Date: 11/15/2018 Subjective:  
 
Complaints of pain. She is using dilaudid PCA. \"The pain wakes me up and I hit the button. \" Denies nausea or vomiting. Tolerating clears. Poor appetite. + flatus this morning. Wants a nicotine patch. Objective:  
 
Patient Vitals for the past 8 hrs: 
 BP Temp Pulse Resp SpO2  
11/16/18 0759 100/48 98.5 °F (36.9 °C) 71 16 92 % 11/16/18 0503 102/65 97.9 °F (36.6 °C) 60 16 92 % No intake/output data recorded. 11/14 1901 - 11/16 0700 In: 2265 [P.O.:100; I.V.:2165] Out: 580 [Urine:550]ip Physical Exam: 
 
General: alert, cooperative, no distress Cardiac: normal S1 and S2 Lungs: Normal chest wall and respirations. Clear to auscultation. Abdomen: soft, nondistended, generalized tenderness Wounds:clean, dry CBC:  
Lab Results Component Value Date/Time WBC 8.0 11/09/2018 02:27 PM  
 RBC 4.44 11/09/2018 02:27 PM  
 HGB 12.6 11/09/2018 02:27 PM  
 HCT 39.1 11/09/2018 02:27 PM  
 PLATELET 104 10/35/0308 02:27 PM  
 
BMP:  
Lab Results Component Value Date/Time Glucose 77 11/09/2018 02:27 PM  
 Sodium 140 11/09/2018 02:27 PM  
 Potassium 4.5 11/09/2018 02:27 PM  
 Chloride 103 11/09/2018 02:27 PM  
 CO2 28 11/09/2018 02:27 PM  
 BUN 7 11/09/2018 02:27 PM  
 Creatinine 0.53 (L) 11/09/2018 02:27 PM  
 Calcium 9.0 11/09/2018 02:27 PM  
 
CMP: 
Lab Results Component Value Date/Time  Glucose 77 11/09/2018 02:27 PM  
 Sodium 140 11/09/2018 02:27 PM  
 Potassium 4.5 11/09/2018 02:27 PM  
 Chloride 103 11/09/2018 02:27 PM  
 CO2 28 11/09/2018 02:27 PM  
 BUN 7 11/09/2018 02:27 PM  
 Creatinine 0.53 (L) 11/09/2018 02:27 PM  
 Calcium 9.0 11/09/2018 02:27 PM  
 Anion gap 9 11/09/2018 02:27 PM  
 BUN/Creatinine ratio 13 11/09/2018 02:27 PM  
 Alk. phosphatase 126 (H) 07/31/2018 09:45 AM  
 Protein, total 7.8 07/31/2018 09:45 AM  
 Albumin 2.8 (L) 07/31/2018 09:45 AM  
 Globulin 5.0 (H) 07/31/2018 09:45 AM  
 A-G Ratio 0.6 (L) 07/31/2018 09:45 AM  
 
 
 
 
   
Assessment:  
Pt is POD #1 s/p Procedure(s): 
REPAIR OF VENTRAL INCISIONAL HERNIA Plan:  
Diet: full liquid diet Activity:OOB as tolerated Pain management-Dilaudid PCA 
GI and DVT prophylaxis Nicotine patch Further plan per Dr. Taye Ko NP

## 2018-11-17 PROCEDURE — 74011250636 HC RX REV CODE- 250/636: Performed by: SURGERY

## 2018-11-17 PROCEDURE — 74011250637 HC RX REV CODE- 250/637: Performed by: NURSE PRACTITIONER

## 2018-11-17 PROCEDURE — 65270000032 HC RM SEMIPRIVATE

## 2018-11-17 PROCEDURE — 74011250637 HC RX REV CODE- 250/637: Performed by: SURGERY

## 2018-11-17 PROCEDURE — 74011250636 HC RX REV CODE- 250/636: Performed by: NURSE PRACTITIONER

## 2018-11-17 RX ORDER — ZOLPIDEM TARTRATE 5 MG/1
5 TABLET ORAL
Status: DISCONTINUED | OUTPATIENT
Start: 2018-11-17 | End: 2018-11-21 | Stop reason: HOSPADM

## 2018-11-17 RX ORDER — ACETAMINOPHEN 500 MG
1000 TABLET ORAL
Status: DISCONTINUED | OUTPATIENT
Start: 2018-11-17 | End: 2018-11-21 | Stop reason: HOSPADM

## 2018-11-17 RX ORDER — AMOXICILLIN 250 MG
2 CAPSULE ORAL DAILY
Status: DISCONTINUED | OUTPATIENT
Start: 2018-11-17 | End: 2018-11-21 | Stop reason: HOSPADM

## 2018-11-17 RX ORDER — HYDROMORPHONE HCL/0.9% NACL/PF 0.5 MG/ML
PLASTIC BAG, INJECTION (ML) INTRAVENOUS
Status: DISCONTINUED | OUTPATIENT
Start: 2018-11-17 | End: 2018-11-19

## 2018-11-17 RX ADMIN — SENNOSIDES AND DOCUSATE SODIUM 2 TABLET: 8.6; 5 TABLET ORAL at 11:27

## 2018-11-17 RX ADMIN — Medication: at 18:54

## 2018-11-17 RX ADMIN — Medication 10 ML: at 21:18

## 2018-11-17 RX ADMIN — ZOLPIDEM TARTRATE 5 MG: 5 TABLET ORAL at 02:00

## 2018-11-17 RX ADMIN — DULOXETINE HYDROCHLORIDE 30 MG: 30 CAPSULE, DELAYED RELEASE ORAL at 08:59

## 2018-11-17 RX ADMIN — DEXTROSE MONOHYDRATE, SODIUM CHLORIDE, AND POTASSIUM CHLORIDE 100 ML/HR: 50; 4.5; 1.49 INJECTION, SOLUTION INTRAVENOUS at 23:00

## 2018-11-17 RX ADMIN — DEXTROSE MONOHYDRATE, SODIUM CHLORIDE, AND POTASSIUM CHLORIDE 100 ML/HR: 50; 4.5; 1.49 INJECTION, SOLUTION INTRAVENOUS at 12:50

## 2018-11-17 RX ADMIN — FLUOXETINE HYDROCHLORIDE 20 MG: 20 CAPSULE ORAL at 08:59

## 2018-11-17 RX ADMIN — DEXTROAMPHETAMINE SACCHARATE, AMPHETAMINE ASPARTATE, DEXTROAMPHETAMINE SULFATE AND AMPHETAMINE SULFATE 30 MG: 2.5; 2.5; 2.5; 2.5 TABLET ORAL at 08:59

## 2018-11-17 RX ADMIN — Medication 10 ML: at 06:04

## 2018-11-17 RX ADMIN — DEXTROSE MONOHYDRATE, SODIUM CHLORIDE, AND POTASSIUM CHLORIDE 100 ML/HR: 50; 4.5; 1.49 INJECTION, SOLUTION INTRAVENOUS at 12:55

## 2018-11-17 RX ADMIN — Medication: at 03:03

## 2018-11-17 RX ADMIN — ENOXAPARIN SODIUM 40 MG: 40 INJECTION SUBCUTANEOUS at 21:18

## 2018-11-17 NOTE — PROGRESS NOTES
Bedside and Verbal shift change report given to Josue No RN (oncoming nurse) by Rahel Marin RN (offgoing nurse). Report included the following information SBAR, Kardex, ED Summary, Intake/Output, MAR and Recent Results.

## 2018-11-17 NOTE — PROGRESS NOTES
Progress Note Patient: Yulisa Elizabeth MRN: [id number]  SSN: [id number] YOB: 1980  Age: 34 y.o. Sex: female Admit Date: 11/15/2018 2 Days Post-Op Procedure:  Procedure(s): 
REPAIR OF VENTRAL INCISIONAL HERNIA Subjective:  
 
Patient has complaints of pain and \"hates how the incision looks, so ugly\". Still with PCA + basal rate and very anxious about having any pain. Was on Dilaudid 2 mg bid at home prior to surgery. C/o constipation and also had PTA. Prefers senna. Feels bloated and minimal flatus, but \"pressure feeling\". No fevers or chills, has been up and walking halls. Not sleeping well and poor appetite Objective:  
 
Visit Vitals /71 (BP 1 Location: Left arm, BP Patient Position: Head of bed elevated (Comment degrees)) Pulse 83 Temp 97.7 °F (36.5 °C) Resp 20 Ht 5' 3\" (1.6 m) Wt 126 lb 1.7 oz (57.2 kg) SpO2 96% BMI 22.34 kg/m² Temp (24hrs), Av.1 °F (36.7 °C), Min:97.7 °F (36.5 °C), Max:98.6 °F (37 °C) Physical Exam:   
A + O x 3, in bed Tearful and rubbing abdomen and incision Chest  CTA COR  RRR 
ABD Soft, midline incision is C/D/I; fullness/ bloating lower abdomen and less so in upper abdomen; tender as expected; few BS, no masses EXT No edema; ambulating independently Data Review: reviewed  Nursing documentation and I & O Lab Review: All lab results for the last 24 hours reviewed. Assessment:  
 
Hospital Problems  Date Reviewed: 11/15/2018 Codes Class Noted POA Incisional hernia ICD-10-CM: O10.0 ICD-9-CM: 553.21  11/15/2018 Unknown * (Principal) Incisional hernia, without obstruction or gangrene ICD-10-CM: K43.2 ICD-9-CM: 553.21  2018 Yes Plan/Recommendations/Medical Decision Makin Days Post-Op Procedure:  Procedure(s): 
REPAIR OF VENTRAL INCISIONAL HERNIA Post op pain and with low pain tolerance as she was on opioids prior to admission Agreed to DC basal rate and add Tylenol 1000 mg q 8 hours Abdominal binder OOB and ambulating Add pericolace daily for constipation Full liquids and can advance to GI light as tolerated Talked about plan for getting off PCA and transitioning to po meds If does ok off basal rate, hopefully DC PCA tomorrow and transition to po Dilaudid + Tylenol Other methods of pain control, relaxation, meditation, breathing exercises, binder and heat GI and DVT prophylaxis Dr. Gonzalez Se covering and will follow Signed By: Ami Torres NP November 17, 2018

## 2018-11-18 PROCEDURE — 74011250636 HC RX REV CODE- 250/636: Performed by: SURGERY

## 2018-11-18 PROCEDURE — 94760 N-INVAS EAR/PLS OXIMETRY 1: CPT

## 2018-11-18 PROCEDURE — 74011250637 HC RX REV CODE- 250/637: Performed by: NURSE PRACTITIONER

## 2018-11-18 PROCEDURE — 74011250637 HC RX REV CODE- 250/637: Performed by: SURGERY

## 2018-11-18 PROCEDURE — 74011250636 HC RX REV CODE- 250/636: Performed by: NURSE PRACTITIONER

## 2018-11-18 PROCEDURE — 65270000032 HC RM SEMIPRIVATE

## 2018-11-18 RX ORDER — DEXTROAMPHETAMINE SACCHARATE, AMPHETAMINE ASPARTATE, DEXTROAMPHETAMINE SULFATE AND AMPHETAMINE SULFATE 2.5; 2.5; 2.5; 2.5 MG/1; MG/1; MG/1; MG/1
30 TABLET ORAL 2 TIMES DAILY
Status: DISCONTINUED | OUTPATIENT
Start: 2018-11-18 | End: 2018-11-21 | Stop reason: HOSPADM

## 2018-11-18 RX ORDER — DEXTROMETHORPHAN/PSEUDOEPHED 2.5-7.5/.8
40 DROPS ORAL
Status: DISCONTINUED | OUTPATIENT
Start: 2018-11-18 | End: 2018-11-21 | Stop reason: HOSPADM

## 2018-11-18 RX ADMIN — DULOXETINE HYDROCHLORIDE 30 MG: 30 CAPSULE, DELAYED RELEASE ORAL at 09:17

## 2018-11-18 RX ADMIN — DEXTROAMPHETAMINE SACCHARATE, AMPHETAMINE ASPARTATE, DEXTROAMPHETAMINE SULFATE AND AMPHETAMINE SULFATE 30 MG: 2.5; 2.5; 2.5; 2.5 TABLET ORAL at 09:17

## 2018-11-18 RX ADMIN — ZOLPIDEM TARTRATE 5 MG: 5 TABLET ORAL at 21:53

## 2018-11-18 RX ADMIN — DEXTROAMPHETAMINE SACCHARATE, AMPHETAMINE ASPARTATE, DEXTROAMPHETAMINE SULFATE AND AMPHETAMINE SULFATE 30 MG: 2.5; 2.5; 2.5; 2.5 TABLET ORAL at 17:46

## 2018-11-18 RX ADMIN — ENOXAPARIN SODIUM 40 MG: 40 INJECTION SUBCUTANEOUS at 21:53

## 2018-11-18 RX ADMIN — DEXTROSE MONOHYDRATE, SODIUM CHLORIDE, AND POTASSIUM CHLORIDE 100 ML/HR: 50; 4.5; 1.49 INJECTION, SOLUTION INTRAVENOUS at 19:47

## 2018-11-18 RX ADMIN — FLUOXETINE HYDROCHLORIDE 20 MG: 20 CAPSULE ORAL at 09:17

## 2018-11-18 RX ADMIN — Medication 10 ML: at 06:17

## 2018-11-18 RX ADMIN — SENNOSIDES AND DOCUSATE SODIUM 2 TABLET: 8.6; 5 TABLET ORAL at 09:17

## 2018-11-18 RX ADMIN — DEXTROSE MONOHYDRATE, SODIUM CHLORIDE, AND POTASSIUM CHLORIDE 100 ML/HR: 50; 4.5; 1.49 INJECTION, SOLUTION INTRAVENOUS at 09:26

## 2018-11-18 RX ADMIN — Medication: at 17:42

## 2018-11-18 NOTE — PROGRESS NOTES
Bedside shift change report given to Atul Douglass RN (oncoming nurse) by Keyshawn Logan RN (offgoing nurse). Report included the following information SBAR, Kardex, Procedure Summary, Intake/Output and MAR.

## 2018-11-18 NOTE — PROGRESS NOTES
Progress Note Patient: Ludy Gardner MRN: [id number]  SSN: [id number] YOB: 1980  Age: 34 y.o. Sex: female Admit Date: 11/15/2018 3 Days Post-Op Procedure:  Procedure(s): 
REPAIR OF VENTRAL INCISIONAL HERNIA Subjective: No acute surgical issues. Pt reporting abdominal pain. No flatus or BM yet. Mild nausea but no vomiting. Objective:  
 
Visit Vitals /80 (BP 1 Location: Left arm, BP Patient Position: Head of bed elevated (Comment degrees); Lying right side) Pulse 100 Temp 99 °F (37.2 °C) Resp 18 Ht 5' 3\" (1.6 m) Wt 126 lb 1.7 oz (57.2 kg) SpO2 95% Breastfeeding? No  
BMI 22.34 kg/m² Temp (24hrs), Av.8 °F (37.1 °C), Min:98.3 °F (36.8 °C), Max:99.3 °F (37.4 °C) Physical Exam:   
Gen:  NAD Pulm:  Unlabored Abd:  S/moderately distended/appropriate TTP Wound:  C/D/I No results found for this or any previous visit (from the past 24 hour(s)). Assessment:  
 
Hospital Problems  Date Reviewed: 11/15/2018 Codes Class Noted POA Incisional hernia ICD-10-CM: B31.1 ICD-9-CM: 553.21  11/15/2018 Unknown * (Principal) Incisional hernia, without obstruction or gangrene ICD-10-CM: K43.2 ICD-9-CM: 553.21  2018 Yes Plan/Recommendations/Medical Decision Making:  
 
- Continue PCA for one more day - Keep on full liquids for now - Out of bed as tolerated - Bowel regiment - Labs in am 
 
Signed By: Mary Allison MD   
 2018

## 2018-11-18 NOTE — PROGRESS NOTES
Bedside and Verbal shift change report given to Kinsey Yost RN (oncoming nurse) by Lizzy Eddy RN (offgoing nurse). Report included the following information SBAR, Kardex, ED Summary, Intake/Output, MAR and Recent Results.

## 2018-11-19 ENCOUNTER — APPOINTMENT (OUTPATIENT)
Dept: GENERAL RADIOLOGY | Age: 38
DRG: 354 | End: 2018-11-19
Attending: NURSE PRACTITIONER
Payer: COMMERCIAL

## 2018-11-19 LAB
ANION GAP SERPL CALC-SCNC: 9 MMOL/L (ref 5–15)
BUN SERPL-MCNC: 4 MG/DL (ref 6–20)
BUN/CREAT SERPL: 9 (ref 12–20)
CALCIUM SERPL-MCNC: 8.6 MG/DL (ref 8.5–10.1)
CHLORIDE SERPL-SCNC: 98 MMOL/L (ref 97–108)
CO2 SERPL-SCNC: 30 MMOL/L (ref 21–32)
CREAT SERPL-MCNC: 0.47 MG/DL (ref 0.55–1.02)
ERYTHROCYTE [DISTWIDTH] IN BLOOD BY AUTOMATED COUNT: 16.8 % (ref 11.5–14.5)
GLUCOSE SERPL-MCNC: 151 MG/DL (ref 65–100)
HCT VFR BLD AUTO: 36.3 % (ref 35–47)
HGB BLD-MCNC: 11.8 G/DL (ref 11.5–16)
MCH RBC QN AUTO: 28.7 PG (ref 26–34)
MCHC RBC AUTO-ENTMCNC: 32.5 G/DL (ref 30–36.5)
MCV RBC AUTO: 88.3 FL (ref 80–99)
NRBC # BLD: 0 K/UL (ref 0–0.01)
NRBC BLD-RTO: 0 PER 100 WBC
PLATELET # BLD AUTO: 255 K/UL (ref 150–400)
PMV BLD AUTO: 10.2 FL (ref 8.9–12.9)
POTASSIUM SERPL-SCNC: 4.3 MMOL/L (ref 3.5–5.1)
RBC # BLD AUTO: 4.11 M/UL (ref 3.8–5.2)
SODIUM SERPL-SCNC: 137 MMOL/L (ref 136–145)
WBC # BLD AUTO: 8 K/UL (ref 3.6–11)

## 2018-11-19 PROCEDURE — 74011250636 HC RX REV CODE- 250/636: Performed by: SURGERY

## 2018-11-19 PROCEDURE — 94760 N-INVAS EAR/PLS OXIMETRY 1: CPT

## 2018-11-19 PROCEDURE — 74011250637 HC RX REV CODE- 250/637: Performed by: SURGERY

## 2018-11-19 PROCEDURE — 74011250636 HC RX REV CODE- 250/636: Performed by: NURSE PRACTITIONER

## 2018-11-19 PROCEDURE — 65270000032 HC RM SEMIPRIVATE

## 2018-11-19 PROCEDURE — 80048 BASIC METABOLIC PNL TOTAL CA: CPT

## 2018-11-19 PROCEDURE — 74011250637 HC RX REV CODE- 250/637: Performed by: NURSE PRACTITIONER

## 2018-11-19 PROCEDURE — 85027 COMPLETE CBC AUTOMATED: CPT

## 2018-11-19 PROCEDURE — 36415 COLL VENOUS BLD VENIPUNCTURE: CPT

## 2018-11-19 PROCEDURE — 74011000250 HC RX REV CODE- 250: Performed by: NURSE PRACTITIONER

## 2018-11-19 PROCEDURE — 74018 RADEX ABDOMEN 1 VIEW: CPT

## 2018-11-19 RX ORDER — DIPHENHYDRAMINE HYDROCHLORIDE 50 MG/ML
12.5 INJECTION, SOLUTION INTRAMUSCULAR; INTRAVENOUS
Status: DISCONTINUED | OUTPATIENT
Start: 2018-11-19 | End: 2018-11-21 | Stop reason: HOSPADM

## 2018-11-19 RX ORDER — KETOROLAC TROMETHAMINE 30 MG/ML
15 INJECTION, SOLUTION INTRAMUSCULAR; INTRAVENOUS
Status: DISCONTINUED | OUTPATIENT
Start: 2018-11-19 | End: 2018-11-21 | Stop reason: HOSPADM

## 2018-11-19 RX ORDER — POLYETHYLENE GLYCOL 3350 17 G/17G
17 POWDER, FOR SOLUTION ORAL DAILY
Status: DISCONTINUED | OUTPATIENT
Start: 2018-11-19 | End: 2018-11-21 | Stop reason: HOSPADM

## 2018-11-19 RX ORDER — OXYCODONE AND ACETAMINOPHEN 5; 325 MG/1; MG/1
2 TABLET ORAL
Status: DISCONTINUED | OUTPATIENT
Start: 2018-11-19 | End: 2018-11-21 | Stop reason: HOSPADM

## 2018-11-19 RX ADMIN — DEXTROSE MONOHYDRATE, SODIUM CHLORIDE, AND POTASSIUM CHLORIDE 100 ML/HR: 50; 4.5; 1.49 INJECTION, SOLUTION INTRAVENOUS at 06:12

## 2018-11-19 RX ADMIN — OXYCODONE AND ACETAMINOPHEN 2 TABLET: 5; 325 TABLET ORAL at 18:38

## 2018-11-19 RX ADMIN — OXYCODONE AND ACETAMINOPHEN 2 TABLET: 5; 325 TABLET ORAL at 14:42

## 2018-11-19 RX ADMIN — FLUOXETINE HYDROCHLORIDE 20 MG: 20 CAPSULE ORAL at 09:33

## 2018-11-19 RX ADMIN — DIPHENHYDRAMINE HYDROCHLORIDE 12.5 MG: 50 INJECTION, SOLUTION INTRAMUSCULAR; INTRAVENOUS at 04:28

## 2018-11-19 RX ADMIN — ZOLPIDEM TARTRATE 5 MG: 5 TABLET ORAL at 22:57

## 2018-11-19 RX ADMIN — Medication 10 ML: at 06:12

## 2018-11-19 RX ADMIN — OXYCODONE AND ACETAMINOPHEN 2 TABLET: 5; 325 TABLET ORAL at 22:51

## 2018-11-19 RX ADMIN — Medication 10 ML: at 14:00

## 2018-11-19 RX ADMIN — POLYETHYLENE GLYCOL 3350 17 G: 17 POWDER, FOR SOLUTION ORAL at 12:10

## 2018-11-19 RX ADMIN — DIPHENHYDRAMINE HYDROCHLORIDE 12.5 MG: 50 INJECTION, SOLUTION INTRAMUSCULAR; INTRAVENOUS at 12:14

## 2018-11-19 RX ADMIN — DULOXETINE HYDROCHLORIDE 30 MG: 30 CAPSULE, DELAYED RELEASE ORAL at 09:33

## 2018-11-19 RX ADMIN — DEXTROSE MONOHYDRATE, SODIUM CHLORIDE, AND POTASSIUM CHLORIDE 100 ML/HR: 50; 4.5; 1.49 INJECTION, SOLUTION INTRAVENOUS at 18:33

## 2018-11-19 RX ADMIN — Medication 10 ML: at 21:23

## 2018-11-19 RX ADMIN — OXYCODONE AND ACETAMINOPHEN 2 TABLET: 5; 325 TABLET ORAL at 10:45

## 2018-11-19 RX ADMIN — KETOROLAC TROMETHAMINE 15 MG: 30 INJECTION, SOLUTION INTRAMUSCULAR at 14:00

## 2018-11-19 RX ADMIN — SENNOSIDES AND DOCUSATE SODIUM 2 TABLET: 8.6; 5 TABLET ORAL at 09:33

## 2018-11-19 RX ADMIN — DEXTROAMPHETAMINE SACCHARATE, AMPHETAMINE ASPARTATE, DEXTROAMPHETAMINE SULFATE AND AMPHETAMINE SULFATE 30 MG: 2.5; 2.5; 2.5; 2.5 TABLET ORAL at 09:33

## 2018-11-19 RX ADMIN — DEXTROAMPHETAMINE SACCHARATE, AMPHETAMINE ASPARTATE, DEXTROAMPHETAMINE SULFATE AND AMPHETAMINE SULFATE 30 MG: 2.5; 2.5; 2.5; 2.5 TABLET ORAL at 17:19

## 2018-11-19 NOTE — PROGRESS NOTES
ATTENDING ADDENDUM I supervised the APC and reviewed the note. We discussed the plan of care A little confused this evening, so will need to drop narcotics slowly. General Surgery Daily Progress Note Admit Date: 11/15/2018 Post-Operative Day: 4 Days Post-Op from Procedure(s): 
REPAIR OF VENTRAL INCISIONAL HERNIA Subjective:  
 
Last 24 hrs: Pt cont to have pain and abd bloating. Feels cramping, no gas. Wants to keep PCA. Discussed that it would be good to stop it. Objective:  
 
Blood pressure 112/75, pulse 77, temperature 98.1 °F (36.7 °C), resp. rate 18, height 5' 3\" (1.6 m), weight 126 lb 1.7 oz (57.2 kg), last menstrual period 2018, SpO2 97 %, not currently breastfeeding. Temp (24hrs), Av.3 °F (36.8 °C), Min:98.1 °F (36.7 °C), Max:98.7 °F (37.1 °C) 
 
 
_____________________ Physical Exam:    
Alert and Oriented, x3, in no acute distress. Cardiovascular: RRR, no peripheral edema Lungs:CTAB Abdomen: soft w/ distention, hypoactive BS but heard throughout, incision is intact Assessment:  
Principal Problem: 
  Incisional hernia, without obstruction or gangrene (2018) Active Problems: 
  Incisional hernia (11/15/2018) Plan:  
 
Cont senna Add miralax D/c pca and start po percocet - she is okay w/ this plan OOB as she is doing Cont full liq KUB today PPI Data Review: 
 
Recent Labs 18 
0120 WBC 8.0 HGB 11.8 HCT 36.3  Recent Labs 18 
0120   
K 4.3 CL 98  
CO2 30 * BUN 4*  
CREA 0.47* CA 8.6 No results for input(s): AML, LPSE in the last 72 hours. ______________________ Medications: 
 
Current Facility-Administered Medications Medication Dose Route Frequency  diphenhydrAMINE (BENADRYL) injection 12.5 mg  12.5 mg IntraVENous Q6H PRN  
 oxyCODONE-acetaminophen (PERCOCET) 5-325 mg per tablet 2 Tab  2 Tab Oral Q4H PRN  
  polyethylene glycol (MIRALAX) packet 17 g  17 g Oral DAILY  simethicone (MYLICON) 04SJ/3.1TI oral drops 40 mg  40 mg Oral QID PRN  
 dextroamphetamine-amphetamine (ADDERALL) tablet 30 mg  30 mg Oral BID  acetaminophen (TYLENOL) tablet 1,000 mg  1,000 mg Oral Q8H PRN  
 senna-docusate (PERICOLACE) 8.6-50 mg per tablet 2 Tab  2 Tab Oral DAILY  zolpidem (AMBIEN) tablet 5 mg  5 mg Oral QHS PRN  
 nicotine (NICODERM CQ) 14 mg/24 hr patch 1 Patch  1 Patch TransDERmal DAILY  DULoxetine (CYMBALTA) capsule 30 mg  30 mg Oral DAILY  FLUoxetine (PROzac) capsule 20 mg  20 mg Oral DAILY  sodium chloride (NS) flush 5-10 mL  5-10 mL IntraVENous Q8H  
 sodium chloride (NS) flush 5-10 mL  5-10 mL IntraVENous PRN  
 dextrose 5% - 0.45% NaCl with KCl 20 mEq/L infusion  100 mL/hr IntraVENous CONTINUOUS  
 ondansetron (ZOFRAN) injection 4 mg  4 mg IntraVENous Q4H PRN  
 enoxaparin (LOVENOX) injection 40 mg  40 mg SubCUTAneous Q24H Travis Hubbard NP 
11/19/2018

## 2018-11-19 NOTE — PROGRESS NOTES
Note that the patient will come off of her PCA pump today and continue with a full liquid diet. Patient has been walking the halls. Care Management will continue to follow her disposition.   
JIMY Mark

## 2018-11-19 NOTE — ROUTINE PROCESS
Patient ambulated multiple laps in the halls this am, pain controlled on PCA. Complained of vague red, itchy patches on arms and face that were mildly relieved by benadryl. Bedside shift change report given to [address] [address] (oncoming nurse) by Sheila Zamudio RN (offgoing nurse). Report included the following information SBAR, Kardex, Intake/Output, MAR and Recent Results.

## 2018-11-20 PROCEDURE — 74011250637 HC RX REV CODE- 250/637: Performed by: NURSE PRACTITIONER

## 2018-11-20 PROCEDURE — 74011000250 HC RX REV CODE- 250: Performed by: NURSE PRACTITIONER

## 2018-11-20 PROCEDURE — 74011250637 HC RX REV CODE- 250/637: Performed by: SURGERY

## 2018-11-20 PROCEDURE — 74011250636 HC RX REV CODE- 250/636: Performed by: NURSE PRACTITIONER

## 2018-11-20 PROCEDURE — 74011250636 HC RX REV CODE- 250/636: Performed by: SURGERY

## 2018-11-20 PROCEDURE — 65270000032 HC RM SEMIPRIVATE

## 2018-11-20 PROCEDURE — 94760 N-INVAS EAR/PLS OXIMETRY 1: CPT

## 2018-11-20 RX ADMIN — DEXTROSE MONOHYDRATE, SODIUM CHLORIDE, AND POTASSIUM CHLORIDE 100 ML/HR: 50; 4.5; 1.49 INJECTION, SOLUTION INTRAVENOUS at 16:17

## 2018-11-20 RX ADMIN — DEXTROAMPHETAMINE SACCHARATE, AMPHETAMINE ASPARTATE, DEXTROAMPHETAMINE SULFATE AND AMPHETAMINE SULFATE 30 MG: 2.5; 2.5; 2.5; 2.5 TABLET ORAL at 08:30

## 2018-11-20 RX ADMIN — DULOXETINE HYDROCHLORIDE 30 MG: 30 CAPSULE, DELAYED RELEASE ORAL at 08:30

## 2018-11-20 RX ADMIN — DEXTROSE MONOHYDRATE, SODIUM CHLORIDE, AND POTASSIUM CHLORIDE 100 ML/HR: 50; 4.5; 1.49 INJECTION, SOLUTION INTRAVENOUS at 05:15

## 2018-11-20 RX ADMIN — OXYCODONE AND ACETAMINOPHEN 2 TABLET: 5; 325 TABLET ORAL at 20:19

## 2018-11-20 RX ADMIN — OXYCODONE AND ACETAMINOPHEN 2 TABLET: 5; 325 TABLET ORAL at 07:17

## 2018-11-20 RX ADMIN — OXYCODONE AND ACETAMINOPHEN 2 TABLET: 5; 325 TABLET ORAL at 15:24

## 2018-11-20 RX ADMIN — KETOROLAC TROMETHAMINE 15 MG: 30 INJECTION, SOLUTION INTRAMUSCULAR at 10:09

## 2018-11-20 RX ADMIN — SIMETHICONE 40 MG: 20 SUSPENSION/ DROPS ORAL at 22:01

## 2018-11-20 RX ADMIN — SENNOSIDES AND DOCUSATE SODIUM 2 TABLET: 8.6; 5 TABLET ORAL at 08:30

## 2018-11-20 RX ADMIN — ZOLPIDEM TARTRATE 5 MG: 5 TABLET ORAL at 22:04

## 2018-11-20 RX ADMIN — DEXTROAMPHETAMINE SACCHARATE, AMPHETAMINE ASPARTATE, DEXTROAMPHETAMINE SULFATE AND AMPHETAMINE SULFATE 30 MG: 2.5; 2.5; 2.5; 2.5 TABLET ORAL at 17:07

## 2018-11-20 RX ADMIN — POLYETHYLENE GLYCOL 3350 17 G: 17 POWDER, FOR SOLUTION ORAL at 08:31

## 2018-11-20 RX ADMIN — SIMETHICONE 40 MG: 20 SUSPENSION/ DROPS ORAL at 14:43

## 2018-11-20 RX ADMIN — OXYCODONE AND ACETAMINOPHEN 2 TABLET: 5; 325 TABLET ORAL at 11:08

## 2018-11-20 RX ADMIN — FLUOXETINE HYDROCHLORIDE 20 MG: 20 CAPSULE ORAL at 08:30

## 2018-11-20 NOTE — PROGRESS NOTES
Bedside and Verbal shift change report given to [employer], RN (oncoming nurse) by Bakari Lopez RN (offgoing nurse). Report included the following information SBAR, Kardex, Procedure Summary, Intake/Output, MAR and Recent Results.

## 2018-11-20 NOTE — PROGRESS NOTES
Bedside and Verbal shift change report given to Prakash Rhodes, [facility] (oncoming nurse) by Audie Mcmahon RN (offgoing nurse). Report included the following information SBAR, Kardex, Procedure Summary, Intake/Output, MAR and Recent Results.

## 2018-11-20 NOTE — PROGRESS NOTES
Charge nurse received call from patient's   stating that patient is currently hallucinating. Charge nurse went to see patient immediately and patient sleeping at this time. Charge nurse informed primary nurse of what happened. Went to check on patient soon after. Patient was still sleeping. Will continue to monitor pt.

## 2018-11-20 NOTE — PROGRESS NOTES
NUTRITION COMPLETE ASSESSMENT 
 
RECOMMENDATIONS:  
1. Encourage regular meals and snacks 
 - should be at least be drinking Ensure Enlive if less than 50% trays consumed 
 - document % meals and supplements consumed in I/O flowsheet 2. Add daily MVI 3. Weekly weights 4. Consider rechecking A1c Interventions/Plan:  
Food/Nutrient Delivery:    (Ensure Enlive TID) Assessment:  
Reason for Assessment: [x]At Nutrition Risk Diet: (gi lite - started this morning) Supplements: none Nutritionally Significant Medications: [x] Reviewed & Includes: adderall, cymbalta, prozac, miralax, pericolace, D5 1/2 NaCl w/ KCl @ 100ml/hr; PRN: zofran, simethicone Meal Intake:  
Patient Vitals for the past 100 hrs: 
 % Diet Eaten 11/18/18 1215 50 % 11/18/18 0926 50 % Pre-Hospitalization: 
Usual Appetite: Poor Diet at Home: regular Vitamins/Supplements: No 
 
Current Hospitalization:  
Appetite: Poor PO Ability: Independent Average po intake: Average supplements intake:    
 Subjective: 
I just haven't felt like eating since I was here is August.  
 
Objective: 
Pt admitted for hernia repair. 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 S/p hernia repair on 11/15. Abd distention and bloating with KUB yesterday showing probably ileus. No BM since surgery, bowel regimen rx. Tolerating some of full liquids. Pt visited today. Notes gas today. Pt know from previous admit in August for pancreatitis. She reports poor intake since admit in August with just no appetite or taste for food. Agrees that nutrition has probably not been the best, eating most granola bars at home. Pt agreeable to Ensure Enlive TID (1050kcal, 60g protein - chocolate) which she drank on last admit. Recommend adding daily MVI with continuation at d/c along with supplements. Severe wt loss of 26% x 6 months noted. Wt Readings:  
11/15/18 57.2 kg (126 lb 1.7 oz) 10/31/18 59.4 kg (131 lb)  
09/26/18 59.9 kg (132 lb)  
08/29/18 59.9 kg (132 lb)  
07/30/18 64.9 kg (143 lb)  
06/27/18 67.6 kg (149 lb) 05/23/18 68.9 kg (152 lb)  
04/25/18 77.2 kg (170 lb 3.1 oz) Patient meets criteria for Severe Chronic Protein Calorie Malnutrition as evidenced by:  
ASPEN Malnutrition Criteria Acute Illness, Chronic Illness, or Social/Enviornmental: Chronic illness Energy Intake: Less than/equal to 75% of est energy req for greater than/equal to 1 month Weight Loss: Greater than 7.5% x 3 mos Muscle Mass: Mild ASPEN Malnutrition Score - Chronic Illness: 13 
Chronic Illness - Malnutrition Diagnosis: Severe malnutrition. Will continue to follow for po intake, diet advancement per surgery, wt trends. Estimated Nutrition Needs:  
Kcals/day: 9510 Kcals/day(1445-1564kcal) Protein: 68 g(68-74g (1.2-1.3g/kg)) Fluid: 1710 ml(30ml/kg) Based On: [place] [address] Jeor(x 1.2-1.3) Weight Used: Actual wt(57.2kg) Pt expected to meet estimated nutrient needs:  []   Yes     []  No [] Unable to predict at this time Nutrition Diagnosis: 1. Malnutrition related to inadequate protein/energy intake 2/2 poor appetite as evidenced by severe wt loss; muscle loss; less than 75% meals consumed x >1 month Goals:   
 Consumption of at least 3 supplements/day + 50% meals Monitoring & Evaluation: - Total energy intake, Liquid meal replacement - Weight/weight change, Electrolyte and renal profile, GI 
 
Previous Nutrition Goals Met:   N/A Previous Recommendations:    N/A Education & Discharge Needs: 
 [] None Identified 
 [x] Identified and addressed [x] Participated in care plan, discharge planning, and/or interdisciplinary rounds Cultural, Sikh and ethnic food preferences identified: None Skin Integrity: [x]Intact  []Other Edema: []None [x]Other: 1+ generalized Last BM: 11/15 Food Allergies: [x]None []Other Diet Restrictions: Cultural/Mu-ism Preference(s): None Anthropometrics:   
Weight Loss Metrics 11/15/2018 11/9/2018 10/31/2018 10/10/2018 9/26/2018 9/12/2018 8/29/2018 Today's Wt 126 lb 1.7 oz 126 lb 1 oz 131 lb 134 lb 132 lb 127 lb 132 lb BMI 22.34 kg/m2 22.33 kg/m2 23.21 kg/m2 23.74 kg/m2 23.38 kg/m2 22.5 kg/m2 23.38 kg/m2 Height: 5' 3\" (160 cm), Body mass index is 22.34 kg/m². IBW : 52.2 kg (115 lb), % IBW (Calculated): 109.65 % Usual Body Weight: 77.1 kg (170 lb),   
 
Labs:   
Lab Results Component Value Date/Time Sodium 137 11/19/2018 01:20 AM  
 Potassium 4.3 11/19/2018 01:20 AM  
 Chloride 98 11/19/2018 01:20 AM  
 CO2 30 11/19/2018 01:20 AM  
 Glucose 151 (H) 11/19/2018 01:20 AM  
 BUN 4 (L) 11/19/2018 01:20 AM  
 Creatinine 0.47 (L) 11/19/2018 01:20 AM  
 Calcium 8.6 11/19/2018 01:20 AM  
 Magnesium 2.1 08/17/2018 04:49 AM  
 Phosphorus 4.9 (H) 08/17/2018 04:49 AM  
 Albumin 2.8 (L) 07/31/2018 09:45 AM  
 
Lab Results Component Value Date/Time Hemoglobin A1c 10.1 (H) 08/01/2018 07:19 PM  
 
Heather Davis RD Pager #[phone]

## 2018-11-20 NOTE — PROGRESS NOTES
Problem: Pressure Injury - Risk of 
Goal: *Prevention of pressure injury Document Fredrick Scale and appropriate interventions in the flowsheet. Outcome: Progressing Towards Goal 
Pressure Injury Interventions: Activity Interventions: Increase time out of bed, Pressure redistribution bed/mattress(bed type) Mobility Interventions: Pressure redistribution bed/mattress (bed type) Nutrition Interventions: Document food/fluid/supplement intake

## 2018-11-20 NOTE — CDMP QUERY
Dear Dr. Shanell Nunn, Please clarify if this patient is (was) being treated/managed for:  
 
=> Severe protein-calorie malnutrition in the setting of ventral incisional hernia requiring RD consult, lab monitoring,daily MVI, Weekly weights and PO supplements 
 
=> Other explanation of clinical findings 
=> Clinically Undetermined (no explanation for clinical findings) The medical record reflects the following clinical findings, treatment, and risk factors. Risk Factors:   admitted for hernia repair Clinical Indicators:  poor appetite, KUB- probably ileus, eating most granola bars at home, albumin 2.8,  BMI 22.34 kg/m2, severe wt loss; muscle loss; less than 75% meals consumed x >1 month Per RD PN-Patient meets criteria for Severe Chronic Protein Calorie Malnutrition as evidenced by:  
ASPEN Malnutrition Criteria Acute Illness, Chronic Illness, or Social/Enviornmental: Chronic illness Energy Intake: Less than/equal to 75% of est energy req for greater than/equal to 1 month Weight Loss: Greater than 7.5% x 3 mos Muscle Mass: Mild ASPEN Malnutrition Score - Chronic Illness: 13 
Chronic Illness - Malnutrition Diagnosis: Severe malnutrition Treatment: RD consult, lab monitoring, daily MVI, Weekly weights, 1/2 NaCl w/ KCl @ 100ml/hr, Ensure Please clarify and document your clinical opinion in the progress notes and discharge summary including the definitive and/or presumptive diagnosis, (suspected or probable), related to the above clinical findings. Please include clinical findings supporting your diagnosis. Thank You 
Kemar Rodriguez,MSN,BSN,RN,[facility] 
[phone]

## 2018-11-20 NOTE — PROGRESS NOTES
Bedside shift change report given to Bakari Lopez (oncoming nurse) by Erin (offgoing nurse). Report included the following information SBAR, Kardex and MAR.

## 2018-11-20 NOTE — PROGRESS NOTES
General Surgery Daily Progress Note Admit Date: 11/15/2018 Post-Operative Day: 5 Days Post-Op from Procedure(s): 
REPAIR OF VENTRAL INCISIONAL HERNIA Subjective:  
 
Last 24 hrs: \" I am feeling so much better today\". PO medication is managing the pain, feels less bloated and passing flatus. No nausea or vomiting. No BM. Tolerating diet Objective:  
 
Blood pressure 114/76, pulse 98, temperature 99.1 °F (37.3 °C), resp. rate 16, height 5' 3\" (1.6 m), weight 126 lb 1.7 oz (57.2 kg), last menstrual period 2018, SpO2 96 %, not currently breastfeeding. Temp (24hrs), Av.3 °F (36.8 °C), Min:97.8 °F (36.6 °C), Max:99.1 °F (37.3 °C) KUB: Probable ileus 
 
_____________________ Physical Exam:    
Alert and Oriented x3, mildly anxious, in no acute distress. Cardiovascular: RRR, noperipheral edema Lungs:CTAB Abdomen: soft, active bowel sounds, incision site clean and intact Assessment:  
Principal Problem: 
  Incisional hernia, without obstruction or gangrene (2018) Active Problems: 
  Incisional hernia (11/15/2018) Plan:  
 
Pain management OOB Bowel regiment Continue diet Data Review: 
 
Recent Labs 18 
0120 WBC 8.0 HGB 11.8 HCT 36.3  Recent Labs 18 
0120   
K 4.3 CL 98  
CO2 30 * BUN 4*  
CREA 0.47* CA 8.6 No results for input(s): AML, LPSE in the last 72 hours. ______________________ Medications: 
 
Current Facility-Administered Medications Medication Dose Route Frequency  diphenhydrAMINE (BENADRYL) injection 12.5 mg  12.5 mg IntraVENous Q6H PRN  
 oxyCODONE-acetaminophen (PERCOCET) 5-325 mg per tablet 2 Tab  2 Tab Oral Q4H PRN  polyethylene glycol (MIRALAX) packet 17 g  17 g Oral DAILY  ketorolac (TORADOL) injection 15 mg  15 mg IntraVENous Q6H PRN  
 simethicone (MYLICON) 91UF/6.6RC oral drops 40 mg  40 mg Oral QID PRN  
  dextroamphetamine-amphetamine (ADDERALL) tablet 30 mg  30 mg Oral BID  acetaminophen (TYLENOL) tablet 1,000 mg  1,000 mg Oral Q8H PRN  
 senna-docusate (PERICOLACE) 8.6-50 mg per tablet 2 Tab  2 Tab Oral DAILY  zolpidem (AMBIEN) tablet 5 mg  5 mg Oral QHS PRN  
 nicotine (NICODERM CQ) 14 mg/24 hr patch 1 Patch  1 Patch TransDERmal DAILY  DULoxetine (CYMBALTA) capsule 30 mg  30 mg Oral DAILY  FLUoxetine (PROzac) capsule 20 mg  20 mg Oral DAILY  sodium chloride (NS) flush 5-10 mL  5-10 mL IntraVENous Q8H  
 sodium chloride (NS) flush 5-10 mL  5-10 mL IntraVENous PRN  
 dextrose 5% - 0.45% NaCl with KCl 20 mEq/L infusion  100 mL/hr IntraVENous CONTINUOUS  
 ondansetron (ZOFRAN) injection 4 mg  4 mg IntraVENous Q4H PRN  
 enoxaparin (LOVENOX) injection 40 mg  40 mg SubCUTAneous Q24H Sandi Bee 11/20/2018 I agree w/ assessment and plan by the student Adv diet to [employer] - maybe home tomorrow

## 2018-11-20 NOTE — PROGRESS NOTES
Checked on pt hourly throughout shift. Pt had no signs of hallucinating. Patient slept throughout the night.

## 2018-11-21 ENCOUNTER — TELEPHONE (OUTPATIENT)
Dept: SURGERY | Age: 38
End: 2018-11-21

## 2018-11-21 VITALS
SYSTOLIC BLOOD PRESSURE: 121 MMHG | TEMPERATURE: 98.1 F | BODY MASS INDEX: 22.34 KG/M2 | HEART RATE: 71 BPM | DIASTOLIC BLOOD PRESSURE: 79 MMHG | RESPIRATION RATE: 18 BRPM | WEIGHT: 126.1 LBS | OXYGEN SATURATION: 100 % | HEIGHT: 63 IN

## 2018-11-21 DIAGNOSIS — G89.18 POST-OP PAIN: Primary | ICD-10-CM

## 2018-11-21 PROCEDURE — 74011250637 HC RX REV CODE- 250/637: Performed by: NURSE PRACTITIONER

## 2018-11-21 PROCEDURE — 74011250637 HC RX REV CODE- 250/637: Performed by: SURGERY

## 2018-11-21 PROCEDURE — 74011250636 HC RX REV CODE- 250/636: Performed by: SURGERY

## 2018-11-21 PROCEDURE — 74011000250 HC RX REV CODE- 250: Performed by: NURSE PRACTITIONER

## 2018-11-21 RX ORDER — OXYCODONE HYDROCHLORIDE 10 MG/1
10 TABLET ORAL
Qty: 40 TAB | Refills: 0 | Status: SHIPPED | OUTPATIENT
Start: 2018-11-21 | End: 2018-12-03 | Stop reason: ALTCHOICE

## 2018-11-21 RX ORDER — HYDROMORPHONE HYDROCHLORIDE 2 MG/1
4 TABLET ORAL
Qty: 60 TAB | Refills: 0 | Status: SHIPPED | OUTPATIENT
Start: 2018-11-21 | End: 2018-12-03 | Stop reason: ALTCHOICE

## 2018-11-21 RX ADMIN — OXYCODONE AND ACETAMINOPHEN 2 TABLET: 5; 325 TABLET ORAL at 02:05

## 2018-11-21 RX ADMIN — SENNOSIDES AND DOCUSATE SODIUM 2 TABLET: 8.6; 5 TABLET ORAL at 08:58

## 2018-11-21 RX ADMIN — Medication 10 ML: at 06:18

## 2018-11-21 RX ADMIN — DEXTROSE MONOHYDRATE, SODIUM CHLORIDE, AND POTASSIUM CHLORIDE 100 ML/HR: 50; 4.5; 1.49 INJECTION, SOLUTION INTRAVENOUS at 02:05

## 2018-11-21 RX ADMIN — POLYETHYLENE GLYCOL 3350 17 G: 17 POWDER, FOR SOLUTION ORAL at 08:58

## 2018-11-21 RX ADMIN — OXYCODONE AND ACETAMINOPHEN 2 TABLET: 5; 325 TABLET ORAL at 10:51

## 2018-11-21 RX ADMIN — OXYCODONE AND ACETAMINOPHEN 2 TABLET: 5; 325 TABLET ORAL at 06:18

## 2018-11-21 RX ADMIN — FLUOXETINE HYDROCHLORIDE 20 MG: 20 CAPSULE ORAL at 08:58

## 2018-11-21 RX ADMIN — DEXTROAMPHETAMINE SACCHARATE, AMPHETAMINE ASPARTATE, DEXTROAMPHETAMINE SULFATE AND AMPHETAMINE SULFATE 30 MG: 2.5; 2.5; 2.5; 2.5 TABLET ORAL at 08:04

## 2018-11-21 RX ADMIN — DULOXETINE HYDROCHLORIDE 30 MG: 30 CAPSULE, DELAYED RELEASE ORAL at 08:58

## 2018-11-21 RX ADMIN — SIMETHICONE 40 MG: 20 SUSPENSION/ DROPS ORAL at 05:23

## 2018-11-21 RX ADMIN — ONDANSETRON HYDROCHLORIDE 4 MG: 2 INJECTION, SOLUTION INTRAMUSCULAR; INTRAVENOUS at 02:42

## 2018-11-21 NOTE — PROGRESS NOTES
Patient discharged home. Pt's  at bedside. Discharge instructions and prescriptions given and explained to patient. Pt verbalized understanding. Peripheral IV discontinued, tip intact, pt tolerated well. Pt chose to ambulate off unit. Pt's  to transport pt home.

## 2018-11-21 NOTE — TELEPHONE ENCOUNTER
Patient calls stating the Dilaudid that was given to her at discharge was a weaning dose. She is requesting Oxycodone 10 mg. Dr. Duane Pila has agreed to write the prescription and patient was told she can pick it up at the .

## 2018-11-21 NOTE — PROGRESS NOTES
General Surgery Daily Progress Note Admit Date: 11/15/2018 Post-Operative Day: 6 Days Post-Op from Procedure(s): 
REPAIR OF VENTRAL INCISIONAL HERNIA Subjective:  
 
Last 24 hrs: Resting quietly in bed. Inquiring about discharge. Wants to go home. Still no BM. Denies NV. Voiding. + flatus Feeling despressed Objective:  
 
Blood pressure 121/79, pulse 71, temperature 98.1 °F (36.7 °C), resp. rate 18, height 5' 3\" (1.6 m), weight 126 lb 1.7 oz (57.2 kg), last menstrual period 2018, SpO2 100 %, not currently breastfeeding. Temp (24hrs), Av.3 °F (36.8 °C), Min:97.3 °F (36.3 °C), Max:99 °F (37.2 °C) 
 
 
_____________________ Physical Exam:    
Alert and Oriented, talkative, in no acute distress. Cardiovascular: RRR Lungs:CTAB Abdomen: soft, mildly distended. +BS. Midline incision c/d/i, staples in place Assessment:  
Principal Problem: 
  Incisional hernia, without obstruction or gangrene (2018) Active Problems: 
  Incisional hernia (11/15/2018) Plan: D/c to home F/up in office w Dr. Kurt olivares NP next week Data Review: 
 
Recent Labs 18 
0120 WBC 8.0 HGB 11.8 HCT 36.3  Recent Labs 18 
0120   
K 4.3 CL 98  
CO2 30 * BUN 4*  
CREA 0.47* CA 8.6 No results for input(s): AML, LPSE in the last 72 hours. ______________________ Medications: 
 
Current Facility-Administered Medications Medication Dose Route Frequency  diphenhydrAMINE (BENADRYL) injection 12.5 mg  12.5 mg IntraVENous Q6H PRN  
 oxyCODONE-acetaminophen (PERCOCET) 5-325 mg per tablet 2 Tab  2 Tab Oral Q4H PRN  polyethylene glycol (MIRALAX) packet 17 g  17 g Oral DAILY  ketorolac (TORADOL) injection 15 mg  15 mg IntraVENous Q6H PRN  
 simethicone (MYLICON) 20VQ/8.7JN oral drops 40 mg  40 mg Oral QID PRN  
 dextroamphetamine-amphetamine (ADDERALL) tablet 30 mg  30 mg Oral BID  
  acetaminophen (TYLENOL) tablet 1,000 mg  1,000 mg Oral Q8H PRN  
 senna-docusate (PERICOLACE) 8.6-50 mg per tablet 2 Tab  2 Tab Oral DAILY  zolpidem (AMBIEN) tablet 5 mg  5 mg Oral QHS PRN  
 nicotine (NICODERM CQ) 14 mg/24 hr patch 1 Patch  1 Patch TransDERmal DAILY  DULoxetine (CYMBALTA) capsule 30 mg  30 mg Oral DAILY  FLUoxetine (PROzac) capsule 20 mg  20 mg Oral DAILY  sodium chloride (NS) flush 5-10 mL  5-10 mL IntraVENous Q8H  
 sodium chloride (NS) flush 5-10 mL  5-10 mL IntraVENous PRN  
 dextrose 5% - 0.45% NaCl with KCl 20 mEq/L infusion  100 mL/hr IntraVENous CONTINUOUS  
 ondansetron (ZOFRAN) injection 4 mg  4 mg IntraVENous Q4H PRN  
 enoxaparin (LOVENOX) injection 40 mg  40 mg SubCUTAneous Q24H Jeyson Lucio PA-C 
11/21/2018

## 2018-11-21 NOTE — PROGRESS NOTES
Bedside and Verbal shift change report given to Ana (oncoming nurse) by [facility] (offgoing nurse). Report included the following information SBAR, Kardex, ED Summary, Procedure Summary, Intake/Output, MAR and Recent Results.

## 2018-11-21 NOTE — TELEPHONE ENCOUNTER
Patient said the 4mg Dilaud every 4 hours is not working and she needs 8mg She wants to make sure Dr. Tonya Rogers meant to write it that way. She said she is in a lot of pain.

## 2018-11-21 NOTE — DISCHARGE INSTRUCTIONS
Surgical Discharge Instructions  [unfilled]  Procedure: Procedure(s):  REPAIR OF VENTRAL INCISIONAL HERNIA    Please return to your surgeon's office for a 2 week follow-up appointment. Future Appointments   Date Time Provider Kd Kaufman   12/3/2018 11:20 AM FLY Kelly       Office address is: [address]  [place] [place] [address]  [phone]  Post Operative Surgical Instructions:  After your surgery, it is normal to feel weak and tired for a few  days after you return home. If you had laparoscopic surgery, you may also have pain in your shoulder for about 24 hours. · For a laparoscopic surgery, most people can go back to work or their normal routine in 1 to 2 weeks, but it may take longer, depending on the type of work you do. This care sheet gives you a general idea about how long it will take for you to recover. However, each person recovers at a different pace. Follow the steps below to get better as quickly as possible. How can you care for yourself at home? Activity  · Rest when you feel tired. Getting enough sleep will help you recover. · Try to walk each day. Start out by walking a little more than you did the day before. Gradually increase the amount you walk. Walking boosts blood flow and helps prevent pneumonia and constipation. · For about 2 to 4 weeks, avoid lifting anything that would make you strain. This may include a child, heavy grocery bags and milk containers, a heavy briefcase or backpack, cat litter or dog food bags, or a vacuum . · Avoid strenuous activities, such as biking, jogging, weightlifting, and aerobic exercise, until your doctor says it is okay. · You may shower. Kelli Hillson the incisions dry. Do not take a bath for the first 2 weeks, or until your doctor tells you it is okay.   · You may drive when you are no longer taking pain medicine and can quickly move your foot from the gas pedal to the brake. You must also be able to sit comfortably for a long period of time, even if you do not plan to go far. You might get caught in traffic. · For a laparoscopic surgery, most people can go back to work or their normal routine in 1 to 2 weeks, but it may take longer. Diet  · Eat smaller meals more often instead of fewer larger meals. You can eat a normal diet. If your stomach is upset, try bland, low-fat foods like plain rice, broiled chicken, toast, and yogurt. · Drink plenty of fluids (unless your doctor tells you not to). · You may notice that your bowel movements are not regular right after your surgery. This is common. Try to avoid constipation and straining with bowel movements. You may want to take a fiber supplement and a stool softener such as colace every day. If you have not had a bowel movement after a couple of days, ask your doctor about taking a mild laxative. Medicines  · Take pain medicines exactly as directed. ¨ If the doctor gave you a prescription medicine for pain, take it as prescribed. ¨ If you are not taking a prescription pain medicine, take an over-the-counter medicine such as acetaminophen (Tylenol), . Read and follow all instructions on the label. ¨ Do not take two or more pain medicines at the same time unless the doctor told you to. Many pain medicines contain acetaminophen, which is Tylenol. Too much Tylenol can be harmful. · If you think your pain medicine is making you sick to your stomach:  ¨ Take your medicine after meals (unless your doctor tells you not to). ¨ Ask your doctor for a different pain medicine. · If your doctor prescribed antibiotics, take them as directed. Do not stop taking them just because you feel better. You need to take the full course of antibiotics. Incision care  · After 24 to 48 hours, wash the area daily with warm, soapy water, and pat it dry. · Keep the area clean and dry.  You may cover it with a gauze bandage if it weeps or rubs against clothing. Change the bandage every day. Follow-up care is a key part of your treatment and safety. Please call our office for a 2 week follow follow up appointment with your surgeon at [phone]  Be sure to make and go to all appointments, and call your doctor if you are having problems. Call your doctor now or seek immediate medical care if:  · You are sick to your stomach and cannot drink fluids. · You have pain that does not get better when you take your pain medicine. · You have signs of infection, such as:  ¨ Increased pain, swelling, warmth, or redness. ¨ Red streaks leading from the incision. ¨ Pus draining from the incision. ¨ Swollen lymph nodes in your neck, armpits, or groin. ¨ A fever  · Bright red blood has soaked through a large bandage over your incision. · You have signs of a blood clot, such as:  ¨ Pain in your calf, back of knee, thigh, or groin. © 4406-9545 [employer], [employer]. Care instructions adapted under license by [facility] (which disclaims liability or warranty for this information). This care instruction is for use with your licensed healthcare professional. If you have questions about a medical condition or this instruction, always ask your healthcare professional. Bunny Marinellior any warranty or liability for your use of this information. Abdominal Hernia Repair: What to Expect at Home  Your Recovery  After surgery to repair your hernia, you are likely to have pain for a few days. You may also feel like you have the flu, and you may have a low fever and feel tired and nauseated. This is common. You should feel better after a few days and will probably feel much better in 7 days. For several weeks you may feel twinges or pulling in the hernia repair when you move. You may have some bruising around the area of your hernia repair.  This is normal.  This care sheet gives you a general idea about how long it will take for you to recover. But each person recovers at a different pace. Follow the steps below to get better as quickly as possible. How can you care for yourself at home? Activity    · Rest when you feel tired. Getting enough sleep will help you recover.     · Try to walk each day. Start by walking a little more than you did the day before. Bit by bit, increase the amount you walk. Walking boosts blood flow and helps prevent pneumonia and constipation.     · If your doctor gives you an abdominal binder to wear, use it as directed. This is an elastic bandage that wraps around your belly and upper hips. It helps support your belly muscles after surgery.     · Avoid strenuous activities, such as biking, jogging, weight lifting, or aerobic exercise, until your doctor says it is okay.     · Avoid lifting anything that would make you strain. This may include heavy grocery bags and milk containers, a heavy briefcase or backpack, cat litter or dog food bags, a vacuum , or a child.     · Ask your doctor when you can drive again.     · Most people are able to return to work within 1 to 2 weeks after surgery. But if your job requires that you to do heavy lifting or strenuous activity, you may need to take 4 to 6 weeks off from work.     · You may shower 24 to 48 hours after surgery, if your doctor okays it. Pat the cut (incision) dry. Do not take a bath for the first 2 weeks, or until your doctor tells you it is okay.     · Ask your doctor when it is okay for you to have sex. Diet    · You can eat your normal diet. If your stomach is upset, try bland, low-fat foods like plain rice, broiled chicken, toast, and yogurt.     · Drink plenty of fluids (unless your doctor tells you not to).     · You may notice that your bowel movements are not regular right after your surgery. This is common. Avoid constipation and straining with bowel movements. You may want to take a fiber supplement every day.  If you have not had a bowel movement after a couple of days, ask your doctor about taking a mild laxative. Medicines    · Your doctor will tell you if and when you can restart your medicines. He or she will also give you instructions about taking any new medicines.     · If you take blood thinners, such as warfarin (Coumadin), clopidogrel (Plavix), or aspirin, be sure to talk to your doctor. He or she will tell you if and when to start taking those medicines again. Make sure that you understand exactly what your doctor wants you to do.     · Be safe with medicines. Take pain medicines exactly as directed. ? If the doctor gave you a prescription medicine for pain, take it as prescribed. ? If you are not taking a prescription pain medicine, ask your doctor if you can take an over-the-counter medicine.     · If your doctor prescribed antibiotics, take them as directed. Do not stop taking them just because you feel better. You need to take the full course of antibiotics.     · If you think your pain medicine is making you sick to your stomach:  ? Take your medicine after meals (unless your doctor has told you not to). ? Ask your doctor for a different pain medicine. Incision care    · If you have strips of tape on the cut (incision) the doctor made, leave the tape on for a week or until it falls off. Or follow your doctor's instructions for removing the tape.     · If you have staples closing the cut, you will need to visit your doctor in 1 to 2 weeks to have them removed.     · Wash the area daily with warm, soapy water, and pat it dry. Don't use hydrogen peroxide or alcohol, which can slow healing. You may cover the area with a gauze bandage if it weeps or rubs against clothing. Change the bandage every day. Other instructions    · Hold a pillow over your incision when you cough or take deep breaths. This will support your belly and decrease your pain.     · Do breathing exercises at home as instructed by your doctor.  This will help prevent pneumonia.     · If you had laparoscopic surgery, you may also have pain in your left shoulder. The pain usually lasts about a day or two. Follow-up care is a key part of your treatment and safety. Be sure to make and go to all appointments, and call your doctor if you are having problems. It's also a good idea to know your test results and keep a list of the medicines you take. When should you call for help? Call 911 anytime you think you may need emergency care. For example, call if:    · You passed out (lost consciousness).     · You are short of breath.    Call your doctor now or seek immediate medical care if:    · You are sick to your stomach and cannot drink fluids.     · You have signs of a blood clot in your leg (called a deep vein thrombosis), such as:  ? Pain in your calf, back of the knee, thigh, or groin. ? Redness and swelling in your leg or groin.     · You have signs of infection, such as:  ? Increased pain, swelling, warmth, or redness. ? Red streaks leading from the incision. ? Pus draining from the incision. ? A fever.     · You cannot pass stools or gas.     · You have pain that does not get better after you take pain medicine.     · You have loose stitches, or your incision comes open.     · Bright red blood has soaked through the bandage over your incision.    Watch closely for changes in your health, and be sure to contact your doctor if you have any problems. Where can you learn more? Go to http://maria c-checo.info/. Enter B577 in the search box to learn more about \"Abdominal Hernia Repair: What to Expect at Home. \"  Current as of: March 28, 2018  Content Version: 11.8  © 2603-0083 [employer]. Care instructions adapted under license by [employer] (which disclaims liability or warranty for this information).  If you have questions about a medical condition or this instruction, always ask your healthcare professional. [employer] disclaims any warranty or liability for your use of this information.

## 2018-11-27 NOTE — DISCHARGE SUMMARY
Physician Discharge Summary     Patient ID:  Claudio Hill  [id number]  88 y.o.  1980    Allergies: Amoxicillin    Admit Date: 11/15/2018    Discharge Date: 11/21/2018    * Admission Diagnoses: VENTRAL INCISIONAL HERNIA; Incisional hernia    * Discharge Diagnoses:    Hospital Problems as of 11/21/2018 Date Reviewed: 11/15/2018          Codes Class Noted - Resolved POA    Incisional hernia ICD-10-CM: K43.2  ICD-9-CM: 553.21  11/15/2018 - Present Unknown        * (Principal) Incisional hernia, without obstruction or gangrene ICD-10-CM: K43.2  ICD-9-CM: 553.21  11/4/2018 - Present Yes               Admission Condition: Good    * Discharge Condition: good    * Procedures: Procedure(s):  REPAIR OF VENTRAL [place] Course:   Underwent open mesh underlay repair of ventral incisional hernia. Pain control was a challenge due to her being on narcotics for a prolonged time following distal pancreatectomy with complications. Eventually able to take PO pain meds and go home. Consults: None    Significant Diagnostic Studies: none    * Disposition: Home    Discharge Medications:   Discharge Medication List as of 11/21/2018 12:36 PM      CONTINUE these medications which have CHANGED    Details   HYDROmorphone (DILAUDID) 2 mg tablet Take 2 Tabs by mouth every four (4) hours as needed for Pain. Max Daily Amount: 24 mg., Print, Disp-60 Tab, R-0         CONTINUE these medications which have NOT CHANGED    Details   senna (SENNA) 8.6 mg tablet Take 1 Tab by mouth as needed for Constipation. , Historical Med      FLUoxetine (PROZAC) 20 mg capsule Take  by mouth daily. , Historical Med      ALPRAZolam (XANAX) 1 mg tablet Historical Med      dextroamphetamine-amphetamine (ADDERALL) 30 mg tablet Historical Med      clonazePAM (KLONOPIN) 0.5 mg tablet as needed., Historical Med      DULoxetine (CYMBALTA) 60 mg capsule Take 1 Cap by mouth daily. , Normal, Disp-90 Cap, R-1      acetaminophen (TYLENOL) 500 mg tablet Take 500 mg by mouth every eight (8) hours as needed for Pain., Historical Med      naloxone (NARCAN) 4 mg/actuation nasal spray Use 1 spray intranasally into 1 nostril. Use a new Narcan nasal spray for subsequent doses and administer into alternating nostrils. May repeat every 2 to 3 minutes as needed. , Print, Disp-3 Each, R-0      naloxone 2 mg/actuation spry Use 1 spray intranasally into 1 nostril. Use a new Narcan nasal spray for subsequent doses and administer into alternating nostrils. May repeat every 2 to 3 minutes as needed. , Print, Disp-1 Packet, R-0      ibuprofen (MOTRIN IB) 200 mg tablet Take 800 mg by mouth every eight (8) hours as needed., Historical Med             * Follow-up Care/Patient Instructions:   Activity: Activity as tolerated  Diet: Regular Diet  Wound Care: Keep wound clean and dry    Follow-up Information     Follow up With Specialties Details Why Contact Info    Felecia Sotelo., FLY Nurse Practitioner   [address] [place] [address] [address]  [phone]              Signed:  Magali Pal MD  11/27/2018  4:13 PM

## 2018-11-27 NOTE — PROGRESS NOTES
During her hospitalization she continued to deal with protein calorie malnutrition that was present on admission and was improving at the time of her admission

## 2018-12-03 ENCOUNTER — OFFICE VISIT (OUTPATIENT)
Dept: SURGERY | Age: 38
End: 2018-12-03

## 2018-12-03 VITALS
HEIGHT: 63 IN | SYSTOLIC BLOOD PRESSURE: 130 MMHG | DIASTOLIC BLOOD PRESSURE: 78 MMHG | BODY MASS INDEX: 21.97 KG/M2 | OXYGEN SATURATION: 96 % | RESPIRATION RATE: 18 BRPM | TEMPERATURE: 98.5 F | HEART RATE: 80 BPM | WEIGHT: 124 LBS

## 2018-12-03 DIAGNOSIS — Z09 POSTOPERATIVE EXAMINATION: Primary | ICD-10-CM

## 2018-12-03 DIAGNOSIS — K43.2 INCISIONAL HERNIA, WITHOUT OBSTRUCTION OR GANGRENE: ICD-10-CM

## 2018-12-03 RX ORDER — OXYCODONE HYDROCHLORIDE 5 MG/1
TABLET ORAL
Qty: 40 TAB | Refills: 0 | Status: SHIPPED | OUTPATIENT
Start: 2018-12-03 | End: 2019-05-25

## 2018-12-03 NOTE — PROGRESS NOTES
1. Have you been to the ER, urgent care clinic since your last visit? Hospitalized since your last visit? No    2. Have you seen or consulted any other health care providers outside of the [address] [place] [address] since your last visit? Include any pap smears or colon screening. No    complains of pain.    \"I have a sharp pain when talk or breathe\"   \"hurts when I lay down\"   \"hurts when I sit up\"

## 2018-12-03 NOTE — PATIENT INSTRUCTIONS
Abdominal Hernia Repair: What to Expect at Home  Your Recovery  After surgery to repair your hernia, you are likely to have pain for a few days. You may also feel like you have the flu, and you may have a low fever and feel tired and nauseated. This is common. You should feel better after a few days and will probably feel much better in 7 days. For several weeks you may feel twinges or pulling in the hernia repair when you move. You may have some bruising around the area of your hernia repair. This is normal.  This care sheet gives you a general idea about how long it will take for you to recover. But each person recovers at a different pace. Follow the steps below to get better as quickly as possible. How can you care for yourself at home? Activity    · Rest when you feel tired. Getting enough sleep will help you recover.     · Try to walk each day. Start by walking a little more than you did the day before. Bit by bit, increase the amount you walk. Walking boosts blood flow and helps prevent pneumonia and constipation.     · If your doctor gives you an abdominal binder to wear, use it as directed. This is an elastic bandage that wraps around your belly and upper hips. It helps support your belly muscles after surgery.     · Avoid strenuous activities, such as biking, jogging, weight lifting, or aerobic exercise, until your doctor says it is okay.     · Avoid lifting anything that would make you strain. This may include heavy grocery bags and milk containers, a heavy briefcase or backpack, cat litter or dog food bags, a vacuum , or a child.     · Ask your doctor when you can drive again.     · Most people are able to return to work within 1 to 2 weeks after surgery. But if your job requires that you to do heavy lifting or strenuous activity, you may need to take 4 to 6 weeks off from work.     · You may shower 24 to 48 hours after surgery, if your doctor okays it. Pat the cut (incision) dry.  Do not take a bath for the first 2 weeks, or until your doctor tells you it is okay.     · Ask your doctor when it is okay for you to have sex. Diet    · You can eat your normal diet. If your stomach is upset, try bland, low-fat foods like plain rice, broiled chicken, toast, and yogurt.     · Drink plenty of fluids (unless your doctor tells you not to).     · You may notice that your bowel movements are not regular right after your surgery. This is common. Avoid constipation and straining with bowel movements. You may want to take a fiber supplement every day. If you have not had a bowel movement after a couple of days, ask your doctor about taking a mild laxative. Medicines    · Your doctor will tell you if and when you can restart your medicines. He or she will also give you instructions about taking any new medicines.     · If you take blood thinners, such as warfarin (Coumadin), clopidogrel (Plavix), or aspirin, be sure to talk to your doctor. He or she will tell you if and when to start taking those medicines again. Make sure that you understand exactly what your doctor wants you to do.     · Be safe with medicines. Take pain medicines exactly as directed. ? If the doctor gave you a prescription medicine for pain, take it as prescribed. ? If you are not taking a prescription pain medicine, ask your doctor if you can take an over-the-counter medicine.     · If your doctor prescribed antibiotics, take them as directed. Do not stop taking them just because you feel better. You need to take the full course of antibiotics.     · If you think your pain medicine is making you sick to your stomach:  ? Take your medicine after meals (unless your doctor has told you not to). ? Ask your doctor for a different pain medicine. Incision care    · If you have strips of tape on the cut (incision) the doctor made, leave the tape on for a week or until it falls off. Or follow your doctor's instructions for removing the tape.   · If you have staples closing the cut, you will need to visit your doctor in 1 to 2 weeks to have them removed.     · Wash the area daily with warm, soapy water, and pat it dry. Don't use hydrogen peroxide or alcohol, which can slow healing. You may cover the area with a gauze bandage if it weeps or rubs against clothing. Change the bandage every day. Other instructions    · Hold a pillow over your incision when you cough or take deep breaths. This will support your belly and decrease your pain.     · Do breathing exercises at home as instructed by your doctor. This will help prevent pneumonia.     · If you had laparoscopic surgery, you may also have pain in your left shoulder. The pain usually lasts about a day or two. Follow-up care is a key part of your treatment and safety. Be sure to make and go to all appointments, and call your doctor if you are having problems. It's also a good idea to know your test results and keep a list of the medicines you take. When should you call for help? Call 911 anytime you think you may need emergency care. For example, call if:    · You passed out (lost consciousness).     · You are short of breath.    Call your doctor now or seek immediate medical care if:    · You are sick to your stomach and cannot drink fluids.     · You have signs of a blood clot in your leg (called a deep vein thrombosis), such as:  ? Pain in your calf, back of the knee, thigh, or groin. ? Redness and swelling in your leg or groin.     · You have signs of infection, such as:  ? Increased pain, swelling, warmth, or redness. ? Red streaks leading from the incision. ? Pus draining from the incision. ?  A fever.     · You cannot pass stools or gas.     · You have pain that does not get better after you take pain medicine.     · You have loose stitches, or your incision comes open.     · Bright red blood has soaked through the bandage over your incision.    Watch closely for changes in your health, and be sure to contact your doctor if you have any problems. Where can you learn more? Go to http://maria [employer]-che[employer].info/. Enter B577 in the search box to learn more about \"Abdominal Hernia Repair: What to Expect at Home. \"  Current as of: March 28, 2018  Content Version: 11.8  © 8335-9058 [employer]. Care instructions adapted under license by [employer] (which disclaims liability or warranty for this information). If you have questions about a medical condition or this instruction, always ask your healthcare professional. [address] any warranty or liability for your use of this information.

## 2018-12-03 NOTE — PROGRESS NOTES
Subjective:    Yulisa Elizabeth is a 34 y.o. female presents for postop care 18 days following incisional hernia repair by Dr. Radha Frank. Appetite is good. Eating a regular diet  without difficulty. Bowel movements are  Regular. Taking senna and miralax. Pain is not well controlled. Medication(s) being used: oxycodone 10mg every 4 hours. Also is taking tylenol and/or ibuprofen oTC PRN. AFter further discussion, pt says that pain is controlled with current dose but was concerned why she was having pain. Pt willing to wean down the oxycodone but mentioned that she would like to switch back to dilaudid at some point. . Denies nausea, shortness of breath, chest pain, redness at incision site, vomiting and diarrhea  Fevers : pt mentioned that she takes her temperature daily and will have \"on and off\" fevers of 99F, sometimes up to 100F/101F. This has been going on for months. She didn't have one during her post op hospital stay, but had \"only a few\" since her discharge. None in the past few days. Has Naloxone on hand. Advance directive not on file      Objective:     Visit Vitals  /78 (BP 1 Location: Right arm, BP Patient Position: Sitting)   Pulse 80   Temp 98.5 °F (36.9 °C) (Oral)   Resp 18   Ht 5' 3\" (1.6 m)   Wt 124 lb (56.2 kg)   LMP 11/04/2018   SpO2 96%   BMI 21.97 kg/m²       General:  alert, no distress, accompanied by    Abdomen: soft, bowel sounds active, appropriate TTP   Incision:   healing well, no drainage, no erythema, no seroma, appropriate post op swelling, no dehiscence, incisions well approximated   Heart: regular rate and rhythm, S1, S2 normal, no murmur, click, rub or gallop   Lungs: clear to auscultation bilaterally       Assessment:     Ventral incisional hernia status post repair. Doing well postoperatively. Pain control    Plan:     1. Pt is to increase activities as tolerated. No lifting >10 lbs, maybe 15 lb if no pain.   2. Follow-up in2 weeks, more so for pain management  3. Wound care discussed - healing well  4. Fevers : d/w with Pt and  to call us if temp >100F  5. Pain control : rx for roxicodone 5mg, take 1-2 tabs every 4 hrs prn pain. Goal is to decrease from 10mg every 4 hours down to 5mg. Take ibuprofen and/or tylenol to augment pain control. 6. Abdominal binder : rx. Recommend for patient to wear day and night. Ms. Min Irby has a reminder for a \"due or due soon\" health maintenance. I have asked that she contact her primary care provider for follow-up on this health maintenance. Patient verbalized understanding and agreement.

## 2018-12-12 ENCOUNTER — OFFICE VISIT (OUTPATIENT)
Dept: SURGERY | Age: 38
End: 2018-12-12

## 2018-12-12 VITALS
DIASTOLIC BLOOD PRESSURE: 80 MMHG | BODY MASS INDEX: 21.09 KG/M2 | HEIGHT: 63 IN | SYSTOLIC BLOOD PRESSURE: 120 MMHG | RESPIRATION RATE: 18 BRPM | TEMPERATURE: 98.3 F | WEIGHT: 119 LBS | HEART RATE: 108 BPM | OXYGEN SATURATION: 95 %

## 2018-12-12 DIAGNOSIS — Z09 POSTOPERATIVE EXAMINATION: Primary | ICD-10-CM

## 2018-12-12 DIAGNOSIS — G89.18 POST-OP PAIN: ICD-10-CM

## 2018-12-12 RX ORDER — OXYCODONE HYDROCHLORIDE 10 MG/1
10 TABLET ORAL
Qty: 130 TAB | Refills: 0 | Status: SHIPPED | OUTPATIENT
Start: 2018-12-12 | End: 2019-01-02 | Stop reason: SDUPTHER

## 2018-12-12 NOTE — PROGRESS NOTES
Subjective:      Lenore West  is a 34 y.o.  female who presents with pain following a ventral incisional hernia repair (11/15/2018). Pt rates the pain as a 5/10 throughout her whole abdomen that radiates to her back and thigh. Pt states that she is currently taking 2x 5mg oxycodone every 4 hrs for her pain. Her family states that she has been in a lot of pain since her surgery  and had a fever last week. Pt states that her pain is predominantly on the left side of her abdomen, and is exacerbated by talking, blowing her nose, and pressure on that area. Pt also states that she has had several abscesses and infections at the incision site. Patient's family is present and is contributing to the HPI. Past Medical History:   Diagnosis Date    Abscess of abdominal cavity ([address] 7/30/2018    Anxiety     Blurred vision     Chest pain     Chronic pain 04/2018    MUSCLE WEAKNESS,PANCREATIC CYST -NO LONGER AN ISSUE    Depression     AND ANXIETY    Frequent headaches     Incisional hernia, without obstruction or gangrene 11/4/2018    Muscle weakness     Nicotine vapor product user     Pancreatic cyst 3/21/2018    Shortness of breath     Stomach pain     Swallowing difficulty        Past Surgical History:   Procedure Laterality Date    HX GI      COLONOSCOPY    HX GYN  2005    endometriosis surgery    HX HERNIA REPAIR  11/15/2018    Ventral incisional hermia repair by Dr. Meche Souza  04/20/2018    lap distal pancreatectomy converted to open-[facility]-DR. Mulugeta Johnson       Social History     Tobacco Use    Smoking status: Current Every Day Smoker     Packs/day: 1.00     Years: 25.00     Pack years: 25.00    Smokeless tobacco: Current User    Tobacco comment: STOP SMOKING BOOKLET PROVIDED   Substance Use Topics    Alcohol use: No     Comment: ONCE EVERY TWO MONTHS       Family History   Problem Relation Age of Onset    Cancer Mother         colon    Cancer Father skin    Anesth Problems Father         SUCCINYLCHOLINE  REACTION       Current Outpatient Medications on File Prior to Visit   Medication Sig Dispense Refill    oxyCODONE IR (ROXICODONE) 5 mg immediate release tablet Take one or two tablets every 4 hours as needed for pain. 40 Tab 0    senna (SENNA) 8.6 mg tablet Take 1 Tab by mouth as needed for Constipation.  FLUoxetine (PROZAC) 20 mg capsule Take 40 mg by mouth daily.  ALPRAZolam (XANAX) 1 mg tablet       dextroamphetamine-amphetamine (ADDERALL) 30 mg tablet       clonazePAM (KLONOPIN) 0.5 mg tablet as needed.  acetaminophen (TYLENOL) 500 mg tablet Take 500 mg by mouth every eight (8) hours as needed for Pain.  ibuprofen (MOTRIN IB) 200 mg tablet Take 800 mg by mouth every eight (8) hours as needed.  DULoxetine (CYMBALTA) 60 mg capsule Take 1 Cap by mouth daily. (Patient taking differently: Take 30 mg by mouth daily. Patient states she takes 30mg daily) 90 Cap 1    naloxone (NARCAN) 4 mg/actuation nasal spray Use 1 spray intranasally into 1 nostril. Use a new Narcan nasal spray for subsequent doses and administer into alternating nostrils. May repeat every 2 to 3 minutes as needed. 3 Each 0    naloxone 2 mg/actuation spry Use 1 spray intranasally into 1 nostril. Use a new Narcan nasal spray for subsequent doses and administer into alternating nostrils. May repeat every 2 to 3 minutes as needed. 1 Packet 0     No current facility-administered medications on file prior to visit. Allergies   Allergen Reactions    Amoxicillin Shortness of Breath and Rash     Pt has had keflex in the past         Review of Systems:    Pertinent items are noted in the History of Present Illness.     Objective:     Visit Vitals  /80 (BP 1 Location: Right arm, BP Patient Position: Sitting)   Pulse (!) 108   Temp 98.3 °F (36.8 °C) (Oral)   Resp 18   Ht 5' 3\" (1.6 m)   Wt 119 lb (54 kg)   SpO2 95%   BMI 21.08 kg/m²        Physical Exam:  GENERAL: alert, cooperative, no distress, appears stated age  ABDOMEN: Her most recent incision is healing well, there are no masses     Labs: No results found for this or any previous visit (from the past 24 hour(s)). Assessment and Plan:       ICD-10-CM ICD-9-CM    1. Postoperative examination Z09 V67.00    2. Post-op pain G89.18 338.18 oxyCODONE IR (ROXICODONE) 10 mg tab immediate release tablet       I think this pain is due to the body healing. I would like to change her prescription to 10mg of oxycodone to help with the pain. I would like her to make an appointment with Dr. Cyndy Packer to get lined up for her narcotic taper, and I would like to see her again in 2 weeks. I answered all the patients question. The patient agrees and will schedule this accordingly. This document was scribed by Roberta Jara as dictated by Dr. Bulmaro Rosales.      Signed By: Pete Vera MD     12/12/18

## 2018-12-19 ENCOUNTER — TELEPHONE (OUTPATIENT)
Dept: SURGERY | Age: 38
End: 2018-12-19

## 2019-01-02 ENCOUNTER — OFFICE VISIT (OUTPATIENT)
Dept: SURGERY | Age: 39
End: 2019-01-02

## 2019-01-02 VITALS
OXYGEN SATURATION: 97 % | RESPIRATION RATE: 16 BRPM | SYSTOLIC BLOOD PRESSURE: 120 MMHG | BODY MASS INDEX: 22.15 KG/M2 | DIASTOLIC BLOOD PRESSURE: 80 MMHG | WEIGHT: 125 LBS | HEART RATE: 99 BPM | HEIGHT: 63 IN | TEMPERATURE: 98.2 F

## 2019-01-02 DIAGNOSIS — G89.18 POST-OP PAIN: ICD-10-CM

## 2019-01-02 RX ORDER — OXYCODONE HYDROCHLORIDE 10 MG/1
10 TABLET ORAL
Qty: 40 TAB | Refills: 0 | Status: SHIPPED | OUTPATIENT
Start: 2019-01-02 | End: 2019-05-25

## 2019-01-02 NOTE — PROGRESS NOTES
Subjective:      Oc Soto  is a 34 y.o.  female who presents for a PO 3 week f/u for ventral incisional hernia (11/15/2018). She states that laying on her right side exacerbates pain on the left side. She states that the pain is predominantly in the left upper quadrant. She notes that she has been more active, and she would like to start practicing yoga and wanted to make sure that level of activity would not disrupt her healing. Pt's family states that she is still complaining of pain but believes it is due to her being more active. She states that she is still taking 10 mg of oxycodone 4-5x a day. She mentions that she has an appointment with Dr. Felipe Noel on 1/09/2018 to begin weaning off the narcotics. Patient's family is present and is contributing to the HPI. Past Medical History:   Diagnosis Date    Abscess of abdominal cavity ([address] 7/30/2018    Anxiety     Blurred vision     Chest pain     Chronic pain 04/2018    MUSCLE WEAKNESS,PANCREATIC CYST -NO LONGER AN ISSUE    Depression     AND ANXIETY    Frequent headaches     Incisional hernia, without obstruction or gangrene 11/4/2018    Muscle weakness     Nicotine vapor product user     Pancreatic cyst 3/21/2018    Shortness of breath     Stomach pain     Swallowing difficulty        Past Surgical History:   Procedure Laterality Date    HX GI      COLONOSCOPY    HX GYN  2005    endometriosis surgery    HX HERNIA REPAIR  11/15/2018    Ventral incisional hermia repair by Dr. Caren Troncoso  04/20/2018    lap distal pancreatectomy converted to open-[facility]-DR. Ban Burton       Social History     Tobacco Use    Smoking status: Current Every Day Smoker     Packs/day: 1.00     Years: 25.00     Pack years: 25.00    Smokeless tobacco: Current User    Tobacco comment: STOP SMOKING BOOKLET PROVIDED   Substance Use Topics    Alcohol use: No     Comment: ONCE EVERY TWO MONTHS       Family History   Problem Relation Age of Onset    Cancer Mother         colon    Cancer Father         skin    Anesth Problems Father         SUCCINYLCHOLINE  REACTION       Current Outpatient Medications on File Prior to Visit   Medication Sig Dispense Refill    oxyCODONE IR (ROXICODONE) 5 mg immediate release tablet Take one or two tablets every 4 hours as needed for pain. 40 Tab 0    senna (SENNA) 8.6 mg tablet Take 1 Tab by mouth as needed for Constipation.  FLUoxetine (PROZAC) 20 mg capsule Take 40 mg by mouth daily.  ALPRAZolam (XANAX) 1 mg tablet       dextroamphetamine-amphetamine (ADDERALL) 30 mg tablet       clonazePAM (KLONOPIN) 0.5 mg tablet as needed.  DULoxetine (CYMBALTA) 60 mg capsule Take 1 Cap by mouth daily. (Patient taking differently: Take 30 mg by mouth daily. Patient states she takes 30mg daily) 90 Cap 1    naloxone (NARCAN) 4 mg/actuation nasal spray Use 1 spray intranasally into 1 nostril. Use a new Narcan nasal spray for subsequent doses and administer into alternating nostrils. May repeat every 2 to 3 minutes as needed. 3 Each 0    naloxone 2 mg/actuation spry Use 1 spray intranasally into 1 nostril. Use a new Narcan nasal spray for subsequent doses and administer into alternating nostrils. May repeat every 2 to 3 minutes as needed. 1 Packet 0    acetaminophen (TYLENOL) 500 mg tablet Take 500 mg by mouth every eight (8) hours as needed for Pain.  ibuprofen (MOTRIN IB) 200 mg tablet Take 800 mg by mouth every eight (8) hours as needed. No current facility-administered medications on file prior to visit. Allergies   Allergen Reactions    Amoxicillin Shortness of Breath and Rash     Pt has had keflex in the past         Review of Systems:    Pertinent items are noted in the History of Present Illness.     Objective:     Visit Vitals  /80 (BP 1 Location: Right arm, BP Patient Position: Sitting)   Pulse 99   Temp 98.2 °F (36.8 °C) (Oral)   Resp 16   Ht 5' 3\" (1.6 m)   Wt 125 lb (56.7 kg)   SpO2 97%   BMI 22.14 kg/m²        Physical Exam:  GENERAL: alert, cooperative, no distress, appears stated age  ABDOMEN: Soft, non-tender, no masses, no organomegaly. Labs: No results found for this or any previous visit (from the past 24 hour(s)). Assessment and Plan:       ICD-10-CM ICD-9-CM    1. Post-op pain G89.18 338.18 oxyCODONE IR (ROXICODONE) 10 mg tab immediate release tablet       I believe that her pain is due to her healing. I advised her to continue her activities as she sees fit, but to take it slow until her pain is completely managed. She will see Dr. Viviane Ricketts on 1/09/2019 to begin weaning off the narcotics. This document was scribed by Beatriz Foss as dictated by Dr. Jennifer Dunn.      Signed By: Bk Patton MD     01/02/19

## 2019-02-04 ENCOUNTER — OFFICE VISIT (OUTPATIENT)
Dept: SURGERY | Age: 39
End: 2019-02-04

## 2019-02-04 VITALS
BODY MASS INDEX: 23.74 KG/M2 | HEIGHT: 63 IN | HEART RATE: 66 BPM | DIASTOLIC BLOOD PRESSURE: 80 MMHG | OXYGEN SATURATION: 98 % | WEIGHT: 134 LBS | RESPIRATION RATE: 16 BRPM | TEMPERATURE: 98.1 F | SYSTOLIC BLOOD PRESSURE: 118 MMHG

## 2019-02-04 DIAGNOSIS — G89.18 POST-OP PAIN: Primary | ICD-10-CM

## 2019-02-04 RX ORDER — OXYCODONE HYDROCHLORIDE 10 MG/1
10 TABLET ORAL
Qty: 50 TAB | Refills: 0 | Status: SHIPPED | OUTPATIENT
Start: 2019-02-04 | End: 2019-05-25

## 2019-02-04 NOTE — PROGRESS NOTES
1. Have you been to the ER, urgent care clinic since your last visit? Hospitalized since your last visit? No    2. Have you seen or consulted any other health care providers outside of the [address] [place] [address] since your last visit? Include any pap smears or colon screening. Dr. Viviane Ricketts -pain management    Patient requesting Oxycodone stating Percocet pills are too big.

## 2019-02-04 NOTE — PROGRESS NOTES
Subjective:      Joshua Melgar  is a 34 y.o.  female who presents with worsening pain at a previous surgical site. Patient is s/p laparoscopic converted to open distal pancreatectomy (4/20/2018) who developed several complications including post-op fever, abdominal abscess, and an incisional hernia. Her ventral incisional hernia was repaired by me on 11/15/2018, and she has had issues with abdominal pain since. Pt complains of a \"new\" pain that is localized to her left side abdomen that occasionally radiates to her back. She states that it is painful to use her abdominal muscles in any way. She also states that when she sits up, she feels \"something digging into her ribs. \"     Pt also states that she has been having fevers of 100 deg F. She has been tapering off her narcotics. She states she had started stepping down from the percocet and moved from 10 mg to 7.5 mg (as prescribed by Dr. Promise Conteh) when she had a \"setback\" and is no longer getting much relief from 7.5 mg percocet. She would like to switch over to oxycodone if possible. Patient's family is present and is contributing to the HPI. Past Medical History:   Diagnosis Date    Abscess of abdominal cavity ([address] 7/30/2018    Anxiety     Blurred vision     Chest pain     Chronic pain 04/2018    MUSCLE WEAKNESS,PANCREATIC CYST -NO LONGER AN ISSUE    Depression     AND ANXIETY    Frequent headaches     Incisional hernia, without obstruction or gangrene 11/4/2018    Muscle weakness     Nicotine vapor product user     Pancreatic cyst 3/21/2018    Shortness of breath     Stomach pain     Swallowing difficulty        Past Surgical History:   Procedure Laterality Date    HX GI      COLONOSCOPY    HX GYN  2005    endometriosis surgery    HX HERNIA REPAIR  11/15/2018    Ventral incisional hermia repair by Dr. Conor Montemayor  04/20/2018    lap distal pancreatectomy converted to open-[facility]-DR. Graham Waters Social History     Tobacco Use    Smoking status: Current Every Day Smoker     Packs/day: 1.00     Years: 25.00     Pack years: 25.00    Smokeless tobacco: Current User    Tobacco comment: STOP SMOKING BOOKLET PROVIDED   Substance Use Topics    Alcohol use: No     Comment: ONCE EVERY TWO MONTHS       Family History   Problem Relation Age of Onset    Cancer Mother         colon    Cancer Father         skin    Anesth Problems Father         SUCCINYLCHOLINE  REACTION       Current Outpatient Medications on File Prior to Visit   Medication Sig Dispense Refill    oxycodone HCl/acetaminophen (PERCOCET PO) Take  by mouth.  FLUoxetine (PROZAC) 20 mg capsule Take 40 mg by mouth daily.  dextroamphetamine-amphetamine (ADDERALL) 30 mg tablet       clonazePAM (KLONOPIN) 0.5 mg tablet as needed.  acetaminophen (TYLENOL) 500 mg tablet Take 500 mg by mouth every eight (8) hours as needed for Pain.  oxyCODONE IR (ROXICODONE) 10 mg tab immediate release tablet Take 1 Tab by mouth every four (4) hours as needed. Max Daily Amount: 60 mg. 40 Tab 0    oxyCODONE IR (ROXICODONE) 5 mg immediate release tablet Take one or two tablets every 4 hours as needed for pain. 40 Tab 0    senna (SENNA) 8.6 mg tablet Take 1 Tab by mouth as needed for Constipation.  ALPRAZolam (XANAX) 1 mg tablet       DULoxetine (CYMBALTA) 60 mg capsule Take 1 Cap by mouth daily. (Patient taking differently: Take 30 mg by mouth daily. Patient states she takes 30mg daily) 90 Cap 1    naloxone (NARCAN) 4 mg/actuation nasal spray Use 1 spray intranasally into 1 nostril. Use a new Narcan nasal spray for subsequent doses and administer into alternating nostrils. May repeat every 2 to 3 minutes as needed. 3 Each 0    naloxone 2 mg/actuation spry Use 1 spray intranasally into 1 nostril. Use a new Narcan nasal spray for subsequent doses and administer into alternating nostrils. May repeat every 2 to 3 minutes as needed.  1 Packet 0  ibuprofen (MOTRIN IB) 200 mg tablet Take 800 mg by mouth every eight (8) hours as needed. No current facility-administered medications on file prior to visit. Allergies   Allergen Reactions    Amoxicillin Shortness of Breath and Rash     Pt has had keflex in the past         Review of Systems:    Pertinent items are noted in the History of Present Illness. Objective:     Visit Vitals  /80 (BP 1 Location: Right arm, BP Patient Position: Sitting)   Pulse 66   Temp 98.1 °F (36.7 °C) (Oral)   Resp 16   Ht 5' 3\" (1.6 m)   Wt 134 lb (60.8 kg)   SpO2 98%   BMI 23.74 kg/m²        Physical Exam:  ABDOMEN: She is tender in the left upper quadrant but I cannot feel any masses or defects in the fascia. Labs: No results found for this or any previous visit (from the past 24 hour(s)). Assessment and Plan:       ICD-10-CM ICD-9-CM    1. Post-op pain G89.18 338.18 oxyCODONE IR (ROXICODONE) 10 mg tab immediate release tablet       I believe the pressure she is feeling in her ribs is due to scar tissue and this will eventually go away over time. I will also write her a prescription for 10 mg of Oxycodone to hold her over until Dr. Kendra Sosa returns from vacation. She agrees with this plan and will return as needed. This document was scribed by Catie Parker as dictated by Dr. Destiny Wing.      Signed By: Johnnie Patel MD     02/04/19

## 2019-02-22 ENCOUNTER — OFFICE VISIT (OUTPATIENT)
Dept: SURGERY | Age: 39
End: 2019-02-22

## 2019-02-22 ENCOUNTER — TELEPHONE (OUTPATIENT)
Dept: SURGERY | Age: 39
End: 2019-02-22

## 2019-02-22 VITALS
DIASTOLIC BLOOD PRESSURE: 91 MMHG | HEIGHT: 63 IN | OXYGEN SATURATION: 98 % | RESPIRATION RATE: 18 BRPM | SYSTOLIC BLOOD PRESSURE: 123 MMHG | WEIGHT: 131 LBS | BODY MASS INDEX: 23.21 KG/M2 | TEMPERATURE: 98.8 F | HEART RATE: 70 BPM

## 2019-02-22 DIAGNOSIS — R50.9 FEVER AND CHILLS: Primary | ICD-10-CM

## 2019-02-22 DIAGNOSIS — G89.18 POST-OP PAIN: ICD-10-CM

## 2019-02-22 DIAGNOSIS — R10.12 LUQ PAIN: ICD-10-CM

## 2019-02-22 NOTE — PATIENT INSTRUCTIONS

## 2019-02-22 NOTE — PROGRESS NOTES
HISTORY OF PRESENT ILLNESS  Aleisha Gibson is a 34 y.o. female who presents with left sided abdominal pain. Patient is s/p laparoscopic converted to open distal pancreatectomy (4/20/2018) who developed several complications including post-op fever, abdominal abscess, and an incisional hernia. Her ventral incisional hernia was repaired by Dr. Robinson Chau. on 11/15/2018, and she has had issues with abdominal pain since. She states that it is hard to get into a comfortable position. She feels that the left side is more swollen and radiates to her back. She is taking Roxicodone prescribed by Dr. Lisa Malcolm. Denies nausea or vomiting. She is moving her bowels without issue. She has been running fevers to 101 at home. Current Outpatient Medications:     oxycodone HCl/acetaminophen (PERCOCET PO), Take  by mouth., Disp: , Rfl:     oxyCODONE IR (ROXICODONE) 10 mg tab immediate release tablet, Take 1 Tab by mouth every four (4) hours as needed. Max Daily Amount: 60 mg., Disp: 40 Tab, Rfl: 0    oxyCODONE IR (ROXICODONE) 5 mg immediate release tablet, Take one or two tablets every 4 hours as needed for pain., Disp: 40 Tab, Rfl: 0    senna (SENNA) 8.6 mg tablet, Take 1 Tab by mouth as needed for Constipation. , Disp: , Rfl:     FLUoxetine (PROZAC) 20 mg capsule, Take 40 mg by mouth daily. , Disp: , Rfl:     ALPRAZolam (XANAX) 1 mg tablet, , Disp: , Rfl:     dextroamphetamine-amphetamine (ADDERALL) 30 mg tablet, , Disp: , Rfl:     acetaminophen (TYLENOL) 500 mg tablet, Take 500 mg by mouth every eight (8) hours as needed for Pain., Disp: , Rfl:     ibuprofen (MOTRIN IB) 200 mg tablet, Take 800 mg by mouth every eight (8) hours as needed. , Disp: , Rfl:     oxyCODONE IR (ROXICODONE) 10 mg tab immediate release tablet, Take 1 Tab by mouth every six (6) hours as needed. Max Daily Amount: 40 mg., Disp: 50 Tab, Rfl: 0    clonazePAM (KLONOPIN) 0.5 mg tablet, as needed. , Disp: , Rfl:     DULoxetine (CYMBALTA) 60 mg capsule, Take 1 Cap by mouth daily. (Patient taking differently: Take 30 mg by mouth daily. Patient states she takes 30mg daily), Disp: 90 Cap, Rfl: 1    naloxone (NARCAN) 4 mg/actuation nasal spray, Use 1 spray intranasally into 1 nostril. Use a new Narcan nasal spray for subsequent doses and administer into alternating nostrils. May repeat every 2 to 3 minutes as needed. , Disp: 3 Each, Rfl: 0    naloxone 2 mg/actuation spry, Use 1 spray intranasally into 1 nostril. Use a new Narcan nasal spray for subsequent doses and administer into alternating nostrils. May repeat every 2 to 3 minutes as needed. , Disp: 1 Packet, Rfl: 0      Visit Vitals  BP (!) 123/91   Pulse 70   Temp 98.8 °F (37.1 °C) (Oral)   Resp 18   Ht 5' 3\" (1.6 m)   Wt 131 lb (59.4 kg)   SpO2 98%   BMI 23.21 kg/m²     HPI    Review of Systems   Constitutional: Positive for chills and fever. Gastrointestinal: Positive for abdominal pain (left sided abdominal pain). Negative for heartburn, nausea and vomiting. Physical Exam   Constitutional: She is oriented to person, place, and time. She appears well-developed and well-nourished. Neck: Normal range of motion. Neck supple. Cardiovascular: Normal rate and regular rhythm. Exam reveals no gallop and no friction rub. No murmur heard. Pulmonary/Chest: Effort normal and breath sounds normal.   Abdominal: Soft. Bowel sounds are normal. She exhibits no distension. There is tenderness. Guarding: Left mid abdomen. Neurological: She is alert and oriented to person, place, and time. Skin: Skin is warm and dry. Psychiatric: She has a normal mood and affect. ASSESSMENT and PLAN   Patient is s/p laparoscopic converted to open distal pancreatectomy (4/20/2018) who developed several complications including post-op fever, abdominal abscess, and an incisional hernia. Her ventral incisional hernia was repaired by Dr. Leo Gordon on 11/15/2018, and she has had issues with abdominal pain since.  She is having worsening abdominal pain. Discussed with Dr. Stanton Freire. Abd /Pelvic Ct  Will call with results. Pt verbalized understanding and questions were answered to the best of my knowledge and ability.

## 2019-02-22 NOTE — PROGRESS NOTES
1. Have you been to the ER, urgent care clinic since your last visit? Hospitalized since your last visit? No 
 
2. Have you seen or consulted any other health care providers outside of the [address] [place] [address] since your last visit? Include any pap smears or colon screening. Yes When: 2/13/2019 Dr. Kartik Cr.

## 2019-02-22 NOTE — TELEPHONE ENCOUNTER
----- Message from Estefania Flores LPN sent at 8/97/6714  7:43 AM EST -----  Please call patient and put her on [facility] schedule today thanks FOSTER  ----- Message -----  From: Raul Dobson MD  Sent: 2/21/2019   6:32 PM  To: Estefania Flores LPN    Needs an appointment on Friday.

## 2019-02-27 ENCOUNTER — TELEPHONE (OUTPATIENT)
Dept: SURGERY | Age: 39
End: 2019-02-27

## 2019-02-27 DIAGNOSIS — R10.12 LUQ PAIN: Primary | ICD-10-CM

## 2019-02-28 ENCOUNTER — HOSPITAL ENCOUNTER (OUTPATIENT)
Dept: CT IMAGING | Age: 39
Discharge: HOME | End: 2019-02-28
Attending: NURSE PRACTITIONER
Payer: COMMERCIAL

## 2019-02-28 DIAGNOSIS — R10.12 LUQ PAIN: ICD-10-CM

## 2019-02-28 PROCEDURE — 74011636320 HC RX REV CODE- 636/320: Performed by: NURSE PRACTITIONER

## 2019-02-28 PROCEDURE — 74011000258 HC RX REV CODE- 258: Performed by: NURSE PRACTITIONER

## 2019-02-28 PROCEDURE — 74160 CT ABDOMEN W/CONTRAST: CPT

## 2019-02-28 RX ORDER — SODIUM CHLORIDE 0.9 % (FLUSH) 0.9 %
10 SYRINGE (ML) INJECTION
Status: COMPLETED | OUTPATIENT
Start: 2019-02-28 | End: 2019-02-28

## 2019-02-28 RX ADMIN — Medication 10 ML: at 14:33

## 2019-02-28 RX ADMIN — IOPAMIDOL 100 ML: 755 INJECTION, SOLUTION INTRAVENOUS at 14:33

## 2019-02-28 RX ADMIN — IOHEXOL 50 ML: 240 INJECTION, SOLUTION INTRATHECAL; INTRAVASCULAR; INTRAVENOUS; ORAL at 14:33

## 2019-02-28 RX ADMIN — SODIUM CHLORIDE 100 ML: 900 INJECTION, SOLUTION INTRAVENOUS at 14:33

## 2019-03-01 ENCOUNTER — TELEPHONE (OUTPATIENT)
Dept: SURGERY | Age: 39
End: 2019-03-01

## 2019-05-03 ENCOUNTER — TELEPHONE (OUTPATIENT)
Dept: SURGERY | Age: 39
End: 2019-05-03

## 2019-05-03 NOTE — TELEPHONE ENCOUNTER
I returned patients call and she states she is 'doing really really well'. She states her only concern is that she is having electric shock type pains at the bottom of her incision. She states there is no redness, no swelling, no fevers, no drainage and incision looks good. She states she is off all pain mediation. I told her if it gets worse and not better to make an appointment.

## 2019-05-11 ENCOUNTER — ED HISTORICAL/CONVERTED ENCOUNTER (OUTPATIENT)
Dept: OTHER | Age: 39
End: 2019-05-11

## 2019-05-25 ENCOUNTER — HOSPITAL ENCOUNTER (EMERGENCY)
Age: 39
Discharge: HOME | End: 2019-05-25
Attending: EMERGENCY MEDICINE
Payer: COMMERCIAL

## 2019-05-25 ENCOUNTER — APPOINTMENT (OUTPATIENT)
Dept: CT IMAGING | Age: 39
End: 2019-05-25
Attending: NURSE PRACTITIONER
Payer: COMMERCIAL

## 2019-05-25 VITALS
TEMPERATURE: 99.9 F | HEART RATE: 125 BPM | BODY MASS INDEX: 23.92 KG/M2 | DIASTOLIC BLOOD PRESSURE: 59 MMHG | OXYGEN SATURATION: 91 % | RESPIRATION RATE: 18 BRPM | WEIGHT: 135 LBS | SYSTOLIC BLOOD PRESSURE: 112 MMHG | HEIGHT: 63 IN

## 2019-05-25 DIAGNOSIS — R73.9 HYPERGLYCEMIA: Primary | ICD-10-CM

## 2019-05-25 LAB
ALBUMIN SERPL-MCNC: 4.4 G/DL (ref 3.5–5)
ALBUMIN/GLOB SERPL: 1.1 {RATIO} (ref 1.1–2.2)
ALP SERPL-CCNC: 119 U/L (ref 45–117)
ALT SERPL-CCNC: 19 U/L (ref 12–78)
ANION GAP SERPL CALC-SCNC: 14 MMOL/L (ref 5–15)
APPEARANCE UR: CLEAR
AST SERPL-CCNC: 16 U/L (ref 15–37)
BACTERIA URNS QL MICRO: ABNORMAL /HPF
BASE DEFICIT BLDV-SCNC: 3.2 MMOL/L
BASOPHILS # BLD: 0.1 K/UL (ref 0–0.1)
BASOPHILS NFR BLD: 1 % (ref 0–1)
BDY SITE: ABNORMAL
BILIRUB SERPL-MCNC: 0.3 MG/DL (ref 0.2–1)
BILIRUB UR QL: NEGATIVE
BUN SERPL-MCNC: 8 MG/DL (ref 6–20)
BUN/CREAT SERPL: 9 (ref 12–20)
CALCIUM SERPL-MCNC: 9.1 MG/DL (ref 8.5–10.1)
CHLORIDE SERPL-SCNC: 93 MMOL/L (ref 97–108)
CO2 SERPL-SCNC: 23 MMOL/L (ref 21–32)
COLOR UR: ABNORMAL
CREAT SERPL-MCNC: 0.91 MG/DL (ref 0.55–1.02)
DIFFERENTIAL METHOD BLD: ABNORMAL
EOSINOPHIL # BLD: 0.3 K/UL (ref 0–0.4)
EOSINOPHIL NFR BLD: 2 % (ref 0–7)
EPITH CASTS URNS QL MICRO: ABNORMAL /LPF
ERYTHROCYTE [DISTWIDTH] IN BLOOD BY AUTOMATED COUNT: 15 % (ref 11.5–14.5)
GLOBULIN SER CALC-MCNC: 3.9 G/DL (ref 2–4)
GLUCOSE BLD STRIP.AUTO-MCNC: 265 MG/DL (ref 65–100)
GLUCOSE BLD STRIP.AUTO-MCNC: 381 MG/DL (ref 65–100)
GLUCOSE SERPL-MCNC: 438 MG/DL (ref 65–100)
GLUCOSE UR STRIP.AUTO-MCNC: >1000 MG/DL
HCG UR QL: NEGATIVE
HCO3 BLDV-SCNC: 20 MMOL/L (ref 23–28)
HCT VFR BLD AUTO: 43.4 % (ref 35–47)
HGB BLD-MCNC: 14.8 G/DL (ref 11.5–16)
HGB UR QL STRIP: ABNORMAL
HYALINE CASTS URNS QL MICRO: ABNORMAL /LPF (ref 0–5)
IMM GRANULOCYTES # BLD AUTO: 0.1 K/UL (ref 0–0.04)
IMM GRANULOCYTES NFR BLD AUTO: 1 % (ref 0–0.5)
KETONES UR QL STRIP.AUTO: 15 MG/DL
LEUKOCYTE ESTERASE UR QL STRIP.AUTO: NEGATIVE
LYMPHOCYTES # BLD: 3.7 K/UL (ref 0.8–3.5)
LYMPHOCYTES NFR BLD: 30 % (ref 12–49)
MAGNESIUM SERPL-MCNC: 2.1 MG/DL (ref 1.6–2.4)
MCH RBC QN AUTO: 31.3 PG (ref 26–34)
MCHC RBC AUTO-ENTMCNC: 34.1 G/DL (ref 30–36.5)
MCV RBC AUTO: 91.8 FL (ref 80–99)
MONOCYTES # BLD: 0.6 K/UL (ref 0–1)
MONOCYTES NFR BLD: 5 % (ref 5–13)
NEUTS SEG # BLD: 7.7 K/UL (ref 1.8–8)
NEUTS SEG NFR BLD: 61 % (ref 32–75)
NITRITE UR QL STRIP.AUTO: NEGATIVE
NRBC # BLD: 0 K/UL (ref 0–0.01)
NRBC BLD-RTO: 0 PER 100 WBC
PCO2 BLDV: 32 MMHG (ref 41–51)
PH BLDV: 7.42 [PH] (ref 7.32–7.42)
PH UR STRIP: 5.5 [PH] (ref 5–8)
PHOSPHATE SERPL-MCNC: 3.2 MG/DL (ref 2.6–4.7)
PLATELET # BLD AUTO: 341 K/UL (ref 150–400)
PMV BLD AUTO: 10.3 FL (ref 8.9–12.9)
PO2 BLDV: 55 MMHG (ref 25–40)
POTASSIUM SERPL-SCNC: 3.8 MMOL/L (ref 3.5–5.1)
PROT SERPL-MCNC: 8.3 G/DL (ref 6.4–8.2)
PROT UR STRIP-MCNC: NEGATIVE MG/DL
RBC # BLD AUTO: 4.73 M/UL (ref 3.8–5.2)
RBC #/AREA URNS HPF: ABNORMAL /HPF (ref 0–5)
SAO2 % BLDV: 89 % (ref 65–88)
SAO2% DEVICE SAO2% SENSOR NAME: ABNORMAL
SERVICE CMNT-IMP: ABNORMAL
SERVICE CMNT-IMP: ABNORMAL
SODIUM SERPL-SCNC: 130 MMOL/L (ref 136–145)
SP GR UR REFRACTOMETRY: <1.005 (ref 1–1.03)
SPECIMEN SITE: ABNORMAL
UA: UC IF INDICATED,UAUC: ABNORMAL
UROBILINOGEN UR QL STRIP.AUTO: 0.2 EU/DL (ref 0.2–1)
WBC # BLD AUTO: 12.5 K/UL (ref 3.6–11)
WBC URNS QL MICRO: ABNORMAL /HPF (ref 0–4)

## 2019-05-25 PROCEDURE — 82962 GLUCOSE BLOOD TEST: CPT

## 2019-05-25 PROCEDURE — 96361 HYDRATE IV INFUSION ADD-ON: CPT

## 2019-05-25 PROCEDURE — 84100 ASSAY OF PHOSPHORUS: CPT

## 2019-05-25 PROCEDURE — 81025 URINE PREGNANCY TEST: CPT

## 2019-05-25 PROCEDURE — 36415 COLL VENOUS BLD VENIPUNCTURE: CPT

## 2019-05-25 PROCEDURE — 82803 BLOOD GASES ANY COMBINATION: CPT

## 2019-05-25 PROCEDURE — 99285 EMERGENCY DEPT VISIT HI MDM: CPT

## 2019-05-25 PROCEDURE — 74011250636 HC RX REV CODE- 250/636: Performed by: NURSE PRACTITIONER

## 2019-05-25 PROCEDURE — 96375 TX/PRO/DX INJ NEW DRUG ADDON: CPT

## 2019-05-25 PROCEDURE — 80053 COMPREHEN METABOLIC PANEL: CPT

## 2019-05-25 PROCEDURE — 74011250636 HC RX REV CODE- 250/636: Performed by: EMERGENCY MEDICINE

## 2019-05-25 PROCEDURE — 96374 THER/PROPH/DIAG INJ IV PUSH: CPT

## 2019-05-25 PROCEDURE — 83735 ASSAY OF MAGNESIUM: CPT

## 2019-05-25 PROCEDURE — 70450 CT HEAD/BRAIN W/O DYE: CPT

## 2019-05-25 PROCEDURE — 85025 COMPLETE CBC W/AUTO DIFF WBC: CPT

## 2019-05-25 PROCEDURE — 87086 URINE CULTURE/COLONY COUNT: CPT

## 2019-05-25 PROCEDURE — 74011636637 HC RX REV CODE- 636/637: Performed by: NURSE PRACTITIONER

## 2019-05-25 PROCEDURE — 81001 URINALYSIS AUTO W/SCOPE: CPT

## 2019-05-25 RX ORDER — DISULFIRAM 250 MG/1
250 TABLET ORAL DAILY
COMMUNITY

## 2019-05-25 RX ORDER — INSULIN GLARGINE 100 [IU]/ML
20 INJECTION, SOLUTION SUBCUTANEOUS ONCE
Status: COMPLETED | OUTPATIENT
Start: 2019-05-25 | End: 2019-05-25

## 2019-05-25 RX ORDER — INSULIN GLARGINE 100 [IU]/ML
INJECTION, SOLUTION SUBCUTANEOUS
Qty: 1 VIAL | Refills: 0 | Status: SHIPPED | OUTPATIENT
Start: 2019-05-25

## 2019-05-25 RX ORDER — LORAZEPAM 2 MG/ML
1 INJECTION INTRAMUSCULAR ONCE
Status: COMPLETED | OUTPATIENT
Start: 2019-05-25 | End: 2019-05-25

## 2019-05-25 RX ADMIN — INSULIN HUMAN 8 UNITS: 100 INJECTION, SOLUTION PARENTERAL at 14:09

## 2019-05-25 RX ADMIN — SODIUM CHLORIDE 1000 ML: 900 INJECTION, SOLUTION INTRAVENOUS at 13:40

## 2019-05-25 RX ADMIN — INSULIN GLARGINE 20 UNITS: 100 INJECTION, SOLUTION SUBCUTANEOUS at 16:58

## 2019-05-25 RX ADMIN — LORAZEPAM 1 MG: 2 INJECTION, SOLUTION INTRAMUSCULAR; INTRAVENOUS at 15:30

## 2019-05-25 NOTE — ED PROVIDER NOTES
I have evaluated the patient as the Provider in Triage. I have reviewed Her vital signs and the triage nurse assessment. I have talked with the patient and any available family and advised that I am the provider in triage and have ordered the appropriate study to initiate their work up based on the clinical presentation during my assessment. I have advised that the patient will be accommodated in the Main ED as soon as possible. I have also requested to contact the triage nurse or myself immediately if the patient experiences any changes in their condition during this brief waiting period. 34 y.o. female with past medical history significant for blurred vision, frequent headaches, incisional hernia without obstruction or gangrene, pancreatic cyst, depression, and nicotine vapor product use who presents from private vehicle with chief complaint of hyperglycemia. Pt reports hyperglycemia () while at [facility]. Pt also c/o increased thirst, blurred vision, and body aches. Pt notes she is taking medications to aid with pain medication withdrawal and alcohol abuse. There are no other acute medical concerns at this time. Social hx: Alcohol use, \"last drink was last Monday\". PCP: Elvira Marte., NP    Note written by Roldan Mujica, as dictated by Anitra Koo MD 12:39 PM    This is a 64-year-old female who presents ambulatory to the emergency room with complaint of blurred vision, increased thirst and a high blood glucose level. Patient states last year she had pancreatic surgery, was tested multiple times for diabetes and she was negative. Patient states that the past week or so she has had increased thirst, blurred vision, body aches and pains and \"thought that maybe she was becoming diabetic. \" Patient went to [facility] today where she took her blood glucose and it read 550. Patient came to the emergency room for further evaluation and treatment of her hyperglycemia.  Of note patient had a pain pump placed last week to help with withdrawal from pain medication and alcohol use. Last drink was on Monday. Denies fever or chills, denies nausea or vomiting. Denies abdominal pain. There are no further complaints at this time. Jose Armando Rebolledo MD  Past Medical History:  7/30/2018: Abscess of abdominal cavity (HCC)  No date: Anxiety  No date: Blurred vision  No date: Chest pain  04/2018: Chronic pain      Comment:  MUSCLE WEAKNESS,PANCREATIC CYST -NO LONGER AN ISSUE  No date: Depression      Comment:  AND ANXIETY  No date: Frequent headaches  11/4/2018: Incisional hernia, without obstruction or gangrene  No date: Muscle weakness  No date: Nicotine vapor product user  3/21/2018: Pancreatic cyst  No date: Shortness of breath  No date: Stomach pain  No date: Swallowing difficulty  Past Surgical History:  No date: HX GI      Comment:  COLONOSCOPY  2005: HX GYN      Comment:  endometriosis surgery  11/15/2018: HX HERNIA REPAIR      Comment:  Ventral incisional hermia repair by Dr. Kimberlyn Adkins   04/20/2018: HX OTHER SURGICAL      Comment:  lap distal pancreatectomy converted to open-[facility]-DR. Bev Ochoa             Past Medical History:   Diagnosis Date    Abscess of abdominal cavity ([address] 7/30/2018    Anxiety     Blurred vision     Chest pain     Chronic pain 04/2018    MUSCLE WEAKNESS,PANCREATIC CYST -NO LONGER AN ISSUE    Depression     AND ANXIETY    Frequent headaches     Incisional hernia, without obstruction or gangrene 11/4/2018    Muscle weakness     Nicotine vapor product user     Pancreatic cyst 3/21/2018    Shortness of breath     Stomach pain     Swallowing difficulty        Past Surgical History:   Procedure Laterality Date    HX GI      COLONOSCOPY    HX GYN  2005    endometriosis surgery    HX HERNIA REPAIR  11/15/2018    Ventral incisional hermia repair by Dr. John Pollock  04/20/2018    lap distal pancreatectomy converted to open-[facility]-DR. Justine Beltran Family History:   Problem Relation Age of Onset    Cancer Mother         colon    Cancer Father         skin    Anesth Problems Father         SUCCINYLCHOLINE  REACTION       Social History     Socioeconomic History    Marital status:      Spouse name: Not on file    Number of children: Not on file    Years of education: Not on file    Highest education level: Not on file   Occupational History    Not on file   Social Needs    Financial resource strain: Not on file    Food insecurity:     Worry: Not on file     Inability: Not on file    Transportation needs:     Medical: Not on file     Non-medical: Not on file   Tobacco Use    Smoking status: Current Every Day Smoker     Packs/day: 1.00     Years: 25.00     Pack years: 25.00    Smokeless tobacco: Current User    Tobacco comment: STOP SMOKING BOOKLET PROVIDED   Substance and Sexual Activity    Alcohol use: No     Comment: ONCE EVERY TWO MONTHS    Drug use: No    Sexual activity: Yes   Lifestyle    Physical activity:     Days per week: Not on file     Minutes per session: Not on file    Stress: Not on file   Relationships    Social connections:     Talks on phone: Not on file     Gets together: Not on file     Attends Gnosticist service: Not on file     Active member of club or organization: Not on file     Attends meetings of clubs or organizations: Not on file     Relationship status: Not on file    Intimate partner violence:     Fear of current or ex partner: Not on file     Emotionally abused: Not on file     Physically abused: Not on file     Forced sexual activity: Not on file   Other Topics Concern    Not on file   Social History Narrative    Not on file         ALLERGIES: Amoxicillin    Review of Systems   Constitutional: Negative for appetite change, chills, diaphoresis, fatigue and fever. HENT: Negative for congestion, ear discharge, ear pain, sinus pressure, sinus pain, sore throat and trouble swallowing.     Eyes: Positive for visual disturbance. Negative for photophobia, pain and redness. Respiratory: Negative for chest tightness, shortness of breath and wheezing. Cardiovascular: Negative for chest pain and palpitations. Gastrointestinal: Negative for abdominal distention, abdominal pain, nausea and vomiting. Endocrine: Positive for polydipsia. Genitourinary: Negative for difficulty urinating, flank pain, frequency and urgency. Musculoskeletal: Positive for arthralgias and myalgias. Negative for back pain, neck pain and neck stiffness. General body aches and pains     Skin: Negative for color change, pallor, rash and wound. Allergic/Immunologic: Negative. Neurological: Negative for dizziness, speech difficulty, weakness and headaches. Hematological: Does not bruise/bleed easily. Psychiatric/Behavioral: Negative for behavioral problems. The patient is nervous/anxious. There were no vitals filed for this visit. Physical Exam   Constitutional: She is oriented to person, place, and time. She appears well-developed and well-nourished. No distress. HENT:   Head: Normocephalic and atraumatic. Right Ear: External ear normal.   Left Ear: External ear normal.   Nose: Nose normal.   Mouth/Throat: Oropharynx is clear and moist.   Eyes: Pupils are equal, round, and reactive to light. Conjunctivae and EOM are normal. Right eye exhibits no discharge. Left eye exhibits no discharge. States visual changes     Neck: Normal range of motion. Neck supple. No JVD present. No tracheal deviation present. Cardiovascular: Regular rhythm, normal heart sounds and intact distal pulses. Exam reveals no gallop. No murmur heard. 's -120's   Pulmonary/Chest: Effort normal and breath sounds normal. No respiratory distress. She has no wheezes. She has no rales. She exhibits no tenderness. Abdominal: Soft. Bowel sounds are normal. She exhibits no distension. There is no tenderness.  There is no rebound and no guarding. Genitourinary:   Genitourinary Comments: Negative    Musculoskeletal: Normal range of motion. She exhibits no edema or tenderness. Neurological: She is alert and oriented to person, place, and time. Skin: Skin is warm and dry. No rash noted. No erythema. No pallor. Psychiatric: Her behavior is normal. Judgment and thought content normal.   Anxious appearing female     Nursing note and vitals reviewed. MDM  Number of Diagnoses or Management Options  Hyperglycemia: new and requires workup  Diagnosis management comments: Plan:  Discharge to home and follow up with PCP, endocrinology and [facility]. Return to ED with worsening symptoms. Blood glucose checks three times per day prior to breakfast. Please document blood glucose levels. (You have two glucometer's at home and we prescribed more strips.)     Patient and family in agreement with plan of care. Amount and/or Complexity of Data Reviewed  Clinical lab tests: ordered and reviewed  Tests in the radiology section of CPT®: ordered and reviewed  Discuss the patient with other providers: yes (Discussed plan of care with Dr. Tatiana Horn and Dr. Pooja De Leon.)          4:30 PM   Spoke with Dr. Pooja De Leon regarding possible admission to the hospital.  Patient can follow up as an outpatient with [facility] and endocrinology as well as her PCP. Patient in agreement with plan of care and will return to the ED with worsening symptoms. 5:04 PM  Pt has been reexamined. Pt has no new complaints, changes or physical findings. Care plan outlined and precautions discussed. All available results were reviewed with pt. All medications were reviewed with pt. All of pt's questions and concerns were addressed. Pt agrees to F/U as instructed and agrees to return to ED upon further deterioration. Pt is ready to go home.   Karen Medrano NP      Procedures

## 2019-05-25 NOTE — ED NOTES
Education provided to pt on self-injection of insulin. Pt verbalized understanding via return demonstration. She was able to administer lantus without difficulty.

## 2019-05-25 NOTE — DISCHARGE INSTRUCTIONS
Patient Education        Learning About High Blood Sugar  What is high blood sugar? Your body turns the food you eat into glucose (sugar), which it uses for energy. But if your body isn't able to use the sugar right away, it can build up in your blood and lead to high blood sugar. When the amount of sugar in your blood stays too high for too much of the time, you may have diabetes. Diabetes is a disease that can cause serious health problems. The good news is that lifestyle changes may help you get your blood sugar back to normal and avoid or delay diabetes. What causes high blood sugar? Sugar (glucose) can build up in your blood if you:  · Are overweight. · Have a family history of diabetes. · Take certain medicines, such as steroids. What are the symptoms? Having high blood sugar may not cause any symptoms at all. Or it may make you feel very thirsty or very hungry. You may also urinate more often than usual, have blurry vision, or lose weight without trying. How is high blood sugar treated? You can take steps to lower your blood sugar level if you understand what makes it get higher. Your doctor may want you to learn how to test your blood sugar level at home. Then you can see how illness, stress, or different kinds of food or medicine raise or lower your blood sugar level. Other tests may be needed to see if you have diabetes. How can you prevent high blood sugar? · Watch your weight. If you're overweight, losing just a small amount of weight may help. Reducing fat around your waist is most important. · Limit the amount of calories, sweets, and unhealthy fat you eat. Ask your doctor if a dietitian can help you. A registered dietitian can help you create meal plans that fit your lifestyle. · Get at least 30 minutes of exercise on most days of the week. Exercise helps control your blood sugar. It also helps you maintain a healthy weight. Walking is a good choice.  You also may want to do other activities, such as running, swimming, cycling, or playing tennis or team sports. · If your doctor prescribed medicines, take them exactly as prescribed. Call your doctor if you think you are having a problem with your medicine. You will get more details on the specific medicines your doctor prescribes. Follow-up care is a key part of your treatment and safety. Be sure to make and go to all appointments, and call your doctor if you are having problems. It's also a good idea to know your test results and keep a list of the medicines you take. Where can you learn more? Go to http://maria c-checo.info/. Enter O108 in the search box to learn more about \"Learning About High Blood Sugar. \"  Current as of: July 25, 2018  Content Version: 11.9  © 2453-6192 [employer]. Care instructions adapted under license by [employer] (which disclaims liability or warranty for this information). If you have questions about a medical condition or this instruction, always ask your healthcare professional. [address] any warranty or liability for your use of this information.

## 2019-05-25 NOTE — ED TRIAGE NOTES
Patient c/o high blood sugar readings, increased thirst, blurred vision, body cramps, is on medications to aid with pain medication withdraw and alcohol abuse.

## 2019-05-27 LAB
BACTERIA SPEC CULT: NORMAL
CC UR VC: NORMAL
SERVICE CMNT-IMP: NORMAL

## 2021-04-28 NOTE — ROUTINE PROCESS
Bedside and Verbal shift change report given to [facility] 7715 (oncoming nurse) by Cole Ward (offgoing nurse).  Report included the following information SBAR, Kardex, OR Summary, Intake/Output, MAR, Recent Results and Cardiac Rhythm ST. I left a message that we were returning her call. It looks like she is being referred for bladder spasms/ urinary frequency as far as I can see. Nakia NANCE Rn

## 2021-06-29 ENCOUNTER — TRANSCRIBE ORDER (OUTPATIENT)
Dept: SCHEDULING | Age: 41
End: 2021-06-29

## 2021-06-29 DIAGNOSIS — K85.90 ACUTE PANCREATITIS: Primary | ICD-10-CM

## 2021-07-08 ENCOUNTER — HOSPITAL ENCOUNTER (OUTPATIENT)
Dept: MRI IMAGING | Age: 41
Discharge: HOME | End: 2021-07-08
Attending: INTERNAL MEDICINE
Payer: COMMERCIAL

## 2021-07-08 VITALS — WEIGHT: 138 LBS | BODY MASS INDEX: 24.45 KG/M2

## 2021-07-08 DIAGNOSIS — K85.90 ACUTE PANCREATITIS: ICD-10-CM

## 2021-07-08 PROCEDURE — A9585 GADOBUTROL INJECTION: HCPCS | Performed by: RADIOLOGY

## 2021-07-08 PROCEDURE — 74011250636 HC RX REV CODE- 250/636: Performed by: RADIOLOGY

## 2021-07-08 PROCEDURE — 74183 MRI ABD W/O CNTR FLWD CNTR: CPT

## 2021-07-08 PROCEDURE — 77030021566

## 2021-07-08 RX ADMIN — GADOBUTROL 6 ML: 604.72 INJECTION INTRAVENOUS at 11:23

## 2022-03-18 PROBLEM — E87.29 HIGH ANION GAP METABOLIC ACIDOSIS: Status: ACTIVE | Noted: 2018-04-03

## 2022-03-18 PROBLEM — K86.2 PANCREAS CYST: Status: ACTIVE | Noted: 2018-04-20

## 2022-03-18 PROBLEM — K43.2 INCISIONAL HERNIA: Status: ACTIVE | Noted: 2018-11-15

## 2022-03-18 PROBLEM — Z09 POSTOPERATIVE EXAMINATION: Status: ACTIVE | Noted: 2018-07-16

## 2022-03-18 PROBLEM — K86.89 PANCREATIC FLUID LEAK: Status: ACTIVE | Noted: 2018-07-16

## 2022-03-19 PROBLEM — R50.82 POSTOPERATIVE FEVER: Status: ACTIVE | Noted: 2018-05-07

## 2022-03-19 PROBLEM — R74.8 ELEVATED LIPASE: Status: ACTIVE | Noted: 2018-04-03

## 2022-03-19 PROBLEM — K85.90 PANCREATIC ABSCESS: Status: ACTIVE | Noted: 2018-07-30

## 2022-03-19 PROBLEM — R00.0 SINUS TACHYCARDIA: Status: ACTIVE | Noted: 2018-04-03

## 2022-03-19 PROBLEM — E66.9 OBESITY (BMI 30-39.9): Status: ACTIVE | Noted: 2018-04-26

## 2022-03-19 PROBLEM — R45.851 SUICIDAL IDEATION: Status: ACTIVE | Noted: 2018-04-03

## 2022-03-19 PROBLEM — K86.2 PANCREATIC CYST: Status: ACTIVE | Noted: 2018-03-21

## 2022-03-19 PROBLEM — K43.2 INCISIONAL HERNIA, WITHOUT OBSTRUCTION OR GANGRENE: Status: ACTIVE | Noted: 2018-11-04

## 2022-03-19 PROBLEM — K65.1 ABSCESS OF ABDOMINAL CAVITY (HCC): Status: ACTIVE | Noted: 2018-07-30

## 2022-03-19 PROBLEM — F10.929 ALCOHOL INTOXICATION (HCC): Status: ACTIVE | Noted: 2018-04-03

## 2022-03-20 PROBLEM — F10.10 ALCOHOL ABUSE: Status: ACTIVE | Noted: 2018-04-03

## 2023-05-12 RX ORDER — DEXTROAMPHETAMINE SACCHARATE, AMPHETAMINE ASPARTATE, DEXTROAMPHETAMINE SULFATE AND AMPHETAMINE SULFATE 7.5; 7.5; 7.5; 7.5 MG/1; MG/1; MG/1; MG/1
TABLET ORAL
COMMUNITY
Start: 2018-09-20 | End: 2023-06-27

## 2023-05-12 RX ORDER — DISULFIRAM 250 MG/1
250 TABLET ORAL DAILY
COMMUNITY
End: 2023-05-22

## 2023-05-12 RX ORDER — NALOXONE HYDROCHLORIDE 4 MG/.1ML
SPRAY NASAL
COMMUNITY
Start: 2018-04-30 | End: 2023-05-22

## 2023-05-12 RX ORDER — INSULIN GLARGINE 100 [IU]/ML
INJECTION, SOLUTION SUBCUTANEOUS
COMMUNITY
Start: 2019-05-25 | End: 2023-06-27

## 2023-05-12 RX ORDER — FLUOXETINE HYDROCHLORIDE 20 MG/1
80 CAPSULE ORAL DAILY
COMMUNITY
End: 2023-05-22

## 2023-05-12 RX ORDER — ACETAMINOPHEN 500 MG
TABLET ORAL EVERY 8 HOURS PRN
COMMUNITY

## 2023-05-12 RX ORDER — IBUPROFEN 200 MG
TABLET ORAL EVERY 8 HOURS PRN
COMMUNITY

## 2023-05-12 RX ORDER — SENNA PLUS 8.6 MG/1
1 TABLET ORAL PRN
COMMUNITY
End: 2023-05-22

## 2023-05-12 RX ORDER — ALPRAZOLAM 1 MG/1
TABLET ORAL
COMMUNITY
Start: 2018-09-20

## 2023-05-20 PROBLEM — F41.9 ANXIETY: Status: ACTIVE | Noted: 2023-05-20

## 2023-05-22 ENCOUNTER — OFFICE VISIT (OUTPATIENT)
Age: 43
End: 2023-05-22
Payer: COMMERCIAL

## 2023-05-22 VITALS
SYSTOLIC BLOOD PRESSURE: 112 MMHG | WEIGHT: 135.8 LBS | BODY MASS INDEX: 24.06 KG/M2 | DIASTOLIC BLOOD PRESSURE: 74 MMHG | HEIGHT: 63 IN

## 2023-05-22 DIAGNOSIS — N92.6 IRREGULAR MENSES: ICD-10-CM

## 2023-05-22 DIAGNOSIS — N95.1 MENOPAUSAL SYMPTOMS: ICD-10-CM

## 2023-05-22 DIAGNOSIS — Z12.31 BREAST CANCER SCREENING BY MAMMOGRAM: ICD-10-CM

## 2023-05-22 DIAGNOSIS — Z01.419 WELL WOMAN EXAM WITH ROUTINE GYNECOLOGICAL EXAM: Primary | ICD-10-CM

## 2023-05-22 PROBLEM — E11.9 DIABETES (HCC): Status: ACTIVE | Noted: 2023-05-22

## 2023-05-22 PROCEDURE — 99396 PREV VISIT EST AGE 40-64: CPT | Performed by: OBSTETRICS & GYNECOLOGY

## 2023-05-22 RX ORDER — INSULIN LISPRO 100 [IU]/ML
INJECTION, SOLUTION INTRAVENOUS; SUBCUTANEOUS
COMMUNITY
Start: 2023-03-11

## 2023-05-22 RX ORDER — VENLAFAXINE HYDROCHLORIDE 37.5 MG/1
CAPSULE, EXTENDED RELEASE ORAL
COMMUNITY
Start: 2023-04-11

## 2023-05-22 RX ORDER — ZOLPIDEM TARTRATE 10 MG/1
TABLET ORAL
COMMUNITY
Start: 2023-03-17

## 2023-05-22 RX ORDER — ATORVASTATIN CALCIUM 20 MG/1
TABLET, FILM COATED ORAL
COMMUNITY
Start: 2023-04-22

## 2023-05-22 RX ORDER — DEXTROAMPHETAMINE SACCHARATE, AMPHETAMINE ASPARTATE, DEXTROAMPHETAMINE SULFATE AND AMPHETAMINE SULFATE 5; 5; 5; 5 MG/1; MG/1; MG/1; MG/1
1 TABLET ORAL 4 TIMES DAILY
COMMUNITY
Start: 2023-05-03

## 2023-05-22 RX ORDER — FLUOXETINE HYDROCHLORIDE 40 MG/1
CAPSULE ORAL
COMMUNITY
Start: 2023-05-12

## 2023-05-22 RX ORDER — FLUOXETINE HYDROCHLORIDE 20 MG/1
20 CAPSULE ORAL DAILY
COMMUNITY

## 2023-05-22 NOTE — PROGRESS NOTES
Puma De Oliveira is a 38 y.o. female returns for an annual exam     Chief Complaint   Patient presents with    Annual Exam       Patient's last menstrual period was 05/13/2023. Her periods are heavy with clots irregular in flow. She does have dysmenorrhea. Problems: having sweats and cycles coming every 3 weeks. Birth Control: none. Last Pap: normal obtained 3/28/2019. She does not have a history of JULIAN 2, 3 or cervical cancer. Last Mammogram: has not had a recent mammogram.  Patient states mammogram 2 years ago dense breast was recommended to get MRI she did not follow through. Last Bone Density:  not obtained. Last colonoscopy: normal obtained 3 year(s) ago. 1. Have you been to the ER, urgent care clinic, or hospitalized since your last visit? N/A NP    2. Have you seen or consulted any other health care providers outside of the [address] [place] [address] since your last visit? N/A NP     Examination chaperoned by Johanna Bravo
(PROZAC) 20 MG capsule Take 1 capsule by mouth daily      atorvastatin (LIPITOR) 20 MG tablet       FLUoxetine (PROZAC) 40 MG capsule       insulin lispro (HUMALOG) 100 UNIT/ML SOLN injection vial       venlafaxine (EFFEXOR XR) 37.5 MG extended release capsule TAKE 1 CAPSULE BY MOUTH IN THE MORNING      acetaminophen (TYLENOL) 500 MG tablet Take by mouth every 8 hours as needed      ALPRAZolam (XANAX) 1 MG tablet ceived the following from [facility]: Outside name: ALPRAZolam (XANAX) 1 mg tablet      amphetamine-dextroamphetamine (ADDERALL) 30 MG tablet ceived the following from [facility]: Outside name: dextroamphetamine-amphetamine (ADDERALL) 30 mg tablet      ibuprofen (ADVIL;MOTRIN) 200 MG tablet Take by mouth every 8 hours as needed      insulin glargine (LANTUS) 100 UNIT/ML injection vial Take 20 units subcutaneously daily at bedtime      zolpidem (AMBIEN) 10 MG tablet       amphetamine-dextroamphetamine (ADDERALL) 20 MG tablet Take 1 tablet by mouth 4 times daily. Max Daily Amount: 4 tablets       No current facility-administered medications on file prior to visit.      Allergies   Allergen Reactions    Amoxicillin Rash and Shortness Of Breath     Pt has had keflex in the past       Review of Systems:   History obtained from the patient-negative for:  Constitutional: weight loss, fever, night sweats  HEENT: hearing loss, earache, congestion, snoring, sorethroat  CV: chest pain, palpitations, edema  Resp: cough, shortness of breath, wheezing  Breast: breast lumps, nipple discharge, galactorrhea  GI: change in bowel habits, abdominal pain, black or bloody stools  : frequency, dysuria, hematuria, vaginal discharge  MSK: back pain, joint pain, muscle pain  Skin: itching, rash, hives  Neuro: dizziness, headache, confusion, weakness  Psych: anxiety, depression, change in mood  Heme/lymph: bleeding, bruising, pallor    Physical Exam    Vitals  /74   Ht 5' 3\" (1.6 m)   Wt 135

## 2023-05-23 PROBLEM — K65.1 ABSCESS OF ABDOMINAL CAVITY (HCC): Status: RESOLVED | Noted: 2018-07-30 | Resolved: 2023-05-23

## 2023-05-23 PROBLEM — R45.851 SUICIDAL IDEATION: Status: RESOLVED | Noted: 2018-04-03 | Resolved: 2023-05-23

## 2023-05-23 PROBLEM — E87.29 HIGH ANION GAP METABOLIC ACIDOSIS: Status: RESOLVED | Noted: 2018-04-03 | Resolved: 2023-05-23

## 2023-05-23 PROBLEM — E28.0 ESTROGEN INCREASED: Status: ACTIVE | Noted: 2023-05-23

## 2023-05-23 LAB
ESTRADIOL SERPL-MCNC: 3938 PG/ML
FSH SERPL-ACNC: <0.3 MIU/ML
LH SERPL-ACNC: <0.3 MIU/ML

## 2023-05-31 LAB
CYTOLOGIST CVX/VAG CYTO: NORMAL
CYTOLOGY CVX/VAG DOC CYTO: NORMAL
CYTOLOGY CVX/VAG DOC THIN PREP: NORMAL
DX ICD CODE: NORMAL
HPV GENOTYPE REFLEX: NORMAL
HPV I/H RISK 4 DNA CVX QL PROBE+SIG AMP: NEGATIVE
Lab: NORMAL
Lab: NORMAL
OTHER STN SPEC: NORMAL
STAT OF ADQ CVX/VAG CYTO-IMP: NORMAL

## 2023-06-27 ENCOUNTER — OFFICE VISIT (OUTPATIENT)
Age: 43
End: 2023-06-27
Payer: COMMERCIAL

## 2023-06-27 VITALS
WEIGHT: 137 LBS | DIASTOLIC BLOOD PRESSURE: 73 MMHG | BODY MASS INDEX: 24.27 KG/M2 | SYSTOLIC BLOOD PRESSURE: 106 MMHG | HEIGHT: 63 IN

## 2023-06-27 DIAGNOSIS — R87.1 ABNORMAL LEVEL OF FEMALE SEX HORMONES: Primary | ICD-10-CM

## 2023-06-27 PROBLEM — R74.8 ELEVATED LIVER ENZYMES: Status: ACTIVE | Noted: 2023-06-27

## 2023-06-27 PROCEDURE — 99213 OFFICE O/P EST LOW 20 MIN: CPT | Performed by: OBSTETRICS & GYNECOLOGY

## 2023-06-27 RX ORDER — VENLAFAXINE HYDROCHLORIDE 150 MG/1
CAPSULE, EXTENDED RELEASE ORAL
COMMUNITY
Start: 2023-06-22

## 2023-06-28 LAB
ESTRADIOL SERPL-MCNC: 229 PG/ML
FSH SERPL-ACNC: 2.7 MIU/ML
LH SERPL-ACNC: 8.9 MIU/ML

## 2023-08-02 ENCOUNTER — OFFICE VISIT (OUTPATIENT)
Age: 43
End: 2023-08-02
Payer: COMMERCIAL

## 2023-08-02 VITALS
HEIGHT: 63 IN | HEART RATE: 108 BPM | BODY MASS INDEX: 26.05 KG/M2 | WEIGHT: 147 LBS | TEMPERATURE: 98.6 F | OXYGEN SATURATION: 97 % | RESPIRATION RATE: 15 BRPM | DIASTOLIC BLOOD PRESSURE: 76 MMHG | SYSTOLIC BLOOD PRESSURE: 109 MMHG

## 2023-08-02 DIAGNOSIS — R10.84 GENERALIZED ABDOMINAL WALL PAIN: Primary | ICD-10-CM

## 2023-08-02 PROCEDURE — 99203 OFFICE O/P NEW LOW 30 MIN: CPT | Performed by: SURGERY

## 2023-08-02 RX ORDER — INSULIN PMP CART,AUT,G6/7,CNTR
EACH SUBCUTANEOUS
COMMUNITY
Start: 2023-07-05

## 2023-08-02 RX ORDER — PROCHLORPERAZINE 25 MG/1
SUPPOSITORY RECTAL
COMMUNITY
Start: 2023-06-30

## 2023-08-02 ASSESSMENT — PATIENT HEALTH QUESTIONNAIRE - PHQ9
SUM OF ALL RESPONSES TO PHQ QUESTIONS 1-9: 1
SUM OF ALL RESPONSES TO PHQ9 QUESTIONS 1 & 2: 1
1. LITTLE INTEREST OR PLEASURE IN DOING THINGS: 0
SUM OF ALL RESPONSES TO PHQ QUESTIONS 1-9: 1
2. FEELING DOWN, DEPRESSED OR HOPELESS: 1

## 2023-08-03 PROBLEM — R10.84 GENERALIZED ABDOMINAL WALL PAIN: Status: ACTIVE | Noted: 2023-08-03

## 2023-08-03 NOTE — PROGRESS NOTES
Surgery History and Physical    Subjective:      Mary Rockwell  is a 38 y.o.  female who presents with a complaint of generalizes abdominal wall pain and tenderness associated with difficulty with urination and defecation. She had a distal pancreatectomy a number of years ago with a presumed hernia in the incision. Lately the entire abdomen feels distended to her as well. .    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    Shortness of breath     Stomach pain     Suicidal ideation 4/3/2018    Swallowing difficulty        Past Surgical History:   Procedure Laterality Date    COLONOSCOPY  2020    WNL    CT DRAIN SOFT TISSUE ABSCESS  05/08/2018    CT DRAIN SOFT TISSUE ABSCESS 5/8/2018 [facility] RAD CT    CT VISCERAL PERCUTANEOUS DRAIN  07/31/2018    CT VISCERAL PERCUTANEOUS DRAIN 7/31/2018 [facility] RAD CT    CT VISCERAL PERCUTANEOUS DRAIN  07/31/2018    CT VISCERAL PERCUTANEOUS DRAIN 7/31/2018 [facility] RAD CT    HERNIA REPAIR  11/15/2018    Ventral incisional hermia repair by Dr. Jordon Lala  04/20/2018    lap distal pancreatectomy converted to open-[facility]-DR. Keeley Gimenez    PANCREATECTOMY      Partial  Mucinous pancreatic cyst    PELVIC LAPAROSCOPY  12/06/2010    ablation of endometriosis.   Dr. Allison Po       Social History     Tobacco Use    Smoking status: Every Day     Packs/day: 1.00     Types: Cigarettes    Smokeless tobacco: Current    Tobacco comments:     Quit smoking: STOP SMOKING BOOKLET PROVIDED   Substance Use

## 2023-08-10 ENCOUNTER — HOSPITAL ENCOUNTER (OUTPATIENT)
Facility: HOSPITAL | Age: 43
Discharge: HOME | End: 2023-08-10
Attending: SURGERY
Payer: COMMERCIAL

## 2023-08-10 DIAGNOSIS — R10.84 GENERALIZED ABDOMINAL WALL PAIN: ICD-10-CM

## 2023-08-10 PROCEDURE — 74176 CT ABD & PELVIS W/O CONTRAST: CPT

## 2023-08-16 ENCOUNTER — TELEPHONE (OUTPATIENT)
Age: 43
End: 2023-08-16

## 2023-08-16 DIAGNOSIS — R10.84 GENERALIZED ABDOMINAL WALL PAIN: Primary | ICD-10-CM

## 2023-08-16 NOTE — TELEPHONE ENCOUNTER
Returned call to patient. Two patient identifiers used. Made patient aware I made the provider aware. Per Dr. Jasbir Smith he will place CT order with contrast. Patient verbalized understanding, and stated she had the other CT done at [facility] location, made patient aware when she is scheduling the CT with central scheduling, to mention the location she prefers. Patient verbalized understanding and thanked for the call.

## 2023-08-16 NOTE — TELEPHONE ENCOUNTER
Patient called and stated she read the radiologist report and it stated she should have a ct scan with contrast. Patient would like to know if she could get this order put in.

## 2023-08-23 ENCOUNTER — HOSPITAL ENCOUNTER (OUTPATIENT)
Facility: HOSPITAL | Age: 43
Discharge: HOME | End: 2023-08-26
Attending: SURGERY
Payer: COMMERCIAL

## 2023-08-23 DIAGNOSIS — R10.84 GENERALIZED ABDOMINAL WALL PAIN: ICD-10-CM

## 2023-08-23 PROCEDURE — 74160 CT ABDOMEN W/CONTRAST: CPT

## 2023-08-23 PROCEDURE — 6360000004 HC RX CONTRAST MEDICATION: Performed by: SURGERY

## 2023-08-23 RX ADMIN — IOPAMIDOL 100 ML: 755 INJECTION, SOLUTION INTRAVENOUS at 12:18

## 2023-08-25 ENCOUNTER — TELEPHONE (OUTPATIENT)
Age: 43
End: 2023-08-25

## 2023-08-25 DIAGNOSIS — R10.84 GENERALIZED ABDOMINAL WALL PAIN: Primary | ICD-10-CM

## 2023-08-25 NOTE — TELEPHONE ENCOUNTER
She had seen the report on MyChart. I told her it looks like she has a liver problem, possibly cirrhosis.   Will get her to see Dr. Garza Ranks

## 2023-08-28 ENCOUNTER — TELEPHONE (OUTPATIENT)
Age: 43
End: 2023-08-28

## 2023-08-28 NOTE — TELEPHONE ENCOUNTER
Patient called to schedule NP appt referred by Ruiz Aragon, general surgeon. Patient has a significant amount of swelling/edema all over including abdomen. CT scan was done. She has had a 10 lb weight gain in 10 days and asks to see someone asap because she is so uncomfortable. Records in cc.  Please review for sooner appointment

## 2023-08-28 NOTE — TELEPHONE ENCOUNTER
Patient called stating that she called the [facility] [place] for an appointment per Dr. Marcella Roca request and patient states that they do not have any openings till February or March for an appointment unless it is cancer/tumor related. Patient states that she read the MyChart report and it stated she has right side heart failure. Patient says that she is pretty much bloated everywhere on her body and is very uncomfortable and all she wants to do is sleep. Patient was very upset during the conversation. Patient stated that she \"will be dead by February or March\" and wanted  Dr. Denver Ribera to be aware to see If he could \"call and get an appointment\" sooner.

## 2023-08-28 NOTE — TELEPHONE ENCOUNTER
[address] [facility] [address]. Two patient identifiers used. Called [facility] and spoke with  staff, she stated she just got off the phone with the patient. I asked if there is anyway we can get a sooner appt, staff stated she told the patient she had the patient information sent over to the nurses for review, she stated she the patient is having symptoms and made note of that as well to the nurses, she stated she told the patient she will call her back once she receives a answer from the nurses but stated new patient appts are booked till next year. Returned call to patient. Two patient identifiers used. Made patient aware of message above, patient thanked for calling the office and asked if Dr. Denver Ribera can speak with someone as well, made patient aware I'll notify the provider. Patient stated he symptoms has gotten worse and has gained 10 lbs in 1 week and stated she looks pregnant. Asked patient is there any days she is not available, if  we can get her a appt at the [address] [place] [address],[address]. Patient stated is on vacation Labor day week going to the beach. Patient stated she is unsure if she is going on the trip due to her symptoms, but stated that's the only time she is not available. Made patient aware I'll notify the provider. Patient verbalized understanding. Made Dr. Denver Ribera aware of message above. Dr. Denver Ribera he will call to see if he can speak to someone as well.

## 2023-08-30 NOTE — TELEPHONE ENCOUNTER
Attempt to call patient no answer left message to see if patient got a appt at [facility], left message to return call. [address] and spoke with Min Barragan, to see if patient was scheduled for a appt, and made her aware Dr. Mita Salgado spoke with Dr. Aurora Shelby to see if patient can be seen sooner, Min Barragan stated the patient could come in the office in 6 weeks, a possible spot is open on 09/12/2023 at 9 am but patient would have to wait about 2 hours. Made Ismael aware I will call the patient and have her call them to schedule appt. Min Barragan verbalized understanding. Returned call to patient. Two patient identifiers used. Made patient aware of message above, patient verbalized understanding, patient thanked Dr. Mita Salgado and I for calling to get her a sooner appt versus next year.

## 2023-08-30 NOTE — TELEPHONE ENCOUNTER
Patient called stating that she wanted to pass a message to Dr. Brandi Zhang and his nurse. Patient stated that they were able to get her an earlier appointment at the [address] [place] [address],[address] and she was just called again and they had a cancellation for 9/1 at 2:00pm with the NP there. Patient was so thankful for all the help from Dr. Brandi Zhang and his nurse and wanted to pass along the good news.

## 2023-09-01 ENCOUNTER — OFFICE VISIT (OUTPATIENT)
Age: 43
End: 2023-09-01
Payer: COMMERCIAL

## 2023-09-01 VITALS
TEMPERATURE: 97 F | HEIGHT: 63 IN | HEART RATE: 107 BPM | OXYGEN SATURATION: 96 % | BODY MASS INDEX: 25.87 KG/M2 | WEIGHT: 146 LBS | SYSTOLIC BLOOD PRESSURE: 97 MMHG | DIASTOLIC BLOOD PRESSURE: 79 MMHG

## 2023-09-01 DIAGNOSIS — I42.0 DILATED CARDIOMYOPATHY (HCC): ICD-10-CM

## 2023-09-01 DIAGNOSIS — K74.69 OTHER CIRRHOSIS OF LIVER (HCC): Primary | ICD-10-CM

## 2023-09-01 PROCEDURE — 99205 OFFICE O/P NEW HI 60 MIN: CPT | Performed by: NURSE PRACTITIONER

## 2023-09-01 RX ORDER — FUROSEMIDE 20 MG/1
20 TABLET ORAL DAILY
Qty: 60 TABLET | Refills: 3 | Status: SHIPPED | OUTPATIENT
Start: 2023-09-01

## 2023-09-01 RX ORDER — SPIRONOLACTONE 50 MG/1
50 TABLET, FILM COATED ORAL DAILY
Qty: 90 TABLET | Refills: 1 | Status: SHIPPED | OUTPATIENT
Start: 2023-09-01

## 2023-09-02 LAB
ALBUMIN SERPL-MCNC: 3.2 G/DL (ref 3.9–4.9)
ALP SERPL-CCNC: 722 IU/L (ref 44–121)
ALT SERPL-CCNC: 83 IU/L (ref 0–32)
ANA SER QL: NEGATIVE
AST SERPL-CCNC: 127 IU/L (ref 0–40)
BASOPHILS # BLD AUTO: 0.1 X10E3/UL (ref 0–0.2)
BASOPHILS NFR BLD AUTO: 2 %
BILIRUB DIRECT SERPL-MCNC: 0.55 MG/DL (ref 0–0.4)
BILIRUB SERPL-MCNC: 0.9 MG/DL (ref 0–1.2)
BUN SERPL-MCNC: 8 MG/DL (ref 6–24)
BUN/CREAT SERPL: 17 (ref 9–23)
CALCIUM SERPL-MCNC: 8.8 MG/DL (ref 8.7–10.2)
CHLORIDE SERPL-SCNC: 101 MMOL/L (ref 96–106)
CO2 SERPL-SCNC: 24 MMOL/L (ref 20–29)
CREAT SERPL-MCNC: 0.47 MG/DL (ref 0.57–1)
EGFRCR SERPLBLD CKD-EPI 2021: 120 ML/MIN/1.73
EOSINOPHIL # BLD AUTO: 0.2 X10E3/UL (ref 0–0.4)
EOSINOPHIL NFR BLD AUTO: 3 %
ERYTHROCYTE [DISTWIDTH] IN BLOOD BY AUTOMATED COUNT: 12.6 % (ref 11.7–15.4)
FERRITIN SERPL-MCNC: 122 NG/ML (ref 15–150)
GLUCOSE SERPL-MCNC: 110 MG/DL (ref 70–99)
HAV AB SER QL IA: POSITIVE
HBV CORE AB SERPL QL IA: NEGATIVE
HBV SURFACE AB SER QL: REACTIVE
HBV SURFACE AG SERPL QL IA: NEGATIVE
HCT VFR BLD AUTO: 40.1 % (ref 34–46.6)
HGB BLD-MCNC: 13.6 G/DL (ref 11.1–15.9)
IMM GRANULOCYTES # BLD AUTO: 0 X10E3/UL (ref 0–0.1)
IMM GRANULOCYTES NFR BLD AUTO: 0 %
INR PPP: 1.2 (ref 0.9–1.2)
IRON SATN MFR SERPL: 8 % (ref 15–55)
IRON SERPL-MCNC: 24 UG/DL (ref 27–159)
LYMPHOCYTES # BLD AUTO: 2.3 X10E3/UL (ref 0.7–3.1)
LYMPHOCYTES NFR BLD AUTO: 34 %
MCH RBC QN AUTO: 34.2 PG (ref 26.6–33)
MCHC RBC AUTO-ENTMCNC: 33.9 G/DL (ref 31.5–35.7)
MCV RBC AUTO: 101 FL (ref 79–97)
MONOCYTES # BLD AUTO: 0.6 X10E3/UL (ref 0.1–0.9)
MONOCYTES NFR BLD AUTO: 9 %
NEUTROPHILS # BLD AUTO: 3.6 X10E3/UL (ref 1.4–7)
NEUTROPHILS NFR BLD AUTO: 52 %
PLATELET # BLD AUTO: 187 X10E3/UL (ref 150–450)
POTASSIUM SERPL-SCNC: 3.4 MMOL/L (ref 3.5–5.2)
PROT SERPL-MCNC: 6.1 G/DL (ref 6–8.5)
PROTHROMBIN TIME: 13 SEC (ref 9.1–12)
RBC # BLD AUTO: 3.98 X10E6/UL (ref 3.77–5.28)
SODIUM SERPL-SCNC: 141 MMOL/L (ref 134–144)
TIBC SERPL-MCNC: 314 UG/DL (ref 250–450)
UIBC SERPL-MCNC: 290 UG/DL (ref 131–425)
WBC # BLD AUTO: 6.7 X10E3/UL (ref 3.4–10.8)

## 2023-09-03 LAB
MITOCHONDRIA M2 IGG SER-ACNC: <20 UNITS (ref 0–20)
SMA IGG SER-ACNC: 22 UNITS (ref 0–19)

## 2023-09-05 LAB
A1AT SERPL-MCNC: 264 MG/DL (ref 101–187)
CERULOPLASMIN SERPL-MCNC: 53.1 MG/DL (ref 19–39)

## 2023-09-12 ENCOUNTER — HOSPITAL ENCOUNTER (OUTPATIENT)
Facility: HOSPITAL | Age: 43
Discharge: HOME | End: 2023-09-14
Payer: COMMERCIAL

## 2023-09-12 VITALS
DIASTOLIC BLOOD PRESSURE: 84 MMHG | HEART RATE: 109 BPM | HEIGHT: 63 IN | WEIGHT: 146 LBS | SYSTOLIC BLOOD PRESSURE: 96 MMHG | BODY MASS INDEX: 25.87 KG/M2

## 2023-09-12 DIAGNOSIS — I50.20 HEART FAILURE WITH REDUCED EJECTION FRACTION (HCC): Primary | ICD-10-CM

## 2023-09-12 DIAGNOSIS — I42.0 DILATED CARDIOMYOPATHY (HCC): ICD-10-CM

## 2023-09-12 LAB
ALBUMIN SERPL-MCNC: 3.4 G/DL (ref 3.9–4.9)
ALP SERPL-CCNC: 451 IU/L (ref 44–121)
ALT SERPL-CCNC: 59 IU/L (ref 0–32)
AST SERPL-CCNC: 139 IU/L (ref 0–40)
BASOPHILS # BLD AUTO: 0.2 X10E3/UL (ref 0–0.2)
BASOPHILS NFR BLD AUTO: 3 %
BILIRUB DIRECT SERPL-MCNC: 0.45 MG/DL (ref 0–0.4)
BILIRUB SERPL-MCNC: 0.9 MG/DL (ref 0–1.2)
BUN SERPL-MCNC: 13 MG/DL (ref 6–24)
BUN/CREAT SERPL: 19 (ref 9–23)
CALCIUM SERPL-MCNC: 9 MG/DL (ref 8.7–10.2)
CHLORIDE SERPL-SCNC: 100 MMOL/L (ref 96–106)
CO2 SERPL-SCNC: 30 MMOL/L (ref 20–29)
CREAT SERPL-MCNC: 0.68 MG/DL (ref 0.57–1)
ECHO AO ARCH DIAM: 2.3 CM
ECHO AO ASC DIAM: 2.9 CM
ECHO AO ASCENDING AORTA INDEX: 1.72 CM/M2
ECHO AV AREA PEAK VELOCITY: 1.2 CM2
ECHO AV AREA/BSA PEAK VELOCITY: 0.7 CM2/M2
ECHO AV PEAK GRADIENT: 4 MMHG
ECHO AV PEAK VELOCITY: 1 M/S
ECHO AV VELOCITY RATIO: 0.5
ECHO BSA: 1.72 M2
ECHO LA DIAMETER INDEX: 2.01 CM/M2
ECHO LA DIAMETER: 3.4 CM
ECHO LA VOL 2C: 48 ML (ref 22–52)
ECHO LA VOL 2C: 51 ML (ref 22–52)
ECHO LA VOL 4C: 24 ML (ref 22–52)
ECHO LA VOL 4C: 27 ML (ref 22–52)
ECHO LA VOL BP: 34 ML (ref 22–52)
ECHO LA VOL/BSA BIPLANE: 20 ML/M2 (ref 16–34)
ECHO LA VOLUME AREA LENGTH: 38 ML
ECHO LA VOLUME INDEX AREA LENGTH: 22 ML/M2 (ref 16–34)
ECHO LV E' LATERAL VELOCITY: 4 CM/S
ECHO LV E' SEPTAL VELOCITY: 4 CM/S
ECHO LV EDV A2C: 164 ML
ECHO LV EDV A4C: 181 ML
ECHO LV EDV BP: 176 ML (ref 56–104)
ECHO LV EDV INDEX A4C: 107 ML/M2
ECHO LV EDV INDEX BP: 104 ML/M2
ECHO LV EDV NDEX A2C: 97 ML/M2
ECHO LV EJECTION FRACTION A2C: 23 %
ECHO LV EJECTION FRACTION A4C: 10 %
ECHO LV EJECTION FRACTION BIPLANE: 16 % (ref 55–100)
ECHO LV ESV A2C: 127 ML
ECHO LV ESV A4C: 162 ML
ECHO LV ESV BP: 148 ML (ref 19–49)
ECHO LV ESV INDEX A2C: 75 ML/M2
ECHO LV ESV INDEX A4C: 96 ML/M2
ECHO LV ESV INDEX BP: 88 ML/M2
ECHO LV FRACTIONAL SHORTENING: 9 % (ref 28–44)
ECHO LV INTERNAL DIMENSION DIASTOLE INDEX: 4.08 CM/M2
ECHO LV INTERNAL DIMENSION DIASTOLIC: 6.9 CM (ref 3.9–5.3)
ECHO LV INTERNAL DIMENSION SYSTOLIC INDEX: 3.73 CM/M2
ECHO LV INTERNAL DIMENSION SYSTOLIC: 6.3 CM
ECHO LV IVSD: 0.6 CM (ref 0.6–0.9)
ECHO LV MASS 2D: 169.4 G (ref 67–162)
ECHO LV MASS INDEX 2D: 100.3 G/M2 (ref 43–95)
ECHO LV POSTERIOR WALL DIASTOLIC: 0.6 CM (ref 0.6–0.9)
ECHO LV RELATIVE WALL THICKNESS RATIO: 0.17
ECHO LVOT AREA: 2.8 CM2
ECHO LVOT DIAM: 1.9 CM
ECHO LVOT MEAN GRADIENT: 0 MMHG
ECHO LVOT PEAK GRADIENT: 1 MMHG
ECHO LVOT PEAK VELOCITY: 0.5 M/S
ECHO LVOT STROKE VOLUME INDEX: 13.2 ML/M2
ECHO LVOT SV: 22.4 ML
ECHO LVOT VTI: 7.9 CM
ECHO MV A VELOCITY: 0.29 M/S
ECHO MV E DECELERATION TIME (DT): 152 MS
ECHO MV E VELOCITY: 0.79 M/S
ECHO MV E/A RATIO: 2.72
ECHO MV E/E' LATERAL: 19.75
ECHO MV E/E' RATIO (AVERAGED): 19.75
ECHO MV E/E' SEPTAL: 19.75
ECHO MV REGURGITANT PEAK GRADIENT: 49 MMHG
ECHO MV REGURGITANT PEAK VELOCITY: 3.5 M/S
ECHO PULMONARY ARTERY END DIASTOLIC PRESSURE: 1 MMHG
ECHO PV MAX VELOCITY: 0.5 M/S
ECHO PV PEAK GRADIENT: 1 MMHG
ECHO PV REGURGITANT MAX VELOCITY: 0.6 M/S
ECHO RV FREE WALL PEAK S': 8 CM/S
ECHO RV INTERNAL DIMENSION: 3.6 CM
ECHO RV TAPSE: 1 CM (ref 1.7–?)
ECHO TV REGURGITANT MAX VELOCITY: 2.2 M/S
ECHO TV REGURGITANT PEAK GRADIENT: 20 MMHG
EGFRCR SERPLBLD CKD-EPI 2021: 109 ML/MIN/1.73
EOSINOPHIL # BLD AUTO: 0.3 X10E3/UL (ref 0–0.4)
EOSINOPHIL NFR BLD AUTO: 4 %
ERYTHROCYTE [DISTWIDTH] IN BLOOD BY AUTOMATED COUNT: 13.2 % (ref 11.7–15.4)
GLUCOSE SERPL-MCNC: 78 MG/DL (ref 70–99)
HCT VFR BLD AUTO: 44.6 % (ref 34–46.6)
HGB BLD-MCNC: 14.7 G/DL (ref 11.1–15.9)
IMM GRANULOCYTES # BLD AUTO: 0 X10E3/UL (ref 0–0.1)
IMM GRANULOCYTES NFR BLD AUTO: 1 %
LYMPHOCYTES # BLD AUTO: 2.8 X10E3/UL (ref 0.7–3.1)
LYMPHOCYTES NFR BLD AUTO: 43 %
MCH RBC QN AUTO: 33.7 PG (ref 26.6–33)
MCHC RBC AUTO-ENTMCNC: 33 G/DL (ref 31.5–35.7)
MCV RBC AUTO: 102 FL (ref 79–97)
MONOCYTES # BLD AUTO: 0.5 X10E3/UL (ref 0.1–0.9)
MONOCYTES NFR BLD AUTO: 8 %
NEUTROPHILS # BLD AUTO: 2.6 X10E3/UL (ref 1.4–7)
NEUTROPHILS NFR BLD AUTO: 41 %
PLATELET # BLD AUTO: 205 X10E3/UL (ref 150–450)
POTASSIUM SERPL-SCNC: 3.1 MMOL/L (ref 3.5–5.2)
PROT SERPL-MCNC: 6.8 G/DL (ref 6–8.5)
RBC # BLD AUTO: 4.36 X10E6/UL (ref 3.77–5.28)
SODIUM SERPL-SCNC: 147 MMOL/L (ref 134–144)
WBC # BLD AUTO: 6.4 X10E3/UL (ref 3.4–10.8)

## 2023-09-12 PROCEDURE — 93306 TTE W/DOPPLER COMPLETE: CPT

## 2023-09-14 ENCOUNTER — TELEPHONE (OUTPATIENT)
Age: 43
End: 2023-09-14

## 2023-09-14 NOTE — TELEPHONE ENCOUNTER
Patient called saying she had an EKG and was told to go to ER, had been putting it off, has right side heart failure, now liver is struggling. Is going to [facility] tomorrow. Wanted to apologize for not going.

## 2023-09-15 ENCOUNTER — APPOINTMENT (OUTPATIENT)
Facility: HOSPITAL | Age: 43
End: 2023-09-15
Payer: COMMERCIAL

## 2023-09-15 ENCOUNTER — HOSPITAL ENCOUNTER (EMERGENCY)
Facility: HOSPITAL | Age: 43
Discharge: HOME | End: 2023-09-15
Attending: EMERGENCY MEDICINE
Payer: COMMERCIAL

## 2023-09-15 VITALS
RESPIRATION RATE: 16 BRPM | HEART RATE: 106 BPM | SYSTOLIC BLOOD PRESSURE: 93 MMHG | BODY MASS INDEX: 26.02 KG/M2 | DIASTOLIC BLOOD PRESSURE: 71 MMHG | OXYGEN SATURATION: 98 % | HEIGHT: 63 IN | TEMPERATURE: 97.9 F | WEIGHT: 146.83 LBS

## 2023-09-15 DIAGNOSIS — I42.0 DILATED CARDIOMYOPATHY (HCC): Primary | ICD-10-CM

## 2023-09-15 DIAGNOSIS — K74.69 OTHER CIRRHOSIS OF LIVER (HCC): ICD-10-CM

## 2023-09-15 DIAGNOSIS — E87.6 HYPOKALEMIA: ICD-10-CM

## 2023-09-15 LAB
ALBUMIN SERPL-MCNC: 3 G/DL (ref 3.5–5)
ALBUMIN/GLOB SERPL: 0.7 (ref 1.1–2.2)
ALP SERPL-CCNC: 351 U/L (ref 45–117)
ALT SERPL-CCNC: 61 U/L (ref 12–78)
ANION GAP SERPL CALC-SCNC: 8 MMOL/L (ref 5–15)
AST SERPL-CCNC: 109 U/L (ref 15–37)
BASOPHILS # BLD: 0.1 K/UL (ref 0–0.1)
BASOPHILS NFR BLD: 2 % (ref 0–1)
BILIRUB SERPL-MCNC: 1.3 MG/DL (ref 0.2–1)
BUN SERPL-MCNC: 14 MG/DL (ref 6–20)
BUN/CREAT SERPL: 21 (ref 12–20)
CALCIUM SERPL-MCNC: 9 MG/DL (ref 8.5–10.1)
CHLORIDE SERPL-SCNC: 98 MMOL/L (ref 97–108)
CO2 SERPL-SCNC: 30 MMOL/L (ref 21–32)
COMMENT:: NORMAL
CREAT SERPL-MCNC: 0.66 MG/DL (ref 0.55–1.02)
DIFFERENTIAL METHOD BLD: ABNORMAL
EKG ATRIAL RATE: 106 BPM
EKG DIAGNOSIS: NORMAL
EKG P AXIS: 72 DEGREES
EKG P-R INTERVAL: 158 MS
EKG Q-T INTERVAL: 300 MS
EKG QRS DURATION: 104 MS
EKG QTC CALCULATION (BAZETT): 398 MS
EKG R AXIS: -45 DEGREES
EKG T AXIS: 53 DEGREES
EKG VENTRICULAR RATE: 106 BPM
EOSINOPHIL # BLD: 0.2 K/UL (ref 0–0.4)
EOSINOPHIL NFR BLD: 3 % (ref 0–7)
ERYTHROCYTE [DISTWIDTH] IN BLOOD BY AUTOMATED COUNT: 13.9 % (ref 11.5–14.5)
GLOBULIN SER CALC-MCNC: 4.4 G/DL (ref 2–4)
GLUCOSE SERPL-MCNC: 155 MG/DL (ref 65–100)
HCT VFR BLD AUTO: 48.9 % (ref 35–47)
HGB BLD-MCNC: 16.1 G/DL (ref 11.5–16)
IMM GRANULOCYTES # BLD AUTO: 0 K/UL (ref 0–0.04)
IMM GRANULOCYTES NFR BLD AUTO: 0 % (ref 0–0.5)
LYMPHOCYTES # BLD: 1.5 K/UL (ref 0.8–3.5)
LYMPHOCYTES NFR BLD: 20 % (ref 12–49)
MCH RBC QN AUTO: 32.7 PG (ref 26–34)
MCHC RBC AUTO-ENTMCNC: 32.9 G/DL (ref 30–36.5)
MCV RBC AUTO: 99.4 FL (ref 80–99)
MONOCYTES # BLD: 0.5 K/UL (ref 0–1)
MONOCYTES NFR BLD: 7 % (ref 5–13)
NEUTS SEG # BLD: 5.1 K/UL (ref 1.8–8)
NEUTS SEG NFR BLD: 68 % (ref 32–75)
NRBC # BLD: 0 K/UL (ref 0–0.01)
NRBC BLD-RTO: 0 PER 100 WBC
NT PRO BNP: 2144 PG/ML
PLATELET # BLD AUTO: 167 K/UL (ref 150–400)
PMV BLD AUTO: 11.9 FL (ref 8.9–12.9)
POTASSIUM SERPL-SCNC: 2.8 MMOL/L (ref 3.5–5.1)
PROT SERPL-MCNC: 7.4 G/DL (ref 6.4–8.2)
RBC # BLD AUTO: 4.92 M/UL (ref 3.8–5.2)
SODIUM SERPL-SCNC: 136 MMOL/L (ref 136–145)
SPECIMEN HOLD: NORMAL
TROPONIN I SERPL HS-MCNC: 44 NG/L (ref 0–51)
WBC # BLD AUTO: 7.5 K/UL (ref 3.6–11)

## 2023-09-15 PROCEDURE — 84484 ASSAY OF TROPONIN QUANT: CPT

## 2023-09-15 PROCEDURE — 36415 COLL VENOUS BLD VENIPUNCTURE: CPT

## 2023-09-15 PROCEDURE — 85025 COMPLETE CBC W/AUTO DIFF WBC: CPT

## 2023-09-15 PROCEDURE — 71046 X-RAY EXAM CHEST 2 VIEWS: CPT

## 2023-09-15 PROCEDURE — 99285 EMERGENCY DEPT VISIT HI MDM: CPT

## 2023-09-15 PROCEDURE — 80053 COMPREHEN METABOLIC PANEL: CPT

## 2023-09-15 PROCEDURE — 6370000000 HC RX 637 (ALT 250 FOR IP): Performed by: EMERGENCY MEDICINE

## 2023-09-15 PROCEDURE — 93005 ELECTROCARDIOGRAM TRACING: CPT

## 2023-09-15 PROCEDURE — 83880 ASSAY OF NATRIURETIC PEPTIDE: CPT

## 2023-09-15 RX ORDER — POTASSIUM CHLORIDE 750 MG/1
40 TABLET, FILM COATED, EXTENDED RELEASE ORAL ONCE
Status: COMPLETED | OUTPATIENT
Start: 2023-09-15 | End: 2023-09-15

## 2023-09-15 RX ORDER — POTASSIUM CHLORIDE 750 MG/1
10 TABLET, EXTENDED RELEASE ORAL DAILY
Qty: 60 TABLET | Refills: 0 | Status: SHIPPED | OUTPATIENT
Start: 2023-09-15

## 2023-09-15 RX ADMIN — POTASSIUM CHLORIDE 40 MEQ: 750 TABLET, FILM COATED, EXTENDED RELEASE ORAL at 12:47

## 2023-09-15 ASSESSMENT — ENCOUNTER SYMPTOMS
SINUS PAIN: 0
TROUBLE SWALLOWING: 0
SINUS PRESSURE: 0
BLOOD IN STOOL: 0
COUGH: 0
WHEEZING: 0
EYES NEGATIVE: 1
VOMITING: 0
COLOR CHANGE: 0
ABDOMINAL PAIN: 0
DIARRHEA: 0
CHEST TIGHTNESS: 0
SORE THROAT: 0
RHINORRHEA: 0
STRIDOR: 0
NAUSEA: 0
BACK PAIN: 0
SHORTNESS OF BREATH: 0

## 2023-09-15 ASSESSMENT — PAIN - FUNCTIONAL ASSESSMENT: PAIN_FUNCTIONAL_ASSESSMENT: 0-10

## 2023-09-15 ASSESSMENT — PAIN SCALES - GENERAL: PAINLEVEL_OUTOF10: 0

## 2023-09-15 NOTE — ED NOTES
Patient discharged by Raudel Wade MD- [facility], with strong and steady gait, no acute distress noted at time of discharge - Discharge information / home RX / and reasons to return to the ED were reviewed by the ED provider and this RN.       Aimee Kohli RN  09/15/23 1506

## 2023-09-15 NOTE — ED NOTES
11:09 AM    Patient is an 37 y.o. with history of cirrhosis and newly diagnosed dilated cardiomyopathy who was referred to come to the ER for her ECHO results. Patient has a cardiologist appointment on the 4th with [facility]. Patient reports she has intermittent shortness of breath with no chest pain. I have evaluated the patient as the Provider in Rapid Medical Evaluation (RME). I have reviewed her vital signs and the triage nurse assessment. I have talked with the patient and any available family and advised that I am the provider in triage and have ordered the appropriate study to initiate their work up based on the clinical presentation during my assessment. I have advised that the patient will be accommodated in the Main ED as soon as possible. I have also requested to contact the triage nurse or myself immediately if the patient experiences any changes in their condition during this brief waiting period.     [address] [facility] [place]       [address] [place], [place]  09/15/23 [id number]

## 2023-09-15 NOTE — DISCHARGE INSTRUCTIONS
Need to use the potassium as directed. Follow-up with the cardiologist as instructed. If you begin to get any new symptoms you should be reevaluated. I have spoken to Ms. Souravjuan josechristina and she is fine with going home with a potassium as long as you follow-up with cardiology as directed. Let her know if you have any other difficulty.

## 2023-09-26 ENCOUNTER — HOSPITAL ENCOUNTER (OUTPATIENT)
Facility: HOSPITAL | Age: 43
Discharge: HOME | End: 2023-09-29
Payer: COMMERCIAL

## 2023-09-26 ENCOUNTER — HOSPITAL ENCOUNTER (OUTPATIENT)
Facility: HOSPITAL | Age: 43
Discharge: HOME | End: 2023-09-26
Attending: RADIOLOGY | Admitting: RADIOLOGY
Payer: COMMERCIAL

## 2023-09-26 VITALS
RESPIRATION RATE: 14 BRPM | SYSTOLIC BLOOD PRESSURE: 93 MMHG | HEART RATE: 101 BPM | WEIGHT: 145.5 LBS | OXYGEN SATURATION: 95 % | BODY MASS INDEX: 25.78 KG/M2 | DIASTOLIC BLOOD PRESSURE: 68 MMHG | HEIGHT: 63 IN

## 2023-09-26 VITALS
RESPIRATION RATE: 19 BRPM | HEART RATE: 99 BPM | OXYGEN SATURATION: 92 % | TEMPERATURE: 97.8 F | DIASTOLIC BLOOD PRESSURE: 71 MMHG | SYSTOLIC BLOOD PRESSURE: 93 MMHG

## 2023-09-26 DIAGNOSIS — K74.69 OTHER CIRRHOSIS OF LIVER (HCC): ICD-10-CM

## 2023-09-26 PROCEDURE — 6360000002 HC RX W HCPCS: Performed by: RADIOLOGY

## 2023-09-26 PROCEDURE — 37200 TRANSCATHETER BIOPSY: CPT

## 2023-09-26 PROCEDURE — 88307 TISSUE EXAM BY PATHOLOGIST: CPT

## 2023-09-26 PROCEDURE — 88313 SPECIAL STAINS GROUP 2: CPT

## 2023-09-26 PROCEDURE — 75889 VEIN X-RAY LIVER W/HEMODYNAM: CPT

## 2023-09-26 RX ORDER — MIDAZOLAM HYDROCHLORIDE 2 MG/2ML
INJECTION, SOLUTION INTRAMUSCULAR; INTRAVENOUS PRN
Status: COMPLETED | OUTPATIENT
Start: 2023-09-26 | End: 2023-09-26

## 2023-09-26 RX ORDER — DIPHENHYDRAMINE HYDROCHLORIDE 50 MG/ML
INJECTION INTRAMUSCULAR; INTRAVENOUS PRN
Status: COMPLETED | OUTPATIENT
Start: 2023-09-26 | End: 2023-09-26

## 2023-09-26 RX ORDER — PROCHLORPERAZINE 25 MG/1
SUPPOSITORY RECTAL
COMMUNITY
Start: 2023-09-24

## 2023-09-26 RX ORDER — FENTANYL CITRATE 50 UG/ML
INJECTION, SOLUTION INTRAMUSCULAR; INTRAVENOUS PRN
Status: COMPLETED | OUTPATIENT
Start: 2023-09-26 | End: 2023-09-26

## 2023-09-26 RX ORDER — POTASSIUM CHLORIDE 750 MG/1
TABLET, FILM COATED, EXTENDED RELEASE ORAL
COMMUNITY
Start: 2023-09-15

## 2023-09-26 RX ADMIN — FENTANYL CITRATE 50 MCG: 50 INJECTION, SOLUTION INTRAMUSCULAR; INTRAVENOUS at 12:02

## 2023-09-26 RX ADMIN — FENTANYL CITRATE 25 MCG: 50 INJECTION, SOLUTION INTRAMUSCULAR; INTRAVENOUS at 12:22

## 2023-09-26 RX ADMIN — MIDAZOLAM HYDROCHLORIDE 2 MG: 1 INJECTION, SOLUTION INTRAMUSCULAR; INTRAVENOUS at 12:02

## 2023-09-26 RX ADMIN — FENTANYL CITRATE 25 MCG: 50 INJECTION, SOLUTION INTRAMUSCULAR; INTRAVENOUS at 12:20

## 2023-09-26 RX ADMIN — MIDAZOLAM HYDROCHLORIDE 0.5 MG: 1 INJECTION, SOLUTION INTRAMUSCULAR; INTRAVENOUS at 12:08

## 2023-09-26 RX ADMIN — DIPHENHYDRAMINE HYDROCHLORIDE 50 MG: 50 INJECTION, SOLUTION INTRAMUSCULAR; INTRAVENOUS at 12:49

## 2023-09-26 RX ADMIN — FENTANYL CITRATE 50 MCG: 50 INJECTION, SOLUTION INTRAMUSCULAR; INTRAVENOUS at 12:06

## 2023-09-26 ASSESSMENT — PULMONARY FUNCTION TESTS
PIF_VALUE: 0

## 2023-09-26 NOTE — DISCHARGE INSTRUCTIONS
TRANSJUGULAR BIOPSY DISCHARGE INSTRUCTIONS    General Information:   The transjugular liver biopsy is a procedure that is done to obtain a liver biopsy through the portal vein. A biopsy is the removal of a small piece of tissue for microscopic examination or testing. Healthy tissue can be obtained for the purpose of tissue-type matching for transplants. Unhealthy tissues are more commonly biopsied to diagnose disease. The liver biopsy helps detect cancer, infections, and the cause of an enlargement of the liver or elevated liver enzymes. It also helps to diagnose a number of liver diseases. Home Care Instructions: You can resume your regular diet and medication regimen. Do not drink alcohol, drive, or make any important legal decisions in the next 24 hours. Do not lift anything heavier than a gallon of milk, or do anything strenuous for the next 24 hours. You will notice a dressing on your neck. This was the site of the insertion for the procedure. This dressing may be removed in 24 hours. You may take a shower after the bandage comes off. Do not take a bath, swim or immerse yourself in water until the wound on your neck has completely healed. Call If:   You should call your Physician and/or Radiology Nurse if you have any bleeding other than a small spot on your bandage. Call if you have any signs of infection, fever, or increased pain at the site. Call if you have any questions of how to take care of your wound. Call if you notice your abdomen swelling. You may still have to come in for the paracentesis, but you should not have to come in as often. Follow-Up Instructions: Please see your ordering doctor as he/she has requested. You have received sedation medications today. YOU SHOULD NOT DRIVE FOR 24 HOURS, DO NOT OPERATE HEAVY MACHINERY, DO NOT MAKE ANY LEGAL DECISIONS OR SIGN LEGAL DOCUMENTS FOR 24 HOURS. DO NOT DRINK ALCOHOL, TAKE ANY MEDICATIONS UNLESS PRESCRIBED BY YOUR DOCTOR.

## 2023-09-26 NOTE — PROGRESS NOTES
Patient to angio for TJLB accompanied by     [phone]  Patient with generalized itching, received order for 50 milligrams IV benadryl    1300  Patient's family updated on status and completion of procedure    1400  Itching improved, vitals stable, site clean, dry, and intact, abdomen soft and non tender, denies pain  Procedure:TJLB    Sedation:2.5 milligrams versed and 150 micrograms fentanyl    Site:right jugular    Discharge Details: discharge teaching completed with patient and spouse, understanding verbalized, patient discharged home via wheelchair
PRE-OP DIAGNOSIS:  Umbilical hernia 23-Jul-2021 10:26:06  Edy Michael

## 2023-09-26 NOTE — H&P
system. Medication: Versed: 2.5 mg Fentanyl: 150 mcg Intraprocedure time: 24 minutes     1. Hepatic venogram with pressure measurements demonstrating portosystemic gradient of 3 mmHg. Right heart pressure 2 mmHg. 2. Transjugular core liver biopsy performed. Final pathology results are pending.        Assessment        Plan   Plan is for [address] [facility] liver biopsy     Consultations Ordered:  None    Electronically signed by Fang uLndberg MD on 9/26/23 at 2:18 PM EDT

## 2023-09-28 ENCOUNTER — CLINICAL DOCUMENTATION (OUTPATIENT)
Age: 43
End: 2023-09-28

## 2023-09-28 ENCOUNTER — TELEPHONE (OUTPATIENT)
Age: 43
End: 2023-09-28

## 2023-09-28 RX ORDER — FUROSEMIDE 20 MG/1
60 TABLET ORAL DAILY
Qty: 90 TABLET | Refills: 3 | Status: SHIPPED | OUTPATIENT
Start: 2023-09-28

## 2023-09-28 RX ORDER — SPIRONOLACTONE 50 MG/1
150 TABLET, FILM COATED ORAL DAILY
Qty: 90 TABLET | Refills: 3 | Status: SHIPPED | OUTPATIENT
Start: 2023-09-28

## 2023-09-28 NOTE — TELEPHONE ENCOUNTER
Reviewed HVPG findings with Viry. Liver biopsy pending. She is scheduled to see Dr. Julia Alberto in cardiology on Monday. We will schedule next available follow up at [address] [place] [address],[address]. Refill sent for diuretics; pt reports she is currently taking 60 mg Lasix/ 150 mg spironolactone daily.

## 2023-09-28 NOTE — TELEPHONE ENCOUNTER
Patient is calling to clarify recent IR procedure was done on the correct side and/or that the findings that were seen were on the left side, not the right side. States that issue is heart related possibly and not liver related. Patient needs clarification regarding review of medical records in [address] [place] [address]. Currently, no follow up is scheduled.

## 2023-10-02 ENCOUNTER — CLINICAL DOCUMENTATION (OUTPATIENT)
Age: 43
End: 2023-10-02

## 2023-10-02 NOTE — PROGRESS NOTES
Left messages in attempt to schedule follow up appt with patient. Patient has not called back. Appt scheduled 11/22/23. Appt reminder mailed to patient.

## 2023-10-04 ENCOUNTER — OFFICE VISIT (OUTPATIENT)
Age: 43
End: 2023-10-04
Payer: COMMERCIAL

## 2023-10-04 ENCOUNTER — TELEPHONE (OUTPATIENT)
Age: 43
End: 2023-10-04

## 2023-10-04 VITALS
WEIGHT: 124 LBS | HEART RATE: 96 BPM | BODY MASS INDEX: 21.97 KG/M2 | OXYGEN SATURATION: 97 % | HEIGHT: 63 IN | SYSTOLIC BLOOD PRESSURE: 120 MMHG | DIASTOLIC BLOOD PRESSURE: 70 MMHG

## 2023-10-04 DIAGNOSIS — I42.0 DILATED CARDIOMYOPATHY (HCC): Primary | ICD-10-CM

## 2023-10-04 DIAGNOSIS — Z01.818 PREOP TESTING: Primary | ICD-10-CM

## 2023-10-04 DIAGNOSIS — Z01.818 PREOP TESTING: ICD-10-CM

## 2023-10-04 DIAGNOSIS — I42.0 DILATED CARDIOMYOPATHY (HCC): ICD-10-CM

## 2023-10-04 PROCEDURE — 99215 OFFICE O/P EST HI 40 MIN: CPT | Performed by: SPECIALIST

## 2023-10-04 RX ORDER — METOPROLOL SUCCINATE 25 MG/1
25 TABLET, EXTENDED RELEASE ORAL
Qty: 30 TABLET | Refills: 5 | Status: SHIPPED | OUTPATIENT
Start: 2023-10-04

## 2023-10-04 RX ORDER — METOPROLOL SUCCINATE 25 MG/1
25 TABLET, EXTENDED RELEASE ORAL
COMMUNITY
End: 2023-10-04 | Stop reason: SDUPTHER

## 2023-10-04 NOTE — PATIENT INSTRUCTIONS

## 2023-10-04 NOTE — PROGRESS NOTES
CARDIOLOGY OFFICE NOTE    Jerome Schaefer MD, [facility] [address], [place]  Phone [phone]; Fax [phone]  Mobile [phone]   Voice Mail [phone]    Primary care: PATI Smith       ATTENTION:   This medical record was transcribed using an electronic medical records/speech recognition system. Although proofread, it may and can contain electronic, spelling and other errors. Corrections may be executed at a later time. Please feel free to contact us for any clarifications as needed. Mode Rushing is a 37 y.o. female with  referred for a cardiomyopathy discovered on echo after CT of her heart during work-up by GI for presumed cirrhosis        Cardiac risk factors: diabetes mellitus, dyslipidemia, and smoking/ tobacco exposure  I have personally obtained the history from the patient. HISTORY OF PRESENTING ILLNESS    MsLia/Mr. Mode Rushing  37 y.o. 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  There was a distal pancreatectomy. She also had hernia surgery as as a result of the her pancreatectomy. Around 2019 diabetes and now has a insulin pump. History started to experience increasing lower extremity bloating. Concerned that she had cirrhosis of the liver had a CT of her abdomen and suggested that she had a dilated heart. For the last several months she has been increasingly short of breath. She is unable to walk her dogs as she did about 3 months ago. She can even get down the driveway without being short of breath on 1 occasion she called her  because she could not make it home. She says her weight was 134 pounds and over 10 days increased to 147 pounds. She now weighs 124 pounds after being on Lasix and spironolactone. She has noticed palpitations and increasing fatigue and is sleeping more.   She also notes discoloration around her

## 2023-10-04 NOTE — H&P (VIEW-ONLY)
No    Sexual activity: Yes     Partners: Male     Birth control/protection: None         MEDICATIONS     Current Outpatient Medications   Medication Sig    spironolactone (ALDACTONE) 50 MG tablet Take 3 tablets by mouth daily    furosemide (LASIX) 20 MG tablet Take 3 tablets by mouth daily    Continuous Blood Gluc Sensor (DEXCOM G6 SENSOR) MISC     potassium chloride (KLOR-CON M) 10 MEQ extended release tablet Take 1 tablet by mouth daily    Continuous Blood Gluc Transmit (DEXCOM G6 TRANSMITTER) MISC     Insulin Disposable Pump (OMNIPOD 5 G6 POD, GEN 5,) MISC     venlafaxine (EFFEXOR XR) 150 MG extended release capsule     amphetamine-dextroamphetamine (ADDERALL) 20 MG tablet Take 1 tablet by mouth 4 times daily. ALPRAZolam (XANAX) 1 MG tablet ceived the following from [facility]: Outside name: ALPRAZolam (XANAX) 1 mg tablet    ibuprofen (ADVIL;MOTRIN) 200 MG tablet Take by mouth every 8 hours as needed    metoprolol succinate (TOPROL XL) 25 MG extended release tablet Take 1 tablet by mouth nightly    potassium chloride (KLOR-CON) 10 MEQ extended release tablet     atorvastatin (LIPITOR) 20 MG tablet  (Patient not taking: Reported on 10/4/2023)    insulin lispro (HUMALOG) 100 UNIT/ML SOLN injection vial     zolpidem (AMBIEN) 10 MG tablet  (Patient not taking: Reported on 10/4/2023)    acetaminophen (TYLENOL) 500 MG tablet Take by mouth every 8 hours as needed     No current facility-administered medications for this visit. I have reviewed the nurses notes, vitals, problem list, allergy list, medical history, family, social history and medications. REVIEW OF SYMPTOMS    As per HPI  General: Pt denies excessive weight gain or loss. Pt is able to conduct ADL's  HEENT: Denies blurred vision, headaches, hearing loss, epistaxis and difficulty swallowing. Respiratory: Denies cough, congestion, shortness of breath, GUERRERO, wheezing or stridor.   Cardiovascular: Denies precordial pain,

## 2023-10-04 NOTE — TELEPHONE ENCOUNTER
Noman Burnham - pt needs asap appt with Dr Garry Monroy ER/cardiomyopathy.      I also put form on your desk for asap L/R Cath for Cardiomyopathy - EF 20-25%

## 2023-10-04 NOTE — PROGRESS NOTES
Lisandro Silva is a 37 y.o. female    had concerns including Follow-Up from Hospital, Cardiomyopathy, and Congestive Heart Failure. Vitals:    10/04/23 1553   BP: 120/70   Site: Right Upper Arm   Position: Sitting   Pulse: 96   SpO2: 97%   Weight: 124 lb (56.2 kg)   Height: 5' 3\" (1.6 m)        Chest pain yes after taking a deep breath she has some pain on the left upper side of her chest     SOB yes with exertion and sometimes it is random when she is not doing anything    Dizziness NO    Swelling NO    Palpitations NO    Refills NO    Covid Vaccinated yes      1. Have you been to the ER, urgent care clinic since your last visit? Hospitalized since your last visit? NO    2. Have you seen or consulted any other health care providers outside of the [address] [place] [address] Avenue since your last visit? Include any pap smears or colon screening. NO    NAME Lisandro Silva         1980      MRN    [id number]      LAST OFFICE APPOINTMENT: Visit date not found     DIAGNOSIS  No diagnosis found. HOME MEDICATION  Current Outpatient Medications   Medication Sig    spironolactone (ALDACTONE) 50 MG tablet Take 3 tablets by mouth daily    furosemide (LASIX) 20 MG tablet Take 3 tablets by mouth daily    Continuous Blood Gluc Sensor (DEXCOM G6 SENSOR) MISC     potassium chloride (KLOR-CON M) 10 MEQ extended release tablet Take 1 tablet by mouth daily    Continuous Blood Gluc Transmit (DEXCOM G6 TRANSMITTER) MISC     Insulin Disposable Pump (OMNIPOD 5 G6 POD, GEN 5,) MISC     venlafaxine (EFFEXOR XR) 150 MG extended release capsule     amphetamine-dextroamphetamine (ADDERALL) 20 MG tablet Take 1 tablet by mouth 4 times daily.     ALPRAZolam (XANAX) 1 MG tablet ceived the following from [facility]: Outside name: ALPRAZolam (XANAX) 1 mg tablet    ibuprofen (ADVIL;MOTRIN) 200 MG tablet Take by mouth every 8 hours as needed    potassium chloride (KLOR-CON) 10 MEQ extended release tablet     atorvastatin

## 2023-10-05 ENCOUNTER — TELEPHONE (OUTPATIENT)
Age: 43
End: 2023-10-05

## 2023-10-05 NOTE — TELEPHONE ENCOUNTER
New Patient referred by Dr. Kelvin Vinson for dilated cardiomyopathy. Per patient she has SOB w/ exertion at times. She is in the process of getting scheduled for her left and right heart catherizations. After conferring with Marilu Ribera RN it was advised that the patient come in after her testing unless she is symptomatic. Patient states that she is okay and will wait until her scheduled appointment. Patient informed to contact the clinic if her symptoms worsen to see if she can have an earlier appointment. She was scheduled w/ Dr. Chelle Bradshaw on 10/26. Patient was given the address to [facility] and directions to clinic. Patient was advised to arrive 15 minutes early for registration, bring medication bottles, as well as insurance cards and ID. Patient was provided the clinic phone number for any questions, in addition to my extension. Patient is agreeable and understanding of all instructions.        *[employer] message with instructions were also sent at the patient's request.     [employer], [address]

## 2023-10-05 NOTE — TELEPHONE ENCOUNTER
Pt is calling to find out how soon she may be schedule to have LHC. Pt advise and assist.    [phone] please make sure you dial a one.

## 2023-10-06 ENCOUNTER — TELEPHONE (OUTPATIENT)
Age: 43
End: 2023-10-06

## 2023-10-10 ENCOUNTER — TELEPHONE (OUTPATIENT)
Age: 43
End: 2023-10-10

## 2023-10-10 ENCOUNTER — DIRECT ADMIT ORDERS (OUTPATIENT)
Age: 43
End: 2023-10-10

## 2023-10-10 DIAGNOSIS — I42.0 CONGESTIVE CARDIOMYOPATHY (HCC): Primary | ICD-10-CM

## 2023-10-10 RX ORDER — SODIUM CHLORIDE 0.9 % (FLUSH) 0.9 %
5-40 SYRINGE (ML) INJECTION PRN
OUTPATIENT
Start: 2023-10-17

## 2023-10-10 RX ORDER — SODIUM CHLORIDE 0.9 % (FLUSH) 0.9 %
5-40 SYRINGE (ML) INJECTION EVERY 12 HOURS SCHEDULED
OUTPATIENT
Start: 2023-10-17

## 2023-10-10 RX ORDER — SODIUM CHLORIDE 9 MG/ML
INJECTION, SOLUTION INTRAVENOUS PRN
OUTPATIENT
Start: 2023-10-17

## 2023-10-10 NOTE — TELEPHONE ENCOUNTER
Pt wants to wait until after the Cath and she see's [facility] before scheduling with Dr. Jose Carlos Wells

## 2023-10-12 LAB
BUN SERPL-MCNC: 12 MG/DL (ref 6–24)
BUN/CREAT SERPL: 19 (ref 9–23)
CALCIUM SERPL-MCNC: 9.6 MG/DL (ref 8.7–10.2)
CHLORIDE SERPL-SCNC: 96 MMOL/L (ref 96–106)
CO2 SERPL-SCNC: 23 MMOL/L (ref 20–29)
CREAT SERPL-MCNC: 0.63 MG/DL (ref 0.57–1)
EGFRCR SERPLBLD CKD-EPI 2021: 111 ML/MIN/1.73
ERYTHROCYTE [DISTWIDTH] IN BLOOD BY AUTOMATED COUNT: 14.8 % (ref 11.7–15.4)
GLUCOSE SERPL-MCNC: 307 MG/DL (ref 70–99)
HCT VFR BLD AUTO: 44.3 % (ref 34–46.6)
HGB BLD-MCNC: 14.8 G/DL (ref 11.1–15.9)
MAGNESIUM SERPL-MCNC: 1.2 MG/DL (ref 1.6–2.3)
MCH RBC QN AUTO: 33.3 PG (ref 26.6–33)
MCHC RBC AUTO-ENTMCNC: 33.4 G/DL (ref 31.5–35.7)
MCV RBC AUTO: 100 FL (ref 79–97)
PLATELET # BLD AUTO: 202 X10E3/UL (ref 150–450)
POTASSIUM SERPL-SCNC: 5.1 MMOL/L (ref 3.5–5.2)
RBC # BLD AUTO: 4.44 X10E6/UL (ref 3.77–5.28)
SODIUM SERPL-SCNC: 135 MMOL/L (ref 134–144)
WBC # BLD AUTO: 7 X10E3/UL (ref 3.4–10.8)

## 2023-10-13 ENCOUNTER — CLINICAL DOCUMENTATION (OUTPATIENT)
Age: 43
End: 2023-10-13

## 2023-10-16 NOTE — TELEPHONE ENCOUNTER
Spoke To Pt:  Just a reminder not to forget your LHC scheduled for tomorrow. Arriving at 6:30am  You will need a    NPO from midnight   check in at the 2nd floor outpt. reg. Desk.   Medications:  Hold Lasix day of procedure  Hold Spironolactone day of procedure  VO by Dr. Faith/Dr. Arthur Schroeder.

## 2023-10-17 ENCOUNTER — HOSPITAL ENCOUNTER (OUTPATIENT)
Facility: HOSPITAL | Age: 43
Setting detail: OUTPATIENT SURGERY
Discharge: HOME | End: 2023-10-17
Attending: STUDENT IN AN ORGANIZED HEALTH CARE EDUCATION/TRAINING PROGRAM | Admitting: STUDENT IN AN ORGANIZED HEALTH CARE EDUCATION/TRAINING PROGRAM
Payer: COMMERCIAL

## 2023-10-17 VITALS
BODY MASS INDEX: 20.13 KG/M2 | TEMPERATURE: 97.2 F | DIASTOLIC BLOOD PRESSURE: 71 MMHG | WEIGHT: 117.9 LBS | HEART RATE: 81 BPM | OXYGEN SATURATION: 100 % | RESPIRATION RATE: 16 BRPM | SYSTOLIC BLOOD PRESSURE: 93 MMHG | HEIGHT: 64 IN

## 2023-10-17 DIAGNOSIS — I42.0 CONGESTIVE CARDIOMYOPATHY (HCC): ICD-10-CM

## 2023-10-17 LAB
ECHO BSA: 1.55 M2
GLUCOSE BLD STRIP.AUTO-MCNC: 108 MG/DL (ref 65–117)
GLUCOSE BLD STRIP.AUTO-MCNC: 94 MG/DL (ref 65–117)
SERVICE CMNT-IMP: NORMAL
SERVICE CMNT-IMP: NORMAL

## 2023-10-17 PROCEDURE — 2709999900 HC NON-CHARGEABLE SUPPLY: Performed by: STUDENT IN AN ORGANIZED HEALTH CARE EDUCATION/TRAINING PROGRAM

## 2023-10-17 PROCEDURE — 6360000004 HC RX CONTRAST MEDICATION: Performed by: STUDENT IN AN ORGANIZED HEALTH CARE EDUCATION/TRAINING PROGRAM

## 2023-10-17 PROCEDURE — 2500000003 HC RX 250 WO HCPCS: Performed by: STUDENT IN AN ORGANIZED HEALTH CARE EDUCATION/TRAINING PROGRAM

## 2023-10-17 PROCEDURE — 2580000003 HC RX 258: Performed by: STUDENT IN AN ORGANIZED HEALTH CARE EDUCATION/TRAINING PROGRAM

## 2023-10-17 PROCEDURE — 6360000002 HC RX W HCPCS: Performed by: STUDENT IN AN ORGANIZED HEALTH CARE EDUCATION/TRAINING PROGRAM

## 2023-10-17 PROCEDURE — 99152 MOD SED SAME PHYS/QHP 5/>YRS: CPT | Performed by: STUDENT IN AN ORGANIZED HEALTH CARE EDUCATION/TRAINING PROGRAM

## 2023-10-17 PROCEDURE — 99153 MOD SED SAME PHYS/QHP EA: CPT | Performed by: STUDENT IN AN ORGANIZED HEALTH CARE EDUCATION/TRAINING PROGRAM

## 2023-10-17 PROCEDURE — 82962 GLUCOSE BLOOD TEST: CPT

## 2023-10-17 PROCEDURE — C1894 INTRO/SHEATH, NON-LASER: HCPCS | Performed by: STUDENT IN AN ORGANIZED HEALTH CARE EDUCATION/TRAINING PROGRAM

## 2023-10-17 PROCEDURE — C1725 CATH, TRANSLUMIN NON-LASER: HCPCS | Performed by: STUDENT IN AN ORGANIZED HEALTH CARE EDUCATION/TRAINING PROGRAM

## 2023-10-17 PROCEDURE — 93460 R&L HRT ART/VENTRICLE ANGIO: CPT | Performed by: STUDENT IN AN ORGANIZED HEALTH CARE EDUCATION/TRAINING PROGRAM

## 2023-10-17 PROCEDURE — C1769 GUIDE WIRE: HCPCS | Performed by: STUDENT IN AN ORGANIZED HEALTH CARE EDUCATION/TRAINING PROGRAM

## 2023-10-17 RX ORDER — SODIUM CHLORIDE 0.9 % (FLUSH) 0.9 %
5-40 SYRINGE (ML) INJECTION EVERY 12 HOURS SCHEDULED
Status: DISCONTINUED | OUTPATIENT
Start: 2023-10-17 | End: 2023-10-17 | Stop reason: HOSPADM

## 2023-10-17 RX ORDER — HEPARIN SODIUM 200 [USP'U]/100ML
INJECTION, SOLUTION INTRAVENOUS PRN
Status: DISCONTINUED | OUTPATIENT
Start: 2023-10-17 | End: 2023-10-17 | Stop reason: HOSPADM

## 2023-10-17 RX ORDER — VERAPAMIL HYDROCHLORIDE 2.5 MG/ML
INJECTION, SOLUTION INTRAVENOUS PRN
Status: DISCONTINUED | OUTPATIENT
Start: 2023-10-17 | End: 2023-10-17 | Stop reason: HOSPADM

## 2023-10-17 RX ORDER — SPIRONOLACTONE 25 MG/1
25 TABLET ORAL DAILY
Qty: 90 TABLET | Refills: 1 | Status: SHIPPED | OUTPATIENT
Start: 2023-10-17

## 2023-10-17 RX ORDER — HEPARIN SODIUM 1000 [USP'U]/ML
INJECTION, SOLUTION INTRAVENOUS; SUBCUTANEOUS PRN
Status: DISCONTINUED | OUTPATIENT
Start: 2023-10-17 | End: 2023-10-17 | Stop reason: HOSPADM

## 2023-10-17 RX ORDER — MIDAZOLAM HYDROCHLORIDE 1 MG/ML
INJECTION INTRAMUSCULAR; INTRAVENOUS PRN
Status: DISCONTINUED | OUTPATIENT
Start: 2023-10-17 | End: 2023-10-17 | Stop reason: HOSPADM

## 2023-10-17 RX ORDER — FENTANYL CITRATE 50 UG/ML
INJECTION, SOLUTION INTRAMUSCULAR; INTRAVENOUS PRN
Status: DISCONTINUED | OUTPATIENT
Start: 2023-10-17 | End: 2023-10-17 | Stop reason: HOSPADM

## 2023-10-17 RX ORDER — SODIUM CHLORIDE 0.9 % (FLUSH) 0.9 %
5-40 SYRINGE (ML) INJECTION PRN
Status: DISCONTINUED | OUTPATIENT
Start: 2023-10-17 | End: 2023-10-17 | Stop reason: HOSPADM

## 2023-10-17 RX ORDER — SODIUM CHLORIDE 9 MG/ML
INJECTION, SOLUTION INTRAVENOUS PRN
Status: DISCONTINUED | OUTPATIENT
Start: 2023-10-17 | End: 2023-10-17 | Stop reason: HOSPADM

## 2023-10-17 NOTE — DISCHARGE INSTRUCTIONS
drink alcohol for 24 hours. If your stomach is upset, try clear liquids and bland, low-fat foods like plain toast or rice. Drink plenty of fluids (unless your doctor tells you not to). When should you call for help? Call 911 anytime you think you may need emergency care. For example, call if:    You have trouble breathing. You passed out (lost consciousness). Call your doctor now or seek immediate medical care if:    You have nausea or vomiting that gets worse or won't stop. You have a fever. You have a new or worse headache. The medicine is not wearing off and you can't think clearly.

## 2023-10-17 NOTE — INTERVAL H&P NOTE
Update History & Physical  History and physical has been reviewed. The patient has been examined. There have been no significant clinical changes since the completion of the originally dated History and Physical.    Risk and benefit of cardiac catheterization/PCI was described in detail to patient.  (risk of vascular access complication with potential surgical intervention for management, stroke, myocardial infarction, emergent open heart surgery and death). Patient wishes to proceed with coronary angiography with possible intervention. 09/12/23    ECHO (TTE) COMPLETE (CONTRAST/BUBBLE/3D PRN) 09/12/2023 12:20 PM (Final)    Interpretation Summary    Left Ventricle: Severely reduced left ventricular systolic function with a visually estimated EF of 20 - 25%. Left ventricle is severely dilated. LVIDd is 6.9 cm. Normal wall thickness. IVSd is 0.6 cm. LVPWd is 0.6 cm. Severe global hypokinesis present. Abnormal diastolic function. Right Ventricle: Mildly reduced systolic function. Aortic Valve: Trileaflet valve. Mitral Valve: Mild regurgitation. Signed by: Vicky Dunn MD on 9/12/2023 12:20 PM      ASA 3  Airway 2      Plan: The risks, benefits, expected outcome, and alternative to the recommended procedure have been discussed with the patient. Patient understands and wants to proceed with the procedure.      Electronically signed by Gay Gambino DO on 10/17/2023 at 8:12 AM

## 2023-10-17 NOTE — PROGRESS NOTES
Received patient from waiting area. Armband and allergies verbally confirmed with patient. Procedure explained and consents signed. Notified Dr. Lico Cole of patient pressure in the 80s/60s. No new orders. 0815: TRANSFER - OUT REPORT:    Verbal report given to Heidy Hoffmann  on Soren Rojas  being transferred to cath lab for routine progression of patient care       Report consisted of patient's Situation, Background, Assessment and   Recommendations(SBAR). Information from the following report(s) Nurse Handoff Report was reviewed with the receiving nurse. Lines:   Peripheral IV 10/17/23 Right Antecubital (Active)   Site Assessment Clean, dry & intact 10/17/23 0746   Line Status Blood return noted 10/17/23 0746   Phlebitis Assessment No symptoms 10/17/23 0746   Infiltration Assessment 0 10/17/23 0746   Dressing Status Clean, dry & intact; New dressing applied 10/17/23 0746   Dressing Type Transparent 10/17/23 0746   Dressing Intervention New 10/17/23 0746        Opportunity for questions and clarification was provided. Patient transported with:  Tech    0903: TRANSFER - IN REPORT:    Verbal report received from [facility] RN on Soren Rojas  being received from cath lab for routine post-op      Report consisted of patient's Situation, Background, Assessment and   Recommendations(SBAR). Information from the following report(s) Nurse Handoff Report was reviewed with the receiving nurse. Opportunity for questions and clarification was provided. Assessment completed upon patient's arrival to unit and care assumed. 3584: Patient received from cath lab. Patient with TR band to right radial site and quikclot to right IJ. Site clean, dry and intact. No hematoma. Pulses palpable. VS stable. Patient with no complaints at this time. 1004: 2 ml air released from TR Band. No bleeding or hematoma noted. Radial and Ulnar pulse on right wrist palpable. Pt tolerated well.  Will continue to

## 2023-10-24 ENCOUNTER — OFFICE VISIT (OUTPATIENT)
Age: 43
End: 2023-10-24
Payer: COMMERCIAL

## 2023-10-24 VITALS
HEART RATE: 84 BPM | SYSTOLIC BLOOD PRESSURE: 110 MMHG | WEIGHT: 128.6 LBS | DIASTOLIC BLOOD PRESSURE: 60 MMHG | OXYGEN SATURATION: 99 % | HEIGHT: 64 IN | BODY MASS INDEX: 21.95 KG/M2

## 2023-10-24 DIAGNOSIS — R00.2 PALPITATION: ICD-10-CM

## 2023-10-24 DIAGNOSIS — I42.0 CONGESTIVE CARDIOMYOPATHY (HCC): Primary | ICD-10-CM

## 2023-10-24 DIAGNOSIS — I42.0 DILATED CARDIOMYOPATHY (HCC): ICD-10-CM

## 2023-10-24 PROCEDURE — 99214 OFFICE O/P EST MOD 30 MIN: CPT | Performed by: SPECIALIST

## 2023-10-24 RX ORDER — FUROSEMIDE 20 MG/1
20 TABLET ORAL DAILY
Qty: 90 TABLET | Refills: 3
Start: 2023-10-24 | End: 2023-10-27

## 2023-10-24 RX ORDER — CARVEDILOL 3.12 MG/1
3.12 TABLET ORAL 2 TIMES DAILY
Qty: 60 TABLET | Refills: 4 | Status: SHIPPED | OUTPATIENT
Start: 2023-10-24 | End: 2023-10-26 | Stop reason: ALTCHOICE

## 2023-10-24 NOTE — PATIENT INSTRUCTIONS
1) your blood pressure is still slightly low at 110/60 so I favor decreasing her Lasix to 20 mg a day. Lasix is also known as furosemide. If you notice any increase in weight by 5 pounds over 2-day period or increasing shortness of breath and when she take again 60 mg of Lasix that day. Please check her blood pressure before taking and take if her blood pressure is 110 or greater. 2) by lowering her Lasix I want to start you on Coreg 3.125 twice daily  is also known as carvedilol. 3) I would like for you to get a BMP and magnesium drawn at [facility] in approximately 2 weeks      4) follow-up with me in 3 months. It is  my pleasure to have the opportunity to be involved in taking care of you and to provide you the best cardiac care. At the end of every visit I  encourage you to eat healthy and clean and reduce your red meat intake as well as exercise 30 minutes 5 days a week to ensure a healthy heart. If you are a smoker , it will be essential that you stop smoking to reduce your risk of heart disease. Part of providing you the best heart care possible IS AN ACCURATE KNOWLEDGE OF YOUR MEDICINE. It  will be  essential at each office visit that you provide me with an accurate list of your medicines. When you come into the office you should have that list or another option is lining up your pill bottles  and taking a snapshot with your cell phone of all the medicines you are taking currently and show it to the nurse in the examining room. Inaccurate reporting of your medication to the nurse may lead to adverse events and medical errors. Thank you again for giving me the opportunity to be part of your heart care and it is my pleasure. All the best,    Jerome Still MD

## 2023-10-24 NOTE — PROGRESS NOTES
Gregory Maddox is a 37 y.o. female    had concerns including Cardiomyopathy and Palpitations. Vitals:    10/24/23 0843   BP: 110/60   Site: Left Upper Arm   Position: Sitting   Pulse: 84   SpO2: 99%   Weight: 58.3 kg (128 lb 9.6 oz)   Height: 1.626 m (5' 4\")        Chest pain feels it gets worst at time     SOB is getting worse    Dizziness No    Swelling No    Refills No    Covid Vaccinated yes      1. Have you been to the ER, urgent care clinic since your last visit? Hospitalized since your last visit? no    2. Have you seen or consulted any other health care providers outside of the [address] [place] [address] Avenue since your last visit? Include any pap smears or colon screening. No    NAME Gregory Maddox         1980      MRN    [id number]      LAST OFFICE APPOINTMENT: 10/4/2023     DIAGNOSIS    ICD-10-CM    1. Congestive cardiomyopathy (HCC)  I42.0       2. Palpitation  R00.2       3. Dilated cardiomyopathy (HCC)  I42.0           HOME MEDICATION  Current Outpatient Medications   Medication Sig    spironolactone (ALDACTONE) 25 MG tablet Take 1 tablet by mouth daily    furosemide (LASIX) 20 MG tablet Take 3 tablets by mouth daily    Continuous Blood Gluc Sensor (DEXCOM G6 SENSOR) MISC     potassium chloride (KLOR-CON M) 10 MEQ extended release tablet Take 1 tablet by mouth daily    Continuous Blood Gluc Transmit (DEXCOM G6 TRANSMITTER) MISC     Insulin Disposable Pump (OMNIPOD 5 G6 POD, GEN 5,) MISC     amphetamine-dextroamphetamine (ADDERALL) 20 MG tablet Take 1 tablet by mouth 4 times daily. acetaminophen (TYLENOL) 500 MG tablet Take by mouth every 8 hours as needed    ALPRAZolam (XANAX) 1 MG tablet 1 tablet daily. ceived the following from [address]: Outside name: ALPRAZolam (XANAX) 1 mg tablet    venlafaxine 225 MG extended release tablet Take 1 tablet by mouth daily     No current facility-administered medications for this visit.        VITAL SIGNS  Wt Readings from Last 3
advanced heart failure later this week. Unclear if she may benefit from inotropic support  2. Palpitations  -Event monitor just showed tachycardia  -Could always use Corlanor if needed but I think heart rate is maintaining cardiac output  3. Alcohol use  -Instructed her to stop as soon as possible. She understands consequences going forward  4. Tobacco use  -Encouraged her to stop smoking  5. T1 DM felt attributable to her pancreatectomy  -She has a insulin pump  I do not have her last hemoglobin A1c        No orders of the defined types were placed in this encounter. We discussed the expected course, resolution and complications of the diagnosis(es) in detail. Medication risks, benefits, costs, interactions, and alternatives were discussed as indicated. I advised him to contact the office if his condition worsens, changes or fails to improve as anticipated. He expressed understanding with the diagnosis(es) and plan        No follow-up provider specified. I have discussed the diagnosis with  Cayetano Llanos and the intended plan as seen in the above orders. Questions were answered concerning future plans. I have discussed medication side effects and warnings with the patient as well. Thank you,  PATI Duncan for involving me in the care of  Cayetano Llanos. Please do not hesitate to contact me for further questions/concerns. Jerome Rendon MD, [address], [address] [place] [address]      [phone] / [phone] Fax

## 2023-10-24 NOTE — PROGRESS NOTES
her that she must quit alcohol completely. She did not want to give up alcohol but thought she could comply with cessation. Advanced Care Plan on file  Recommend flu, covid and pneumonia vaccinations  Follow-up with primary cardiologist  Return to [facility] in 1 month       HISTORY OF PRESENT ILLNESS:  I had the pleasure of seeing Mary Rockwell in [address] at [facility] in [facility]. Mary Rockwell is a 37 y.o. female with a history of ETOH abuse, Tobacco abuse, Intraductal Papillary Mucinous Neoplasm. She underwent partial pancreatectomy in 2018. This has been complicated by incisional hernia requiring surgical repair and diabetes, managed with insulin pump. In the summer of 2023 she complained of generalized abdominal pain and tenderness with associated difficulty with urination and defecation. She was evaluated by surgery in early August 2023. CT was ordered for further evaluation. CT showed heterogenous hypoattenuation of the right and left lobes of the liver which was non specific and contrasted study was recommended. CT with contrast showed cirrhotic liver with fatting infiltration. There was also findings of dilated cardiomegaly with contrast reflux into the IVC and hepatic veins consistent with elevated right heart pressures. She was referred to hepatology. She was first seen by hepatology in early September 2023. She complained of shortness of breath and abdominal/peripheral edema. Risk factors for cirrhosis included heavy alcohol use-1 pint of liquor daily. She was started on diuretics. Echo was ordered and liver biopsy scheduled. Echo 9/12/2023 showed EF of 20-25%, LVIDd 6.9 cm, mildly reduced RV function. Liver Biopsy 9/26/23 was consistent with congestive hepatopathy superimposed with alcoholic liver disease. She was referred to cardiology. Patient was evaluated for heart failure at [facility] in early October 2023.  She

## 2023-10-26 ENCOUNTER — OFFICE VISIT (OUTPATIENT)
Age: 43
End: 2023-10-26

## 2023-10-26 VITALS
RESPIRATION RATE: 20 BRPM | HEART RATE: 85 BPM | SYSTOLIC BLOOD PRESSURE: 88 MMHG | WEIGHT: 132 LBS | TEMPERATURE: 98 F | BODY MASS INDEX: 22.53 KG/M2 | DIASTOLIC BLOOD PRESSURE: 68 MMHG | HEIGHT: 64 IN | OXYGEN SATURATION: 97 %

## 2023-10-26 DIAGNOSIS — F10.10 ALCOHOL ABUSE: ICD-10-CM

## 2023-10-26 DIAGNOSIS — R53.83 FATIGUE, UNSPECIFIED TYPE: ICD-10-CM

## 2023-10-26 DIAGNOSIS — I42.8 NON-ISCHEMIC CARDIOMYOPATHY (HCC): ICD-10-CM

## 2023-10-26 DIAGNOSIS — I42.0 CONGESTIVE CARDIOMYOPATHY (HCC): Primary | ICD-10-CM

## 2023-10-26 DIAGNOSIS — E55.9 VITAMIN D DEFICIENCY: ICD-10-CM

## 2023-10-26 DIAGNOSIS — I50.22 CHRONIC SYSTOLIC HEART FAILURE (HCC): ICD-10-CM

## 2023-10-26 DIAGNOSIS — E61.1 IRON DEFICIENCY: ICD-10-CM

## 2023-10-26 LAB
DISTANCE WALKED: NORMAL
ERYTHROCYTE [DISTWIDTH] IN BLOOD BY AUTOMATED COUNT: 16.2 % (ref 11.5–14.5)
FERRITIN SERPL-MCNC: 117 NG/ML (ref 8–252)
HCT VFR BLD AUTO: 43.1 % (ref 35–47)
HGB BLD-MCNC: 14.3 G/DL (ref 11.5–16)
MCH RBC QN AUTO: 33.2 PG (ref 26–34)
MCHC RBC AUTO-ENTMCNC: 33.2 G/DL (ref 30–36.5)
MCV RBC AUTO: 100 FL (ref 80–99)
NRBC # BLD: 0 K/UL (ref 0–0.01)
NRBC BLD-RTO: 0 PER 100 WBC
PLATELET # BLD AUTO: 250 K/UL (ref 150–400)
PMV BLD AUTO: 11.2 FL (ref 8.9–12.9)
RBC # BLD AUTO: 4.31 M/UL (ref 3.8–5.2)
SPO2: NORMAL
T4 FREE SERPL-MCNC: 0.7 NG/DL (ref 0.8–1.5)
TSH SERPL DL<=0.05 MIU/L-ACNC: 2.49 UIU/ML (ref 0.36–3.74)
WBC # BLD AUTO: 8.2 K/UL (ref 3.6–11)

## 2023-10-26 RX ORDER — ZOLPIDEM TARTRATE 10 MG/1
TABLET ORAL NIGHTLY PRN
COMMUNITY

## 2023-10-26 RX ORDER — METOPROLOL SUCCINATE 25 MG/1
25 TABLET, EXTENDED RELEASE ORAL DAILY
Qty: 90 TABLET | Refills: 1 | Status: SHIPPED | OUTPATIENT
Start: 2023-10-26

## 2023-10-26 NOTE — PATIENT INSTRUCTIONS
Medication changes:     Stop Carvedilol    Start Metoprolol 25mg once daily:   Check your blood pressure before you take Metoprolol. Do not take Metoprolol if your blood pressure is <90     Please record blood pressure daily before medication and two hours after medication. Please record weight daily upon waking/after using the bathroom. Keep a written records of your weights and blood pressure and bring to your next appointment. If you have a weight gain of 3 or more pounds overnight OR 5 or more pounds in one week, new/worsened shortness of breath or swelling or if your blood pressure begins to consistently run below 90/60 and/or you begin to experience dizziness or lightheadedness please contact our office at [phone] option 2. Testing Ordered:     6 minute walk  Labs to be drawn today in clinic  Genetic testing  Sleep Study      Please call the scheduling center to schedule a cardiac MRI at [facility]: [phone]    Genetic testing has been performed. Results can take 2-3 weeks to return. We will review results at next visit. Lab work has been completed in clinic today. If results are normal you may just received a message through my chart. Please make sure to have an active my chart account. Having issues logging in? Contact the [facility] at [phone]. Our office will notify you of any abnormal results requiring changes to your current plan of care. Other Recommendations:     A referral to Sleep medicine has been placed. You will be contacted for scheduling. If you have not heard from them in 2 weeks please call and schedule your appointment with Sleep Medicine. You can call one of the numbers listed below. [address] [facility] Isauro Squires, [address]  Tel.  [phone]  Fax. [address] [facility], [address] [place] [address]  Tel.  [phone]  Fax.  [phone] [address], [address] [facility]  Tel: [phone]  Fax:

## 2023-10-27 ENCOUNTER — TELEPHONE (OUTPATIENT)
Age: 43
End: 2023-10-27

## 2023-10-27 DIAGNOSIS — E55.9 VITAMIN D DEFICIENCY: Primary | ICD-10-CM

## 2023-10-27 LAB
25(OH)D3 SERPL-MCNC: 14.7 NG/ML (ref 30–100)
ALBUMIN SERPL-MCNC: 3.5 G/DL (ref 3.5–5)
ALBUMIN/GLOB SERPL: 1 (ref 1.1–2.2)
ALP SERPL-CCNC: 140 U/L (ref 45–117)
ALT SERPL-CCNC: 42 U/L (ref 12–78)
ANION GAP SERPL CALC-SCNC: 6 MMOL/L (ref 5–15)
AST SERPL-CCNC: 90 U/L (ref 15–37)
BILIRUB SERPL-MCNC: 0.7 MG/DL (ref 0.2–1)
BUN SERPL-MCNC: 13 MG/DL (ref 6–20)
BUN/CREAT SERPL: 24 (ref 12–20)
CALCIUM SERPL-MCNC: 9 MG/DL (ref 8.5–10.1)
CHLORIDE SERPL-SCNC: 102 MMOL/L (ref 97–108)
CHOLEST SERPL-MCNC: 239 MG/DL
CO2 SERPL-SCNC: 28 MMOL/L (ref 21–32)
CREAT SERPL-MCNC: 0.54 MG/DL (ref 0.55–1.02)
EST. AVERAGE GLUCOSE BLD GHB EST-MCNC: 169 MG/DL
GLOBULIN SER CALC-MCNC: 3.6 G/DL (ref 2–4)
GLUCOSE SERPL-MCNC: 146 MG/DL (ref 65–100)
HBA1C MFR BLD: 7.5 % (ref 4–5.6)
HDLC SERPL-MCNC: 74 MG/DL
HDLC SERPL: 3.2 (ref 0–5)
HIV 1+2 AB+HIV1 P24 AG SERPL QL IA: NONREACTIVE
HIV 1/2 RESULT COMMENT: NORMAL
IRON SATN MFR SERPL: 43 % (ref 20–50)
IRON SERPL-MCNC: 164 UG/DL (ref 35–150)
LDLC SERPL CALC-MCNC: 135.2 MG/DL (ref 0–100)
NT PRO BNP: 2500 PG/ML
POTASSIUM SERPL-SCNC: 4.4 MMOL/L (ref 3.5–5.1)
PROT SERPL-MCNC: 7.1 G/DL (ref 6.4–8.2)
SODIUM SERPL-SCNC: 136 MMOL/L (ref 136–145)
TIBC SERPL-MCNC: 384 UG/DL (ref 250–450)
TRIGL SERPL-MCNC: 149 MG/DL
URATE SERPL-MCNC: 5.5 MG/DL (ref 2.6–6)
VLDLC SERPL CALC-MCNC: 29.8 MG/DL

## 2023-10-27 RX ORDER — ERGOCALCIFEROL 1.25 MG/1
50000 CAPSULE ORAL WEEKLY
Qty: 12 CAPSULE | Refills: 0 | Status: SHIPPED | OUTPATIENT
Start: 2023-10-27

## 2023-10-27 RX ORDER — FUROSEMIDE 20 MG/1
20 TABLET ORAL DAILY
Qty: 90 TABLET | Refills: 3
Start: 2023-10-27

## 2023-10-27 NOTE — TELEPHONE ENCOUNTER
----- Message from Paloma Ku MD sent at 10/27/2023  8:27 AM EDT -----  Please send a copy of patient's labs to her PCP and endocrinologist. Let patient know her A1c is 7.5. Her cholesterol is high. I believe we discussed she had been intolerant to statin, so I would recommend low fat diet. I would like for her to discuss her diet with the nutritionist at Cardiac Rehab. She had some abnormalities on her thyroid labs that I would like for her to also discuss with her endocrinologist. Pro-BNP was elevated. I would like for her to increase Lasix to 40 mg daily x 3 days. Can we check in with her Monday/Tuesday with weight check and symptoms. F RN called and shared the above information with patient per Dr. Tracy Gupta; Patient verbalized understanding; [employer] message sent as well. Routed labs to PCP and Endocrinology per MD request; Patient aware.

## 2023-10-30 ENCOUNTER — TELEPHONE (OUTPATIENT)
Age: 43
End: 2023-10-30

## 2023-10-30 NOTE — TELEPHONE ENCOUNTER
----- Message from Laurie Parnell RN sent at 10/27/2023  2:10 PM EDT -----  Call and check wt and symptoms    10/30/2023 0915    [facility] RN phoned patient; 2 patient identifiers verified; Patient reports she has been on vacation over the last several days and has not begun recommended medication or weight tracking, BP, HR logging regimen. Patient states she has noticed some increased swelling in lower extremities and SOB with activity; Patient states she \"will start recommended regimen when she returns home today\".      11/3/23      [facility] RN Left  requesting patient call back     11/6/23    [facility] RN Sent [employer] message requesting patient to call office for updates on BP, HR and weight logs    11/6/23  12:45    Left  requesting return call

## 2023-10-31 LAB — ANA TITR SER IF: NEGATIVE

## 2023-11-01 LAB
ALBUMIN SERPL ELPH-MCNC: 3.5 G/DL (ref 2.9–4.4)
ALBUMIN/GLOB SERPL: 1.2 (ref 0.7–1.7)
ALPHA1 GLOB SERPL ELPH-MCNC: 0.2 G/DL (ref 0–0.4)
ALPHA2 GLOB SERPL ELPH-MCNC: 0.7 G/DL (ref 0.4–1)
B-GLOBULIN SERPL ELPH-MCNC: 1.1 G/DL (ref 0.7–1.3)
GAMMA GLOB SERPL ELPH-MCNC: 1.1 G/DL (ref 0.4–1.8)
GLOBULIN SER-MCNC: 3.1 G/DL (ref 2.2–3.9)
IGA SERPL-MCNC: 372 MG/DL (ref 87–352)
IGG SERPL-MCNC: 1006 MG/DL (ref 586–1602)
IGM SERPL-MCNC: 133 MG/DL (ref 26–217)
INTERPRETATION SERPL IEP-IMP: ABNORMAL
KAPPA LC FREE SER-MCNC: 27.7 MG/L (ref 3.3–19.4)
KAPPA LC FREE/LAMBDA FREE SER: 1.29 (ref 0.26–1.65)
LAMBDA LC FREE SERPL-MCNC: 21.5 MG/L (ref 5.7–26.3)
M PROTEIN SERPL ELPH-MCNC: ABNORMAL G/DL
PROT SERPL-MCNC: 6.6 G/DL (ref 6–8.5)

## 2023-11-03 ENCOUNTER — TELEPHONE (OUTPATIENT)
Age: 43
End: 2023-11-03

## 2023-11-09 ENCOUNTER — TELEPHONE (OUTPATIENT)
Facility: HOSPITAL | Age: 43
End: 2023-11-09

## 2023-11-09 NOTE — TELEPHONE ENCOUNTER
[facility]- Referral  Referral received for outpatient Phase II Cardiac Rehab at [facility].  Telephone calls placed X3. Able to leave voice mail for the 1st and 3rd calls.  VM to contact department to answer any questions and to enroll in program.  GARRET Wheat RN   4 = No assist / stand by assistance

## 2023-11-16 ENCOUNTER — PATIENT MESSAGE (OUTPATIENT)
Age: 43
End: 2023-11-16

## 2023-11-16 ENCOUNTER — TELEPHONE (OUTPATIENT)
Age: 43
End: 2023-11-16

## 2023-11-16 DIAGNOSIS — R79.0 LOW MAGNESIUM LEVEL: Primary | ICD-10-CM

## 2023-11-16 DIAGNOSIS — R79.0 LOW MAGNESIUM LEVEL: ICD-10-CM

## 2023-11-16 LAB
BUN SERPL-MCNC: 6 MG/DL (ref 6–24)
BUN/CREAT SERPL: 10 (ref 9–23)
CALCIUM SERPL-MCNC: 10.3 MG/DL (ref 8.7–10.2)
CHLORIDE SERPL-SCNC: 101 MMOL/L (ref 96–106)
CO2 SERPL-SCNC: 24 MMOL/L (ref 20–29)
CREAT SERPL-MCNC: 0.58 MG/DL (ref 0.57–1)
EGFRCR SERPLBLD CKD-EPI 2021: 114 ML/MIN/1.73
GLUCOSE SERPL-MCNC: 172 MG/DL (ref 70–99)
MAGNESIUM SERPL-MCNC: 1.4 MG/DL (ref 1.6–2.3)
POTASSIUM SERPL-SCNC: 4.5 MMOL/L (ref 3.5–5.2)
SODIUM SERPL-SCNC: 143 MMOL/L (ref 134–144)

## 2023-11-16 RX ORDER — TORSEMIDE 20 MG/1
20 TABLET ORAL DAILY
Qty: 40 TABLET | Refills: 0 | Status: SHIPPED | OUTPATIENT
Start: 2023-11-16

## 2023-11-16 NOTE — TELEPHONE ENCOUNTER
Patient reports she has not been following instructions and called for review and Nursing Education. [facility] RN reviewed patient instructions re: daily weights, BP and HR readings: Patient states she will begin communicating through [employer] to record vs tracking. F RN communicated to Dr. Shahriar Gonzalez patient varying record of vs, weights and c/o swelling. Orders received - RBVO:  Stop Lasix  Start Torsemide, 20mg tablets, 2 tablets for 3 days and then reduce dose to 1 tablet once daily.  performed and communicated with pharmacy. RN called patient - verified patient identity and relayed instructions of medication regimen per MD.  Patient verbalized understanding - repeat back instructions. No questions at this time. Patient reports baseline weight of 121lbs as comfortable and absent of symptoms of CHF.

## 2023-11-20 ENCOUNTER — HOSPITAL ENCOUNTER (OUTPATIENT)
Facility: HOSPITAL | Age: 43
Setting detail: RECURRING SERIES
Discharge: HOME | End: 2023-11-23

## 2023-11-20 VITALS — WEIGHT: 127 LBS | BODY MASS INDEX: 21.68 KG/M2 | HEIGHT: 64 IN

## 2023-11-20 ASSESSMENT — PATIENT HEALTH QUESTIONNAIRE - PHQ9
SUM OF ALL RESPONSES TO PHQ QUESTIONS 1-9: 20
4. FEELING TIRED OR HAVING LITTLE ENERGY: 3
6. FEELING BAD ABOUT YOURSELF - OR THAT YOU ARE A FAILURE OR HAVE LET YOURSELF OR YOUR FAMILY DOWN: 2
3. TROUBLE FALLING OR STAYING ASLEEP: 3
2. FEELING DOWN, DEPRESSED OR HOPELESS: 2
SUM OF ALL RESPONSES TO PHQ9 QUESTIONS 1 & 2: 4
5. POOR APPETITE OR OVEREATING: 3
1. LITTLE INTEREST OR PLEASURE IN DOING THINGS: 2
7. TROUBLE CONCENTRATING ON THINGS, SUCH AS READING THE NEWSPAPER OR WATCHING TELEVISION: 3
SUM OF ALL RESPONSES TO PHQ QUESTIONS 1-9: 20
8. MOVING OR SPEAKING SO SLOWLY THAT OTHER PEOPLE COULD HAVE NOTICED. OR THE OPPOSITE, BEING SO FIGETY OR RESTLESS THAT YOU HAVE BEEN MOVING AROUND A LOT MORE THAN USUAL: 2
SUM OF ALL RESPONSES TO PHQ QUESTIONS 1-9: 20
9. THOUGHTS THAT YOU WOULD BE BETTER OFF DEAD, OR OF HURTING YOURSELF: 0
SUM OF ALL RESPONSES TO PHQ QUESTIONS 1-9: 20

## 2023-11-20 ASSESSMENT — LIFESTYLE VARIABLES
ALCOHOL_TYPE: BOURBON
CIGARETTES_PER_DAY: 1 PPD
ALCOHOL_AMOUNT: 2-3
ALCOHOL_USE: DAILY

## 2023-11-20 ASSESSMENT — EJECTION FRACTION: EF_VALUE: 20

## 2023-11-21 ENCOUNTER — TELEPHONE (OUTPATIENT)
Age: 43
End: 2023-11-21

## 2023-11-21 RX ORDER — DIGOXIN 125 MCG
125 TABLET ORAL DAILY
Qty: 30 TABLET | Refills: 5 | Status: SHIPPED | OUTPATIENT
Start: 2023-11-21

## 2023-11-21 RX ORDER — DIGOXIN 125 MCG
125 TABLET ORAL DAILY
COMMUNITY
End: 2023-11-21 | Stop reason: SDUPTHER

## 2023-11-21 NOTE — TELEPHONE ENCOUNTER
Pt unable to do Cardiac Rehab yesterday due to low BP and fast HR. Pt to start Digoxin 0.125 mg daily per DR Rendon and see NP in 2 weeks.

## 2023-11-24 DIAGNOSIS — I42.0 CONGESTIVE CARDIOMYOPATHY (HCC): ICD-10-CM

## 2023-11-24 DIAGNOSIS — I42.0 DILATED CARDIOMYOPATHY (HCC): ICD-10-CM

## 2023-11-24 DIAGNOSIS — R00.2 PALPITATION: ICD-10-CM

## 2023-11-25 NOTE — PROGRESS NOTES
not taking Spironolactone. Problem List:  Chronic systolic heart failure  Stage C  Non ischemic cardiomyopathy  Intraductal Papillary Mucinous Neoplasm  S/p partial pancreatectomy 1333  Complicated by incisional hernia s/p repair  Complicated by DM, on insulin pump  ETOH Abuse  Tobacco Abuse  ADHD    LIFE GOALS:  Lifestyle goals reviewed with the patient. CARDIAC IMAGING and DIAGNOSTICS Reviewed:  Echo 9/12/2023    Left Ventricle: Severely reduced left ventricular systolic function with a visually estimated EF of 20 - 25%. Left ventricle is severely dilated. LVIDd is 6.9 cm. Normal wall thickness. IVSd is 0.6 cm. LVPWd is 0.6 cm. Severe global hypokinesis present. Abnormal diastolic function. Right Ventricle: Mildly reduced systolic function. Aortic Valve: Trileaflet valve. Mitral Valve: Mild regurgitation. Cardiac Cath 10/17/23  Findings:   L Main:large caliber, mild calcification, no significant disease  LAD:large caliber that tapers to small caliber within mid-vessel. Proximal vessel is calcified without any significant obstructive disease, distal LAD is atretic and appears to have severe diffuse disease v. Atretic from infarcted apextwo small diagonals w/o critical disease   LCx:large caliber, small to moderate om1, moderate om2, calcified without significant obstructive disease  RCA: large caliber, dual pda, moderate pl branch, no critical disease     LVEDP:  12 mmhg     RHC findings:     RAP=    5  mmHg  RVSP=  25/5  mmHg  PAP=     24/15/18  mmHg  PCWP=  14  mmHg  CO=       Adeline 2.77, thermal 3.5  L/min  CI=          Adeline 1.77   thermal 2.24 L/min/m2     Pa sat 49%  Ao sat 86.7%    ECG 9/15/23: Sinus, 106 BPM,  ms,  ms        11/20/2023     1:30 PM 10/26/2023    10:00 AM   AMB 6 Minute Walk Report   O2 Saturation - Before walk  97   Heart Rate (PRE) 128 87   O2 Saturation - After walk  99   Heart Rate (POST)  94   Distance Walked in Feet (ft)  1128 ft.    Distance in Meters

## 2023-11-27 ENCOUNTER — OFFICE VISIT (OUTPATIENT)
Age: 43
End: 2023-11-27
Payer: COMMERCIAL

## 2023-11-27 VITALS
HEART RATE: 102 BPM | OXYGEN SATURATION: 96 % | HEIGHT: 64 IN | RESPIRATION RATE: 20 BRPM | TEMPERATURE: 97.5 F | BODY MASS INDEX: 22.81 KG/M2 | WEIGHT: 133.6 LBS | DIASTOLIC BLOOD PRESSURE: 72 MMHG | SYSTOLIC BLOOD PRESSURE: 88 MMHG

## 2023-11-27 DIAGNOSIS — E55.9 VITAMIN D DEFICIENCY: ICD-10-CM

## 2023-11-27 DIAGNOSIS — I42.8 NON-ISCHEMIC CARDIOMYOPATHY (HCC): ICD-10-CM

## 2023-11-27 DIAGNOSIS — F10.10 ALCOHOL ABUSE: ICD-10-CM

## 2023-11-27 DIAGNOSIS — I50.22 CHRONIC SYSTOLIC HEART FAILURE (HCC): Primary | ICD-10-CM

## 2023-11-27 PROCEDURE — 99214 OFFICE O/P EST MOD 30 MIN: CPT | Performed by: INTERNAL MEDICINE

## 2023-11-27 PROCEDURE — 93000 ELECTROCARDIOGRAM COMPLETE: CPT | Performed by: INTERNAL MEDICINE

## 2023-11-27 RX ORDER — ERGOCALCIFEROL 1.25 MG/1
50000 CAPSULE ORAL WEEKLY
Qty: 12 CAPSULE | Refills: 0 | Status: SHIPPED | OUTPATIENT
Start: 2023-11-27

## 2023-11-27 RX ORDER — FUROSEMIDE 20 MG/1
60 TABLET ORAL DAILY
Qty: 90 TABLET | Refills: 3 | Status: SHIPPED | OUTPATIENT
Start: 2023-11-27

## 2023-11-27 RX ORDER — MAGNESIUM OXIDE 400 MG/1
400 TABLET ORAL 2 TIMES DAILY
Qty: 180 TABLET | Refills: 1 | Status: SHIPPED | OUTPATIENT
Start: 2023-11-27

## 2023-11-27 RX ORDER — POTASSIUM CHLORIDE 750 MG/1
10 TABLET, EXTENDED RELEASE ORAL DAILY
Qty: 90 TABLET | Refills: 1 | Status: SHIPPED | OUTPATIENT
Start: 2023-11-27

## 2023-11-27 RX ORDER — ROSUVASTATIN CALCIUM 10 MG/1
10 TABLET, COATED ORAL DAILY
Qty: 90 TABLET | Refills: 1 | Status: SHIPPED | OUTPATIENT
Start: 2023-11-27

## 2023-11-27 ASSESSMENT — PATIENT HEALTH QUESTIONNAIRE - PHQ9
SUM OF ALL RESPONSES TO PHQ QUESTIONS 1-9: 0
1. LITTLE INTEREST OR PLEASURE IN DOING THINGS: 0
2. FEELING DOWN, DEPRESSED OR HOPELESS: 0
SUM OF ALL RESPONSES TO PHQ9 QUESTIONS 1 & 2: 0

## 2023-11-27 NOTE — PATIENT INSTRUCTIONS
drinking an adequate amount of water with a goal of 6-8 eight ounce glasses (1.5-2 liters) of fluid daily. Your urine should be clear and light yellow straw colored. Follow up in 1 month with Dr Moody Cockayne  with [address]    Thank you for allowing us the privilege of being a part of your healthcare team! Please do not hesitate to contact our office at [phone] option 2 with any questions or concerns.

## 2023-11-28 ENCOUNTER — PATIENT MESSAGE (OUTPATIENT)
Age: 43
End: 2023-11-28

## 2023-11-28 ENCOUNTER — TELEPHONE (OUTPATIENT)
Age: 43
End: 2023-11-28

## 2023-11-28 DIAGNOSIS — I50.22 CHRONIC SYSTOLIC HEART FAILURE (HCC): Primary | ICD-10-CM

## 2023-11-28 NOTE — TELEPHONE ENCOUNTER
Pt is having difficulty signing up for copay card for newly prescribed med (Corlanor). She would appreciate being able to speak with Angel Luis to walk her through it. She can be reached at [phone]. --Called patient unable to reach patient. My chart message sent, prior auth still pending response at this time. 11/30- Spoke with patient, initial prior [address] was denied, Ashish Cortez RN is trying to redo or appeal. RN alert patient of process, and will alert patient once approved, and will alert pharmacy. 12/1- PA approved, called [employer] to alert of approval. My chart message sent to patient.

## 2023-11-28 NOTE — TELEPHONE ENCOUNTER
Pt is having difficulty signing up for copay card for newly prescribed med (Corlanor). She would appreciate being able to speak with Angel Luis to walk her through it. She can be reached at [phone].

## 2023-12-08 ENCOUNTER — TELEPHONE (OUTPATIENT)
Age: 43
End: 2023-12-08

## 2023-12-08 NOTE — TELEPHONE ENCOUNTER
Patient asked to speak with a nurse. Said she has pitting edema, nerve pain, and her toenails and feet look grayish, and purple. Said she peels the skin off her feet, then forgets she did, and when she walks at night her feet hurt badly. Said her heart rate is usually in the 120s to 130s, BP low. Asked that someone pls give her a call.    Thank you.

## 2023-12-15 ENCOUNTER — TELEPHONE (OUTPATIENT)
Age: 43
End: 2023-12-15

## 2023-12-19 ENCOUNTER — HOSPITAL ENCOUNTER (OUTPATIENT)
Facility: HOSPITAL | Age: 43
Setting detail: INFUSION SERIES
End: 2023-12-19

## 2023-12-19 ENCOUNTER — HOSPITAL ENCOUNTER (OUTPATIENT)
Facility: HOSPITAL | Age: 43
Setting detail: INFUSION SERIES
Discharge: HOME | End: 2023-12-19
Payer: COMMERCIAL

## 2023-12-19 VITALS
HEART RATE: 74 BPM | RESPIRATION RATE: 18 BRPM | SYSTOLIC BLOOD PRESSURE: 107 MMHG | TEMPERATURE: 97.6 F | DIASTOLIC BLOOD PRESSURE: 72 MMHG

## 2023-12-19 DIAGNOSIS — I50.9 CONGESTIVE HEART FAILURE, UNSPECIFIED HF CHRONICITY, UNSPECIFIED HEART FAILURE TYPE (HCC): Primary | ICD-10-CM

## 2023-12-19 PROCEDURE — 96365 THER/PROPH/DIAG IV INF INIT: CPT

## 2023-12-19 PROCEDURE — 2500000003 HC RX 250 WO HCPCS: Performed by: INTERNAL MEDICINE

## 2023-12-19 PROCEDURE — 2580000003 HC RX 258: Performed by: INTERNAL MEDICINE

## 2023-12-19 RX ORDER — BUMETANIDE 0.25 MG/ML
2 INJECTION INTRAMUSCULAR; INTRAVENOUS ONCE
Status: COMPLETED | OUTPATIENT
Start: 2023-12-19 | End: 2023-12-19

## 2023-12-19 RX ORDER — SODIUM CHLORIDE 9 MG/ML
INJECTION, SOLUTION INTRAVENOUS CONTINUOUS
Status: CANCELLED
Start: 2023-12-20

## 2023-12-19 RX ORDER — SODIUM CHLORIDE 9 MG/ML
INJECTION, SOLUTION INTRAVENOUS CONTINUOUS
Status: DISCONTINUED | OUTPATIENT
Start: 2023-12-19 | End: 2023-12-20 | Stop reason: HOSPADM

## 2023-12-19 RX ORDER — BUMETANIDE 0.25 MG/ML
2 INJECTION INTRAMUSCULAR; INTRAVENOUS ONCE
Status: CANCELLED
Start: 2023-12-20 | End: 2023-12-20

## 2023-12-19 RX ADMIN — BUMETANIDE 2 MG: 0.25 INJECTION, SOLUTION INTRAMUSCULAR; INTRAVENOUS at 11:49

## 2023-12-19 RX ADMIN — SODIUM CHLORIDE: 9 INJECTION, SOLUTION INTRAVENOUS at 11:46

## 2023-12-21 ENCOUNTER — HOSPITAL ENCOUNTER (OUTPATIENT)
Facility: HOSPITAL | Age: 43
Setting detail: INFUSION SERIES
Discharge: HOME | End: 2023-12-21
Payer: COMMERCIAL

## 2023-12-21 ENCOUNTER — TELEPHONE (OUTPATIENT)
Age: 43
End: 2023-12-21

## 2023-12-21 VITALS
WEIGHT: 133.9 LBS | SYSTOLIC BLOOD PRESSURE: 103 MMHG | TEMPERATURE: 98 F | OXYGEN SATURATION: 96 % | DIASTOLIC BLOOD PRESSURE: 63 MMHG | HEART RATE: 105 BPM | BODY MASS INDEX: 23.35 KG/M2 | RESPIRATION RATE: 18 BRPM

## 2023-12-21 DIAGNOSIS — Z79.899 ENCOUNTER FOR MONITORING DIURETIC THERAPY: ICD-10-CM

## 2023-12-21 DIAGNOSIS — I50.23 ACUTE ON CHRONIC SYSTOLIC (CONGESTIVE) HEART FAILURE (HCC): Primary | ICD-10-CM

## 2023-12-21 DIAGNOSIS — Z51.81 ENCOUNTER FOR MONITORING DIURETIC THERAPY: ICD-10-CM

## 2023-12-21 DIAGNOSIS — R53.83 FATIGUE, UNSPECIFIED TYPE: ICD-10-CM

## 2023-12-21 DIAGNOSIS — I42.0 CONGESTIVE CARDIOMYOPATHY (HCC): ICD-10-CM

## 2023-12-21 DIAGNOSIS — I50.9 CONGESTIVE HEART FAILURE, UNSPECIFIED HF CHRONICITY, UNSPECIFIED HEART FAILURE TYPE (HCC): Primary | ICD-10-CM

## 2023-12-21 LAB
ERYTHROCYTE [DISTWIDTH] IN BLOOD BY AUTOMATED COUNT: 13.9 % (ref 11.5–14.5)
HCT VFR BLD AUTO: 44.6 % (ref 35–47)
HGB BLD-MCNC: 15.9 G/DL (ref 11.5–16)
MCH RBC QN AUTO: 34.9 PG (ref 26–34)
MCHC RBC AUTO-ENTMCNC: 35.7 G/DL (ref 30–36.5)
MCV RBC AUTO: 98 FL (ref 80–99)
NRBC # BLD: 0 K/UL (ref 0–0.01)
NRBC BLD-RTO: 0 PER 100 WBC
PLATELET # BLD AUTO: 177 K/UL (ref 150–400)
PMV BLD AUTO: 11.7 FL (ref 8.9–12.9)
RBC # BLD AUTO: 4.55 M/UL (ref 3.8–5.2)
WBC # BLD AUTO: 9.1 K/UL (ref 3.6–11)

## 2023-12-21 PROCEDURE — 36415 COLL VENOUS BLD VENIPUNCTURE: CPT

## 2023-12-21 PROCEDURE — 96374 THER/PROPH/DIAG INJ IV PUSH: CPT

## 2023-12-21 PROCEDURE — 85027 COMPLETE CBC AUTOMATED: CPT

## 2023-12-21 PROCEDURE — 2580000003 HC RX 258: Performed by: INTERNAL MEDICINE

## 2023-12-21 PROCEDURE — 2500000003 HC RX 250 WO HCPCS: Performed by: INTERNAL MEDICINE

## 2023-12-21 RX ORDER — BUMETANIDE 0.25 MG/ML
2 INJECTION INTRAMUSCULAR; INTRAVENOUS ONCE
Status: CANCELLED
Start: 2023-12-21 | End: 2023-12-21

## 2023-12-21 RX ORDER — BUMETANIDE 0.25 MG/ML
2 INJECTION INTRAMUSCULAR; INTRAVENOUS ONCE
Status: COMPLETED | OUTPATIENT
Start: 2023-12-21 | End: 2023-12-21

## 2023-12-21 RX ORDER — SODIUM CHLORIDE 9 MG/ML
INJECTION, SOLUTION INTRAVENOUS CONTINUOUS
Status: CANCELLED
Start: 2023-12-21

## 2023-12-21 RX ORDER — SODIUM CHLORIDE 9 MG/ML
INJECTION, SOLUTION INTRAVENOUS CONTINUOUS
Status: DISCONTINUED | OUTPATIENT
Start: 2023-12-21 | End: 2023-12-22 | Stop reason: HOSPADM

## 2023-12-21 RX ADMIN — SODIUM CHLORIDE: 9 INJECTION, SOLUTION INTRAVENOUS at 13:39

## 2023-12-21 RX ADMIN — BUMETANIDE 2 MG: 0.25 INJECTION INTRAMUSCULAR; INTRAVENOUS at 13:40

## 2023-12-21 NOTE — TELEPHONE ENCOUNTER
Per Dr. Hung patient was at [facility] today and got Bumex and Labs. The labs were hemolyzed except CBC, that resulted. Can we reorder CMP, proBNP and Mag for them to draw tomorrow.        Called patient with no answer. Left  with [facility] call back number and sent my chart message.

## 2023-12-22 NOTE — TELEPHONE ENCOUNTER
Patient called back with update:    12/22- 130lb  (down from 138lb), 121/107- pulse-111  Upset stomach, sleeping a lot, constipation  No dizziness/lightheadedness, still some SOB  Chest pain with taking deep breath in   A lot of urine output with first infusion, less with second infusion yesterday.    Patient will go to lab today (but wont be till later) encourage her to go as soon as able.    Jud Hung MD  You31 minutes ago (11:59 AM)     Good weight is coming down. Will look out for labs and call her if any issues with renal function or potassium. Tell her to continue Lasix 60 mg BID through the weekend and I will let her know when I see the labs if she can do another Metolazone this weekend. If her weight gets to 120 before Tuesday, then drop the Lasix to 60 mg daily from 60 mg BID. Lets call on Tuesday for weight check.     Called patient with above update, no answer LM and sent my chart due to office closing at 3pm.   Labs will be ordered and sent to [employer] [facility] as well, in case patient is unable to attend [facility] today.

## 2023-12-26 ENCOUNTER — HOSPITAL ENCOUNTER (OUTPATIENT)
Dept: VASCULAR SURGERY | Facility: HOSPITAL | Age: 43
Discharge: HOME | End: 2023-12-28
Attending: INTERNAL MEDICINE
Payer: COMMERCIAL

## 2023-12-26 DIAGNOSIS — I50.22 CHRONIC SYSTOLIC HEART FAILURE (HCC): ICD-10-CM

## 2023-12-26 PROCEDURE — 93922 UPR/L XTREMITY ART 2 LEVELS: CPT

## 2023-12-27 DIAGNOSIS — I50.23 ACUTE ON CHRONIC SYSTOLIC (CONGESTIVE) HEART FAILURE (HCC): ICD-10-CM

## 2023-12-27 LAB
VAS LEFT ABI: 1.12
VAS LEFT ARM BP: 113 MMHG
VAS LEFT DORSALIS PEDIS BP: 122 MMHG
VAS LEFT PTA BP: 126 MMHG
VAS LEFT TBI: 1.36
VAS LEFT TOE PRESSURE: 154 MMHG
VAS RIGHT ABI: 1.14
VAS RIGHT ARM BP: 97 MMHG
VAS RIGHT DORSALIS PEDIS BP: 121 MMHG
VAS RIGHT PTA BP: 129 MMHG
VAS RIGHT TBI: 0.81
VAS RIGHT TOE PRESSURE: 91 MMHG

## 2023-12-27 PROCEDURE — 93922 UPR/L XTREMITY ART 2 LEVELS: CPT

## 2023-12-28 ENCOUNTER — TELEPHONE (OUTPATIENT)
Age: 43
End: 2023-12-28

## 2023-12-28 DIAGNOSIS — I50.22 CHRONIC SYSTOLIC HEART FAILURE (HCC): ICD-10-CM

## 2023-12-28 LAB
ALBUMIN SERPL-MCNC: 3.9 G/DL (ref 3.5–5)
ALBUMIN/GLOB SERPL: 1.1 (ref 1.1–2.2)
ALP SERPL-CCNC: 147 U/L (ref 45–117)
ALT SERPL-CCNC: 81 U/L (ref 12–78)
ANION GAP SERPL CALC-SCNC: 6 MMOL/L (ref 5–15)
AST SERPL-CCNC: 149 U/L (ref 15–37)
BILIRUB SERPL-MCNC: 0.4 MG/DL (ref 0.2–1)
BUN SERPL-MCNC: 9 MG/DL (ref 6–20)
BUN/CREAT SERPL: 14 (ref 12–20)
CHLORIDE SERPL-SCNC: 98 MMOL/L (ref 97–108)
CO2 SERPL-SCNC: 33 MMOL/L (ref 21–32)
CREAT SERPL-MCNC: 0.66 MG/DL (ref 0.55–1.02)
GLOBULIN SER CALC-MCNC: 3.6 G/DL (ref 2–4)
GLUCOSE SERPL-MCNC: 276 MG/DL (ref 65–100)
MAGNESIUM SERPL-MCNC: 1.6 MG/DL (ref 1.6–2.4)
NT PRO BNP: 646 PG/ML
POTASSIUM SERPL-SCNC: 3.5 MMOL/L (ref 3.5–5.1)
PROT SERPL-MCNC: 7.5 G/DL (ref 6.4–8.2)
SODIUM SERPL-SCNC: 137 MMOL/L (ref 136–145)

## 2023-12-28 RX ORDER — FUROSEMIDE 40 MG/1
80 TABLET ORAL 2 TIMES DAILY
Qty: 120 TABLET | Refills: 0 | Status: SHIPPED | OUTPATIENT
Start: 2023-12-28

## 2023-12-28 RX ORDER — POTASSIUM CHLORIDE 20 MEQ/1
20 TABLET, EXTENDED RELEASE ORAL DAILY
Qty: 30 TABLET | Refills: 0 | Status: SHIPPED | OUTPATIENT
Start: 2023-12-28

## 2023-12-28 NOTE — TELEPHONE ENCOUNTER
----- Message from Meka Hoang MD sent at 12/28/2023  8:16 AM EST -----  Her labs would suggest she is getting to be intravascularly dry, BNP is down to 646 from 2500 and CO2 is up to 33. Renal function and electrolytes are ok. I want her to continue on a higher dose of Lasix, lets have her take Lasix 80 mg BID. Continue Magnesium 400 mg BID and increase Potassium to 20 meq daily. She needs labs again in another week. She was supposed to have follow up in 2 weeks after our 12/18 visit but I do not see a follow up scheduled. Called patient LM requesting call back to discuss above labs/med changes. My chart message also sent. Spoke with patient regarding above medication changes, Patient verbalized understanding. Requested Prescriptions     Signed Prescriptions Disp Refills    furosemide (LASIX) 40 MG tablet 120 tablet 0     Sig: Take 2 tablets by mouth 2 times daily     Authorizing Provider: Singh BISHOP     Ordering User: Sylvia Lopez    potassium chloride (KLOR-CON M) 20 MEQ extended release tablet 30 tablet 0     Sig: Take 1 tablet by mouth daily     Authorizing Provider: Meka Hoang     Ordering User: Sylvia Lopez     Patient confirmed appt next week Jan 4th, patient will get labs at appt next week, she will also bring logs of weight/BP to appt.

## 2024-01-04 ENCOUNTER — TELEPHONE (OUTPATIENT)
Age: 44
End: 2024-01-04

## 2024-01-04 DIAGNOSIS — Z51.81 ENCOUNTER FOR MONITORING DIURETIC THERAPY: ICD-10-CM

## 2024-01-04 DIAGNOSIS — I50.22 CHRONIC SYSTOLIC HEART FAILURE (HCC): Primary | ICD-10-CM

## 2024-01-04 DIAGNOSIS — Z79.899 ENCOUNTER FOR MONITORING DIURETIC THERAPY: ICD-10-CM

## 2024-01-04 NOTE — TELEPHONE ENCOUNTER
Routed called to Dr Hung due to patient was due for labs at appt and was to bring logs of weight/BP to appt.     Labs order per Dr Hung-- BMP and pro BNP. My chart message sent to patient regarding labs and logs.

## 2024-01-04 NOTE — TELEPHONE ENCOUNTER
Patient cancelled her appointment for today, 01.04.23, said she forgot that her mother has colonoscopy today, and she has to take her. She said she will call later to reschedule because she does not have her calender with her.

## 2024-01-16 ENCOUNTER — OFFICE VISIT (OUTPATIENT)
Age: 44
End: 2024-01-16
Payer: COMMERCIAL

## 2024-01-16 VITALS
SYSTOLIC BLOOD PRESSURE: 108 MMHG | BODY MASS INDEX: 23.61 KG/M2 | WEIGHT: 138.3 LBS | DIASTOLIC BLOOD PRESSURE: 71 MMHG | HEIGHT: 64 IN | OXYGEN SATURATION: 98 % | HEART RATE: 64 BPM

## 2024-01-16 DIAGNOSIS — G47.33 OSA (OBSTRUCTIVE SLEEP APNEA): Primary | ICD-10-CM

## 2024-01-16 DIAGNOSIS — I50.22 CHRONIC SYSTOLIC HEART FAILURE (HCC): ICD-10-CM

## 2024-01-16 PROCEDURE — 99203 OFFICE O/P NEW LOW 30 MIN: CPT | Performed by: SPECIALIST

## 2024-01-16 ASSESSMENT — SLEEP AND FATIGUE QUESTIONNAIRES
HOW LIKELY ARE YOU TO NOD OFF OR FALL ASLEEP IN A CAR, WHILE STOPPED FOR A FEW MINUTES IN TRAFFIC: 0
HOW LIKELY ARE YOU TO NOD OFF OR FALL ASLEEP WHILE SITTING AND READING: 0
HOW LIKELY ARE YOU TO NOD OFF OR FALL ASLEEP WHEN YOU ARE A PASSENGER IN A CAR FOR AN HOUR WITHOUT A BREAK: 2
HOW LIKELY ARE YOU TO NOD OFF OR FALL ASLEEP WHILE WATCHING TV: 1
HOW LIKELY ARE YOU TO NOD OFF OR FALL ASLEEP WHILE SITTING AND TALKING TO SOMEONE: 0
HOW LIKELY ARE YOU TO NOD OFF OR FALL ASLEEP WHILE SITTING INACTIVE IN A PUBLIC PLACE: 0
HOW LIKELY ARE YOU TO NOD OFF OR FALL ASLEEP WHILE SITTING QUIETLY AFTER LUNCH WITHOUT ALCOHOL: 2
NECK CIRCUMFERENCE (INCHES): 14
HOW LIKELY ARE YOU TO NOD OFF OR FALL ASLEEP WHILE LYING DOWN TO REST IN THE AFTERNOON WHEN CIRCUMSTANCES PERMIT: 3
ESS TOTAL SCORE: 8

## 2024-01-16 NOTE — PROGRESS NOTES
rashes   Neuro:  Speech fluent, face symmetrical, tongue movement normal   Psych:  Normal affect,  normal countenance        Assessment:   1. REGINALD (obstructive sleep apnea)  2. Chronic systolic heart failure (HCC)    NYHA class IV    History consistent with sleep disordered breathing.  Patient has a long soft palate, narrow posterior oral airway.  Potentially, sleep breathing abnormalities more prominent when supine, and/or in rem sleep.    Impact of untreated sleep apnea on cardiac function was discussed.    Plan:     * Patient has a history and examination consistent with the diagnosis of sleep apnea.  * Sleep testing was ordered for initial evaluation.    * She was provided information on sleep apnea including corresponding risk factors and the importance of proper treatment.   * Treatment options if indicated were reviewed today.      Instructions:  Do not engage in activities requiring a normal degree of alertness if fatigue is present.  The patient understands that untreated or undertreated sleep apnea could impair judgement and the ability to function normally during the day.  Call or return if symptoms worsen or persist.          Ilan Ly MD, [facility]  Electronically signed 01/16/24       This note was created using voice recognition software. Despite editing, there may be syntax errors.  This note will not be viewable in SNAPP'.

## 2024-01-17 NOTE — PROGRESS NOTES
CARDIOLOGY OFFICE NOTE    [address] [address], [place] [address]  Phone [phone]; Fax [phone]    Primary Cardiologist: Jerome Rendon MD, [facility]  Last Office Visit: 10/24/23    Primary care: Sebastian Hilario PA       ATTENTION:   This medical record was transcribed using an electronic medical records/speech recognition system.  Although proofread, it may and can contain electronic, spelling and other errors.  Corrections may be executed at a later time.  Please feel free to contact us for any clarifications as needed.      Viry Andrade is a 43 y.o. female with  referred for a cardiomyopathy discovered on echo after CT of her heart during work-up by GI for presumed cirrhosis        Cardiac risk factors: diabetes mellitus, dyslipidemia, and smoking/ tobacco exposure  I have personally obtained the history from the patient.    HISTORY OF PRESENTING ILLNESS   Ms. Viry Andrade  43 y.o. presents today for follow-up.   Continues with chest pain, shortness of breath. Feels like shortness of breath has been steadily getting worse over a few months. Continues to not be able to walk her dogs.   Feels very bloated, with fluid in her abdomen. Feels like weight has fluctuated a bit. Previously saw Dr. Hung, Adv. HF, 12/18/23 with recommendations for admission, IV diuresis, and possible RHC. Pt opted for diuresis at outpatient infusion center. Notes little relief from this. Feels extreme fatigue, can hardly shower. Says HR is usually elevated in the 130s, SBP 110s.      From previous OV \"Ms. Viry Andrade  43 y.o. 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  There was a distal pancreatectomy.  She also had hernia surgery as as a result of the her pancreatectomy.  Around 2019 diabetes and now has a insulin pump.  History started to experience increasing lower extremity bloating.  Concerned that

## 2024-01-18 ENCOUNTER — TELEPHONE (OUTPATIENT)
Age: 44
End: 2024-01-18

## 2024-01-18 ENCOUNTER — OFFICE VISIT (OUTPATIENT)
Age: 44
End: 2024-01-18
Payer: COMMERCIAL

## 2024-01-18 VITALS
SYSTOLIC BLOOD PRESSURE: 100 MMHG | HEIGHT: 64 IN | BODY MASS INDEX: 23.12 KG/M2 | DIASTOLIC BLOOD PRESSURE: 76 MMHG | HEART RATE: 91 BPM | WEIGHT: 135.4 LBS | OXYGEN SATURATION: 97 %

## 2024-01-18 DIAGNOSIS — I50.22 CHRONIC SYSTOLIC HEART FAILURE (HCC): Primary | ICD-10-CM

## 2024-01-18 DIAGNOSIS — E10.42 TYPE 1 DIABETES MELLITUS WITH DIABETIC POLYNEUROPATHY (HCC): ICD-10-CM

## 2024-01-18 DIAGNOSIS — I42.8 NON-ISCHEMIC CARDIOMYOPATHY (HCC): ICD-10-CM

## 2024-01-18 DIAGNOSIS — I42.8 NONISCHEMIC CARDIOMYOPATHY (HCC): ICD-10-CM

## 2024-01-18 PROCEDURE — 93000 ELECTROCARDIOGRAM COMPLETE: CPT | Performed by: INTERNAL MEDICINE

## 2024-01-18 PROCEDURE — 99205 OFFICE O/P NEW HI 60 MIN: CPT | Performed by: INTERNAL MEDICINE

## 2024-01-18 RX ORDER — INSULIN LISPRO 100 [IU]/ML
INJECTION, SOLUTION INTRAVENOUS; SUBCUTANEOUS
COMMUNITY
Start: 2024-01-11

## 2024-01-18 ASSESSMENT — PATIENT HEALTH QUESTIONNAIRE - PHQ9
2. FEELING DOWN, DEPRESSED OR HOPELESS: 1
SUM OF ALL RESPONSES TO PHQ QUESTIONS 1-9: 1
SUM OF ALL RESPONSES TO PHQ9 QUESTIONS 1 & 2: 1
1. LITTLE INTEREST OR PLEASURE IN DOING THINGS: 0
SUM OF ALL RESPONSES TO PHQ QUESTIONS 1-9: 1

## 2024-01-18 NOTE — PATIENT INSTRUCTIONS
Schedule for single chamber ICD implantation with [employer], after cardiac MRI  Obtain blood work prior to the procedure    
Quality 226: Preventive Care And Screening: Tobacco Use: Screening And Cessation Intervention: Patient screened for tobacco use and is an ex/non-smoker
Detail Level: Detailed
Quality 431: Preventive Care And Screening: Unhealthy Alcohol Use - Screening: Patient did not receive brief counseling if identified as an unhealthy alcohol user, reason not given

## 2024-01-18 NOTE — PROGRESS NOTES
Cardiac Electrophysiology OFFICE Consultation Note       Assessment/Plan:   1. Chronic systolic heart failure (HCC)  -     EKG 12 Lead  2. Non-ischemic cardiomyopathy (HCC)  3. Type 1 diabetes mellitus with diabetic polyneuropathy (HCC)       Primary Cardiologist: Blessing      Chronic systolic congestive heart failure  Severe niCMP, LVEF of 20-25%.  Given LV dilation and evidence of nonischemic cardiomyopathy with prior CHF exacerbation and New York Heart Association class III/ambulatory 4.  History of severe alcohol use.  Still drinking <1 pint of bourbon a day.  Guideline directed medical therapy limited by hypotension.  CMR scheduled in February. Declined LifeVest.   -Followed by advanced heart failure  - Continue Toprol-XL, ivabradine  - Unable to tolerate ACE inhibitor or Entresto due to hypotension  - Continue spironolactone  - Jardiance contraindicated in type 1 diabetes  - In the setting of severe dilated nonischemic cardiomyopathy with New York Heart Association class III symptoms, patient would best benefit from ICD implantation for primary prevention of sudden cardiac death  - I have discussed the risks and benefits of ICD implant with the pt including but not limited to MI, death, bleeding, infection, lead dislodgement, pneumothorax, tamponade.  We have reviewed an evidence-based tool (https://patientdecisionaid.org/wp-content/uploads/2016/06/ICD-tool-shortened-V1-3-.pdf), all questions were answered and patient reports full understanding of discussion and wish to proceed with the procedure.  - Scheduled for cardiac MRI in February  - Will schedule for single-chamber ICD implantation with [employer] at [facility]   -obtain blood work prior to procedure    Type 1 diabetes  A1c 7.5  - Has insulin pump  - On statin therapy    Alcohol abuse  - Emphasized importance of alcohol relation to severe cardiomyopathy  - Reduction of use now down to less than 1 pint a day of

## 2024-01-24 ENCOUNTER — APPOINTMENT (OUTPATIENT)
Facility: HOSPITAL | Age: 44
DRG: 292 | End: 2024-01-24
Payer: COMMERCIAL

## 2024-01-24 ENCOUNTER — OFFICE VISIT (OUTPATIENT)
Facility: HOSPITAL | Age: 44
DRG: 292 | End: 2024-01-24
Attending: EMERGENCY MEDICINE
Payer: COMMERCIAL

## 2024-01-24 ENCOUNTER — HOSPITAL ENCOUNTER (INPATIENT)
Facility: HOSPITAL | Age: 44
LOS: 1 days | Discharge: HOME | DRG: 292 | End: 2024-01-25
Attending: EMERGENCY MEDICINE | Admitting: INTERNAL MEDICINE
Payer: COMMERCIAL

## 2024-01-24 ENCOUNTER — OFFICE VISIT (OUTPATIENT)
Age: 44
End: 2024-01-24
Payer: COMMERCIAL

## 2024-01-24 ENCOUNTER — TELEPHONE (OUTPATIENT)
Age: 44
End: 2024-01-24

## 2024-01-24 VITALS
SYSTOLIC BLOOD PRESSURE: 106 MMHG | OXYGEN SATURATION: 95 % | HEART RATE: 100 BPM | WEIGHT: 130 LBS | DIASTOLIC BLOOD PRESSURE: 74 MMHG | HEIGHT: 64 IN | BODY MASS INDEX: 22.2 KG/M2

## 2024-01-24 VITALS
HEART RATE: 97 BPM | DIASTOLIC BLOOD PRESSURE: 80 MMHG | OXYGEN SATURATION: 94 % | HEIGHT: 63 IN | BODY MASS INDEX: 23.32 KG/M2 | RESPIRATION RATE: 17 BRPM | SYSTOLIC BLOOD PRESSURE: 136 MMHG | WEIGHT: 131.61 LBS | TEMPERATURE: 98.4 F

## 2024-01-24 DIAGNOSIS — E10.42 TYPE 1 DIABETES MELLITUS WITH DIABETIC POLYNEUROPATHY (HCC): ICD-10-CM

## 2024-01-24 DIAGNOSIS — Z78.9 FAILURE OF OUTPATIENT TREATMENT: ICD-10-CM

## 2024-01-24 DIAGNOSIS — I50.22 CHRONIC SYSTOLIC HEART FAILURE (HCC): Primary | ICD-10-CM

## 2024-01-24 DIAGNOSIS — I50.23 ACUTE ON CHRONIC SYSTOLIC (CONGESTIVE) HEART FAILURE (HCC): ICD-10-CM

## 2024-01-24 DIAGNOSIS — R60.0 BILATERAL LEG EDEMA: ICD-10-CM

## 2024-01-24 DIAGNOSIS — I42.8 NONISCHEMIC CARDIOMYOPATHY (HCC): ICD-10-CM

## 2024-01-24 DIAGNOSIS — F10.10 ALCOHOL ABUSE: ICD-10-CM

## 2024-01-24 DIAGNOSIS — I42.8 NONISCHEMIC CARDIOMYOPATHY (HCC): Primary | ICD-10-CM

## 2024-01-24 LAB
ALBUMIN SERPL-MCNC: 4.3 G/DL (ref 3.5–5)
ALBUMIN/GLOB SERPL: 1.1 (ref 1.1–2.2)
ALP SERPL-CCNC: 197 U/L (ref 45–117)
ALT SERPL-CCNC: 103 U/L (ref 12–78)
ANION GAP SERPL CALC-SCNC: 13 MMOL/L (ref 5–15)
AST SERPL-CCNC: 366 U/L (ref 15–37)
BASOPHILS # BLD: 0.1 K/UL (ref 0–0.1)
BASOPHILS NFR BLD: 2 % (ref 0–1)
BILIRUB SERPL-MCNC: 1.7 MG/DL (ref 0.2–1)
BUN SERPL-MCNC: 23 MG/DL (ref 6–20)
BUN/CREAT SERPL: 31 (ref 12–20)
CALCIUM SERPL-MCNC: 9.1 MG/DL (ref 8.5–10.1)
CHLORIDE SERPL-SCNC: 87 MMOL/L (ref 97–108)
CO2 SERPL-SCNC: 29 MMOL/L (ref 21–32)
COMMENT:: NORMAL
CREAT SERPL-MCNC: 0.75 MG/DL (ref 0.55–1.02)
DIFFERENTIAL METHOD BLD: ABNORMAL
EKG ATRIAL RATE: 113 BPM
EKG DIAGNOSIS: NORMAL
EKG P AXIS: 72 DEGREES
EKG P-R INTERVAL: 154 MS
EKG Q-T INTERVAL: 328 MS
EKG QRS DURATION: 98 MS
EKG QTC CALCULATION (BAZETT): 449 MS
EKG R AXIS: 90 DEGREES
EKG T AXIS: -80 DEGREES
EKG VENTRICULAR RATE: 113 BPM
EOSINOPHIL # BLD: 0.2 K/UL (ref 0–0.4)
EOSINOPHIL NFR BLD: 3 % (ref 0–7)
ERYTHROCYTE [DISTWIDTH] IN BLOOD BY AUTOMATED COUNT: 12.6 % (ref 11.5–14.5)
EST. AVERAGE GLUCOSE BLD GHB EST-MCNC: 171 MG/DL
GLOBULIN SER CALC-MCNC: 3.8 G/DL (ref 2–4)
GLUCOSE SERPL-MCNC: 399 MG/DL (ref 65–100)
HBA1C MFR BLD: 7.6 % (ref 4–5.6)
HCT VFR BLD AUTO: 42.1 % (ref 35–47)
HGB BLD-MCNC: 14.7 G/DL (ref 11.5–16)
IMM GRANULOCYTES # BLD AUTO: 0 K/UL (ref 0–0.04)
IMM GRANULOCYTES NFR BLD AUTO: 0 % (ref 0–0.5)
IRON SATN MFR SERPL: 79 % (ref 20–50)
IRON SERPL-MCNC: 261 UG/DL (ref 35–150)
LYMPHOCYTES # BLD: 1.7 K/UL (ref 0.8–3.5)
LYMPHOCYTES NFR BLD: 21 % (ref 12–49)
MAGNESIUM SERPL-MCNC: 2.2 MG/DL (ref 1.6–2.4)
MCH RBC QN AUTO: 35.1 PG (ref 26–34)
MCHC RBC AUTO-ENTMCNC: 34.9 G/DL (ref 30–36.5)
MCV RBC AUTO: 100.5 FL (ref 80–99)
MONOCYTES # BLD: 0.6 K/UL (ref 0–1)
MONOCYTES NFR BLD: 7 % (ref 5–13)
NEUTS SEG # BLD: 5.4 K/UL (ref 1.8–8)
NEUTS SEG NFR BLD: 67 % (ref 32–75)
NRBC # BLD: 0 K/UL (ref 0–0.01)
NRBC BLD-RTO: 0 PER 100 WBC
NT PRO BNP: 117 PG/ML
PLATELET # BLD AUTO: 152 K/UL (ref 150–400)
PMV BLD AUTO: 11.7 FL (ref 8.9–12.9)
POTASSIUM SERPL-SCNC: 3.4 MMOL/L (ref 3.5–5.1)
PROT SERPL-MCNC: 8.1 G/DL (ref 6.4–8.2)
RBC # BLD AUTO: 4.19 M/UL (ref 3.8–5.2)
SODIUM SERPL-SCNC: 129 MMOL/L (ref 136–145)
SPECIMEN HOLD: NORMAL
TIBC SERPL-MCNC: 330 UG/DL (ref 250–450)
TROPONIN I SERPL HS-MCNC: 26 NG/L (ref 0–51)
WBC # BLD AUTO: 8 K/UL (ref 3.6–11)

## 2024-01-24 PROCEDURE — 83550 IRON BINDING TEST: CPT

## 2024-01-24 PROCEDURE — 84484 ASSAY OF TROPONIN QUANT: CPT

## 2024-01-24 PROCEDURE — 2580000003 HC RX 258: Performed by: INTERNAL MEDICINE

## 2024-01-24 PROCEDURE — 6370000000 HC RX 637 (ALT 250 FOR IP): Performed by: INTERNAL MEDICINE

## 2024-01-24 PROCEDURE — 99285 EMERGENCY DEPT VISIT HI MDM: CPT

## 2024-01-24 PROCEDURE — 99215 OFFICE O/P EST HI 40 MIN: CPT

## 2024-01-24 PROCEDURE — 76705 ECHO EXAM OF ABDOMEN: CPT

## 2024-01-24 PROCEDURE — 83036 HEMOGLOBIN GLYCOSYLATED A1C: CPT

## 2024-01-24 PROCEDURE — 85025 COMPLETE CBC W/AUTO DIFF WBC: CPT

## 2024-01-24 PROCEDURE — 6360000002 HC RX W HCPCS: Performed by: INTERNAL MEDICINE

## 2024-01-24 PROCEDURE — 71046 X-RAY EXAM CHEST 2 VIEWS: CPT

## 2024-01-24 PROCEDURE — 83735 ASSAY OF MAGNESIUM: CPT

## 2024-01-24 PROCEDURE — 93010 ELECTROCARDIOGRAM REPORT: CPT | Performed by: INTERNAL MEDICINE

## 2024-01-24 PROCEDURE — 2060000000 HC ICU INTERMEDIATE R&B

## 2024-01-24 PROCEDURE — 96374 THER/PROPH/DIAG INJ IV PUSH: CPT

## 2024-01-24 PROCEDURE — 6360000002 HC RX W HCPCS: Performed by: EMERGENCY MEDICINE

## 2024-01-24 PROCEDURE — 36415 COLL VENOUS BLD VENIPUNCTURE: CPT

## 2024-01-24 PROCEDURE — 93005 ELECTROCARDIOGRAM TRACING: CPT | Performed by: STUDENT IN AN ORGANIZED HEALTH CARE EDUCATION/TRAINING PROGRAM

## 2024-01-24 PROCEDURE — 83880 ASSAY OF NATRIURETIC PEPTIDE: CPT

## 2024-01-24 PROCEDURE — 83540 ASSAY OF IRON: CPT

## 2024-01-24 PROCEDURE — 80053 COMPREHEN METABOLIC PANEL: CPT

## 2024-01-24 RX ORDER — BUMETANIDE 0.25 MG/ML
2 INJECTION INTRAMUSCULAR; INTRAVENOUS
Status: COMPLETED | OUTPATIENT
Start: 2024-01-24 | End: 2024-01-24

## 2024-01-24 RX ORDER — ZOLPIDEM TARTRATE 5 MG/1
5 TABLET ORAL NIGHTLY PRN
Status: DISCONTINUED | OUTPATIENT
Start: 2024-01-24 | End: 2024-01-25 | Stop reason: HOSPADM

## 2024-01-24 RX ORDER — SODIUM CHLORIDE 9 MG/ML
INJECTION, SOLUTION INTRAVENOUS PRN
Status: DISCONTINUED | OUTPATIENT
Start: 2024-01-24 | End: 2024-01-25 | Stop reason: HOSPADM

## 2024-01-24 RX ORDER — POTASSIUM CHLORIDE 750 MG/1
40 TABLET, FILM COATED, EXTENDED RELEASE ORAL ONCE
Status: COMPLETED | OUTPATIENT
Start: 2024-01-24 | End: 2024-01-24

## 2024-01-24 RX ORDER — ENOXAPARIN SODIUM 100 MG/ML
40 INJECTION SUBCUTANEOUS DAILY
Status: DISCONTINUED | OUTPATIENT
Start: 2024-01-24 | End: 2024-01-25 | Stop reason: HOSPADM

## 2024-01-24 RX ORDER — PHENOBARBITAL 32.4 MG/1
32.4 TABLET ORAL 4 TIMES DAILY
Status: DISCONTINUED | OUTPATIENT
Start: 2024-01-24 | End: 2024-01-25 | Stop reason: HOSPADM

## 2024-01-24 RX ORDER — ONDANSETRON 2 MG/ML
4 INJECTION INTRAMUSCULAR; INTRAVENOUS EVERY 6 HOURS PRN
Status: DISCONTINUED | OUTPATIENT
Start: 2024-01-24 | End: 2024-01-25 | Stop reason: HOSPADM

## 2024-01-24 RX ORDER — ONDANSETRON 4 MG/1
4 TABLET, ORALLY DISINTEGRATING ORAL EVERY 8 HOURS PRN
Status: DISCONTINUED | OUTPATIENT
Start: 2024-01-24 | End: 2024-01-25 | Stop reason: HOSPADM

## 2024-01-24 RX ORDER — POLYETHYLENE GLYCOL 3350 17 G/17G
17 POWDER, FOR SOLUTION ORAL DAILY PRN
Status: DISCONTINUED | OUTPATIENT
Start: 2024-01-24 | End: 2024-01-25 | Stop reason: HOSPADM

## 2024-01-24 RX ORDER — FUROSEMIDE 10 MG/ML
40 INJECTION INTRAMUSCULAR; INTRAVENOUS 2 TIMES DAILY
Status: DISCONTINUED | OUTPATIENT
Start: 2024-01-25 | End: 2024-01-25 | Stop reason: HOSPADM

## 2024-01-24 RX ORDER — PHENOBARBITAL 32.4 MG/1
16.2 TABLET ORAL EVERY 6 HOURS PRN
Status: DISCONTINUED | OUTPATIENT
Start: 2024-01-26 | End: 2024-01-25 | Stop reason: HOSPADM

## 2024-01-24 RX ORDER — PHENOBARBITAL 32.4 MG/1
32.4 TABLET ORAL EVERY 6 HOURS PRN
Status: DISCONTINUED | OUTPATIENT
Start: 2024-01-24 | End: 2024-01-25 | Stop reason: HOSPADM

## 2024-01-24 RX ORDER — NICOTINE 21 MG/24HR
1 PATCH, TRANSDERMAL 24 HOURS TRANSDERMAL DAILY
Status: DISCONTINUED | OUTPATIENT
Start: 2024-01-24 | End: 2024-01-25 | Stop reason: HOSPADM

## 2024-01-24 RX ORDER — SPIRONOLACTONE 25 MG/1
25 TABLET ORAL DAILY
Status: DISCONTINUED | OUTPATIENT
Start: 2024-01-25 | End: 2024-01-25 | Stop reason: HOSPADM

## 2024-01-24 RX ORDER — SODIUM CHLORIDE 0.9 % (FLUSH) 0.9 %
5-40 SYRINGE (ML) INJECTION PRN
Status: DISCONTINUED | OUTPATIENT
Start: 2024-01-24 | End: 2024-01-25 | Stop reason: HOSPADM

## 2024-01-24 RX ORDER — ROSUVASTATIN CALCIUM 10 MG/1
10 TABLET, COATED ORAL DAILY
Status: DISCONTINUED | OUTPATIENT
Start: 2024-01-25 | End: 2024-01-25 | Stop reason: HOSPADM

## 2024-01-24 RX ORDER — METOPROLOL SUCCINATE 25 MG/1
25 TABLET, EXTENDED RELEASE ORAL DAILY
Status: DISCONTINUED | OUTPATIENT
Start: 2024-01-25 | End: 2024-01-25 | Stop reason: HOSPADM

## 2024-01-24 RX ORDER — SODIUM CHLORIDE 0.9 % (FLUSH) 0.9 %
5-40 SYRINGE (ML) INJECTION EVERY 12 HOURS SCHEDULED
Status: DISCONTINUED | OUTPATIENT
Start: 2024-01-24 | End: 2024-01-25 | Stop reason: HOSPADM

## 2024-01-24 RX ORDER — MULTIVITAMIN WITH IRON
1 TABLET ORAL DAILY
Status: DISCONTINUED | OUTPATIENT
Start: 2024-01-24 | End: 2024-01-25 | Stop reason: HOSPADM

## 2024-01-24 RX ORDER — ALPRAZOLAM 0.5 MG/1
1 TABLET ORAL DAILY
Status: DISCONTINUED | OUTPATIENT
Start: 2024-01-25 | End: 2024-01-25 | Stop reason: HOSPADM

## 2024-01-24 RX ORDER — LANOLIN ALCOHOL/MO/W.PET/CERES
100 CREAM (GRAM) TOPICAL DAILY
Status: DISCONTINUED | OUTPATIENT
Start: 2024-01-24 | End: 2024-01-25 | Stop reason: HOSPADM

## 2024-01-24 RX ORDER — PHENOBARBITAL 32.4 MG/1
16.2 TABLET ORAL 2 TIMES DAILY
Status: DISCONTINUED | OUTPATIENT
Start: 2024-01-26 | End: 2024-01-25 | Stop reason: HOSPADM

## 2024-01-24 RX ORDER — ERGOCALCIFEROL 1.25 MG/1
50000 CAPSULE ORAL WEEKLY
Status: DISCONTINUED | OUTPATIENT
Start: 2024-01-24 | End: 2024-01-25 | Stop reason: HOSPADM

## 2024-01-24 RX ORDER — DIGOXIN 125 MCG
125 TABLET ORAL DAILY
Status: DISCONTINUED | OUTPATIENT
Start: 2024-01-25 | End: 2024-01-25 | Stop reason: HOSPADM

## 2024-01-24 RX ORDER — PHENOBARBITAL 32.4 MG/1
32.4 TABLET ORAL 2 TIMES DAILY
Status: DISCONTINUED | OUTPATIENT
Start: 2024-01-25 | End: 2024-01-25 | Stop reason: HOSPADM

## 2024-01-24 RX ORDER — FOLIC ACID 1 MG/1
1 TABLET ORAL DAILY
Status: DISCONTINUED | OUTPATIENT
Start: 2024-01-24 | End: 2024-01-25 | Stop reason: HOSPADM

## 2024-01-24 RX ORDER — DEXTROAMPHETAMINE SACCHARATE, AMPHETAMINE ASPARTATE, DEXTROAMPHETAMINE SULFATE AND AMPHETAMINE SULFATE 5; 5; 5; 5 MG/1; MG/1; MG/1; MG/1
1 TABLET ORAL 4 TIMES DAILY
Status: DISCONTINUED | OUTPATIENT
Start: 2024-01-24 | End: 2024-01-25 | Stop reason: HOSPADM

## 2024-01-24 RX ADMIN — IVABRADINE 5 MG: 5 TABLET, FILM COATED ORAL at 22:17

## 2024-01-24 RX ADMIN — POTASSIUM CHLORIDE 40 MEQ: 750 TABLET, FILM COATED, EXTENDED RELEASE ORAL at 17:31

## 2024-01-24 RX ADMIN — SODIUM CHLORIDE, PRESERVATIVE FREE 10 ML: 5 INJECTION INTRAVENOUS at 22:17

## 2024-01-24 RX ADMIN — ENOXAPARIN SODIUM 40 MG: 100 INJECTION SUBCUTANEOUS at 17:33

## 2024-01-24 RX ADMIN — THERA TABS 1 TABLET: TAB at 19:04

## 2024-01-24 RX ADMIN — PHENOBARBITAL 32.4 MG: 32.4 TABLET ORAL at 22:17

## 2024-01-24 RX ADMIN — FOLIC ACID 1 MG: 1 TABLET ORAL at 19:04

## 2024-01-24 RX ADMIN — PHENOBARBITAL 32.4 MG: 32.4 TABLET ORAL at 19:11

## 2024-01-24 RX ADMIN — Medication 100 MG: at 19:04

## 2024-01-24 RX ADMIN — BUMETANIDE 2 MG: 0.25 INJECTION INTRAMUSCULAR; INTRAVENOUS at 16:53

## 2024-01-24 NOTE — H&P
usage of about 2.0 standard drinks of alcohol per week. She reports that she does not use drugs.   Social Determinants of Health     Tobacco Use: High Risk (1/24/2024)    Patient History     Smoking Tobacco Use: Every Day     Smokeless Tobacco Use: Current     Passive Exposure: Not on file   Alcohol Use: Not on file   Financial Resource Strain: Not on file   Food Insecurity: Not on file   Transportation Needs: Not on file   Physical Activity: Not on file   Stress: Not on file   Social Connections: Not on file   Intimate Partner Violence: Not on file   Depression: Not at risk (1/18/2024)    PHQ-2     PHQ-2 Score: 1   Recent Concern: Depression - At risk (11/20/2023)    PHQ-2     PHQ-2 Score: 20   Housing Stability: Not on file   Interpersonal Safety: Not on file   Utilities: Not on file        Medications were reconciled to the best of my ability given all available resources at the time of admission. Route is PO if not otherwise noted.     Family and social history were personally reviewed, all pertinent and relevant details are outlined as above.    Objective:   /84   Pulse (!) 116   Temp 98 °F (36.7 °C) (Oral)   Resp 16   Wt 59.7 kg (131 lb 9.8 oz)   SpO2 94%   BMI 22.95 kg/m²         PHYSICAL EXAM:   General: awake, NAD, anxious appearing  HEENT: NCAT pupils dilated but reactive, MMM  Neck: Supple, no masses  Chest: Clear to auscultation bilaterally   CVS: regular rhythm, tachycardic, no murmurs/rubs/gallops appreciated, trace BLE edema  Abd: +BS, soft, NT, mildly distended, R-sided abdominal insulin pump in place  MSK: WATERS  Neuro/Psych: Pleasant mood and affect, Aox3, NFDs noted  Skin: Warm, dry, without rashes or lesions    Data Review:   I have independently reviewed and interpreted patient's lab and all other diagnostic data    Abnormal Labs Reviewed   CBC WITH AUTO DIFFERENTIAL - Abnormal; Notable for the following components:       Result Value    .5 (*)     MCH 35.1 (*)     Basophils % 2

## 2024-01-24 NOTE — ED PROVIDER NOTES
appearance.   HENT:      Head: Normocephalic and atraumatic.   Cardiovascular:      Rate and Rhythm: Normal rate and regular rhythm.      Heart sounds: Normal heart sounds.   Pulmonary:      Effort: Pulmonary effort is normal.      Breath sounds: Normal breath sounds.   Skin:     General: Skin is warm and dry.   Neurological:      Mental Status: She is alert.         DIAGNOSTIC RESULTS     EKG: All EKG's are interpreted by the Emergency Department Physician who either signs or Co-signs this chart in the absence of a cardiologist.        RADIOLOGY:   Plain radiographic images, CT and ultrasound are visualized and preliminarily interpreted by the emergency physician as documented in clinical course.    Interpretation per the Radiologist below, if available at the time of this note:    US ABDOMEN LIMITED   Final Result   No drainable ascites.            POC US ECHOCARDIO TRANSTHORACIC LTD   Final Result      XR CHEST (2 VW)   Final Result   No acute cardiopulmonary disease.         Vascular duplex lower extremity venous bilateral    (Results Pending)        LABS:  Labs Reviewed   CBC WITH AUTO DIFFERENTIAL - Abnormal; Notable for the following components:       Result Value    .5 (*)     MCH 35.1 (*)     Basophils % 2 (*)     All other components within normal limits   COMPREHENSIVE METABOLIC PANEL - Abnormal; Notable for the following components:    Sodium 129 (*)     Potassium 3.4 (*)     Chloride 87 (*)     Glucose 399 (*)     BUN 23 (*)     Bun/Cre Ratio 31 (*)     Total Bilirubin 1.7 (*)      (*)      (*)     Alk Phosphatase 197 (*)     All other components within normal limits   TROPONIN   EXTRA TUBES HOLD   BRAIN NATRIURETIC PEPTIDE   ETHANOL       All other labs were within normal range or not returned as of this dictation.    EMERGENCY DEPARTMENT COURSE and DIFFERENTIAL DIAGNOSIS/MDM:   Vitals:    Vitals:    01/24/24 1355   BP: 126/84   Pulse: (!) 116   Resp: 16   Temp: 98 °F (36.7 °C)

## 2024-01-24 NOTE — ED NOTES
1:52 PM  I have evaluated the patient as the Provider in Rapid Medical Evaluation (RME). I have reviewed her vital signs and the triage nurse assessment. I have talked with the patient and any available family and advised that I am the provider in triage and have ordered the appropriate study to initiate their work up based on the clinical presentation during my assessment. I have advised that the patient will be accommodated in the Main ED as soon as possible. I have also requested to contact the triage nurse or myself immediately if the patient experiences any changes in their condition during this brief waiting period.    43 y.o. female presents to ED with concern for heart failure complications. Patient has a history of Stage IV CHF for which she sees Dr. Kaur. She reports that she got an outpatient infusion of bumex and [place] about a month ago with some relief but after that she has continued to have GUERRERO, fatigue, SOB, palpitations. She also has leg swelling and cough. She was instructed by the NP at her cardiologist to come in to ER for admission.    PATI QUEVEDO Ashley, PA  01/24/24 6026

## 2024-01-24 NOTE — ED TRIAGE NOTES
Pt stated she has heart failure and was told to come here to get \"flushed out\", +sob, +fatigue, feels her heart stops for a second, hard to breath, pt tearful in triage, feet are killing her, +leg swelling, +cough, denies fever

## 2024-01-24 NOTE — PROGRESS NOTES
had concerns including Cardiomyopathy and Palpitations.    Vitals:    01/24/24 1048   BP: 106/74   Site: Left Upper Arm   Position: Sitting   Pulse: 100   SpO2: 95%   Weight: 59 kg (130 lb)   Height: 1.613 m (5' 3.5\")        Chest pain patient states chest pain with SOB     Refills No        1. Have you been to the ER, urgent care clinic since your last visit? No       Hospitalized since your last visit? No       Where?        Date?

## 2024-01-25 ENCOUNTER — TELEPHONE (OUTPATIENT)
Age: 44
End: 2024-01-25

## 2024-01-25 NOTE — PROGRESS NOTES
Pt siting up in bed, family member at bedside. Pt states she feels better and wants to go home.  Vitals and blood sugar stable at this time. Will continue to monitor patient.

## 2024-01-25 NOTE — TELEPHONE ENCOUNTER
Pt called to update us on her ER visit yesterday.  She was admitted but never left the ER.  She had a terrible experience and left against medical advice.  Please contact her at [phone].

## 2024-01-25 NOTE — PROGRESS NOTES
Informed by RN of Viry Andrade request to leave AMA on 1/24/2024 at 2232. Patient was informed of the plan of care, benefits of staying including cardiology/hopsitalist/diabetes management consultation, evaluation of transaminitis, echo, duplexes, and diuresis. She was informed of the risks of leaving being worsening heart and liver disease, worsening electrolyte derrangements and death. Patient cited personal obligations and improved/resolved symptoms as reason. Advised patient to follow up with a primary care physician and cardiologist or return to the Emergency Department if symptoms worsen.   KARINE Barriga - NP

## 2024-01-25 NOTE — PROGRESS NOTES
PT requested to speak to MD about being discharged. NP came and spoke with patient and explained the risk. Pt still wants to leave against medical advice. Papers signed. IV removed.

## 2024-01-26 ENCOUNTER — TELEPHONE (OUTPATIENT)
Age: 44
End: 2024-01-26

## 2024-01-26 NOTE — TELEPHONE ENCOUNTER
Patient called to discuss her ED visit-she was very upset about how she was treated, stated she voided on herself because no one would help her to the bathroom, she also states her glucose dropped to 50 and no one answered call bell when she rang.  She states she signed out AMA because she felt like no one was giving her plan of care or doing anything.  I reviewed her labs with her and she states she did not even know her labs were so bad.  I reviewed her labs with GARRETT Beyer NP, who advised patient present to ED, can be [facility], patient declines.  Will make patient follow up appt in [facility] for Monday at 10:30, she is in agreeement, strongly suggested she go to ED if condition worsens over weekend.

## 2024-01-28 ENCOUNTER — HOSPITAL ENCOUNTER (OUTPATIENT)
Facility: HOSPITAL | Age: 44
Discharge: HOME | End: 2024-01-31
Payer: COMMERCIAL

## 2024-01-28 DIAGNOSIS — I50.22 CHRONIC SYSTOLIC HEART FAILURE (HCC): ICD-10-CM

## 2024-01-28 DIAGNOSIS — G47.33 OSA (OBSTRUCTIVE SLEEP APNEA): ICD-10-CM

## 2024-01-28 PROCEDURE — 95810 POLYSOM 6/> YRS 4/> PARAM: CPT | Performed by: SPECIALIST

## 2024-01-29 ENCOUNTER — TELEPHONE (OUTPATIENT)
Age: 44
End: 2024-01-29

## 2024-01-29 VITALS — SYSTOLIC BLOOD PRESSURE: 139 MMHG | DIASTOLIC BLOOD PRESSURE: 80 MMHG | HEART RATE: 93 BPM | OXYGEN SATURATION: 95 %

## 2024-01-29 DIAGNOSIS — G47.33 OSA (OBSTRUCTIVE SLEEP APNEA): Primary | ICD-10-CM

## 2024-01-29 NOTE — TELEPHONE ENCOUNTER
PSG performed for potential sleep disordered breathing.  391 minutes recorded of which 288 minutes spent asleep with a sleep efficiency of 73.7%.  Sleep onset at 28 minutes; REM sleep not observed.    81 respiratory events occurred of which 58 hypopnea and 23 apnea (1 central, 22 obstructive).  Overall AHI 16.9/h.  Minimal SaO2 80%.    Patient slept supine and left lateral.    Impression: Sleep-disordered breathing associated with arterial desaturation.  Study potentially underestimated sleep apnea severity as REM sleep was not observed.    Recommendation:  Given history of Chronic systolic heart failure; NYHA class IV attended titration study is indicated.     Sleep technologist: Please review study results with the patient.  Order has been entered for titration study.

## 2024-01-29 NOTE — PROGRESS NOTES
[address]  [place] [address]  Tel.  [phone]  Fax. [phone] [address]  [place] [address]  Tel.  [phone]  Fax. [phone] [address] [place] [address]  Tel.  [phone]  Fax. [phone]     Sleep Study Technical Notes  Disclaimer:  all notes have not been confirmed by interpreting physician      Acquisition Notes:  Lights off: 9:59pm  Sleep onset: 10:27pm  Other comments:     The study ordered was a Split-night polysomnogram. The hookup was completed without issue.    The patient was placed in bed and the lights were off at 9:59pm. Sleep onset occurred at approximately 10:27pm.    During the study, N1, N2, and REM were noted. The patient slept and on all sides. Moderate snoring was heard.    The patient appeared to show signs of sleep-disordered breathing. However, these events did not appear to be frequent and severe enough to warrant CPAP therapy.    Therefore, the diagnostic portion of the study was extended in hopes of finding more evidence of sleep-disordered breathing.    Lights were on at 4:30am.    POST Test:  Patient was awakened. Electrodes were removed. The patient was discharged after completing the Post Questionnaire. Patient stated that they were alert and ok to drive. Equipment and room cleaned per infection control policy.

## 2024-01-30 ENCOUNTER — HOSPITAL ENCOUNTER (OUTPATIENT)
Facility: HOSPITAL | Age: 44
Discharge: HOME | End: 2024-02-02
Payer: COMMERCIAL

## 2024-01-30 DIAGNOSIS — G47.33 OSA (OBSTRUCTIVE SLEEP APNEA): ICD-10-CM

## 2024-01-30 PROCEDURE — 95811 POLYSOM 6/>YRS CPAP 4/> PARM: CPT | Performed by: SPECIALIST

## 2024-01-30 NOTE — TELEPHONE ENCOUNTER
Sleep study results reviewed with patient. She expressed understanding and is willing to move forward with a CPAP Titration.

## 2024-01-31 VITALS
HEART RATE: 93 BPM | BODY MASS INDEX: 23.74 KG/M2 | HEIGHT: 63 IN | OXYGEN SATURATION: 95 % | DIASTOLIC BLOOD PRESSURE: 80 MMHG | WEIGHT: 134 LBS | SYSTOLIC BLOOD PRESSURE: 140 MMHG

## 2024-01-31 NOTE — PROGRESS NOTES
Sleep Study Technical Notes   Disclaimer:  all notes have not been confirmed by interpreting physician      PRE-Test:  Viry Andrade (: 1980) arrived in the lobby. The patient was taken to the Sleep Center and taken directly to his/her room.   Procedure explained to the patient and questions were answered. The patient expressed understanding of the procedure. Electrodes were applied without incident. The patient was placed in bed and the study was started.    Acquisition Notes:  Lights off: 2209    Respiratory events:   A__N    H__Y  C__N  M__N  ECG:    Significant arrhythmias:   N  Snoring:  Mild   Sleep Stages:  REM   Y    Titration type:CPAP  PAP titration information in study flow sheet if applicable  Positional therapy with:   Patient slept with positional therapy:  N  Patient slept with head of bed elevated:  N  Supine sleep assessed per physician order:  Y     Patient wore an oral appliance:  N  Other comments:   Patient had caregiver in attendance:  N  Patient watched TV or on phone after lights out for 2 hours  Patient to bathroom 1 times        POST Test:  Patient was awakened.  Electrodes were removed.    The patient was discharged after answering the Post Questionnaire.    Equipment and room cleaned per infection control policy.

## 2024-02-12 NOTE — TELEPHONE ENCOUNTER
Requested Prescriptions     Signed Prescriptions Disp Refills    ivabradine (CORLANOR) 5 MG TABS tablet 60 tablet 0     Sig: Take 1 tablet by mouth 2 times daily (with meals) with meals NO FURTHER REFILLS UNTIL SEEN BY [facility]. PATIENT MAY CALL [phone] TO SCHEDULE     Authorizing Provider: JAILYN DESIR     Ordering User: CHRISTAL CONLEY

## 2024-02-13 ENCOUNTER — TELEPHONE (OUTPATIENT)
Age: 44
End: 2024-02-13

## 2024-02-13 DIAGNOSIS — G47.33 OSA (OBSTRUCTIVE SLEEP APNEA): Primary | ICD-10-CM

## 2024-02-13 NOTE — TELEPHONE ENCOUNTER
Titration sleep study performed.PSG demonstrated 81 respiratory events of which 58 hypopnea and 23 apnea (1 central, 22 obstructive).  Overall AHI 16.9/h.  Minimal SpO2 80%.    Study potentially underestimated sleep apnea severity as REM sleep was not observed during that study.    394.2 minutes recorded of which 192.5 minutes spent asleep with a sleep efficiency of 48.8%.  Sleep onset at 123.7 minutes; REM onset at 158 minutes with total REM representing 11.9% of sleep time.    14 respiratory events occurred of which 13 hypopnea and 1 obstructive apnea.  Overall AHI 4.4/h.  Patient slept supine and left lateral.  Minimal SaO2 79%.    CPAP initiated at 5 cm increased to 13 cm.  At 13 cm CPAP: 9.5 minutes recorded with 9.5 minutes of sleep.  REM not observed.  AHI 0.  Minimal SaO2 92%.  Patient left lateral.  ResMed F30 (M) mask.    Impression: Sleep-disordered breathing improving with CPAP increased to 13 cm.  REM sleep not observed at the final pressure setting.    Recommendation: APAP 12-16 cm    Sleep technologist: Please review titration study results with the patient.  Order has been entered for APAP 12-16 cm.  Please schedule compliance appointment.

## 2024-02-16 ENCOUNTER — CLINICAL DOCUMENTATION (OUTPATIENT)
Age: 44
End: 2024-02-16

## 2024-02-22 ENCOUNTER — TELEPHONE (OUTPATIENT)
Age: 44
End: 2024-02-22

## 2024-02-22 NOTE — TELEPHONE ENCOUNTER
Patient LVM reporting she has not yet heard from [facility] regarding her CPAP setup. We will contact Evgeny @ [facility] to check status.

## 2024-03-07 ENCOUNTER — PREP FOR PROCEDURE (OUTPATIENT)
Age: 44
End: 2024-03-07

## 2024-03-08 RX ORDER — SODIUM CHLORIDE 0.9 % (FLUSH) 0.9 %
5-40 SYRINGE (ML) INJECTION PRN
Status: CANCELLED | OUTPATIENT
Start: 2024-03-08

## 2024-03-08 RX ORDER — SODIUM CHLORIDE 0.9 % (FLUSH) 0.9 %
5-40 SYRINGE (ML) INJECTION EVERY 12 HOURS SCHEDULED
Status: CANCELLED | OUTPATIENT
Start: 2024-03-08

## 2024-03-08 RX ORDER — SODIUM CHLORIDE 9 MG/ML
INJECTION, SOLUTION INTRAVENOUS PRN
Status: CANCELLED | OUTPATIENT
Start: 2024-03-08

## 2024-03-11 ENCOUNTER — OFFICE VISIT (OUTPATIENT)
Age: 44
End: 2024-03-11
Payer: COMMERCIAL

## 2024-03-11 VITALS
OXYGEN SATURATION: 95 % | BODY MASS INDEX: 23.57 KG/M2 | WEIGHT: 133 LBS | SYSTOLIC BLOOD PRESSURE: 90 MMHG | DIASTOLIC BLOOD PRESSURE: 60 MMHG | HEIGHT: 63 IN | RESPIRATION RATE: 18 BRPM | HEART RATE: 94 BPM

## 2024-03-11 DIAGNOSIS — I42.0 CONGESTIVE CARDIOMYOPATHY (HCC): ICD-10-CM

## 2024-03-11 DIAGNOSIS — I42.0 DILATED CARDIOMYOPATHY (HCC): ICD-10-CM

## 2024-03-11 DIAGNOSIS — I50.23 ACUTE ON CHRONIC SYSTOLIC (CONGESTIVE) HEART FAILURE (HCC): ICD-10-CM

## 2024-03-11 DIAGNOSIS — I42.8 NONISCHEMIC CARDIOMYOPATHY (HCC): Primary | ICD-10-CM

## 2024-03-11 DIAGNOSIS — I42.8 NON-ISCHEMIC CARDIOMYOPATHY (HCC): ICD-10-CM

## 2024-03-11 DIAGNOSIS — I50.22 CHRONIC SYSTOLIC HEART FAILURE (HCC): ICD-10-CM

## 2024-03-11 PROCEDURE — 99214 OFFICE O/P EST MOD 30 MIN: CPT

## 2024-03-11 RX ORDER — ACETAMINOPHEN 500 MG
500 TABLET ORAL EVERY 4 HOURS PRN
COMMUNITY

## 2024-03-11 RX ORDER — LOSARTAN POTASSIUM 25 MG/1
12.5 TABLET ORAL 2 TIMES DAILY
COMMUNITY
Start: 2024-02-20 | End: 2025-02-19

## 2024-03-11 RX ORDER — CHLORHEXIDINE GLUCONATE 4 G/100ML
SOLUTION TOPICAL
Qty: 1 EACH | Refills: 0 | Status: SHIPPED | OUTPATIENT
Start: 2024-03-11

## 2024-03-11 RX ORDER — POTASSIUM CHLORIDE 20 MEQ/15ML
20 SOLUTION ORAL DAILY
COMMUNITY
Start: 2024-02-29

## 2024-03-11 RX ORDER — GABAPENTIN 300 MG/1
300 CAPSULE ORAL
COMMUNITY
Start: 2024-02-09

## 2024-03-11 ASSESSMENT — PATIENT HEALTH QUESTIONNAIRE - PHQ9
SUM OF ALL RESPONSES TO PHQ QUESTIONS 1-9: 0
2. FEELING DOWN, DEPRESSED OR HOPELESS: 0
SUM OF ALL RESPONSES TO PHQ QUESTIONS 1-9: 0
SUM OF ALL RESPONSES TO PHQ9 QUESTIONS 1 & 2: 0
SUM OF ALL RESPONSES TO PHQ QUESTIONS 1-9: 0
1. LITTLE INTEREST OR PLEASURE IN DOING THINGS: 0
SUM OF ALL RESPONSES TO PHQ QUESTIONS 1-9: 0

## 2024-03-11 NOTE — PATIENT INSTRUCTIONS
device clinic and then 3 months post implant with MD.   This is subject to change based off of discharge orders placed by MD or NP.       Restrictions:    NO raising affected arm above shoulder height until: 1 month (or 4 weeks) post implant   NO lifting (with affected arm) heavier than 10 pounds until:  1 month (or 4 weeks) post implant, slowly work back into regular activity after   NO fast, swinging motions (raking, golfing/swimming/power walking) until:  6 weeks post implant, slowly work back into regular activity after   NO arc welding or chainsaw use: ICD: NOT allowed  Pacemaker: 1 month (or 4 weeks) post implant. Refer to patient number on device card to reference certain equipment.   NO soaking in water (bathtub, hot tub, pool, river, ocean, etc.): 1 month (or 4 weeks) post implant or until completely healed   Allowed to shower: Ok to get bandage wet, OK to shower after bandage removed. Do not let shower beat on incision site. Pat dry.   NO dental work for 8 weeks after your implant. (antibiotics only needed with certain procedures)   MRI compatible devices AND leads:  Must wait until 6 weeks after implant.    DRIVING: No driving for 3 days, or longer depending on MD's recommendations. You must wear seatbelt per [place] law. If this is uncomfortable, use a pad or towel over the site or avoid driving until discomfort lessens.     **Remote monitoring is STRONGLY recommended for our device clinic and instruction will be given based off of your device and device company.

## 2024-03-12 ENCOUNTER — TELEPHONE (OUTPATIENT)
Age: 44
End: 2024-03-12

## 2024-03-12 NOTE — TELEPHONE ENCOUNTER
I am reaching out about patient Viry Andrade  76.  Please call Tessie at [phone] option 1 reference # [id number] to complete a [id number].    Called [employer] at number above, ID verified using two patient identifiers.  P2P scheduled for today 3/12/24 at 2:15 pm.    Peer to peer will be between Haydee Harrington NP and Dr. Castro.  Phone number calling will be a [facility] area code.

## 2024-03-12 NOTE — TELEPHONE ENCOUNTER
Haydee Harrington, KARINE - CNP  [employer] now (2:37 PM)     Auth number [id number]. Good until 4/10/2024

## 2024-03-15 ENCOUNTER — TELEPHONE (OUTPATIENT)
Age: 44
End: 2024-03-15

## 2024-03-15 NOTE — DISCHARGE INSTRUCTIONS
able to shower.     DISCHARGE PRECAUTIONS      You can have an MRI after 6 weeks.  You must be aware that any strong magnet or magnetic field can affect your ICD.  In general, be careful of metal detectors, heavy machinery, and any area where arc-welding is performed.  When approaching a security checkpoint show your Pacemaker ID Card to security personnel.    Always tell your doctor or dentist that you have an ICD.  In some cases, antibiotics may be prescribed before certain procedures.    Your temporary identification will be given to you with these instructions.  Keep your ICD card in your wallet or on your person at all times.  You should receive your permanent card, although this may take up to 8-12 weeks.  If you do not receive your permanent card, please call the office at [phone] or the phone number provided on your temporary card for the ICD company.       SYMPTOMS THAT NEED TO BE REPORTED IMMEDIATELY      Temperature more than 100.4 F    Redness or warmth at the incision site, or pain for longer than the first 5 days after the implant.    Drainage from the incision site.    Swelling around the incision site.    Shortness of breath.    Rapid heart rate or palpitations.    Dizziness, lightheadedness, fainting.    Slow pulse below 40 beats per minute.    REMEMBER: If you feel something is an emergency or cannot be handled over the phone, call 911 or go to the closest emergency room.    WHAT TO DO IF YOUR ICD DELIVERS A SHOCK     If your ICD delivers a shock, you should seek attention at the nearest emergency room, call our office or call 911.      FOLLOW UP CARE     Follow-up in Device Clinic for wound check and device check on 3/29/2024 at 1:40 pm.     Future Appointments   Date Time Provider Department Center   3/29/2024  1:40 PM PACEMAKER, [facility]   5/1/2024  8:00 AM Luda Laguerre APRN - NP [facility] AMB   5/9/2024  2:40 PM Jerome Rendon MD [facility] AMB   6/24/2024  1:20 PM

## 2024-03-15 NOTE — TELEPHONE ENCOUNTER
Spoke To Pt:  Just a reminder not to forget your Single Chamber ICD scheduled for Monday 3/18/24.  Arriving at 9am  You will need a  must stay on site  NPO from midnight   check in at the 2nd floor outpt. reg. Desk.  Medications:  Hold lasix day of procedure  Hold Spironolactone day of procedure

## 2024-03-18 ENCOUNTER — APPOINTMENT (OUTPATIENT)
Facility: HOSPITAL | Age: 44
End: 2024-03-18
Attending: INTERNAL MEDICINE
Payer: COMMERCIAL

## 2024-03-18 ENCOUNTER — HOSPITAL ENCOUNTER (OUTPATIENT)
Facility: HOSPITAL | Age: 44
Setting detail: OUTPATIENT SURGERY
Discharge: HOME | End: 2024-03-18
Attending: INTERNAL MEDICINE | Admitting: INTERNAL MEDICINE
Payer: COMMERCIAL

## 2024-03-18 VITALS
OXYGEN SATURATION: 99 % | HEART RATE: 64 BPM | TEMPERATURE: 97 F | SYSTOLIC BLOOD PRESSURE: 113 MMHG | BODY MASS INDEX: 22.83 KG/M2 | DIASTOLIC BLOOD PRESSURE: 72 MMHG | HEIGHT: 64 IN | RESPIRATION RATE: 13 BRPM | WEIGHT: 133.71 LBS

## 2024-03-18 DIAGNOSIS — I50.22 CHRONIC SYSTOLIC HEART FAILURE (HCC): ICD-10-CM

## 2024-03-18 DIAGNOSIS — I42.8 NONISCHEMIC CARDIOMYOPATHY (HCC): ICD-10-CM

## 2024-03-18 LAB
ANION GAP SERPL CALC-SCNC: 5 MMOL/L (ref 5–15)
BUN SERPL-MCNC: 15 MG/DL (ref 6–20)
BUN/CREAT SERPL: 23 (ref 12–20)
CALCIUM SERPL-MCNC: 9.6 MG/DL (ref 8.5–10.1)
CHLORIDE SERPL-SCNC: 97 MMOL/L (ref 97–108)
CO2 SERPL-SCNC: 34 MMOL/L (ref 21–32)
CREAT SERPL-MCNC: 0.64 MG/DL (ref 0.55–1.02)
ECHO BSA: 1.65 M2
ERYTHROCYTE [DISTWIDTH] IN BLOOD BY AUTOMATED COUNT: 13.2 % (ref 11.5–14.5)
GLUCOSE SERPL-MCNC: 123 MG/DL (ref 65–100)
HCT VFR BLD AUTO: 45.6 % (ref 35–47)
HGB BLD-MCNC: 16.1 G/DL (ref 11.5–16)
MCH RBC QN AUTO: 35.7 PG (ref 26–34)
MCHC RBC AUTO-ENTMCNC: 35.3 G/DL (ref 30–36.5)
MCV RBC AUTO: 101.1 FL (ref 80–99)
NRBC # BLD: 0 K/UL (ref 0–0.01)
NRBC BLD-RTO: 0 PER 100 WBC
PLATELET # BLD AUTO: 236 K/UL (ref 150–400)
PMV BLD AUTO: 11.1 FL (ref 8.9–12.9)
POTASSIUM SERPL-SCNC: 3.6 MMOL/L (ref 3.5–5.1)
RBC # BLD AUTO: 4.51 M/UL (ref 3.8–5.2)
SODIUM SERPL-SCNC: 136 MMOL/L (ref 136–145)
WBC # BLD AUTO: 7.2 K/UL (ref 3.6–11)

## 2024-03-18 PROCEDURE — C1722 AICD, SINGLE CHAMBER: HCPCS | Performed by: INTERNAL MEDICINE

## 2024-03-18 PROCEDURE — C1769 GUIDE WIRE: HCPCS | Performed by: INTERNAL MEDICINE

## 2024-03-18 PROCEDURE — 33249 INSJ/RPLCMT DEFIB W/LEAD(S): CPT | Performed by: INTERNAL MEDICINE

## 2024-03-18 PROCEDURE — 99152 MOD SED SAME PHYS/QHP 5/>YRS: CPT | Performed by: INTERNAL MEDICINE

## 2024-03-18 PROCEDURE — 99153 MOD SED SAME PHYS/QHP EA: CPT | Performed by: INTERNAL MEDICINE

## 2024-03-18 PROCEDURE — 6370000000 HC RX 637 (ALT 250 FOR IP)

## 2024-03-18 PROCEDURE — 85027 COMPLETE CBC AUTOMATED: CPT

## 2024-03-18 PROCEDURE — C1777 LEAD, AICD, ENDO SINGLE COIL: HCPCS | Performed by: INTERNAL MEDICINE

## 2024-03-18 PROCEDURE — 76937 US GUIDE VASCULAR ACCESS: CPT | Performed by: INTERNAL MEDICINE

## 2024-03-18 PROCEDURE — 6360000002 HC RX W HCPCS: Performed by: INTERNAL MEDICINE

## 2024-03-18 PROCEDURE — 2709999900 HC NON-CHARGEABLE SUPPLY: Performed by: INTERNAL MEDICINE

## 2024-03-18 PROCEDURE — 80048 BASIC METABOLIC PNL TOTAL CA: CPT

## 2024-03-18 PROCEDURE — 71045 X-RAY EXAM CHEST 1 VIEW: CPT

## 2024-03-18 PROCEDURE — 36415 COLL VENOUS BLD VENIPUNCTURE: CPT

## 2024-03-18 PROCEDURE — C1892 INTRO/SHEATH,FIXED,PEEL-AWAY: HCPCS | Performed by: INTERNAL MEDICINE

## 2024-03-18 PROCEDURE — C1894 INTRO/SHEATH, NON-LASER: HCPCS | Performed by: INTERNAL MEDICINE

## 2024-03-18 PROCEDURE — 2500000003 HC RX 250 WO HCPCS: Performed by: INTERNAL MEDICINE

## 2024-03-18 DEVICE — IMPLANTABLE CARDIOVERTER DEFIBRILLATOR VR
Type: IMPLANTABLE DEVICE | Status: FUNCTIONAL
Brand: VIGILANT™ EL ICD VR

## 2024-03-18 DEVICE — INTEGRATED BIPOLAR PACE/SENSE AND DEFIBRILLATION LEAD
Type: IMPLANTABLE DEVICE | Status: FUNCTIONAL
Brand: RELIANCE 4-FRONT™

## 2024-03-18 RX ORDER — SODIUM CHLORIDE 9 MG/ML
INJECTION, SOLUTION INTRAVENOUS PRN
Status: DISCONTINUED | OUTPATIENT
Start: 2024-03-18 | End: 2024-03-18 | Stop reason: HOSPADM

## 2024-03-18 RX ORDER — SODIUM CHLORIDE 0.9 % (FLUSH) 0.9 %
5-40 SYRINGE (ML) INJECTION PRN
Status: DISCONTINUED | OUTPATIENT
Start: 2024-03-18 | End: 2024-03-18 | Stop reason: HOSPADM

## 2024-03-18 RX ORDER — ACETAMINOPHEN 325 MG/1
650 TABLET ORAL EVERY 4 HOURS PRN
Status: DISCONTINUED | OUTPATIENT
Start: 2024-03-18 | End: 2024-03-18 | Stop reason: HOSPADM

## 2024-03-18 RX ORDER — ONDANSETRON 2 MG/ML
4 INJECTION INTRAMUSCULAR; INTRAVENOUS EVERY 6 HOURS PRN
Status: DISCONTINUED | OUTPATIENT
Start: 2024-03-18 | End: 2024-03-18 | Stop reason: HOSPADM

## 2024-03-18 RX ORDER — DIPHENHYDRAMINE HYDROCHLORIDE 50 MG/ML
INJECTION INTRAMUSCULAR; INTRAVENOUS PRN
Status: DISCONTINUED | OUTPATIENT
Start: 2024-03-18 | End: 2024-03-18 | Stop reason: HOSPADM

## 2024-03-18 RX ORDER — SODIUM CHLORIDE 0.9 % (FLUSH) 0.9 %
5-40 SYRINGE (ML) INJECTION EVERY 12 HOURS SCHEDULED
Status: DISCONTINUED | OUTPATIENT
Start: 2024-03-18 | End: 2024-03-18 | Stop reason: HOSPADM

## 2024-03-18 RX ORDER — FENTANYL CITRATE 50 UG/ML
INJECTION, SOLUTION INTRAMUSCULAR; INTRAVENOUS PRN
Status: DISCONTINUED | OUTPATIENT
Start: 2024-03-18 | End: 2024-03-18 | Stop reason: HOSPADM

## 2024-03-18 RX ORDER — ONDANSETRON 2 MG/ML
INJECTION INTRAMUSCULAR; INTRAVENOUS PRN
Status: DISCONTINUED | OUTPATIENT
Start: 2024-03-18 | End: 2024-03-18 | Stop reason: HOSPADM

## 2024-03-18 RX ADMIN — ACETAMINOPHEN 650 MG: 325 TABLET ORAL at 12:55

## 2024-03-18 ASSESSMENT — PAIN DESCRIPTION - ORIENTATION: ORIENTATION: LEFT

## 2024-03-18 ASSESSMENT — PAIN DESCRIPTION - DESCRIPTORS: DESCRIPTORS: ACHING

## 2024-03-18 ASSESSMENT — PAIN SCALES - GENERAL: PAINLEVEL_OUTOF10: 6

## 2024-03-18 ASSESSMENT — PAIN DESCRIPTION - LOCATION: LOCATION: CHEST

## 2024-03-18 NOTE — H&P
Office note from 3/11/2024 reviewed. No interim changes.   --------------------------------------------------------            Cardiac Electrophysiology OFFICE Consultation Note       Assessment/Plan:   1. Nonischemic cardiomyopathy (HCC)  2. Acute on chronic systolic (congestive) heart failure (HCC)  3. Chronic systolic heart failure (HCC)  4. Non-ischemic cardiomyopathy (HCC)  5. Congestive cardiomyopathy (HCC)  6. Dilated cardiomyopathy (HCC)  7. Cardiomyopathy (HCC)         Primary Cardiologist: Dora/ BENEDICT    Chronic systolic congestive heart failure: Severe niCMP, LVEF of 20-25%.  Given LV dilation and evidence of nonischemic cardiomyopathy with prior CHF exacerbation and New York Heart Association class III/ambulatory 4.  History of severe alcohol use.  Still drinking <1 pint of bourbon a day.  Guideline directed medical therapy limited by hypotension.  CMR scheduled in February. Declined LifeVest.   - Followed by advanced heart failure at [facility]  - Continue Toprol-XL, ivabradine, and spironolactone   - Unable to tolerate ACE inhibitor or Entresto due to hypotension  - Jardiance contraindicated in type 1 diabetes  - In the setting of severe dilated nonischemic cardiomyopathy with New York Heart Association class III symptoms, patient would best benefit from ICD implantation for primary prevention of sudden cardiac death  - I have discussed the risks and benefits of ICD implant with the pt including but not limited to MI, death, bleeding, infection, lead dislodgement, pneumothorax, tamponade.  We have reviewed an evidence-based tool (https://patientdecisionaid.org/wp-content/uploads/2016/06/ICD-tool-shortened-V1-3-.pdf), all questions were answered and patient reports full understanding of discussion and wish to proceed with the procedure.  - Cardiac MRI done at [facility]. EF 34%  - Scheduled for single chamber ICD implant on 3/18/2024  - lab work done recently at [facility] (3/1/24)     Type 1 diabetes: A1c 7.5  - Has

## 2024-03-18 NOTE — PROCEDURES
ICD Implantation    Procedure Date: 03/18/24  Lab Physician: Felicia Kaur MD, [facility], [facility]    INDICATIONS:  44 yo woman with a history of non-ischemic cardiomyopathy (LVEF of 20-25%), chronic systolic CHF with NYHA class III, type I DM now referred for ICD implantation for prevention of SCD.    COMMENTS:  After informed consent was obtained, the patient was brought to the electrophysiology laboratory in the fasting state, and was prepped and draped in the usual sterile fashion. IV antibiotic was administered prophylactically. Conscious sedation was administered by nursing staff independent of those performing the procedure under my supervision with intermittent dosing of anxiolytics and narcotics for a total sedation time of 30 mins.    Ultrasound-guided Access  Local anesthetic was delivered to the left pectoral region and an incision was made in the left deltopectoral groove. The axillary vein was accessed using a micropuncture needle with ultrasound guidance (ultrasound evaluation of possible access sites. Patency of the selected vessel.  Realtime visualization of the vascular needle entry was performed) and the vein was cannulated and a retaining wire was placed in the IVC under fluoroscopy. A 9F peel-away sheath was placed in the vein and a [employer] lead was advanced to the RV apex under fluoroscopic guidance. Ventricular sensing and pacing thresholds were checked and were good.  Pacing at 10V was performed from the ventricular lead without diaphragmatic or intercostal capture. The leads were sutured to the underlying pectoralis muscle using 0 Silk suture. Final pacing and sensing thresholds were checked and were good.     Wound Closure  A subcutaneous pocket was then created using blunt dissection and it was copiously irrigated with a saline solution containing antibiotics. The leads were connected to a [employer] generator and the entire system was implanted into the pocket. The subcutaneous

## 2024-03-18 NOTE — PROGRESS NOTES
9:38 AM  Patient arrived. ID and allergies verified verbally with patient. Pt voices understanding of procedure to be performed. Consent obtained. Pt prepped for procedure. Pt denies contrast allergy. Patient denies taking any blood thinners.    11:31 AM  TRANSFER - OUT REPORT:    Verbal report given to RT Leia on Viry Andrade  being transferred to EP lab for ordered procedure       Report consisted of patient's Situation, Background, Assessment and   Recommendations(SBAR).     Information from the following report(s) Nurse Handoff Report was reviewed with the receiving nurse.    Lines:   Peripheral IV 03/18/24 Left Forearm (Active)   Opportunity for questions and clarification was provided.      Patient transported with:  Tech    12:33 PM  TRANSFER - IN REPORT:    Verbal report received from RADHA Zimmerman on Viry Andrade  being received from EP Lab for routine post-op      Report consisted of patient's Situation, Background, Assessment and   Recommendations(SBAR).     Information from the following report(s) Nurse Handoff Report was reviewed with the receiving nurse.    Opportunity for questions and clarification was provided.      Assessment completed upon patient's arrival to unit and care assumed.     2:20 PM  Discharge instructions given to patient and family. Time given to ask questions and gain clarity. No questions at this time. Patient and family confirm understanding of instructions given. Patient given copy of discharge instructions to take home.

## 2024-03-21 ENCOUNTER — TELEPHONE (OUTPATIENT)
Age: 44
End: 2024-03-21

## 2024-03-21 ENCOUNTER — OFFICE VISIT (OUTPATIENT)
Age: 44
End: 2024-03-21

## 2024-03-21 VITALS
TEMPERATURE: 97.6 F | DIASTOLIC BLOOD PRESSURE: 84 MMHG | HEART RATE: 78 BPM | BODY MASS INDEX: 22.88 KG/M2 | HEIGHT: 64 IN | SYSTOLIC BLOOD PRESSURE: 109 MMHG | OXYGEN SATURATION: 97 % | WEIGHT: 134 LBS

## 2024-03-21 DIAGNOSIS — R74.8 ELEVATED LIVER ENZYMES: Primary | ICD-10-CM

## 2024-03-21 DIAGNOSIS — K90.9 STEATORRHEA: ICD-10-CM

## 2024-03-21 DIAGNOSIS — R15.9 INCONTINENCE OF FECES, UNSPECIFIED FECAL INCONTINENCE TYPE: ICD-10-CM

## 2024-03-21 ASSESSMENT — PATIENT HEALTH QUESTIONNAIRE - PHQ9
SUM OF ALL RESPONSES TO PHQ QUESTIONS 1-9: 0
SUM OF ALL RESPONSES TO PHQ QUESTIONS 1-9: 0
1. LITTLE INTEREST OR PLEASURE IN DOING THINGS: NOT AT ALL
SUM OF ALL RESPONSES TO PHQ QUESTIONS 1-9: 0
SUM OF ALL RESPONSES TO PHQ9 QUESTIONS 1 & 2: 0
SUM OF ALL RESPONSES TO PHQ QUESTIONS 1-9: 0
2. FEELING DOWN, DEPRESSED OR HOPELESS: NOT AT ALL

## 2024-03-21 NOTE — TELEPHONE ENCOUNTER
Received call from patient, ID verified using two patient identifiers. Patient states she took a shower and the dressing to her ICD incision site (implanted 3/1//24) got saturated. Advised patient that it is ok to remove the wet dressing. Informed her that she can cover the incision with a large non stick bandage. Informed her to wash her hands before and after changing her dressing. Informed her not to apply any creams, lotions or powders at or near the incision. Patient very appreciative of information and will contact the office with any questions or concerns.     Future Appointments   Date Time Provider Department Center   3/29/2024  1:40 PM PACEMAKER, STFRMAHSA [facility] BS AMB   5/9/2024  2:40 PM Jerome Rendon MD [facility] BS AMB   6/20/2024  3:00 PM Luda Laguerre APRN - NP [facility] BS AMB   6/24/2024  1:20 PM PACEMAKER, STFRANCES [facility] AMB   6/24/2024  1:40 PM Jocelin Chin APRN - NP [facility] BS AMB

## 2024-03-21 NOTE — PROGRESS NOTES
[facility]      Chaka Burns MD, FACP, FACG, FAASLD      PATI Velasquez-C    Shari Molina, [facility]   Luda Laguerre, [facility]   Jael Hollingsworth, [facility]  Mikey Winslow, [facility]   Angelica Mackay, [facility]   Lala Aston, [facility]   [address], [address]   [place]  [address] [phone]   FAX: [phone]  [facility]   [address], [address]   [place]  [address] [phone]   FAX: [phone]       Patient Care Team:  Sebastian Hilario PA as PCP - General (Physician Assistant)  Sebastian Hilario PA as PCP - Empaneled Provider  Netta Dial APRN - NP as Nurse Practitioner  Harinder Block II, MD as Gynecologist (Obstetrics & Gynecology)      Patient Active Problem List   Diagnosis    Incisional hernia    Pancreatic fluid leak    Alcohol intoxication (HCC)    Thrombocytopenia, unspecified (HCC)    Sinus tachycardia    Hepatitis    Obesity (BMI 30-39.9)    Incisional hernia, without obstruction or gangrene    Pancreatic cyst    Alcohol abuse    Anxiety    Diabetes (HCC)    Estrogen increased    Elevated liver enzymes    Generalized abdominal wall pain    Chronic systolic heart failure (HCC)    Non-ischemic cardiomyopathy (HCC)    CHF (congestive heart failure) (HCC)    Nonischemic cardiomyopathy (HCC)     Viry Andrade is being seen at [facility] for management of likely cirrhosis that   is secondary to cardiac dysfunction vs alcohol induced liver disease.  The active problem list, all pertinent past medical history, medications, liver histology, radiologic findings and laboratory findings related to the liver disorder were reviewed and discussed with the patient.      The patient is a 43 y.o. female who was found to have chronic liver disease and possible

## 2024-03-21 NOTE — PROGRESS NOTES
Identified pt with two pt identifiers(name and ). Reviewed record in preparation for visit and have obtained necessary documentation.    Chief Complaint   Patient presents with    Cirrhosis     FS     /84 (Site: Right Upper Arm, Position: Sitting, Cuff Size: Medium Adult)   Pulse 78   Temp 97.6 °F (36.4 °C)   Ht 1.613 m (5' 3.5\")   Wt 60.8 kg (134 lb)   SpO2 97%   BMI 23.36 kg/m²       1. \"Have you been to the ER, urgent care clinic since your last visit?  Hospitalized since your last visit?\" Yes [facility] ED visit (see encounter)    2. \"Have you seen or consulted any other health care providers outside of the [facility] since your last visit?\" No     Patient is accompanied by self I have received verbal consent from Viry Andrade to discuss any/all medical information while they are present in the room.

## 2024-03-25 ENCOUNTER — TELEPHONE (OUTPATIENT)
Age: 44
End: 2024-03-25

## 2024-03-25 NOTE — TELEPHONE ENCOUNTER
Spoke with Viry singletary: recommendations for pain management & alcohol cessation. No additional questions at this time.

## 2024-03-29 ENCOUNTER — PROCEDURE VISIT (OUTPATIENT)
Age: 44
End: 2024-03-29
Payer: COMMERCIAL

## 2024-03-29 DIAGNOSIS — Z95.810 PRESENCE OF AUTOMATIC CARDIOVERTER/DEFIBRILLATOR (AICD): Primary | ICD-10-CM

## 2024-03-29 PROCEDURE — 93282 PRGRMG EVAL IMPLANTABLE DFB: CPT | Performed by: INTERNAL MEDICINE

## 2024-03-29 NOTE — PROGRESS NOTES
Patient presents for wound check post-device implantation. The dressing was removed and the site was inspected. The site appeared to be well-healing without ecchymosis/tenderness/erythema. Denies pain, fevers, discharge.       Future Appointments   Date Time Provider Department Center   5/9/2024  2:40 PM Jerome Rendon MD [facility] BS AMB   6/20/2024  3:00 PM Luda Laguerre APRN - NP [facility] BS AMB   6/24/2024  1:20 PM PACEMAKERSUDHA [facility] BS AMB   6/24/2024  1:40 PM [facility]         Continue follow up in device clinic as planned.

## 2024-04-02 RX ORDER — IVABRADINE 5 MG/1
TABLET, FILM COATED ORAL
Qty: 60 TABLET | Refills: 0 | OUTPATIENT
Start: 2024-04-02

## 2024-04-02 NOTE — TELEPHONE ENCOUNTER
Medication refill received and refused, (last visit December-- cancelled visit Jan 4th and Jan 29th) my chart message sent to patient alerting of need for follow up for further refills or to have medications refilled by other cardiologist.

## 2024-04-12 ENCOUNTER — HOSPITAL ENCOUNTER (OUTPATIENT)
Facility: HOSPITAL | Age: 44
Discharge: HOME | End: 2024-04-12
Payer: COMMERCIAL

## 2024-04-12 DIAGNOSIS — R74.8 ELEVATED LIVER ENZYMES: ICD-10-CM

## 2024-04-12 PROCEDURE — 76700 US EXAM ABDOM COMPLETE: CPT

## 2024-04-22 ENCOUNTER — TELEPHONE (OUTPATIENT)
Age: 44
End: 2024-04-22

## 2024-04-22 NOTE — TELEPHONE ENCOUNTER
Dr Rendon/Natasha pt that had ICD implant 3/18/24. C/o discomfort/pain on L side (ICD side), shoulder pain, chest discomfort and jaw discomfort since implant and has gotten progressively worse. She states she is concerned it is related to ICD. She does have an appt with [facility] at 1:20 pm.

## 2024-04-23 DIAGNOSIS — E55.9 VITAMIN D DEFICIENCY: ICD-10-CM

## 2024-04-23 DIAGNOSIS — I50.22 CHRONIC SYSTOLIC HEART FAILURE (HCC): ICD-10-CM

## 2024-04-23 RX ORDER — ERGOCALCIFEROL 1.25 MG/1
CAPSULE ORAL
Qty: 12 CAPSULE | Refills: 0 | OUTPATIENT
Start: 2024-04-23

## 2024-05-02 ENCOUNTER — TELEPHONE (OUTPATIENT)
Age: 44
End: 2024-05-02

## 2024-05-02 NOTE — TELEPHONE ENCOUNTER
Received call from patient, ID verified using two patient identifiers.  Ms. Andrade is calling because she is scheduled to come to the office for a wound check tomorrow.    She states she started feeling so much better yesterday with less pain at the site.  She doesn't feel like she needs to be seen.    Advised her that I will cancel the appointment tomorrow and if for some reason the pain returns we can certainly get her rescheduled.  Patient verbalized understanding and will call back with any other questions or concerns.    Future Appointments   Date Time Provider Department Center   6/20/2024  3:00 PM Luda Laguerre APRN - NP [facility] BS AMB   6/24/2024  1:20 PM PACEMAKER, STFRMAHSA [facility] BS AMB   6/24/2024  1:40 PM Jocelin Chin APRN - NP [facility] BS AMB

## 2024-05-20 NOTE — DIABETES MGMT
DTC Progress Note    Recommendations/ Comments: If appropriate, please consider adding an A1c prior to d/c.  Pt's POC is now > 180 mg/dl the past three readings. Pt may benefit from some education on monitoring BS prior to d/c and if BS continue to trend upward addition of an oral medication. Pt has required 8 units of correction in past 24 hours. Noted d/c pending. Current hospital DM medication: lipsro insulin correction scale with meals only    Chart reviewed on 2 [facility]. Patient is a 42 y.o. female with no history of diabetes. A1c:   No results found for: HBA1C, HGBE8, QZV4RIYR    Recent Glucose Results: Lab Results   Component Value Date/Time    GLUCPOC 180 (H) 04/30/2018 06:19 AM    GLUCPOC 211 (H) 04/29/2018 09:32 PM    GLUCPOC 185 (H) 04/29/2018 04:01 PM        Lab Results   Component Value Date/Time    Creatinine 0.45 (L) 04/26/2018 03:57 AM     Estimated Creatinine Clearance: 165.1 mL/min (based on Cr of 0.45). Active Orders   Diet    DIET GI LITE (POST SURGICAL) No Conc. Sweets        PO intake: Patient Vitals for the past 72 hrs:   % Diet Eaten   04/28/18 1735 50 %       Will continue to follow as needed.     Thank you [FreeTextEntry1] : Cardiac Rehabilitation Phase 2 Focus assessment and physical exam at session #1 ITP initiated, finalized by Dr. Lara at session #1  All questions answered.  Patient seen and evaluated by Dr. Lara prior to session #1 to establish care and ITP.

## 2024-06-08 DIAGNOSIS — I50.22 CHRONIC SYSTOLIC HEART FAILURE (HCC): ICD-10-CM

## 2024-06-10 RX ORDER — ROSUVASTATIN CALCIUM 10 MG/1
10 TABLET, COATED ORAL DAILY
Qty: 90 TABLET | Refills: 0 | OUTPATIENT
Start: 2024-06-10

## 2024-06-19 ENCOUNTER — TRANSCRIBE ORDERS (OUTPATIENT)
Facility: HOSPITAL | Age: 44
End: 2024-06-19

## 2024-06-19 DIAGNOSIS — K70.30 ALCOHOLIC CIRRHOSIS OF LIVER WITHOUT ASCITES (HCC): ICD-10-CM

## 2024-06-19 DIAGNOSIS — K85.90 ACUTE PANCREATITIS, UNSPECIFIED COMPLICATION STATUS, UNSPECIFIED PANCREATITIS TYPE: Primary | ICD-10-CM

## 2024-06-19 NOTE — PROGRESS NOTES
Cardiac Electrophysiology OFFICE Consultation Note       Assessment/Plan:   1. Chronic systolic heart failure (HCC)  2. Nonischemic cardiomyopathy (HCC)  3. Presence of automatic cardioverter/defibrillator (AICD)  4. Dilated cardiomyopathy (HCC)      Primary Cardiologist: Dora/ CAMPOS    Chronic systolic congestive heart failure: Severe niCMP, LVEF of 20-25%.  Given LV dilation and evidence of nonischemic cardiomyopathy with prior CHF exacerbation and New York Heart Association class III/ambulatory 4.  History of severe alcohol use.  Still drinking <1 pint of bourbon a day.  Guideline directed medical therapy limited by hypotension.  CMR scheduled in February. Declined LifeVest.   - Followed by advanced heart failure at [facility]  - Continue Toprol-XL, ivabradine, and spironolactone   - Unable to tolerate ACE inhibitor or Entresto due to hypotension  - Jardiance contraindicated in type 1 diabetes  - In the setting of severe dilated nonischemic cardiomyopathy with New York Heart Association class III symptoms, patient would best benefit from ICD implantation for primary prevention of sudden cardiac death.  - Cardiac MRI done at [facility]. EF 34%  - Now s/p single chamber ICD implant on 3/18/2024    [employer] single chamber ICD (DOI 3/18/2024): Device check today shows proper lead & generator function. Generator longevity estimated 14 years. RV <1%. No sustained VT/VF.  Iterative programming was performed to test the leads sensing and threshold parameters without any permanent changes.   - Continue remote device transmissions every 3 months  - Follow-up in EP clinic in 1 year or sooner with any concerns    Type 1 diabetes: A1c 7.5  - Has insulin pump  - On statin therapy    Alcohol abuse  - Emphasized importance of alcohol relation to severe cardiomyopathy  - Reduction of use now down to less than 1 pint a day of bourbon    Subjective:       Viry Andrade is a 43 y.o. patient who is seen for H&P update prior to

## 2024-06-20 ENCOUNTER — OFFICE VISIT (OUTPATIENT)
Age: 44
End: 2024-06-20
Payer: COMMERCIAL

## 2024-06-20 VITALS
SYSTOLIC BLOOD PRESSURE: 104 MMHG | HEIGHT: 64 IN | RESPIRATION RATE: 18 BRPM | HEART RATE: 115 BPM | OXYGEN SATURATION: 97 % | BODY MASS INDEX: 21.95 KG/M2 | WEIGHT: 128.6 LBS | DIASTOLIC BLOOD PRESSURE: 70 MMHG | TEMPERATURE: 96.8 F

## 2024-06-20 DIAGNOSIS — G57.93 NEUROPATHIC PAIN OF BOTH FEET: Primary | ICD-10-CM

## 2024-06-20 DIAGNOSIS — K70.30 ALCOHOLIC CIRRHOSIS OF LIVER WITHOUT ASCITES (HCC): ICD-10-CM

## 2024-06-20 PROCEDURE — 99215 OFFICE O/P EST HI 40 MIN: CPT | Performed by: NURSE PRACTITIONER

## 2024-06-20 RX ORDER — LANOLIN ALCOHOL/MO/W.PET/CERES
30 CREAM (GRAM) TOPICAL
COMMUNITY
Start: 2024-06-18

## 2024-06-20 RX ORDER — LANOLIN ALCOHOL/MO/W.PET/CERES
CREAM (GRAM) TOPICAL
COMMUNITY
Start: 2024-06-18

## 2024-06-20 RX ORDER — OMEGA-3 FATTY ACIDS/FISH OIL 300-1000MG
CAPSULE ORAL
COMMUNITY

## 2024-06-20 RX ORDER — PROCHLORPERAZINE 25 MG/1
SUPPOSITORY RECTAL
COMMUNITY

## 2024-06-20 RX ORDER — PANCRELIPASE 36000; 180000; 114000 [USP'U]/1; [USP'U]/1; [USP'U]/1
300 CAPSULE, DELAYED RELEASE PELLETS ORAL
COMMUNITY
Start: 2024-06-18

## 2024-06-20 RX ORDER — GABAPENTIN 300 MG/1
300 CAPSULE ORAL 3 TIMES DAILY
Qty: 90 CAPSULE | Refills: 1 | Status: SHIPPED | OUTPATIENT
Start: 2024-06-20 | End: 2024-08-19

## 2024-06-20 ASSESSMENT — PATIENT HEALTH QUESTIONNAIRE - PHQ9
SUM OF ALL RESPONSES TO PHQ9 QUESTIONS 1 & 2: 0
SUM OF ALL RESPONSES TO PHQ QUESTIONS 1-9: 0
2. FEELING DOWN, DEPRESSED OR HOPELESS: NOT AT ALL
1. LITTLE INTEREST OR PLEASURE IN DOING THINGS: NOT AT ALL
SUM OF ALL RESPONSES TO PHQ QUESTIONS 1-9: 0
DEPRESSION UNABLE TO ASSESS: FUNCTIONAL CAPACITY MOTIVATION LIMITS ACCURACY

## 2024-06-20 NOTE — PROGRESS NOTES
Identified pt with two pt identifiers(name and ). Reviewed record in preparation for visit and have obtained necessary documentation.  Vitals:    24 1451   BP: 104/70   Site: Left Upper Arm   Position: Sitting   Cuff Size: Medium Adult   Pulse: (!) 115   Resp: 18   Temp: 96.8 °F (36 °C)   TempSrc: Temporal   SpO2: 97%   Weight: 58.3 kg (128 lb 9.6 oz)   Height: 1.613 m (5' 3.5\")        Health Maintenance Review: Patient reminded of \"due or due soon\" health maintenance. I have asked the patient to contact his/her primary care provider (PCP) for follow-up on his/her health maintenance.    Coordination of Care Questionnaire:  :   1) Have you been to an emergency room, urgent care, or hospitalized since your last visit?  If yes, where when, and reason for visit? no       2. Have seen or consulted any other health care provider since your last visit?   If yes, where when, and reason for visit?  No      Patient is accompanied by self I have received verbal consent from Viry Andrade to discuss any/all medical information while they are present in the room.   
Mother with SLD.    SOCIAL HISTORY:  The patient is .    The patient has 1 child.    The patient currently smokes 10 cigarettes daily.    The patient reports daily drinking but recent reduction.  The patient does not work outside the home.      PHYSICAL EXAMINATION:  /70 (Site: Left Upper Arm, Position: Sitting, Cuff Size: Medium Adult)   Pulse (!) 115   Temp 96.8 °F (36 °C) (Temporal)   Resp 18   Ht 1.613 m (5' 3.5\")   Wt 58.3 kg (128 lb 9.6 oz)   LMP  (LMP Unknown)   SpO2 97%   BMI 22.42 kg/m²   General: No acute distress.   Eyes: Sclera anicteric.   ENT: No oral lesions.  Thyroid normal.  Nodes: No adenopathy.   Skin: No spider angiomata.  No jaundice.  No palmar erythema.  Respiratory: Lungs clear to auscultation.   Cardiovascular: Regular heart rate.  No murmurs.  No JVD.  Abdomen:  (+) Incisional hernia in the midline epigastric area.Soft non-tender.  Liver palpable two finger breadths below the right costal margin.  Spleen not palpable. No obvious ascites.  Extremities: No edema.  No muscle wasting.  No gross arthritic changes.  Neurologic: Alert and oriented.  Cranial nerves grossly intact.  No asterixis.    LABORATORY STUDIES:   Latest Ref Rn 12/27/2023 1/24/2024 3/18/2024 6/20/2024   AMANDEEP - Routine Labs        WBC 3.4 - 10.8 x10E3/uL  8.0  7.2  9.2    ANC 1.4 - 7.0 x10E3/uL  5.4   5.5    HGB 11.1 - 15.9 g/dL  14.7  16.1 (H)  15.6     - 450 x10E3/uL  152  236  211    PLT        INR 0.9 - 1.2     1.0    AST 0 - 40 IU/L 149 (H)  366 (H)   237 (H)    ALT 0 - 32 IU/L 81 (H)  103 (H)   68 (H)    Alk Phos 44 - 121 IU/L 147 (H)  197 (H)   366 (H)    Bili, Total 0.0 - 1.2 mg/dL 0.4  1.7 (H)   0.7    Bili, Direct 0.00 - 0.40 mg/dL    0.25    Albumin 3.9 - 4.9 g/dL 3.9  4.3   4.8    BUN 6 - 24 mg/dL 9  23 (H)  15  9    Creat 0.57 - 1.00 mg/dL 0.66  0.75  0.64  0.61    Na 134 - 144 mmol/L 137  129 (L)  136  138    K 3.5 - 5.2 mmol/L 3.5  3.4 (L)  3.6  3.1 (L)    Cl 96 - 106 mmol/L 98  87

## 2024-06-21 LAB
ALBUMIN SERPL-MCNC: 4.8 G/DL (ref 3.9–4.9)
ALP SERPL-CCNC: 366 IU/L (ref 44–121)
ALT SERPL-CCNC: 68 IU/L (ref 0–32)
AST SERPL-CCNC: 237 IU/L (ref 0–40)
BASOPHILS # BLD AUTO: 0.2 X10E3/UL (ref 0–0.2)
BASOPHILS NFR BLD AUTO: 2 %
BILIRUB DIRECT SERPL-MCNC: 0.25 MG/DL (ref 0–0.4)
BILIRUB SERPL-MCNC: 0.7 MG/DL (ref 0–1.2)
BUN SERPL-MCNC: 9 MG/DL (ref 6–24)
BUN/CREAT SERPL: 15 (ref 9–23)
CALCIUM SERPL-MCNC: 9.9 MG/DL (ref 8.7–10.2)
CHLORIDE SERPL-SCNC: 90 MMOL/L (ref 96–106)
CO2 SERPL-SCNC: 30 MMOL/L (ref 20–29)
CREAT SERPL-MCNC: 0.61 MG/DL (ref 0.57–1)
EGFRCR SERPLBLD CKD-EPI 2021: 112 ML/MIN/1.73
EOSINOPHIL # BLD AUTO: 0.3 X10E3/UL (ref 0–0.4)
EOSINOPHIL NFR BLD AUTO: 3 %
ERYTHROCYTE [DISTWIDTH] IN BLOOD BY AUTOMATED COUNT: 13 % (ref 11.7–15.4)
GLUCOSE SERPL-MCNC: 143 MG/DL (ref 70–99)
HCT VFR BLD AUTO: 45.6 % (ref 34–46.6)
HGB BLD-MCNC: 15.6 G/DL (ref 11.1–15.9)
IMM GRANULOCYTES # BLD AUTO: 0 X10E3/UL (ref 0–0.1)
IMM GRANULOCYTES NFR BLD AUTO: 0 %
INR PPP: 1 (ref 0.9–1.2)
LYMPHOCYTES # BLD AUTO: 2.6 X10E3/UL (ref 0.7–3.1)
LYMPHOCYTES NFR BLD AUTO: 28 %
MCH RBC QN AUTO: 34.8 PG (ref 26.6–33)
MCHC RBC AUTO-ENTMCNC: 34.2 G/DL (ref 31.5–35.7)
MCV RBC AUTO: 102 FL (ref 79–97)
MONOCYTES # BLD AUTO: 0.6 X10E3/UL (ref 0.1–0.9)
MONOCYTES NFR BLD AUTO: 7 %
NEUTROPHILS # BLD AUTO: 5.5 X10E3/UL (ref 1.4–7)
NEUTROPHILS NFR BLD AUTO: 60 %
PLATELET # BLD AUTO: 211 X10E3/UL (ref 150–450)
POTASSIUM SERPL-SCNC: 3.1 MMOL/L (ref 3.5–5.2)
PROT SERPL-MCNC: 8 G/DL (ref 6–8.5)
PROTHROMBIN TIME: 10.9 SEC (ref 9.1–12)
RBC # BLD AUTO: 4.48 X10E6/UL (ref 3.77–5.28)
SODIUM SERPL-SCNC: 138 MMOL/L (ref 134–144)
WBC # BLD AUTO: 9.2 X10E3/UL (ref 3.4–10.8)

## 2024-06-24 ENCOUNTER — PROCEDURE VISIT (OUTPATIENT)
Age: 44
End: 2024-06-24
Payer: COMMERCIAL

## 2024-06-24 ENCOUNTER — OFFICE VISIT (OUTPATIENT)
Age: 44
End: 2024-06-24
Payer: COMMERCIAL

## 2024-06-24 VITALS
WEIGHT: 128.8 LBS | HEIGHT: 64 IN | HEART RATE: 82 BPM | BODY MASS INDEX: 21.99 KG/M2 | OXYGEN SATURATION: 96 % | SYSTOLIC BLOOD PRESSURE: 120 MMHG | DIASTOLIC BLOOD PRESSURE: 60 MMHG

## 2024-06-24 DIAGNOSIS — I50.22 CHRONIC SYSTOLIC HEART FAILURE (HCC): Primary | ICD-10-CM

## 2024-06-24 DIAGNOSIS — Z95.810 PRESENCE OF AUTOMATIC CARDIOVERTER/DEFIBRILLATOR (AICD): ICD-10-CM

## 2024-06-24 DIAGNOSIS — I42.8 NONISCHEMIC CARDIOMYOPATHY (HCC): ICD-10-CM

## 2024-06-24 DIAGNOSIS — I42.0 DILATED CARDIOMYOPATHY (HCC): ICD-10-CM

## 2024-06-24 DIAGNOSIS — Z95.810 PRESENCE OF AUTOMATIC CARDIOVERTER/DEFIBRILLATOR (AICD): Primary | ICD-10-CM

## 2024-06-24 PROCEDURE — 99214 OFFICE O/P EST MOD 30 MIN: CPT

## 2024-06-25 PROCEDURE — 93282 PRGRMG EVAL IMPLANTABLE DFB: CPT | Performed by: INTERNAL MEDICINE

## 2024-06-26 LAB
AFP L3 MFR SERPL: ABNORMAL % (ref 0–9.9)
AFP SERPL-MCNC: 9.8 NG/ML (ref 0–6.4)

## 2024-07-05 ENCOUNTER — TELEPHONE (OUTPATIENT)
Age: 44
End: 2024-07-05

## 2024-07-15 ENCOUNTER — PATIENT MESSAGE (OUTPATIENT)
Age: 44
End: 2024-07-15

## 2024-07-16 ENCOUNTER — TELEPHONE (OUTPATIENT)
Age: 44
End: 2024-07-16

## 2024-07-16 NOTE — TELEPHONE ENCOUNTER
Left message for patient to return call to office to schedule appt for tech visit for mask fit and patient will also need a first adherence appt with provider.

## 2024-07-17 ENCOUNTER — ANESTHESIA EVENT (OUTPATIENT)
Facility: HOSPITAL | Age: 44
End: 2024-07-17
Payer: COMMERCIAL

## 2024-07-17 ENCOUNTER — TELEPHONE (OUTPATIENT)
Age: 44
End: 2024-07-17

## 2024-07-17 ENCOUNTER — HOSPITAL ENCOUNTER (OUTPATIENT)
Facility: HOSPITAL | Age: 44
Discharge: HOME | End: 2024-07-20
Attending: INTERNAL MEDICINE
Payer: COMMERCIAL

## 2024-07-17 DIAGNOSIS — K70.30 ALCOHOLIC CIRRHOSIS OF LIVER WITHOUT ASCITES (HCC): ICD-10-CM

## 2024-07-17 DIAGNOSIS — K85.90 ACUTE PANCREATITIS, UNSPECIFIED COMPLICATION STATUS, UNSPECIFIED PANCREATITIS TYPE: ICD-10-CM

## 2024-07-17 PROCEDURE — 74170 CT ABD WO CNTRST FLWD CNTRST: CPT

## 2024-07-17 PROCEDURE — 6360000004 HC RX CONTRAST MEDICATION: Performed by: INTERNAL MEDICINE

## 2024-07-17 RX ADMIN — IOPAMIDOL 100 ML: 755 INJECTION, SOLUTION INTRAVENOUS at 16:01

## 2024-07-17 NOTE — TELEPHONE ENCOUNTER
Pt cancelled Kulwinder appt and never rescheduled, yet was supposed to have been seen two weeks from last appt.  LVM for pt.  Spoke with .  Pt is now being followed at [facility].

## 2024-07-18 ENCOUNTER — ANESTHESIA (OUTPATIENT)
Facility: HOSPITAL | Age: 44
End: 2024-07-18
Payer: COMMERCIAL

## 2024-07-18 ENCOUNTER — HOSPITAL ENCOUNTER (OUTPATIENT)
Facility: HOSPITAL | Age: 44
Setting detail: OUTPATIENT SURGERY
Discharge: HOME | End: 2024-07-18
Attending: INTERNAL MEDICINE | Admitting: INTERNAL MEDICINE
Payer: COMMERCIAL

## 2024-07-18 VITALS
HEART RATE: 68 BPM | BODY MASS INDEX: 23.21 KG/M2 | TEMPERATURE: 97.8 F | WEIGHT: 131 LBS | HEIGHT: 63 IN | OXYGEN SATURATION: 100 % | DIASTOLIC BLOOD PRESSURE: 75 MMHG | RESPIRATION RATE: 20 BRPM | SYSTOLIC BLOOD PRESSURE: 105 MMHG

## 2024-07-18 PROCEDURE — 2500000003 HC RX 250 WO HCPCS: Performed by: NURSE ANESTHETIST, CERTIFIED REGISTERED

## 2024-07-18 PROCEDURE — C1889 IMPLANT/INSERT DEVICE, NOC: HCPCS | Performed by: INTERNAL MEDICINE

## 2024-07-18 PROCEDURE — 3700000000 HC ANESTHESIA ATTENDED CARE: Performed by: INTERNAL MEDICINE

## 2024-07-18 PROCEDURE — 7100000010 HC PHASE II RECOVERY - FIRST 15 MIN: Performed by: INTERNAL MEDICINE

## 2024-07-18 PROCEDURE — 3600007502: Performed by: INTERNAL MEDICINE

## 2024-07-18 PROCEDURE — 6360000002 HC RX W HCPCS: Performed by: NURSE ANESTHETIST, CERTIFIED REGISTERED

## 2024-07-18 PROCEDURE — 3700000001 HC ADD 15 MINUTES (ANESTHESIA): Performed by: INTERNAL MEDICINE

## 2024-07-18 PROCEDURE — 2720000010 HC SURG SUPPLY STERILE: Performed by: INTERNAL MEDICINE

## 2024-07-18 PROCEDURE — 3600007512: Performed by: INTERNAL MEDICINE

## 2024-07-18 PROCEDURE — 2580000003 HC RX 258: Performed by: INTERNAL MEDICINE

## 2024-07-18 PROCEDURE — 2709999900 HC NON-CHARGEABLE SUPPLY: Performed by: INTERNAL MEDICINE

## 2024-07-18 PROCEDURE — 7100000011 HC PHASE II RECOVERY - ADDTL 15 MIN: Performed by: INTERNAL MEDICINE

## 2024-07-18 DEVICE — WORKING LENGTH 235CM, WORKING CHANNEL 2.8MM
Type: IMPLANTABLE DEVICE | Site: SIGMOID COLON | Status: FUNCTIONAL
Brand: RESOLUTION 360 CLIP

## 2024-07-18 RX ORDER — SODIUM CHLORIDE 9 MG/ML
25 INJECTION, SOLUTION INTRAVENOUS PRN
Status: DISCONTINUED | OUTPATIENT
Start: 2024-07-18 | End: 2024-07-18 | Stop reason: HOSPADM

## 2024-07-18 RX ORDER — PHENYLEPHRINE HCL IN 0.9% NACL 0.4MG/10ML
SYRINGE (ML) INTRAVENOUS PRN
Status: DISCONTINUED | OUTPATIENT
Start: 2024-07-18 | End: 2024-07-18 | Stop reason: SDUPTHER

## 2024-07-18 RX ORDER — SODIUM CHLORIDE 0.9 % (FLUSH) 0.9 %
5-40 SYRINGE (ML) INJECTION PRN
Status: DISCONTINUED | OUTPATIENT
Start: 2024-07-18 | End: 2024-07-18 | Stop reason: HOSPADM

## 2024-07-18 RX ORDER — SODIUM CHLORIDE 9 MG/ML
INJECTION, SOLUTION INTRAVENOUS CONTINUOUS
Status: DISCONTINUED | OUTPATIENT
Start: 2024-07-18 | End: 2024-07-18 | Stop reason: HOSPADM

## 2024-07-18 RX ORDER — SODIUM CHLORIDE 0.9 % (FLUSH) 0.9 %
5-40 SYRINGE (ML) INJECTION EVERY 12 HOURS SCHEDULED
Status: DISCONTINUED | OUTPATIENT
Start: 2024-07-18 | End: 2024-07-18 | Stop reason: HOSPADM

## 2024-07-18 RX ADMIN — PROPOFOL 20 MG: 10 INJECTION, EMULSION INTRAVENOUS at 11:46

## 2024-07-18 RX ADMIN — PROPOFOL 25 MG: 10 INJECTION, EMULSION INTRAVENOUS at 12:04

## 2024-07-18 RX ADMIN — PROPOFOL 20 MG: 10 INJECTION, EMULSION INTRAVENOUS at 11:49

## 2024-07-18 RX ADMIN — PROPOFOL 25 MG: 10 INJECTION, EMULSION INTRAVENOUS at 11:55

## 2024-07-18 RX ADMIN — PROPOFOL 50 MG: 10 INJECTION, EMULSION INTRAVENOUS at 11:44

## 2024-07-18 RX ADMIN — SODIUM CHLORIDE: 9 INJECTION, SOLUTION INTRAVENOUS at 11:23

## 2024-07-18 RX ADMIN — Medication 80 MCG: at 11:48

## 2024-07-18 RX ADMIN — PROPOFOL 25 MG: 10 INJECTION, EMULSION INTRAVENOUS at 12:01

## 2024-07-18 RX ADMIN — Medication 80 MCG: at 11:42

## 2024-07-18 RX ADMIN — PROPOFOL 50 MG: 10 INJECTION, EMULSION INTRAVENOUS at 11:42

## 2024-07-18 RX ADMIN — LIDOCAINE HYDROCHLORIDE 50 MG: 20 INJECTION, SOLUTION EPIDURAL; INFILTRATION; INTRACAUDAL; PERINEURAL at 11:42

## 2024-07-18 RX ADMIN — PROPOFOL 20 MG: 10 INJECTION, EMULSION INTRAVENOUS at 11:52

## 2024-07-18 RX ADMIN — PROPOFOL 25 MG: 10 INJECTION, EMULSION INTRAVENOUS at 11:58

## 2024-07-18 ASSESSMENT — ENCOUNTER SYMPTOMS: SHORTNESS OF BREATH: 1

## 2024-07-18 ASSESSMENT — PAIN - FUNCTIONAL ASSESSMENT: PAIN_FUNCTIONAL_ASSESSMENT: NONE - DENIES PAIN

## 2024-07-18 ASSESSMENT — PAIN SCALES - GENERAL
PAINLEVEL_OUTOF10: 0

## 2024-07-18 NOTE — ANESTHESIA PRE PROCEDURE
Department of Anesthesiology  Preprocedure Note       Name:  Viry Andrade   Age:  43 y.o.  :  1980                                          MRN:  [id number]         Date:  2024      Surgeon: Surgeon(s):  Romeo Garrett MD    Procedure: Procedure(s):  COLORECTAL CANCER SCREENING, NOT HIGH RISK  ESOPHAGOGASTRODUODENOSCOPY    Medications prior to admission:   Prior to Admission medications    Medication Sig Start Date End Date Taking? Authorizing Provider   thiamine 100 MG tablet  24   Viri Gruber MD   CREON 52416-780215 units CPEP delayed release capsule 300 capsules 24   Viri Gruber MD   ibuprofen (ADVIL;MOTRIN) 200 MG CAPS capsule 0    Viri Gruber MD   thiamine 100 MG tablet 30 tablets 24   Viri Gruber MD   Continuous Glucose  (DEXCOM G6 ) PREMA Dexacom    Viri Gruber MD   gabapentin (NEURONTIN) 300 MG capsule Take 1 capsule by mouth 3 times daily for 60 days. Intended supply: 90 days Max Daily Amount: 900 mg 24  Luda Laguerre, APRN - NP   losartan (COZAAR) 25 MG tablet Take 0.5 tablets by mouth 2 times daily 24  Viri Gruber MD   acetaminophen (TYLENOL) 500 MG tablet Take 1 tablet by mouth every 4 hours as needed for Pain    Viri Gruber MD   Potassium Chloride (KAON 10 %) 10 % SOLN oral solution Take 15 mLs by mouth daily 24   Viri Gruber MD   ivabradine (CORLANOR) 5 MG TABS tablet Take 1 tablet by mouth 2 times daily (with meals) with meals NO FURTHER REFILLS UNTIL SEEN BY [facility]. PATIENT MAY CALL [phone] TO SCHEDULE 24   Jud Hung MD   insulin lispro (HUMALOG) 100 UNIT/ML SOLN injection vial  24   Viri Gruber MD   furosemide (LASIX) 40 MG tablet Take 2 tablets by mouth 2 times daily 23   Jud Hung MD   magnesium oxide (MAG-OX) 400 MG tablet Take 1 tablet by mouth 2 times daily 23   Jud Hung MD

## 2024-07-18 NOTE — PROGRESS NOTES

## 2024-07-18 NOTE — ANESTHESIA POSTPROCEDURE EVALUATION
Department of Anesthesiology  Postprocedure Note    Patient: Viry Andrade  MRN: [id number]  YOB: 1980  Date of evaluation: 7/18/2024    Procedure Summary       Date: 07/18/24 Room / Location: [facility] 04 / [facility] ENDOSCOPY    Anesthesia Start: 1142 Anesthesia Stop: 1208    Procedures:       COLORECTAL CANCER SCREENING, NOT HIGH RISK (Lower GI Region)      ESOPHAGOGASTRODUODENOSCOPY (Upper GI Region) Diagnosis:       Colon cancer screening      (Colon cancer screening [Z12.11])    Surgeons: Romeo Garrett MD Responsible Provider:     Anesthesia Type: MAC ASA Status: 3            Anesthesia Type: MAC    Beto Phase I: Beto Score: 10    Beto Phase II: Beto Score: 10    Anesthesia Post Evaluation    Patient location during evaluation: bedside  Nausea & Vomiting: no nausea  Cardiovascular status: blood pressure returned to baseline  Respiratory status: acceptable  Hydration status: euvolemic    No notable events documented.

## 2024-07-18 NOTE — H&P
[facility]  [address]  [place] [address] [phone]                                History and Physical     NAME: Viry Andrade   :  1980   MRN:  [id number]     HPI:  The patient was seen and examined.    Past Surgical History:   Procedure Laterality Date    CARDIAC PROCEDURE N/A 10/17/2023    Left and right heart cath / coronary angiography performed by Fran Faith DO at [facility] CARDIAC CATH LAB    COLONOSCOPY      WNL    CT DRAIN SOFT TISSUE ABSCESS  2018    CT DRAIN SOFT TISSUE ABSCESS 2018 [facility] RAD CT    CT VISCERAL PERCUTANEOUS DRAIN  2018    CT VISCERAL PERCUTANEOUS DRAIN 2018 [facility] RAD CT    CT VISCERAL PERCUTANEOUS DRAIN  2018    CT VISCERAL PERCUTANEOUS DRAIN 2018 [facility] RAD CT    EP DEVICE PROCEDURE N/A 3/18/2024    Insert ICD single performed by Felicia Kaur MD at [facility] CARDIAC CATH LAB    HERNIA REPAIR  11/15/2018    Ventral incisional hermia repair by Dr. Gutierrez     INVASIVE VASCULAR N/A 3/18/2024    Ultrasound guided vascular access performed by Felicia Kaur MD at [facility] CARDIAC CATH LAB    IR TRANSCATHETER BIOPSY  2023    IR TRANSCATHETER BIOPSY 2023 [facility] RAD ANGIO IR    OTHER SURGICAL HISTORY  2018    lap distal pancreatectomy converted to open-[facility]-DR. HOSSEIN Gutierrez    PANCREATECTOMY      Partial  Mucinous pancreatic cyst    PELVIC LAPAROSCOPY  2010    ablation of endometriosis.  Dr. Avila     Past Medical History:   Diagnosis Date    Abscess of abdominal cavity (HCC) 2018    Adenomyosis     ADHD     Anxiety     Blurred vision     Chest pain     CHF (congestive heart failure) (HCC) 2023    Liver [place] says liver, disease, and heart failure    Chronic back pain A long time    5 level disc replacement in     Chronic pain 2018    MUSCLE WEAKNESS,PANCREATIC CYST -NO LONGER AN ISSUE    Chronic systolic heart failure (HCC) 10/26/2023    Depression     AND ANXIETY    Diabetes mellitus (HCC)

## 2024-07-18 NOTE — PROGRESS NOTES
Viry Andrade  1980  [id number]    Situation:  Verbal report received from: Myriam  Procedure: Procedure(s):  COLORECTAL CANCER SCREENING, NOT HIGH RISK  ESOPHAGOGASTRODUODENOSCOPY    Background:    Preoperative diagnosis: Colon cancer screening [Z12.11]  Postoperative diagnosis: * No post-op diagnosis entered *    :  Dr. Garrett  Assistant(s): Circulator: Myriam Tamayo RN  Endoscopy Technician: Lisa Rutledge    Specimens:   ID Type Source Tests Collected by Time Destination   1 : gastric bx Tissue Gastric SURGICAL PATHOLOGY Romeo Garrett MD 7/18/2024 1148    2 : lower third esophagus bx Tissue Esophagus SURGICAL PATHOLOGY Romeo Garrett MD 7/18/2024 1149    3 : mid-third esophagus bx Tissue Esophagus SURGICAL PATHOLOGY Romeo Garrett MD 7/18/2024 1153    4 : descending colon polyp Tissue Colon-Descending SURGICAL PATHOLOGY Romeo Garrett MD 7/18/2024 1155    5 : hepatic flexure polyp Tissue Hepatic Flexure SURGICAL PATHOLOGY Romeo Garrett MD 7/18/2024 1158    6 : sigmoid Tissue Sigmoid Colon SURGICAL PATHOLOGY Romeo Garrett MD 7/18/2024 1205      H. Pylori  No    Assessment:  Intra-procedure medications     Anesthesia gave intra-procedure sedation and medications, see anesthesia flow sheet Yes    Intravenous fluids: NS@ KVO     Vital signs stable Yes    Abdominal assessment: round and soft Yes    Recommendation:  Discharge patient per MD order Yes.  Family or Friend yes  Permission to share finding with family or friend Yes

## 2024-07-18 NOTE — OP NOTE
[facility]  [address]  [place] [address] [phone]                           Colonoscopy and EGD Procedure Note      Indications:    Screening colonoscopy, Cirrhosis livr with ascites      :  Romeo Garrett MD    Staff: Circulator: Myriam Tamayo RN  Endoscopy Technician: Lisa Rutledge     Implants: 1 resolution clip was placed in the distal sigmoid colon post polypectomy for hemostasis ([place])    Referring Provider: Sebastian Hilario PA    Sedation:  MAC anesthesia    Procedure Details:  After informed consent was obtained with all risks and benefits of procedure explained and preoperative exam completed, the patient was taken to the endoscopy suite and placed in the left lateral decubitus position.  Upon sequential sedation as per above, a digital rectal exam was performed  And was normal.  The Olympus videocolonoscope  was inserted in the rectum and carefully advanced to the cecum, which was identified by the ileocecal valve and appendiceal orifice.  The quality of preparation was good.  The colonoscope was slowly withdrawn with careful evaluation between folds. Retroflexion in the rectum was performed and was normal..     Colon Findings:   Rectum: no mucosal lesion appreciated  Grade 1 internal hemorrhoid(s);  Sigmoid: 1  Sessile polyp(s), the largest 10 mm in size;      -Diverticulosis  Descending Colon: no mucosal lesion appreciated  1  Sessile polyp(s), the largest 8 mm in size;  Transverse Colon: no mucosal lesion appreciated  Ascending Colon: 1  Sessile polyp(s), the largest 10 mm in size noted at hepatic flexure;  Cecum: no mucosal lesion appreciated  Terminal Ileum: not intubated      Following sequential administration of sedation as per above, the [id number] gastroscope was inserted into the mouth and advanced under direct vision to second portion of the duodenum.  A careful inspection was made as the gastroscope was withdrawn, including a

## 2024-07-18 NOTE — PROGRESS NOTES
Endoscopy discharge instructions have been reviewed and given to patient.  The patient verbalized understanding and acceptance of instructions.      Dr. Garrett discussed with patient procedure findings and next steps.

## 2024-07-18 NOTE — DISCHARGE INSTRUCTIONS
[facility]  [address]  [place] [address] [phone]                               DISCHARGE INSTRUCTIONS    Viry Andrade  [id number]  1980    DISCOMFORT:  Redness at IV site- apply warm compress to area; if redness or soreness persist- contact your physician  There may be a slight amount of blood passed from the rectum  Gaseous discomfort- walking, belching will help relieve any discomfort    DIET:   High fiber diet., Low salt diet   - however -  remember your colon is empty and a heavy meal will produce gas.   Avoid these foods:  vegetables, fried / greasy foods, carbonated drinks for today   You may not  drink alcoholic beverages for at least 12 hours      ACTIVITY  Spend the remainder of the day resting -  avoid any strenuous activity, then you may resume your normal daily activities   You may not operate a vehicle for 12 hours  You may not  engage in an occupation involving machinery or appliances for rest of today  Avoid making any critical decisions for at least 24 hour    CALL M.D.  ANY SIGN OF   Increasing pain, nausea, vomiting  Abdominal distension (swelling)  New increased bleeding (oral or rectal)  Fever (chills)  Pain in chest area  Bloody discharge from nose or mouth  Shortness of breath    You may not  take any Advil, Aspirin, Ibuprofen, Motrin, Aleve, or Goody’s for 7 days, ONLY  Tylenol as needed for pain.    Post procedure diagnosis:   Abnormal mucosa esophagus  Portal hypertensive gastropathy stomach  Colon polyps  Diverticulosis  Hemorrhoids    Post-procedure recommendations:   -Await pathology., -Follow symptoms., -High fiber diet.  -Repeat colonoscopy in 3 years.  -Low salt diet.    -Resume normal medication(s).  -NO aspirin for 7 days     Follow-up Instructions:   Call Dr. Garrett for any questions or problems.     If we took a biopsy please call the office within 2 weeks to discuss your pathology results. Telephone #  Brandie Carranza(Fellow)

## 2024-08-12 ENCOUNTER — OFFICE VISIT (OUTPATIENT)
Age: 44
End: 2024-08-12
Payer: COMMERCIAL

## 2024-08-12 VITALS
HEART RATE: 97 BPM | OXYGEN SATURATION: 99 % | BODY MASS INDEX: 22.54 KG/M2 | RESPIRATION RATE: 14 BRPM | TEMPERATURE: 99.5 F | DIASTOLIC BLOOD PRESSURE: 57 MMHG | SYSTOLIC BLOOD PRESSURE: 92 MMHG | WEIGHT: 127.2 LBS | HEIGHT: 63 IN

## 2024-08-12 DIAGNOSIS — R10.84 GENERALIZED ABDOMINAL WALL PAIN: Primary | ICD-10-CM

## 2024-08-12 PROCEDURE — 99213 OFFICE O/P EST LOW 20 MIN: CPT | Performed by: SURGERY

## 2024-08-12 RX ORDER — SPIRONOLACTONE 100 MG/1
TABLET, FILM COATED ORAL
COMMUNITY
Start: 2024-07-23

## 2024-08-12 RX ORDER — VENLAFAXINE 75 MG/1
TABLET ORAL
COMMUNITY
Start: 2024-08-01

## 2024-08-12 ASSESSMENT — PATIENT HEALTH QUESTIONNAIRE - PHQ9
2. FEELING DOWN, DEPRESSED OR HOPELESS: SEVERAL DAYS
SUM OF ALL RESPONSES TO PHQ QUESTIONS 1-9: 1
1. LITTLE INTEREST OR PLEASURE IN DOING THINGS: NOT AT ALL
SUM OF ALL RESPONSES TO PHQ QUESTIONS 1-9: 1
SUM OF ALL RESPONSES TO PHQ QUESTIONS 1-9: 1
SUM OF ALL RESPONSES TO PHQ9 QUESTIONS 1 & 2: 1
SUM OF ALL RESPONSES TO PHQ QUESTIONS 1-9: 1

## 2024-08-12 NOTE — PROGRESS NOTES
Identified patient with two patient identifiers (name and ). Reviewed chart in preparation for visit and have obtained necessary documentation.    Viry Andrade is a 43 y.o. female  Chief Complaint   Patient presents with    Follow-up     Abdominal hernia      BP (!) 92/57 (Site: Right Upper Arm, Position: Sitting, Cuff Size: Small Adult)   Pulse 97   Temp 99.5 °F (37.5 °C) (Oral)   Resp 14   Ht 1.6 m (5' 3\")   Wt 57.7 kg (127 lb 3.2 oz)   LMP 2024 (Approximate) Comment: spotty and irregular. Unable to urinate in cup. Denies pregnancy.  SpO2 99%   BMI 22.53 kg/m²     1. Have you been to the ER, urgent care clinic since your last visit?  Hospitalized since your last visit?no    2. Have you seen or consulted any other health care providers outside of the [facility] since your last visit?  Include any pap smears or colon screening. Yes

## 2024-08-23 ENCOUNTER — CLINICAL DOCUMENTATION (OUTPATIENT)
Age: 44
End: 2024-08-23

## 2024-08-23 NOTE — PROGRESS NOTES
Received fax from  for device management during removal of anterior cervical hardware C7-T1 laminectomy & foraminotomies, C5-T1, posterior instrum fusion on 9/3/24 at .  Per Dr Kaur can use magnet.  Faxed to BREANNA PAT at fax # [phone].  Received confirmation.

## 2024-08-28 NOTE — PROGRESS NOTES
Surgery History and Physical    Subjective:      Viry Andrade  is a 44 y.o.   female who presents with abdominal bloating after meals and frequent oily stools.  This makes her very uncomfortable and adds to her abdominal pain. She has been taking Creon at a low dose with some relief..    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    Endometriosis 2005    Frequent headaches     Genital herpes     Hepatitis 06/2011    from tick bite    Hyperlipidemia     ICD (implantable cardioverter-defibrillator) in place     Incisional hernia, without obstruction or gangrene 11/04/2018    Muscle weakness     Nicotine vapor product user     Non-ischemic cardiomyopathy - ICD in-situ 10/26/2023    Pancreatic cyst 03/21/2018    Mucinous cyst    Shortness of breath     Smoker     Stomach pain     Suicidal ideation 04/03/2018    Swallowing difficulty        Past Surgical History:   Procedure Laterality Date    CARDIAC PROCEDURE N/A 10/17/2023    Left and right heart cath / coronary angiography performed by Fran Faith DO at [facility] CARDIAC CATH LAB    COLONOSCOPY  2020    WNL    COLONOSCOPY N/A 7/18/2024    COLORECTAL CANCER SCREENING, NOT HIGH RISK performed by Romeo Garrett MD at [facility] ENDOSCOPY    CT DRAIN SOFT TISSUE ABSCESS  05/08/2018    CT DRAIN SOFT TISSUE ABSCESS 5/8/2018 [facility] RAD CT    CT VISCERAL PERCUTANEOUS DRAIN  07/31/2018    CT VISCERAL

## 2024-09-05 ENCOUNTER — TELEPHONE (OUTPATIENT)
Age: 44
End: 2024-09-05

## 2024-09-05 NOTE — TELEPHONE ENCOUNTER
Returned call to patient.  Two patient identifiers used.   Patient stated when taking the Creon it has made the diarrhea less frequent but also stated it cause her to nauseous. She stated she ended up stopping the Creon now she is experiences the same symptoms has before. She stated she is having accidents again, and seeing chunks of food in her stools. She stated she knows Dr. Gutierrez did not prescribe the Creon but would like to know his thoughts, she stated she feels he has a lot of experience.She also stated she knows there may be other enzymes out there so she would also like his thoughts of that and also stated she has been experiencing some pain at hernia sites. I made patient aware I will make the provider aware of message. Patient thanked for call and verbalized understanding.

## 2024-09-05 NOTE — TELEPHONE ENCOUNTER
Pt has called to inform us since her 8/12/24 appt she has been experiencing bowel incontinence. The Creon helped with this, however, it caused consistent nausea for the patient. Pt stated her food is partially digesting in her stomach as she is finding chunks of broccoli in stool. She wanted to know if it is abnormal for her stool to be the color of the Slurpees that she drinks? Pt would like to receive a call back from Dr. Gutierrez. Advised nurse or provider may return call.

## 2024-10-02 ENCOUNTER — CLINICAL DOCUMENTATION (OUTPATIENT)
Age: 44
End: 2024-10-02

## 2024-10-02 NOTE — PROGRESS NOTES
Received another fax from  for device management during removal of anterior cervical hardware C7-T1 laminectomy & foraminotomies, C5-T1, posterior instrum fusion on 9/3/24 at .  Per Haydee Harrington, NP can use magnet.  Faxed to BREANNA PAT at fax # [phone].  Received confirmation     2nd time faxing

## 2024-10-23 ENCOUNTER — TELEPHONE (OUTPATIENT)
Age: 44
End: 2024-10-23

## 2024-10-23 ENCOUNTER — TELEMEDICINE (OUTPATIENT)
Age: 44
End: 2024-10-23
Payer: COMMERCIAL

## 2024-10-23 DIAGNOSIS — M79.2 NEUROPATHIC PAIN OF FOOT: ICD-10-CM

## 2024-10-23 DIAGNOSIS — K70.30 ALCOHOLIC CIRRHOSIS OF LIVER WITHOUT ASCITES (HCC): Primary | ICD-10-CM

## 2024-10-23 PROCEDURE — 99214 OFFICE O/P EST MOD 30 MIN: CPT | Performed by: NURSE PRACTITIONER

## 2024-10-23 RX ORDER — LANOLIN ALCOHOL/MO/W.PET/CERES
CREAM (GRAM) TOPICAL
COMMUNITY
Start: 2024-10-15 | End: 2024-10-23 | Stop reason: CLARIF

## 2024-10-23 RX ORDER — POTASSIUM CHLORIDE 3 G/15ML
SOLUTION ORAL
COMMUNITY
Start: 2024-10-07 | End: 2024-10-23 | Stop reason: CLARIF

## 2024-10-23 RX ORDER — PREGABALIN 50 MG/1
CAPSULE ORAL
Qty: 235 CAPSULE | Refills: 1 | Status: SHIPPED | OUTPATIENT
Start: 2024-10-23 | End: 2025-01-13

## 2024-10-23 ASSESSMENT — PATIENT HEALTH QUESTIONNAIRE - PHQ9
2. FEELING DOWN, DEPRESSED OR HOPELESS: NOT AT ALL
1. LITTLE INTEREST OR PLEASURE IN DOING THINGS: NOT AT ALL
SUM OF ALL RESPONSES TO PHQ QUESTIONS 1-9: 0
DEPRESSION UNABLE TO ASSESS: FUNCTIONAL CAPACITY MOTIVATION LIMITS ACCURACY
SUM OF ALL RESPONSES TO PHQ QUESTIONS 1-9: 0
SUM OF ALL RESPONSES TO PHQ9 QUESTIONS 1 & 2: 0
SUM OF ALL RESPONSES TO PHQ QUESTIONS 1-9: 0
SUM OF ALL RESPONSES TO PHQ QUESTIONS 1-9: 0

## 2024-10-23 ASSESSMENT — ANXIETY QUESTIONNAIRES
5. BEING SO RESTLESS THAT IT IS HARD TO SIT STILL: NOT AT ALL
GAD7 TOTAL SCORE: 0
6. BECOMING EASILY ANNOYED OR IRRITABLE: NOT AT ALL
3. WORRYING TOO MUCH ABOUT DIFFERENT THINGS: NOT AT ALL
7. FEELING AFRAID AS IF SOMETHING AWFUL MIGHT HAPPEN: NOT AT ALL
2. NOT BEING ABLE TO STOP OR CONTROL WORRYING: NOT AT ALL
IF YOU CHECKED OFF ANY PROBLEMS ON THIS QUESTIONNAIRE, HOW DIFFICULT HAVE THESE PROBLEMS MADE IT FOR YOU TO DO YOUR WORK, TAKE CARE OF THINGS AT HOME, OR GET ALONG WITH OTHER PEOPLE: NOT DIFFICULT AT ALL
4. TROUBLE RELAXING: NOT AT ALL
1. FEELING NERVOUS, ANXIOUS, OR ON EDGE: NOT AT ALL

## 2024-10-23 NOTE — TELEPHONE ENCOUNTER
Telephone call placed to patient.  Identity verified with two patient identifiers.      Pt states she was having surgery the day in question on 10/15/24 regarding erroneous VF transmission.  Will document report.  Pt states otherwise feeling okay s/p laminectomy.    Opportunities for questions, clarifications, and concerns provided.  Pt expressed understanding.

## 2024-10-23 NOTE — PROGRESS NOTES
Viry Andrade, was evaluated through a synchronous (real-time) audio-video encounter. The patient (or guardian if applicable) is aware that this is a billable service, which includes applicable co-pays. This Virtual Visit was conducted with patient's (and/or legal guardian's) consent. Patient identification was verified, and a caregiver was present when appropriate.   The patient was located at Home: [address] [place] [id number]  Provider was located at Facility (Appt Dept): [address] [place] [address]  Alvaro [address] [place] [id number]  Confirm you are appropriately licensed, registered, or certified to deliver care in the state where the patient is located as indicated above. If you are not or unsure, please re-schedule the visit: Yes, I confirm.     Viry Andrade (:  1980) is a Established patient, presenting virtually for evaluation of the following:      Below is the assessment and plan developed based on review of pertinent history, physical exam, labs, studies, and medications.     Assessment & Plan  Alcoholic cirrhosis of liver without ascites (HCC)   Chronic, not at goal (unstable), continue current treatment plan and lifestyle modifications recommended    Orders:    AFP with AFP-L3%; Future    US ABDOMEN LIMITED; Future      Return in about 3 months (around 2025).         --KARINE Valladares - NP           [facility]      Chaka Burns MD, FACP, FACG, FAASLD      ELMIRA Velasquez, [facility]   Luda Laguerre, [facility]   SANDY PalmaP-C  SANDY MooreP-C   Angelica Mackay [facility]   Lala Baires [facility]   [address]   [place]  [address] [phone]   FAX: [phone]  [facility]   [address]

## 2024-10-23 NOTE — PROGRESS NOTES
Chief Complaint   Patient presents with    Follow-up     N/A     There were no vitals filed for this visit.  .  \"Have you been to the ER, urgent care clinic since your last visit?  Hospitalized since your last visit?\"    YES - When: approximately 10/15/024 ago.  Where and Why: surgery [facility]  .    “Have you seen or consulted any other health care providers outside of [facility] since your last visit?”    NO    Have you had a mammogram?”   NO    No breast cancer screening on file             Click Here for Release of Records Request

## 2024-11-15 PROCEDURE — 93297 REM INTERROG DEV EVAL ICPMS: CPT | Performed by: INTERNAL MEDICINE

## 2024-12-02 ENCOUNTER — CLINICAL DOCUMENTATION (OUTPATIENT)
Age: 44
End: 2024-12-02

## 2024-12-02 DIAGNOSIS — K74.69 OTHER CIRRHOSIS OF LIVER (HCC): Primary | ICD-10-CM

## 2024-12-02 NOTE — PROGRESS NOTES
Left message asking patient to call to reschedule appt. Per staff message, please offer the patient earlier follow-up with me either at 1:30 on 12/4, or 8 AM on 12/10 or 12/11.  I can also do 2:00 virtual on 1/2, per Luda

## 2024-12-10 ENCOUNTER — OFFICE VISIT (OUTPATIENT)
Age: 44
End: 2024-12-10
Payer: COMMERCIAL

## 2024-12-10 ENCOUNTER — TELEPHONE (OUTPATIENT)
Age: 44
End: 2024-12-10

## 2024-12-10 VITALS
HEIGHT: 63 IN | BODY MASS INDEX: 22.01 KG/M2 | SYSTOLIC BLOOD PRESSURE: 79 MMHG | HEART RATE: 76 BPM | WEIGHT: 124.2 LBS | OXYGEN SATURATION: 99 % | DIASTOLIC BLOOD PRESSURE: 47 MMHG

## 2024-12-10 DIAGNOSIS — Z79.899 ENCOUNTER FOR MONITORING DIURETIC THERAPY: ICD-10-CM

## 2024-12-10 DIAGNOSIS — I50.22 CHRONIC SYSTOLIC HEART FAILURE (HCC): ICD-10-CM

## 2024-12-10 DIAGNOSIS — Z51.81 ENCOUNTER FOR MONITORING DIURETIC THERAPY: ICD-10-CM

## 2024-12-10 DIAGNOSIS — K43.9 VENTRAL HERNIA WITHOUT OBSTRUCTION OR GANGRENE: Primary | ICD-10-CM

## 2024-12-10 DIAGNOSIS — K74.69 OTHER CIRRHOSIS OF LIVER (HCC): ICD-10-CM

## 2024-12-10 PROCEDURE — 99215 OFFICE O/P EST HI 40 MIN: CPT | Performed by: NURSE PRACTITIONER

## 2024-12-10 RX ORDER — ONDANSETRON 8 MG/1
8 TABLET, ORALLY DISINTEGRATING ORAL 3 TIMES DAILY PRN
Qty: 30 TABLET | Refills: 2 | Status: SHIPPED | OUTPATIENT
Start: 2024-12-10 | End: 2025-01-09

## 2024-12-10 NOTE — TELEPHONE ENCOUNTER
Attempted to contact patient regarding referral for Ventral hernia without obstruction or gangrene. Patient did not answer, LVM requesting a return call if interested in scheduling. Will follow up.

## 2024-12-10 NOTE — PROGRESS NOTES
Patient identified by name and date of birth  Viry Andrade is a 44 y.o. female   Chief Complaint   Patient presents with    Follow-up      Vitals:    12/10/24 0813 12/10/24 0816   BP: (!) 86/65 (!) 79/47   Site: Left Upper Arm    Pulse: 76    SpO2: 99%    Weight: 56.3 kg (124 lb 3.2 oz)    Height: 1.6 m (5' 3\")        No LMP recorded. (Menstrual status: Not having periods).    \"Have you been to the ER, urgent care clinic since your last visit?  Hospitalized since your last visit?\"    NO    “Have you seen or consulted any other health care providers outside of [facility] since your last visit?”    NO

## 2024-12-10 NOTE — PROGRESS NOTES
[facility]      Chaka Burns MD, FACP, FACG, FAASLD      PATI Velasquez-C    Shari Molina, [facility]   Luda Laguerre, [facility]   Jael Hollingsworth, [facility]  Mikey Winslow, [facility]   Angelica Mackay, [facility]   Lala Aston, [facility]   [address]   [place]  [address] [phone]   FAX: [phone]  [facility]   [address]   [place]  [address] [phone]   FAX: [phone]       Patient Care Team:  Sebastian Hilario PA as PCP - General (Physician Assistant)  Sebastian Hilario PA as PCP - Empaneled Provider  Netta Dial APRN - NP as Nurse Practitioner  Harinder Block II, MD as Gynecologist (Obstetrics & Gynecology)      Patient Active Problem List   Diagnosis    Incisional hernia    Pancreatic fluid leak    Alcohol intoxication (HCC)    Thrombocytopenia, unspecified (HCC)    Sinus tachycardia    Hepatitis    Obesity (BMI 30-39.9)    Incisional hernia, without obstruction or gangrene    Pancreatic cyst    Alcohol abuse    Anxiety    Diabetes (HCC)    Estrogen increased    Elevated liver enzymes    Generalized abdominal wall pain    Chronic systolic heart failure (HCC)    Non-ischemic cardiomyopathy (HCC)    CHF (congestive heart failure) (HCC)    Nonischemic cardiomyopathy (HCC)       Viry Andrade is being seen at [facility] for management of likely cirrhosis that is secondary to cardiac dysfunction vs alcohol induced liver disease.  The active problem list, all pertinent past medical history, medications, liver histology, radiologic findings and laboratory findings related to the liver disorder were reviewed and discussed with the patient.      The patient is a 44 y.o. female who was found to have chronic liver disease and  cirrhosis

## 2024-12-11 LAB
ALBUMIN SERPL-MCNC: 3.7 G/DL (ref 3.9–4.9)
ALP SERPL-CCNC: 800 IU/L (ref 44–121)
ALT SERPL-CCNC: 71 IU/L (ref 0–32)
AST SERPL-CCNC: 265 IU/L (ref 0–40)
BASOPHILS # BLD AUTO: 0.1 X10E3/UL (ref 0–0.2)
BASOPHILS NFR BLD AUTO: 1 %
BILIRUB DIRECT SERPL-MCNC: 0.52 MG/DL (ref 0–0.4)
BILIRUB SERPL-MCNC: 0.8 MG/DL (ref 0–1.2)
BUN SERPL-MCNC: 7 MG/DL (ref 6–24)
BUN/CREAT SERPL: 9 (ref 9–23)
CALCIUM SERPL-MCNC: 9.3 MG/DL (ref 8.7–10.2)
CHLORIDE SERPL-SCNC: 85 MMOL/L (ref 96–106)
CO2 SERPL-SCNC: 33 MMOL/L (ref 20–29)
CREAT SERPL-MCNC: 0.81 MG/DL (ref 0.57–1)
EGFRCR SERPLBLD CKD-EPI 2021: 91 ML/MIN/1.73
EOSINOPHIL # BLD AUTO: 0.3 X10E3/UL (ref 0–0.4)
EOSINOPHIL NFR BLD AUTO: 3 %
ERYTHROCYTE [DISTWIDTH] IN BLOOD BY AUTOMATED COUNT: 14.3 % (ref 11.7–15.4)
GLUCOSE SERPL-MCNC: ABNORMAL MG/DL
HCT VFR BLD AUTO: 38.5 % (ref 34–46.6)
HGB BLD-MCNC: 12.4 G/DL (ref 11.1–15.9)
IMM GRANULOCYTES # BLD AUTO: 0.1 X10E3/UL (ref 0–0.1)
IMM GRANULOCYTES NFR BLD AUTO: 1 %
INR PPP: 1 (ref 0.9–1.2)
LYMPHOCYTES # BLD AUTO: 2.8 X10E3/UL (ref 0.7–3.1)
LYMPHOCYTES NFR BLD AUTO: 26 %
MCH RBC QN AUTO: 34.1 PG (ref 26.6–33)
MCHC RBC AUTO-ENTMCNC: 32.2 G/DL (ref 31.5–35.7)
MCV RBC AUTO: 106 FL (ref 79–97)
MONOCYTES # BLD AUTO: 1.1 X10E3/UL (ref 0.1–0.9)
MONOCYTES NFR BLD AUTO: 10 %
NEUTROPHILS # BLD AUTO: 6.3 X10E3/UL (ref 1.4–7)
NEUTROPHILS NFR BLD AUTO: 59 %
NT-PROBNP SERPL-MCNC: 134 PG/ML (ref 0–249)
PLATELET # BLD AUTO: 176 X10E3/UL (ref 150–450)
POTASSIUM SERPL-SCNC: ABNORMAL MMOL/L
PROT SERPL-MCNC: 6.9 G/DL (ref 6–8.5)
PROTHROMBIN TIME: 11.6 SEC (ref 9.1–12)
RBC # BLD AUTO: 3.64 X10E6/UL (ref 3.77–5.28)
SODIUM SERPL-SCNC: 136 MMOL/L (ref 134–144)
WBC # BLD AUTO: 10.6 X10E3/UL (ref 3.4–10.8)

## 2024-12-11 NOTE — TELEPHONE ENCOUNTER
Second attempt to contact patient regarding referral for Ventral hernia without obstruction or gangrene. Patient did not answer, LVM requesting a return call if interested in scheduling. Will follow up.

## 2024-12-13 LAB
AFP L3 MFR SERPL: 9.6 % (ref 0–9.9)
AFP SERPL-MCNC: 3.4 NG/ML (ref 0–6.4)

## 2024-12-17 ENCOUNTER — OFFICE VISIT (OUTPATIENT)
Age: 44
End: 2024-12-17
Payer: COMMERCIAL

## 2024-12-17 ENCOUNTER — TELEPHONE (OUTPATIENT)
Age: 44
End: 2024-12-17

## 2024-12-17 VITALS
DIASTOLIC BLOOD PRESSURE: 82 MMHG | HEIGHT: 63 IN | RESPIRATION RATE: 16 BRPM | WEIGHT: 119 LBS | BODY MASS INDEX: 21.09 KG/M2 | TEMPERATURE: 98.2 F | HEART RATE: 109 BPM | SYSTOLIC BLOOD PRESSURE: 107 MMHG | OXYGEN SATURATION: 99 %

## 2024-12-17 DIAGNOSIS — K86.81 EXOCRINE PANCREATIC INSUFFICIENCY: ICD-10-CM

## 2024-12-17 DIAGNOSIS — R10.84 GENERALIZED ABDOMINAL WALL PAIN: Primary | ICD-10-CM

## 2024-12-17 PROBLEM — K86.2 PANCREATIC CYST: Status: RESOLVED | Noted: 2018-03-21 | Resolved: 2024-12-17

## 2024-12-17 PROBLEM — K43.2 INCISIONAL HERNIA: Status: RESOLVED | Noted: 2018-11-15 | Resolved: 2024-12-17

## 2024-12-17 PROBLEM — K86.89 PANCREATIC FLUID LEAK: Status: RESOLVED | Noted: 2018-07-16 | Resolved: 2024-12-17

## 2024-12-17 PROCEDURE — 99214 OFFICE O/P EST MOD 30 MIN: CPT | Performed by: SURGERY

## 2024-12-17 ASSESSMENT — PATIENT HEALTH QUESTIONNAIRE - PHQ9
1. LITTLE INTEREST OR PLEASURE IN DOING THINGS: SEVERAL DAYS
SUM OF ALL RESPONSES TO PHQ QUESTIONS 1-9: 2
SUM OF ALL RESPONSES TO PHQ9 QUESTIONS 1 & 2: 2
SUM OF ALL RESPONSES TO PHQ QUESTIONS 1-9: 2
2. FEELING DOWN, DEPRESSED OR HOPELESS: SEVERAL DAYS

## 2024-12-17 NOTE — PROGRESS NOTES
Subjective:      Viry Andrade is a 44 y.o. female with a history of pancreatic tail neoplasm, managed through laparoscopic-converted to open distal pancreatectomy; postoperative course complicated by multiple fluid collections requiring multiple drainage procedures, and incisional hernia, managed several years ago with open incisional hernia repair with mesh.  She also has a history of cirrhosis, likely alcohol related, and nonischemic cardiomyopathy etiology unclear, managed by [facility].  She notes recent development of tender bulge at superior aspect of midline scar, and right upper quadrant and left upper quadrant bulges, both also tender to palpation.  She notes intermittent diarrhea and constipation, and was previously diagnosed with pancreatic exocrine insufficiency.  She denies nausea or vomiting.  Last upper endoscopy earlier this year (no varices).    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

## 2024-12-17 NOTE — TELEPHONE ENCOUNTER
I called the patient back and I left her a voice mail letting her know that Dr Harp does not treat UTI's and she would need to see her PCP. I told her to call me back if she needs anything further.

## 2024-12-17 NOTE — TELEPHONE ENCOUNTER
Pt said she was seen this morning and she said she forgot to mention that she think she has a UTI and wants to know if they prescribe an abx for her she said [facility] because she's in pain

## 2024-12-17 NOTE — PROGRESS NOTES
Identified patient with two patient identifiers (name and ). Reviewed chart in preparation for visit and have obtained necessary documentation.    Viry Andrade is a 44 y.o. female  Chief Complaint   Patient presents with    Hernia     Ventral hernia     /82 (Site: Right Upper Arm, Position: Sitting, Cuff Size: Large Adult)   Pulse (!) 109   Temp 98.2 °F (36.8 °C)   Resp 16   Ht 1.6 m (5' 3\")   Wt 54 kg (119 lb)   SpO2 99%   BMI 21.08 kg/m²     1. Have you been to the ER, urgent care clinic since your last visit?  Hospitalized since your last visit?new patient    2. Have you seen or consulted any other health care providers outside of the [facility] since your last visit?  Include any pap smears or colon screening. New patient

## 2024-12-19 ENCOUNTER — TELEPHONE (OUTPATIENT)
Age: 44
End: 2024-12-19

## 2024-12-19 NOTE — TELEPHONE ENCOUNTER
Requesting clarification on how many pills to be taken daily. I told her I will check with the provider and call back.

## 2024-12-19 NOTE — TELEPHONE ENCOUNTER
I spoke with the provider. I called the pharmacist and informed her the provider said, \"One tablet with each meal.\" She acknowledged understanding and thanked me for the call.

## 2025-01-20 ENCOUNTER — HOSPITAL ENCOUNTER (OUTPATIENT)
Facility: HOSPITAL | Age: 45
Discharge: HOME | End: 2025-01-23
Payer: COMMERCIAL

## 2025-01-20 ENCOUNTER — HOSPITAL ENCOUNTER (OUTPATIENT)
Facility: HOSPITAL | Age: 45
Discharge: HOME | End: 2025-01-23
Attending: SURGERY
Payer: COMMERCIAL

## 2025-01-20 DIAGNOSIS — R10.84 GENERALIZED ABDOMINAL WALL PAIN: ICD-10-CM

## 2025-01-20 DIAGNOSIS — K70.30 ALCOHOLIC CIRRHOSIS OF LIVER WITHOUT ASCITES (HCC): ICD-10-CM

## 2025-01-20 PROCEDURE — 76705 ECHO EXAM OF ABDOMEN: CPT

## 2025-01-20 PROCEDURE — 6360000004 HC RX CONTRAST MEDICATION: Performed by: SURGERY

## 2025-01-20 PROCEDURE — 74177 CT ABD & PELVIS W/CONTRAST: CPT

## 2025-01-20 RX ORDER — IOPAMIDOL 755 MG/ML
100 INJECTION, SOLUTION INTRAVASCULAR
Status: COMPLETED | OUTPATIENT
Start: 2025-01-20 | End: 2025-01-20

## 2025-01-20 RX ORDER — DIATRIZOATE MEGLUMINE AND DIATRIZOATE SODIUM 660; 100 MG/ML; MG/ML
30 SOLUTION ORAL; RECTAL
Status: DISCONTINUED | OUTPATIENT
Start: 2025-01-20 | End: 2025-01-24 | Stop reason: HOSPADM

## 2025-01-20 RX ADMIN — IOPAMIDOL 100 ML: 755 INJECTION, SOLUTION INTRAVENOUS at 20:29

## 2025-01-20 RX ADMIN — DIATRIZOATE MEGLUMINE AND DIATRIZOATE SODIUM 30 ML: 660; 100 LIQUID ORAL; RECTAL at 20:29

## 2025-01-31 DIAGNOSIS — R10.13 EPIGASTRIC ABDOMINAL PAIN: Primary | ICD-10-CM

## 2025-02-12 ENCOUNTER — HOSPITAL ENCOUNTER (OUTPATIENT)
Facility: HOSPITAL | Age: 45
Discharge: HOME | End: 2025-02-15
Payer: COMMERCIAL

## 2025-02-12 DIAGNOSIS — R10.13 EPIGASTRIC ABDOMINAL PAIN: ICD-10-CM

## 2025-02-12 PROCEDURE — 76705 ECHO EXAM OF ABDOMEN: CPT

## 2025-02-17 ENCOUNTER — OFFICE VISIT (OUTPATIENT)
Age: 45
End: 2025-02-17
Payer: COMMERCIAL

## 2025-02-17 VITALS
DIASTOLIC BLOOD PRESSURE: 78 MMHG | HEART RATE: 88 BPM | WEIGHT: 129 LBS | HEIGHT: 63 IN | BODY MASS INDEX: 22.86 KG/M2 | TEMPERATURE: 98.1 F | SYSTOLIC BLOOD PRESSURE: 110 MMHG | OXYGEN SATURATION: 98 % | RESPIRATION RATE: 20 BRPM

## 2025-02-17 DIAGNOSIS — R10.84 GENERALIZED ABDOMINAL WALL PAIN: ICD-10-CM

## 2025-02-17 DIAGNOSIS — I42.8 NON-ISCHEMIC CARDIOMYOPATHY (HCC): ICD-10-CM

## 2025-02-17 DIAGNOSIS — K86.81 EXOCRINE PANCREATIC INSUFFICIENCY: ICD-10-CM

## 2025-02-17 DIAGNOSIS — Z72.0 TOBACCO ABUSE: ICD-10-CM

## 2025-02-17 DIAGNOSIS — K43.2 RECURRENT INCISIONAL HERNIA: Primary | ICD-10-CM

## 2025-02-17 PROCEDURE — 99213 OFFICE O/P EST LOW 20 MIN: CPT | Performed by: SURGERY

## 2025-02-17 ASSESSMENT — PATIENT HEALTH QUESTIONNAIRE - PHQ9
SUM OF ALL RESPONSES TO PHQ QUESTIONS 1-9: 1
1. LITTLE INTEREST OR PLEASURE IN DOING THINGS: NOT AT ALL
SUM OF ALL RESPONSES TO PHQ QUESTIONS 1-9: 1
SUM OF ALL RESPONSES TO PHQ9 QUESTIONS 1 & 2: 1
SUM OF ALL RESPONSES TO PHQ QUESTIONS 1-9: 1
SUM OF ALL RESPONSES TO PHQ QUESTIONS 1-9: 1
2. FEELING DOWN, DEPRESSED OR HOPELESS: SEVERAL DAYS

## 2025-02-17 NOTE — PROGRESS NOTES
Identified patient with two patient identifiers (name and ). Reviewed chart in preparation for visit and have obtained necessary documentation.    Viry Andrade is a 44 y.o. female  Chief Complaint   Patient presents with    Results     Ct/ultrasound     /78 (Site: Left Upper Arm, Position: Sitting, Cuff Size: Large Adult)   Pulse 88   Temp 98.1 °F (36.7 °C)   Resp 20   Ht 1.6 m (5' 3\")   Wt 58.5 kg (129 lb)   SpO2 98%   BMI 22.85 kg/m²     1. Have you been to the ER, urgent care clinic since your last visit?  Hospitalized since your last visit?no    2. Have you seen or consulted any other health care providers outside of the [facility] since your last visit?  Include any pap smears or colon screening. no

## 2025-02-18 ENCOUNTER — TELEPHONE (OUTPATIENT)
Age: 45
End: 2025-02-18

## 2025-02-18 PROBLEM — Z72.0 TOBACCO ABUSE: Status: ACTIVE | Noted: 2025-02-18

## 2025-02-18 PROBLEM — K43.2 RECURRENT INCISIONAL HERNIA: Status: ACTIVE | Noted: 2018-11-04

## 2025-02-18 NOTE — TELEPHONE ENCOUNTER
Patient needs to speak with Luda about getting clearance to have hernia surgery. Patient called by to say that she missed Luda's call and asked that she call her back.

## 2025-02-18 NOTE — PROGRESS NOTES
Subjective:       Viry Andrade presents to the clinic for evaluation of abdominal pain, possible recurrent incisional hernia.  She has a history of distal pancreatectomy complicated by abscess, with later repair of incisional hernia with mesh.  She continues to complain of epigastric pain, radiating across her abdominal wall, with epigastric bulge.  She notes nausea, intermittent vomiting, and ongoing altered bowel habits secondary to exocrine pancreatic insufficiency.  She continues to smoke.     Objective:      /78 (Site: Left Upper Arm, Position: Sitting, Cuff Size: Large Adult)   Pulse 88   Temp 98.1 °F (36.7 °C)   Resp 20   Ht 1.6 m (5' 3\")   Wt 58.5 kg (129 lb)   SpO2 98%   BMI 22.85 kg/m²     General:  alert, appears stated age, and cooperative   Abdomen: Nondistended, soft epigastric bulge, nonreducible, tender to palpation.   Incision:  Well-healed      CT abdomen/pelvis: Nodular liver changes consistent with cirrhosis.  Abdominal wall ultrasound: Epigastric fascial defect containing preperitoneal fat.    Assessment:      Recurrent incisional hernia in the setting of ongoing tobacco use, cardiomyopathy, poorly controlled insulin dependent diabetes, protein malnutrition.  We discussed open repair given prior surgeries, known liver disease, size of defect.  We discussed she would need to be tobacco free before I would be willing to offer hernia repair.  She would also need clearance from the heart failure team at [facility] and would need to improve protein stores through protein supplements.  She agrees with this plan.      Plan:     1. Continue current medications.  2.  High-protein diet, utilizing low-sodium shakes as needed to meet goal of 60 g/day.  3.  Patient will contact her heart failure team for cardiac clearance.  She will start smoking cessation.  4. Follow up: 4 weeks or after cardiac clearance obtained.

## 2025-02-20 NOTE — TELEPHONE ENCOUNTER
[facility] LIVER [facility]     Chaka Burns MD, FACP, MACG, FAASLD   MD Sharon Sunshine PA-C April S Ashworth, [employer]   Luda Benjaminjuan josechristina, [facility]   Jael Hollingsworth, FNP-C  Mikey Winslow, [facility]   Angelica Mackay, [facility]   at [facility]   [address], [address]   [place]  [address] [phone]   FAX: [phone]  [facility]   [address], [address]   [place]  [address] [phone]   FAX: [phone]       Risk of elective surgery in a patient with cirrhosis.  The patient has been told that surgery should be considered for repair of a ventral hernia.    The patient has cirrhosis Child class A and MELD of under 10.  The risk of complications including hepatic decompensation is about 20-30%.  Operative mortality risk is under 5%.  Using Vocal Melo scoring,  30 day mortality 0.6% and risk of 90 day decompensation is 5 %.       I have encouraged the patient to also ask cardiology to weigh in on operative risk.     The patient last underwent an EGD to assess for esophageal varices in 7/2024.  No esophageal varices were present.      The ultimate decision regarding whether or not to proceed with surgery will depend upon conversations between the surgeon and patient/and/or family and careful assessment of the risk and benefit of the surgical procedure.

## 2025-02-21 ENCOUNTER — TELEPHONE (OUTPATIENT)
Age: 45
End: 2025-02-21

## 2025-02-21 NOTE — TELEPHONE ENCOUNTER
Patient called in stating that she has received clearance from her Hepatologist Luda Laguerre NP and Cardiologist Lisa Rouse MD so she can get incisional hernia repair surgery. Documents aren't in patient's  and patient would like to know if he has received progress notes from either office. Advised nurse would follow up.

## 2025-02-21 NOTE — TELEPHONE ENCOUNTER
Patient called requesting a refill for ZENPEP to be sent to the pharmacy on file. Patient stated that she is experiencing fecal incontinence and only has one pill left. Patient would also like to know if Dr. Harp is willing to prescribe her Nicotine patches due him wanting her to quit smoking.  Advised nurse would follow up.

## 2025-02-24 DIAGNOSIS — K86.81 EXOCRINE PANCREATIC INSUFFICIENCY: ICD-10-CM

## 2025-03-10 ENCOUNTER — OFFICE VISIT (OUTPATIENT)
Age: 45
End: 2025-03-10
Payer: COMMERCIAL

## 2025-03-10 VITALS
TEMPERATURE: 98.2 F | OXYGEN SATURATION: 99 % | WEIGHT: 132.4 LBS | SYSTOLIC BLOOD PRESSURE: 123 MMHG | HEART RATE: 104 BPM | HEIGHT: 63 IN | DIASTOLIC BLOOD PRESSURE: 90 MMHG | BODY MASS INDEX: 23.46 KG/M2

## 2025-03-10 DIAGNOSIS — K74.60 CIRRHOSIS OF LIVER WITHOUT ASCITES, UNSPECIFIED HEPATIC CIRRHOSIS TYPE (HCC): Primary | ICD-10-CM

## 2025-03-10 PROCEDURE — 99215 OFFICE O/P EST HI 40 MIN: CPT | Performed by: NURSE PRACTITIONER

## 2025-03-10 RX ORDER — TORSEMIDE 20 MG/1
20 TABLET ORAL
COMMUNITY
Start: 2025-01-20 | End: 2026-01-20

## 2025-03-10 RX ORDER — NICOTINE 21 MG/24HR
1 PATCH, TRANSDERMAL 24 HOURS TRANSDERMAL DAILY
Qty: 42 PATCH | Refills: 0 | Status: SHIPPED | OUTPATIENT
Start: 2025-03-10 | End: 2025-04-21

## 2025-03-10 ASSESSMENT — PATIENT HEALTH QUESTIONNAIRE - PHQ9
1. LITTLE INTEREST OR PLEASURE IN DOING THINGS: NOT AT ALL
DEPRESSION UNABLE TO ASSESS: FUNCTIONAL CAPACITY MOTIVATION LIMITS ACCURACY
SUM OF ALL RESPONSES TO PHQ QUESTIONS 1-9: 0
2. FEELING DOWN, DEPRESSED OR HOPELESS: NOT AT ALL
SUM OF ALL RESPONSES TO PHQ QUESTIONS 1-9: 0

## 2025-03-10 ASSESSMENT — ANXIETY QUESTIONNAIRES
6. BECOMING EASILY ANNOYED OR IRRITABLE: NOT AT ALL
GAD7 TOTAL SCORE: 0
5. BEING SO RESTLESS THAT IT IS HARD TO SIT STILL: NOT AT ALL
7. FEELING AFRAID AS IF SOMETHING AWFUL MIGHT HAPPEN: NOT AT ALL
3. WORRYING TOO MUCH ABOUT DIFFERENT THINGS: NOT AT ALL
4. TROUBLE RELAXING: NOT AT ALL
2. NOT BEING ABLE TO STOP OR CONTROL WORRYING: NOT AT ALL
1. FEELING NERVOUS, ANXIOUS, OR ON EDGE: NOT AT ALL
IF YOU CHECKED OFF ANY PROBLEMS ON THIS QUESTIONNAIRE, HOW DIFFICULT HAVE THESE PROBLEMS MADE IT FOR YOU TO DO YOUR WORK, TAKE CARE OF THINGS AT HOME, OR GET ALONG WITH OTHER PEOPLE: NOT DIFFICULT AT ALL

## 2025-03-10 NOTE — PROGRESS NOTES
Chief Complaint   Patient presents with    Follow-up     Vitals:    03/10/25 1314   BP: (!) 123/90   Pulse: (!) 104   Temp: 98.2 °F (36.8 °C)   SpO2: 99%     \"Have you been to the ER, urgent care clinic since your last visit?  Hospitalized since your last visit?\"    NO    “Have you seen or consulted any other health care providers outside our system since your last visit?”    NO    Have you had a mammogram?”   NO    No breast cancer screening on file       “Have you had a diabetic eye exam?”    NO     No diabetic eye exam on file            
review medications, answer patient questions and documentation of the findings in my note.  Independent or exclusive of time spent on procedure.

## 2025-03-11 LAB
ALBUMIN SERPL-MCNC: 4.2 G/DL (ref 3.9–4.9)
ALP SERPL-CCNC: 362 IU/L (ref 44–121)
ALT SERPL-CCNC: 45 IU/L (ref 0–32)
AST SERPL-CCNC: 322 IU/L (ref 0–40)
BASOPHILS # BLD AUTO: 0.1 X10E3/UL (ref 0–0.2)
BASOPHILS NFR BLD AUTO: 2 %
BILIRUB DIRECT SERPL-MCNC: 0.3 MG/DL (ref 0–0.4)
BILIRUB SERPL-MCNC: 0.5 MG/DL (ref 0–1.2)
BUN SERPL-MCNC: 6 MG/DL (ref 6–24)
BUN/CREAT SERPL: 13 (ref 9–23)
CALCIUM SERPL-MCNC: 9.1 MG/DL (ref 8.7–10.2)
CHLORIDE SERPL-SCNC: 101 MMOL/L (ref 96–106)
CO2 SERPL-SCNC: 28 MMOL/L (ref 20–29)
CREAT SERPL-MCNC: 0.47 MG/DL (ref 0.57–1)
EGFRCR SERPLBLD CKD-EPI 2021: 119 ML/MIN/1.73
EOSINOPHIL # BLD AUTO: 0.2 X10E3/UL (ref 0–0.4)
EOSINOPHIL NFR BLD AUTO: 2 %
ERYTHROCYTE [DISTWIDTH] IN BLOOD BY AUTOMATED COUNT: 16.1 % (ref 11.7–15.4)
GLUCOSE SERPL-MCNC: 161 MG/DL (ref 70–99)
HCT VFR BLD AUTO: 34.5 % (ref 34–46.6)
HGB BLD-MCNC: 11.4 G/DL (ref 11.1–15.9)
IMM GRANULOCYTES # BLD AUTO: 0 X10E3/UL (ref 0–0.1)
IMM GRANULOCYTES NFR BLD AUTO: 0 %
INR PPP: 1 (ref 0.9–1.2)
LYMPHOCYTES # BLD AUTO: 1.6 X10E3/UL (ref 0.7–3.1)
LYMPHOCYTES NFR BLD AUTO: 21 %
MCH RBC QN AUTO: 31.4 PG (ref 26.6–33)
MCHC RBC AUTO-ENTMCNC: 33 G/DL (ref 31.5–35.7)
MCV RBC AUTO: 95 FL (ref 79–97)
MONOCYTES # BLD AUTO: 0.7 X10E3/UL (ref 0.1–0.9)
MONOCYTES NFR BLD AUTO: 9 %
NEUTROPHILS # BLD AUTO: 5.3 X10E3/UL (ref 1.4–7)
NEUTROPHILS NFR BLD AUTO: 66 %
PLATELET # BLD AUTO: 147 X10E3/UL (ref 150–450)
POTASSIUM SERPL-SCNC: 3.7 MMOL/L (ref 3.5–5.2)
PROT SERPL-MCNC: 7.1 G/DL (ref 6–8.5)
PROTHROMBIN TIME: 11.5 SEC (ref 9.1–12)
RBC # BLD AUTO: 3.63 X10E6/UL (ref 3.77–5.28)
SODIUM SERPL-SCNC: 143 MMOL/L (ref 134–144)
WBC # BLD AUTO: 8 X10E3/UL (ref 3.4–10.8)

## 2025-03-12 ENCOUNTER — RESULTS FOLLOW-UP (OUTPATIENT)
Age: 45
End: 2025-03-12

## 2025-04-08 ENCOUNTER — TRANSCRIBE ORDERS (OUTPATIENT)
Facility: HOSPITAL | Age: 45
End: 2025-04-08

## 2025-04-08 DIAGNOSIS — M54.12 CERVICAL RADICULOPATHY: Primary | ICD-10-CM

## 2025-06-02 ENCOUNTER — TELEPHONE (OUTPATIENT)
Age: 45
End: 2025-06-02

## 2025-06-02 NOTE — TELEPHONE ENCOUNTER
Patient called to let you know her stomach is hard a huge and she wanted to speak with you about addressing it.     She said she is uncomfortable and in which she beomces winded when doing daily tasks.

## 2025-06-02 NOTE — TELEPHONE ENCOUNTER
Left voicemail for patient.  Offered to order paracentesis.  Inquired about any diuretic dose adjustments.  Requested call back or [facility]iation [employer] message.

## 2025-06-03 ENCOUNTER — TELEPHONE (OUTPATIENT)
Age: 45
End: 2025-06-03

## 2025-06-03 DIAGNOSIS — K70.31 ALCOHOLIC CIRRHOSIS OF LIVER WITH ASCITES (HCC): Primary | ICD-10-CM

## 2025-06-03 NOTE — TELEPHONE ENCOUNTER
LVM for patient in response to [facility] messages. Recommended presenting to the ED for further management if outpatient para is unable to be scheduled in the next several days. Defer diuretic adjustments to cardiology ISO CHF.

## 2025-06-03 NOTE — TELEPHONE ENCOUNTER
Patient requests a phone call back to provider regarding her abdomen being distended, hard, and uncomfortable.  Patient also states that it is hard to breath but doesn't believe she needs to go to the hospital.

## 2025-06-11 ENCOUNTER — APPOINTMENT (OUTPATIENT)
Facility: HOSPITAL | Age: 45
End: 2025-06-11
Payer: COMMERCIAL

## 2025-06-11 ENCOUNTER — HOSPITAL ENCOUNTER (INPATIENT)
Facility: HOSPITAL | Age: 45
LOS: 3 days | Discharge: HOME | End: 2025-06-14
Attending: STUDENT IN AN ORGANIZED HEALTH CARE EDUCATION/TRAINING PROGRAM | Admitting: INTERNAL MEDICINE
Payer: COMMERCIAL

## 2025-06-11 DIAGNOSIS — B17.9 ACUTE HEPATITIS: Primary | ICD-10-CM

## 2025-06-11 DIAGNOSIS — K74.60 CIRRHOSIS OF LIVER WITH ASCITES, UNSPECIFIED HEPATIC CIRRHOSIS TYPE (HCC): ICD-10-CM

## 2025-06-11 DIAGNOSIS — R18.8 CIRRHOSIS OF LIVER WITH ASCITES, UNSPECIFIED HEPATIC CIRRHOSIS TYPE (HCC): ICD-10-CM

## 2025-06-11 DIAGNOSIS — K70.31 ALCOHOLIC CIRRHOSIS OF LIVER WITH ASCITES (HCC): ICD-10-CM

## 2025-06-11 LAB
ALBUMIN SERPL-MCNC: 3.2 G/DL (ref 3.5–5)
ALBUMIN/GLOB SERPL: 0.6 (ref 1.1–2.2)
ALP SERPL-CCNC: 449 U/L (ref 45–117)
ALT SERPL-CCNC: 43 U/L (ref 12–78)
ANION GAP SERPL CALC-SCNC: 8 MMOL/L (ref 2–12)
AST SERPL-CCNC: 232 U/L (ref 15–37)
BASOPHILS # BLD: 0.1 K/UL (ref 0–0.1)
BASOPHILS NFR BLD: 1.4 % (ref 0–1)
BILIRUB SERPL-MCNC: 1.6 MG/DL (ref 0.2–1)
BUN SERPL-MCNC: 8 MG/DL (ref 6–20)
BUN/CREAT SERPL: 12 (ref 12–20)
CALCIUM SERPL-MCNC: 9.7 MG/DL (ref 8.5–10.1)
CHLORIDE SERPL-SCNC: 96 MMOL/L (ref 97–108)
CO2 SERPL-SCNC: 33 MMOL/L (ref 21–32)
COMMENT:: NORMAL
CREAT SERPL-MCNC: 0.69 MG/DL (ref 0.55–1.02)
DIFFERENTIAL METHOD BLD: ABNORMAL
EOSINOPHIL # BLD: 0.23 K/UL (ref 0–0.4)
EOSINOPHIL NFR BLD: 3.2 % (ref 0–7)
ERYTHROCYTE [DISTWIDTH] IN BLOOD BY AUTOMATED COUNT: 19.9 % (ref 11.5–14.5)
ETHANOL SERPL-MCNC: 213 MG/DL (ref 0–0.08)
GLOBULIN SER CALC-MCNC: 5.6 G/DL (ref 2–4)
GLUCOSE BLD STRIP.AUTO-MCNC: 329 MG/DL (ref 65–117)
GLUCOSE SERPL-MCNC: 174 MG/DL (ref 65–100)
HCT VFR BLD AUTO: 37.6 % (ref 35–47)
HGB BLD-MCNC: 12 G/DL (ref 11.5–16)
IMM GRANULOCYTES # BLD AUTO: 0.03 K/UL (ref 0–0.04)
IMM GRANULOCYTES NFR BLD AUTO: 0.4 % (ref 0–0.5)
LIPASE SERPL-CCNC: 10 U/L (ref 13–75)
LYMPHOCYTES # BLD: 1.55 K/UL (ref 0.8–3.5)
LYMPHOCYTES NFR BLD: 21.8 % (ref 12–49)
MCH RBC QN AUTO: 30.4 PG (ref 26–34)
MCHC RBC AUTO-ENTMCNC: 31.9 G/DL (ref 30–36.5)
MCV RBC AUTO: 95.2 FL (ref 80–99)
MONOCYTES # BLD: 0.71 K/UL (ref 0–1)
MONOCYTES NFR BLD: 10 % (ref 5–13)
NEUTS SEG # BLD: 4.48 K/UL (ref 1.8–8)
NEUTS SEG NFR BLD: 63.2 % (ref 32–75)
NRBC # BLD: 0 K/UL (ref 0–0.01)
NRBC BLD-RTO: 0 PER 100 WBC
PLATELET # BLD AUTO: 118 K/UL (ref 150–400)
POTASSIUM SERPL-SCNC: 2.9 MMOL/L (ref 3.5–5.1)
PROT SERPL-MCNC: 8.8 G/DL (ref 6.4–8.2)
RBC # BLD AUTO: 3.95 M/UL (ref 3.8–5.2)
RBC MORPH BLD: ABNORMAL
RBC MORPH BLD: ABNORMAL
SERVICE CMNT-IMP: ABNORMAL
SODIUM SERPL-SCNC: 137 MMOL/L (ref 136–145)
SPECIMEN HOLD: NORMAL
WBC # BLD AUTO: 7.1 K/UL (ref 3.6–11)

## 2025-06-11 PROCEDURE — 6360000002 HC RX W HCPCS: Performed by: STUDENT IN AN ORGANIZED HEALTH CARE EDUCATION/TRAINING PROGRAM

## 2025-06-11 PROCEDURE — 82077 ASSAY SPEC XCP UR&BREATH IA: CPT

## 2025-06-11 PROCEDURE — 2060000000 HC ICU INTERMEDIATE R&B

## 2025-06-11 PROCEDURE — 80053 COMPREHEN METABOLIC PANEL: CPT

## 2025-06-11 PROCEDURE — 99285 EMERGENCY DEPT VISIT HI MDM: CPT

## 2025-06-11 PROCEDURE — 83690 ASSAY OF LIPASE: CPT

## 2025-06-11 PROCEDURE — 85025 COMPLETE CBC W/AUTO DIFF WBC: CPT

## 2025-06-11 PROCEDURE — 96376 TX/PRO/DX INJ SAME DRUG ADON: CPT

## 2025-06-11 PROCEDURE — 82962 GLUCOSE BLOOD TEST: CPT

## 2025-06-11 PROCEDURE — 6360000004 HC RX CONTRAST MEDICATION: Performed by: RADIOLOGY

## 2025-06-11 PROCEDURE — 96374 THER/PROPH/DIAG INJ IV PUSH: CPT

## 2025-06-11 PROCEDURE — 74177 CT ABD & PELVIS W/CONTRAST: CPT

## 2025-06-11 RX ORDER — IOPAMIDOL 755 MG/ML
100 INJECTION, SOLUTION INTRAVASCULAR
Status: COMPLETED | OUTPATIENT
Start: 2025-06-11 | End: 2025-06-11

## 2025-06-11 RX ORDER — HYDROMORPHONE HYDROCHLORIDE 1 MG/ML
1 INJECTION, SOLUTION INTRAMUSCULAR; INTRAVENOUS; SUBCUTANEOUS ONCE
Status: COMPLETED | OUTPATIENT
Start: 2025-06-11 | End: 2025-06-11

## 2025-06-11 RX ORDER — HYDROMORPHONE HYDROCHLORIDE 1 MG/ML
1 INJECTION, SOLUTION INTRAMUSCULAR; INTRAVENOUS; SUBCUTANEOUS
Status: COMPLETED | OUTPATIENT
Start: 2025-06-11 | End: 2025-06-11

## 2025-06-11 RX ADMIN — HYDROMORPHONE HYDROCHLORIDE 1 MG: 1 INJECTION, SOLUTION INTRAMUSCULAR; INTRAVENOUS; SUBCUTANEOUS at 17:25

## 2025-06-11 RX ADMIN — IOPAMIDOL 100 ML: 755 INJECTION, SOLUTION INTRAVENOUS at 15:06

## 2025-06-11 RX ADMIN — HYDROMORPHONE HYDROCHLORIDE 1 MG: 1 INJECTION, SOLUTION INTRAMUSCULAR; INTRAVENOUS; SUBCUTANEOUS at 19:28

## 2025-06-11 ASSESSMENT — PAIN DESCRIPTION - ORIENTATION
ORIENTATION: RIGHT;LEFT;MID;UPPER
ORIENTATION: RIGHT;LEFT

## 2025-06-11 ASSESSMENT — PAIN DESCRIPTION - PAIN TYPE
TYPE: CHRONIC PAIN
TYPE: ACUTE PAIN

## 2025-06-11 ASSESSMENT — PAIN SCALES - GENERAL
PAINLEVEL_OUTOF10: 7
PAINLEVEL_OUTOF10: 6
PAINLEVEL_OUTOF10: 7
PAINLEVEL_OUTOF10: 7

## 2025-06-11 ASSESSMENT — PAIN DESCRIPTION - LOCATION
LOCATION: ABDOMEN;BACK;FLANK
LOCATION: ABDOMEN
LOCATION: ABDOMEN;FLANK;BACK
LOCATION: ABDOMEN;FLANK;BACK

## 2025-06-11 ASSESSMENT — PAIN DESCRIPTION - FREQUENCY
FREQUENCY: CONTINUOUS
FREQUENCY: CONTINUOUS

## 2025-06-11 ASSESSMENT — PAIN - FUNCTIONAL ASSESSMENT
PAIN_FUNCTIONAL_ASSESSMENT: PREVENTS OR INTERFERES SOME ACTIVE ACTIVITIES AND ADLS
PAIN_FUNCTIONAL_ASSESSMENT: 0-10
PAIN_FUNCTIONAL_ASSESSMENT: ACTIVITIES ARE NOT PREVENTED

## 2025-06-11 ASSESSMENT — PAIN DESCRIPTION - ONSET
ONSET: ON-GOING
ONSET: SUDDEN

## 2025-06-11 ASSESSMENT — PAIN DESCRIPTION - DESCRIPTORS
DESCRIPTORS: ACHING;BURNING;SHARP
DESCRIPTORS: ACHING;SHARP;BURNING
DESCRIPTORS: BURNING;SHARP
DESCRIPTORS: ACHING;BURNING

## 2025-06-11 NOTE — ED PROVIDER NOTES
contemporaneously reviewed the CT abdomen/pelvis, which demonstrated acute hepatitis superimposed on hepatic steatosis and cirrhosis, with moderate volume ascites.    LABS  The following laboratory tests were performed and independently interpreted as notable for the following abnormalities:  - thrombocytopenia (low platelet count)  - hypokalemia (low potassium)  -  (elevated aspartate aminotransferase)  - alkaline phosphatase 449 (elevated)  - total bilirubin 1.6 (elevated)    MEDICAL DECISION MAKING  This 44-year-old female with known cirrhosis presented with worsening abdominal distention, severe ascites, and symptoms of acute hepatitis. She was referred by her hepatologist for paracentesis. On exam, she appeared pale and uncomfortable, with severe abdominal distention and mild tachycardia. Labs revealed thrombocytopenia, hypokalemia, elevated AST (232), alkaline phosphatase (449), and total bilirubin (1.6), consistent with acute hepatic injury and decompensation. CT abdomen/pelvis confirmed acute hepatitis superimposed on cirrhosis and moderate ascites.  Given the severity of her symptoms and objective findings, admission was required for ultrasound-guided paracentesis and pain control. Her mild shortness of breath and cough were monitored, but no evidence of infection or cardiopulmonary decompensation was found. The decision to admit was based on the need for procedural intervention and close monitoring due to her complex comorbidities and abnormal laboratory findings.  Therefore, inpatient management is the safest plan to address her acute hepatic decompensation and provide definitive therapy for her ascites.    DIFFERENTIAL DIAGNOSES  - Acute hepatitis: Considered due to elevated AST, imaging findings, and clinical presentation; less likely in absence of fever or jaundice.  - Spontaneous bacterial peritonitis: Considered given ascites and abdominal pain; less likely as patient denies fever or  chills.  - Congestive heart failure exacerbation: Considered due to history and lower extremity edema; less likely as respiratory exam is unremarkable.  - Hepatorenal syndrome: Considered in cirrhosis with ascites; less likely as no evidence of renal dysfunction provided.  - Portal vein thrombosis: Considered in cirrhosis with ascites; less likely as no imaging evidence or acute abdominal findings.  - Bacterial pneumonia: Considered due to cough and mild shortness of breath; less likely as patient denies fever and respiratory exam is unremarkable.  - Pulmonary embolism: Considered due to mild shortness of breath and tachycardia; less likely as patient denies chest pain and has no recent immobilization, trauma, surgery, or exogenous hormone use.    DISPOSITION  Discharge: Home    No discussion documented    DIAGNOSIS  Primary Diagnosis: Acute hepatitis superimposed on cirrhosis  Secondary Diagnoses:  - Ascites  - Thrombocytopenia  - Hypokalemia  - Hepatic steatosis    Perfect Serve Consult for Admission  6:11 PM    ED Room Number: ER02/02  Patient Name and age:  Viry Andrade 44 y.o.  female  Working Diagnosis:   1. Acute hepatitis    2. Cirrhosis of liver with ascites, unspecified hepatic cirrhosis type (HCC)        COVID-19 Suspicion: No  Sepsis present:  No  Reassessment needed: No  Code Status:  Full Code  Readmission: No  Isolation Requirements: no  Recommended Level of Care: med/surg  Department: [facility] Adult ED - [phone]  Consulting Provider:     Other:      Total critical care time spent exclusive of procedures:  41 minutes.         Thang Wheat MD  06/11/25 3727

## 2025-06-11 NOTE — ED NOTES
12:29 PM  I have evaluated the patient as the Provider in Rapid Medical Evaluation (RME). I have reviewed her vital signs and the triage nurse assessment. I have talked with the patient and any available family and advised that I am the provider in triage and have ordered the appropriate study to initiate their work up based on the clinical presentation during my assessment. I have advised that the patient will be accommodated in the Main ED as soon as possible. I have also requested to contact the triage nurse or myself immediately if the patient experiences any changes in their condition during this brief waiting period.    44-year-old female with history of cirrhosis, CHF, HTN presents to ED as referral from PCP.  Patient has a history of cirrhosis secondary to cardiac dysfunction versus alcohol induced liver disease and follows Dr. Burns. She also follows [facility] with cardiology. Patient reports that she has been having some abdominal distension and pain for the past 3 weeks. She was supposed to have a paracentesis on 06/23 but this has been worsening, so she was referred here to have it done instead. She notes some associated regurgitation, bowel incontinence, SOB, cough. Denies any fevers, chills.     PATI QUEVEDO Ashley, PA  06/11/25 8618

## 2025-06-11 NOTE — ED TRIAGE NOTES
Pt ambulatory to triage with c/o abd pain. Pt was referred here by her hematologist to have a paracentesis. Pt's abdomen bloated and distended. Reports regurgitation. Reports some SOB and cough. Denies fevers    Piper KRUEGER assessing in triage

## 2025-06-12 ENCOUNTER — APPOINTMENT (OUTPATIENT)
Facility: HOSPITAL | Age: 45
End: 2025-06-12
Payer: COMMERCIAL

## 2025-06-12 ENCOUNTER — APPOINTMENT (OUTPATIENT)
Facility: HOSPITAL | Age: 45
End: 2025-06-12
Attending: INTERNAL MEDICINE
Payer: COMMERCIAL

## 2025-06-12 LAB
ALBUMIN FLD-MCNC: 1.8 G/DL
ALBUMIN SERPL-MCNC: 2.8 G/DL (ref 3.5–5)
AMMONIA PLAS-SCNC: 68 UMOL/L
AMYLASE FLD-CCNC: 16 U/L
ANION GAP SERPL CALC-SCNC: 8 MMOL/L (ref 2–12)
APPEARANCE FLD: ABNORMAL
BASOPHILS # BLD: 0.11 K/UL (ref 0–0.1)
BASOPHILS NFR BLD: 1.2 % (ref 0–1)
BUN SERPL-MCNC: 6 MG/DL (ref 6–20)
BUN/CREAT SERPL: 10 (ref 12–20)
CALCIUM SERPL-MCNC: 8.8 MG/DL (ref 8.5–10.1)
CHLORIDE SERPL-SCNC: 96 MMOL/L (ref 97–108)
CO2 SERPL-SCNC: 30 MMOL/L (ref 21–32)
COLOR FLD: ABNORMAL
CREAT SERPL-MCNC: 0.63 MG/DL (ref 0.55–1.02)
DIFFERENTIAL METHOD BLD: ABNORMAL
DIGOXIN SERPL-MCNC: <0.2 NG/ML (ref 0.9–2)
ECHO BSA: 1.68 M2
EKG ATRIAL RATE: 97 BPM
EKG DIAGNOSIS: NORMAL
EKG P AXIS: 66 DEGREES
EKG P-R INTERVAL: 156 MS
EKG Q-T INTERVAL: 370 MS
EKG QRS DURATION: 90 MS
EKG QTC CALCULATION (BAZETT): 469 MS
EKG R AXIS: 54 DEGREES
EKG T AXIS: 51 DEGREES
EKG VENTRICULAR RATE: 97 BPM
EOSINOPHIL # BLD: 0.35 K/UL (ref 0–0.4)
EOSINOPHIL NFR BLD: 4 % (ref 0–7)
ERYTHROCYTE [DISTWIDTH] IN BLOOD BY AUTOMATED COUNT: 19.6 % (ref 11.5–14.5)
EST. AVERAGE GLUCOSE BLD GHB EST-MCNC: 177 MG/DL
FOLATE SERPL-MCNC: 9.2 NG/ML (ref 5–21)
GLUCOSE BLD STRIP.AUTO-MCNC: 279 MG/DL (ref 65–117)
GLUCOSE BLD STRIP.AUTO-MCNC: 290 MG/DL (ref 65–117)
GLUCOSE BLD STRIP.AUTO-MCNC: 312 MG/DL (ref 65–117)
GLUCOSE BLD STRIP.AUTO-MCNC: 321 MG/DL (ref 65–117)
GLUCOSE BLD STRIP.AUTO-MCNC: 354 MG/DL (ref 65–117)
GLUCOSE FLD-MCNC: 325 MG/DL
GLUCOSE SERPL-MCNC: 335 MG/DL (ref 65–100)
HBA1C MFR BLD: 7.8 % (ref 4–5.6)
HCT VFR BLD AUTO: 35.6 % (ref 35–47)
HGB BLD-MCNC: 11.2 G/DL (ref 11.5–16)
IMM GRANULOCYTES # BLD AUTO: 0.02 K/UL (ref 0–0.04)
IMM GRANULOCYTES NFR BLD AUTO: 0.2 % (ref 0–0.5)
LDH FLD L TO P-CCNC: 137 U/L
LYMPHOCYTES # BLD: 1.96 K/UL (ref 0.8–3.5)
LYMPHOCYTES NFR BLD: 22.1 % (ref 12–49)
LYMPHOCYTES NFR FLD: 89 %
MCH RBC QN AUTO: 30.4 PG (ref 26–34)
MCHC RBC AUTO-ENTMCNC: 31.5 G/DL (ref 30–36.5)
MCV RBC AUTO: 96.7 FL (ref 80–99)
MESOTHL CELL NFR FLD: 2 %
MONOCYTES # BLD: 0.68 K/UL (ref 0–1)
MONOCYTES NFR BLD: 7.7 % (ref 5–13)
MONOS+MACROS NFR FLD: 3 %
NEUTROPHILS NFR FLD: 6 %
NEUTS SEG # BLD: 5.73 K/UL (ref 1.8–8)
NEUTS SEG NFR BLD: 64.8 % (ref 32–75)
NRBC # BLD: 0 K/UL (ref 0–0.01)
NRBC BLD-RTO: 0 PER 100 WBC
NUC CELL # FLD: 426 /CU MM
PLATELET # BLD AUTO: 112 K/UL (ref 150–400)
PMV BLD AUTO: 12.2 FL (ref 8.9–12.9)
POTASSIUM SERPL-SCNC: 3 MMOL/L (ref 3.5–5.1)
PROT FLD-MCNC: 3.6 G/DL
RBC # BLD AUTO: 3.68 M/UL (ref 3.8–5.2)
RBC # FLD: >100 /CU MM
SERVICE CMNT-IMP: ABNORMAL
SODIUM SERPL-SCNC: 134 MMOL/L (ref 136–145)
SPECIMEN SOURCE FLD: ABNORMAL
SPECIMEN SOURCE FLD: NORMAL
TROPONIN I SERPL HS-MCNC: 13 NG/L (ref 0–51)
TSH SERPL DL<=0.05 MIU/L-ACNC: 4.31 UIU/ML (ref 0.36–3.74)
VIT B12 SERPL-MCNC: 640 PG/ML (ref 193–986)
WBC # BLD AUTO: 8.9 K/UL (ref 3.6–11)

## 2025-06-12 PROCEDURE — 2500000003 HC RX 250 WO HCPCS: Performed by: INTERNAL MEDICINE

## 2025-06-12 PROCEDURE — 76942 ECHO GUIDE FOR BIOPSY: CPT

## 2025-06-12 PROCEDURE — 82962 GLUCOSE BLOOD TEST: CPT

## 2025-06-12 PROCEDURE — 82945 GLUCOSE OTHER FLUID: CPT

## 2025-06-12 PROCEDURE — 82607 VITAMIN B-12: CPT

## 2025-06-12 PROCEDURE — 80162 ASSAY OF DIGOXIN TOTAL: CPT

## 2025-06-12 PROCEDURE — 93005 ELECTROCARDIOGRAM TRACING: CPT | Performed by: INTERNAL MEDICINE

## 2025-06-12 PROCEDURE — 6370000000 HC RX 637 (ALT 250 FOR IP): Performed by: INTERNAL MEDICINE

## 2025-06-12 PROCEDURE — 6360000002 HC RX W HCPCS: Performed by: INTERNAL MEDICINE

## 2025-06-12 PROCEDURE — 6370000000 HC RX 637 (ALT 250 FOR IP): Performed by: NURSE PRACTITIONER

## 2025-06-12 PROCEDURE — 82040 ASSAY OF SERUM ALBUMIN: CPT

## 2025-06-12 PROCEDURE — 84443 ASSAY THYROID STIM HORMONE: CPT

## 2025-06-12 PROCEDURE — 1200000000 HC SEMI PRIVATE

## 2025-06-12 PROCEDURE — 0W9G3ZZ DRAINAGE OF PERITONEAL CAVITY, PERCUTANEOUS APPROACH: ICD-10-PCS | Performed by: PHYSICIAN ASSISTANT

## 2025-06-12 PROCEDURE — 49083 ABD PARACENTESIS W/IMAGING: CPT

## 2025-06-12 PROCEDURE — 71045 X-RAY EXAM CHEST 1 VIEW: CPT

## 2025-06-12 PROCEDURE — 93970 EXTREMITY STUDY: CPT

## 2025-06-12 PROCEDURE — 82150 ASSAY OF AMYLASE: CPT

## 2025-06-12 PROCEDURE — 83615 LACTATE (LD) (LDH) ENZYME: CPT

## 2025-06-12 PROCEDURE — 82140 ASSAY OF AMMONIA: CPT

## 2025-06-12 PROCEDURE — 87205 SMEAR GRAM STAIN: CPT

## 2025-06-12 PROCEDURE — 87070 CULTURE OTHR SPECIMN AEROBIC: CPT

## 2025-06-12 PROCEDURE — 84484 ASSAY OF TROPONIN QUANT: CPT

## 2025-06-12 PROCEDURE — 89050 BODY FLUID CELL COUNT: CPT

## 2025-06-12 PROCEDURE — 84157 ASSAY OF PROTEIN OTHER: CPT

## 2025-06-12 PROCEDURE — 82042 OTHER SOURCE ALBUMIN QUAN EA: CPT

## 2025-06-12 PROCEDURE — 83036 HEMOGLOBIN GLYCOSYLATED A1C: CPT

## 2025-06-12 PROCEDURE — 80048 BASIC METABOLIC PNL TOTAL CA: CPT

## 2025-06-12 PROCEDURE — 82746 ASSAY OF FOLIC ACID SERUM: CPT

## 2025-06-12 PROCEDURE — 93010 ELECTROCARDIOGRAM REPORT: CPT | Performed by: INTERNAL MEDICINE

## 2025-06-12 PROCEDURE — 85025 COMPLETE CBC W/AUTO DIFF WBC: CPT

## 2025-06-12 PROCEDURE — 99223 1ST HOSP IP/OBS HIGH 75: CPT | Performed by: NURSE PRACTITIONER

## 2025-06-12 RX ORDER — LANOLIN ALCOHOL/MO/W.PET/CERES
400 CREAM (GRAM) TOPICAL 2 TIMES DAILY
Status: DISCONTINUED | OUTPATIENT
Start: 2025-06-12 | End: 2025-06-14 | Stop reason: HOSPADM

## 2025-06-12 RX ORDER — ENOXAPARIN SODIUM 100 MG/ML
40 INJECTION SUBCUTANEOUS DAILY
Status: DISCONTINUED | OUTPATIENT
Start: 2025-06-12 | End: 2025-06-14 | Stop reason: HOSPADM

## 2025-06-12 RX ORDER — POTASSIUM CHLORIDE 1500 MG/1
20 TABLET, EXTENDED RELEASE ORAL 2 TIMES DAILY
COMMUNITY

## 2025-06-12 RX ORDER — OXYCODONE HYDROCHLORIDE 5 MG/1
5 TABLET ORAL EVERY 4 HOURS PRN
Status: DISCONTINUED | OUTPATIENT
Start: 2025-06-12 | End: 2025-06-14 | Stop reason: HOSPADM

## 2025-06-12 RX ORDER — LANOLIN ALCOHOL/MO/W.PET/CERES
100 CREAM (GRAM) TOPICAL DAILY
Status: DISCONTINUED | OUTPATIENT
Start: 2025-06-12 | End: 2025-06-14 | Stop reason: HOSPADM

## 2025-06-12 RX ORDER — ONDANSETRON 4 MG/1
4 TABLET, ORALLY DISINTEGRATING ORAL EVERY 8 HOURS PRN
Status: DISCONTINUED | OUTPATIENT
Start: 2025-06-12 | End: 2025-06-14 | Stop reason: HOSPADM

## 2025-06-12 RX ORDER — METRONIDAZOLE 500 MG/100ML
500 INJECTION, SOLUTION INTRAVENOUS EVERY 8 HOURS
Status: DISCONTINUED | OUTPATIENT
Start: 2025-06-12 | End: 2025-06-14 | Stop reason: ALTCHOICE

## 2025-06-12 RX ORDER — SODIUM CHLORIDE 0.9 % (FLUSH) 0.9 %
5-40 SYRINGE (ML) INJECTION EVERY 12 HOURS SCHEDULED
Status: DISCONTINUED | OUTPATIENT
Start: 2025-06-12 | End: 2025-06-14 | Stop reason: HOSPADM

## 2025-06-12 RX ORDER — PHENOBARBITAL 32.4 MG/1
16.2 TABLET ORAL EVERY 6 HOURS PRN
Status: DISCONTINUED | OUTPATIENT
Start: 2025-06-14 | End: 2025-06-14 | Stop reason: HOSPADM

## 2025-06-12 RX ORDER — VENLAFAXINE 37.5 MG/1
75 TABLET ORAL 3 TIMES DAILY
Status: DISCONTINUED | OUTPATIENT
Start: 2025-06-13 | End: 2025-06-14 | Stop reason: HOSPADM

## 2025-06-12 RX ORDER — ONDANSETRON 2 MG/ML
4 INJECTION INTRAMUSCULAR; INTRAVENOUS EVERY 6 HOURS PRN
Status: DISCONTINUED | OUTPATIENT
Start: 2025-06-12 | End: 2025-06-14 | Stop reason: HOSPADM

## 2025-06-12 RX ORDER — FUROSEMIDE 80 MG/1
80 TABLET ORAL 2 TIMES DAILY
COMMUNITY

## 2025-06-12 RX ORDER — LEVOFLOXACIN 5 MG/ML
750 INJECTION, SOLUTION INTRAVENOUS EVERY 24 HOURS
Status: DISCONTINUED | OUTPATIENT
Start: 2025-06-12 | End: 2025-06-14

## 2025-06-12 RX ORDER — MAGNESIUM SULFATE IN WATER 40 MG/ML
2000 INJECTION, SOLUTION INTRAVENOUS PRN
Status: DISCONTINUED | OUTPATIENT
Start: 2025-06-12 | End: 2025-06-14 | Stop reason: HOSPADM

## 2025-06-12 RX ORDER — POTASSIUM CHLORIDE 7.45 MG/ML
10 INJECTION INTRAVENOUS PRN
Status: DISCONTINUED | OUTPATIENT
Start: 2025-06-12 | End: 2025-06-14 | Stop reason: HOSPADM

## 2025-06-12 RX ORDER — POLYETHYLENE GLYCOL 3350 17 G/17G
17 POWDER, FOR SOLUTION ORAL DAILY PRN
Status: DISCONTINUED | OUTPATIENT
Start: 2025-06-12 | End: 2025-06-14 | Stop reason: HOSPADM

## 2025-06-12 RX ORDER — POTASSIUM CHLORIDE 7.45 MG/ML
10 INJECTION INTRAVENOUS
Status: COMPLETED | OUTPATIENT
Start: 2025-06-12 | End: 2025-06-12

## 2025-06-12 RX ORDER — SODIUM CHLORIDE 9 MG/ML
INJECTION, SOLUTION INTRAVENOUS PRN
Status: DISCONTINUED | OUTPATIENT
Start: 2025-06-12 | End: 2025-06-14 | Stop reason: HOSPADM

## 2025-06-12 RX ORDER — SODIUM CHLORIDE 0.9 % (FLUSH) 0.9 %
5-40 SYRINGE (ML) INJECTION PRN
Status: DISCONTINUED | OUTPATIENT
Start: 2025-06-12 | End: 2025-06-14 | Stop reason: HOSPADM

## 2025-06-12 RX ORDER — MULTIVITAMIN WITH IRON
1 TABLET ORAL DAILY
Status: DISCONTINUED | OUTPATIENT
Start: 2025-06-12 | End: 2025-06-14 | Stop reason: HOSPADM

## 2025-06-12 RX ORDER — LACTULOSE 10 G/15ML
20 SOLUTION ORAL 3 TIMES DAILY
Status: DISCONTINUED | OUTPATIENT
Start: 2025-06-12 | End: 2025-06-13

## 2025-06-12 RX ORDER — PHENOBARBITAL 32.4 MG/1
16.2 TABLET ORAL 2 TIMES DAILY
Status: DISCONTINUED | OUTPATIENT
Start: 2025-06-14 | End: 2025-06-14 | Stop reason: HOSPADM

## 2025-06-12 RX ORDER — IVABRADINE 5 MG/1
5 TABLET, FILM COATED ORAL 2 TIMES DAILY WITH MEALS
Status: DISCONTINUED | OUTPATIENT
Start: 2025-06-12 | End: 2025-06-12

## 2025-06-12 RX ORDER — NICOTINE 21 MG/24HR
1 PATCH, TRANSDERMAL 24 HOURS TRANSDERMAL DAILY
Status: DISCONTINUED | OUTPATIENT
Start: 2025-06-12 | End: 2025-06-14 | Stop reason: HOSPADM

## 2025-06-12 RX ORDER — VENLAFAXINE 37.5 MG/1
75 TABLET ORAL DAILY
Status: DISCONTINUED | OUTPATIENT
Start: 2025-06-12 | End: 2025-06-12

## 2025-06-12 RX ORDER — PHENOBARBITAL 32.4 MG/1
32.4 TABLET ORAL 2 TIMES DAILY
Status: DISPENSED | OUTPATIENT
Start: 2025-06-13 | End: 2025-06-14

## 2025-06-12 RX ORDER — ALPRAZOLAM 1 MG/1
1 TABLET ORAL 4 TIMES DAILY PRN
Status: DISCONTINUED | OUTPATIENT
Start: 2025-06-12 | End: 2025-06-14 | Stop reason: HOSPADM

## 2025-06-12 RX ORDER — PHENOBARBITAL 32.4 MG/1
32.4 TABLET ORAL 4 TIMES DAILY
Status: COMPLETED | OUTPATIENT
Start: 2025-06-12 | End: 2025-06-12

## 2025-06-12 RX ORDER — LOSARTAN POTASSIUM 25 MG/1
12.5 TABLET ORAL DAILY
Status: DISCONTINUED | OUTPATIENT
Start: 2025-06-12 | End: 2025-06-14 | Stop reason: HOSPADM

## 2025-06-12 RX ORDER — DIGOXIN 125 MCG
125 TABLET ORAL DAILY
Status: DISCONTINUED | OUTPATIENT
Start: 2025-06-12 | End: 2025-06-14 | Stop reason: HOSPADM

## 2025-06-12 RX ORDER — ROSUVASTATIN CALCIUM 10 MG/1
10 TABLET, COATED ORAL DAILY
Status: DISCONTINUED | OUTPATIENT
Start: 2025-06-12 | End: 2025-06-14 | Stop reason: HOSPADM

## 2025-06-12 RX ORDER — TORSEMIDE 20 MG/1
20 TABLET ORAL DAILY
Status: DISCONTINUED | OUTPATIENT
Start: 2025-06-12 | End: 2025-06-12

## 2025-06-12 RX ORDER — FOLIC ACID 1 MG/1
1 TABLET ORAL DAILY
Status: DISCONTINUED | OUTPATIENT
Start: 2025-06-12 | End: 2025-06-14 | Stop reason: HOSPADM

## 2025-06-12 RX ORDER — FUROSEMIDE 40 MG/1
80 TABLET ORAL 2 TIMES DAILY
Status: DISCONTINUED | OUTPATIENT
Start: 2025-06-12 | End: 2025-06-14 | Stop reason: HOSPADM

## 2025-06-12 RX ORDER — SPIRONOLACTONE 100 MG/1
100 TABLET, FILM COATED ORAL DAILY
Status: DISCONTINUED | OUTPATIENT
Start: 2025-06-12 | End: 2025-06-13

## 2025-06-12 RX ORDER — METOPROLOL SUCCINATE 25 MG/1
25 TABLET, EXTENDED RELEASE ORAL DAILY
Status: DISCONTINUED | OUTPATIENT
Start: 2025-06-12 | End: 2025-06-14 | Stop reason: HOSPADM

## 2025-06-12 RX ORDER — INSULIN LISPRO 100 [IU]/ML
0-8 INJECTION, SOLUTION INTRAVENOUS; SUBCUTANEOUS
Status: DISCONTINUED | OUTPATIENT
Start: 2025-06-12 | End: 2025-06-14 | Stop reason: HOSPADM

## 2025-06-12 RX ORDER — FUROSEMIDE 10 MG/ML
40 INJECTION INTRAMUSCULAR; INTRAVENOUS DAILY
Status: DISCONTINUED | OUTPATIENT
Start: 2025-06-12 | End: 2025-06-13

## 2025-06-12 RX ORDER — POTASSIUM CHLORIDE 750 MG/1
40 TABLET, EXTENDED RELEASE ORAL PRN
Status: DISCONTINUED | OUTPATIENT
Start: 2025-06-12 | End: 2025-06-14 | Stop reason: HOSPADM

## 2025-06-12 RX ORDER — HYDROMORPHONE HYDROCHLORIDE 1 MG/ML
1 INJECTION, SOLUTION INTRAMUSCULAR; INTRAVENOUS; SUBCUTANEOUS EVERY 4 HOURS PRN
Status: DISCONTINUED | OUTPATIENT
Start: 2025-06-12 | End: 2025-06-12

## 2025-06-12 RX ORDER — DEXTROAMPHETAMINE SACCHARATE, AMPHETAMINE ASPARTATE, DEXTROAMPHETAMINE SULFATE AND AMPHETAMINE SULFATE 2.5; 2.5; 2.5; 2.5 MG/1; MG/1; MG/1; MG/1
1 TABLET ORAL 3 TIMES DAILY
Status: DISCONTINUED | OUTPATIENT
Start: 2025-06-12 | End: 2025-06-14 | Stop reason: HOSPADM

## 2025-06-12 RX ORDER — PHENOBARBITAL 32.4 MG/1
32.4 TABLET ORAL EVERY 6 HOURS PRN
Status: DISPENSED | OUTPATIENT
Start: 2025-06-12 | End: 2025-06-14

## 2025-06-12 RX ADMIN — ALPRAZOLAM 1 MG: 1 TABLET ORAL at 23:16

## 2025-06-12 RX ADMIN — Medication 400 MG: at 21:13

## 2025-06-12 RX ADMIN — PHENOBARBITAL 32.4 MG: 32.4 TABLET ORAL at 09:48

## 2025-06-12 RX ADMIN — ROSUVASTATIN 10 MG: 10 TABLET, FILM COATED ORAL at 09:49

## 2025-06-12 RX ADMIN — POTASSIUM CHLORIDE 10 MEQ: 10 INJECTION, SOLUTION INTRAVENOUS at 05:15

## 2025-06-12 RX ADMIN — INSULIN LISPRO 4 UNITS: 100 INJECTION, SOLUTION INTRAVENOUS; SUBCUTANEOUS at 13:19

## 2025-06-12 RX ADMIN — HYDROMORPHONE HYDROCHLORIDE 0.5 MG: 1 INJECTION, SOLUTION INTRAMUSCULAR; INTRAVENOUS; SUBCUTANEOUS at 13:20

## 2025-06-12 RX ADMIN — LACTULOSE 20 G: 20 SOLUTION ORAL at 21:13

## 2025-06-12 RX ADMIN — Medication 400 MG: at 13:18

## 2025-06-12 RX ADMIN — ENOXAPARIN SODIUM 40 MG: 100 INJECTION SUBCUTANEOUS at 09:51

## 2025-06-12 RX ADMIN — ONDANSETRON 4 MG: 2 INJECTION, SOLUTION INTRAMUSCULAR; INTRAVENOUS at 21:27

## 2025-06-12 RX ADMIN — DEXTROAMPHETAMINE SACCHARATE, AMPHETAMINE ASPARTATE, DEXTROAMPHETAMINE SULFATE, AMPHETAMINE SULFATE TABLETS, 10 MG,CLL 1 TABLET: 2.5; 2.5; 2.5; 2.5 TABLET ORAL at 13:18

## 2025-06-12 RX ADMIN — Medication 1 MG: at 09:48

## 2025-06-12 RX ADMIN — HYDROMORPHONE HYDROCHLORIDE 1 MG: 1 INJECTION, SOLUTION INTRAMUSCULAR; INTRAVENOUS; SUBCUTANEOUS at 01:41

## 2025-06-12 RX ADMIN — METRONIDAZOLE 500 MG: 500 INJECTION, SOLUTION INTRAVENOUS at 07:12

## 2025-06-12 RX ADMIN — PHENOBARBITAL 32.4 MG: 32.4 TABLET ORAL at 18:39

## 2025-06-12 RX ADMIN — INSULIN LISPRO 6 UNITS: 100 INJECTION, SOLUTION INTRAVENOUS; SUBCUTANEOUS at 02:25

## 2025-06-12 RX ADMIN — PHENOBARBITAL 32.4 MG: 32.4 TABLET ORAL at 13:17

## 2025-06-12 RX ADMIN — OXYCODONE 5 MG: 5 TABLET ORAL at 18:40

## 2025-06-12 RX ADMIN — INSULIN LISPRO 8 UNITS: 100 INJECTION, SOLUTION INTRAVENOUS; SUBCUTANEOUS at 09:45

## 2025-06-12 RX ADMIN — HYDROMORPHONE HYDROCHLORIDE 1 MG: 1 INJECTION, SOLUTION INTRAMUSCULAR; INTRAVENOUS; SUBCUTANEOUS at 05:52

## 2025-06-12 RX ADMIN — METOPROLOL SUCCINATE 25 MG: 25 TABLET, EXTENDED RELEASE ORAL at 09:48

## 2025-06-12 RX ADMIN — SODIUM CHLORIDE, PRESERVATIVE FREE 10 ML: 5 INJECTION INTRAVENOUS at 21:16

## 2025-06-12 RX ADMIN — RIFAXIMIN 550 MG: 550 TABLET ORAL at 21:13

## 2025-06-12 RX ADMIN — POTASSIUM CHLORIDE 10 MEQ: 10 INJECTION, SOLUTION INTRAVENOUS at 01:42

## 2025-06-12 RX ADMIN — SODIUM CHLORIDE, PRESERVATIVE FREE 10 ML: 5 INJECTION INTRAVENOUS at 09:51

## 2025-06-12 RX ADMIN — PANTOPRAZOLE SODIUM 40 MG: 40 INJECTION, POWDER, FOR SOLUTION INTRAVENOUS at 09:51

## 2025-06-12 RX ADMIN — LOSARTAN POTASSIUM 12.5 MG: 25 TABLET, FILM COATED ORAL at 09:49

## 2025-06-12 RX ADMIN — INSULIN LISPRO 6 UNITS: 100 INJECTION, SOLUTION INTRAVENOUS; SUBCUTANEOUS at 22:14

## 2025-06-12 RX ADMIN — INSULIN LISPRO 4 UNITS: 100 INJECTION, SOLUTION INTRAVENOUS; SUBCUTANEOUS at 18:40

## 2025-06-12 RX ADMIN — METRONIDAZOLE 500 MG: 500 INJECTION, SOLUTION INTRAVENOUS at 21:33

## 2025-06-12 RX ADMIN — ONDANSETRON 4 MG: 2 INJECTION, SOLUTION INTRAMUSCULAR; INTRAVENOUS at 13:33

## 2025-06-12 RX ADMIN — SPIRONOLACTONE 100 MG: 100 TABLET ORAL at 13:19

## 2025-06-12 RX ADMIN — PANCRELIPASE LIPASE, PANCRELIPASE PROTEASE, PANCRELIPASE AMYLASE 20000 UNITS: 20000; 63000; 84000 CAPSULE, DELAYED RELEASE ORAL at 18:39

## 2025-06-12 RX ADMIN — POTASSIUM CHLORIDE 10 MEQ: 10 INJECTION, SOLUTION INTRAVENOUS at 02:54

## 2025-06-12 RX ADMIN — Medication 100 MG: at 09:47

## 2025-06-12 RX ADMIN — FUROSEMIDE 40 MG: 10 INJECTION, SOLUTION INTRAMUSCULAR; INTRAVENOUS at 18:40

## 2025-06-12 RX ADMIN — THERA TABS 1 TABLET: TAB at 09:47

## 2025-06-12 RX ADMIN — DIGOXIN 125 MCG: 125 TABLET ORAL at 13:19

## 2025-06-12 RX ADMIN — POTASSIUM CHLORIDE 10 MEQ: 10 INJECTION, SOLUTION INTRAVENOUS at 04:15

## 2025-06-12 RX ADMIN — LEVOFLOXACIN 750 MG: 750 INJECTION, SOLUTION INTRAVENOUS at 07:12

## 2025-06-12 RX ADMIN — PHENOBARBITAL 32.4 MG: 32.4 TABLET ORAL at 21:13

## 2025-06-12 ASSESSMENT — PAIN SCALES - GENERAL
PAINLEVEL_OUTOF10: 7
PAINLEVEL_OUTOF10: 7
PAINLEVEL_OUTOF10: 6
PAINLEVEL_OUTOF10: 7

## 2025-06-12 ASSESSMENT — PAIN DESCRIPTION - LOCATION
LOCATION: ABDOMEN
LOCATION: ABDOMEN

## 2025-06-12 ASSESSMENT — PAIN DESCRIPTION - DESCRIPTORS: DESCRIPTORS: ACHING

## 2025-06-12 NOTE — PROGRESS NOTES
[facility]                                                                                          Hospitalist Progress Note  Sushma Madala, MD  Office Phone: [phone]        Date of Service:  2025  NAME:  Viry Andrade  :  1980  MRN:  [id number]       Admission Summary:   Viry Andrade is a 44 y.o. female with past medical history significant for heart failure with reduced ejection fraction, liver cirrhosis, nonischemic cardiomyopathy s/p ICD placement, type 2 diabetes, hypertension, dyslipidemia, tobacco and alcohol use disorder presented at the emergency room with abdominal distention and pain.  Patient symptoms started several days ago and progressively getting worse.  The abdominal pain is diffuse in location, no radiation, no known aggravating or relieving factors, pressure-like and 5/10 in severity.  Patient is under the care of hepatologist and she is scheduled for abdominal paracentesis in about a week for patient was advised to come to the emergency room today because of worsening abdominal pain and distention.  Patient denies a fever, rigors and chills.  Patient has not had abdominal paracentesis in the past.  No record of a prior admission to this hospital.  Patient is not able to provide good history because of the acuity of her condition.  CT of the abdomen and pelvis done in the emergency room showed acute hepatitis superimposed on hepatic steatosis and cirrhosis as well as small to moderate volume of ascites.  Serum alcohol level was 213.  She was referred to the hospitalist service for admission.       Interval history / Subjective:   Patient is seen and examined at bedside this noon in ED.  present. She is anxious and crying - reassured her. ( Upset about another ED patient and overwhelmed being in ED) . Paracentesis done - fluid analysis      Assessment & Plan:     Alcoholic liver cirrhosis with ascites  Suspect

## 2025-06-12 NOTE — CONSULTS
[facility]     Chaka Burns MD, FACP, MACG, FAASLD   MD Sharon Sunshine, ELMIRA Molina, [facility]   Luda Benjaminila, [facility]  Mikey Winslow, FNP-C   Angelicaisabella Mackay, [facility]         Liver [facility]   [address]   [place]  [address] [phone]   FAX: [phone]  [facility]   [address], [address]   [place]  [address] [phone]   FAX: [phone]         HEPATOLOGY CONSULT NOTE  I was asked to see this patient in consultation for evaluation and management of cirrhosis secondary to alcohol induced liver disase. .    I have reviewed the Emergency room note, Hospital admission note, Notes by all other physicians who have seen the patient during this hospitalization to date.  I have reviewed the problem list, all past medical history and the reason for this hospitalization.  I have reviewed the allergies and the medications the patient was taking at home prior to this hospitalization.    HISTORY:  The patient is regularly followed and cared for at [facility].      The patient is a 44 y.o. female who was found to have chronic liver disease and  cirrhosis in 8/2023 when undergoing abdominal CT for evaluation of new onset anasarca with SOB.  The patient has a history of IPMN requiring partial pancreatectomy in 2018 with numerous post-surgical complications.      Additionally, she reports an episode of acute hepatitis following a tick bite in ~2012.     Serologic evaluation for markers of chronic liver disease was mildly positive for ASMA.     The patient underwent hepatic venous pressure measurements with transjugular liver biopsy in 9/2023.  Estimated portosystemic gradient was 3 mmHg, right heart pressure 2 mmHg. Central pathology read demonstrated

## 2025-06-12 NOTE — PROCEDURES
PROCEDURE NOTE  Date: 6/12/2025   Name: Viry Andrade  YOB: 1980    Procedures      Interventional Radiology Brief Postoperative Note    Procedure Date:  6/12/2025    Procedure:  US guided paracentesis    :  PATI Butler    Attending:  Cory Medrano MD    Preoperative Diagnosis:  Ascites    Postoperative Diagnosis:  Ascites    Complications:  none    Estimated Blood Loss:  less than 3cc    Procedure Findings:  Technically successful US guided paracentesis. 2550cc thin angella fluid aspirated.     Specimens:  2550cc thin angella fluid aspirated    Condition of Patient:  The patient tolerated the procedure without difficulty and remained in stable condition throughout.

## 2025-06-12 NOTE — ED NOTES
ED TO INPATIENT SBAR HANDOFF    Patient Name: Viry Andrade   :  1980  44 y.o.   MRN:  [id number]  ED Room #:  ER02/02     Situation  Code Status: Full Code   Allergies: Amoxicillin, Beef allergy, Adhesive tape, and Wound dressing adhesive  Weight: Patient Vitals for the past 96 hrs (Last 3 readings):   Weight   25 1233 63.6 kg (140 lb 3.4 oz)       Arrived from: home    Chief Complaint:   Chief Complaint   Patient presents with    Abdominal Pain       Hospital Problem/Diagnosis:  Principal Problem:    Alcoholic cirrhosis of liver with ascites (HCC)  Resolved Problems:    * No resolved hospital problems. *      Mobility: no current mobility problem   ED Fall Risk: Presents to emergency department  because of falls (Syncope, seizure, or loss of consciousness): No, Age > 70: No, Altered Mental Status, Intoxication with alcohol or substance confusion (Disorientation, impaired judgment, poor safety awaremess, or inability to follow instructions): No, Impaired Mobility: Ambulates or transfers with assistive devices or assistance; Unable to ambulate or transer.: No, Nursing Judgement: No   Fell in ED or prior to admission: no   Restraints: no     Sitter: no   Family/Caregiver Present: yes    Neet to know social/safety information: current every day drinker, monitor for s/s of ETOH withdrawal    Background  History:   Past Medical History:   Diagnosis Date    Abscess of abdominal cavity (HCC) 2018    Adenomyosis     ADHD     Anxiety     Blurred vision     Cervical pain (neck)     Chest pain     CHF (congestive heart failure) (HCC) 2023    Liver [place] says liver, disease, and heart failure    Chronic back pain A long time    5 level disc replacement in     Chronic pain 2018    MUSCLE WEAKNESS,PANCREATIC CYST -NO LONGER AN ISSUE    Chronic systolic heart failure (HCC) 10/26/2023    Depression     AND ANXIETY    Diabetes mellitus ([facility]) [id number]    Type 1 due to losing half of

## 2025-06-12 NOTE — H&P
medications and monitor blood pressure closely.    9.  Dyslipidemia POA: Will continue Crestor.    10.  Hypokalemia POA: Will replete and repeat level.  Will also check magnesium level.    11.  Thrombocytopenia POA: This is mild and patient is asymptomatic.  Will monitor platelet count.    12.  Mood disorder POA: Will resume preadmission medications.    13.  Suspected spontaneous bacterial peritonitis POA: Will start the patient on Levaquin and Flagyl and await result of ascitic fluid analysis.        DIET: ADULT DIET; Regular; 3 carb choices (45 gm/meal)   ISOLATION PRECAUTIONS: No active isolations  CODE STATUS: Full Code   Central Line:   None  DVT PROPHYLAXIS: Lovenox  FUNCTIONAL STATUS PRIOR TO HOSPITALIZATION: Fully active and ambulatory; able to carry on all self-care without restriction.  Ambulatory status/function: By self   EARLY MOBILITY ASSESSMENT: Recommend routine ambulation while hospitalized with the assistance of nursing staff  ANTICIPATED DISCHARGE: Greater than 48 hours.  ANTICIPATED DISPOSITION: Home  EMERGENCY CONTACT/SURROGATE DECISION MAKER: Hao Saeed, spouse    CRITICAL CARE WAS PERFORMED FOR THIS ENCOUNTER: YES. I had a face to face encounter with the patient, reviewed and interpreted patient data including clinical events, labs, images, vital signs, I/O's, and examined patient.  I have discussed the case and the plan and management of the patient's care with the consulting services, the bedside nurses and necessary ancillary providers.  This patient has a high probability of imminent, clinically significant deterioration, which requires the highest level of preparedness to intervene urgently. I participated in the decision-making and personally managed or directed the management of the following life and organ supporting interventions that required my frequent assessment to treat or prevent imminent deterioration.  I personally spent 45 minutes of critical care time.  This is time spent  at this critically ill patient's bedside actively involved in patient care as well as the coordination of care and discussions with the patient's family.  This does not include any procedural time which has been billed separately.      Signed By: Willem Spring MD     June 12, 2025         Please note that this dictation may have been completed with Dragon, the computer voice recognition software.  Quite often unanticipated grammatical, syntax, homophones, and other interpretive errors are inadvertently transcribed by the computer software.  Please disregard these errors.  Please excuse any errors that have escaped final proofreading.

## 2025-06-13 LAB
ALBUMIN SERPL-MCNC: 2.6 G/DL (ref 3.5–5)
ALBUMIN/GLOB SERPL: 0.6 (ref 1.1–2.2)
ALP SERPL-CCNC: 392 U/L (ref 45–117)
ALT SERPL-CCNC: 34 U/L (ref 12–78)
ANION GAP SERPL CALC-SCNC: 8 MMOL/L (ref 2–12)
AST SERPL-CCNC: 155 U/L (ref 15–37)
BACTERIA SPEC CULT: NORMAL
BILIRUB SERPL-MCNC: 1.7 MG/DL (ref 0.2–1)
BUN SERPL-MCNC: 6 MG/DL (ref 6–20)
BUN/CREAT SERPL: 9 (ref 12–20)
CALCIUM SERPL-MCNC: 8.9 MG/DL (ref 8.5–10.1)
CHLORIDE SERPL-SCNC: 93 MMOL/L (ref 97–108)
CO2 SERPL-SCNC: 33 MMOL/L (ref 21–32)
CREAT SERPL-MCNC: 0.67 MG/DL (ref 0.55–1.02)
DIGOXIN SERPL-MCNC: 0.2 NG/ML (ref 0.9–2)
GLOBULIN SER CALC-MCNC: 4.3 G/DL (ref 2–4)
GLUCOSE BLD STRIP.AUTO-MCNC: 233 MG/DL (ref 65–117)
GLUCOSE BLD STRIP.AUTO-MCNC: 314 MG/DL (ref 65–117)
GLUCOSE BLD STRIP.AUTO-MCNC: 316 MG/DL (ref 65–117)
GLUCOSE BLD STRIP.AUTO-MCNC: 421 MG/DL (ref 65–117)
GLUCOSE BLD STRIP.AUTO-MCNC: 459 MG/DL (ref 65–117)
GLUCOSE SERPL-MCNC: 299 MG/DL (ref 65–100)
GRAM STN SPEC: NORMAL
GRAM STN SPEC: NORMAL
POTASSIUM SERPL-SCNC: 3.5 MMOL/L (ref 3.5–5.1)
PROT SERPL-MCNC: 6.9 G/DL (ref 6.4–8.2)
SERVICE CMNT-IMP: ABNORMAL
SERVICE CMNT-IMP: NORMAL
SODIUM SERPL-SCNC: 134 MMOL/L (ref 136–145)
TROPONIN I SERPL HS-MCNC: 11 NG/L (ref 0–51)

## 2025-06-13 PROCEDURE — 6370000000 HC RX 637 (ALT 250 FOR IP): Performed by: NURSE PRACTITIONER

## 2025-06-13 PROCEDURE — 99232 SBSQ HOSP IP/OBS MODERATE 35: CPT | Performed by: INTERNAL MEDICINE

## 2025-06-13 PROCEDURE — 84484 ASSAY OF TROPONIN QUANT: CPT

## 2025-06-13 PROCEDURE — 1200000000 HC SEMI PRIVATE

## 2025-06-13 PROCEDURE — 80053 COMPREHEN METABOLIC PANEL: CPT

## 2025-06-13 PROCEDURE — 80162 ASSAY OF DIGOXIN TOTAL: CPT

## 2025-06-13 PROCEDURE — 6370000000 HC RX 637 (ALT 250 FOR IP): Performed by: INTERNAL MEDICINE

## 2025-06-13 PROCEDURE — 82107 ALPHA-FETOPROTEIN L3: CPT

## 2025-06-13 PROCEDURE — 82962 GLUCOSE BLOOD TEST: CPT

## 2025-06-13 PROCEDURE — 2500000003 HC RX 250 WO HCPCS: Performed by: INTERNAL MEDICINE

## 2025-06-13 PROCEDURE — 6360000002 HC RX W HCPCS: Performed by: INTERNAL MEDICINE

## 2025-06-13 PROCEDURE — P9047 ALBUMIN (HUMAN), 25%, 50ML: HCPCS | Performed by: INTERNAL MEDICINE

## 2025-06-13 RX ORDER — INSULIN LISPRO 100 [IU]/ML
6 INJECTION, SOLUTION INTRAVENOUS; SUBCUTANEOUS
Status: DISCONTINUED | OUTPATIENT
Start: 2025-06-13 | End: 2025-06-14 | Stop reason: HOSPADM

## 2025-06-13 RX ORDER — LACTULOSE 10 G/15ML
20 SOLUTION ORAL 2 TIMES DAILY
Status: DISCONTINUED | OUTPATIENT
Start: 2025-06-13 | End: 2025-06-14 | Stop reason: HOSPADM

## 2025-06-13 RX ORDER — SPIRONOLACTONE 100 MG/1
200 TABLET, FILM COATED ORAL DAILY
Status: DISCONTINUED | OUTPATIENT
Start: 2025-06-14 | End: 2025-06-14 | Stop reason: HOSPADM

## 2025-06-13 RX ORDER — ALBUMIN (HUMAN) 12.5 G/50ML
25 SOLUTION INTRAVENOUS EVERY 6 HOURS
Status: DISCONTINUED | OUTPATIENT
Start: 2025-06-13 | End: 2025-06-14 | Stop reason: HOSPADM

## 2025-06-13 RX ADMIN — FUROSEMIDE 80 MG: 40 TABLET ORAL at 08:43

## 2025-06-13 RX ADMIN — Medication 100 MG: at 08:43

## 2025-06-13 RX ADMIN — ONDANSETRON 4 MG: 2 INJECTION, SOLUTION INTRAMUSCULAR; INTRAVENOUS at 05:34

## 2025-06-13 RX ADMIN — INSULIN LISPRO 8 UNITS: 100 INJECTION, SOLUTION INTRAVENOUS; SUBCUTANEOUS at 12:20

## 2025-06-13 RX ADMIN — SODIUM CHLORIDE, PRESERVATIVE FREE 10 ML: 5 INJECTION INTRAVENOUS at 20:44

## 2025-06-13 RX ADMIN — LACTULOSE 20 G: 20 SOLUTION ORAL at 08:41

## 2025-06-13 RX ADMIN — INSULIN LISPRO 6 UNITS: 100 INJECTION, SOLUTION INTRAVENOUS; SUBCUTANEOUS at 12:21

## 2025-06-13 RX ADMIN — RIFAXIMIN 550 MG: 550 TABLET ORAL at 08:42

## 2025-06-13 RX ADMIN — INSULIN LISPRO 6 UNITS: 100 INJECTION, SOLUTION INTRAVENOUS; SUBCUTANEOUS at 06:52

## 2025-06-13 RX ADMIN — LACTULOSE 20 G: 20 SOLUTION ORAL at 14:10

## 2025-06-13 RX ADMIN — VENLAFAXINE 75 MG: 37.5 TABLET ORAL at 14:10

## 2025-06-13 RX ADMIN — METRONIDAZOLE 500 MG: 500 INJECTION, SOLUTION INTRAVENOUS at 22:54

## 2025-06-13 RX ADMIN — Medication 400 MG: at 08:43

## 2025-06-13 RX ADMIN — INSULIN LISPRO 2 UNITS: 100 INJECTION, SOLUTION INTRAVENOUS; SUBCUTANEOUS at 21:44

## 2025-06-13 RX ADMIN — DIGOXIN 125 MCG: 125 TABLET ORAL at 08:44

## 2025-06-13 RX ADMIN — SODIUM CHLORIDE, PRESERVATIVE FREE 10 ML: 5 INJECTION INTRAVENOUS at 08:50

## 2025-06-13 RX ADMIN — INSULIN LISPRO 6 UNITS: 100 INJECTION, SOLUTION INTRAVENOUS; SUBCUTANEOUS at 17:13

## 2025-06-13 RX ADMIN — METRONIDAZOLE 500 MG: 500 INJECTION, SOLUTION INTRAVENOUS at 05:38

## 2025-06-13 RX ADMIN — PHENOBARBITAL 32.4 MG: 32.4 TABLET ORAL at 08:45

## 2025-06-13 RX ADMIN — PANTOPRAZOLE SODIUM 40 MG: 40 INJECTION, POWDER, FOR SOLUTION INTRAVENOUS at 08:41

## 2025-06-13 RX ADMIN — SODIUM CHLORIDE, PRESERVATIVE FREE 10 ML: 5 INJECTION INTRAVENOUS at 22:54

## 2025-06-13 RX ADMIN — LEVOFLOXACIN 750 MG: 750 INJECTION, SOLUTION INTRAVENOUS at 06:54

## 2025-06-13 RX ADMIN — Medication 1 MG: at 08:43

## 2025-06-13 RX ADMIN — RIFAXIMIN 550 MG: 550 TABLET ORAL at 21:00

## 2025-06-13 RX ADMIN — FUROSEMIDE 40 MG: 10 INJECTION, SOLUTION INTRAMUSCULAR; INTRAVENOUS at 08:42

## 2025-06-13 RX ADMIN — ENOXAPARIN SODIUM 40 MG: 100 INJECTION SUBCUTANEOUS at 08:42

## 2025-06-13 RX ADMIN — SODIUM CHLORIDE, PRESERVATIVE FREE 10 ML: 5 INJECTION INTRAVENOUS at 08:48

## 2025-06-13 RX ADMIN — ROSUVASTATIN 10 MG: 10 TABLET, FILM COATED ORAL at 08:43

## 2025-06-13 RX ADMIN — METRONIDAZOLE 500 MG: 500 INJECTION, SOLUTION INTRAVENOUS at 14:13

## 2025-06-13 RX ADMIN — ALBUMIN (HUMAN) 25 G: 0.25 INJECTION, SOLUTION INTRAVENOUS at 20:11

## 2025-06-13 RX ADMIN — VENLAFAXINE 75 MG: 37.5 TABLET ORAL at 21:00

## 2025-06-13 RX ADMIN — Medication 400 MG: at 21:00

## 2025-06-13 RX ADMIN — INSULIN LISPRO 6 UNITS: 100 INJECTION, SOLUTION INTRAVENOUS; SUBCUTANEOUS at 17:12

## 2025-06-13 RX ADMIN — THERA TABS 1 TABLET: TAB at 08:43

## 2025-06-13 RX ADMIN — OXYCODONE 5 MG: 5 TABLET ORAL at 05:33

## 2025-06-13 RX ADMIN — VENLAFAXINE 75 MG: 37.5 TABLET ORAL at 08:43

## 2025-06-13 ASSESSMENT — PAIN SCALES - GENERAL: PAINLEVEL_OUTOF10: 6

## 2025-06-13 ASSESSMENT — PAIN DESCRIPTION - LOCATION: LOCATION: ABDOMEN

## 2025-06-13 NOTE — CONSULTS
[facility] LIVER [facility]     Chaka Burns MD, FACP, MACG, FAASLD   MD Sharon Sunshine, ELMIRA Molina, [facility]   Luda Benjaminila, [facility]  Mikey Winslow, FNP-C   Angelicaisabella Mackay, [facility]         Liver [facility]   [address]   [place]  [address] [phone]   FAX: [phone]  Liver [facility]   [address], [address]   [place]  [address] [phone]   FAX: [phone]         HEPATOLOGY PROGRESS NOTE  The patient is regularly followed and cared for at [facility].      The patient is a 44 y.o. female who was found to have chronic liver disease and  cirrhosis in 8/2023 when undergoing abdominal CT for evaluation of new onset anasarca with SOB.  The patient has a history of IPMN requiring partial pancreatectomy in 2018 with numerous post-surgical complications.      Additionally, she reports an episode of acute hepatitis following a tick bite in ~2012.     Serologic evaluation for markers of chronic liver disease was mildly positive for ASMA.     The patient underwent hepatic venous pressure measurements with transjugular liver biopsy in 9/2023.  Estimated portosystemic gradient was 3 mmHg, right heart pressure 2 mmHg. Central pathology read demonstrated  sinusoidal dilatation and passive congestion and was suggestive of stage II fibrosis.  Dr. Burns also reviewed the biopsy slides and appreciated nodules consistent with cirrhosis.     She is currently following with [facility] cardiology.  She was found to have heart failure with reduced ejection fraction and nonischemic cardiomyopathy.  She underwent single-chamber ICD implant in 3/2024.  Heart failure clinic is currently managing her diuretics.    The patient has been experiencing worsening abdominal distention overt the  causes of chronic liver disease was mildly positive for ASMA.     Alcohol use disorder  The patient has an alcohol use disorder that is severe.  The patient has developed an alcohol related illness and/or a complication of cirrhosis.  The patient continues to consume alcohol.    We discussed trajectory of disease and worsening decomenspation with continued alcohol use.    I have recommended that the patient consider inpatient rehab at [facility] in [place] or [facility].     Ascites   Ascites developed for the first time in 8/2023.  Ascites was previously managed on successfully on diuretics.  Diuretics being managed by cardiology.  Per patient report, currently taking 80 mg Lasix BID.    Paracentesis performed today with 2550 mL fluid removed.   Cell count pending.  Will r/o SBP.  SAAG 1.4 consistent with portal hypertension.      The patient was counseled regarding the need to maintain sodium restriction and the types of foods containing high amounts of sodium to be avoided.     Lower extremity edema  Edema has mostly resolved on current dose of diuretics.   Diuretics as above.      The renal function is normal and edema should be able to be controled with diuretics.     Ventral Hernia  The patient has developed an incisional hernia with prior repair with mesh at midline incision above the umbilicus.  She has several other potential hernias to the left and right of the umbilicus, as well as a R inguinal hernia.  The hernia is painful and the patient feels it is a source of nausea.  It is not safe for hernia to be surgically repaired at this time d/t ascites & significant portal hypertension.      Screening for Esophageal varices   EGD performed on 7/2024 was negative for esophageal varices.     Hepatic encephalopathy   The patient has increased forgetfulness, losses track during conversation and may have covert HE.  Will initiate treatment for HE with Lactulose BID & Xifaxan BID

## 2025-06-13 NOTE — PLAN OF CARE
Problem: Chronic Conditions and Co-morbidities  Goal: Patient's chronic conditions and co-morbidity symptoms are monitored and maintained or improved  6/13/2025 0051 by Juliet Dickens RN  Outcome: Progressing  6/13/2025 0051 by Juliet Dickens RN  Outcome: Progressing     Problem: Discharge Planning  Goal: Discharge to home or other facility with appropriate resources  6/13/2025 0051 by Juliet Dickens RN  Outcome: Progressing  6/13/2025 0051 by Juliet Dickens RN  Outcome: Progressing     Problem: Pain  Goal: Verbalizes/displays adequate comfort level or baseline comfort level  Outcome: Progressing     Problem: Gastrointestinal - Adult  Goal: Minimal or absence of nausea and vomiting  Outcome: Progressing  Goal: Maintains adequate nutritional intake  Outcome: Progressing

## 2025-06-13 NOTE — PROGRESS NOTES
[facility]                                                                                          Hospitalist Progress Note  KARINE Ramirez NP  Office Phone: [phone]        Date of Service:  2025  NAME:  Viry Andrade  :  1980  MRN:  [id number]       Admission Summary:   Per H&P  Viry Andrade is a 44 y.o. female with past medical history significant for heart failure with reduced ejection fraction, liver cirrhosis, nonischemic cardiomyopathy s/p ICD placement, type 2 diabetes, hypertension, dyslipidemia, tobacco and alcohol use disorder presented at the emergency room with abdominal distention and pain.  Patient symptoms started several days ago and progressively getting worse.  The abdominal pain is diffuse in location, no radiation, no known aggravating or relieving factors, pressure-like and 5/10 in severity.  Patient is under the care of hepatologist and she is scheduled for abdominal paracentesis in about a week for patient was advised to come to the emergency room today because of worsening abdominal pain and distention.  Patient denies a fever, rigors and chills.  Patient has not had abdominal paracentesis in the past.  No record of a prior admission to this hospital.  Patient is not able to provide good history because of the acuity of her condition.  CT of the abdomen and pelvis done in the emergency room showed acute hepatitis superimposed on hepatic steatosis and cirrhosis as well as small to moderate volume of ascites.  Serum alcohol level was 213.  She was referred to the hospitalist service for admission.       Interval history / Subjective:     I saw the patient today on rounds.  Feeling better however with still significant ascites     Assessment & Plan:     Alcoholic liver cirrhosis with ascites  Suspect spontaneous bacterial peritonitis   Hepatology following, appreciate recommendations  Continuing furosemide and  Oral Daily    multivitamin 1 tablet  1 tablet Oral Daily    folic acid (FOLVITE) tablet 1 mg  1 mg Oral Daily    PHENobarbital tablet 32.4 mg  32.4 mg Oral BID    Followed by    [START ON 6/14/2025] PHENobarbital tablet 16.2 mg  16.2 mg Oral BID    PHENobarbital tablet 32.4 mg  32.4 mg Oral Q6H PRN    Followed by    [START ON 6/14/2025] PHENobarbital tablet 16.2 mg  16.2 mg Oral Q6H PRN    nicotine (NICODERM CQ) 21 MG/24HR 1 patch  1 patch TransDERmal Daily    levoFLOXacin (LEVAQUIN) 750 MG/150ML infusion 750 mg  750 mg IntraVENous Q24H    metroNIDAZOLE (FLAGYL) 500 mg in 0.9% NaCl 100 mL IVPB premix  500 mg IntraVENous Q8H    HYDROmorphone (DILAUDID) injection 0.5 mg  0.5 mg IntraVENous Q4H PRN    ALPRAZolam (XANAX) tablet 1 mg  1 mg Oral 4x Daily PRN    furosemide (LASIX) tablet 80 mg  80 mg Oral BID    venlafaxine (EFFEXOR) tablet 75 mg  75 mg Oral TID    sodium chloride flush 0.9 % injection 5-40 mL  5-40 mL IntraVENous 2 times per day    sodium chloride flush 0.9 % injection 5-40 mL  5-40 mL IntraVENous PRN    0.9 % sodium chloride infusion   IntraVENous PRN    rifAXIMin (XIFAXAN) tablet 550 mg  550 mg Oral BID    furosemide (LASIX) injection 40 mg  40 mg IntraVENous Daily    lactulose (CHRONULAC) 10 GM/15ML solution 20 g  20 g Oral TID     Facility-Administered Medications Ordered in Other Encounters   Medication Dose Route Frequency    iopamidol (ISOVUE-370) 76 % injection 100 mL  100 mL IntraVENous ONCE PRN     ______________________________________________________________________  EXPECTED LENGTH OF STAY: 3  ACTUAL LENGTH OF STAY:          2                 Rad Vanegas, KARINE - NP

## 2025-06-13 NOTE — PLAN OF CARE
Problem: Chronic Conditions and Co-morbidities  Goal: Patient's chronic conditions and co-morbidity symptoms are monitored and maintained or improved  6/13/2025 1427 by Verito Aldridge RN  Outcome: Progressing  6/13/2025 0051 by Juliet Dickens RN  Outcome: Progressing  6/13/2025 0051 by Juliet Dickens RN  Outcome: Progressing

## 2025-06-13 NOTE — CARE COORDINATION
Transition of Care Plan:    RUR: 13%  Prior Level of Functioning: Independent   Disposition: Home with Family Assistance   JONY: 24hrs.   Follow up appointments: New PCP APPT, Cardio, hepatology    DME needed: None   Transportation at discharge: Family   IM/IMM Medicare/ letter given: N/A   Caregiver Contact:   Hao Saeed (Spouse) [phone]  Discharge Caregiver contacted prior to discharge? N  Care Conference needed? N  Barriers to discharge: Medical clearance, Hepatology     Admitting DX: Alcoholic cirrhosis of liver with ascites     Per conversation with the pt: the pt confirmed her demographics and insurance provider. The pt reported that she was currently not connected with a primary care physician but open to this cm scheduling a new pt appointment prior to discharge. The pt reported that she is independent at baseline with Adls and IADls. The pt reported that she is currently unemployed. This cm inquired if the pt was interested in resources surrounding substance abuse. The pt reported that she was open to the resources but not open to IP residential programs. The pt reported that she has tried AA in the past but did not have success with the program. The pt expressed that she is dealing with a lot of stressors surrounding family and the pt's health which acts as triggers.  The pt reported that she was unaware of the amount of alcohol consumed daily. This cm expressed that he understood and reported that he still wanted to provide the pt with resources as they could add an additional layer of support for the pt.  This cm will continue to follow for WILFRIDO needs.      06/13/25 9093   Service Assessment   Patient Orientation Alert and Oriented   Cognition Alert   History Provided By Patient   Primary Caregiver Self   Support Systems Spouse/Significant Other   Patient's Healthcare Decision Maker is: Legal Next of Kin   PCP Verified by CM No   Prior Functional Level Independent in ADLs/IADLs   Current

## 2025-06-14 VITALS
RESPIRATION RATE: 16 BRPM | HEART RATE: 106 BPM | TEMPERATURE: 98.6 F | HEIGHT: 63 IN | BODY MASS INDEX: 24.84 KG/M2 | SYSTOLIC BLOOD PRESSURE: 92 MMHG | WEIGHT: 140.21 LBS | OXYGEN SATURATION: 97 % | DIASTOLIC BLOOD PRESSURE: 67 MMHG

## 2025-06-14 LAB
ALBUMIN SERPL-MCNC: 3.3 G/DL (ref 3.5–5)
ALBUMIN/GLOB SERPL: 0.8 (ref 1.1–2.2)
ALP SERPL-CCNC: 344 U/L (ref 45–117)
ALT SERPL-CCNC: 31 U/L (ref 12–78)
ANION GAP SERPL CALC-SCNC: 11 MMOL/L (ref 2–12)
AST SERPL-CCNC: 122 U/L (ref 15–37)
BILIRUB SERPL-MCNC: 1.9 MG/DL (ref 0.2–1)
BUN SERPL-MCNC: 7 MG/DL (ref 6–20)
BUN/CREAT SERPL: 8 (ref 12–20)
CALCIUM SERPL-MCNC: 8.3 MG/DL (ref 8.5–10.1)
CHLORIDE SERPL-SCNC: 86 MMOL/L (ref 97–108)
CO2 SERPL-SCNC: 32 MMOL/L (ref 21–32)
CREAT SERPL-MCNC: 0.93 MG/DL (ref 0.55–1.02)
GLOBULIN SER CALC-MCNC: 3.9 G/DL (ref 2–4)
GLUCOSE BLD STRIP.AUTO-MCNC: 336 MG/DL (ref 65–117)
GLUCOSE BLD STRIP.AUTO-MCNC: 441 MG/DL (ref 65–117)
GLUCOSE SERPL-MCNC: 374 MG/DL (ref 65–100)
POTASSIUM SERPL-SCNC: 2.7 MMOL/L (ref 3.5–5.1)
PROT SERPL-MCNC: 7.2 G/DL (ref 6.4–8.2)
SERVICE CMNT-IMP: ABNORMAL
SERVICE CMNT-IMP: ABNORMAL
SODIUM SERPL-SCNC: 129 MMOL/L (ref 136–145)

## 2025-06-14 PROCEDURE — 6360000002 HC RX W HCPCS: Performed by: INTERNAL MEDICINE

## 2025-06-14 PROCEDURE — 6370000000 HC RX 637 (ALT 250 FOR IP): Performed by: INTERNAL MEDICINE

## 2025-06-14 PROCEDURE — 80053 COMPREHEN METABOLIC PANEL: CPT

## 2025-06-14 PROCEDURE — 82962 GLUCOSE BLOOD TEST: CPT

## 2025-06-14 PROCEDURE — 2500000003 HC RX 250 WO HCPCS: Performed by: INTERNAL MEDICINE

## 2025-06-14 PROCEDURE — 6370000000 HC RX 637 (ALT 250 FOR IP): Performed by: NURSE PRACTITIONER

## 2025-06-14 PROCEDURE — 93297 REM INTERROG DEV EVAL ICPMS: CPT | Performed by: INTERNAL MEDICINE

## 2025-06-14 PROCEDURE — P9047 ALBUMIN (HUMAN), 25%, 50ML: HCPCS | Performed by: INTERNAL MEDICINE

## 2025-06-14 RX ORDER — POTASSIUM CHLORIDE 750 MG/1
40 TABLET, EXTENDED RELEASE ORAL EVERY 4 HOURS
Status: COMPLETED | OUTPATIENT
Start: 2025-06-14 | End: 2025-06-14

## 2025-06-14 RX ORDER — SPIRONOLACTONE 100 MG/1
200 TABLET, FILM COATED ORAL DAILY
Qty: 60 TABLET | Refills: 0 | Status: SHIPPED | OUTPATIENT
Start: 2025-06-15 | End: 2025-07-15

## 2025-06-14 RX ORDER — LACTULOSE 10 G/15ML
20 SOLUTION ORAL 2 TIMES DAILY
Qty: 1800 ML | Refills: 0 | Status: SHIPPED | OUTPATIENT
Start: 2025-06-14 | End: 2025-07-14

## 2025-06-14 RX ORDER — LANOLIN ALCOHOL/MO/W.PET/CERES
100 CREAM (GRAM) TOPICAL DAILY
Qty: 30 TABLET | Refills: 3 | Status: SHIPPED | OUTPATIENT
Start: 2025-06-15

## 2025-06-14 RX ORDER — FOLIC ACID 1 MG/1
1 TABLET ORAL DAILY
Qty: 30 TABLET | Refills: 3 | Status: SHIPPED | OUTPATIENT
Start: 2025-06-15

## 2025-06-14 RX ADMIN — ALBUMIN (HUMAN) 25 G: 0.25 INJECTION, SOLUTION INTRAVENOUS at 01:21

## 2025-06-14 RX ADMIN — Medication 400 MG: at 08:29

## 2025-06-14 RX ADMIN — POTASSIUM CHLORIDE 40 MEQ: 750 TABLET, EXTENDED RELEASE ORAL at 08:38

## 2025-06-14 RX ADMIN — DIGOXIN 125 MCG: 125 TABLET ORAL at 08:30

## 2025-06-14 RX ADMIN — LEVOFLOXACIN 750 MG: 750 INJECTION, SOLUTION INTRAVENOUS at 07:53

## 2025-06-14 RX ADMIN — Medication 100 MG: at 08:29

## 2025-06-14 RX ADMIN — ALBUMIN (HUMAN) 25 G: 0.25 INJECTION, SOLUTION INTRAVENOUS at 09:35

## 2025-06-14 RX ADMIN — SODIUM CHLORIDE, PRESERVATIVE FREE 10 ML: 5 INJECTION INTRAVENOUS at 08:40

## 2025-06-14 RX ADMIN — POTASSIUM CHLORIDE 10 MEQ: 7.46 INJECTION, SOLUTION INTRAVENOUS at 05:34

## 2025-06-14 RX ADMIN — INSULIN LISPRO 6 UNITS: 100 INJECTION, SOLUTION INTRAVENOUS; SUBCUTANEOUS at 12:22

## 2025-06-14 RX ADMIN — METRONIDAZOLE 500 MG: 500 INJECTION, SOLUTION INTRAVENOUS at 06:44

## 2025-06-14 RX ADMIN — POTASSIUM CHLORIDE 10 MEQ: 7.46 INJECTION, SOLUTION INTRAVENOUS at 06:45

## 2025-06-14 RX ADMIN — INSULIN LISPRO 6 UNITS: 100 INJECTION, SOLUTION INTRAVENOUS; SUBCUTANEOUS at 07:48

## 2025-06-14 RX ADMIN — PANTOPRAZOLE SODIUM 40 MG: 40 INJECTION, POWDER, FOR SOLUTION INTRAVENOUS at 08:31

## 2025-06-14 RX ADMIN — POTASSIUM CHLORIDE 40 MEQ: 750 TABLET, FILM COATED, EXTENDED RELEASE ORAL at 12:23

## 2025-06-14 RX ADMIN — LACTULOSE 20 G: 20 SOLUTION ORAL at 08:30

## 2025-06-14 RX ADMIN — FUROSEMIDE 80 MG: 40 TABLET ORAL at 08:29

## 2025-06-14 RX ADMIN — OXYCODONE 5 MG: 5 TABLET ORAL at 01:32

## 2025-06-14 RX ADMIN — Medication 1 MG: at 08:29

## 2025-06-14 RX ADMIN — RIFAXIMIN 550 MG: 550 TABLET ORAL at 08:29

## 2025-06-14 RX ADMIN — INSULIN LISPRO 8 UNITS: 100 INJECTION, SOLUTION INTRAVENOUS; SUBCUTANEOUS at 07:17

## 2025-06-14 RX ADMIN — VENLAFAXINE 75 MG: 37.5 TABLET ORAL at 08:29

## 2025-06-14 RX ADMIN — ROSUVASTATIN 10 MG: 10 TABLET, FILM COATED ORAL at 08:30

## 2025-06-14 RX ADMIN — POTASSIUM CHLORIDE 10 MEQ: 7.46 INJECTION, SOLUTION INTRAVENOUS at 04:24

## 2025-06-14 RX ADMIN — POTASSIUM CHLORIDE 40 MEQ: 750 TABLET, FILM COATED, EXTENDED RELEASE ORAL at 09:35

## 2025-06-14 RX ADMIN — THERA TABS 1 TABLET: TAB at 08:29

## 2025-06-14 ASSESSMENT — PAIN DESCRIPTION - ORIENTATION: ORIENTATION: ANTERIOR;MID

## 2025-06-14 ASSESSMENT — PAIN - FUNCTIONAL ASSESSMENT: PAIN_FUNCTIONAL_ASSESSMENT: PREVENTS OR INTERFERES SOME ACTIVE ACTIVITIES AND ADLS

## 2025-06-14 ASSESSMENT — PAIN SCALES - GENERAL: PAINLEVEL_OUTOF10: 5

## 2025-06-14 ASSESSMENT — PAIN DESCRIPTION - DESCRIPTORS: DESCRIPTORS: ACHING;SHARP;OTHER (COMMENT)

## 2025-06-14 ASSESSMENT — PAIN DESCRIPTION - LOCATION: LOCATION: ABDOMEN

## 2025-06-14 NOTE — PLAN OF CARE
Problem: Discharge Planning  Goal: Discharge to home or other facility with appropriate resources  6/14/2025 0355 by Kacie Freitas RN  Outcome: Progressing  6/13/2025 1427 by Verito Aldridge RN  Outcome: Progressing     Problem: Pain  Goal: Verbalizes/displays adequate comfort level or baseline comfort level  6/14/2025 0355 by Kacie Freitas RN  Outcome: Progressing  6/13/2025 1427 by Verito Aldridge RN  Outcome: Progressing     Problem: Gastrointestinal - Adult  Goal: Minimal or absence of nausea and vomiting  6/14/2025 0355 by Kacie Freitas RN  Outcome: Progressing  6/13/2025 1427 by Verito Aldridge RN  Outcome: Progressing     Problem: Safety - Adult  Goal: Free from fall injury  6/14/2025 0355 by Kacie Freitas RN  Outcome: Progressing  6/13/2025 1427 by Verito Aldridge RN  Outcome: Progressing

## 2025-06-14 NOTE — DISCHARGE INSTRUCTIONS
Discharge Instructions       PATIENT ID: Viry Andrade  MRN: [id number]   YOB: 1980    DATE OF ADMISSION: 6/11/2025   DATE OF DISCHARGE: 6/14/2025    PRIMARY CARE PROVIDER: None, None     ATTENDING PHYSICIAN: Wendie Zhang MD   DISCHARGING PROVIDER: KARINE Ramirez NP    To contact this individual call [phone] and ask the  to page.   If unavailable ask to be transferred the Adult Hospitalist Department.    DISCHARGE DIAGNOSES ascites, abdominal fluid due to cirrhosis    CONSULTATIONS: IP CONSULT TO SOCIAL WORK  IP CONSULT TO HEPATOLOGY    PROCEDURES/SURGERIES: * No surgery found *    PENDING TEST RESULTS:   At the time of discharge the following test results are still pending:     FOLLOW UP APPOINTMENTS:   Follow-up Information         Follow up With Specialties Details Why Contact Info     [facility]   Schedule an appointment as soon as possible for a visit   [address], [address]  [place] [place] [place] [address] [phone]                   ADDITIONAL CARE RECOMMENDATIONS:      DIET: Low-salt diet        ACTIVITY: activity as tolerated      DISCHARGE MEDICATIONS:   See Medication Reconciliation Form    It is important that you take the medication exactly as they are prescribed.   Keep your medication in the bottles provided by the pharmacist and keep a list of the medication names, dosages, and times to be taken in your wallet.   Do not take other medications without consulting your doctor.       NOTIFY YOUR PHYSICIAN FOR ANY OF THE FOLLOWING:   Fever over 101 degrees for 24 hours.   Chest pain, shortness of breath, fever, chills, nausea, vomiting, diarrhea, change in mentation, falling, weakness, bleeding. Severe pain or pain not relieved by medications.  Or, any other signs or symptoms that you may have questions about.      DISPOSITION:    Home With:   OT  PT  HH  RN       SNF/Inpatient Rehab/LTAC    Independent/assisted

## 2025-06-14 NOTE — PROGRESS NOTES
This RN contacted on call provider for critical lab results, Potassium 2.7. On call provider responded. Will follow PRN orders for IV potassium replacement.

## 2025-06-14 NOTE — DISCHARGE SUMMARY
Discharge Summary       PATIENT ID: Viry Andrade  MRN: [id number]   YOB: 1980    DATE OF ADMISSION: 6/11/2025  4:07 PM    DATE OF DISCHARGE: 6/14/2025   PRIMARY CARE PROVIDER: None, None     ATTENDING PHYSICIAN: LEXX Zhang MD  DISCHARGING PROVIDER: KARINE Ramirez NP    To contact this individual call [phone] and ask the  to page.  If unavailable ask to be transferred the Adult Hospitalist Department.    CONSULTATIONS: IP CONSULT TO SOCIAL WORK  IP CONSULT TO HEPATOLOGY    PROCEDURES/SURGERIES: * No surgery found *     ADMITTING DIAGNOSES & HOSPITAL COURSE:   Per H&P  Viry Andrade is a 44 y.o. female with past medical history significant for heart failure with reduced ejection fraction, liver cirrhosis, nonischemic cardiomyopathy s/p ICD placement, type 2 diabetes, hypertension, dyslipidemia, tobacco and alcohol use disorder presented at the emergency room with abdominal distention and pain.  Patient symptoms started several days ago and progressively getting worse.  The abdominal pain is diffuse in location, no radiation, no known aggravating or relieving factors, pressure-like and 5/10 in severity.  Patient is under the care of hepatologist and she is scheduled for abdominal paracentesis in about a week for patient was advised to come to the emergency room today because of worsening abdominal pain and distention.  Patient denies a fever, rigors and chills.  Patient has not had abdominal paracentesis in the past.  No record of a prior admission to this hospital.  Patient is not able to provide good history because of the acuity of her condition.  CT of the abdomen and pelvis done in the emergency room showed acute hepatitis superimposed on hepatic steatosis and cirrhosis as well as small to moderate volume of ascites.  Serum alcohol level was 213.  She was referred to the hospitalist service for admission.        DISCHARGE DIAGNOSES / PLAN:      Alcoholic liver cirrhosis  ALDACTONE  Take 2 tablets by mouth daily  Start taking on: Josefina 15, 2025  What changed: See the new instructions.            CONTINUE taking these medications      ALPRAZolam 1 MG tablet  Commonly known as: XANAX     amphetamine-dextroamphetamine 20 MG tablet  Commonly known as: ADDERALL     Dexcom G6  Judy     Dexcom G6 Sensor Misc     Dexcom G6 Transmitter Misc     digoxin 125 MCG tablet  Commonly known as: LANOXIN  Take 1 tablet by mouth daily     furosemide 80 MG tablet  Commonly known as: LASIX     ibuprofen 200 MG Caps capsule  Commonly known as: ADVIL;MOTRIN     insulin lispro 100 UNIT/ML Soln injection vial  Commonly known as: HUMALOG,ADMELOG     lipase-protease-amylase 67311-40643 units Cpep delayed release capsule  Commonly known as: Zenpep  Take 1 capsule by mouth 3 times daily (with meals)     magnesium oxide 400 MG tablet  Commonly known as: MAG-OX  Take 1 tablet by mouth 2 times daily     metoprolol succinate 25 MG extended release tablet  Commonly known as: Toprol XL  Take 1 tablet by mouth daily     nicotine 21 MG/24HR  Commonly known as: NICODERM CQ  Place 1 patch onto the skin daily     Omnipod 5 [id number] Pods Gen 5 Misc     potassium chloride 20 MEQ extended release tablet  Commonly known as: KLOR-CON M     venlafaxine 75 MG tablet  Commonly known as: EFFEXOR     vitamin D 1.25 MG (90875 UT) Caps capsule  Commonly known as: ERGOCALCIFEROL  Take 1 capsule by mouth once a week            STOP taking these medications      acetaminophen 500 MG tablet  Commonly known as: TYLENOL     ivabradine 5 MG Tabs tablet  Commonly known as: Corlanor     Potassium Chloride 10 % Soln oral solution  Commonly known as: KAON 10 %     pregabalin 50 MG capsule  Commonly known as: LYRICA     rosuvastatin 10 MG tablet  Commonly known as: CRESTOR     torsemide 20 MG tablet  Commonly known as: DEMADEX               Where to Get Your Medications        These medications were sent to [id number] -

## 2025-06-16 ENCOUNTER — HOSPITAL ENCOUNTER (OUTPATIENT)
Facility: HOSPITAL | Age: 45
Discharge: HOME | End: 2025-06-19
Payer: COMMERCIAL

## 2025-06-16 VITALS
OXYGEN SATURATION: 97 % | DIASTOLIC BLOOD PRESSURE: 74 MMHG | SYSTOLIC BLOOD PRESSURE: 99 MMHG | RESPIRATION RATE: 14 BRPM | HEART RATE: 74 BPM

## 2025-06-16 DIAGNOSIS — K70.31 ALCOHOLIC CIRRHOSIS OF LIVER WITH ASCITES (HCC): ICD-10-CM

## 2025-06-16 LAB
APPEARANCE FLD: ABNORMAL
BACTERIA SPEC CULT: NORMAL
BACTERIA SPEC CULT: NORMAL
COLOR FLD: ABNORMAL
COMMENT:: NORMAL
ECHO BSA: 1.68 M2
GRAM STN SPEC: NORMAL
GRAM STN SPEC: NORMAL
LYMPHOCYTES NFR FLD: 32 %
MESOTHL CELL NFR FLD: 2 %
MONOS+MACROS NFR FLD: 66 %
NUC CELL # FLD: 498 /CU MM
RBC # FLD: >100 /CU MM
SERVICE CMNT-IMP: NORMAL
SPECIMEN HOLD: NORMAL
SPECIMEN SOURCE FLD: ABNORMAL

## 2025-06-16 PROCEDURE — 6360000002 HC RX W HCPCS

## 2025-06-16 PROCEDURE — 49083 ABD PARACENTESIS W/IMAGING: CPT

## 2025-06-16 PROCEDURE — 76942 ECHO GUIDE FOR BIOPSY: CPT

## 2025-06-16 PROCEDURE — 89050 BODY FLUID CELL COUNT: CPT

## 2025-06-16 RX ORDER — LIDOCAINE HYDROCHLORIDE 10 MG/ML
INJECTION, SOLUTION EPIDURAL; INFILTRATION; INTRACAUDAL; PERINEURAL PRN
Status: COMPLETED | OUTPATIENT
Start: 2025-06-16 | End: 2025-06-16

## 2025-06-16 RX ADMIN — LIDOCAINE HYDROCHLORIDE 10 ML: 10 INJECTION, SOLUTION EPIDURAL; INFILTRATION; INTRACAUDAL; PERINEURAL at 15:05

## 2025-06-16 NOTE — PROGRESS NOTES
Pt arrives ambulatory to angio department accompanied by  for US guided paracentesis procedure. All assessments completed and consent was reviewed.  Education given was regarding procedure, no sedation, post-procedure care and  management/follow-up. Opportunity for questions was provided and all questions and concerns were addressed.

## 2025-06-17 ENCOUNTER — RESULTS FOLLOW-UP (OUTPATIENT)
Age: 45
End: 2025-06-17

## 2025-06-17 LAB
AFP L3 MFR SERPL: 6.6 % (ref 0–9.9)
AFP SERPL-MCNC: 5.4 NG/ML (ref 0–6.4)

## 2025-06-18 ENCOUNTER — OFFICE VISIT (OUTPATIENT)
Age: 45
End: 2025-06-18
Payer: COMMERCIAL

## 2025-06-18 VITALS
TEMPERATURE: 98.2 F | HEIGHT: 63 IN | HEART RATE: 105 BPM | BODY MASS INDEX: 24.38 KG/M2 | DIASTOLIC BLOOD PRESSURE: 65 MMHG | OXYGEN SATURATION: 96 % | WEIGHT: 137.6 LBS | SYSTOLIC BLOOD PRESSURE: 104 MMHG

## 2025-06-18 DIAGNOSIS — K70.9 ALCOHOL INDUCED LIVER DISORDER: ICD-10-CM

## 2025-06-18 DIAGNOSIS — K70.31 ASCITES DUE TO ALCOHOLIC CIRRHOSIS (HCC): ICD-10-CM

## 2025-06-18 DIAGNOSIS — R10.11 RIGHT UPPER QUADRANT ABDOMINAL PAIN: ICD-10-CM

## 2025-06-18 DIAGNOSIS — K74.69 OTHER CIRRHOSIS OF LIVER (HCC): Primary | ICD-10-CM

## 2025-06-18 PROCEDURE — 99214 OFFICE O/P EST MOD 30 MIN: CPT | Performed by: NURSE PRACTITIONER

## 2025-06-18 RX ORDER — DISULFIRAM 250 MG/1
TABLET ORAL
Qty: 88 TABLET | Refills: 0 | Status: SHIPPED | OUTPATIENT
Start: 2025-06-18 | End: 2025-08-31

## 2025-06-18 RX ORDER — OXYCODONE HYDROCHLORIDE 5 MG/1
5 TABLET ORAL EVERY 6 HOURS PRN
Qty: 12 TABLET | Refills: 0 | Status: SHIPPED | OUTPATIENT
Start: 2025-06-18 | End: 2025-06-21

## 2025-06-18 ASSESSMENT — PATIENT HEALTH QUESTIONNAIRE - PHQ9
1. LITTLE INTEREST OR PLEASURE IN DOING THINGS: NOT AT ALL
SUM OF ALL RESPONSES TO PHQ QUESTIONS 1-9: 0
2. FEELING DOWN, DEPRESSED OR HOPELESS: NOT AT ALL
SUM OF ALL RESPONSES TO PHQ QUESTIONS 1-9: 0
SUM OF ALL RESPONSES TO PHQ QUESTIONS 1-9: 0
DEPRESSION UNABLE TO ASSESS: FUNCTIONAL CAPACITY MOTIVATION LIMITS ACCURACY
SUM OF ALL RESPONSES TO PHQ QUESTIONS 1-9: 0

## 2025-06-18 NOTE — PROGRESS NOTES
Chief Complaint   Patient presents with    Follow-up     N/A     Vitals:    06/18/25 1327   BP: 104/65   BP Site: Left Upper Arm   Patient Position: Sitting   Pulse: (!) 105   Temp: 98.2 °F (36.8 °C)   TempSrc: Temporal   SpO2: 96%   Weight: 62.4 kg (137 lb 9.6 oz)   Height: 1.6 m (5' 3\")     .  \"Have you been to the ER, urgent care clinic since your last visit?  Hospitalized since your last visit?\"    YES - When: approximately 6/11/25-6/14/25 ago.  Where and Why: [facility].    “Have you seen or consulted any other health care providers outside of [facility] since your last visit?”    NO    Have you had a mammogram?”   NO    No breast cancer screening on file             Click Here for Release of Records Request

## 2025-06-18 NOTE — PROGRESS NOTES
[facility]     Chaka Burns MD, FACP, MACG, FAASLD   MD Sharon Sunshine, ELMIRA Molina, [facility]-BC   Luda Laguerre, [facility]  Mikey Winslow, FNP-C   Angelica Mackay, [facility]         Liver [facility]   [address], [address]   [place]  [address] [phone]   FAX: [phone]  [facility]   [address], [address]   [place]  [address] [phone]   FAX: [phone]       Patient Care Team:  None, None as PCP - Netta Dillon APRN - NP as Nurse Practitioner  Harinder Block II, MD as Gynecologist (Obstetrics & Gynecology)      Patient Active Problem List   Diagnosis    Alcohol intoxication    Thrombocytopenia, unspecified    Sinus tachycardia    Hepatitis    Obesity (BMI 30-39.9)    Recurrent incisional hernia    Alcohol abuse    Anxiety    Diabetes (HCC)    Estrogen increased    Elevated liver enzymes    Generalized abdominal wall pain    Chronic systolic heart failure (HCC)    Non-ischemic cardiomyopathy (HCC)    CHF (congestive heart failure) (HCC)    Nonischemic cardiomyopathy (HCC)    Exocrine pancreatic insufficiency    Tobacco abuse    Alcoholic cirrhosis of liver with ascites (HCC)       Viry Andrade is being seen at [facility] for management of likely cirrhosis that is secondary to cardiac dysfunction vs alcohol induced liver disease.  The active problem list, all pertinent past medical history, medications, liver histology, radiologic findings and laboratory findings related to the liver disorder were reviewed and discussed with the patient.      The patient is a 44 y.o. female who was found to have chronic liver disease and  cirrhosis in 8/2023 when undergoing abdominal CT for evaluation of new onset anasarca with SOB.  The

## 2025-06-19 LAB
ALBUMIN SERPL-MCNC: 3.5 G/DL (ref 3.9–4.9)
ALP SERPL-CCNC: 439 IU/L (ref 44–121)
ALT SERPL-CCNC: 26 IU/L (ref 0–32)
AST SERPL-CCNC: 168 IU/L (ref 0–40)
BASOPHILS # BLD AUTO: 0.2 X10E3/UL (ref 0–0.2)
BASOPHILS NFR BLD AUTO: 2 %
BILIRUB SERPL-MCNC: 2 MG/DL (ref 0–1.2)
BUN SERPL-MCNC: 9 MG/DL (ref 6–24)
BUN/CREAT SERPL: 15 (ref 9–23)
CALCIUM SERPL-MCNC: 9.6 MG/DL (ref 8.7–10.2)
CHLORIDE SERPL-SCNC: 95 MMOL/L (ref 96–106)
CO2 SERPL-SCNC: 26 MMOL/L (ref 20–29)
CREAT SERPL-MCNC: 0.61 MG/DL (ref 0.57–1)
EGFRCR SERPLBLD CKD-EPI 2021: 112 ML/MIN/1.73
EOSINOPHIL # BLD AUTO: 0.2 X10E3/UL (ref 0–0.4)
EOSINOPHIL NFR BLD AUTO: 2 %
ERYTHROCYTE [DISTWIDTH] IN BLOOD BY AUTOMATED COUNT: 17.7 % (ref 11.7–15.4)
GLOBULIN SER CALC-MCNC: 3.2 G/DL (ref 1.5–4.5)
GLUCOSE SERPL-MCNC: 138 MG/DL (ref 70–99)
HCT VFR BLD AUTO: 34.6 % (ref 34–46.6)
HGB BLD-MCNC: 11.1 G/DL (ref 11.1–15.9)
IMM GRANULOCYTES # BLD AUTO: 0 X10E3/UL (ref 0–0.1)
IMM GRANULOCYTES NFR BLD AUTO: 0 %
INR PPP: 1.1 (ref 0.9–1.2)
LYMPHOCYTES # BLD AUTO: 2.3 X10E3/UL (ref 0.7–3.1)
LYMPHOCYTES NFR BLD AUTO: 22 %
MCH RBC QN AUTO: 29.7 PG (ref 26.6–33)
MCHC RBC AUTO-ENTMCNC: 32.1 G/DL (ref 31.5–35.7)
MCV RBC AUTO: 93 FL (ref 79–97)
MONOCYTES # BLD AUTO: 1.4 X10E3/UL (ref 0.1–0.9)
MONOCYTES NFR BLD AUTO: 13 %
NEUTROPHILS # BLD AUTO: 6.4 X10E3/UL (ref 1.4–7)
NEUTROPHILS NFR BLD AUTO: 61 %
PLATELET # BLD AUTO: 165 X10E3/UL (ref 150–450)
POTASSIUM SERPL-SCNC: 3.7 MMOL/L (ref 3.5–5.2)
PROT SERPL-MCNC: 6.7 G/DL (ref 6–8.5)
PROTHROMBIN TIME: 12.4 SEC (ref 9.1–12)
RBC # BLD AUTO: 3.74 X10E6/UL (ref 3.77–5.28)
SODIUM SERPL-SCNC: 137 MMOL/L (ref 134–144)
WBC # BLD AUTO: 10.5 X10E3/UL (ref 3.4–10.8)

## 2025-06-20 ENCOUNTER — RESULTS FOLLOW-UP (OUTPATIENT)
Age: 45
End: 2025-06-20

## 2025-06-24 DIAGNOSIS — K70.31 ALCOHOLIC CIRRHOSIS OF LIVER WITH ASCITES (HCC): Primary | ICD-10-CM

## 2025-06-24 RX ORDER — SPIRONOLACTONE 100 MG/1
300 TABLET, FILM COATED ORAL DAILY
Qty: 90 TABLET | Refills: 2 | Status: SHIPPED | OUTPATIENT
Start: 2025-06-24 | End: 2025-09-22

## 2025-06-25 ENCOUNTER — CLINICAL DOCUMENTATION (OUTPATIENT)
Age: 45
End: 2025-06-25

## 2025-07-03 ENCOUNTER — RESULTS FOLLOW-UP (OUTPATIENT)
Age: 45
End: 2025-07-03

## 2025-07-03 ENCOUNTER — HOSPITAL ENCOUNTER (OUTPATIENT)
Facility: HOSPITAL | Age: 45
Discharge: HOME | End: 2025-07-03
Payer: COMMERCIAL

## 2025-07-03 VITALS
RESPIRATION RATE: 16 BRPM | DIASTOLIC BLOOD PRESSURE: 71 MMHG | SYSTOLIC BLOOD PRESSURE: 99 MMHG | TEMPERATURE: 97.9 F | OXYGEN SATURATION: 95 %

## 2025-07-03 DIAGNOSIS — K70.31 ASCITES DUE TO ALCOHOLIC CIRRHOSIS (HCC): ICD-10-CM

## 2025-07-03 DIAGNOSIS — K74.69 OTHER CIRRHOSIS OF LIVER (HCC): ICD-10-CM

## 2025-07-03 LAB
APPEARANCE FLD: ABNORMAL
COLOR FLD: ABNORMAL
COMMENT:: NORMAL
LYMPHOCYTES NFR FLD: 26 %
MONOS+MACROS NFR FLD: 64 %
NEUTROPHILS NFR FLD: 10 %
NUC CELL # FLD: 978 /CU MM
RBC # FLD: >100 /CU MM
SPECIMEN HOLD: NORMAL
SPECIMEN SOURCE FLD: ABNORMAL

## 2025-07-03 PROCEDURE — 76942 ECHO GUIDE FOR BIOPSY: CPT

## 2025-07-03 PROCEDURE — 6360000002 HC RX W HCPCS: Performed by: PHYSICIAN ASSISTANT

## 2025-07-03 PROCEDURE — 89050 BODY FLUID CELL COUNT: CPT

## 2025-07-03 RX ORDER — LIDOCAINE HYDROCHLORIDE 10 MG/ML
INJECTION, SOLUTION EPIDURAL; INFILTRATION; INTRACAUDAL; PERINEURAL PRN
Status: COMPLETED | OUTPATIENT
Start: 2025-07-03 | End: 2025-07-03

## 2025-07-03 RX ADMIN — LIDOCAINE HYDROCHLORIDE 10 ML: 10 INJECTION, SOLUTION EPIDURAL; INFILTRATION; INTRACAUDAL; PERINEURAL at 13:54

## 2025-07-03 NOTE — PROGRESS NOTES
Pt arrives ambulatory to angio department accompanied by  for paracentesis procedure. All assessments completed and consent was reviewed.  Education given was regarding procedure, post-procedure care and  management/follow-up. Opportunity for questions was provided and all questions and concerns were addressed.

## 2025-07-09 ENCOUNTER — OFFICE VISIT (OUTPATIENT)
Age: 45
End: 2025-07-09
Payer: COMMERCIAL

## 2025-07-09 ENCOUNTER — PROCEDURE VISIT (OUTPATIENT)
Age: 45
End: 2025-07-09

## 2025-07-09 VITALS — HEIGHT: 63 IN | WEIGHT: 132.6 LBS | BODY MASS INDEX: 23.5 KG/M2

## 2025-07-09 VITALS
OXYGEN SATURATION: 97 % | RESPIRATION RATE: 16 BRPM | HEIGHT: 63 IN | DIASTOLIC BLOOD PRESSURE: 62 MMHG | BODY MASS INDEX: 23.5 KG/M2 | WEIGHT: 132.6 LBS | HEART RATE: 76 BPM | SYSTOLIC BLOOD PRESSURE: 90 MMHG

## 2025-07-09 DIAGNOSIS — Z95.810 ICD (IMPLANTABLE CARDIOVERTER-DEFIBRILLATOR) IN PLACE: ICD-10-CM

## 2025-07-09 DIAGNOSIS — E10.42 TYPE 1 DIABETES MELLITUS WITH DIABETIC POLYNEUROPATHY (HCC): ICD-10-CM

## 2025-07-09 DIAGNOSIS — I50.9 CONGESTIVE HEART FAILURE, UNSPECIFIED HF CHRONICITY, UNSPECIFIED HEART FAILURE TYPE (HCC): Primary | ICD-10-CM

## 2025-07-09 DIAGNOSIS — I42.8 NON-ISCHEMIC CARDIOMYOPATHY (HCC): ICD-10-CM

## 2025-07-09 DIAGNOSIS — K70.31 ALCOHOLIC CIRRHOSIS OF LIVER WITH ASCITES (HCC): ICD-10-CM

## 2025-07-09 DIAGNOSIS — Z95.810 CARDIAC DEFIBRILLATOR IN PLACE: Primary | ICD-10-CM

## 2025-07-09 PROCEDURE — 99214 OFFICE O/P EST MOD 30 MIN: CPT | Performed by: INTERNAL MEDICINE

## 2025-07-09 PROCEDURE — G2211 COMPLEX E/M VISIT ADD ON: HCPCS | Performed by: INTERNAL MEDICINE

## 2025-07-09 PROCEDURE — 3051F HG A1C>EQUAL 7.0%<8.0%: CPT | Performed by: INTERNAL MEDICINE

## 2025-07-09 NOTE — PROGRESS NOTES
Room #: 4    Chief Complaint   Patient presents with    Follow-up    Device Check       Vitals:    07/09/25 1335   BP: 90/62   BP Site: Left Upper Arm   Patient Position: Sitting   BP Cuff Size: Medium Adult   Pulse: 76   Resp: 16   SpO2: 97%   Weight: 60.1 kg (132 lb 9.6 oz)   Height: 1.6 m (5' 3\")         Chest pain:  NO    Have you been to the ER, urgent care, or hospitalized outside of [facility] since your last visit?   YES - [facility] - December 2024    Refills:  NO

## 2025-07-09 NOTE — PROGRESS NOTES
Cardiac Electrophysiology OFFICE Consultation Note       Assessment/Plan:   1. Congestive heart failure, unspecified HF chronicity, unspecified heart failure type (HCC)  2. ICD (implantable cardioverter-defibrillator) in place  3. Non-ischemic cardiomyopathy (HCC)  4. Alcoholic cirrhosis of liver with ascites (HCC)  5. Type 1 diabetes mellitus with diabetic polyneuropathy (HCC)      Primary Cardiologist: [facility]     Chronic systolic congestive heart failure:   Severe niCMP, LVEF of 20-25%. LV dilation and evidence of nonischemic cardiomyopathy with prior CHF exacerbation and New York Heart Association class III/ambulatory 4. History of severe alcohol use.   - Followed by advanced heart failure at [facility]  - Continue Toprol-XL, ivabradine, and spironolactone   - Unable to tolerate ACE inhibitor or Entresto due to hypotension  - Jardiance contraindicated in type 1 diabetes  - Cardiac MRI 2/22/24 at [facility]. EF 34%  - Echo 11/27/24: EF 55-60%  - Now s/p single chamber ICD implant on 3/18/2024     [employer] single chamber ICD (DOI 3/18/2024): Device check today shows proper lead & generator function. Generator longevity estimated 13.5 years. RV <1%. No sustained VT/VF. Iterative programming was performed to test the leads sensing and threshold parameters without any permanent changes.   - Continue remote device transmissions every 3 months  - Follow-up in EP clinic in 1 year or sooner with any concerns     Type 1 diabetes:   A1c 7.8 6/12/25  - Has insulin pump  - On statin therapy     Alcohol abuse  - Emphasized importance of alcohol relation to severe cardiomyopathy     Cirrhosis  Requiring recurrent paracetesis  - last removed 2L    Patient is an established patient with plan for longitudinal relationship and ongoing assessment and management of  arrhythmias recurrence, monitor, labs, remote transmission, device management) as a focal point of continued medical care.   - FU in EP in 1 year with Q3 months

## 2025-07-10 ENCOUNTER — OFFICE VISIT (OUTPATIENT)
Age: 45
End: 2025-07-10
Payer: COMMERCIAL

## 2025-07-10 VITALS
DIASTOLIC BLOOD PRESSURE: 81 MMHG | WEIGHT: 131.4 LBS | HEART RATE: 101 BPM | TEMPERATURE: 98.3 F | OXYGEN SATURATION: 96 % | SYSTOLIC BLOOD PRESSURE: 115 MMHG | BODY MASS INDEX: 23.28 KG/M2 | HEIGHT: 63 IN

## 2025-07-10 DIAGNOSIS — K70.31 ALCOHOLIC CIRRHOSIS OF LIVER WITH ASCITES (HCC): ICD-10-CM

## 2025-07-10 DIAGNOSIS — G31.2 ALCOHOLIC ENCEPHALOPATHY: Primary | ICD-10-CM

## 2025-07-10 DIAGNOSIS — L29.81 CHOLESTATIC PRURITUS: ICD-10-CM

## 2025-07-10 PROCEDURE — 99215 OFFICE O/P EST HI 40 MIN: CPT | Performed by: NURSE PRACTITIONER

## 2025-07-10 ASSESSMENT — PATIENT HEALTH QUESTIONNAIRE - PHQ9
SUM OF ALL RESPONSES TO PHQ QUESTIONS 1-9: 0
SUM OF ALL RESPONSES TO PHQ QUESTIONS 1-9: 0
1. LITTLE INTEREST OR PLEASURE IN DOING THINGS: NOT AT ALL
DEPRESSION UNABLE TO ASSESS: FUNCTIONAL CAPACITY MOTIVATION LIMITS ACCURACY
SUM OF ALL RESPONSES TO PHQ QUESTIONS 1-9: 0
2. FEELING DOWN, DEPRESSED OR HOPELESS: NOT AT ALL
SUM OF ALL RESPONSES TO PHQ QUESTIONS 1-9: 0

## 2025-07-10 NOTE — PROGRESS NOTES
Chief Complaint   Patient presents with    Follow-up     N/A     Vitals:    07/10/25 1254   BP: 115/81   BP Site: Left Upper Arm   Patient Position: Sitting   Pulse: (!) 101   Temp: 98.3 °F (36.8 °C)   TempSrc: Temporal   SpO2: 96%   Weight: 59.6 kg (131 lb 6.4 oz)   Height: 1.6 m (5' 3\")     .  \"Have you been to the ER, urgent care clinic since your last visit?  Hospitalized since your last visit?\"    NO    “Have you seen or consulted any other health care providers outside of [facility] since your last visit?”    NO    Have you had a mammogram?”   NO    No breast cancer screening on file             Click Here for Release of Records Request    
138 (H)           Latest Ref Rng 6/20/2024 12/10/2024   [facility] - Cancer Screening      AFP 0.0 - 6.4 ng/mL 9.8 (H)  3.4    AFP-L3% 0.0 - 9.9 % Comment  9.6          SEROLOGIES:   Latest Ref Rng 9/1/2023 10/26/2023 1/24/2024   [facility] - Serologies       Hep A Ab, Total Negative  Positive !      Hep B Surface Ag Negative  Negative      Hep B Core Ab, Total Negative  Negative      Hep B Surface Ab  Reactive      Ferritin 8 - 252 NG/  117     Iron % Saturation 20 - 50 % 8 (LL)  43  79 (H)    FRANKIE    Negative     FRANKIE Ab, Direct Negative  Negative      ASMCA 0 - 19 Units 22 (H)      M2 Ab 0.0 - 20.0 Units <20.0      Ceruloplasmin 19.0 - 39.0 mg/dL 53.1 (H)      Alpha-1 antitrypsin level 101 - 187 mg/dL 264 (H)           LIVER HISTOLOGY:  9/2023. Liver biopsy. Liver with macrovesicular steatosis, patchy parenchymal collapse, and sinusoidal dilatation and congestion. Perisinusoidal fibrosis, stage 2. MLS' review of biopsy slides were consistent with AUD and cirrhosis vs passive hepatic congestion.     ENDOSCOPIC PROCEDURES:  7/2024.  EGD performed by Dr. Garrett.  No esophageal varices.  No gastric varices.  Moderate portal gastropathy.      RADIOLOGY:  8/2023.  CT scan abdomen with IV contrast.  Changes consistent with cirrhosis. No liver mass lesions.  No dilated bile ducts.  Small ascites.  Dilated cardiomegaly with contrast reflux into prominent inferior vena cava and hepatic veins consistent with elevated right heart pressures with right heart failure and raises possibility of cardiogenic cirrhosis.  Status post distal pancreatectomy.  7/2024.  Triple phase CT scan.  Changes consistent with cirrhosis. No liver mass lesions.  No dilated bile ducts.  No ascites.  1/2025.  Ultrasound of liver.   Echogenic consistent with cirrhosis.  No liver mass lesions.  No dilated bile ducts.  No ascites.  7/2025.  CT abdomen with IV contrast.  Acute hepatitis, superimposed on hepatic steatosis and cirrhosis.  Small to moderate volume

## 2025-07-11 ENCOUNTER — LAB (OUTPATIENT)
Age: 45
End: 2025-07-11

## 2025-07-11 ENCOUNTER — OFFICE VISIT (OUTPATIENT)
Age: 45
End: 2025-07-11

## 2025-07-11 ENCOUNTER — TELEPHONE (OUTPATIENT)
Age: 45
End: 2025-07-11

## 2025-07-11 VITALS
HEART RATE: 83 BPM | WEIGHT: 128 LBS | OXYGEN SATURATION: 95 % | BODY MASS INDEX: 21.85 KG/M2 | HEIGHT: 64 IN | RESPIRATION RATE: 16 BRPM | DIASTOLIC BLOOD PRESSURE: 62 MMHG | SYSTOLIC BLOOD PRESSURE: 98 MMHG | TEMPERATURE: 97.4 F

## 2025-07-11 DIAGNOSIS — R41.89 COGNITIVE IMPAIRMENT: ICD-10-CM

## 2025-07-11 DIAGNOSIS — E66.9 OBESITY (BMI 30-39.9): ICD-10-CM

## 2025-07-11 DIAGNOSIS — D69.6 THROMBOCYTOPENIA, UNSPECIFIED: ICD-10-CM

## 2025-07-11 DIAGNOSIS — R74.8 ELEVATED LIVER ENZYMES: ICD-10-CM

## 2025-07-11 DIAGNOSIS — I50.9 CONGESTIVE HEART FAILURE, UNSPECIFIED HF CHRONICITY, UNSPECIFIED HEART FAILURE TYPE (HCC): ICD-10-CM

## 2025-07-11 DIAGNOSIS — K86.81 EXOCRINE PANCREATIC INSUFFICIENCY: ICD-10-CM

## 2025-07-11 DIAGNOSIS — I87.8 VENOUS CONGESTION: ICD-10-CM

## 2025-07-11 DIAGNOSIS — I50.9 CONGESTIVE HEART FAILURE, UNSPECIFIED HF CHRONICITY, UNSPECIFIED HEART FAILURE TYPE (HCC): Primary | ICD-10-CM

## 2025-07-11 RX ORDER — LANOLIN ALCOHOL/MO/W.PET/CERES
100 CREAM (GRAM) TOPICAL DAILY
Qty: 30 TABLET | Refills: 3 | Status: SHIPPED | OUTPATIENT
Start: 2025-07-11

## 2025-07-11 RX ORDER — METOPROLOL TARTRATE AND HYDROCHLOROTHIAZIDE 50; 25 MG/1; MG/1
TABLET ORAL
COMMUNITY

## 2025-07-11 RX ORDER — LACTULOSE 10 G/15ML
20 SOLUTION ORAL 3 TIMES DAILY
Qty: 2700 ML | Refills: 0 | Status: SHIPPED | OUTPATIENT
Start: 2025-07-11 | End: 2025-08-10

## 2025-07-11 RX ORDER — ZOLPIDEM TARTRATE 10 MG/1
10 TABLET ORAL NIGHTLY PRN
COMMUNITY
Start: 2025-06-20

## 2025-07-11 SDOH — ECONOMIC STABILITY: FOOD INSECURITY: WITHIN THE PAST 12 MONTHS, THE FOOD YOU BOUGHT JUST DIDN'T LAST AND YOU DIDN'T HAVE MONEY TO GET MORE.: NEVER TRUE

## 2025-07-11 SDOH — HEALTH STABILITY: PHYSICAL HEALTH: ON AVERAGE, HOW MANY MINUTES DO YOU ENGAGE IN EXERCISE AT THIS LEVEL?: 30 MIN

## 2025-07-11 SDOH — ECONOMIC STABILITY: INCOME INSECURITY: IN THE LAST 12 MONTHS, WAS THERE A TIME WHEN YOU WERE NOT ABLE TO PAY THE MORTGAGE OR RENT ON TIME?: NO

## 2025-07-11 SDOH — ECONOMIC STABILITY: FOOD INSECURITY: WITHIN THE PAST 12 MONTHS, YOU WORRIED THAT YOUR FOOD WOULD RUN OUT BEFORE YOU GOT MONEY TO BUY MORE.: NEVER TRUE

## 2025-07-11 SDOH — HEALTH STABILITY: PHYSICAL HEALTH: ON AVERAGE, HOW MANY DAYS PER WEEK DO YOU ENGAGE IN MODERATE TO STRENUOUS EXERCISE (LIKE A BRISK WALK)?: 1 DAY

## 2025-07-11 SDOH — ECONOMIC STABILITY: TRANSPORTATION INSECURITY
IN THE PAST 12 MONTHS, HAS LACK OF TRANSPORTATION KEPT YOU FROM MEETINGS, WORK, OR FROM GETTING THINGS NEEDED FOR DAILY LIVING?: NO

## 2025-07-11 SDOH — ECONOMIC STABILITY: TRANSPORTATION INSECURITY
IN THE PAST 12 MONTHS, HAS THE LACK OF TRANSPORTATION KEPT YOU FROM MEDICAL APPOINTMENTS OR FROM GETTING MEDICATIONS?: NO

## 2025-07-11 ASSESSMENT — PATIENT HEALTH QUESTIONNAIRE - PHQ9
10. IF YOU CHECKED OFF ANY PROBLEMS, HOW DIFFICULT HAVE THESE PROBLEMS MADE IT FOR YOU TO DO YOUR WORK, TAKE CARE OF THINGS AT HOME, OR GET ALONG WITH OTHER PEOPLE: VERY DIFFICULT
7. TROUBLE CONCENTRATING ON THINGS, SUCH AS READING THE NEWSPAPER OR WATCHING TELEVISION: MORE THAN HALF THE DAYS
8. MOVING OR SPEAKING SO SLOWLY THAT OTHER PEOPLE COULD HAVE NOTICED. OR THE OPPOSITE, BEING SO FIGETY OR RESTLESS THAT YOU HAVE BEEN MOVING AROUND A LOT MORE THAN USUAL: NOT AT ALL
SUM OF ALL RESPONSES TO PHQ QUESTIONS 1-9: 11
10. IF YOU CHECKED OFF ANY PROBLEMS, HOW DIFFICULT HAVE THESE PROBLEMS MADE IT FOR YOU TO DO YOUR WORK, TAKE CARE OF THINGS AT HOME, OR GET ALONG WITH OTHER PEOPLE: VERY DIFFICULT
3. TROUBLE FALLING OR STAYING ASLEEP: SEVERAL DAYS
4. FEELING TIRED OR HAVING LITTLE ENERGY: NEARLY EVERY DAY
1. LITTLE INTEREST OR PLEASURE IN DOING THINGS: MORE THAN HALF THE DAYS
9. THOUGHTS THAT YOU WOULD BE BETTER OFF DEAD, OR OF HURTING YOURSELF: NOT AT ALL
3. TROUBLE FALLING OR STAYING ASLEEP: SEVERAL DAYS
5. POOR APPETITE OR OVEREATING: SEVERAL DAYS
2. FEELING DOWN, DEPRESSED OR HOPELESS: SEVERAL DAYS
SUM OF ALL RESPONSES TO PHQ QUESTIONS 1-9: 11
9. THOUGHTS THAT YOU WOULD BE BETTER OFF DEAD, OR OF HURTING YOURSELF: NOT AT ALL
1. LITTLE INTEREST OR PLEASURE IN DOING THINGS: MORE THAN HALF THE DAYS
5. POOR APPETITE OR OVEREATING: SEVERAL DAYS
6. FEELING BAD ABOUT YOURSELF - OR THAT YOU ARE A FAILURE OR HAVE LET YOURSELF OR YOUR FAMILY DOWN: SEVERAL DAYS
6. FEELING BAD ABOUT YOURSELF - OR THAT YOU ARE A FAILURE OR HAVE LET YOURSELF OR YOUR FAMILY DOWN: SEVERAL DAYS
4. FEELING TIRED OR HAVING LITTLE ENERGY: NEARLY EVERY DAY
7. TROUBLE CONCENTRATING ON THINGS, SUCH AS READING THE NEWSPAPER OR WATCHING TELEVISION: MORE THAN HALF THE DAYS
2. FEELING DOWN, DEPRESSED OR HOPELESS: SEVERAL DAYS
SUM OF ALL RESPONSES TO PHQ QUESTIONS 1-9: 11
8. MOVING OR SPEAKING SO SLOWLY THAT OTHER PEOPLE COULD HAVE NOTICED. OR THE OPPOSITE - BEING SO FIDGETY OR RESTLESS THAT YOU HAVE BEEN MOVING AROUND A LOT MORE THAN USUAL: NOT AT ALL

## 2025-07-11 ASSESSMENT — ENCOUNTER SYMPTOMS
WHEEZING: 0
ANAL BLEEDING: 0
DIARRHEA: 0
ABDOMINAL DISTENTION: 0
SINUS PAIN: 0
SORE THROAT: 0
NAUSEA: 0
SHORTNESS OF BREATH: 0
SINUS PRESSURE: 0
CONSTIPATION: 0
BLOOD IN STOOL: 0
BACK PAIN: 0
VOMITING: 0
RHINORRHEA: 0
ABDOMINAL PAIN: 0
COUGH: 0
CHEST TIGHTNESS: 0

## 2025-07-11 NOTE — TELEPHONE ENCOUNTER
FYI:    Patient called to let Dr. Razo know she was unable to leave a urine specimen today. She will come in next week and get a cup to bring back.

## 2025-07-11 NOTE — PROGRESS NOTES
Identified pt with two pt identifiers(name and )    Chief Complaint   Patient presents with    New Patient     Patient is here to establish care, prior PCP  was [facility], no concerns     Labs Only     Patient is fasting, patient would like labs done hepatology wanted her to complete         Health Maintenance Due   Topic    Diabetic foot exam     Diabetic Alb to Cr ratio (uACR) test     Diabetic retinal exam     Hepatitis C screen     Hepatitis A vaccine (1 of 2 - Risk 2-dose series)    Hepatitis B vaccine (1 of 3 - 19+ 3-dose series)    DTaP/Tdap/Td vaccine (1 - Tdap)    Pneumococcal 0-49 years Vaccine (1 of 2 - PCV)    Breast cancer screen     COVID-19 Vaccine ( season)    Lipids        Wt Readings from Last 3 Encounters:   07/10/25 59.6 kg (131 lb 6.4 oz)   25 60.1 kg (132 lb 9.6 oz)   25 60.1 kg (132 lb 9.6 oz)     Temp Readings from Last 3 Encounters:   07/10/25 98.3 °F (36.8 °C) (Temporal)   25 97.9 °F (36.6 °C) (Oral)   25 98.2 °F (36.8 °C) (Temporal)     BP Readings from Last 3 Encounters:   07/10/25 115/81   25 90/62   25 99/71     Pulse Readings from Last 3 Encounters:   07/10/25 (!) 101   25 76   25 74           Depression Screening:  :         2025     8:22 AM 7/10/2025    12:59 PM 2025     1:33 PM 3/10/2025     1:15 PM 2025    10:34 AM 2024     9:24 AM 10/23/2024     1:17 PM   PHQ-9 Questionaire   Little interest or pleasure in doing things 2 0 0 0 0 1 0   Feeling down, depressed, or hopeless 1 0 0 0 1 1 0   Trouble falling or staying asleep, or sleeping too much 1         Feeling tired or having little energy 3         Poor appetite or overeating 1         Feeling bad about yourself - or that you are a failure or have let yourself or your family down 1         Trouble concentrating on things, such as reading the newspaper or watching television 2         Moving or speaking so slowly that other people 
six small meals daily. She is no longer seeing the surgeon who performed her pancreatectomy, Dr. Duane Gutierrez, as he refused to operate on her until she quit smoking.    She is currently on Effexor three times a day.    SOCIAL HISTORY  The patient admits to drinking a lot of alcohol for quite a while in the past.    Review of Systems   Constitutional:  Negative for activity change, appetite change, fatigue and fever.   HENT:  Negative for congestion, postnasal drip, rhinorrhea, sinus pressure, sinus pain, sneezing and sore throat.    Respiratory:  Negative for cough, chest tightness, shortness of breath and wheezing.    Cardiovascular:  Negative for chest pain, palpitations and leg swelling.   Gastrointestinal:  Negative for abdominal distention, abdominal pain, anal bleeding, blood in stool, constipation, diarrhea, nausea and vomiting.   Endocrine: Negative for cold intolerance, heat intolerance, polydipsia, polyphagia and polyuria.   Genitourinary:  Negative for dyspareunia, genital sores, hematuria, menstrual problem, pelvic pain, vaginal bleeding, vaginal discharge and vaginal pain.   Musculoskeletal:  Negative for arthralgias, back pain, gait problem, joint swelling and neck pain.   Skin:  Negative for pallor, rash and wound.   Allergic/Immunologic: Negative for environmental allergies, food allergies and immunocompromised state.   Hematological:  Negative for adenopathy. Does not bruise/bleed easily.   Psychiatric/Behavioral:  Negative for behavioral problems, decreased concentration, dysphoric mood, self-injury, sleep disturbance and suicidal ideas. The patient is not nervous/anxious.           Objective   Blood pressure 98/62, pulse 83, temperature 97.4 °F (36.3 °C), temperature source Temporal, resp. rate 16, height 1.613 m (5' 3.5\"), weight 58.1 kg (128 lb), SpO2 95%, not currently breastfeeding.  Physical Exam  Gastrointestinal: Distended abdomen with bloating noted.  Skin: Mottled skin with visible 
NCAT, no stridor
NCAT, chest clear, no stridor

## 2025-07-12 LAB
ALBUMIN SERPL-MCNC: 3.9 G/DL (ref 3.9–4.9)
ALP SERPL-CCNC: 308 IU/L (ref 44–121)
ALT SERPL-CCNC: 30 IU/L (ref 0–32)
AST SERPL-CCNC: 162 IU/L (ref 0–40)
BASOPHILS # BLD AUTO: 0.2 X10E3/UL (ref 0–0.2)
BASOPHILS NFR BLD AUTO: 2 %
BILIRUB DIRECT SERPL-MCNC: 0.63 MG/DL (ref 0–0.4)
BILIRUB SERPL-MCNC: 1 MG/DL (ref 0–1.2)
BUN SERPL-MCNC: 9 MG/DL (ref 6–24)
BUN/CREAT SERPL: 13 (ref 9–23)
CALCIUM SERPL-MCNC: 10.2 MG/DL (ref 8.7–10.2)
CHLORIDE SERPL-SCNC: 99 MMOL/L (ref 96–106)
CHOLEST SERPL-MCNC: 196 MG/DL (ref 100–199)
CO2 SERPL-SCNC: 23 MMOL/L (ref 20–29)
CREAT SERPL-MCNC: 0.68 MG/DL (ref 0.57–1)
EGFRCR SERPLBLD CKD-EPI 2021: 109 ML/MIN/1.73
EOSINOPHIL # BLD AUTO: 0.7 X10E3/UL (ref 0–0.4)
EOSINOPHIL NFR BLD AUTO: 6 %
ERYTHROCYTE [DISTWIDTH] IN BLOOD BY AUTOMATED COUNT: 17.8 % (ref 11.7–15.4)
EST. AVERAGE GLUCOSE BLD GHB EST-MCNC: 174 MG/DL
GLUCOSE SERPL-MCNC: 150 MG/DL (ref 70–99)
HBA1C MFR BLD: 7.7 % (ref 4.8–5.6)
HBV SURFACE AB SER QL: REACTIVE
HCT VFR BLD AUTO: 36.3 % (ref 34–46.6)
HCV IGG SERPL QL IA: NON REACTIVE
HDLC SERPL-MCNC: 43 MG/DL
HGB BLD-MCNC: 11.6 G/DL (ref 11.1–15.9)
IMM GRANULOCYTES # BLD AUTO: 0 X10E3/UL (ref 0–0.1)
IMM GRANULOCYTES NFR BLD AUTO: 0 %
IMP & REVIEW OF LAB RESULTS: NORMAL
LDLC SERPL CALC-MCNC: 129 MG/DL (ref 0–99)
LYMPHOCYTES # BLD AUTO: 2.3 X10E3/UL (ref 0.7–3.1)
LYMPHOCYTES NFR BLD AUTO: 22 %
Lab: NORMAL
MCH RBC QN AUTO: 31.4 PG (ref 26.6–33)
MCHC RBC AUTO-ENTMCNC: 32 G/DL (ref 31.5–35.7)
MCV RBC AUTO: 98 FL (ref 79–97)
MONOCYTES # BLD AUTO: 1.1 X10E3/UL (ref 0.1–0.9)
MONOCYTES NFR BLD AUTO: 10 %
NEUTROPHILS # BLD AUTO: 6.4 X10E3/UL (ref 1.4–7)
NEUTROPHILS NFR BLD AUTO: 60 %
PLATELET # BLD AUTO: 212 X10E3/UL (ref 150–450)
POTASSIUM SERPL-SCNC: 4.9 MMOL/L (ref 3.5–5.2)
PROT SERPL-MCNC: 7.7 G/DL (ref 6–8.5)
RBC # BLD AUTO: 3.7 X10E6/UL (ref 3.77–5.28)
SODIUM SERPL-SCNC: 142 MMOL/L (ref 134–144)
SPECIMEN STATUS REPORT: NORMAL
TRIGL SERPL-MCNC: 135 MG/DL (ref 0–149)
TSH SERPL DL<=0.005 MIU/L-ACNC: 2.91 UIU/ML (ref 0.45–4.5)
VLDLC SERPL CALC-MCNC: 24 MG/DL (ref 5–40)
WBC # BLD AUTO: 10.6 X10E3/UL (ref 3.4–10.8)

## 2025-07-14 LAB — MAGNESIUM SERPL-MCNC: 2 MG/DL (ref 1.6–2.3)

## 2025-07-15 LAB — AMMONIA PLAS-MCNC: 301 UG/DL (ref 31–155)

## 2025-07-17 ENCOUNTER — HOSPITAL ENCOUNTER (OUTPATIENT)
Facility: HOSPITAL | Age: 45
Discharge: HOME | End: 2025-07-20
Payer: COMMERCIAL

## 2025-07-17 VITALS
OXYGEN SATURATION: 96 % | HEART RATE: 103 BPM | RESPIRATION RATE: 17 BRPM | SYSTOLIC BLOOD PRESSURE: 124 MMHG | DIASTOLIC BLOOD PRESSURE: 83 MMHG

## 2025-07-17 DIAGNOSIS — K74.69 OTHER CIRRHOSIS OF LIVER (HCC): ICD-10-CM

## 2025-07-17 DIAGNOSIS — K70.31 ASCITES DUE TO ALCOHOLIC CIRRHOSIS (HCC): ICD-10-CM

## 2025-07-17 PROCEDURE — 76705 ECHO EXAM OF ABDOMEN: CPT

## 2025-07-17 NOTE — PROGRESS NOTES
Pt arrives ambulatory to angio department accompanied by  for thoracentesis procedure. All assessments completed and consent was reviewed.  Education given was regarding procedure, no sedation, post-procedure care and  management/follow-up. Opportunity for questions was provided and all questions and concerns were addressed.

## 2025-07-17 NOTE — PROGRESS NOTES
Pt scanned at bedside. Per US, not enough fluid to perform paracentesis. Pt was rescheduled for an additional scan in a few weeks. Providers notified.

## 2025-07-26 DIAGNOSIS — K86.81 EXOCRINE PANCREATIC INSUFFICIENCY: ICD-10-CM

## 2025-07-28 RX ORDER — PANCRELIPASE LIPASE, PANCRELIPASE PROTEASE, PANCRELIPASE AMYLASE 20000; 63000; 84000 [USP'U]/1; [USP'U]/1; [USP'U]/1
CAPSULE, DELAYED RELEASE ORAL
Qty: 180 CAPSULE | Refills: 1 | Status: SHIPPED | OUTPATIENT
Start: 2025-07-28

## 2025-08-05 PROCEDURE — 93297 REM INTERROG DEV EVAL ICPMS: CPT | Performed by: INTERNAL MEDICINE

## 2025-08-22 ENCOUNTER — OFFICE VISIT (OUTPATIENT)
Age: 45
End: 2025-08-22
Payer: COMMERCIAL

## 2025-08-22 VITALS
HEART RATE: 82 BPM | WEIGHT: 132.6 LBS | TEMPERATURE: 97.5 F | HEIGHT: 64 IN | DIASTOLIC BLOOD PRESSURE: 69 MMHG | SYSTOLIC BLOOD PRESSURE: 98 MMHG | OXYGEN SATURATION: 97 % | BODY MASS INDEX: 22.64 KG/M2

## 2025-08-22 DIAGNOSIS — M25.551 BILATERAL HIP PAIN: Primary | ICD-10-CM

## 2025-08-22 DIAGNOSIS — K74.69 OTHER CIRRHOSIS OF LIVER (HCC): ICD-10-CM

## 2025-08-22 DIAGNOSIS — K70.9 ALCOHOL INDUCED LIVER DISORDER: ICD-10-CM

## 2025-08-22 DIAGNOSIS — M25.552 BILATERAL HIP PAIN: Primary | ICD-10-CM

## 2025-08-22 PROCEDURE — 99214 OFFICE O/P EST MOD 30 MIN: CPT | Performed by: NURSE PRACTITIONER

## 2025-08-22 RX ORDER — TRAMADOL HYDROCHLORIDE 50 MG/1
50 TABLET ORAL EVERY 6 HOURS PRN
Qty: 30 TABLET | Refills: 0 | Status: SHIPPED | OUTPATIENT
Start: 2025-08-22 | End: 2025-09-05

## 2025-08-22 ASSESSMENT — PATIENT HEALTH QUESTIONNAIRE - PHQ9
2. FEELING DOWN, DEPRESSED OR HOPELESS: SEVERAL DAYS
DEPRESSION UNABLE TO ASSESS: FUNCTIONAL CAPACITY MOTIVATION LIMITS ACCURACY
SUM OF ALL RESPONSES TO PHQ QUESTIONS 1-9: 2
1. LITTLE INTEREST OR PLEASURE IN DOING THINGS: SEVERAL DAYS
SUM OF ALL RESPONSES TO PHQ QUESTIONS 1-9: 2

## 2025-08-26 ENCOUNTER — TELEPHONE (OUTPATIENT)
Age: 45
End: 2025-08-26

## 2025-08-26 DIAGNOSIS — G89.29 CHRONIC MIDLINE THORACIC BACK PAIN: ICD-10-CM

## 2025-08-26 DIAGNOSIS — M25.551 BILATERAL HIP PAIN: Primary | ICD-10-CM

## 2025-08-26 DIAGNOSIS — M54.6 CHRONIC MIDLINE THORACIC BACK PAIN: ICD-10-CM

## 2025-08-26 DIAGNOSIS — M25.552 BILATERAL HIP PAIN: Primary | ICD-10-CM

## 2025-08-28 ENCOUNTER — CLINICAL DOCUMENTATION (OUTPATIENT)
Age: 45
End: 2025-08-28

## 2025-09-03 ENCOUNTER — CLINICAL DOCUMENTATION (OUTPATIENT)
Age: 45
End: 2025-09-03

## (undated) DEVICE — Device

## (undated) DEVICE — BITE BLK ENDOSCP AD 54FR GRN POLYETH ENDOSCP W STRP SLD

## (undated) DEVICE — Z DISCONTINUED NO SUB IDED SET EXTN W/ 4 W STPCOCK M SPIN LOK 36IN

## (undated) DEVICE — UNIVERSAL STAPLER: Brand: ENDO GIA ULTRA

## (undated) DEVICE — SUT SLK 2-0SH 30IN BLK --

## (undated) DEVICE — DRAIN CHN 19FR L0.25MM DIA6.3MM SIL RND HUBLESS FULL FLUT

## (undated) DEVICE — GUIDEWIRE VASC L180CM DIA0.035IN 3MM PTFE J TIP EXCHG FIX

## (undated) DEVICE — CATH IV AUTOGRD BC BLU 22GA 25 -- INSYTE

## (undated) DEVICE — NEONATAL-ADULT SPO2 SENSOR: Brand: NELLCOR

## (undated) DEVICE — KIT INTRO 9FR L13CM DIA0.118IN SPLITTABLE HEMSTAT ROBUST

## (undated) DEVICE — BAG BELONG PT PERS CLEAR HANDL

## (undated) DEVICE — ORISE PROKNIFE 1.5 MM ELECTRODE: Brand: ORISE™ PROKNIFE

## (undated) DEVICE — SUTURE SZ 0 27IN 5/8 CIR UR-6  TAPER PT VIOLET ABSRB VICRYL J603H

## (undated) DEVICE — INFECTION CONTROL KIT SYS

## (undated) DEVICE — COVER US PRB W15XL120CM W/ GEL RUBBERBAND TAPE STRP FLD GEN

## (undated) DEVICE — KIT COMPLIANCE W ENDOGLDE + 11 NO BRSH ENDOKT

## (undated) DEVICE — BLADELESS OPTICAL TROCAR WITH FIXATION CANNULA: Brand: VERSAONE

## (undated) DEVICE — ARGO BAGZ FLUID MANAGEMENT SYSTEM: Brand: ARGO BAGZ FLUID MANAGEMENT SYSTEM

## (undated) DEVICE — SUTURE VCRL SZ 2-0 L27IN ABSRB VLT L26MM SH 1/2 CIR J317H

## (undated) DEVICE — KIT IV STRT W CHLORAPREP PD 1ML

## (undated) DEVICE — BLOCK BITE ENDO --

## (undated) DEVICE — SUTURE MCRYL SZ 4-0 L27IN ABSRB UD L19MM PS-2 1/2 CIR PRIM Y426H

## (undated) DEVICE — (D)PREP SKN CHLRAPRP APPL 26ML -- CONVERT TO ITEM 371833

## (undated) DEVICE — KENDALL RADIOLUCENT FOAM MONITORING ELECTRODE -RECTANGULAR SHAPE: Brand: KENDALL

## (undated) DEVICE — POOLE SUCTION INSTRUMENT WITH REMOVABLE SHEATH: Brand: POOLE

## (undated) DEVICE — 1200 GUARD II KIT W/5MM TUBE W/O VAC TUBE: Brand: GUARDIAN

## (undated) DEVICE — TRAP SURG QUAD PARABOLA SLOT DSGN SFTY SCRN TRAPEASE

## (undated) DEVICE — SOLUTION IV 1000ML 0.9% SOD CHL

## (undated) DEVICE — FILTER SMK EVAC FLO CLR MEGADYNE

## (undated) DEVICE — HEART CATH-SFMC: Brand: MEDLINE INDUSTRIES, INC.

## (undated) DEVICE — KENDALL SCD EXPRESS SLEEVES, KNEE LENGTH, MEDIUM: Brand: KENDALL SCD

## (undated) DEVICE — BW-400L DISP SNGL-END CLEANINGBRUSH: Brand: OLYMPUS

## (undated) DEVICE — SURGICAL PROCEDURE KIT GEN LAPAROSCOPY LF

## (undated) DEVICE — REM POLYHESIVE ADULT PATIENT RETURN ELECTRODE: Brand: VALLEYLAB

## (undated) DEVICE — SYR 10ML LUER LOK 1/5ML GRAD --

## (undated) DEVICE — VISUALIZATION SYSTEM: Brand: CLEARIFY

## (undated) DEVICE — SUTURE MNFLMNT SH 26 MM 1/2 CI CRCLE TPR PNT SYMTRC PD PLU

## (undated) DEVICE — PACEMAKER PACK: Brand: MEDLINE INDUSTRIES, INC.

## (undated) DEVICE — APPLICATOR BNDG 1MM ADH PREMIERPRO EXOFIN

## (undated) DEVICE — ORISE PROKNIFE 3.0 MM ELECTRODE: Brand: ORISE™ PROKNIFE

## (undated) DEVICE — CLICKLINE SCISSORS INSERT: Brand: CLICKLINE

## (undated) DEVICE — CATHETER ART THERMODILUTION 6 FRX110 CM 4 LUMEN SWAN

## (undated) DEVICE — MEDI-VAC NON-CONDUCTIVE SUCTION TUBING: Brand: CARDINAL HEALTH

## (undated) DEVICE — BLADELESS OPTICAL TROCAR WITH FIXATION CANNULA: Brand: VERSAPORT

## (undated) DEVICE — CLIP LIG ADH PD HORZ MED BLU --

## (undated) DEVICE — DRESSING ANTIMIC FOAM OPTIFOAM POSTOP ADH 4 X 6 IN

## (undated) DEVICE — WRAP SURG W1.31XL1.34M CARD FOR PT 165-172CM THERMOWRP

## (undated) DEVICE — 3M™ IOBAN™ 2 ANTIMICROBIAL INCISE DRAPE 6640EZ: Brand: IOBAN™ 2

## (undated) DEVICE — STERILE POLYISOPRENE POWDER-FREE SURGICAL GLOVES WITH EMOLLIENT COATING: Brand: PROTEXIS

## (undated) DEVICE — NEEDLE HYPO 25GA L1.5IN BVL ORIENTED ECLIPSE

## (undated) DEVICE — NEEDLE HYPO 18GA L1.5IN PNK S STL HUB POLYPR SHLD REG BVL

## (undated) DEVICE — TOWEL SURG W17XL27IN STD BLU COT NONFENESTRATED PREWASHED

## (undated) DEVICE — DEVON™ KNEE AND BODY STRAP 60" X 3" (1.5 M X 7.6 CM): Brand: DEVON

## (undated) DEVICE — GLIDESHEATH SLENDER STAINLESS STEEL KIT: Brand: GLIDESHEATH SLENDER

## (undated) DEVICE — RADIFOCUS GLIDEWIRE ADVANTAGE GUIDEWIRE: Brand: GLIDEWIRE ADVANTAGE

## (undated) DEVICE — SUPPLEMENT DIGESTIVE H2O SOL GI-EASE

## (undated) DEVICE — NEEDLE ASPIR 22GA CHN 2.4MM SHTH DIA1.65MM CO CHROM ENDOSCP

## (undated) DEVICE — APPLICATOR MEDICATED 26 CC SOLUTION HI LT ORNG CHLORAPREP

## (undated) DEVICE — SUTURE STRATAFIX SPRL MCRYL + SZ 4-0 L12IN ABSRB UD PS-2 SXMP1B117

## (undated) DEVICE — NEEDLE HYPO 22GA L1.5IN BLK S STL HUB POLYPR SHLD REG BVL

## (undated) DEVICE — GUIDEWIRE VASC L80CM DIA0.018IN TIP L5CM 15DEG ANG NIT

## (undated) DEVICE — DRAPE,UTILTY,TAPE,15X26, 4EA/PK: Brand: MEDLINE

## (undated) DEVICE — TUBING PRSS MON L12IN PVC RIG NONEXPANDING M TO FEM CONN

## (undated) DEVICE — SUT ETHLN 3-0 18IN PS2 BLK --

## (undated) DEVICE — CLIP LIG ADH PD W SLOT HORZ --

## (undated) DEVICE — CATHETER DIAG 5FR L100CM LUMN ID0.047IN JR4 CRV 0 SIDE H

## (undated) DEVICE — UNIVERSAL FIXATION CANNULA: Brand: VERSAONE

## (undated) DEVICE — SUTURE VCRL SZ 2-0 L36IN ABSRB UD L40MM CT 1/2 CIR J957H

## (undated) DEVICE — ANGIOGRAPHIC CATHETER: Brand: IMPULSE™

## (undated) DEVICE — SOLUTION IRRIG 1000ML H2O STRL BLT

## (undated) DEVICE — BAND COMPR L24CM REG CLR PLAS HEMSTAT EXT HK AND LOOP RETEN

## (undated) DEVICE — TRAY CATH OD16FR SIL URIN M STATLOK STBL DEV SURSTP

## (undated) DEVICE — DBD-PACK,LAPAROTOMY,2 REINFORCED GOWNS: Brand: MEDLINE

## (undated) DEVICE — CURVED, LARGE JAW, OPEN SEALER/DIVIDER NANO-COATED: Brand: LIGASURE IMPACT

## (undated) DEVICE — BLACK INTELLIGENT RELOAD: Brand: TRI-STAPLE 2.0

## (undated) DEVICE — SET ADMIN 16ML TBNG L100IN 2 Y INJ SITE IV PIGGY BK DISP

## (undated) DEVICE — SKIN TEMPERATURE SENSOR: Brand: DEROYAL

## (undated) DEVICE — MARYLAND JAW LAPAROSCOPIC SEALER/DIVIDER COATED: Brand: LIGASURE

## (undated) DEVICE — BLADE ASSEMB CLP HAIR FINE --

## (undated) DEVICE — SUTURE PERMA-HAND SZ 0 L30IN NONABSORBABLE BLK L36MM CT-1 424H

## (undated) DEVICE — SUPPORT WRST AD W3.5XL9IN DIA14.5IN ART SFT ADJ HK AND LOOP

## (undated) DEVICE — SUTURE ABSORBABLE MONOFILAMENT 2-0 CT1 12 IN UD MONOCRYL + SXMP1B412

## (undated) DEVICE — ARTICULATING RELOAD WITH TRI-STAPLE TECHNOLOGY: Brand: ENDO GIA

## (undated) DEVICE — STRIP,CLOSURE,WOUND,MEDI-STRIP,1/2X4: Brand: MEDLINE

## (undated) DEVICE — SYRINGE IRRIG 60ML SFT PLIABLE BLB EZ TO GRP 1 HND USE W/

## (undated) DEVICE — 5F (1.0MM ID) X 9CM5F (1.0MM ID) REGULAR MICRO-STICK® INTRODUCER SETINTRODUCER SET WITH NITINOL GUIDEWIREWITH NITIN WITH RADIOPAQUE TIPWITH RADIOPAQ: Brand: MICRO-STICK SETMICRO-STICK SET

## (undated) DEVICE — SUTURE VCRL 1 L27IN ABSRB CT BRAID COAT UD J281H

## (undated) DEVICE — SPONGE LAP 18X18IN STRL -- 5/PK

## (undated) DEVICE — CANN NASAL O2 CAPNOGRAPHY AD -- FILTERLINE

## (undated) DEVICE — SURGICAL PROCEDURE PACK BASIN MAJ SET CUST NO CAUT

## (undated) DEVICE — SYRINGE MED 20ML STD CLR PLAS LUERLOCK TIP N CTRL DISP

## (undated) DEVICE — MEDI-TRACE CADENCE ADULT, DEFIBRILLATION ELECTRODE -RTS  (10 PR/PK) - PHYSIO-CONTROL: Brand: MEDI-TRACE CADENCE

## (undated) DEVICE — TROCAR SITE CLOSURE DEVICE: Brand: ENDO CLOSE

## (undated) DEVICE — MEDI-VAC YANK SUCT HNDL W/TPRD BULBOUS TIP: Brand: CARDINAL HEALTH

## (undated) DEVICE — ROCKER SWITCH PENCIL BLADE ELECTRODE, HOLSTER: Brand: EDGE

## (undated) DEVICE — TUBING INSUFLTN 10FT LUER -- CONVERT TO ITEM 368568

## (undated) DEVICE — SNARE ENDOSCP L240CM LOOP W27MM SHTH DIA2.4MM WRK CHN 2.8MM

## (undated) DEVICE — SUTURE ETHBND EXCEL SZ 0 L18IN NONABSORBABLE GRN L36MM CT-1 CX21D

## (undated) DEVICE — SOLIDIFIER FLUID 3000 CC ABSORB

## (undated) DEVICE — Device: Brand: SINGLE USE SOFT BRUSH